# Patient Record
Sex: MALE | Race: WHITE | NOT HISPANIC OR LATINO | Employment: OTHER | URBAN - NONMETROPOLITAN AREA
[De-identification: names, ages, dates, MRNs, and addresses within clinical notes are randomized per-mention and may not be internally consistent; named-entity substitution may affect disease eponyms.]

---

## 2017-01-04 RX ORDER — CLOPIDOGREL BISULFATE 75 MG/1
TABLET ORAL
Qty: 90 TABLET | Refills: 3 | Status: ON HOLD | OUTPATIENT
Start: 2017-01-04 | End: 2017-02-07

## 2017-01-13 ENCOUNTER — OFFICE VISIT (OUTPATIENT)
Dept: ORTHOPEDIC SURGERY | Facility: CLINIC | Age: 77
End: 2017-01-13

## 2017-01-13 VITALS — BODY MASS INDEX: 27.64 KG/M2 | WEIGHT: 156 LBS | HEIGHT: 63 IN

## 2017-01-13 DIAGNOSIS — I25.10 CORONARY ARTERY DISEASE INVOLVING NATIVE CORONARY ARTERY OF NATIVE HEART WITHOUT ANGINA PECTORIS: ICD-10-CM

## 2017-01-13 DIAGNOSIS — M48.061 SPINAL STENOSIS OF LUMBAR REGION: ICD-10-CM

## 2017-01-13 DIAGNOSIS — M51.16 NEURITIS OR RADICULITIS DUE TO RUPTURE OF LUMBAR INTERVERTEBRAL DISC: ICD-10-CM

## 2017-01-13 DIAGNOSIS — M51.36 DEGENERATION OF LUMBAR INTERVERTEBRAL DISC: Primary | ICD-10-CM

## 2017-01-13 PROCEDURE — 99214 OFFICE O/P EST MOD 30 MIN: CPT | Performed by: ORTHOPAEDIC SURGERY

## 2017-01-13 NOTE — PROGRESS NOTES
Vishnu Waller is a 76 y.o. male returns for     Chief Complaint   Patient presents with   • Lumbar Spine - Follow-up       HISTORY OF PRESENT ILLNESS: Patient here to discuss surgical options.  Wishes to proceed for surgical treatment.       CONCURRENT MEDICAL HISTORY:    Past Medical History   Diagnosis Date   • Acute bacterial sinusitis    • Acute bronchitis    • Acute frontal sinusitis    • Acute maxillary sinusitis    • Acute pharyngitis    • Acute sinusitis      improved   • Allergic rhinitis    • Allergic rhinitis due to pollen    • Anxiety    • Artificial lens present      in position   • Backache    • Borderline glaucoma    • Chronic laryngitis    • Chronic rhinitis    • Coronary arteriosclerosis    • Coronary atherosclerosis    • Cough    • Degeneration of lumbar intervertebral disc    • Degenerative joint disease involving multiple joints    • Diabetes    • Diverticular disease of colon    • Erectile dysfunction    • Essential hypertension    • Generalized anxiety disorder    • GERD (gastroesophageal reflux disease)    • Glaucoma    • Heart disease    • Hemorrhoids      without bleeding   • Hyperlipidemia    • Insomnia    • Kidney stone    • Malignant tumor of prostate    • Need for prophylactic vaccination and inoculation against influenza    • Osteoarthritis    • Osteoarthritis of multiple joints    • Pain, lumbar region      Pain radiating to lumbar region of back     • Prostate cancer    • Pseudomembranous enterocolitis      improved   • Rotator cuff syndrome      right   • Shoulder pain    • Sleep apnea    • Strain of rotator cuff capsule    • Tenosynovitis    • Type 2 diabetes mellitus      no BDR   • Upper respiratory infection        No Known Allergies      Current Outpatient Prescriptions:   •  amLODIPine (NORVASC) 5 MG tablet, , Disp: , Rfl:   •  clopidogrel (PLAVIX) 75 MG tablet, TAKE 1 TABLET DAILY, Disp: 90 tablet, Rfl: 3  •  cyanocobalamin (VITAMIN B-12N) 50 MCG tablet, Take 50 mcg by  mouth Daily., Disp: , Rfl:   •  HYDROcodone-acetaminophen (NORCO)  MG per tablet, Take 1 tablet by mouth daily as needed for moderate pain (4-6)., Disp: , Rfl:   •  latanoprost (XALATAN) 0.005 % ophthalmic solution, Administer 1 drop to both eyes every night., Disp: , Rfl:   •  losartan (COZAAR) 50 MG tablet, , Disp: , Rfl:   •  melatonin 3 MG tablet, TAKE 1/2 TO 1 TABLET BY MOUTH NIGHTLY FOR SLEEP ISSUES, Disp: , Rfl: 5  •  meloxicam (MOBIC) 15 MG tablet, , Disp: , Rfl:   •  metFORMIN (GLUCOPHAGE) 1000 MG tablet, 1,000 mg 2 (Two) Times a Day With Meals., Disp: , Rfl:   •  mometasone (NASONEX) 50 MCG/ACT nasal spray, 2 sprays into each nostril daily., Disp: , Rfl:   •  montelukast (SINGULAIR) 10 MG tablet, , Disp: , Rfl:   •  Multiple Vitamins-Minerals (CENTRUM PO), Take 1 tablet by mouth daily. Centrum 0.4 mg-162 mg-18 mg tablet  (unconfirmed), Disp: , Rfl:   •  simvastatin (ZOCOR) 40 MG tablet, , Disp: , Rfl:     Past Surgical History   Procedure Laterality Date   • Cataract extraction Bilateral    • Nose surgery     • Prostatectomy     • Shoulder surgery Left      rotator cuff repair   • Colonoscopy     • Shoulder arthroscopy  07/24/2013     Arthroscopy of right shoulder with rotator repair, Carla procedure, Biceps tenotomy, subacromial decompression.   • Cardiac catheterization  09/25/2003     Cardiac cath (Normal left ventricular systolic function, EF 60% Multi-vessel coronary artery disease with critical disease noted in the LAD coronary artery, diagonal coronary artery, obtuse marginal coronary and right coronary artery.)   • Cardiac catheterization  05/03/1996     Cardiac cath (Coronary atherosclerotic heart disease. 70% diagonal stenosis. Mild to moderate left anterior descending artery stenosis. Preserved left ventricular function.)   • Colonoscopy       Colon endoscopy 48162 (Rectal bleeding. Radiation proctitis w/bleeding. An abnormal CT of transverse colon due to mucosal ischemic colitis, that  now is healed. Internal hemorrhoids w/possible bleeding.)   • Colonoscopy  05/10/2011     Colon endoscopy 14760 (Single sessile polyp found in transverse colon and sigmoid colon ,removed by cold biopsy polypectomy.divertic. found in sigmoid colon,descending colon. Friability/capillary friability in rectum sigmoid due to prior radiation.Inter. hem. Grade 1)   • Colonoscopy  05/02/2007     Colon endoscopy 85287 (Colon polyp. Diverticulosis without bleeding. Internal hemorrhoids without bleeding.)   • Cystoscopy  01/13/1995     Cystoscopy, stone removal (Right ureteroscopic lasertripsy.)   • Other surgical history       EXTENDED VISUAL FIELDS STUDY 37357 (Borderline glaucoma) (3): 03/19/2015, 04/09/2014, 03/27/2013   • Other surgical history  10/29/2003     Heart revascularize (TMR) (Directmyocardial revascularization times four; LIMA to LAD SVG to DARIUSZ SVG to OM1, SVG to RCA)   • Injection of medication  11/15/2012     Kenalog (4)      • Knee arthroscopy  01/17/2007     Knee arthroscopy, surgery (Arthroscopy with medial and lateral meniscectomies, right knee.)   • Knee surgery  11/04/2015     Knee Surgery (Right unicompartmental arthroplasty.)   • Septorhinoplasty  11/16/1989     Nasal surgery procedure (Septorhinoplasty with reconstruction of the nasal pyramid with a iliac crest graft, rhinoplasty was performed with external approach.)   • Other surgical history       OCT DISC NFL 77218 (Borderline glaucoma)  (3): 09/24/2015, 08/13/2014, 07/29/2013   • Cataract extraction  10/04/2011     Remove cataract, insert lens (Right)   • Cataract extraction  08/23/2011     Remove cataract, insert lens (left)   • Excision lesion  05/13/1999     REMOVE EAR LESION 05691 (1.3 cm basal cell carcinoma, right preauricular area. Excision)   • Excision lesion  03/13/2001     REMOVE LESION NECK/CHEST 74558 (Excision of irritated seborrheic keratosis, anterior neck, 1.1 cm and deep lipoma, posterior neck, 3.5 cm)   • Shoulder surgery   "11/01/2005     Shoulder surgery procedure (Decompression, subacromial, explore of the rotator cuff, no tear found nor repaired, acromioplasty of the left shoulder and AC shoulder joint resection.)   • Epidural  12/03/2014     Therapeutic/diag injection (Lumbar transforaminal epidural steroid injection, L5-S1, left side.)   • Epidural  10/22/2014     Therapeutic/diag injection (Lumbar transforaminal epidural steroid injection L5-S1 left side.)   • Epidural  08/07/2014     Therapeutic/diag injection (Lumbar transforaminal epidural steroid injection.)       ROS  No fevers or chills.  No chest pain or shortness of air.  No GI or  disturbances.    PHYSICAL EXAMINATION:       Visit Vitals   • Ht 63\" (160 cm)   • Wt 156 lb (70.8 kg)   • BMI 27.63 kg/m2       Physical Exam   Constitutional: He appears well-developed.   HENT:   Head: Normocephalic and atraumatic.   Eyes: Pupils are equal, round, and reactive to light. Right eye exhibits no discharge. Left eye exhibits no discharge.   Neck: Normal range of motion. No JVD present. No tracheal deviation present. No thyromegaly present.   Cardiovascular: Normal rate, regular rhythm, normal heart sounds and intact distal pulses.  Exam reveals no gallop and no friction rub.    No murmur heard.  Pulmonary/Chest: Effort normal and breath sounds normal. No respiratory distress. He has no wheezes. He has no rales. He exhibits no tenderness.   Abdominal: Soft. Bowel sounds are normal. He exhibits no distension. There is no tenderness. There is no guarding.   Musculoskeletal: He exhibits no edema or deformity.        Thoracic back: He exhibits normal range of motion, no tenderness, no bony tenderness, no swelling, no edema, no deformity, no laceration, no pain, no spasm and normal pulse.        Lumbar back: He exhibits decreased range of motion and tenderness. He exhibits no bony tenderness, no swelling, no edema, no deformity, no laceration, no pain, no spasm and normal pulse.        " Back:    Lymphadenopathy:     He has no cervical adenopathy.   Neurological: He is alert. He has normal reflexes. He displays normal reflexes. No cranial nerve deficit. Coordination normal.   Skin: Skin is warm. No rash noted. No erythema.   Psychiatric: He has a normal mood and affect. His behavior is normal. Thought content normal.   forward leaning posture.      GAIT:     [x]  Normal  []  Antalgic    Assistive device: []  None  []  Walker     []  Crutches  []  Cane     []  Wheelchair  []  Stretcher    Right Ankle Exam     Muscle Strength   Dorsiflexion:  5/5  Plantar flexion:  5/5  Anterior tibial:  5/5  Gastrocsoleus:  5/5  Peroneal muscle:  5/5      Left Ankle Exam     Muscle Strength   Dorsiflexion:  5/5   Plantar flexion:  5/5   Anterior tibial:  5/5   Gastrocsoleus:  5/5  Peroneal muscle:  5/5      Right Hip Exam     Muscle Strength   Flexion: 5/5       Left Hip Exam     Muscle Strength   Flexion: 5/5       Back Exam     Range of Motion   Extension: 20   Flexion: 60   Lateral Bend Right: 20   Lateral Bend Left: 20     Other   Gait: normal   Erythema: no back redness  Scars: absent              No results found.          ASSESSMENT:    Diagnoses and all orders for this visit:    Degeneration of lumbar intervertebral disc    Neuritis or radiculitis due to rupture of lumbar intervertebral disc          PLAN    Plan for decompression with interlaminar distraction of L3-4 and L4-5.   Reviewed risks of surgery, including risk of infection, hematoma, spinal fluid leak, neurologic injury including numbness and weakness.  I explained to him the medical risks of surgery including the risk of death, blindness, heart attack, stroke, gastric ulceration, perforation, peritonitis, deep vein thrombosis, pulmonary embolus, pneumonia, pulmonary failure necessitating prolonged ventilation, heart failure renal failure sepsis and multiorgan system failure.   He wishes to proceed for surgical treatment.       Lucio Faulkner  MD Gael

## 2017-01-13 NOTE — MR AVS SNAPSHOT
Vishnu Waller   1/13/2017 10:40 AM   Office Visit    Dept Phone:  [phone]   Encounter #:  [id number]    Provider:  uLcio Mayo MD   Department:  [facility] ORTHOPEDICS                Your Full Care Plan              Your Updated Medication List          This list is accurate as of: 1/13/17 12:33 PM.  Always use your most recent med list.                amLODIPine 5 MG tablet   Commonly known as:  NORVASC       CENTRUM PO       clopidogrel 75 MG tablet   Commonly known as:  PLAVIX   TAKE 1 TABLET DAILY       cyanocobalamin 50 MCG tablet   Commonly known as:  VITAMIN B-12N       HYDROcodone-acetaminophen  MG per tablet   Commonly known as:  NORCO       latanoprost 0.005 % ophthalmic solution   Commonly known as:  XALATAN       losartan 50 MG tablet   Commonly known as:  COZAAR       melatonin 3 MG tablet       meloxicam 15 MG tablet   Commonly known as:  MOBIC       metFORMIN 1000 MG tablet   Commonly known as:  GLUCOPHAGE       mometasone 50 MCG/ACT nasal spray   Commonly known as:  NASONEX       montelukast 10 MG tablet   Commonly known as:  SINGULAIR       simvastatin 40 MG tablet   Commonly known as:  ZOCOR               You Were Diagnosed With        Codes Comments    Degeneration of lumbar intervertebral disc    -  Primary ICD-10-CM: M51.36  ICD-9-CM: 722.52     Neuritis or radiculitis due to rupture of lumbar intervertebral disc     ICD-10-CM: M51.16  ICD-9-CM: 722.10       Instructions     None    Patient Instructions History      Upcoming Appointments     Visit Type Date Time Department    FOLLOW UP 1/13/2017 10:40 AM [facility] ORTHOPEDIC [facility]    FOLLOW UP 2/17/2017 10:00 AM [facility] ORTHOPEDIC [facility]    OFFICE VISIT 4/28/2017  1:15 PM [facility] HEART CARE [facility]    OFFICE VISIT 10/5/2017  1:15 PM [facility] OPHTHALMOLOGY [facility]      MyChart Signup     Our records indicate that you have declined [facility] signup. If you would like to sign up for  "[employer], please email [email or web address] or call [phone] to obtain an activation code.             Other Info from Your Visit           Your Appointments     Feb 17, 2017 10:00 AM CST   Follow Up with Nirmal Paez MD   [facility] ORTHOPEDICS (--)    [address]  [facility] [address] [phone]           Arrive 15 minutes prior to appointment.            Apr 28, 2017  1:15 PM CDT   Office Visit with Radha Wilkes MD   [facility] CARDIOLOGY (--)    [address]  [facility] [address] [phone]           Arrive 15 minutes prior to appointment.            Oct 05, 2017  1:15 PM CDT   Office Visit with Julio Cesar Shafer MD   [facility] OPHTHALMOLOGY (--)    [address] [place] [address] [address] [facility] [address] [phone]           Arrive 15 minutes prior to appointment.              Allergies     No Known Allergies      Reason for Visit     Lumbar Spine - Follow-up           Vital Signs     Height Weight Body Mass Index Smoking Status          63\" (160 cm) 156 lb (70.8 kg) 27.63 kg/m2 Current Every Day Smoker        Problems and Diagnoses Noted     Degeneration of lumbar or lumbosacral intervertebral disc    Neuritis or radiculitis due to rupture of lumbar intervertebral disc        "

## 2017-01-17 PROBLEM — M48.061 SPINAL STENOSIS OF LUMBAR REGION: Status: ACTIVE | Noted: 2017-01-17

## 2017-01-23 DIAGNOSIS — M51.16 NEURITIS OR RADICULITIS DUE TO RUPTURE OF LUMBAR INTERVERTEBRAL DISC: ICD-10-CM

## 2017-01-23 DIAGNOSIS — M51.36 DEGENERATION OF LUMBAR INTERVERTEBRAL DISC: Primary | ICD-10-CM

## 2017-01-23 DIAGNOSIS — M48.061 SPINAL STENOSIS, LUMBAR REGION, WITHOUT NEUROGENIC CLAUDICATION: ICD-10-CM

## 2017-01-24 ENCOUNTER — TELEPHONE (OUTPATIENT)
Dept: ORTHOPEDIC SURGERY | Facility: CLINIC | Age: 77
End: 2017-01-24

## 2017-02-01 ENCOUNTER — APPOINTMENT (OUTPATIENT)
Dept: PREADMISSION TESTING | Facility: HOSPITAL | Age: 77
End: 2017-02-01

## 2017-02-01 VITALS
BODY MASS INDEX: 26.75 KG/M2 | RESPIRATION RATE: 18 BRPM | WEIGHT: 151 LBS | HEIGHT: 63 IN | HEART RATE: 81 BPM | OXYGEN SATURATION: 97 % | SYSTOLIC BLOOD PRESSURE: 140 MMHG | DIASTOLIC BLOOD PRESSURE: 76 MMHG

## 2017-02-01 LAB
ANION GAP SERPL CALCULATED.3IONS-SCNC: 13 MMOL/L (ref 5–15)
BUN BLD-MCNC: 18 MG/DL (ref 7–21)
BUN/CREAT SERPL: 17.6 (ref 7–25)
CALCIUM SPEC-SCNC: 10.4 MG/DL (ref 8.4–10.2)
CHLORIDE SERPL-SCNC: 102 MMOL/L (ref 95–110)
CO2 SERPL-SCNC: 26 MMOL/L (ref 22–31)
CREAT BLD-MCNC: 1.02 MG/DL (ref 0.7–1.3)
DEPRECATED RDW RBC AUTO: 54.9 FL (ref 35.1–43.9)
ERYTHROCYTE [DISTWIDTH] IN BLOOD BY AUTOMATED COUNT: 15.4 % (ref 11.5–14.5)
GFR SERPL CREATININE-BSD FRML MDRD: 71 ML/MIN/1.73 (ref 42–98)
GLUCOSE BLD-MCNC: 170 MG/DL (ref 60–100)
HCT VFR BLD AUTO: 38.6 % (ref 39–49)
HGB BLD-MCNC: 13.1 G/DL (ref 13.7–17.3)
MCH RBC QN AUTO: 33 PG (ref 26.5–34)
MCHC RBC AUTO-ENTMCNC: 33.9 G/DL (ref 31.5–36.3)
MCV RBC AUTO: 97.2 FL (ref 80–98)
PLATELET # BLD AUTO: 231 10*3/MM3 (ref 150–450)
PMV BLD AUTO: 9.9 FL (ref 8–12)
POTASSIUM BLD-SCNC: 4.6 MMOL/L (ref 3.5–5.1)
RBC # BLD AUTO: 3.97 10*6/MM3 (ref 4.37–5.74)
SODIUM BLD-SCNC: 141 MMOL/L (ref 137–145)
WBC NRBC COR # BLD: 9.91 10*3/MM3 (ref 3.2–9.8)

## 2017-02-01 PROCEDURE — 85027 COMPLETE CBC AUTOMATED: CPT | Performed by: ORTHOPAEDIC SURGERY

## 2017-02-01 PROCEDURE — 36415 COLL VENOUS BLD VENIPUNCTURE: CPT

## 2017-02-01 PROCEDURE — 80048 BASIC METABOLIC PNL TOTAL CA: CPT | Performed by: ANESTHESIOLOGY

## 2017-02-01 RX ORDER — OXYBUTYNIN CHLORIDE 5 MG/1
5 TABLET ORAL NIGHTLY
Status: ON HOLD | COMMUNITY
End: 2019-01-23 | Stop reason: SINTOL

## 2017-02-01 RX ORDER — RANITIDINE HCL 75 MG
75 TABLET ORAL 2 TIMES DAILY
COMMUNITY
End: 2018-10-08

## 2017-02-01 NOTE — PAT
Spoke with Camryn at Dr. Mayo's office r/t patient still taking Plavix, was told for patient to hold as of today. Patient voiced understanding.

## 2017-02-06 ENCOUNTER — ANESTHESIA EVENT (OUTPATIENT)
Dept: PERIOP | Facility: HOSPITAL | Age: 77
End: 2017-02-06

## 2017-02-07 ENCOUNTER — APPOINTMENT (OUTPATIENT)
Dept: GENERAL RADIOLOGY | Facility: HOSPITAL | Age: 77
End: 2017-02-07

## 2017-02-07 ENCOUNTER — ANESTHESIA (OUTPATIENT)
Dept: PERIOP | Facility: HOSPITAL | Age: 77
End: 2017-02-07

## 2017-02-07 ENCOUNTER — HOSPITAL ENCOUNTER (OUTPATIENT)
Facility: HOSPITAL | Age: 77
Discharge: HOME | End: 2017-02-10
Attending: ORTHOPAEDIC SURGERY | Admitting: ORTHOPAEDIC SURGERY

## 2017-02-07 DIAGNOSIS — Z74.09 IMPAIRED PHYSICAL MOBILITY: Primary | ICD-10-CM

## 2017-02-07 PROBLEM — M51.36 DEGENERATIVE DISC DISEASE, LUMBAR: Status: ACTIVE | Noted: 2017-02-07

## 2017-02-07 LAB
GLUCOSE BLDC GLUCOMTR-MCNC: 151 MG/DL (ref 70–130)
GLUCOSE BLDC GLUCOMTR-MCNC: 176 MG/DL (ref 70–130)

## 2017-02-07 PROCEDURE — 72100 X-RAY EXAM L-S SPINE 2/3 VWS: CPT | Performed by: ORTHOPAEDIC SURGERY

## 2017-02-07 PROCEDURE — 25010000002 MIDAZOLAM PER 1 MG: Performed by: ANESTHESIOLOGY

## 2017-02-07 PROCEDURE — A9270 NON-COVERED ITEM OR SERVICE: HCPCS | Performed by: ORTHOPAEDIC SURGERY

## 2017-02-07 PROCEDURE — 25010000002 PROMETHAZINE PER 50 MG: Performed by: ORTHOPAEDIC SURGERY

## 2017-02-07 PROCEDURE — 82962 GLUCOSE BLOOD TEST: CPT

## 2017-02-07 PROCEDURE — 25010000002 ONDANSETRON PER 1 MG: Performed by: ANESTHESIOLOGY

## 2017-02-07 PROCEDURE — 63710000001 MONTELUKAST 10 MG TABLET: Performed by: ORTHOPAEDIC SURGERY

## 2017-02-07 PROCEDURE — 25010000002 HYDROMORPHONE PER 4 MG: Performed by: ANESTHESIOLOGY

## 2017-02-07 PROCEDURE — 25010000002 HYDROMORPHONE PER 4 MG: Performed by: NURSE ANESTHETIST, CERTIFIED REGISTERED

## 2017-02-07 PROCEDURE — 94799 UNLISTED PULMONARY SVC/PX: CPT

## 2017-02-07 PROCEDURE — 25010000002 MORPHINE PER 10 MG: Performed by: ORTHOPAEDIC SURGERY

## 2017-02-07 PROCEDURE — 25810000003 SODIUM CHLORIDE 0.9 % WITH KCL 20 MEQ 20-0.9 MEQ/L-% SOLUTION: Performed by: ORTHOPAEDIC SURGERY

## 2017-02-07 PROCEDURE — 22868 INSJ STABLJ DEV W/DCMPRN: CPT | Performed by: ORTHOPAEDIC SURGERY

## 2017-02-07 PROCEDURE — 63710000001 LATANOPROST 0.005 % SOLUTION 2.5 ML BOTTLE: Performed by: ORTHOPAEDIC SURGERY

## 2017-02-07 PROCEDURE — 22867 INSJ STABLJ DEV W/DCMPRN: CPT | Performed by: ORTHOPAEDIC SURGERY

## 2017-02-07 PROCEDURE — 25010000002 PROPOFOL 10 MG/ML EMULSION: Performed by: NURSE ANESTHETIST, CERTIFIED REGISTERED

## 2017-02-07 PROCEDURE — 25010000002 FENTANYL CITRATE (PF) 100 MCG/2ML SOLUTION: Performed by: NURSE ANESTHETIST, CERTIFIED REGISTERED

## 2017-02-07 PROCEDURE — 76000 FLUOROSCOPY <1 HR PHYS/QHP: CPT

## 2017-02-07 PROCEDURE — 25010000003 CEFAZOLIN PER 500 MG: Performed by: ORTHOPAEDIC SURGERY

## 2017-02-07 PROCEDURE — 25010000002 ONDANSETRON PER 1 MG: Performed by: ORTHOPAEDIC SURGERY

## 2017-02-07 PROCEDURE — 25010000002 PHENYLEPHRINE PER 1 ML: Performed by: NURSE ANESTHETIST, CERTIFIED REGISTERED

## 2017-02-07 PROCEDURE — 25010000002 MEPERIDINE 25 MG/0.5ML SOLUTION: Performed by: NURSE ANESTHETIST, CERTIFIED REGISTERED

## 2017-02-07 DEVICE — IMPLANTABLE DEVICE
Type: IMPLANTABLE DEVICE | Site: SPINE LUMBAR | Status: FUNCTIONAL
Brand: COFLEX INTERLAMINAR TECHNOLOGY, 12MM

## 2017-02-07 DEVICE — IMPLANTABLE DEVICE
Type: IMPLANTABLE DEVICE | Site: SPINE LUMBAR | Status: FUNCTIONAL
Brand: COFLEX INTERLAMINAR TECHNOLOGY, 10MM

## 2017-02-07 RX ORDER — LOSARTAN POTASSIUM 50 MG/1
50 TABLET ORAL DAILY
Status: DISCONTINUED | OUTPATIENT
Start: 2017-02-07 | End: 2017-02-10 | Stop reason: HOSPADM

## 2017-02-07 RX ORDER — LATANOPROST 50 UG/ML
1 SOLUTION/ DROPS OPHTHALMIC NIGHTLY
Status: DISCONTINUED | OUTPATIENT
Start: 2017-02-07 | End: 2017-02-10 | Stop reason: HOSPADM

## 2017-02-07 RX ORDER — FENTANYL CITRATE 50 UG/ML
INJECTION, SOLUTION INTRAMUSCULAR; INTRAVENOUS AS NEEDED
Status: DISCONTINUED | OUTPATIENT
Start: 2017-02-07 | End: 2017-02-07 | Stop reason: SURG

## 2017-02-07 RX ORDER — BACTERIOSTATIC SODIUM CHLORIDE 0.9 %
VIAL (ML) INJECTION AS NEEDED
Status: DISCONTINUED | OUTPATIENT
Start: 2017-02-07 | End: 2017-02-10 | Stop reason: HOSPADM

## 2017-02-07 RX ORDER — PROMETHAZINE HYDROCHLORIDE 25 MG/ML
12.5 INJECTION, SOLUTION INTRAMUSCULAR; INTRAVENOUS EVERY 6 HOURS PRN
Status: DISCONTINUED | OUTPATIENT
Start: 2017-02-07 | End: 2017-02-10 | Stop reason: HOSPADM

## 2017-02-07 RX ORDER — ONDANSETRON 2 MG/ML
4 INJECTION INTRAMUSCULAR; INTRAVENOUS ONCE AS NEEDED
Status: DISCONTINUED | OUTPATIENT
Start: 2017-02-07 | End: 2017-02-07

## 2017-02-07 RX ORDER — SODIUM CHLORIDE AND POTASSIUM CHLORIDE 150; 900 MG/100ML; MG/100ML
100 INJECTION, SOLUTION INTRAVENOUS CONTINUOUS
Status: DISCONTINUED | OUTPATIENT
Start: 2017-02-07 | End: 2017-02-10 | Stop reason: HOSPADM

## 2017-02-07 RX ORDER — FLUMAZENIL 0.1 MG/ML
0.2 INJECTION INTRAVENOUS AS NEEDED
Status: DISCONTINUED | OUTPATIENT
Start: 2017-02-07 | End: 2017-02-07

## 2017-02-07 RX ORDER — ONDANSETRON 2 MG/ML
4 INJECTION INTRAMUSCULAR; INTRAVENOUS EVERY 6 HOURS PRN
Status: DISCONTINUED | OUTPATIENT
Start: 2017-02-07 | End: 2017-02-10 | Stop reason: HOSPADM

## 2017-02-07 RX ORDER — MIDAZOLAM HYDROCHLORIDE 1 MG/ML
1 INJECTION INTRAMUSCULAR; INTRAVENOUS
Status: DISCONTINUED | OUTPATIENT
Start: 2017-02-07 | End: 2017-02-07 | Stop reason: HOSPADM

## 2017-02-07 RX ORDER — LIDOCAINE HYDROCHLORIDE 10 MG/ML
0.5 INJECTION, SOLUTION EPIDURAL; INFILTRATION; INTRACAUDAL; PERINEURAL ONCE AS NEEDED
Status: DISCONTINUED | OUTPATIENT
Start: 2017-02-07 | End: 2017-02-07 | Stop reason: HOSPADM

## 2017-02-07 RX ORDER — MONTELUKAST SODIUM 10 MG/1
10 TABLET ORAL NIGHTLY
Status: DISCONTINUED | OUTPATIENT
Start: 2017-02-07 | End: 2017-02-10 | Stop reason: HOSPADM

## 2017-02-07 RX ORDER — FAMOTIDINE 20 MG/1
20 TABLET, FILM COATED ORAL DAILY
Status: DISCONTINUED | OUTPATIENT
Start: 2017-02-07 | End: 2017-02-10 | Stop reason: HOSPADM

## 2017-02-07 RX ORDER — BUPIVACAINE HYDROCHLORIDE AND EPINEPHRINE 2.5; 5 MG/ML; UG/ML
INJECTION, SOLUTION EPIDURAL; INFILTRATION; INTRACAUDAL; PERINEURAL
Status: DISCONTINUED
Start: 2017-02-07 | End: 2017-02-07 | Stop reason: WASHOUT

## 2017-02-07 RX ORDER — AMLODIPINE BESYLATE 5 MG/1
5 TABLET ORAL DAILY
Status: DISCONTINUED | OUTPATIENT
Start: 2017-02-07 | End: 2017-02-10 | Stop reason: HOSPADM

## 2017-02-07 RX ORDER — UREA 10 %
3 LOTION (ML) TOPICAL NIGHTLY PRN
COMMUNITY
End: 2018-07-20

## 2017-02-07 RX ORDER — PROPOFOL 10 MG/ML
VIAL (ML) INTRAVENOUS AS NEEDED
Status: DISCONTINUED | OUTPATIENT
Start: 2017-02-07 | End: 2017-02-07 | Stop reason: SURG

## 2017-02-07 RX ORDER — SCOLOPAMINE TRANSDERMAL SYSTEM 1 MG/1
1 PATCH, EXTENDED RELEASE TRANSDERMAL ONCE
Status: DISCONTINUED | OUTPATIENT
Start: 2017-02-07 | End: 2017-02-07 | Stop reason: HOSPADM

## 2017-02-07 RX ORDER — ATORVASTATIN CALCIUM 40 MG/1
40 TABLET, FILM COATED ORAL DAILY
Status: DISCONTINUED | OUTPATIENT
Start: 2017-02-07 | End: 2017-02-10 | Stop reason: HOSPADM

## 2017-02-07 RX ORDER — CLOPIDOGREL BISULFATE 75 MG/1
75 TABLET ORAL DAILY
COMMUNITY
End: 2017-11-28 | Stop reason: SDUPTHER

## 2017-02-07 RX ORDER — SODIUM CHLORIDE 0.9 % (FLUSH) 0.9 %
1-10 SYRINGE (ML) INJECTION AS NEEDED
Status: DISCONTINUED | OUTPATIENT
Start: 2017-02-07 | End: 2017-02-07 | Stop reason: HOSPADM

## 2017-02-07 RX ORDER — MORPHINE SULFATE 4 MG/ML
4 INJECTION, SOLUTION INTRAMUSCULAR; INTRAVENOUS
Status: DISCONTINUED | OUTPATIENT
Start: 2017-02-07 | End: 2017-02-10 | Stop reason: HOSPADM

## 2017-02-07 RX ORDER — HYDRALAZINE HYDROCHLORIDE 20 MG/ML
5 INJECTION INTRAMUSCULAR; INTRAVENOUS
Status: DISCONTINUED | OUTPATIENT
Start: 2017-02-07 | End: 2017-02-07

## 2017-02-07 RX ORDER — 0.9 % SODIUM CHLORIDE 0.9 %
VIAL (ML) INJECTION
Status: DISPENSED
Start: 2017-02-07 | End: 2017-02-07

## 2017-02-07 RX ORDER — PROMETHAZINE HYDROCHLORIDE 25 MG/ML
2.5 INJECTION, SOLUTION INTRAMUSCULAR; INTRAVENOUS
Status: DISCONTINUED | OUTPATIENT
Start: 2017-02-07 | End: 2017-02-07 | Stop reason: HOSPADM

## 2017-02-07 RX ORDER — CLOPIDOGREL BISULFATE 75 MG/1
75 TABLET ORAL DAILY
Status: DISCONTINUED | OUTPATIENT
Start: 2017-02-07 | End: 2017-02-10 | Stop reason: HOSPADM

## 2017-02-07 RX ORDER — FAMOTIDINE 10 MG/ML
20 INJECTION, SOLUTION INTRAVENOUS ONCE
Status: DISCONTINUED | OUTPATIENT
Start: 2017-02-07 | End: 2017-02-07 | Stop reason: HOSPADM

## 2017-02-07 RX ORDER — NALOXONE HCL 0.4 MG/ML
0.2 VIAL (ML) INJECTION AS NEEDED
Status: DISCONTINUED | OUTPATIENT
Start: 2017-02-07 | End: 2017-02-07

## 2017-02-07 RX ORDER — CHOLECALCIFEROL (VITAMIN D3) 125 MCG
500 CAPSULE ORAL DAILY
Status: DISCONTINUED | OUTPATIENT
Start: 2017-02-07 | End: 2017-02-10 | Stop reason: HOSPADM

## 2017-02-07 RX ORDER — SODIUM CHLORIDE 9 MG/ML
1000 INJECTION, SOLUTION INTRAVENOUS CONTINUOUS PRN
Status: DISCONTINUED | OUTPATIENT
Start: 2017-02-07 | End: 2017-02-07 | Stop reason: HOSPADM

## 2017-02-07 RX ORDER — METOCLOPRAMIDE HYDROCHLORIDE 5 MG/ML
10 INJECTION INTRAMUSCULAR; INTRAVENOUS ONCE AS NEEDED
Status: DISCONTINUED | OUTPATIENT
Start: 2017-02-07 | End: 2017-02-07 | Stop reason: HOSPADM

## 2017-02-07 RX ORDER — LIDOCAINE HYDROCHLORIDE 20 MG/ML
INJECTION, SOLUTION INFILTRATION; PERINEURAL AS NEEDED
Status: DISCONTINUED | OUTPATIENT
Start: 2017-02-07 | End: 2017-02-07 | Stop reason: SURG

## 2017-02-07 RX ORDER — DEXAMETHASONE SODIUM PHOSPHATE 4 MG/ML
2 INJECTION, SOLUTION INTRA-ARTICULAR; INTRALESIONAL; INTRAMUSCULAR; INTRAVENOUS; SOFT TISSUE ONCE AS NEEDED
Status: DISCONTINUED | OUTPATIENT
Start: 2017-02-07 | End: 2017-02-07 | Stop reason: HOSPADM

## 2017-02-07 RX ORDER — ALBUTEROL SULFATE 2.5 MG/3ML
2.5 SOLUTION RESPIRATORY (INHALATION) ONCE
Status: COMPLETED | OUTPATIENT
Start: 2017-02-07 | End: 2017-02-07

## 2017-02-07 RX ORDER — MIDAZOLAM HYDROCHLORIDE 1 MG/ML
2 INJECTION INTRAMUSCULAR; INTRAVENOUS
Status: DISCONTINUED | OUTPATIENT
Start: 2017-02-07 | End: 2017-02-07 | Stop reason: HOSPADM

## 2017-02-07 RX ORDER — LABETALOL HYDROCHLORIDE 5 MG/ML
5 INJECTION, SOLUTION INTRAVENOUS
Status: DISCONTINUED | OUTPATIENT
Start: 2017-02-07 | End: 2017-02-07

## 2017-02-07 RX ORDER — MELOXICAM 15 MG/1
15 TABLET ORAL DAILY
Status: DISCONTINUED | OUTPATIENT
Start: 2017-02-07 | End: 2017-02-10 | Stop reason: HOSPADM

## 2017-02-07 RX ORDER — ONDANSETRON 2 MG/ML
4 INJECTION INTRAMUSCULAR; INTRAVENOUS ONCE AS NEEDED
Status: DISCONTINUED | OUTPATIENT
Start: 2017-02-07 | End: 2017-02-10 | Stop reason: HOSPADM

## 2017-02-07 RX ORDER — ROCURONIUM BROMIDE 10 MG/ML
INJECTION, SOLUTION INTRAVENOUS AS NEEDED
Status: DISCONTINUED | OUTPATIENT
Start: 2017-02-07 | End: 2017-02-07 | Stop reason: SURG

## 2017-02-07 RX ORDER — LANOLIN ALCOHOL/MO/W.PET/CERES
3 CREAM (GRAM) TOPICAL NIGHTLY PRN
Status: DISCONTINUED | OUTPATIENT
Start: 2017-02-07 | End: 2017-02-10 | Stop reason: HOSPADM

## 2017-02-07 RX ORDER — BACITRACIN 50000 [USP'U]/1
INJECTION, POWDER, LYOPHILIZED, FOR SOLUTION INTRAMUSCULAR AS NEEDED
Status: DISCONTINUED | OUTPATIENT
Start: 2017-02-07 | End: 2017-02-10 | Stop reason: HOSPADM

## 2017-02-07 RX ORDER — OXYBUTYNIN CHLORIDE 5 MG/1
5 TABLET ORAL DAILY
Status: DISCONTINUED | OUTPATIENT
Start: 2017-02-07 | End: 2017-02-10 | Stop reason: HOSPADM

## 2017-02-07 RX ORDER — BACITRACIN 50000 [USP'U]/1
INJECTION, POWDER, LYOPHILIZED, FOR SOLUTION INTRAMUSCULAR
Status: DISPENSED
Start: 2017-02-07 | End: 2017-02-07

## 2017-02-07 RX ORDER — OXYCODONE AND ACETAMINOPHEN 7.5; 325 MG/1; MG/1
1 TABLET ORAL EVERY 4 HOURS PRN
Status: DISCONTINUED | OUTPATIENT
Start: 2017-02-07 | End: 2017-02-10 | Stop reason: HOSPADM

## 2017-02-07 RX ORDER — ALBUTEROL SULFATE 2.5 MG/3ML
2.5 SOLUTION RESPIRATORY (INHALATION) ONCE
Status: DISCONTINUED | OUTPATIENT
Start: 2017-02-07 | End: 2017-02-10 | Stop reason: HOSPADM

## 2017-02-07 RX ORDER — METOCLOPRAMIDE HYDROCHLORIDE 5 MG/ML
10 INJECTION INTRAMUSCULAR; INTRAVENOUS ONCE AS NEEDED
Status: DISCONTINUED | OUTPATIENT
Start: 2017-02-07 | End: 2017-02-07

## 2017-02-07 RX ORDER — MORPHINE SULFATE 2 MG/ML
2 INJECTION, SOLUTION INTRAMUSCULAR; INTRAVENOUS
Status: DISCONTINUED | OUTPATIENT
Start: 2017-02-07 | End: 2017-02-07

## 2017-02-07 RX ORDER — DIPHENHYDRAMINE HYDROCHLORIDE 50 MG/ML
25 INJECTION INTRAMUSCULAR; INTRAVENOUS EVERY 6 HOURS PRN
Status: DISCONTINUED | OUTPATIENT
Start: 2017-02-07 | End: 2017-02-10 | Stop reason: HOSPADM

## 2017-02-07 RX ORDER — ONDANSETRON 2 MG/ML
4 INJECTION INTRAMUSCULAR; INTRAVENOUS ONCE AS NEEDED
Status: COMPLETED | OUTPATIENT
Start: 2017-02-07 | End: 2017-02-07

## 2017-02-07 RX ADMIN — FENTANYL CITRATE 50 MCG: 50 INJECTION, SOLUTION INTRAMUSCULAR; INTRAVENOUS at 09:45

## 2017-02-07 RX ADMIN — MORPHINE SULFATE 4 MG: 4 INJECTION, SOLUTION INTRAMUSCULAR; INTRAVENOUS at 19:55

## 2017-02-07 RX ADMIN — FENTANYL CITRATE 50 MCG: 50 INJECTION, SOLUTION INTRAMUSCULAR; INTRAVENOUS at 11:10

## 2017-02-07 RX ADMIN — MORPHINE SULFATE 4 MG: 4 INJECTION, SOLUTION INTRAMUSCULAR; INTRAVENOUS at 23:33

## 2017-02-07 RX ADMIN — ONDANSETRON 4 MG: 2 INJECTION INTRAMUSCULAR; INTRAVENOUS at 11:23

## 2017-02-07 RX ADMIN — EPHEDRINE SULFATE 10 MG: 50 INJECTION INTRAMUSCULAR; INTRAVENOUS; SUBCUTANEOUS at 10:00

## 2017-02-07 RX ADMIN — EPHEDRINE SULFATE 10 MG: 50 INJECTION INTRAMUSCULAR; INTRAVENOUS; SUBCUTANEOUS at 09:25

## 2017-02-07 RX ADMIN — PROPOFOL 30 MG: 10 INJECTION, EMULSION INTRAVENOUS at 09:45

## 2017-02-07 RX ADMIN — LIDOCAINE HYDROCHLORIDE 50 MG: 20 INJECTION, SOLUTION INFILTRATION; PERINEURAL at 08:05

## 2017-02-07 RX ADMIN — FENTANYL CITRATE 50 MCG: 50 INJECTION, SOLUTION INTRAMUSCULAR; INTRAVENOUS at 09:50

## 2017-02-07 RX ADMIN — HYDROMORPHONE HYDROCHLORIDE 0.3 MG: 1 INJECTION, SOLUTION INTRAMUSCULAR; INTRAVENOUS; SUBCUTANEOUS at 12:15

## 2017-02-07 RX ADMIN — PHENYLEPHRINE HYDROCHLORIDE 100 MCG: 10 INJECTION INTRAVENOUS at 08:27

## 2017-02-07 RX ADMIN — EPHEDRINE SULFATE 10 MG: 50 INJECTION INTRAMUSCULAR; INTRAVENOUS; SUBCUTANEOUS at 08:40

## 2017-02-07 RX ADMIN — HYDROMORPHONE HYDROCHLORIDE 0.2 MG: 1 INJECTION, SOLUTION INTRAMUSCULAR; INTRAVENOUS; SUBCUTANEOUS at 12:28

## 2017-02-07 RX ADMIN — ROCURONIUM BROMIDE 50 MG: 10 INJECTION INTRAVENOUS at 08:05

## 2017-02-07 RX ADMIN — MONTELUKAST SODIUM 10 MG: 10 TABLET, FILM COATED ORAL at 21:23

## 2017-02-07 RX ADMIN — MIDAZOLAM 1 MG: 1 INJECTION INTRAMUSCULAR; INTRAVENOUS at 07:58

## 2017-02-07 RX ADMIN — POTASSIUM CHLORIDE AND SODIUM CHLORIDE 100 ML/HR: 900; 150 INJECTION, SOLUTION INTRAVENOUS at 13:15

## 2017-02-07 RX ADMIN — MORPHINE SULFATE 4 MG: 4 INJECTION, SOLUTION INTRAMUSCULAR; INTRAVENOUS at 17:55

## 2017-02-07 RX ADMIN — HYDROMORPHONE HYDROCHLORIDE 0.5 MG: 1 INJECTION, SOLUTION INTRAMUSCULAR; INTRAVENOUS; SUBCUTANEOUS at 11:58

## 2017-02-07 RX ADMIN — FENTANYL CITRATE 50 MCG: 50 INJECTION, SOLUTION INTRAMUSCULAR; INTRAVENOUS at 08:05

## 2017-02-07 RX ADMIN — SODIUM CHLORIDE: 900 INJECTION, SOLUTION INTRAVENOUS at 09:10

## 2017-02-07 RX ADMIN — HYDROMORPHONE HYDROCHLORIDE 0.6 MG: 1 INJECTION, SOLUTION INTRAMUSCULAR; INTRAVENOUS; SUBCUTANEOUS at 13:03

## 2017-02-07 RX ADMIN — MEPERIDINE HYDROCHLORIDE 12.5 MG: 50 INJECTION, SOLUTION INTRAMUSCULAR; INTRAVENOUS; SUBCUTANEOUS at 13:31

## 2017-02-07 RX ADMIN — PHENYLEPHRINE HYDROCHLORIDE 100 MCG: 10 INJECTION INTRAVENOUS at 08:22

## 2017-02-07 RX ADMIN — HYDROMORPHONE HYDROCHLORIDE 0.4 MG: 1 INJECTION, SOLUTION INTRAMUSCULAR; INTRAVENOUS; SUBCUTANEOUS at 12:51

## 2017-02-07 RX ADMIN — SODIUM CHLORIDE 1000 ML: 900 INJECTION, SOLUTION INTRAVENOUS at 06:37

## 2017-02-07 RX ADMIN — CEFAZOLIN SODIUM 2 G: 1 INJECTION, POWDER, FOR SOLUTION INTRAMUSCULAR; INTRAVENOUS at 17:51

## 2017-02-07 RX ADMIN — PROPOFOL 120 MG: 10 INJECTION, EMULSION INTRAVENOUS at 08:05

## 2017-02-07 RX ADMIN — ONDANSETRON 4 MG: 2 INJECTION INTRAMUSCULAR; INTRAVENOUS at 17:49

## 2017-02-07 RX ADMIN — PROMETHAZINE HYDROCHLORIDE 12.5 MG: 25 INJECTION INTRAMUSCULAR; INTRAVENOUS at 19:55

## 2017-02-07 RX ADMIN — EPHEDRINE SULFATE 10 MG: 50 INJECTION INTRAMUSCULAR; INTRAVENOUS; SUBCUTANEOUS at 09:50

## 2017-02-07 RX ADMIN — ALBUTEROL SULFATE 2.5 MG: 2.5 SOLUTION RESPIRATORY (INHALATION) at 06:41

## 2017-02-07 RX ADMIN — EPHEDRINE SULFATE 10 MG: 50 INJECTION INTRAMUSCULAR; INTRAVENOUS; SUBCUTANEOUS at 08:20

## 2017-02-07 RX ADMIN — EPHEDRINE SULFATE 10 MG: 50 INJECTION INTRAMUSCULAR; INTRAVENOUS; SUBCUTANEOUS at 09:08

## 2017-02-07 RX ADMIN — LATANOPROST 1 DROP: 50 SOLUTION OPHTHALMIC at 22:33

## 2017-02-07 RX ADMIN — EPHEDRINE SULFATE 10 MG: 50 INJECTION INTRAMUSCULAR; INTRAVENOUS; SUBCUTANEOUS at 08:30

## 2017-02-07 RX ADMIN — EPHEDRINE SULFATE 10 MG: 50 INJECTION INTRAMUSCULAR; INTRAVENOUS; SUBCUTANEOUS at 10:30

## 2017-02-07 RX ADMIN — CEFAZOLIN SODIUM 2 G: 1 INJECTION, POWDER, FOR SOLUTION INTRAMUSCULAR; INTRAVENOUS at 08:14

## 2017-02-07 RX ADMIN — MORPHINE SULFATE 4 MG: 4 INJECTION, SOLUTION INTRAMUSCULAR; INTRAVENOUS at 15:00

## 2017-02-07 RX ADMIN — CEFAZOLIN SODIUM 2 G: 1 INJECTION, POWDER, FOR SOLUTION INTRAMUSCULAR; INTRAVENOUS at 22:34

## 2017-02-07 RX ADMIN — PHENYLEPHRINE HYDROCHLORIDE 100 MCG: 10 INJECTION INTRAVENOUS at 08:59

## 2017-02-07 RX ADMIN — MIDAZOLAM 1 MG: 1 INJECTION INTRAMUSCULAR; INTRAVENOUS at 08:02

## 2017-02-07 RX ADMIN — PHENYLEPHRINE HYDROCHLORIDE 100 MCG: 10 INJECTION INTRAVENOUS at 08:08

## 2017-02-07 RX ADMIN — ROCURONIUM BROMIDE 10 MG: 10 INJECTION INTRAVENOUS at 09:50

## 2017-02-07 NOTE — ANESTHESIA POSTPROCEDURE EVALUATION
Patient: Vishnu Waller    Procedure Summary     Date Anesthesia Start Anesthesia Stop Room / Location    02/07/17 0800 1155 [facility] OR 11 / [facility] OR       Procedure Diagnosis Surgeon Provider    LUMBAR LAMINECTOMY LUMBAR THREE-FOUR, LUMBAR FOUR-FIVE, INTERLAMINAR DISTRACTION  (N/A Spine Lumbar) Degenerative lumbar disc  (Degenerative lumbar disc) MD Elizabet Upton CRNA          Anesthesia Type: general  Last vitals  /64 (02/07/17 1316)    Temp      Pulse 76 (02/07/17 1316)   Resp 16 (02/07/17 1316)    SpO2 96 % (02/07/17 1316)      Post Anesthesia Care and Evaluation    Patient location during evaluation: bedside  Patient participation: complete - patient participated  Level of consciousness: awake  Pain management: adequate  Airway patency: patent  Anesthetic complications: No anesthetic complications    Cardiovascular status: acceptable  Respiratory status: acceptable  Hydration status: acceptable    Comments:

## 2017-02-07 NOTE — ADDENDUM NOTE
Addendum  created 02/07/17 1325 by Edgardo Cruz MD    Delete clinical note, Sign clinical note

## 2017-02-07 NOTE — ANESTHESIA PROCEDURE NOTES
Airway  Urgency: elective      General Information and Staff    Patient location during procedure: OR  CRNA: ELIZABETH DOYLE    Indications and Patient Condition  Indications for airway management: airway protection    Preoxygenated: yes  MILS maintained throughout  Mask difficulty assessment: 1 - vent by mask    Final Airway Details  Final airway type: endotracheal airway      Successful airway: ETT  Cuffed: yes   Successful intubation technique: direct laryngoscopy  Facilitating devices/methods: intubating stylet  Endotracheal tube insertion site: oral  Blade: Tl  Blade size: #4  ETT size: 7.5 mm  Cormack-Lehane Classification: grade I - full view of glottis  Placement verified by: chest auscultation   Measured from: lips  ETT to lips (cm): 21  Number of attempts at approach: 1

## 2017-02-07 NOTE — OP NOTE
Date of Procedure: 02/07/2017    PREOPERATIVE DIAGNOSES:   1. Multilevel lumbar degenerative disk disease.   2. Spinal stenosis.    POSTOPERATIVE DIAGNOSES:  1. Multilevel lumbar degenerative disk disease.  2. Spinal stenosis.    PROCEDURES PERFORMED:  1. Lumbar hemilaminotomy for decompression of L3-L4 and L4-L5.  2. Lumbar laminotomy for decompression on the left and the right sides at L3-L4.  3. Lumbar laminotomy for decompression at L4-L5 of left and right sides.  4. Placement of Coflex interspinous spacer implant at L3-L4.  5. Placement of interspinous spacer for distraction at L4-L5.  6. Radiographic evaluation.    SURGEON: Lucio Mayo MD     ANESTHESIA: General.    POSITIONING: Prone.    ESTIMATED BLOOD LOSS: 150.    FINDINGS: Severe lumbar stenosis.    DESCRIPTION OF PROCEDURE: The patient was brought to the operating room. Satisfactory airway was established. He was positioned prone. Care was taken to carefully pad the head and neck, trunk and extremities. The lumbar region was cleansed with alcohol-based scrub. A sterile spinal needle was placed and a lateral radiograph was obtained. The lateral radiograph demonstrated positioning of the spinal needle. I marked the skin and proceeded then for a sterile prep of the lumbar region. The lumbar region was cleansed with the ChloraPrep wash. Standard surgical barriers were positioned in the usual fashion utilizing sterile surgical technique throughout the course of the procedure. His consent form was reviewed and everyone agreed. Intravenous antibiotics were administered. MRI was reviewed demonstrating lumbar spinal stenosis of L3-L4 and   L4-L5. Incision was made with dissection down to the midline. I dissected the musculature from the left and right sides and a deep-seated retractor was placed. I identified the facets of L3-L4 and L4-L5, and then a hemilaminotomy for decompression was performed initially at L4-L5 on the right side, and then I placed  a curette and a lateral radiograph was obtained verifying positioning of the curette in the interlaminar space of L4-L5 to verify the operative level. Then utilizing a high-speed bur I removed portions of the lamina, and utilizing a Kerrison removed portions of the ligamentum flavum to decompress the lateral recess. On the right side similarly, this was done at L3-L4; however, in the effort of removing the ligamentum flavum at L3-L4 a durotomy was encountered. This was a small pin hole dural tear at approximately the 1-o'clock position. I proceeded to decompress the left side also with hemilaminotomy at L3-L4 and then again at L4-L5. I then removed the interspinous ligament and removed the ligamentum flavum all the way across the midline to both the left and right sides. Once this was fully decompressed, then I repaired the durotomy. This was done with a 4-0 Nurolon stitch, and once this was sealed then Valsalva showed there was no inadvertent cerebrospinal fluid leak. I irrigated the tissues. The lateral recess was fully decompressed. I trialed for the interspinous spacer at L3-L4 followed by L4-L5, and a size 10 was a correct fit and then a size 12 was a correct fit at L4-L5. I placed the Coflex interspinous spacer at L3-L4. This was positioned and then secured in place followed by placement of the size 12 at L4-L5. Both had a good fit. AP and lateral radiographs were reviewed and demonstrated positioning of the Coflex implant at L3-L4 and L4-L5. The wounds were then irrigated. Floseal was placed for hemostasis at the site of the laminotomies. I then placed a deep drain and suture closed the fascial layer with running Vicryl suture followed by subcutaneous Vicryl and then nylon on the skin. Sterile dressings were placed. The patient was repositioned, extubated and transported to recovery. There were no complications.         CC:            Lucio Mayo MD  Dictation Date/Time: 02/07/2017 14:51:00(Eastern  Time Zone)  Transcribed Date/Time: 02/07/2017 16:12:27 (Eastern Time Zone)  Dictator/ Initials:  [email or web address]  Document ID:                [id number]  Job ID: [id number]

## 2017-02-07 NOTE — ANESTHESIA PREPROCEDURE EVALUATION
" Anesthesia Evaluation     Patient summary reviewed and Nursing notes reviewed      Airway   Mallampati: II  TM distance: >3 FB  Neck ROM: full  possible difficult intubation  Dental    (+) poor dentation and lower dentures    Pulmonary    (+) a smoker (one pack/day for \"years\" uses inhaler PRN.) Current, recent URI resolved, sleep apnea, decreased breath sounds, wheezes (Expiratory wheezes.),     PE comment: Albuterol treatment pre-op.  Cardiovascular - normal exam    ECG reviewed  Rhythm: regular  Rate: normal    (+) hypertension well controlled, CAD, CABG > 6 Months,     ROS comment: EKG:NSR    Neuro/Psych  (+) numbness (Secondary to lumbar radicular symptoms.),    GI/Hepatic/Renal/Endo    (+)  GERD well controlled, chronic renal disease (Creatinine 1.02), diabetes mellitus type 2 well controlled,     Musculoskeletal     Abdominal    Substance History - negative use     OB/GYN negative ob/gyn ROS         Other   (+) arthritis                                 Anesthesia Plan    ASA 3     general     intravenous induction   Anesthetic plan and risks discussed with patient and spouse/significant other.      "

## 2017-02-07 NOTE — ANESTHESIA POSTPROCEDURE EVALUATION
Patient: Vishnu Waller    Procedure Summary     Date Anesthesia Start Anesthesia Stop Room / Location    02/07/17 0800 1155 [facility] OR 11 / [facility] OR       Procedure Diagnosis Surgeon Provider    LUMBAR LAMINECTOMY LUMBAR THREE-FOUR, LUMBAR FOUR-FIVE, INTERLAMINAR DISTRACTION  (N/A Spine Lumbar) Degenerative lumbar disc  (Degenerative lumbar disc) MD Elizabet Upton CRNA          Anesthesia Type: general  Last vitals  /64 (02/07/17 1316)    Temp      Pulse 76 (02/07/17 1316)   Resp 16 (02/07/17 1316)    SpO2 96 % (02/07/17 1316)      Post Anesthesia Care and Evaluation    Patient location during evaluation: bedside  Patient participation: complete - patient participated  Level of consciousness: awake  Pain management: adequate  Airway patency: patent  Anesthetic complications: No anesthetic complications  PONV Status: none  Cardiovascular status: acceptable  Respiratory status: acceptable  Hydration status: acceptable    Comments: Pt with hypoxic issues in recovery, likely atelectasis.  In Phase 2 recovery, I personally walked with him down the mccullough and then had him use his incentive spirometer.  His SpO2 on the toe was 97%, finger 92%.

## 2017-02-07 NOTE — BRIEF OP NOTE
LUMBAR LAMINECTOMY WITH/WITHOUT FUSION  Procedure Note    Vishnu Paizgle  2/7/2017    Pre-op Diagnosis:   Degenerative lumbar disc    Post-op Diagnosis:     Post-Op Diagnosis Codes:     * Degenerative lumbar disc [M51.36]    Procedure/CPT® Codes:  37622-19  08819-91  09326-90  63201-49    Procedure(s):  LUMBAR LAMINECTOMY LUMBAR THREE-FOUR, LUMBAR FOUR-FIVE, INTERLAMINAR DISTRACTION     Surgeon(s):  Lucio Mayo MD    Anesthesia: General    Staff:   Circulator: Edgardo Martinez RN  Scrub Person: Mary Jane Mckeon; Tonya Bautista  Assistant: Camryn Walton CSA    Estimated Blood Loss: * No values recorded between 2/7/2017  8:00 AM and 2/7/2017 11:44 AM *    Specimens:                * No specimens in log *      Drains:   Drain/Device Site 02/07/17 1055 midline lumbar spine collapsible closed device (Active)           Findings: spondylosis, spinal stenosis    Complications: dural tear      Lucio Mayo MD     Date: 2/7/2017  Time: 11:47 AM

## 2017-02-07 NOTE — PLAN OF CARE
Problem: Patient Care Overview (Adult)  Goal: Plan of Care Review  Outcome: Ongoing (interventions implemented as appropriate)    02/07/17 1253   Coping/Psychosocial Response Interventions   Plan Of Care Reviewed With patient   Patient Care Overview   Progress improving   Outcome Evaluation   Outcome Summary/Follow up Plan VSS on 3L nasal cannula, denies nausea, resting comfortably with nonverbal indicator of pain tolerable, verbal pain rating when awakened remains 8/10. Pedal pulses and neuro checks WDL. pt ready for transfer to .         Problem: Perioperative Period (Adult)  Goal: Signs and Symptoms of Listed Potential Problems Will be Absent or Manageable (Perioperative Period)  Outcome: Ongoing (interventions implemented as appropriate)

## 2017-02-08 LAB
GLUCOSE BLDC GLUCOMTR-MCNC: 105 MG/DL (ref 70–130)
GLUCOSE BLDC GLUCOMTR-MCNC: 121 MG/DL (ref 70–130)
GLUCOSE BLDC GLUCOMTR-MCNC: 126 MG/DL (ref 70–130)
GLUCOSE BLDC GLUCOMTR-MCNC: 161 MG/DL (ref 70–130)
HCT VFR BLD AUTO: 30.1 % (ref 39–49)
HGB BLD-MCNC: 10.2 G/DL (ref 13.7–17.3)

## 2017-02-08 PROCEDURE — 97530 THERAPEUTIC ACTIVITIES: CPT

## 2017-02-08 PROCEDURE — A9270 NON-COVERED ITEM OR SERVICE: HCPCS | Performed by: ORTHOPAEDIC SURGERY

## 2017-02-08 PROCEDURE — 97162 PT EVAL MOD COMPLEX 30 MIN: CPT

## 2017-02-08 PROCEDURE — 85014 HEMATOCRIT: CPT | Performed by: ORTHOPAEDIC SURGERY

## 2017-02-08 PROCEDURE — 94799 UNLISTED PULMONARY SVC/PX: CPT

## 2017-02-08 PROCEDURE — 97116 GAIT TRAINING THERAPY: CPT

## 2017-02-08 PROCEDURE — 97110 THERAPEUTIC EXERCISES: CPT

## 2017-02-08 PROCEDURE — 99024 POSTOP FOLLOW-UP VISIT: CPT | Performed by: FAMILY MEDICINE

## 2017-02-08 PROCEDURE — 94760 N-INVAS EAR/PLS OXIMETRY 1: CPT

## 2017-02-08 PROCEDURE — 25010000002 MORPHINE PER 10 MG: Performed by: ORTHOPAEDIC SURGERY

## 2017-02-08 PROCEDURE — G8978 MOBILITY CURRENT STATUS: HCPCS

## 2017-02-08 PROCEDURE — 63710000001 OXYBUTYNIN 5 MG TABLET: Performed by: ORTHOPAEDIC SURGERY

## 2017-02-08 PROCEDURE — 63710000001 FAMOTIDINE 20 MG TABLET: Performed by: ORTHOPAEDIC SURGERY

## 2017-02-08 PROCEDURE — 25010000002 DIPHENHYDRAMINE PER 50 MG: Performed by: ORTHOPAEDIC SURGERY

## 2017-02-08 PROCEDURE — G8979 MOBILITY GOAL STATUS: HCPCS

## 2017-02-08 PROCEDURE — 25810000003 SODIUM CHLORIDE 0.9 % WITH KCL 20 MEQ 20-0.9 MEQ/L-% SOLUTION: Performed by: ORTHOPAEDIC SURGERY

## 2017-02-08 PROCEDURE — 63710000001 METFORMIN 500 MG TABLET: Performed by: ORTHOPAEDIC SURGERY

## 2017-02-08 PROCEDURE — 63710000001 MULTIVITAMIN WITH MINERALS TABLET: Performed by: ORTHOPAEDIC SURGERY

## 2017-02-08 PROCEDURE — 63710000001 VITAMIN B-12 500 MCG TABLET: Performed by: ORTHOPAEDIC SURGERY

## 2017-02-08 PROCEDURE — 63710000001 ATORVASTATIN 40 MG TABLET: Performed by: ORTHOPAEDIC SURGERY

## 2017-02-08 PROCEDURE — 63710000001 MELOXICAM 15 MG TABLET: Performed by: ORTHOPAEDIC SURGERY

## 2017-02-08 PROCEDURE — 63710000001 AMLODIPINE 5 MG TABLET: Performed by: ORTHOPAEDIC SURGERY

## 2017-02-08 PROCEDURE — 63710000001 CLOPIDOGREL 75 MG TABLET: Performed by: ORTHOPAEDIC SURGERY

## 2017-02-08 PROCEDURE — 82962 GLUCOSE BLOOD TEST: CPT

## 2017-02-08 PROCEDURE — 63710000001 LOSARTAN 50 MG TABLET: Performed by: ORTHOPAEDIC SURGERY

## 2017-02-08 PROCEDURE — 63710000001 MONTELUKAST 10 MG TABLET: Performed by: ORTHOPAEDIC SURGERY

## 2017-02-08 PROCEDURE — 63710000001 OXYCODONE-ACETAMINOPHEN 7.5-325 MG TABLET: Performed by: ORTHOPAEDIC SURGERY

## 2017-02-08 PROCEDURE — 85018 HEMOGLOBIN: CPT | Performed by: ORTHOPAEDIC SURGERY

## 2017-02-08 RX ORDER — DEXTROSE MONOHYDRATE 25 G/50ML
25 INJECTION, SOLUTION INTRAVENOUS
Status: DISCONTINUED | OUTPATIENT
Start: 2017-02-08 | End: 2017-02-10 | Stop reason: HOSPADM

## 2017-02-08 RX ORDER — NICOTINE POLACRILEX 4 MG
15 LOZENGE BUCCAL
Status: DISCONTINUED | OUTPATIENT
Start: 2017-02-08 | End: 2017-02-10 | Stop reason: HOSPADM

## 2017-02-08 RX ADMIN — POTASSIUM CHLORIDE AND SODIUM CHLORIDE 100 ML/HR: 900; 150 INJECTION, SOLUTION INTRAVENOUS at 09:40

## 2017-02-08 RX ADMIN — OXYBUTYNIN CHLORIDE 5 MG: 5 TABLET ORAL at 09:44

## 2017-02-08 RX ADMIN — OXYCODONE HYDROCHLORIDE AND ACETAMINOPHEN 1 TABLET: 7.5; 325 TABLET ORAL at 21:25

## 2017-02-08 RX ADMIN — MORPHINE SULFATE 4 MG: 4 INJECTION, SOLUTION INTRAMUSCULAR; INTRAVENOUS at 09:36

## 2017-02-08 RX ADMIN — CYANOCOBALAMIN TAB 500 MCG 500 MCG: 500 TAB at 09:43

## 2017-02-08 RX ADMIN — POTASSIUM CHLORIDE AND SODIUM CHLORIDE 100 ML/HR: 900; 150 INJECTION, SOLUTION INTRAVENOUS at 20:58

## 2017-02-08 RX ADMIN — LATANOPROST 1 DROP: 50 SOLUTION OPHTHALMIC at 21:23

## 2017-02-08 RX ADMIN — METFORMIN HYDROCHLORIDE 1000 MG: 500 TABLET ORAL at 09:43

## 2017-02-08 RX ADMIN — METFORMIN HYDROCHLORIDE 1000 MG: 500 TABLET ORAL at 17:54

## 2017-02-08 RX ADMIN — DIPHENHYDRAMINE HYDROCHLORIDE 25 MG: 50 INJECTION INTRAMUSCULAR; INTRAVENOUS at 21:23

## 2017-02-08 RX ADMIN — LOSARTAN POTASSIUM 50 MG: 50 TABLET, FILM COATED ORAL at 09:44

## 2017-02-08 RX ADMIN — MONTELUKAST SODIUM 10 MG: 10 TABLET, FILM COATED ORAL at 21:23

## 2017-02-08 RX ADMIN — FAMOTIDINE 20 MG: 20 TABLET ORAL at 09:44

## 2017-02-08 RX ADMIN — OXYCODONE HYDROCHLORIDE AND ACETAMINOPHEN 1 TABLET: 7.5; 325 TABLET ORAL at 00:32

## 2017-02-08 RX ADMIN — MORPHINE SULFATE 4 MG: 4 INJECTION, SOLUTION INTRAMUSCULAR; INTRAVENOUS at 06:46

## 2017-02-08 RX ADMIN — AMLODIPINE BESYLATE 5 MG: 5 TABLET ORAL at 09:46

## 2017-02-08 RX ADMIN — CLOPIDOGREL BISULFATE 75 MG: 75 TABLET ORAL at 09:44

## 2017-02-08 RX ADMIN — POTASSIUM CHLORIDE AND SODIUM CHLORIDE 100 ML/HR: 900; 150 INJECTION, SOLUTION INTRAVENOUS at 00:30

## 2017-02-08 RX ADMIN — Medication 1 TABLET: at 09:44

## 2017-02-08 RX ADMIN — ATORVASTATIN CALCIUM 40 MG: 40 TABLET, FILM COATED ORAL at 09:44

## 2017-02-08 RX ADMIN — OXYCODONE HYDROCHLORIDE AND ACETAMINOPHEN 1 TABLET: 7.5; 325 TABLET ORAL at 11:29

## 2017-02-08 RX ADMIN — MELOXICAM 15 MG: 15 TABLET ORAL at 09:00

## 2017-02-08 RX ADMIN — DIPHENHYDRAMINE HYDROCHLORIDE 25 MG: 50 INJECTION INTRAMUSCULAR; INTRAVENOUS at 11:02

## 2017-02-08 NOTE — PROGRESS NOTES
Acute Care - Physical Therapy Initial Evaluation  [facility]     Patient Name: Vishnu Waller  : 1940  MRN: [id number]  Today's Date: 2017   Onset of Illness/Injury or Date of Surgery Date: 17  Date of Referral to PT: 17  Referring Physician: Dr. Mayo      Admit Date: 2017     Visit Dx:    ICD-10-CM ICD-9-CM   1. Impaired physical mobility Z74.09 781.99     Patient Active Problem List   Diagnosis   • Degeneration of lumbar intervertebral disc   • Low back pain without sciatica   • Neuritis or radiculitis due to rupture of lumbar intervertebral disc   • Borderline glaucoma, open angle with borderline findings   • Pseudophakia   • Coronary artery disease involving native coronary artery without angina pectoris   • S/P CABG (coronary artery bypass graft)   • Essential hypertension   • Mixed hyperlipidemia   • Thrombocytopenia   • Spinal stenosis of lumbar region   • Degenerative disc disease, lumbar     Past Medical History   Diagnosis Date   • Acute bacterial sinusitis    • Acute bronchitis    • Acute frontal sinusitis    • Acute maxillary sinusitis    • Acute pharyngitis    • Acute sinusitis      improved   • Allergic rhinitis    • Allergic rhinitis due to pollen    • Anxiety    • Artificial lens present      in position   • Backache    • Borderline glaucoma    • Chronic laryngitis    • Chronic rhinitis    • Coronary arteriosclerosis    • Coronary atherosclerosis    • Cough    • Degeneration of lumbar intervertebral disc    • Degenerative joint disease involving multiple joints    • Diabetes    • Diverticular disease of colon    • Erectile dysfunction    • Essential hypertension    • Generalized anxiety disorder    • GERD (gastroesophageal reflux disease)    • Glaucoma    • Heart disease    • Hemorrhoids      without bleeding   • Hyperlipidemia    • Insomnia    • Kidney stone    • Malignant tumor of prostate    • Need for prophylactic vaccination and inoculation against  influenza    • Osteoarthritis    • Osteoarthritis of multiple joints    • Pain, lumbar region      Pain radiating to lumbar region of back     • Prostate cancer    • Pseudomembranous enterocolitis      improved   • Rotator cuff syndrome      right   • Shoulder pain    • Sleep apnea    • Strain of rotator cuff capsule    • Tenosynovitis    • Type 2 diabetes mellitus      no BDR   • Upper respiratory infection      Past Surgical History   Procedure Laterality Date   • Cataract extraction Bilateral    • Nose surgery     • Prostatectomy     • Shoulder surgery Left      rotator cuff repair   • Colonoscopy     • Shoulder arthroscopy  07/24/2013     Arthroscopy of right shoulder with rotator repair, Carla procedure, Biceps tenotomy, subacromial decompression.   • Cardiac catheterization  09/25/2003     Cardiac cath (Normal left ventricular systolic function, EF 60% Multi-vessel coronary artery disease with critical disease noted in the LAD coronary artery, diagonal coronary artery, obtuse marginal coronary and right coronary artery.)   • Cardiac catheterization  05/03/1996     Cardiac cath (Coronary atherosclerotic heart disease. 70% diagonal stenosis. Mild to moderate left anterior descending artery stenosis. Preserved left ventricular function.)   • Colonoscopy       Colon endoscopy 30704 (Rectal bleeding. Radiation proctitis w/bleeding. An abnormal CT of transverse colon due to mucosal ischemic colitis, that now is healed. Internal hemorrhoids w/possible bleeding.)   • Colonoscopy  05/10/2011     Colon endoscopy 02756 (Single sessile polyp found in transverse colon and sigmoid colon ,removed by cold biopsy polypectomy.divertic. found in sigmoid colon,descending colon. Friability/capillary friability in rectum sigmoid due to prior radiation.Inter. hem. Grade 1)   • Colonoscopy  05/02/2007     Colon endoscopy 90315 (Colon polyp. Diverticulosis without bleeding. Internal hemorrhoids without bleeding.)   • Cystoscopy   01/13/1995     Cystoscopy, stone removal (Right ureteroscopic lasertripsy.)   • Other surgical history       EXTENDED VISUAL FIELDS STUDY 84130 (Borderline glaucoma) (3): 03/19/2015, 04/09/2014, 03/27/2013   • Other surgical history  10/29/2003     Heart revascularize (TMR) (Directmyocardial revascularization times four; LIMA to LAD SVG to DARIUSZ SVG to OM1, SVG to RCA)   • Injection of medication  11/15/2012     Kenalog (4)      • Knee arthroscopy  01/17/2007     Knee arthroscopy, surgery (Arthroscopy with medial and lateral meniscectomies, right knee.)   • Knee surgery  11/04/2015     Knee Surgery (Right unicompartmental arthroplasty.)   • Septorhinoplasty  11/16/1989     Nasal surgery procedure (Septorhinoplasty with reconstruction of the nasal pyramid with a iliac crest graft, rhinoplasty was performed with external approach.)   • Other surgical history       OCT DISC NFL 83050 (Borderline glaucoma)  (3): 09/24/2015, 08/13/2014, 07/29/2013   • Cataract extraction  10/04/2011     Remove cataract, insert lens (Right)   • Cataract extraction  08/23/2011     Remove cataract, insert lens (left)   • Excision lesion  05/13/1999     REMOVE EAR LESION 15168 (1.3 cm basal cell carcinoma, right preauricular area. Excision)   • Excision lesion  03/13/2001     REMOVE LESION NECK/CHEST 84313 (Excision of irritated seborrheic keratosis, anterior neck, 1.1 cm and deep lipoma, posterior neck, 3.5 cm)   • Shoulder surgery  11/01/2005     Shoulder surgery procedure (Decompression, subacromial, explore of the rotator cuff, no tear found nor repaired, acromioplasty of the left shoulder and AC shoulder joint resection.)   • Epidural  12/03/2014     Therapeutic/diag injection (Lumbar transforaminal epidural steroid injection, L5-S1, left side.)   • Epidural  10/22/2014     Therapeutic/diag injection (Lumbar transforaminal epidural steroid injection L5-S1 left side.)   • Epidural  08/07/2014     Therapeutic/diag injection (Lumbar  transforaminal epidural steroid injection.)   • Coronary artery bypass graft  2002   • Joint replacement  2015     partial          PT ASSESSMENT (last 72 hours)      PT Evaluation       02/08/17 1122 02/08/17 1000    General Information    Equipment Currently Used at Home walker, rolling;cane, straight;shower chair  -EW     Living Environment    Lives With spouse  -EW     Living Arrangements house  -EW     Transportation Available car  -EW     Sensory Assessment/Intervention    Light Touch  LUE;RUE  -MM    LUE Light Touch  WNL  -MM    RUE Light Touch  WNL  -MM    Sharp/Dull Discrimination  --  -MM      02/08/17 0930 02/07/17 1800    Rehab Evaluation    Document Type evaluation  -MMA     Subjective Information complains of;pain  -MMA     Patient Effort, Rehab Treatment good  -MMA     Symptoms Noted During/After Treatment none  -MMA     General Information    Patient Profile Review yes  -MMA     Onset of Illness/Injury or Date of Surgery Date 02/07/17  -MMA     Referring Physician Dr. Mayo  -MMA     Pertinent History Of Current Problem --   POD 1 lumbar laminectomy with out fusion at L3-L4 and L4-5  -MMA     Precautions/Limitations spinal precautions;oxygen therapy device and L/min;fall precautions;other (see comments)   Spoke with Dr. Mayo--per MD no brace needed/d.c bedrest   -MMA     Prior Level of Function independent:  -MMA     Equipment Currently Used at Home none  -MMA     Plans/Goals Discussed With patient  -MMA     Living Environment    Lives With spouse;friend(s)  -MMA     Living Arrangements house  -MMA     Home Accessibility no concerns  -MMA     Clinical Impression    Date of Referral to PT 02/08/17  -MMA     PT Diagnosis impaired functional mobility  -MMA     Prognosis good  -MMA     Functional Level At Time Of Evaluation CGA for transfers, ambulation of 125 ft bouts.  -MMA     Criteria for Skilled Therapeutic Interventions Met yes  -MMA     Pathology/Pathophysiology Noted (Describe Specifically  for Each System) musculoskeletal  -MMA     Impairments Found (describe specific impairments) aerobic capacity/endurance;gait, locomotion, and balance;posture  -MMA     Rehab Potential good, to achieve stated therapy goals  -MMA     Vital Signs    Pre Systolic BP Rehab 159  -MMA     Pre Treatment Diastolic BP 78  -MMA     Post Systolic BP Rehab 133  -MMA     Post Treatment Diastolic BP 67  -MMA     Pretreatment Heart Rate (beats/min) 114  -MMA     Posttreatment Heart Rate (beats/min) 105  -MMA     Pre SpO2 (%) 95  -MMA     O2 Delivery Pre Treatment supplemental O2  -MMA     Post SpO2 (%) 93  -MMA     O2 Delivery Post Treatment supplemental O2  -MMA     Rest Breaks  1  -MMA     Pain Assessment    Pain Assessment 0-10  -MMA     Pain Score 4  -MMA     Post Pain Score 4  -MMA     Pain Location Back  -MMA     Pain Intervention(s) Medication (See MAR)  -[facility]     Cognitive Assessment/Intervention    Current Cognitive/Communication Assessment functional  -[facility]     Orientation Status oriented x 4  -MMA     Follows Commands/Answers Questions 100% of the time  -[facility]     Personal Safety WNL/WFL  -[facility]     Personal Safety Interventions gait belt;nonskid shoes/slippers when out of bed  -MMA     ROM (Range of Motion)    General ROM no range of motion deficits identified  -MMA     MMT (Manual Muscle Testing)    General MMT Assessment no strength deficits identified  -MMA     Muscle Tone Assessment    Muscle Tone Assessment  Bilateral Upper Extremities;Bilateral Lower Extremities  -LW    Bilateral Upper Extremities Muscle Tone Assessment  mildly increased tone  -LW    Bilateral Lower Extremities Muscle Tone Assessment  mildly increased tone  -LW    Bed Mobility, Assessment/Treatment    Bed Mob, Supine to Sit, [place] conditional independence  -MMA     Bed Mob, Sit to Supine, [place] conditional independence  -MMA     Bed Mobility, Comment --   Educated pt on log rolling technique for safer mobility  -[facility]     Transfer  Assessment/Treatment    Transfers, Sit-Stand [place] contact guard assist  -MMA     Transfers, Stand-Sit [place] minimum assist (75% patient effort)  -MMA     Transfer, Comment Min A required for controlled descent to chair   -MMA     Gait Assessment/Treatment    Gait, [place] Level contact guard assist  -MMA     Gait, Distance (Feet) --   125' x 2  -MMA     Gait, Gait Deviations rafiq decreased;step length decreased  -MMA     Gait, Safety Issues supplemental O2  -MMA     Gait, Comment --   One episode of mild unsteadiness during bout.  -MMA     Sensory Assessment/Intervention    Light Touch --   BUE/BLE grossly intact to light touch  -MMA     Positioning and Restraints    Pre-Treatment Position in bed  -MMA     Post Treatment Position chair  -MMA     In Chair notified nsg;sitting;call light within reach;encouraged to call for assist  -MMA       02/07/17 1646 02/07/17 1644    General Information    Equipment Currently Used at Home  none  -DD    Living Environment    Lives With significant other  -DD     Living Arrangements house  -DD     Home Accessibility no concerns  -DD     Stair Railings at Home none  -DD     Type of Financial/Environmental Concern none  -DD     Transportation Available car  -DD       02/07/17 1146       Muscle Tone Assessment    Muscle Tone Assessment --  -KR       User Key  (r) = Recorded By, (t) = Taken By, (c) = Cosigned By    Initials Name Provider Type    DD Daniel Dickey, RN Registered Nurse    ELTON Howell, RN Registered Nurse    AUSTIN Arias, RN Registered Nurse    KVNG Cross, RN Registered Nurse    TANNA Parks, PT Physical Therapist    KAROLYN Ledbetter           Physical Therapy Education     Title: PT OT SLP Therapies (Done)     Topic: Physical Therapy (Done)     Point: Mobility training (Done)    Learning Progress Summary    Learner Readiness Method Response Comment Documented by Status   Patient Acceptance E VU log rolling  during bed mobility MM 02/08/17 1125 Done               Point: Home exercise program (Done)    Learning Progress Summary    Learner Readiness Method Response Comment Documented by Status   Patient Acceptance E VU log rolling during bed mobility MM 02/08/17 1125 Done               Point: Body mechanics (Done)    Learning Progress Summary    Learner Readiness Method Response Comment Documented by Status   Patient Acceptance E VU log rolling during bed mobility MM 02/08/17 1125 Done               Point: Precautions (Done)    Learning Progress Summary    Learner Readiness Method Response Comment Documented by Status   Patient Acceptance E VU log rolling during bed mobility MM 02/08/17 1125 Done                      User Key     Initials Effective Dates Name Provider Type Discipline     12/28/16 -  Elsa Parks PT Physical Therapist PT                PT Recommendation and Plan  Anticipated Discharge Disposition: home  Planned Therapy Interventions: gait training, balance training, home exercise program, strengthening, transfer training, patient/family education  PT Frequency: 2 times/day  Plan of Care Review  Outcome Summary/Follow up Plan: Pt required CGA for transfers and ambulation of 125 (x 2) ft. Pt will continue to benefit from skilled PT services to further challenge balance and increase distance ambulated. Anticipate pt can return home with family assistance upon d/c.           IP PT Goals       02/08/17 1117          Transfer Training PT STG    Transfer Training PT STG, Date Established 02/08/17  -MM      Transfer Training PT STG, Time to Achieve 2 - 3 days  -MM      Transfer Training PT STG, Activity Type all transfers  -MM      Transfer Training PT STG, [place] Level conditional independence  -MM      Gait Training PT STG    Gait Training Goal PT STG, Date Established 02/08/17  -MM      Gait Training Goal PT STG, Time to Achieve 2 - 3 days  -MM      Gait Training Goal PT STG, [place] Level  conditional independence  -MM      Gait Training Goal PT STG, Distance to Achieve 500'  -MM        User Key  (r) = Recorded By, (t) = Taken By, (c) = Cosigned By    Initials Name Provider Type    LETON Parks PT Physical Therapist                Outcome Measures       02/08/17 0930          How much help from another person do you currently need...    Turning from your back to your side while in flat bed without using bedrails? 4  -MM      Moving from lying on back to sitting on the side of a flat bed without bedrails? 4  -MM      Moving to and from a bed to a chair (including a wheelchair)? 3  -MM      Standing up from a chair using your arms (e.g., wheelchair, bedside chair)? 3  -MM      Climbing 3-5 steps with a railing? 3  -MM      To walk in hospital room? 3  -MM      AM-PAC 6 Clicks Score 20  -MM      Functional Assessment    Outcome Measure Options AM-PAC 6 Clicks Basic Mobility (PT)  -MM        User Key  (r) = Recorded By, (t) = Taken By, (c) = Cosigned By    Initials Name Provider Type    ELTON Parks PT Physical Therapist           Time Calculation:         PT Charges       02/08/17 1131          Time Calculation    Start Time 0930  -MM      Stop Time 1012  -MM      Time Calculation (min) 42 min  -MM      PT Received On 02/08/17  -MM      PT Goal Re-Cert Due Date 02/21/17  -MM      Time Calculation- PT    Total Timed Code Minutes- PT 25 minute(s)  -MM        User Key  (r) = Recorded By, (t) = Taken By, (c) = Cosigned By    Initials Name Provider Type    ELTON Parks PT Physical Therapist          Therapy Charges for Today     Code Description Service Date Service Provider Modifiers Qty    91190107324 HC PT MOBILITY CURRENT 2/8/2017 Elsa Parks PT GP, CJ 1    79962081862 HC PT MOBILITY PROJECTED 2/8/2017 Elsa Parks PT GP, CI 1    50908980751 HC PT EVAL MOD COMPLEXITY 1 2/8/2017 Elsa Parks PT GP 1    01003695172 HC GAIT TRAINING EA 15 MIN 2/8/2017 Elsa Parks PT GP 1     [id number]  PT THERAPEUTIC ACT EA 15 MIN 2/8/2017 Elsa Parks, PT GP 1          PT G-Codes  PT Professional Judgement Used?: Yes  Outcome Measure Options: AM-PAC 6 Clicks Basic Mobility (PT)  Score: 20  Functional Limitation: Mobility: Walking and moving around  Mobility: Walking and Moving Around Current Status (): At least 20 percent but less than 40 percent impaired, limited or restricted  Mobility: Walking and Moving Around Goal Status (): At least 1 percent but less than 20 percent impaired, limited or restricted      Elsa Parks, PT  2/8/2017

## 2017-02-08 NOTE — PLAN OF CARE
Problem: Patient Care Overview (Adult)  Goal: Plan of Care Review  Outcome: Ongoing (interventions implemented as appropriate)    02/08/17 1545   Coping/Psychosocial Response Interventions   Plan Of Care Reviewed With patient   Patient Care Overview   Progress improving   Outcome Evaluation   Outcome Summary/Follow up Plan Patient working with therapy, pain control continue no s/s infection at this time sitting up in chair multiiple times no new discharge at this time       Goal: Adult Individualization and Mutuality  Outcome: Ongoing (interventions implemented as appropriate)  Goal: Discharge Needs Assessment  Outcome: Ongoing (interventions implemented as appropriate)    Problem: Perioperative Period (Adult)  Goal: Signs and Symptoms of Listed Potential Problems Will be Absent or Manageable (Perioperative Period)  Outcome: Outcome(s) achieved Date Met:  02/08/17

## 2017-02-08 NOTE — PLAN OF CARE
Problem: Patient Care Overview (Adult)  Goal: Plan of Care Review  Outcome: Ongoing (interventions implemented as appropriate)    02/08/17 0352   Coping/Psychosocial Response Interventions   Plan Of Care Reviewed With patient   Patient Care Overview   Progress improving   Outcome Evaluation   Outcome Summary/Follow up Plan Phenergan effective, N/V resolved       Goal: Adult Individualization and Mutuality  Outcome: Ongoing (interventions implemented as appropriate)  Goal: Discharge Needs Assessment  Outcome: Ongoing (interventions implemented as appropriate)    Problem: Perioperative Period (Adult)  Goal: Signs and Symptoms of Listed Potential Problems Will be Absent or Manageable (Perioperative Period)  Outcome: Ongoing (interventions implemented as appropriate)

## 2017-02-08 NOTE — PLAN OF CARE
Problem: Laminectomy/Foraminotomy/Discectomy (Adult)  Goal: Signs and Symptoms of Listed Potential Problems Will be Absent or Manageable (Laminectomy/Foraminotomy/Discectomy)  Outcome: Ongoing (interventions implemented as appropriate)

## 2017-02-08 NOTE — PROGRESS NOTES
"Daily Progress Note      Active Problems:    Degenerative disc disease, lumbar     LOS: 0 days     Subjective    Vishnu Waller is a 77 year old POD 1 from a lumbar laminectomy with out fusion at L3-L4 and L4-L5 . Patient complained of nausea overnight and vomiting x3 as well as back pain. Denies any headaches, numbness and tingling in lower extremities. Patient is otherwise doing well.    Review of Systems - History obtained from the patient  General:negative for - chills, fatigue, fever,   Ophthalmic: negative for - blurry vision or loss of vision  Respiratory: no cough, shortness of breath, or wheezing  Cardiovascular: no chest pain, edema or dyspnea on exertion  Gastrointestinal:no abdominal pain or black or bloody stools. Positive for nausea and vomiting  Genito-Urinary: no dysuria, trouble voiding, or hematuria  Musculoskeletal: Positive for back pain  Neurological: negative for - dizziness, headaches, numbness/tingling  Dermatological: negative for rash and skin lesion changes    Objective     Vital signs in last 24 hours:  Temp:  [96.3 °F (35.7 °C)-97.6 °F (36.4 °C)] 97.4 °F (36.3 °C)  Heart Rate:  [69-97] 95  Resp:  [16-20] 18  BP: (106-126)/(54-64) 110/54    Intake/Output last 3 shifts:  I/O last 3 completed shifts:  In: 3424 [P.O.:480; I.V.:2944]  Out: 1265 [Urine:625; Emesis/NG output:450; Other:190]    Physical Exam:  Visit Vitals   • /54 (BP Location: Left arm, Patient Position: Lying)   • Pulse 95   • Temp 97.4 °F (36.3 °C) (Oral)   • Resp 18   • Ht 63\" (160 cm)   • Wt 149 lb 4 oz (67.7 kg)   • SpO2 97%   • BMI 26.44 kg/m2     Physical Exam   General Appearance:    Alert, cooperative, no distress, appears stated age   Head:    Normocephalic, without obvious abnormality, atraumatic   Eyes:    PERRL     Nose:   Nares normal, septum midline   Throat:   Lips, mucosa, and tongue normal; teeth and gums normal   Neck:   Supple, symmetrical, trachea midline, no adenopathy;        thyroid:  No " enlargement/tenderness/nodules; no carotid    bruit or JVD   Back:     Symmetric, no curvature, ROM normal, no CVA tenderness   Lungs:     Clear to auscultation bilaterally, respirations unlabored   Chest wall:    No tenderness or deformity   Heart:    Regular rate and rhythm, S1 and S2 normal, no murmur, rub   or gallop   Abdomen:     Soft, non-tender, bowel sounds active all four quadrants,     no masses, no organomegaly   Extremities:   Extremities normal, atraumatic, no cyanosis or edema. Able to move all 4 extremities equally. No signs or symptoms of DVT/PE   Pulses:   2+ and symmetric all extremities   Skin:   Skin color, texture, turgor normal, no rashes or lesions. Bandage and drain intact. No drainage noted.    Lymph nodes:   Cervical, supraclavicular, and axillary nodes normal   Neurologic:   CNII-XII intact. Normal strength 4/5 in lower ext, sensation and reflexes normal throughout       Assessment/Plan   77 Year old POD 1 from a lumbar laminectomy with out fusion at L3-L4 and L4-L5. Patient is doing well. Has phenergan for nausea. Will order PT to begin ambulation in [address]. Has scd/deejay for DVT ppx.     Karine Cabrera MD, PGY II

## 2017-02-08 NOTE — PROGRESS NOTES
Acute Care - Physical Therapy Treatment Note  [facility]     Patient Name: Vishnu Waller  : 1940  MRN: [id number]  Today's Date: 2017  Onset of Illness/Injury or Date of Surgery Date: 17  Date of Referral to PT: 17  Referring Physician: Dr. Mayo    Admit Date: 2017    Visit Dx:    ICD-10-CM ICD-9-CM   1. Impaired physical mobility Z74.09 781.99     Patient Active Problem List   Diagnosis   • Degeneration of lumbar intervertebral disc   • Low back pain without sciatica   • Neuritis or radiculitis due to rupture of lumbar intervertebral disc   • Borderline glaucoma, open angle with borderline findings   • Pseudophakia   • Coronary artery disease involving native coronary artery without angina pectoris   • S/P CABG (coronary artery bypass graft)   • Essential hypertension   • Mixed hyperlipidemia   • Thrombocytopenia   • Spinal stenosis of lumbar region   • Degenerative disc disease, lumbar               Adult Rehabilitation Note       17 1430          Rehab Assessment/Intervention    Discipline physical therapist  -MM      Document Type therapy note (daily note)  -MM      Subjective Information complains of;pain  -MM      Patient Effort, Rehab Treatment good  -MM      Precautions/Limitations spinal precautions;fall precautions  -MM      Recorded by [MM] Elsa Parks PT      Vital Signs    Pretreatment Heart Rate (beats/min) 97  -MM      Posttreatment Heart Rate (beats/min) 105  -MM      Pre SpO2 (%) 93  -MM      O2 Delivery Pre Treatment room air  -MM      Intra SpO2 (%) 94  -MM      O2 Delivery Intra Treatment room air  -MM      Post SpO2 (%) 92  -MM      O2 Delivery Post Treatment room air  -MM      Rest Breaks  1  -MM      Recorded by [MM] Elsa Parks PT      Pain Assessment    Pain Assessment 0-10  -MM      Pain Score 3  -MM      Post Pain Score 3  -MM      Pain Location Back  -MM      Pain Intervention(s) Repositioned  -MM      Recorded by [MM] Elsa Parks,  PT      Bed Mobility, Assessment/Treatment    Bed Mob, Supine to Sit, [place] conditional independence  -MM      Bed Mob, Sit to Supine, [place] conditional independence  -MM      Bed Mobility, Comment verbal cueing required for log rolling technique  -MM      Recorded by [MM] Elsa Parks, PT      Transfer Assessment/Treatment    Transfers, Sit-Stand Miller contact guard assist  -MM      Transfers, Stand-Sit [place] contact guard assist  -MM      Recorded by [MM] Elsa Parks, PT      Gait Assessment/Treatment    Gait, [place] Level contact guard assist  -MM      Gait, Distance (Feet) --   125', 300'  -MM      Gait, Comment --   Mild unsteadiness with B horizontal head turns  -MM      Recorded by [MM] Elsa Parks, PT      Positioning and Restraints    Pre-Treatment Position in bed  -MM      Post Treatment Position bed  -MM      In Bed side lying left;notified nsg;call light within reach;encouraged to call for assist  -MM      Recorded by [MM] Elsa Parks, PT        User Key  (r) = Recorded By, (t) = Taken By, (c) = Cosigned By    Initials Name Effective Dates    MM Elsa Parks, PT 12/28/16 -                 IP PT Goals       02/08/17 1510 02/08/17 1117       Transfer Training PT STG    Transfer Training PT STG, Date Established  02/08/17  -MM     Transfer Training PT STG, Time to Achieve  2 - 3 days  -MM     Transfer Training PT STG, Activity Type  all transfers  -MM     Transfer Training PT STG, [place] Level --  -MM conditional independence  -MM     Transfer Training PT STG, Date Goal Reviewed 02/08/17  -MM      Gait Training PT STG    Gait Training Goal PT STG, Date Established  02/08/17  -MM     Gait Training Goal PT STG, Time to Achieve  2 - 3 days  -MM     Gait Training Goal PT STG, [place] Level  conditional independence  -MM     Gait Training Goal PT STG, Distance to Achieve  500'  -MM     Gait Training Goal PT STG, Date Goal Reviewed 02/08/17  -MM        User Key   (r) = Recorded By, (t) = Taken By, (c) = Cosigned By    Initials Name Provider Type    MM Elsa Parks PT Physical Therapist          Physical Therapy Education     Title: PT OT SLP Therapies (Done)     Topic: Physical Therapy (Done)     Point: Mobility training (Done)    Learning Progress Summary    Learner Readiness Method Response Comment Documented by Status   Patient Acceptance E VU log rolling during bed mobility MM 02/08/17 1125 Done               Point: Home exercise program (Done)    Learning Progress Summary    Learner Readiness Method Response Comment Documented by Status   Patient Acceptance E VU log rolling during bed mobility MM 02/08/17 1125 Done               Point: Body mechanics (Done)    Learning Progress Summary    Learner Readiness Method Response Comment Documented by Status   Patient Acceptance E VU log rolling during bed mobility MM 02/08/17 1125 Done               Point: Precautions (Done)    Learning Progress Summary    Learner Readiness Method Response Comment Documented by Status   Patient Acceptance E VU log rolling during bed mobility MM 02/08/17 1125 Done                      User Key     Initials Effective Dates Name Provider Type Discipline     12/28/16 -  Elsa Parks PT Physical Therapist PT                    PT Recommendation and Plan  Anticipated Discharge Disposition: home  Planned Therapy Interventions: gait training, balance training, home exercise program, strengthening, transfer training, patient/family education  PT Frequency: 2 times/day  Plan of Care Review  Plan Of Care Reviewed With: patient  Outcome Summary/Follow up Plan: Pt required CGA for transfers and ambulation of 300' on room air--O2 sats remained greater than 92%. Pt will continue to benefit from skilled PT services to further address balance deficits (one LOB demonstrated with B head turns) --recommend home with assist and OP services.          Outcome Measures       02/08/17 0930          How much  help from another person do you currently need...    Turning from your back to your side while in flat bed without using bedrails? 4  -MM      Moving from lying on back to sitting on the side of a flat bed without bedrails? 4  -MM      Moving to and from a bed to a chair (including a wheelchair)? 3  -MM      Standing up from a chair using your arms (e.g., wheelchair, bedside chair)? 3  -MM      Climbing 3-5 steps with a railing? 3  -MM      To walk in hospital room? 3  -MM      AM-PAC 6 Clicks Score 20  -MM      Functional Assessment    Outcome Measure Options AM-PAC 6 Clicks Basic Mobility (PT)  -MM        User Key  (r) = Recorded By, (t) = Taken By, (c) = Cosigned By    Initials Name Provider Type    ELTON Parks PT Physical Therapist           Time Calculation:         PT Charges       02/08/17 1514 02/08/17 1131       Time Calculation    Start Time 1430  -MM 0930  -MM     Stop Time 1450  -MM 1012  -MM     Time Calculation (min) 20 min  -MM 42 min  -MM     PT Received On 02/08/17  -MM 02/08/17  -MM     PT Goal Re-Cert Due Date 02/21/17  -MM 02/21/17  -MM     Time Calculation- PT    Total Timed Code Minutes- PT 20 minute(s)  -MM 25 minute(s)  -MM       User Key  (r) = Recorded By, (t) = Taken By, (c) = Cosigned By    Initials Name Provider Type    ELTON Parks PT Physical Therapist          Therapy Charges for Today     Code Description Service Date Service Provider Modifiers Qty    45190238086 HC PT MOBILITY CURRENT 2/8/2017 Elsa Parks PT GP, CJ 1    77006228090 HC PT MOBILITY PROJECTED 2/8/2017 Elsa Parks PT GP, CI 1    83365846670 HC PT EVAL MOD COMPLEXITY 1 2/8/2017 Elsa Parks PT GP 1    45991010120 HC GAIT TRAINING EA 15 MIN 2/8/2017 Elsa Parks PT GP 1    81129394992 HC PT THERAPEUTIC ACT EA 15 MIN 2/8/2017 Elsa Parks PT GP 1    09465222450 HC PT THER PROC EA 15 MIN 2/8/2017 Elsa Parks PT GP 1          PT G-Codes  PT Professional Judgement Used?: Yes  Outcome Measure Options:  AM-PAC 6 Clicks Basic Mobility (PT)  Score: 20  Functional Limitation: Mobility: Walking and moving around  Mobility: Walking and Moving Around Current Status (): At least 20 percent but less than 40 percent impaired, limited or restricted  Mobility: Walking and Moving Around Goal Status (): At least 1 percent but less than 20 percent impaired, limited or restricted    Elsa Parks, PT  2/8/2017

## 2017-02-08 NOTE — PLAN OF CARE
Problem: Patient Care Overview (Adult)  Goal: Plan of Care Review  Outcome: Ongoing (interventions implemented as appropriate)    02/08/17 1117   Outcome Evaluation   Outcome Summary/Follow up Plan Pt required CGA for transfers and ambulation of 125 (x 2) ft. Pt will continue to benefit from skilled PT services to further challenge balance and increase distance ambulated. Anticipate pt can return home with family assistance upon d/c.          Problem: Inpatient Physical Therapy  Goal: Transfer Training Goal 1 STG- PT  Outcome: Ongoing (interventions implemented as appropriate)    02/08/17 1117   Transfer Training PT STG   Transfer Training PT STG, Date Established 02/08/17   Transfer Training PT STG, Time to Achieve 2 - 3 days   Transfer Training PT STG, Activity Type all transfers   Transfer Training PT STG, [place] Level conditional independence       Goal: Gait Training Goal STG- PT  Outcome: Ongoing (interventions implemented as appropriate)    02/08/17 1117   Gait Training PT STG   Gait Training Goal PT STG, Date Established 02/08/17   Gait Training Goal PT STG, Time to Achieve 2 - 3 days   Gait Training Goal PT STG, [place] Level conditional independence   Gait Training Goal PT STG, Distance to Achieve 500'

## 2017-02-08 NOTE — H&P (VIEW-ONLY)
Vishnu Waller is a 76 y.o. male returns for     Chief Complaint   Patient presents with   • Lumbar Spine - Follow-up       HISTORY OF PRESENT ILLNESS: Patient here to discuss surgical options.  Wishes to proceed for surgical treatment.       CONCURRENT MEDICAL HISTORY:    Past Medical History   Diagnosis Date   • Acute bacterial sinusitis    • Acute bronchitis    • Acute frontal sinusitis    • Acute maxillary sinusitis    • Acute pharyngitis    • Acute sinusitis      improved   • Allergic rhinitis    • Allergic rhinitis due to pollen    • Anxiety    • Artificial lens present      in position   • Backache    • Borderline glaucoma    • Chronic laryngitis    • Chronic rhinitis    • Coronary arteriosclerosis    • Coronary atherosclerosis    • Cough    • Degeneration of lumbar intervertebral disc    • Degenerative joint disease involving multiple joints    • Diabetes    • Diverticular disease of colon    • Erectile dysfunction    • Essential hypertension    • Generalized anxiety disorder    • GERD (gastroesophageal reflux disease)    • Glaucoma    • Heart disease    • Hemorrhoids      without bleeding   • Hyperlipidemia    • Insomnia    • Kidney stone    • Malignant tumor of prostate    • Need for prophylactic vaccination and inoculation against influenza    • Osteoarthritis    • Osteoarthritis of multiple joints    • Pain, lumbar region      Pain radiating to lumbar region of back     • Prostate cancer    • Pseudomembranous enterocolitis      improved   • Rotator cuff syndrome      right   • Shoulder pain    • Sleep apnea    • Strain of rotator cuff capsule    • Tenosynovitis    • Type 2 diabetes mellitus      no BDR   • Upper respiratory infection        No Known Allergies      Current Outpatient Prescriptions:   •  amLODIPine (NORVASC) 5 MG tablet, , Disp: , Rfl:   •  clopidogrel (PLAVIX) 75 MG tablet, TAKE 1 TABLET DAILY, Disp: 90 tablet, Rfl: 3  •  cyanocobalamin (VITAMIN B-12N) 50 MCG tablet, Take 50 mcg by  mouth Daily., Disp: , Rfl:   •  HYDROcodone-acetaminophen (NORCO)  MG per tablet, Take 1 tablet by mouth daily as needed for moderate pain (4-6)., Disp: , Rfl:   •  latanoprost (XALATAN) 0.005 % ophthalmic solution, Administer 1 drop to both eyes every night., Disp: , Rfl:   •  losartan (COZAAR) 50 MG tablet, , Disp: , Rfl:   •  melatonin 3 MG tablet, TAKE 1/2 TO 1 TABLET BY MOUTH NIGHTLY FOR SLEEP ISSUES, Disp: , Rfl: 5  •  meloxicam (MOBIC) 15 MG tablet, , Disp: , Rfl:   •  metFORMIN (GLUCOPHAGE) 1000 MG tablet, 1,000 mg 2 (Two) Times a Day With Meals., Disp: , Rfl:   •  mometasone (NASONEX) 50 MCG/ACT nasal spray, 2 sprays into each nostril daily., Disp: , Rfl:   •  montelukast (SINGULAIR) 10 MG tablet, , Disp: , Rfl:   •  Multiple Vitamins-Minerals (CENTRUM PO), Take 1 tablet by mouth daily. Centrum 0.4 mg-162 mg-18 mg tablet  (unconfirmed), Disp: , Rfl:   •  simvastatin (ZOCOR) 40 MG tablet, , Disp: , Rfl:     Past Surgical History   Procedure Laterality Date   • Cataract extraction Bilateral    • Nose surgery     • Prostatectomy     • Shoulder surgery Left      rotator cuff repair   • Colonoscopy     • Shoulder arthroscopy  07/24/2013     Arthroscopy of right shoulder with rotator repair, Carla procedure, Biceps tenotomy, subacromial decompression.   • Cardiac catheterization  09/25/2003     Cardiac cath (Normal left ventricular systolic function, EF 60% Multi-vessel coronary artery disease with critical disease noted in the LAD coronary artery, diagonal coronary artery, obtuse marginal coronary and right coronary artery.)   • Cardiac catheterization  05/03/1996     Cardiac cath (Coronary atherosclerotic heart disease. 70% diagonal stenosis. Mild to moderate left anterior descending artery stenosis. Preserved left ventricular function.)   • Colonoscopy       Colon endoscopy 54739 (Rectal bleeding. Radiation proctitis w/bleeding. An abnormal CT of transverse colon due to mucosal ischemic colitis, that  now is healed. Internal hemorrhoids w/possible bleeding.)   • Colonoscopy  05/10/2011     Colon endoscopy 79063 (Single sessile polyp found in transverse colon and sigmoid colon ,removed by cold biopsy polypectomy.divertic. found in sigmoid colon,descending colon. Friability/capillary friability in rectum sigmoid due to prior radiation.Inter. hem. Grade 1)   • Colonoscopy  05/02/2007     Colon endoscopy 32331 (Colon polyp. Diverticulosis without bleeding. Internal hemorrhoids without bleeding.)   • Cystoscopy  01/13/1995     Cystoscopy, stone removal (Right ureteroscopic lasertripsy.)   • Other surgical history       EXTENDED VISUAL FIELDS STUDY 00497 (Borderline glaucoma) (3): 03/19/2015, 04/09/2014, 03/27/2013   • Other surgical history  10/29/2003     Heart revascularize (TMR) (Directmyocardial revascularization times four; LIMA to LAD SVG to DARIUSZ SVG to OM1, SVG to RCA)   • Injection of medication  11/15/2012     Kenalog (4)      • Knee arthroscopy  01/17/2007     Knee arthroscopy, surgery (Arthroscopy with medial and lateral meniscectomies, right knee.)   • Knee surgery  11/04/2015     Knee Surgery (Right unicompartmental arthroplasty.)   • Septorhinoplasty  11/16/1989     Nasal surgery procedure (Septorhinoplasty with reconstruction of the nasal pyramid with a iliac crest graft, rhinoplasty was performed with external approach.)   • Other surgical history       OCT DISC NFL 79394 (Borderline glaucoma)  (3): 09/24/2015, 08/13/2014, 07/29/2013   • Cataract extraction  10/04/2011     Remove cataract, insert lens (Right)   • Cataract extraction  08/23/2011     Remove cataract, insert lens (left)   • Excision lesion  05/13/1999     REMOVE EAR LESION 38668 (1.3 cm basal cell carcinoma, right preauricular area. Excision)   • Excision lesion  03/13/2001     REMOVE LESION NECK/CHEST 13385 (Excision of irritated seborrheic keratosis, anterior neck, 1.1 cm and deep lipoma, posterior neck, 3.5 cm)   • Shoulder surgery   "11/01/2005     Shoulder surgery procedure (Decompression, subacromial, explore of the rotator cuff, no tear found nor repaired, acromioplasty of the left shoulder and AC shoulder joint resection.)   • Epidural  12/03/2014     Therapeutic/diag injection (Lumbar transforaminal epidural steroid injection, L5-S1, left side.)   • Epidural  10/22/2014     Therapeutic/diag injection (Lumbar transforaminal epidural steroid injection L5-S1 left side.)   • Epidural  08/07/2014     Therapeutic/diag injection (Lumbar transforaminal epidural steroid injection.)       ROS  No fevers or chills.  No chest pain or shortness of air.  No GI or  disturbances.    PHYSICAL EXAMINATION:       Visit Vitals   • Ht 63\" (160 cm)   • Wt 156 lb (70.8 kg)   • BMI 27.63 kg/m2       Physical Exam   Constitutional: He appears well-developed.   HENT:   Head: Normocephalic and atraumatic.   Eyes: Pupils are equal, round, and reactive to light. Right eye exhibits no discharge. Left eye exhibits no discharge.   Neck: Normal range of motion. No JVD present. No tracheal deviation present. No thyromegaly present.   Cardiovascular: Normal rate, regular rhythm, normal heart sounds and intact distal pulses.  Exam reveals no gallop and no friction rub.    No murmur heard.  Pulmonary/Chest: Effort normal and breath sounds normal. No respiratory distress. He has no wheezes. He has no rales. He exhibits no tenderness.   Abdominal: Soft. Bowel sounds are normal. He exhibits no distension. There is no tenderness. There is no guarding.   Musculoskeletal: He exhibits no edema or deformity.        Thoracic back: He exhibits normal range of motion, no tenderness, no bony tenderness, no swelling, no edema, no deformity, no laceration, no pain, no spasm and normal pulse.        Lumbar back: He exhibits decreased range of motion and tenderness. He exhibits no bony tenderness, no swelling, no edema, no deformity, no laceration, no pain, no spasm and normal pulse.        " Back:    Lymphadenopathy:     He has no cervical adenopathy.   Neurological: He is alert. He has normal reflexes. He displays normal reflexes. No cranial nerve deficit. Coordination normal.   Skin: Skin is warm. No rash noted. No erythema.   Psychiatric: He has a normal mood and affect. His behavior is normal. Thought content normal.   forward leaning posture.      GAIT:     [x]  Normal  []  Antalgic    Assistive device: []  None  []  Walker     []  Crutches  []  Cane     []  Wheelchair  []  Stretcher    Right Ankle Exam     Muscle Strength   Dorsiflexion:  5/5  Plantar flexion:  5/5  Anterior tibial:  5/5  Gastrocsoleus:  5/5  Peroneal muscle:  5/5      Left Ankle Exam     Muscle Strength   Dorsiflexion:  5/5   Plantar flexion:  5/5   Anterior tibial:  5/5   Gastrocsoleus:  5/5  Peroneal muscle:  5/5      Right Hip Exam     Muscle Strength   Flexion: 5/5       Left Hip Exam     Muscle Strength   Flexion: 5/5       Back Exam     Range of Motion   Extension: 20   Flexion: 60   Lateral Bend Right: 20   Lateral Bend Left: 20     Other   Gait: normal   Erythema: no back redness  Scars: absent              No results found.          ASSESSMENT:    Diagnoses and all orders for this visit:    Degeneration of lumbar intervertebral disc    Neuritis or radiculitis due to rupture of lumbar intervertebral disc          PLAN    Plan for decompression with interlaminar distraction of L3-4 and L4-5.   Reviewed risks of surgery, including risk of infection, hematoma, spinal fluid leak, neurologic injury including numbness and weakness.  I explained to him the medical risks of surgery including the risk of death, blindness, heart attack, stroke, gastric ulceration, perforation, peritonitis, deep vein thrombosis, pulmonary embolus, pneumonia, pulmonary failure necessitating prolonged ventilation, heart failure renal failure sepsis and multiorgan system failure.   He wishes to proceed for surgical treatment.       Lucio Faulkner  MD [facility]

## 2017-02-08 NOTE — PLAN OF CARE
Problem: Patient Care Overview (Adult)  Goal: Plan of Care Review  Outcome: Ongoing (interventions implemented as appropriate)    02/08/17 1510   Coping/Psychosocial Response Interventions   Plan Of Care Reviewed With patient   Outcome Evaluation   Outcome Summary/Follow up Plan Pt required CGA for transfers and ambulation of 300' on room air--O2 sats remained greater than 92%. Pt will continue to benefit from skilled PT services to further address balance deficits (one LOB demonstrated with B head turns) --recommend home with assist and OP services.         Problem: Inpatient Physical Therapy  Goal: Transfer Training Goal 1 STG- PT  Outcome: Ongoing (interventions implemented as appropriate)    02/08/17 1117 02/08/17 1510   Transfer Training PT STG   Transfer Training PT STG, Date Established 02/08/17 --    Transfer Training PT STG, Time to Achieve 2 - 3 days --    Transfer Training PT STG, Activity Type all transfers --    Transfer Training PT STG, [place] Level --  contact guard assist         02/08/17 1117 02/08/17 1510   Transfer Training PT STG   Transfer Training PT STG, Date Established 02/08/17 --    Transfer Training PT STG, Time to Achieve 2 - 3 days --    Transfer Training PT STG, Activity Type all transfers --    Transfer Training PT STG, [place] Level --  contact guard assist   Transfer Traini       02/08/17 1117 02/08/17 1510   Transfer Training PT STG   Transfer Training PT STG, Date Established 02/08/17 --    Transfer Training PT STG, Time to Achieve 2 - 3 days --    Transfer Training PT STG, Activity Type all transfers --    Transfer Training PT STG, [place] Level --  --    Transfer Training PT STG, Date Goal Reviewed --  02/08/17   ng PT STG, Date Goal Reviewed --  02/08/17       Goal: Gait Training Goal STG- PT  Outcome: Ongoing (interventions implemented as appropriate)    02/08/17 1117 02/08/17 1510   Gait Training PT STG   Gait Training Goal PT STG, Date Established 02/08/17 --     Gait Training Goal PT STG, Time to Achieve 2 - 3 days --    Gait Training Goal PT STG, [place] Level conditional independence --    Gait Training Goal PT STG, Distance to Achieve 500' --    Gait Training Goal PT STG, Date Goal Reviewed --  02/08/17

## 2017-02-09 LAB
GLUCOSE BLDC GLUCOMTR-MCNC: 105 MG/DL (ref 70–130)
GLUCOSE BLDC GLUCOMTR-MCNC: 108 MG/DL (ref 70–130)
GLUCOSE BLDC GLUCOMTR-MCNC: 134 MG/DL (ref 70–130)
GLUCOSE BLDC GLUCOMTR-MCNC: 150 MG/DL (ref 70–130)
HCT VFR BLD AUTO: 26.3 % (ref 39–49)
HGB BLD-MCNC: 8.6 G/DL (ref 13.7–17.3)

## 2017-02-09 PROCEDURE — G8980 MOBILITY D/C STATUS: HCPCS

## 2017-02-09 PROCEDURE — 63710000001 VITAMIN B-12 500 MCG TABLET: Performed by: ORTHOPAEDIC SURGERY

## 2017-02-09 PROCEDURE — A9270 NON-COVERED ITEM OR SERVICE: HCPCS | Performed by: ORTHOPAEDIC SURGERY

## 2017-02-09 PROCEDURE — 63710000001 ATORVASTATIN 40 MG TABLET: Performed by: ORTHOPAEDIC SURGERY

## 2017-02-09 PROCEDURE — 97116 GAIT TRAINING THERAPY: CPT

## 2017-02-09 PROCEDURE — G8978 MOBILITY CURRENT STATUS: HCPCS

## 2017-02-09 PROCEDURE — G8979 MOBILITY GOAL STATUS: HCPCS

## 2017-02-09 PROCEDURE — 63710000001 OXYBUTYNIN 5 MG TABLET: Performed by: ORTHOPAEDIC SURGERY

## 2017-02-09 PROCEDURE — 63710000001 MELOXICAM 15 MG TABLET: Performed by: ORTHOPAEDIC SURGERY

## 2017-02-09 PROCEDURE — 63710000001 METFORMIN 500 MG TABLET: Performed by: ORTHOPAEDIC SURGERY

## 2017-02-09 PROCEDURE — 97110 THERAPEUTIC EXERCISES: CPT

## 2017-02-09 PROCEDURE — 25010000002 DIPHENHYDRAMINE PER 50 MG: Performed by: ORTHOPAEDIC SURGERY

## 2017-02-09 PROCEDURE — 63710000001 MONTELUKAST 10 MG TABLET: Performed by: ORTHOPAEDIC SURGERY

## 2017-02-09 PROCEDURE — 94799 UNLISTED PULMONARY SVC/PX: CPT

## 2017-02-09 PROCEDURE — 63710000001 MULTIVITAMIN WITH MINERALS TABLET: Performed by: ORTHOPAEDIC SURGERY

## 2017-02-09 PROCEDURE — 63710000001 FAMOTIDINE 20 MG TABLET: Performed by: ORTHOPAEDIC SURGERY

## 2017-02-09 PROCEDURE — 82962 GLUCOSE BLOOD TEST: CPT

## 2017-02-09 PROCEDURE — 25810000003 SODIUM CHLORIDE 0.9 % WITH KCL 20 MEQ 20-0.9 MEQ/L-% SOLUTION: Performed by: ORTHOPAEDIC SURGERY

## 2017-02-09 PROCEDURE — 85014 HEMATOCRIT: CPT | Performed by: FAMILY MEDICINE

## 2017-02-09 PROCEDURE — 97530 THERAPEUTIC ACTIVITIES: CPT

## 2017-02-09 PROCEDURE — 63710000001 OXYCODONE-ACETAMINOPHEN 7.5-325 MG TABLET: Performed by: ORTHOPAEDIC SURGERY

## 2017-02-09 PROCEDURE — 63710000001 LOSARTAN 50 MG TABLET: Performed by: ORTHOPAEDIC SURGERY

## 2017-02-09 PROCEDURE — 63710000001 CLOPIDOGREL 75 MG TABLET: Performed by: ORTHOPAEDIC SURGERY

## 2017-02-09 PROCEDURE — 63710000001 AMLODIPINE 5 MG TABLET: Performed by: ORTHOPAEDIC SURGERY

## 2017-02-09 PROCEDURE — 85018 HEMOGLOBIN: CPT | Performed by: FAMILY MEDICINE

## 2017-02-09 RX ADMIN — DIPHENHYDRAMINE HYDROCHLORIDE 25 MG: 50 INJECTION INTRAMUSCULAR; INTRAVENOUS at 04:49

## 2017-02-09 RX ADMIN — CLOPIDOGREL BISULFATE 75 MG: 75 TABLET ORAL at 09:23

## 2017-02-09 RX ADMIN — DIPHENHYDRAMINE HYDROCHLORIDE 25 MG: 50 INJECTION INTRAMUSCULAR; INTRAVENOUS at 20:28

## 2017-02-09 RX ADMIN — CYANOCOBALAMIN TAB 500 MCG 500 MCG: 500 TAB at 09:22

## 2017-02-09 RX ADMIN — OXYCODONE HYDROCHLORIDE AND ACETAMINOPHEN 1 TABLET: 7.5; 325 TABLET ORAL at 16:14

## 2017-02-09 RX ADMIN — LOSARTAN POTASSIUM 50 MG: 50 TABLET, FILM COATED ORAL at 09:22

## 2017-02-09 RX ADMIN — Medication 1 TABLET: at 09:22

## 2017-02-09 RX ADMIN — OXYCODONE HYDROCHLORIDE AND ACETAMINOPHEN 1 TABLET: 7.5; 325 TABLET ORAL at 04:49

## 2017-02-09 RX ADMIN — AMLODIPINE BESYLATE 5 MG: 5 TABLET ORAL at 09:23

## 2017-02-09 RX ADMIN — POTASSIUM CHLORIDE AND SODIUM CHLORIDE 100 ML/HR: 900; 150 INJECTION, SOLUTION INTRAVENOUS at 17:53

## 2017-02-09 RX ADMIN — OXYCODONE HYDROCHLORIDE AND ACETAMINOPHEN 1 TABLET: 7.5; 325 TABLET ORAL at 20:27

## 2017-02-09 RX ADMIN — MELOXICAM 15 MG: 15 TABLET ORAL at 09:23

## 2017-02-09 RX ADMIN — METFORMIN HYDROCHLORIDE 1000 MG: 500 TABLET ORAL at 09:22

## 2017-02-09 RX ADMIN — MONTELUKAST SODIUM 10 MG: 10 TABLET, FILM COATED ORAL at 20:27

## 2017-02-09 RX ADMIN — METFORMIN HYDROCHLORIDE 1000 MG: 500 TABLET ORAL at 17:53

## 2017-02-09 RX ADMIN — LATANOPROST 1 DROP: 50 SOLUTION OPHTHALMIC at 20:28

## 2017-02-09 RX ADMIN — FAMOTIDINE 20 MG: 20 TABLET ORAL at 09:23

## 2017-02-09 RX ADMIN — OXYBUTYNIN CHLORIDE 5 MG: 5 TABLET ORAL at 09:23

## 2017-02-09 RX ADMIN — POTASSIUM CHLORIDE AND SODIUM CHLORIDE 100 ML/HR: 900; 150 INJECTION, SOLUTION INTRAVENOUS at 04:53

## 2017-02-09 RX ADMIN — ATORVASTATIN CALCIUM 40 MG: 40 TABLET, FILM COATED ORAL at 09:23

## 2017-02-09 NOTE — PLAN OF CARE
Problem: Patient Care Overview (Adult)  Goal: Plan of Care Review  Outcome: Ongoing (interventions implemented as appropriate)    02/09/17 0427   Coping/Psychosocial Response Interventions   Plan Of Care Reviewed With patient   Patient Care Overview   Progress improving   Outcome Evaluation   Outcome Summary/Follow up Plan Pt is stable, pain controlled.       Goal: Adult Individualization and Mutuality  Outcome: Ongoing (interventions implemented as appropriate)  Goal: Discharge Needs Assessment  Outcome: Ongoing (interventions implemented as appropriate)    Problem: Laminectomy/Foraminotomy/Discectomy (Adult)  Goal: Signs and Symptoms of Listed Potential Problems Will be Absent or Manageable (Laminectomy/Foraminotomy/Discectomy)  Outcome: Ongoing (interventions implemented as appropriate)

## 2017-02-09 NOTE — PROGRESS NOTES
"Daily Progress Note      Active Problems:    Degenerative disc disease, lumbar     LOS: 0 days     Subjective    Vishnu Waller is a 77 year old POD 1 from a lumbar laminectomy with out fusion at L3-L4 and L4-L5 . Patient complained lower back pain. Nausea has resolved.  Complained of slight headache this am. Denies numbness and tingling in lower extremities. Patient is otherwise doing well. Patient is working well with PT.     Review of Systems - History obtained from the patient  General:negative for - chills, fatigue, fever,   Ophthalmic: negative for - blurry vision or loss of vision  Respiratory: no cough, shortness of breath, or wheezing  Cardiovascular: no chest pain, edema or dyspnea on exertion  Gastrointestinal:no abdominal pain or black or bloody stools. Positive for nausea and vomiting  Genito-Urinary: no dysuria, trouble voiding, or hematuria  Musculoskeletal: Positive for back pain  Neurological: negative for - dizziness, headaches, numbness/tingling  Dermatological: negative for rash and skin lesion changes    Objective     Vital signs in last 24 hours:  Temp:  [99.2 °F (37.3 °C)-100.3 °F (37.9 °C)] 99.2 °F (37.3 °C)  Heart Rate:  [] 112  Resp:  [18-20] 18  BP: (102-159)/(57-70) 138/62    Intake/Output last 3 shifts:  I/O last 3 completed shifts:  In: 4609 [P.O.:600; I.V.:4009]  Out: 2635 [Urine:2425; Other:190]    Physical Exam:  Visit Vitals   • /62 (BP Location: Left arm, Patient Position: Lying)   • Pulse 112   • Temp 99.2 °F (37.3 °C) (Oral)   • Resp 18   • Ht 63\" (160 cm)   • Wt 149 lb 4 oz (67.7 kg)   • SpO2 97%   • BMI 26.44 kg/m2     Physical Exam   General Appearance:    Alert, cooperative, no distress, appears stated age   Head:    Normocephalic, without obvious abnormality, atraumatic   Eyes:    PERRL     Nose:   Nares normal, septum midline   Throat:   Lips, mucosa, and tongue normal; teeth and gums normal   Neck:   Supple, symmetrical, trachea midline, no adenopathy;        " thyroid:  No enlargement/tenderness/nodules; no carotid    bruit or JVD   Back:     Symmetric, no curvature, ROM normal, no CVA tenderness   Lungs:     Clear to auscultation bilaterally, respirations unlabored   Chest wall:    No tenderness or deformity   Heart:    Regular rate and rhythm, S1 and S2 normal, no murmur, rub   or gallop   Abdomen:     Soft, non-tender, bowel sounds active all four quadrants,     no masses, no organomegaly   Extremities:   Extremities normal, atraumatic, no cyanosis or edema. Able to move all 4 extremities equally. No signs or symptoms of DVT/PE   Pulses:   2+ and symmetric all extremities   Skin:   Skin color, texture, turgor normal, no rashes or lesions. Bandage and drain intact. Minimal drainage noted.    Lymph nodes:   Cervical, supraclavicular, and axillary nodes normal   Neurologic:   CNII-XII intact. Normal strength 4/5 in lower ext, sensation and reflexes normal throughout       Lab Results   Component Value Date    HGB 8.6 (L) 02/09/2017       Assessment/Plan   77 Year old POD 1 from a lumbar laminectomy with out fusion at L3-L4 and L4-L5. Patient is doing well. Has phenergan for nausea. Will D/C drain today and continue PT. Pain is well controlled. Will continue scd/deejay for DVT ppx.     Karine Cabrera MD, PGY II

## 2017-02-09 NOTE — PLAN OF CARE
Problem: Patient Care Overview (Adult)  Goal: Plan of Care Review  Outcome: Ongoing (interventions implemented as appropriate)    02/09/17 1535   Coping/Psychosocial Response Interventions   Plan Of Care Reviewed With patient   Outcome Evaluation   Outcome Summary/Follow up Plan Pt demonstrated modified independence with bed mobility, transfers, ambulation of 550 ft and ascended/descended 3 steps. Pt with no LOB during challenging gait tasks and varying gait speeds. No further skilled PT services needed at this time. Pt appears safe to ambulate with nursing staff while IP.          Problem: Inpatient Physical Therapy  Goal: Transfer Training Goal 1 STG- PT  Outcome: Outcome(s) achieved Date Met:  02/09/17 02/08/17 1117 02/09/17 1535   Transfer Training PT STG   Transfer Training PT STG, Date Established 02/08/17 --    Transfer Training PT STG, Time to Achieve 2 - 3 days --    Transfer Training PT STG, Activity Type all transfers --    Transfer Training PT STG, [place] Level conditional independence --    Transfer Training PT STG, Date Goal Reviewed --  02/09/17   Transfer Training PT STG, Outcome --  goal met       Goal: Gait Training Goal STG- PT  Outcome: Outcome(s) achieved Date Met:  02/09/17 02/08/17 1117 02/09/17 1535   Gait Training PT STG   Gait Training Goal PT STG, Date Established 02/08/17 --    Gait Training Goal PT STG, Time to Achieve 2 - 3 days --    Gait Training Goal PT STG, [place] Level conditional independence --    Gait Training Goal PT STG, Distance to Achieve 500' --    Gait Training Goal PT STG, Date Goal Reviewed --  02/09/17   Gait Training Goal PT STG, Outcome --  goal met

## 2017-02-09 NOTE — PROGRESS NOTES
Acute Care - Physical Therapy Treatment Note  [facility]     Patient Name: Vishnu Waller  : 1940  MRN: [id number]  Today's Date: 2017  Onset of Illness/Injury or Date of Surgery Date: 17  Date of Referral to PT: 17  Referring Physician: Dr. Mayo    Admit Date: 2017    Visit Dx:    ICD-10-CM ICD-9-CM   1. Impaired physical mobility Z74.09 781.99     Patient Active Problem List   Diagnosis   • Degeneration of lumbar intervertebral disc   • Low back pain without sciatica   • Neuritis or radiculitis due to rupture of lumbar intervertebral disc   • Borderline glaucoma, open angle with borderline findings   • Pseudophakia   • Coronary artery disease involving native coronary artery without angina pectoris   • S/P CABG (coronary artery bypass graft)   • Essential hypertension   • Mixed hyperlipidemia   • Thrombocytopenia   • Spinal stenosis of lumbar region   • Degenerative disc disease, lumbar               Adult Rehabilitation Note       17 0920 17 1430       Rehab Assessment/Intervention    Discipline physical therapy assistant  -AM physical therapist  -MM     Document Type therapy note (daily note)  -AM therapy note (daily note)  -MM     Subjective Information agree to therapy;complains of;pain  -AM complains of;pain  -MM     Patient Effort, Rehab Treatment good  -AM good  -MM     Precautions/Limitations spinal precautions;fall precautions  -AM spinal precautions;fall precautions  -MM     Equipment Issued to Patient gait belt  -AM      Recorded by [AM] Yair Haro PTA [MM] Elsa Parks PT     Vital Signs    Pre Systolic BP Rehab 138  -AM      Pre Treatment Diastolic BP 76  -AM      Post Systolic BP Rehab 168  -AM      Post Treatment Diastolic BP 74  -AM      Pretreatment Heart Rate (beats/min) 98  -AM 97  -MM     Posttreatment Heart Rate (beats/min) 97  -  -MM     Pre SpO2 (%) 96  -AM 93  -MM     O2 Delivery Pre Treatment supplemental O2  -AM room air   -MM     Intra SpO2 (%)  94  -MM     O2 Delivery Intra Treatment  room air  -MM     Post SpO2 (%) 95  -AM 92  -MM     O2 Delivery Post Treatment room air  -AM room air  -MM     Pre Patient Position Sitting  -AM      Intra Patient Position Standing  -AM      Post Patient Position Sitting  -AM      Rest Breaks   1  -MM     Recorded by [AM] Yair Haro PTA [MM] Elsa Parks, PT     Pain Assessment    Pain Assessment 0-10  -AM 0-10  -MM     Pain Score 5  -AM 3  -MM     Post Pain Score 4  -AM 3  -MM     Pain Type Surgical pain  -AM      Pain Location Back  -AM Back  -MM     Pain Orientation Mid  -AM      Pain Descriptors Aching  -AM      Pain Frequency Constant/continuous  -AM      Date Pain First Started 02/07/17  -AM      Clinical Progression Gradually improving  -AM      Patient's Stated Pain Goal No pain  -AM      Pain Intervention(s) Medication (See MAR);Ambulation/increased activity  -AM Repositioned  -MM     Result of Injury No  -AM      Work-Related Injury No  -AM      Multiple Pain Sites No  -AM      Recorded by [AM] Yair Haro PTA [MM] Elsa Parks, JANAE     Cognitive Assessment/Intervention    Current Cognitive/Communication Assessment functional  -AM      Orientation Status oriented x 4  -AM      Follows Commands/Answers Questions 100% of the time  -AM      Personal Safety Interventions gait belt;nonskid shoes/slippers when out of bed;supervised activity  -AM      Recorded by [AM] Yair Haro PTA      ROM (Range of Motion)    General ROM no range of motion deficits identified  -AM      Recorded by [AM] Yair Haro PTA      Bed Mobility, Assessment/Treatment    Bed Mobility, Roll Left, [place] not tested  -AM      Bed Mobility, Roll Right, [place] not tested  -AM      Bed Mobility, Scoot/Bridge, [place] not tested  -AM      Bed Mob, Supine to Sit, [place] not tested  -AM conditional independence  -MM     Bed Mob, Sit to Supine, [place] not tested  -AM conditional  independence  -MM     Bed Mob, Sidelying to Sit, [place] not tested  -AM      Bed Mob, Sit to Sidelying, [place] not tested  -AM      Bed Mobility, Comment  verbal cueing required for log rolling technique  -MM     Recorded by [AM] Yair Haro PTA [MM] Elsa Parks PT     Transfer Assessment/Treatment    Transfers, Bed-Chair [place] stand by assist;contact guard assist  -AM      Transfers, Chair-Bed [place] stand by assist;contact guard assist  -AM      Transfers, Sit-Stand [place] stand by assist;contact guard assist  -AM contact guard assist  -MM     Transfers, Stand-Sit [place] stand by assist;contact guard assist  -AM contact guard assist  -MM     Toilet Transfer, [place] not tested  -AM      Walk-In Shower Transfer, [place] not tested  -AM      Bathtub Transfer, [place] not tested  -AM      Transfer, Maintain Weight Bearing Status able to maintain weight bearing status  -AM      Transfer, Safety Issues impulsivity  -AM      Transfer, Impairments impaired balance  -AM      Recorded by [AM] Yair Haro PTA [MM] Elsa Parks, PT     Gait Assessment/Treatment    Gait, [place] Level stand by assist;contact guard assist  -AM contact guard assist  -MM     Gait, Distance (Feet) 150  -AM --   125', 300'  -MM     Gait, Gait Pattern Analysis swing-through gait  -AM      Gait, Maintain Weight Bearing Status able to maintain weight bearing status  -AM      Gait, Impairments impaired balance  -AM      Gait, Comment  --   Mild unsteadiness with B horizontal head turns  -MM     Recorded by [AM] Yair Haro PTA [MM] Elsa Parks, PT     Positioning and Restraints    Pre-Treatment Position in bed  -AM in bed  -MM     Post Treatment Position chair  -AM bed  -MM     In Bed  side lying left;notified nsg;call light within reach;encouraged to call for assist  -MM     In Chair reclined;call light within reach;encouraged to call for assist;legs elevated  -AM       Recorded by [AM] Yair Haro PTA [MM] Elsa Parks, PT       User Key  (r) = Recorded By, (t) = Taken By, (c) = Cosigned By    Initials Name Effective Dates    AM Yair Haro, PTA 10/17/16 -     MM Elsa Parks, PT 12/28/16 -                 IP PT Goals       02/09/17 0920 02/08/17 1510 02/08/17 1117    Transfer Training PT STG    Transfer Training PT STG, Date Established   02/08/17  -MM    Transfer Training PT STG, Time to Achieve   2 - 3 days  -MM    Transfer Training PT STG, Activity Type   all transfers  -MM    Transfer Training PT STG, [place] Level  --  -MM conditional independence  -MM    Transfer Training PT STG, Date Goal Reviewed 02/09/17  -AM 02/08/17  -MM     Transfer Training PT STG, Outcome goal not met  -AM      Gait Training PT STG    Gait Training Goal PT STG, Date Established   02/08/17  -MM    Gait Training Goal PT STG, Time to Achieve   2 - 3 days  -MM    Gait Training Goal PT STG, [place] Level   conditional independence  -MM    Gait Training Goal PT STG, Distance to Achieve   500'  -MM    Gait Training Goal PT STG, Date Goal Reviewed 02/09/17  -AM 02/08/17  -MM     Gait Training Goal PT STG, Outcome goal not met  -AM        User Key  (r) = Recorded By, (t) = Taken By, (c) = Cosigned By    Initials Name Provider Type    AM Yair Haro PTA Physical Therapy Assistant    MM Elsa Parks, PT Physical Therapist          Physical Therapy Education     Title: PT OT SLP Therapies (Done)     Topic: Physical Therapy (Done)     Point: Mobility training (Done)    Learning Progress Summary    Learner Readiness Method Response Comment Documented by Status   Patient Acceptance E VU log rolling during bed mobility MM 02/08/17 1125 Done               Point: Home exercise program (Done)    Learning Progress Summary    Learner Readiness Method Response Comment Documented by Status   Patient Acceptance E VU log rolling during bed mobility MM 02/08/17 1125 Done               Point: Body  mechanics (Done)    Learning Progress Summary    Learner Readiness Method Response Comment Documented by Status   Patient Acceptance E VU log rolling during bed mobility MM 02/08/17 1125 Done               Point: Precautions (Done)    Learning Progress Summary    Learner Readiness Method Response Comment Documented by Status   Patient Acceptance E VU log rolling during bed mobility MM 02/08/17 1125 Done                      User Key     Initials Effective Dates Name Provider Type Discipline     12/28/16 -  Elsa Parks, PT Physical Therapist PT                    PT Recommendation and Plan  Anticipated Discharge Disposition: home  Planned Therapy Interventions: gait training, balance training, home exercise program, strengthening, transfer training, patient/family education  PT Frequency: 2 times/day  Plan of Care Review  Plan Of Care Reviewed With: patient  Progress: progress toward functional goals as expected  Outcome Summary/Follow up Plan: No PT goals met this tx. Pt tolerated gt well (150 ft) w/no AD CGA. Pt would benefit from  PT to regain addtitional strength and endurance.          Outcome Measures       02/09/17 0920 02/08/17 0930       How much help from another person do you currently need...    Turning from your back to your side while in flat bed without using bedrails? 4  -AM 4  -MM     Moving from lying on back to sitting on the side of a flat bed without bedrails? 4  -AM 4  -MM     Moving to and from a bed to a chair (including a wheelchair)? 3  -AM 3  -MM     Standing up from a chair using your arms (e.g., wheelchair, bedside chair)? 3  -AM 3  -MM     Climbing 3-5 steps with a railing? 3  -AM 3  -MM     To walk in hospital room? 3  -AM 3  -MM     AM-PAC 6 Clicks Score 20  -AM 20  -MM     Functional Assessment    Outcome Measure Options AM-PAC 6 Clicks Basic Mobility (PT)  -AM AM-PAC 6 Clicks Basic Mobility (PT)  -MM       User Key  (r) = Recorded By, (t) = Taken By, (c) = Cosigned By     Initials Name Provider Type    AM Yair Haro PTA Physical Therapy Assistant    ELTON Parks, PT Physical Therapist           Time Calculation:         PT Charges       02/09/17 0920          Time Calculation    Start Time 0920  -AM      Stop Time 0945  -AM      Time Calculation (min) 25 min  -AM      PT Received On 02/09/17  -AM      PT - Next Appointment 02/09/17  -AM      Time Calculation- PT    Total Timed Code Minutes- PT 25 minute(s)  -AM        User Key  (r) = Recorded By, (t) = Taken By, (c) = Cosigned By    Initials Name Provider Type    AM Yair Haro PTA Physical Therapy Assistant          Therapy Charges for Today     Code Description Service Date Service Provider Modifiers Qty    56937288818 HC PT THERAPEUTIC ACT EA 15 MIN 2/9/2017 Yair Haro PTA GP 1    [id number] HC GAIT TRAINING EA 15 MIN 2/9/2017 Yair Haro PTA GP 1          PT G-Codes  PT Professional Judgement Used?: Yes  Outcome Measure Options: AM-PAC 6 Clicks Basic Mobility (PT)  Score: 20  Functional Limitation: Mobility: Walking and moving around  Mobility: Walking and Moving Around Current Status (): At least 20 percent but less than 40 percent impaired, limited or restricted  Mobility: Walking and Moving Around Goal Status (): At least 1 percent but less than 20 percent impaired, limited or restricted    Yair Haro PTA  2/9/2017

## 2017-02-09 NOTE — THERAPY DISCHARGE NOTE
Acute Care - Physical Therapy Treatment Note/Discharge  [facility]     Patient Name: Vishnu Waller  : 1940  MRN: [id number]  Today's Date: 2017  Onset of Illness/Injury or Date of Surgery Date: 17  Date of Referral to PT: 17  Referring Physician: Dr. Mayo    Admit Date: 2017    Visit Dx:    ICD-10-CM ICD-9-CM   1. Impaired physical mobility Z74.09 781.99     Patient Active Problem List   Diagnosis   • Degeneration of lumbar intervertebral disc   • Low back pain without sciatica   • Neuritis or radiculitis due to rupture of lumbar intervertebral disc   • Borderline glaucoma, open angle with borderline findings   • Pseudophakia   • Coronary artery disease involving native coronary artery without angina pectoris   • S/P CABG (coronary artery bypass graft)   • Essential hypertension   • Mixed hyperlipidemia   • Thrombocytopenia   • Spinal stenosis of lumbar region   • Degenerative disc disease, lumbar       Physical Therapy Education     Title: PT OT SLP Therapies (Done)     Topic: Physical Therapy (Done)     Point: Mobility training (Done)    Learning Progress Summary    Learner Readiness Method Response Comment Documented by Status   Patient Acceptance E VU log rolling during bed mobility MM 17 1125 Done               Point: Home exercise program (Done)    Learning Progress Summary    Learner Readiness Method Response Comment Documented by Status   Patient Acceptance E VU log rolling during bed mobility MM 17 1125 Done               Point: Body mechanics (Done)    Learning Progress Summary    Learner Readiness Method Response Comment Documented by Status   Patient Acceptance E VU log rolling during bed mobility MM 17 1125 Done               Point: Precautions (Done)    Learning Progress Summary    Learner Readiness Method Response Comment Documented by Status   Patient Acceptance E VU log rolling during bed mobility MM 17 1125 Done                       User Key     Initials Effective Dates Name Provider Type Discipline    MM 12/28/16 -  Elsa Parks, PT Physical Therapist PT                    IP PT Goals       02/09/17 1535 02/09/17 0920 02/08/17 1510    Transfer Training PT STG    Transfer Training PT STG, [place] Level   --  -MM    Transfer Training PT STG, Date Goal Reviewed 02/09/17  -MM 02/09/17  -AM 02/08/17  -MM    Transfer Training PT STG, Outcome goal met  -MM goal not met  -AM     Gait Training PT STG    Gait Training Goal PT STG, Date Goal Reviewed 02/09/17  -MM 02/09/17  -AM 02/08/17  -MM    Gait Training Goal PT STG, Outcome goal met  -MM goal not met  -AM       02/08/17 1117          Transfer Training PT STG    Transfer Training PT STG, Date Established 02/08/17  -MM      Transfer Training PT STG, Time to Achieve 2 - 3 days  -MM      Transfer Training PT STG, Activity Type all transfers  -MM      Transfer Training PT STG, [place] Level conditional independence  -MM      Gait Training PT STG    Gait Training Goal PT STG, Date Established 02/08/17  -MM      Gait Training Goal PT STG, Time to Achieve 2 - 3 days  -MM      Gait Training Goal PT STG, [place] Level conditional independence  -MM      Gait Training Goal PT STG, Distance to Achieve 500'  -MM        User Key  (r) = Recorded By, (t) = Taken By, (c) = Cosigned By    Initials Name Provider Type    AM Yair Haro, PTA Physical Therapy Assistant    MM Elsa Parks, PT Physical Therapist              Adult Rehabilitation Note       02/09/17 1433 02/09/17 0920 02/08/17 1430    Rehab Assessment/Intervention    Discipline physical therapist  -MM physical therapy assistant  -AM physical therapist  -MM    Document Type therapy note (daily note)  -MM therapy note (daily note)  -AM therapy note (daily note)  -MM    Subjective Information no complaints  -MM agree to therapy;complains of;pain  -AM complains of;pain  -MM    Patient Effort, Rehab Treatment excellent  -MM good  -AM good   -MM    Precautions/Limitations  spinal precautions;fall precautions  -AM spinal precautions;fall precautions  -MM    Equipment Issued to Patient  gait belt  -AM     Recorded by [MM] Elsa Parks, PT [AM] Yair Haro PTA [MM] Elsa Parks PT    Vital Signs    Pre Systolic BP Rehab  138  -AM     Pre Treatment Diastolic BP  76  -AM     Post Systolic BP Rehab  168  -AM     Post Treatment Diastolic BP  74  -AM     Pretreatment Heart Rate (beats/min) 108  -MM 98  -AM 97  -MM    Intratreatment Heart Rate (beats/min) 125  -MM      Posttreatment Heart Rate (beats/min) 116  -MM 97  -  -MM    Pre SpO2 (%) 95  -MM 96  -AM 93  -MM    O2 Delivery Pre Treatment room air  -MM supplemental O2  -AM room air  -MM    Intra SpO2 (%) 95  -MM  94  -MM    O2 Delivery Intra Treatment room air  -MM  room air  -MM    Post SpO2 (%) 96  -MM 95  -AM 92  -MM    O2 Delivery Post Treatment room air  -MM room air  -AM room air  -MM    Pre Patient Position  Sitting  -AM     Intra Patient Position  Standing  -AM     Post Patient Position  Sitting  -AM     Rest Breaks  0  -MM  1  -MM    Recorded by [MM] Elsa Parks, PT [AM] Yair Haro PTA [MM] Elsa Parks PT    Pain Assessment    Pain Assessment 0-10  -MM 0-10  -AM 0-10  -MM    Pain Score 3  -MM 5  -AM 3  -MM    Post Pain Score  4  -AM 3  -MM    Pain Type  Surgical pain  -AM     Pain Location Back  -MM Back  -AM Back  -MM    Pain Orientation  Mid  -AM     Pain Descriptors  Aching  -AM     Pain Frequency  Constant/continuous  -AM     Date Pain First Started  02/07/17  -AM     Clinical Progression  Gradually improving  -AM     Patient's Stated Pain Goal  No pain  -AM     Pain Intervention(s)  Medication (See MAR);Ambulation/increased activity  -AM Repositioned  -MM    Result of Injury  No  -AM     Work-Related Injury  No  -AM     Multiple Pain Sites  No  -AM     Recorded by [MM] Elsa Parks, PT [AM] Yair Haro PTA [MM] Elsa Parks PT    Cognitive  Assessment/Intervention    Current Cognitive/Communication Assessment  functional  -AM     Orientation Status  oriented x 4  -AM     Follows Commands/Answers Questions  100% of the time  -AM     Personal Safety Interventions  gait belt;nonskid shoes/slippers when out of bed;supervised activity  -AM     Recorded by  [AM] Yair Haro PTA     ROM (Range of Motion)    General ROM  no range of motion deficits identified  -AM     Recorded by  [AM] Yair Haro PTA     Bed Mobility, Assessment/Treatment    Bed Mobility, Roll Left, [place] conditional independence  -MM not tested  -AM     Bed Mobility, Roll Right, [place] conditional independence  -MM not tested  -AM     Bed Mobility, Scoot/Bridge, [place]  not tested  -AM     Bed Mob, Supine to Sit, [place] conditional independence  -MM not tested  -AM conditional independence  -MM    Bed Mob, Sit to Supine, [place] conditional independence  -MM not tested  -AM conditional independence  -MM    Bed Mob, Sidelying to Sit, [place]  not tested  -AM     Bed Mob, Sit to Sidelying, [place]  not tested  -AM     Bed Mobility, Comment Pt able to verbalize log rolling technique for safe bed mobility   -MM  verbal cueing required for log rolling technique  -MM    Recorded by [MM] Elsa Parks, PT [AM] Yair Haro PTA [MM] Elsa Parks, PT    Transfer Assessment/Treatment    Transfers, Bed-Chair [place]  stand by assist;contact guard assist  -AM     Transfers, Chair-Bed [place]  stand by assist;contact guard assist  -AM     Transfers, Sit-Stand [place] conditional independence  -MM stand by assist;contact guard assist  -AM contact guard assist  -MM    Transfers, Stand-Sit [place] conditional independence  -MM stand by assist;contact guard assist  -AM contact guard assist  -MM    Toilet Transfer, [place]  not tested  -AM     Walk-In Shower Transfer, [place]  not tested  -AM     Bathtub Transfer,  [place]  not tested  -AM     Transfer, Maintain Weight Bearing Status  able to maintain weight bearing status  -AM     Transfer, Safety Issues  impulsivity  -AM     Transfer, Impairments  impaired balance  -AM     Recorded by [MM] Elsa Parks, PT [AM] Yair Haro PTA [MM] Elsa Parks PT    Gait Assessment/Treatment    Gait, [place] Level conditional independence  -MM stand by assist;contact guard assist  -AM contact guard assist  -MM    Gait, Distance (Feet) 550  -  -AM --   125', 300'  -MM    Gait, Gait Pattern Analysis  swing-through gait  -AM     Gait, Gait Deviations rafiq decreased  -MM      Gait, Maintain Weight Bearing Status  able to maintain weight bearing status  -AM     Gait, Impairments  impaired balance  -AM     Gait, Comment Pt able to perform B horizontal/vertical head turns and changes in gait speed without LOB.   -MM  --   Mild unsteadiness with B horizontal head turns  -MM    Recorded by [MM] Elsa Parks, PT [AM] Yair Haro PTA [MM] Elsa Parks PT    Stairs Assessment/Treatment    Number of Stairs 3  -MM      Stairs, Handrail Location right side (ascending)  -MM      Stairs, [place] Level conditional independence  -MM      Stairs, Comment Step-to pattern utilized on stairs--no LOB  -MM      Recorded by [MM] Elsa Parks PT      Positioning and Restraints    Pre-Treatment Position in bed  -MM in bed  -AM in bed  -MM    Post Treatment Position bed  -MM chair  -AM bed  -MM    In Bed sitting EOB;call light within reach  -MM  side lying left;notified nsg;call light within reach;encouraged to call for assist  -MM    In Chair  reclined;call light within reach;encouraged to call for assist;legs elevated  -AM     Recorded by [MM] Elsa Parks, PT [AM] Yair Haro PTA [MM] Elsa Parks PT      User Key  (r) = Recorded By, (t) = Taken By, (c) = Cosigned By    Initials Name Effective Dates    AM Yair Haro PTA 10/17/16 -     MM Elsa Parks PT  12/28/16 -           PT Recommendation and Plan  Anticipated Discharge Disposition: home  Planned Therapy Interventions: gait training, balance training, home exercise program, strengthening, transfer training, patient/family education  PT Frequency: 2 times/day  Plan of Care Review  Plan Of Care Reviewed With: patient  Outcome Summary/Follow up Plan: Pt demonstrated modified independence with bed mobility, transfers, ambulation of 550 ft and ascended/descended 3 steps. Pt with no LOB during challenging gait tasks and varying gait speeds. No further skilled PT services needed at this time. Pt appears safe to ambulate with nursing staff while IP.           Outcome Measures       02/09/17 1500 02/09/17 0920 02/08/17 0930    How much help from another person do you currently need...    Turning from your back to your side while in flat bed without using bedrails? 4  -MM 4  -AM 4  -MM    Moving from lying on back to sitting on the side of a flat bed without bedrails? 4  -MM 4  -AM 4  -MM    Moving to and from a bed to a chair (including a wheelchair)? 4  -MM 3  -AM 3  -MM    Standing up from a chair using your arms (e.g., wheelchair, bedside chair)? 4  -MM 3  -AM 3  -MM    Climbing 3-5 steps with a railing? 3  -MM 3  -AM 3  -MM    To walk in hospital room? 4  -MM 3  -AM 3  -MM    AM-PAC 6 Clicks Score 23  -MM 20  -AM 20  -MM    Functional Assessment    Outcome Measure Options AM-PAC 6 Clicks Basic Mobility (PT)  -MM AM-PAC 6 Clicks Basic Mobility (PT)  -AM AM-PAC 6 Clicks Basic Mobility (PT)  -MM      User Key  (r) = Recorded By, (t) = Taken By, (c) = Cosigned By    Initials Name Provider Type    AM Yair Haro, PTA Physical Therapy Assistant    MM Elsa Parks, PT Physical Therapist           Time Calculation:         PT Charges       02/09/17 1544 02/09/17 0920       Time Calculation    Start Time 1433  -MM 0920  -AM     Stop Time 1450  -MM 0945  -AM     Time Calculation (min) 17 min  -MM 25 min  -AM     PT  Received On 02/09/17  -MM 02/09/17  -AM     PT - Next Appointment  02/09/17  -AM     Time Calculation- PT    Total Timed Code Minutes- PT 17 minute(s)  -MM 25 minute(s)  -AM       User Key  (r) = Recorded By, (t) = Taken By, (c) = Cosigned By    Initials Name Provider Type    AM Yair Haro, PTA Physical Therapy Assistant    MM Elsa Parks, PT Physical Therapist          Therapy Charges for Today     Code Description Service Date Service Provider Modifiers Qty    15086960607 HC PT MOBILITY CURRENT 2/8/2017 Elsa Parks, PT GP, CJ 1    14901761163 HC PT MOBILITY PROJECTED 2/8/2017 Elsa Parks, PT GP, CI 1    88963459676 HC PT EVAL MOD COMPLEXITY 1 2/8/2017 Elsa Parks, PT GP 1    46505824430 HC GAIT TRAINING EA 15 MIN 2/8/2017 Elsa Parks, PT GP 1    79512633435 HC PT THERAPEUTIC ACT EA 15 MIN 2/8/2017 Elsa Parks PT GP 1    65195086818 HC PT THER PROC EA 15 MIN 2/8/2017 Elsa Parks PT GP 1    83809713831 HC PT MOBILITY CURRENT 2/9/2017 Elsa Parks, PT GP, CI 1    22352103832 HC PT MOBILITY DISCHARGE 2/9/2017 Elsa Parks PT GP, CI 1    62687429572 HC PT MOBILITY PROJECTED 2/9/2017 Elsa Parks PT GP, CI 1    84773232284 HC PT THER PROC EA 15 MIN 2/9/2017 Elsa Parks PT GP 1          PT G-Codes  PT Professional Judgement Used?: Yes  Outcome Measure Options: AM-PAC 6 Clicks Basic Mobility (PT)  Score: 23  Functional Limitation: Mobility: Walking and moving around  Mobility: Walking and Moving Around Current Status (): At least 1 percent but less than 20 percent impaired, limited or restricted  Mobility: Walking and Moving Around Goal Status (): At least 1 percent but less than 20 percent impaired, limited or restricted  Mobility: Walking and Moving Around Discharge Status (): At least 1 percent but less than 20 percent impaired, limited or restricted    PT Discharge Summary  Anticipated Discharge Disposition: home  Reason for Discharge: All goals achieved, Maximum functional  potential achieved  Outcomes Achieved: Able to achieve all goals within established timeline  Discharge Destination: Home    Elsa Parks, PT  2/9/2017

## 2017-02-09 NOTE — PLAN OF CARE
Problem: Patient Care Overview (Adult)  Goal: Plan of Care Review  Outcome: Ongoing (interventions implemented as appropriate)    02/09/17 1545   Coping/Psychosocial Response Interventions   Plan Of Care Reviewed With patient   Patient Care Overview   Progress improving   Outcome Evaluation   Outcome Summary/Follow up Plan Pt vitals are stable. Pt ambulating in hallway.        Goal: Adult Individualization and Mutuality  Outcome: Ongoing (interventions implemented as appropriate)  Goal: Discharge Needs Assessment  Outcome: Ongoing (interventions implemented as appropriate)    Problem: Laminectomy/Foraminotomy/Discectomy (Adult)  Goal: Signs and Symptoms of Listed Potential Problems Will be Absent or Manageable (Laminectomy/Foraminotomy/Discectomy)  Outcome: Ongoing (interventions implemented as appropriate)

## 2017-02-10 VITALS
HEIGHT: 63 IN | RESPIRATION RATE: 18 BRPM | OXYGEN SATURATION: 94 % | HEART RATE: 92 BPM | TEMPERATURE: 98.9 F | WEIGHT: 149.25 LBS | SYSTOLIC BLOOD PRESSURE: 125 MMHG | BODY MASS INDEX: 26.45 KG/M2 | DIASTOLIC BLOOD PRESSURE: 64 MMHG

## 2017-02-10 LAB — GLUCOSE BLDC GLUCOMTR-MCNC: 120 MG/DL (ref 70–130)

## 2017-02-10 PROCEDURE — 63710000001 METFORMIN 500 MG TABLET: Performed by: ORTHOPAEDIC SURGERY

## 2017-02-10 PROCEDURE — 25810000003 SODIUM CHLORIDE 0.9 % WITH KCL 20 MEQ 20-0.9 MEQ/L-% SOLUTION: Performed by: ORTHOPAEDIC SURGERY

## 2017-02-10 PROCEDURE — A9270 NON-COVERED ITEM OR SERVICE: HCPCS | Performed by: ORTHOPAEDIC SURGERY

## 2017-02-10 PROCEDURE — 82962 GLUCOSE BLOOD TEST: CPT

## 2017-02-10 PROCEDURE — 99024 POSTOP FOLLOW-UP VISIT: CPT | Performed by: ORTHOPAEDIC SURGERY

## 2017-02-10 RX ORDER — SODIUM CHLORIDE 0.9 % (FLUSH) 0.9 %
SYRINGE (ML) INJECTION
Status: DISCONTINUED
Start: 2017-02-10 | End: 2017-02-10 | Stop reason: HOSPADM

## 2017-02-10 RX ORDER — OXYCODONE AND ACETAMINOPHEN 10; 325 MG/1; MG/1
1 TABLET ORAL EVERY 6 HOURS PRN
Qty: 70 TABLET | Refills: 0 | Status: SHIPPED | OUTPATIENT
Start: 2017-02-10 | End: 2017-08-11

## 2017-02-10 RX ORDER — DOCUSATE SODIUM 100 MG/1
100 CAPSULE, LIQUID FILLED ORAL 2 TIMES DAILY
Qty: 60 CAPSULE | Refills: 2 | Status: SHIPPED | OUTPATIENT
Start: 2017-02-10 | End: 2017-08-11

## 2017-02-10 RX ADMIN — POTASSIUM CHLORIDE AND SODIUM CHLORIDE 100 ML/HR: 900; 150 INJECTION, SOLUTION INTRAVENOUS at 04:47

## 2017-02-10 RX ADMIN — METFORMIN HYDROCHLORIDE 1000 MG: 500 TABLET ORAL at 07:58

## 2017-02-10 NOTE — PLAN OF CARE
Problem: Patient Care Overview (Adult)  Goal: Plan of Care Review  Outcome: Ongoing (interventions implemented as appropriate)    02/09/17 1545 02/09/17 2000   Coping/Psychosocial Response Interventions   Plan Of Care Reviewed With --  patient   Patient Care Overview   Progress improving --    Outcome Evaluation   Outcome Summary/Follow up Plan Pt vitals are stable. Pt ambulating in hallway.  --        Goal: Adult Individualization and Mutuality  Outcome: Ongoing (interventions implemented as appropriate)

## 2017-02-10 NOTE — DISCHARGE INSTR - ACTIVITY
May shower  No bending, stooping  No pulling or lifting  No twisting  No driving while on narcotics

## 2017-02-10 NOTE — DISCHARGE SUMMARY
DISCHARGE SUMMARY    NAME: Vishnu Waller   PHYSICIAN: Karine Cabrera MD  : 1940  MRN: [id number]    ADMITTED: 2017   DISCHARGED: 2/10/2017    ADMISSION DIAGNOSES: Degenerative lumbar disc [M51.36]  Degenerative disc disease, lumbar [M51.36]  DISCHARGE DIAGNOSES: Same as admission    SERVICE: Orthopedics. Attending Gael, Resident Karine Cabrera MD    CONSULTS:   Consult Orders (all)     Start     Ordered    17 164  Inpatient Consult to Case Management   Once     Provider:  (Not yet assigned)    17 1647          PROCEDURES:   lumbar laminectomy with out fusion at L3-L4 and L4-L5. Please see procedure note for full details.     HISTORY OF PRESENT ILLNESS:   Patient is a 77 y.o. male presented with Lower back pain. Discussed different options while in office. Patient decided on surgery.      DIAGNOSTIC DATA:   H&H remained stable. Last hemoglobin:  Lab Results   Component Value Date    HGB 8.6 (L) 2017        HOSPITAL COURSE:  Patient had a lumbar laminectomy with out fusion at L3-L4 and L4-L5 preformed by Dr. Mayo on 2017. Please see procedure note for full details. Encountered no complications. Patient worked well with PT. Was walking 550 feet and climbing 3 stairs with little to no difficulty. Drain and dressing was removed prior to discharge. No drainage from incision. No erythema or other signs of infection. Patient was tolerating pain well with Percocet 10/325mg every 6 hours PRN.     DISCHARGE CONDITION:   Good     DISPOSITION:  Home or Self Care    DISCHARGE MEDICATIONS   Vishnu Waller   Home Medication Instructions MARS:[id number]    Printed on:02/10/17 0736   Medication Information                      amLODIPine (NORVASC) 5 MG tablet  Take 5 mg by mouth Daily.             clopidogrel (PLAVIX) 75 MG tablet  Take 75 mg by mouth Daily.             cyanocobalamin (VITAMIN B-12N) 50 MCG tablet  Take 500 mcg by mouth Daily.              docusate sodium (COLACE) 100 MG capsule  Take 1 capsule by mouth 2 (Two) Times a Day.             guaiFENesin (ROBITUSSIN) 100 MG/5ML syrup  Take 200 mg by mouth 3 (Three) Times a Day As Needed for cough.             latanoprost (XALATAN) 0.005 % ophthalmic solution  Administer 1 drop to both eyes every night.             losartan (COZAAR) 50 MG tablet  Take 50 mg by mouth Daily.             melatonin 1 MG tablet  Take 3 mg by mouth At Night As Needed for sleep.             meloxicam (MOBIC) 15 MG tablet  Take 15 mg by mouth Daily.             metFORMIN (GLUCOPHAGE) 1000 MG tablet  1,000 mg 2 (Two) Times a Day With Meals.             mometasone (NASONEX) 50 MCG/ACT nasal spray  2 sprays into each nostril daily.             montelukast (SINGULAIR) 10 MG tablet  Take 10 mg by mouth Daily As Needed.             Multiple Vitamins-Minerals (CENTRUM PO)  Take 1 tablet by mouth daily. Centrum 0.4 mg-162 mg-18 mg tablet  (unconfirmed)             oxybutynin (DITROPAN) 5 MG tablet  Take 5 mg by mouth Daily.             oxyCODONE-acetaminophen (PERCOCET)  MG per tablet  Take 1 tablet by mouth Every 6 (Six) Hours As Needed for moderate pain (4-6).             raNITIdine (ZANTAC) 75 MG tablet  Take 75 mg by mouth 2 (Two) Times a Day.             simvastatin (ZOCOR) 40 MG tablet  Take 40 mg by mouth Every Night.                 INSTRUCTIONS:  Activity:  ambulate with walker. No heavy lifting.   Diet: Regular  Special instructions: Patient instructed to call MD or return to ED with worsening shortness of breath, chest pain, fever greater than 100.4 degrees F or any other medical concerns..    FOLLOW UP:   Dr. Mayo in 3 weeks    PENDING TEST RESULTS AT DISCHARGE  None    Time: Discharge 30 min    Dr. Mayo is the attending at time of discharge, he is aware of the patient's status and agrees with the above discharge summary.    Karine Cabrera MD PGY II          This document has been electronically signed by Karine  MD Rick on February 10, 2017 7:36 AM

## 2017-02-10 NOTE — PROGRESS NOTES
"Daily Progress Note      Active Problems:    Degenerative disc disease, lumbar     LOS: 0 days     Subjective    Vishnu Waller is a 77 year old POD 1 from a lumbar laminectomy with out fusion at L3-L4 and L4-L5 . Patient complained lower back pain. Denies numbness and tingling in lower extremities. Patient is otherwise doing well. Patient is working well with PT. Walked 550 feet and climbed 3 stairs.     Review of Systems - History obtained from the patient  General:negative for - chills, fatigue, fever,   Ophthalmic: negative for - blurry vision or loss of vision  Respiratory: no cough, shortness of breath, or wheezing  Cardiovascular: no chest pain, edema or dyspnea on exertion  Gastrointestinal:no abdominal pain or black or bloody stools, nausea and vomiting.   Genito-Urinary: no dysuria, trouble voiding, or hematuria  Musculoskeletal: Positive for back pain: improving  Neurological: negative for - dizziness, headaches, numbness/tingling  Dermatological: negative for rash and skin lesion changes    Objective     Vital signs in last 24 hours:  Temp:  [98.7 °F (37.1 °C)-100 °F (37.8 °C)] 98.9 °F (37.2 °C)  Heart Rate:  [70-92] 92  Resp:  [18] 18  BP: ()/(55-76) 125/64    Intake/Output last 3 shifts:  I/O last 3 completed shifts:  In: 4803 [P.O.:1040; I.V.:3763]  Out: 2820 [Urine:2750; Other:50]    Physical Exam:  Visit Vitals   • /64   • Pulse 92   • Temp 98.9 °F (37.2 °C) (Oral)   • Resp 18   • Ht 63\" (160 cm)   • Wt 149 lb 4 oz (67.7 kg)   • SpO2 94%   • BMI 26.44 kg/m2     Physical Exam   General Appearance:    Alert, cooperative, no distress, appears stated age   Head:    Normocephalic, without obvious abnormality, atraumatic   Eyes:    PERRL     Nose:   Nares normal, septum midline   Throat:   Lips, mucosa, and tongue normal; teeth and gums normal   Neck:   Supple, symmetrical, trachea midline, no adenopathy;        thyroid:  No enlargement/tenderness/nodules; no carotid    bruit or JVD   Back:     " Symmetric, no curvature, ROM normal, no CVA tenderness   Lungs:     Clear to auscultation bilaterally, respirations unlabored   Chest wall:    No tenderness or deformity   Heart:    Regular rate and rhythm, S1 and S2 normal, no murmur, rub   or gallop   Abdomen:     Soft, non-tender, bowel sounds active all four quadrants,     no masses, no organomegaly   Extremities:   Extremities normal, atraumatic, no cyanosis or edema. Able to move all 4 extremities equally. No signs or symptoms of DVT/PE   Pulses:   2+ and symmetric all extremities   Skin:   Skin color, texture, turgor normal, no rashes or lesions. Dressing removed. No erythema or drainage. Drain removed. Sutures intact.    Lymph nodes:   Cervical, supraclavicular, and axillary nodes normal   Neurologic:   CNII-XII intact. Normal strength 4/5 in lower ext, sensation and reflexes normal throughout       Lab Results   Component Value Date    HGB 8.6 (L) 02/09/2017       Assessment/Plan   77 Year old POD 2 from a lumbar laminectomy with out fusion at L3-L4 and L4-L5. Patient is doing well. Has phenergan for nausea. Pain is well controlled. Will continue scd/deejay for DVT ppx. Home today.     Karine Cabrera MD, PGY II

## 2017-02-11 LAB
GLUCOSE BLDC GLUCOMTR-MCNC: 128 MG/DL (ref 70–130)
GLUCOSE BLDC GLUCOMTR-MCNC: 156 MG/DL (ref 70–130)

## 2017-02-17 ENCOUNTER — OFFICE VISIT (OUTPATIENT)
Dept: ORTHOPEDIC SURGERY | Facility: CLINIC | Age: 77
End: 2017-02-17

## 2017-02-17 VITALS — WEIGHT: 149 LBS | BODY MASS INDEX: 26.4 KG/M2 | HEIGHT: 63 IN

## 2017-02-17 DIAGNOSIS — Z96.651 PRESENCE OF RIGHT ARTIFICIAL KNEE JOINT: ICD-10-CM

## 2017-02-17 DIAGNOSIS — M25.561 CHRONIC PAIN OF RIGHT KNEE: Primary | ICD-10-CM

## 2017-02-17 DIAGNOSIS — G89.29 CHRONIC PAIN OF RIGHT KNEE: Primary | ICD-10-CM

## 2017-02-17 PROCEDURE — 99213 OFFICE O/P EST LOW 20 MIN: CPT | Performed by: ORTHOPAEDIC SURGERY

## 2017-02-17 NOTE — PROGRESS NOTES
Vishnu Waller is a 77 y.o. male returns for     Chief Complaint   Patient presents with   • Right Knee - Follow-up       HISTORY OF PRESENT ILLNESS: surgery done 11/4/2015, xrays done today in office.  Patient is approximately 1 year status post unicompartmental arthroplasty.  He is doing very well and has no complaints from his knees.  No new problems.  Patient is currently status post a lumbar back surgery and is progressing from that as well.       CONCURRENT MEDICAL HISTORY:    Past Medical History   Diagnosis Date   • Acute bacterial sinusitis    • Acute bronchitis    • Acute frontal sinusitis    • Acute maxillary sinusitis    • Acute pharyngitis    • Acute sinusitis      improved   • Allergic rhinitis    • Allergic rhinitis due to pollen    • Anxiety    • Artificial lens present      in position   • Backache    • Borderline glaucoma    • Chronic laryngitis    • Chronic rhinitis    • Coronary arteriosclerosis    • Coronary atherosclerosis    • Cough    • Degeneration of lumbar intervertebral disc    • Degenerative joint disease involving multiple joints    • Diabetes    • Diverticular disease of colon    • Erectile dysfunction    • Essential hypertension    • Generalized anxiety disorder    • GERD (gastroesophageal reflux disease)    • Glaucoma    • Heart disease    • Hemorrhoids      without bleeding   • Hyperlipidemia    • Insomnia    • Kidney stone    • Malignant tumor of prostate    • Need for prophylactic vaccination and inoculation against influenza    • Osteoarthritis    • Osteoarthritis of multiple joints    • Pain, lumbar region      Pain radiating to lumbar region of back     • Prostate cancer    • Pseudomembranous enterocolitis      improved   • Rotator cuff syndrome      right   • Shoulder pain    • Sleep apnea    • Strain of rotator cuff capsule    • Tenosynovitis    • Type 2 diabetes mellitus      no BDR   • Upper respiratory infection        Allergies   Allergen Reactions   • Ace  Inhibitors      Cough with Lisinopril   • Molds & Smuts    • Hydrocodone-Acetaminophen Itching         Current Outpatient Prescriptions:   •  amLODIPine (NORVASC) 5 MG tablet, Take 5 mg by mouth Daily., Disp: , Rfl:   •  clopidogrel (PLAVIX) 75 MG tablet, Take 75 mg by mouth Daily., Disp: , Rfl:   •  cyanocobalamin (VITAMIN B-12N) 50 MCG tablet, Take 500 mcg by mouth Daily., Disp: , Rfl:   •  docusate sodium (COLACE) 100 MG capsule, Take 1 capsule by mouth 2 (Two) Times a Day., Disp: 60 capsule, Rfl: 2  •  guaiFENesin (ROBITUSSIN) 100 MG/5ML syrup, Take 200 mg by mouth 3 (Three) Times a Day As Needed for cough., Disp: , Rfl:   •  latanoprost (XALATAN) 0.005 % ophthalmic solution, Administer 1 drop to both eyes every night., Disp: , Rfl:   •  losartan (COZAAR) 50 MG tablet, Take 50 mg by mouth Daily., Disp: , Rfl:   •  melatonin 1 MG tablet, Take 3 mg by mouth At Night As Needed for sleep., Disp: , Rfl:   •  meloxicam (MOBIC) 15 MG tablet, Take 15 mg by mouth Daily., Disp: , Rfl:   •  metFORMIN (GLUCOPHAGE) 1000 MG tablet, 1,000 mg 2 (Two) Times a Day With Meals., Disp: , Rfl:   •  mometasone (NASONEX) 50 MCG/ACT nasal spray, 2 sprays into each nostril daily., Disp: , Rfl:   •  montelukast (SINGULAIR) 10 MG tablet, Take 10 mg by mouth Daily As Needed., Disp: , Rfl:   •  Multiple Vitamins-Minerals (CENTRUM PO), Take 1 tablet by mouth daily. Centrum 0.4 mg-162 mg-18 mg tablet  (unconfirmed), Disp: , Rfl:   •  oxybutynin (DITROPAN) 5 MG tablet, Take 5 mg by mouth Daily., Disp: , Rfl:   •  oxyCODONE-acetaminophen (PERCOCET)  MG per tablet, Take 1 tablet by mouth Every 6 (Six) Hours As Needed for moderate pain (4-6)., Disp: 70 tablet, Rfl: 0  •  raNITIdine (ZANTAC) 75 MG tablet, Take 75 mg by mouth 2 (Two) Times a Day., Disp: , Rfl:   •  simvastatin (ZOCOR) 40 MG tablet, Take 40 mg by mouth Every Night., Disp: , Rfl:     Past Surgical History   Procedure Laterality Date   • Cataract extraction Bilateral    • Nose  surgery     • Prostatectomy     • Shoulder surgery Left      rotator cuff repair   • Colonoscopy     • Shoulder arthroscopy  07/24/2013     Arthroscopy of right shoulder with rotator repair, Carla procedure, Biceps tenotomy, subacromial decompression.   • Cardiac catheterization  09/25/2003     Cardiac cath (Normal left ventricular systolic function, EF 60% Multi-vessel coronary artery disease with critical disease noted in the LAD coronary artery, diagonal coronary artery, obtuse marginal coronary and right coronary artery.)   • Cardiac catheterization  05/03/1996     Cardiac cath (Coronary atherosclerotic heart disease. 70% diagonal stenosis. Mild to moderate left anterior descending artery stenosis. Preserved left ventricular function.)   • Colonoscopy       Colon endoscopy 58093 (Rectal bleeding. Radiation proctitis w/bleeding. An abnormal CT of transverse colon due to mucosal ischemic colitis, that now is healed. Internal hemorrhoids w/possible bleeding.)   • Colonoscopy  05/10/2011     Colon endoscopy 03841 (Single sessile polyp found in transverse colon and sigmoid colon ,removed by cold biopsy polypectomy.divertic. found in sigmoid colon,descending colon. Friability/capillary friability in rectum sigmoid due to prior radiation.Inter. hem. Grade 1)   • Colonoscopy  05/02/2007     Colon endoscopy 22196 (Colon polyp. Diverticulosis without bleeding. Internal hemorrhoids without bleeding.)   • Cystoscopy  01/13/1995     Cystoscopy, stone removal (Right ureteroscopic lasertripsy.)   • Other surgical history       EXTENDED VISUAL FIELDS STUDY 64592 (Borderline glaucoma) (3): 03/19/2015, 04/09/2014, 03/27/2013   • Other surgical history  10/29/2003     Heart revascularize (TMR) (Directmyocardial revascularization times four; LIMA to LAD SVG to DARIUSZ SVG to OM1, SVG to RCA)   • Injection of medication  11/15/2012     Kenalog (4)      • Knee arthroscopy  01/17/2007     Knee arthroscopy, surgery (Arthroscopy with  "medial and lateral meniscectomies, right knee.)   • Knee surgery  11/04/2015     Knee Surgery (Right unicompartmental arthroplasty.)   • Septorhinoplasty  11/16/1989     Nasal surgery procedure (Septorhinoplasty with reconstruction of the nasal pyramid with a iliac crest graft, rhinoplasty was performed with external approach.)   • Other surgical history       OCT DISC NFL 52110 (Borderline glaucoma)  (3): 09/24/2015, 08/13/2014, 07/29/2013   • Cataract extraction  10/04/2011     Remove cataract, insert lens (Right)   • Cataract extraction  08/23/2011     Remove cataract, insert lens (left)   • Excision lesion  05/13/1999     REMOVE EAR LESION 77542 (1.3 cm basal cell carcinoma, right preauricular area. Excision)   • Excision lesion  03/13/2001     REMOVE LESION NECK/CHEST 25064 (Excision of irritated seborrheic keratosis, anterior neck, 1.1 cm and deep lipoma, posterior neck, 3.5 cm)   • Shoulder surgery  11/01/2005     Shoulder surgery procedure (Decompression, subacromial, explore of the rotator cuff, no tear found nor repaired, acromioplasty of the left shoulder and AC shoulder joint resection.)   • Epidural  12/03/2014     Therapeutic/diag injection (Lumbar transforaminal epidural steroid injection, L5-S1, left side.)   • Epidural  10/22/2014     Therapeutic/diag injection (Lumbar transforaminal epidural steroid injection L5-S1 left side.)   • Epidural  08/07/2014     Therapeutic/diag injection (Lumbar transforaminal epidural steroid injection.)   • Coronary artery bypass graft  2002   • Joint replacement  2015     partial   • Lumbar laminectomy N/A 2/7/2017     Procedure: LUMBAR LAMINECTOMY LUMBAR THREE-FOUR, LUMBAR FOUR-FIVE, INTERLAMINAR DISTRACTION ;  Surgeon: Lucio Mayo MD;  Location: [facility];  Service:        [facility]  No fevers or chills.  No chest pain or shortness of air.  No GI or  disturbances.    PHYSICAL EXAMINATION:       Visit Vitals   • Ht 63\" (160 cm)   • Wt 149 lb (67.6 kg)   • " BMI 26.39 kg/m2       Physical Exam   Constitutional: He is oriented to person, place, and time. He appears well-developed and well-nourished.   Neurological: He is alert and oriented to person, place, and time.   Psychiatric: He has a normal mood and affect. His behavior is normal. Judgment and thought content normal.         Right Knee Exam     Tenderness   The patient is experiencing no tenderness.         Range of Motion   The patient has normal right knee ROM.    Muscle Strength     The patient has normal right knee strength.    Tests   Varus: negative  Valgus: negative    Other   Scars: present (well-healed)  Sensation: normal  Pulse: present  Swelling: none              Xr Knee 1 Or 2 View Right    Result Date: 2/18/2017  Narrative: AP bilateral standing and lateral of the right knee show acceptable position and alignment of the knees status post unicompartmental arthroplasty on the right. He shows no sign of implant loosening or failure. He has minimal arthritic changes in the patellofemoral joint of the right knee. No other acute bony abnormality is noted. He has small metallic artifact in a linear fashion along the medial aspect of his left leg that may be representative of prior vascular work. He also shows some calcification of the popliteal artery on the lateral view of the right knee. Comparison views February 11, 2016. 02/18/17 at 10:31 AM by Nirmal Paez MD     Xr Knee Bilateral Ap Standing    Result Date: 2/18/2017  Narrative: AP bilateral standing and lateral of the right knee show acceptable position and alignment of the knees status post unicompartmental arthroplasty on the right.  He shows no sign of implant loosening or failure.  He has minimal arthritic changes in the patellofemoral joint of the right knee.  No other acute bony abnormality is noted.  He has small metallic artifact in a linear fashion along the medial aspect of his left leg that may be representative of prior vascular  work.  He also shows some calcification of the popliteal artery on the lateral view of the right knee.  Comparison views February 11, 2016. 02/18/17 at 10:29 AM by Nirmal Paez MD     Fl C Arm During Surgery    Result Date: 2/7/2017  Narrative: Please see performing physicians note for result.            ASSESSMENT:    Diagnoses and all orders for this visit:    Chronic pain of right knee    Presence of right artificial knee joint          PLAN    He will continue to be activity as tolerated.  He will continue to progress his strengthening and conditioning as tolerated.  From his knee, there are no restrictions.  He will follow up on an as-needed basis.  Otherwise, he will follow-up per Dr. Mayo's instruction for his back.    Return if symptoms worsen or fail to improve.    Nirmal Paez MD

## 2017-02-22 ENCOUNTER — OFFICE VISIT (OUTPATIENT)
Dept: ORTHOPEDIC SURGERY | Facility: CLINIC | Age: 77
End: 2017-02-22

## 2017-02-22 DIAGNOSIS — M48.061 SPINAL STENOSIS, LUMBAR REGION, WITHOUT NEUROGENIC CLAUDICATION: Primary | ICD-10-CM

## 2017-02-22 DIAGNOSIS — M51.36 DEGENERATION OF LUMBAR INTERVERTEBRAL DISC: ICD-10-CM

## 2017-02-22 DIAGNOSIS — M51.16 NEURITIS OR RADICULITIS DUE TO RUPTURE OF LUMBAR INTERVERTEBRAL DISC: ICD-10-CM

## 2017-02-22 PROCEDURE — 99024 POSTOP FOLLOW-UP VISIT: CPT | Performed by: NURSE PRACTITIONER

## 2017-02-22 NOTE — PROGRESS NOTES
Vishnu Waller is a 77 y.o. male returns for     Chief Complaint   Patient presents with   • Lumbar Spine - Post-op Spine Surgery   • Suture / Staple Removal   • Wound Check       HISTORY OF PRESENT ILLNESS: LUMBAR LAMINECTOMY LUMBAR THREE-FOUR, LUMBAR FOUR-FIVE, INTERLAMINAR DISTRACTION 2/9/17       CONCURRENT MEDICAL HISTORY:    Past Medical History   Diagnosis Date   • Acute bacterial sinusitis    • Acute bronchitis    • Acute frontal sinusitis    • Acute maxillary sinusitis    • Acute pharyngitis    • Acute sinusitis      improved   • Allergic rhinitis    • Allergic rhinitis due to pollen    • Anxiety    • Artificial lens present      in position   • Backache    • Borderline glaucoma    • Chronic laryngitis    • Chronic rhinitis    • Coronary arteriosclerosis    • Coronary atherosclerosis    • Cough    • Degeneration of lumbar intervertebral disc    • Degenerative joint disease involving multiple joints    • Diabetes    • Diverticular disease of colon    • Erectile dysfunction    • Essential hypertension    • Generalized anxiety disorder    • GERD (gastroesophageal reflux disease)    • Glaucoma    • Heart disease    • Hemorrhoids      without bleeding   • Hyperlipidemia    • Insomnia    • Kidney stone    • Malignant tumor of prostate    • Need for prophylactic vaccination and inoculation against influenza    • Osteoarthritis    • Osteoarthritis of multiple joints    • Pain, lumbar region      Pain radiating to lumbar region of back     • Prostate cancer    • Pseudomembranous enterocolitis      improved   • Rotator cuff syndrome      right   • Shoulder pain    • Sleep apnea    • Strain of rotator cuff capsule    • Tenosynovitis    • Type 2 diabetes mellitus      no BDR   • Upper respiratory infection        Allergies   Allergen Reactions   • Ace Inhibitors      Cough with Lisinopril   • Molds & Smuts    • Hydrocodone-Acetaminophen Itching         Current Outpatient Prescriptions:   •  amLODIPine (NORVASC) 5  MG tablet, Take 5 mg by mouth Daily., Disp: , Rfl:   •  clopidogrel (PLAVIX) 75 MG tablet, Take 75 mg by mouth Daily., Disp: , Rfl:   •  cyanocobalamin (VITAMIN B-12N) 50 MCG tablet, Take 500 mcg by mouth Daily., Disp: , Rfl:   •  docusate sodium (COLACE) 100 MG capsule, Take 1 capsule by mouth 2 (Two) Times a Day., Disp: 60 capsule, Rfl: 2  •  guaiFENesin (ROBITUSSIN) 100 MG/5ML syrup, Take 200 mg by mouth 3 (Three) Times a Day As Needed for cough., Disp: , Rfl:   •  latanoprost (XALATAN) 0.005 % ophthalmic solution, Administer 1 drop to both eyes every night., Disp: , Rfl:   •  losartan (COZAAR) 50 MG tablet, Take 50 mg by mouth Daily., Disp: , Rfl:   •  melatonin 1 MG tablet, Take 3 mg by mouth At Night As Needed for sleep., Disp: , Rfl:   •  meloxicam (MOBIC) 15 MG tablet, Take 15 mg by mouth Daily., Disp: , Rfl:   •  metFORMIN (GLUCOPHAGE) 1000 MG tablet, 1,000 mg 2 (Two) Times a Day With Meals., Disp: , Rfl:   •  mometasone (NASONEX) 50 MCG/ACT nasal spray, 2 sprays into each nostril daily., Disp: , Rfl:   •  montelukast (SINGULAIR) 10 MG tablet, Take 10 mg by mouth Daily As Needed., Disp: , Rfl:   •  Multiple Vitamins-Minerals (CENTRUM PO), Take 1 tablet by mouth daily. Centrum 0.4 mg-162 mg-18 mg tablet  (unconfirmed), Disp: , Rfl:   •  oxybutynin (DITROPAN) 5 MG tablet, Take 5 mg by mouth Daily., Disp: , Rfl:   •  oxyCODONE-acetaminophen (PERCOCET)  MG per tablet, Take 1 tablet by mouth Every 6 (Six) Hours As Needed for moderate pain (4-6)., Disp: 70 tablet, Rfl: 0  •  raNITIdine (ZANTAC) 75 MG tablet, Take 75 mg by mouth 2 (Two) Times a Day., Disp: , Rfl:   •  simvastatin (ZOCOR) 40 MG tablet, Take 40 mg by mouth Every Night., Disp: , Rfl:     Past Surgical History   Procedure Laterality Date   • Cataract extraction Bilateral    • Nose surgery     • Prostatectomy     • Shoulder surgery Left      rotator cuff repair   • Colonoscopy     • Shoulder arthroscopy  07/24/2013     Arthroscopy of right  shoulder with rotator repair, Carla procedure, Biceps tenotomy, subacromial decompression.   • Cardiac catheterization  09/25/2003     Cardiac cath (Normal left ventricular systolic function, EF 60% Multi-vessel coronary artery disease with critical disease noted in the LAD coronary artery, diagonal coronary artery, obtuse marginal coronary and right coronary artery.)   • Cardiac catheterization  05/03/1996     Cardiac cath (Coronary atherosclerotic heart disease. 70% diagonal stenosis. Mild to moderate left anterior descending artery stenosis. Preserved left ventricular function.)   • Colonoscopy       Colon endoscopy 90905 (Rectal bleeding. Radiation proctitis w/bleeding. An abnormal CT of transverse colon due to mucosal ischemic colitis, that now is healed. Internal hemorrhoids w/possible bleeding.)   • Colonoscopy  05/10/2011     Colon endoscopy 81096 (Single sessile polyp found in transverse colon and sigmoid colon ,removed by cold biopsy polypectomy.divertic. found in sigmoid colon,descending colon. Friability/capillary friability in rectum sigmoid due to prior radiation.Inter. hem. Grade 1)   • Colonoscopy  05/02/2007     Colon endoscopy 89076 (Colon polyp. Diverticulosis without bleeding. Internal hemorrhoids without bleeding.)   • Cystoscopy  01/13/1995     Cystoscopy, stone removal (Right ureteroscopic lasertripsy.)   • Other surgical history       EXTENDED VISUAL FIELDS STUDY 76707 (Borderline glaucoma) (3): 03/19/2015, 04/09/2014, 03/27/2013   • Other surgical history  10/29/2003     Heart revascularize (TMR) (Directmyocardial revascularization times four; LIMA to LAD SVG to DARIUSZ SVG to OM1, SVG to RCA)   • Injection of medication  11/15/2012     Kenalog (4)      • Knee arthroscopy  01/17/2007     Knee arthroscopy, surgery (Arthroscopy with medial and lateral meniscectomies, right knee.)   • Knee surgery  11/04/2015     Knee Surgery (Right unicompartmental arthroplasty.)   • Septorhinoplasty   11/16/1989     Nasal surgery procedure (Septorhinoplasty with reconstruction of the nasal pyramid with a iliac crest graft, rhinoplasty was performed with external approach.)   • Other surgical history       OCT DISC NFL 98108 (Borderline glaucoma)  (3): 09/24/2015, 08/13/2014, 07/29/2013   • Cataract extraction  10/04/2011     Remove cataract, insert lens (Right)   • Cataract extraction  08/23/2011     Remove cataract, insert lens (left)   • Excision lesion  05/13/1999     REMOVE EAR LESION 02029 (1.3 cm basal cell carcinoma, right preauricular area. Excision)   • Excision lesion  03/13/2001     REMOVE LESION NECK/CHEST 42707 (Excision of irritated seborrheic keratosis, anterior neck, 1.1 cm and deep lipoma, posterior neck, 3.5 cm)   • Shoulder surgery  11/01/2005     Shoulder surgery procedure (Decompression, subacromial, explore of the rotator cuff, no tear found nor repaired, acromioplasty of the left shoulder and AC shoulder joint resection.)   • Epidural  12/03/2014     Therapeutic/diag injection (Lumbar transforaminal epidural steroid injection, L5-S1, left side.)   • Epidural  10/22/2014     Therapeutic/diag injection (Lumbar transforaminal epidural steroid injection L5-S1 left side.)   • Epidural  08/07/2014     Therapeutic/diag injection (Lumbar transforaminal epidural steroid injection.)   • Coronary artery bypass graft  2002   • Joint replacement  2015     partial   • Lumbar laminectomy N/A 2/7/2017     Procedure: LUMBAR LAMINECTOMY LUMBAR THREE-FOUR, LUMBAR FOUR-FIVE, INTERLAMINAR DISTRACTION ;  Surgeon: Lucio Mayo MD;  Location: [facility];  Service:        ROS  No fevers or chills.  No chest pain or shortness of air.  No GI or  disturbances.    PHYSICAL EXAMINATION:       There were no vitals taken for this visit.    Physical Exam   Constitutional: He is oriented to person, place, and time. Vital signs are normal. He appears well-developed and well-nourished. He is cooperative.   HENT:    Head: Normocephalic and atraumatic.   Neck: Trachea normal and phonation normal.   Pulmonary/Chest: Effort normal. No respiratory distress.   Abdominal: Soft. Normal appearance. He exhibits no distension.   Neurological: He is alert and oriented to person, place, and time. GCS eye subscore is 4. GCS verbal subscore is 5. GCS motor subscore is 6.   Skin: Skin is warm, dry and intact.   Psychiatric: He has a normal mood and affect. His speech is normal and behavior is normal. Judgment and thought content normal. Cognition and memory are normal.   Vitals reviewed.      GAIT:     []  Normal  []  Antalgic    Assistive device: []  None  []  Walker     []  Crutches  []  Cane     []  Wheelchair  []  Stretcher    Back Exam     Comments:  Sutures removed this visit and Steri-Strips placed edges of the wound are approximated without any erythema or evidence of infection.              Xr Knee 1 Or 2 View Right    Result Date: 2/18/2017  Narrative: AP bilateral standing and lateral of the right knee show acceptable position and alignment of the knees status post unicompartmental arthroplasty on the right. He shows no sign of implant loosening or failure. He has minimal arthritic changes in the patellofemoral joint of the right knee. No other acute bony abnormality is noted. He has small metallic artifact in a linear fashion along the medial aspect of his left leg that may be representative of prior vascular work. He also shows some calcification of the popliteal artery on the lateral view of the right knee. Comparison views February 11, 2016. 02/18/17 at 10:31 AM by Nirmal Paez MD     Xr Knee Bilateral Ap Standing    Result Date: 2/18/2017  Narrative: AP bilateral standing and lateral of the right knee show acceptable position and alignment of the knees status post unicompartmental arthroplasty on the right.  He shows no sign of implant loosening or failure.  He has minimal arthritic changes in the patellofemoral  joint of the right knee.  No other acute bony abnormality is noted.  He has small metallic artifact in a linear fashion along the medial aspect of his left leg that may be representative of prior vascular work.  He also shows some calcification of the popliteal artery on the lateral view of the right knee.  Comparison views February 11, 2016. 02/18/17 at 10:29 AM by Nirmal Paez MD     Fl C Arm During Surgery    Result Date: 2/7/2017  Narrative: Please see performing physicians note for result.            ASSESSMENT:    Diagnoses and all orders for this visit:    Spinal stenosis, lumbar region, without neurogenic claudication    Degeneration of lumbar intervertebral disc    Neuritis or radiculitis due to rupture of lumbar intervertebral disc          PLAN  Pain has improved recommend follow-up with Dr. Mayo At regularly scheduled visit for postop recheck.  No Follow-up on file.    Hossein Alarcon, APRN

## 2017-03-06 ENCOUNTER — OFFICE VISIT (OUTPATIENT)
Dept: ORTHOPEDIC SURGERY | Facility: CLINIC | Age: 77
End: 2017-03-06

## 2017-03-06 VITALS — BODY MASS INDEX: 26.22 KG/M2 | WEIGHT: 148 LBS | HEIGHT: 63 IN

## 2017-03-06 DIAGNOSIS — M51.16 NEURITIS OR RADICULITIS DUE TO RUPTURE OF LUMBAR INTERVERTEBRAL DISC: ICD-10-CM

## 2017-03-06 DIAGNOSIS — M48.061 SPINAL STENOSIS OF LUMBAR REGION: Primary | ICD-10-CM

## 2017-03-06 DIAGNOSIS — M51.36 DEGENERATION OF LUMBAR INTERVERTEBRAL DISC: ICD-10-CM

## 2017-03-06 PROCEDURE — 99024 POSTOP FOLLOW-UP VISIT: CPT | Performed by: ORTHOPAEDIC SURGERY

## 2017-03-06 NOTE — PROGRESS NOTES
Vishnu Waller is a 77 y.o. male returns for     Chief Complaint   Patient presents with   • Lumbar Spine - Post-op Follow-up       HISTORY OF PRESENT ILLNESS:    States his stamina has diminished, however he is making slow progress.  States he is not taking any narcotic pain medications at this point,      CONCURRENT MEDICAL HISTORY:    Past Medical History   Diagnosis Date   • Acute bacterial sinusitis    • Acute bronchitis    • Acute frontal sinusitis    • Acute maxillary sinusitis    • Acute pharyngitis    • Acute sinusitis      improved   • Allergic rhinitis    • Allergic rhinitis due to pollen    • Anxiety    • Artificial lens present      in position   • Backache    • Borderline glaucoma    • Chronic laryngitis    • Chronic rhinitis    • Coronary arteriosclerosis    • Coronary atherosclerosis    • Cough    • Degeneration of lumbar intervertebral disc    • Degenerative joint disease involving multiple joints    • Diabetes    • Diverticular disease of colon    • Erectile dysfunction    • Essential hypertension    • Generalized anxiety disorder    • GERD (gastroesophageal reflux disease)    • Glaucoma    • Heart disease    • Hemorrhoids      without bleeding   • Hyperlipidemia    • Insomnia    • Kidney stone    • Malignant tumor of prostate    • Need for prophylactic vaccination and inoculation against influenza    • Osteoarthritis    • Osteoarthritis of multiple joints    • Pain, lumbar region      Pain radiating to lumbar region of back     • Prostate cancer    • Pseudomembranous enterocolitis      improved   • Rotator cuff syndrome      right   • Shoulder pain    • Sleep apnea    • Strain of rotator cuff capsule    • Tenosynovitis    • Type 2 diabetes mellitus      no BDR   • Upper respiratory infection        Allergies   Allergen Reactions   • Ace Inhibitors      Cough with Lisinopril   • Molds & Smuts    • Hydrocodone-Acetaminophen Itching         Current Outpatient Prescriptions:   •  amLODIPine  (NORVASC) 5 MG tablet, Take 5 mg by mouth Daily., Disp: , Rfl:   •  clopidogrel (PLAVIX) 75 MG tablet, Take 75 mg by mouth Daily., Disp: , Rfl:   •  cyanocobalamin (VITAMIN B-12N) 50 MCG tablet, Take 500 mcg by mouth Daily., Disp: , Rfl:   •  docusate sodium (COLACE) 100 MG capsule, Take 1 capsule by mouth 2 (Two) Times a Day., Disp: 60 capsule, Rfl: 2  •  guaiFENesin (ROBITUSSIN) 100 MG/5ML syrup, Take 200 mg by mouth 3 (Three) Times a Day As Needed for cough., Disp: , Rfl:   •  latanoprost (XALATAN) 0.005 % ophthalmic solution, Administer 1 drop to both eyes every night., Disp: , Rfl:   •  losartan (COZAAR) 50 MG tablet, Take 50 mg by mouth Daily., Disp: , Rfl:   •  melatonin 1 MG tablet, Take 3 mg by mouth At Night As Needed for sleep., Disp: , Rfl:   •  meloxicam (MOBIC) 15 MG tablet, Take 15 mg by mouth Daily., Disp: , Rfl:   •  metFORMIN (GLUCOPHAGE) 1000 MG tablet, 1,000 mg 2 (Two) Times a Day With Meals., Disp: , Rfl:   •  mometasone (NASONEX) 50 MCG/ACT nasal spray, 2 sprays into each nostril daily., Disp: , Rfl:   •  montelukast (SINGULAIR) 10 MG tablet, Take 10 mg by mouth Daily As Needed., Disp: , Rfl:   •  Multiple Vitamins-Minerals (CENTRUM PO), Take 1 tablet by mouth daily. Centrum 0.4 mg-162 mg-18 mg tablet  (unconfirmed), Disp: , Rfl:   •  oxybutynin (DITROPAN) 5 MG tablet, Take 5 mg by mouth Daily., Disp: , Rfl:   •  oxyCODONE-acetaminophen (PERCOCET)  MG per tablet, Take 1 tablet by mouth Every 6 (Six) Hours As Needed for moderate pain (4-6)., Disp: 70 tablet, Rfl: 0  •  raNITIdine (ZANTAC) 75 MG tablet, Take 75 mg by mouth 2 (Two) Times a Day., Disp: , Rfl:   •  simvastatin (ZOCOR) 40 MG tablet, Take 40 mg by mouth Every Night., Disp: , Rfl:     Past Surgical History   Procedure Laterality Date   • Cataract extraction Bilateral    • Nose surgery     • Prostatectomy     • Shoulder surgery Left      rotator cuff repair   • Colonoscopy     • Shoulder arthroscopy  07/24/2013     Arthroscopy of  right shoulder with rotator repair, Carla procedure, Biceps tenotomy, subacromial decompression.   • Cardiac catheterization  09/25/2003     Cardiac cath (Normal left ventricular systolic function, EF 60% Multi-vessel coronary artery disease with critical disease noted in the LAD coronary artery, diagonal coronary artery, obtuse marginal coronary and right coronary artery.)   • Cardiac catheterization  05/03/1996     Cardiac cath (Coronary atherosclerotic heart disease. 70% diagonal stenosis. Mild to moderate left anterior descending artery stenosis. Preserved left ventricular function.)   • Colonoscopy       Colon endoscopy 75653 (Rectal bleeding. Radiation proctitis w/bleeding. An abnormal CT of transverse colon due to mucosal ischemic colitis, that now is healed. Internal hemorrhoids w/possible bleeding.)   • Colonoscopy  05/10/2011     Colon endoscopy 57616 (Single sessile polyp found in transverse colon and sigmoid colon ,removed by cold biopsy polypectomy.divertic. found in sigmoid colon,descending colon. Friability/capillary friability in rectum sigmoid due to prior radiation.Inter. hem. Grade 1)   • Colonoscopy  05/02/2007     Colon endoscopy 83335 (Colon polyp. Diverticulosis without bleeding. Internal hemorrhoids without bleeding.)   • Cystoscopy  01/13/1995     Cystoscopy, stone removal (Right ureteroscopic lasertripsy.)   • Other surgical history       EXTENDED VISUAL FIELDS STUDY 71283 (Borderline glaucoma) (3): 03/19/2015, 04/09/2014, 03/27/2013   • Other surgical history  10/29/2003     Heart revascularize (TMR) (Directmyocardial revascularization times four; LIMA to LAD SVG to DARIUSZ SVG to OM1, SVG to RCA)   • Injection of medication  11/15/2012     Kenalog (4)      • Knee arthroscopy  01/17/2007     Knee arthroscopy, surgery (Arthroscopy with medial and lateral meniscectomies, right knee.)   • Knee surgery  11/04/2015     Knee Surgery (Right unicompartmental arthroplasty.)   • Septorhinoplasty   "11/16/1989     Nasal surgery procedure (Septorhinoplasty with reconstruction of the nasal pyramid with a iliac crest graft, rhinoplasty was performed with external approach.)   • Other surgical history       OCT DISC NFL 81846 (Borderline glaucoma)  (3): 09/24/2015, 08/13/2014, 07/29/2013   • Cataract extraction  10/04/2011     Remove cataract, insert lens (Right)   • Cataract extraction  08/23/2011     Remove cataract, insert lens (left)   • Excision lesion  05/13/1999     REMOVE EAR LESION 53772 (1.3 cm basal cell carcinoma, right preauricular area. Excision)   • Excision lesion  03/13/2001     REMOVE LESION NECK/CHEST 58115 (Excision of irritated seborrheic keratosis, anterior neck, 1.1 cm and deep lipoma, posterior neck, 3.5 cm)   • Shoulder surgery  11/01/2005     Shoulder surgery procedure (Decompression, subacromial, explore of the rotator cuff, no tear found nor repaired, acromioplasty of the left shoulder and AC shoulder joint resection.)   • Epidural  12/03/2014     Therapeutic/diag injection (Lumbar transforaminal epidural steroid injection, L5-S1, left side.)   • Epidural  10/22/2014     Therapeutic/diag injection (Lumbar transforaminal epidural steroid injection L5-S1 left side.)   • Epidural  08/07/2014     Therapeutic/diag injection (Lumbar transforaminal epidural steroid injection.)   • Coronary artery bypass graft  2002   • Joint replacement  2015     partial   • Lumbar laminectomy N/A 2/7/2017     Procedure: LUMBAR LAMINECTOMY LUMBAR THREE-FOUR, LUMBAR FOUR-FIVE, INTERLAMINAR DISTRACTION ;  Surgeon: Lucio Mayo MD;  Location: [facility];  Service:        ROS  No fevers or chills.  No chest pain or shortness of air.  No GI or  disturbances.    PHYSICAL EXAMINATION:       Visit Vitals   • Ht 63\" (160 cm)   • Wt 148 lb (67.1 kg)   • BMI 26.22 kg/m2       Physical Exam    GAIT:     []  Normal  []  Antalgic    Assistive device: []  None  []  Walker     []  Crutches  []  Cane     []  " Wheelchair  []  Stretcher    Back Exam     Tenderness   The patient is experiencing no tenderness.     Range of Motion   Extension: 10   Flexion: 60     Tests   Straight leg raise right: negative  Straight leg raise left: negative    Other   Erythema: no back redness  Scars: present              Xr Knee 1 Or 2 View Right    Result Date: 2/18/2017  Narrative: AP bilateral standing and lateral of the right knee show acceptable position and alignment of the knees status post unicompartmental arthroplasty on the right. He shows no sign of implant loosening or failure. He has minimal arthritic changes in the patellofemoral joint of the right knee. No other acute bony abnormality is noted. He has small metallic artifact in a linear fashion along the medial aspect of his left leg that may be representative of prior vascular work. He also shows some calcification of the popliteal artery on the lateral view of the right knee. Comparison views February 11, 2016. 02/18/17 at 10:31 AM by Nirmal Paez MD     Xr Knee Bilateral Ap Standing    Result Date: 2/18/2017  Narrative: AP bilateral standing and lateral of the right knee show acceptable position and alignment of the knees status post unicompartmental arthroplasty on the right.  He shows no sign of implant loosening or failure.  He has minimal arthritic changes in the patellofemoral joint of the right knee.  No other acute bony abnormality is noted.  He has small metallic artifact in a linear fashion along the medial aspect of his left leg that may be representative of prior vascular work.  He also shows some calcification of the popliteal artery on the lateral view of the right knee.  Comparison views February 11, 2016. 02/18/17 at 10:29 AM by Nirmal Paez MD     Fl C Arm During Surgery    Result Date: 2/7/2017  Narrative: Please see performing physicians note for result.            ASSESSMENT:    Diagnoses and all orders for this visit:    Spinal  stenosis of lumbar region    Degeneration of lumbar intervertebral disc    Neuritis or radiculitis due to rupture of lumbar intervertebral disc          PLAN    No heavy lifting bending or twisting.  I reviewed with him post operative activity restrictions. Continue post operative course.  Xray lumbar spine.        Lucio Mayo MD

## 2017-04-17 ENCOUNTER — OFFICE VISIT (OUTPATIENT)
Dept: ORTHOPEDIC SURGERY | Facility: CLINIC | Age: 77
End: 2017-04-17

## 2017-04-17 VITALS — WEIGHT: 148 LBS | HEIGHT: 63 IN | BODY MASS INDEX: 26.22 KG/M2

## 2017-04-17 DIAGNOSIS — M48.061 SPINAL STENOSIS OF LUMBAR REGION: ICD-10-CM

## 2017-04-17 DIAGNOSIS — M51.36 DEGENERATIVE DISC DISEASE, LUMBAR: Primary | ICD-10-CM

## 2017-04-17 PROCEDURE — 99024 POSTOP FOLLOW-UP VISIT: CPT | Performed by: ORTHOPAEDIC SURGERY

## 2017-04-17 NOTE — PROGRESS NOTES
Vishnu Waller is a 77 y.o. male returns for     Chief Complaint   Patient presents with   • Lumbar Spine - Follow-up, Pain       HISTORY OF PRESENT ILLNESS:  Continued pain lumbar spine   Pain scale today 6/10 [facility]  States he occasionally takes tylenol for pain medicine.       CONCURRENT MEDICAL HISTORY:    Past Medical History:   Diagnosis Date   • Acute bacterial sinusitis    • Acute bronchitis    • Acute frontal sinusitis    • Acute maxillary sinusitis    • Acute pharyngitis    • Acute sinusitis     improved   • Allergic rhinitis    • Allergic rhinitis due to pollen    • Anxiety    • Artificial lens present     in position   • Backache    • Borderline glaucoma    • Chronic laryngitis    • Chronic rhinitis    • Coronary arteriosclerosis    • Coronary atherosclerosis    • Cough    • Degeneration of lumbar intervertebral disc    • Degenerative joint disease involving multiple joints    • Diabetes    • Diverticular disease of colon    • Erectile dysfunction    • Essential hypertension    • Generalized anxiety disorder    • GERD (gastroesophageal reflux disease)    • Glaucoma    • Heart disease    • Hemorrhoids     without bleeding   • Hyperlipidemia    • Insomnia    • Kidney stone    • Malignant tumor of prostate    • Need for prophylactic vaccination and inoculation against influenza    • Osteoarthritis    • Osteoarthritis of multiple joints    • Pain, lumbar region     Pain radiating to lumbar region of back     • Prostate cancer    • Pseudomembranous enterocolitis     improved   • Rotator cuff syndrome     right   • Shoulder pain    • Sleep apnea    • Strain of rotator cuff capsule    • Tenosynovitis    • Type 2 diabetes mellitus     no BDR   • Upper respiratory infection        Allergies   Allergen Reactions   • Ace Inhibitors      Cough with Lisinopril   • Molds & Smuts    • Hydrocodone-Acetaminophen Itching         Current Outpatient Prescriptions:   •  amLODIPine (NORVASC) 5 MG tablet, Take 5 mg by mouth  Daily., Disp: , Rfl:   •  clopidogrel (PLAVIX) 75 MG tablet, Take 75 mg by mouth Daily., Disp: , Rfl:   •  cyanocobalamin (VITAMIN B-12N) 50 MCG tablet, Take 500 mcg by mouth Daily., Disp: , Rfl:   •  docusate sodium (COLACE) 100 MG capsule, Take 1 capsule by mouth 2 (Two) Times a Day., Disp: 60 capsule, Rfl: 2  •  guaiFENesin (ROBITUSSIN) 100 MG/5ML syrup, Take 200 mg by mouth 3 (Three) Times a Day As Needed for cough., Disp: , Rfl:   •  latanoprost (XALATAN) 0.005 % ophthalmic solution, Administer 1 drop to both eyes every night., Disp: , Rfl:   •  losartan (COZAAR) 50 MG tablet, Take 50 mg by mouth Daily., Disp: , Rfl:   •  melatonin 1 MG tablet, Take 3 mg by mouth At Night As Needed for sleep., Disp: , Rfl:   •  meloxicam (MOBIC) 15 MG tablet, Take 15 mg by mouth Daily., Disp: , Rfl:   •  metFORMIN (GLUCOPHAGE) 1000 MG tablet, 1,000 mg 2 (Two) Times a Day With Meals., Disp: , Rfl:   •  mometasone (NASONEX) 50 MCG/ACT nasal spray, 2 sprays into each nostril daily., Disp: , Rfl:   •  montelukast (SINGULAIR) 10 MG tablet, Take 10 mg by mouth Daily As Needed., Disp: , Rfl:   •  Multiple Vitamins-Minerals (CENTRUM PO), Take 1 tablet by mouth daily. Centrum 0.4 mg-162 mg-18 mg tablet  (unconfirmed), Disp: , Rfl:   •  oxybutynin (DITROPAN) 5 MG tablet, Take 5 mg by mouth Daily., Disp: , Rfl:   •  oxyCODONE-acetaminophen (PERCOCET)  MG per tablet, Take 1 tablet by mouth Every 6 (Six) Hours As Needed for moderate pain (4-6)., Disp: 70 tablet, Rfl: 0  •  raNITIdine (ZANTAC) 75 MG tablet, Take 75 mg by mouth 2 (Two) Times a Day., Disp: , Rfl:   •  simvastatin (ZOCOR) 40 MG tablet, Take 40 mg by mouth Every Night., Disp: , Rfl:     Past Surgical History:   Procedure Laterality Date   • CARDIAC CATHETERIZATION  09/25/2003    Cardiac cath (Normal left ventricular systolic function, EF 60% Multi-vessel coronary artery disease with critical disease noted in the LAD coronary artery, diagonal coronary artery, obtuse  marginal coronary and right coronary artery.)   • CARDIAC CATHETERIZATION  05/03/1996    Cardiac cath (Coronary atherosclerotic heart disease. 70% diagonal stenosis. Mild to moderate left anterior descending artery stenosis. Preserved left ventricular function.)   • CATARACT EXTRACTION Bilateral    • CATARACT EXTRACTION  10/04/2011    Remove cataract, insert lens (Right)   • CATARACT EXTRACTION  08/23/2011    Remove cataract, insert lens (left)   • COLONOSCOPY     • COLONOSCOPY      Colon endoscopy 24895 (Rectal bleeding. Radiation proctitis w/bleeding. An abnormal CT of transverse colon due to mucosal ischemic colitis, that now is healed. Internal hemorrhoids w/possible bleeding.)   • COLONOSCOPY  05/10/2011    Colon endoscopy 78532 (Single sessile polyp found in transverse colon and sigmoid colon ,removed by cold biopsy polypectomy.divertic. found in sigmoid colon,descending colon. Friability/capillary friability in rectum sigmoid due to prior radiation.Inter. hem. Grade 1)   • COLONOSCOPY  05/02/2007    Colon endoscopy 79961 (Colon polyp. Diverticulosis without bleeding. Internal hemorrhoids without bleeding.)   • CORONARY ARTERY BYPASS GRAFT  2002   • CYSTOSCOPY  01/13/1995    Cystoscopy, stone removal (Right ureteroscopic lasertripsy.)   • EPIDURAL  12/03/2014    Therapeutic/diag injection (Lumbar transforaminal epidural steroid injection, L5-S1, left side.)   • EPIDURAL  10/22/2014    Therapeutic/diag injection (Lumbar transforaminal epidural steroid injection L5-S1 left side.)   • EPIDURAL  08/07/2014    Therapeutic/diag injection (Lumbar transforaminal epidural steroid injection.)   • EXCISION LESION  05/13/1999    REMOVE EAR LESION 63931 (1.3 cm basal cell carcinoma, right preauricular area. Excision)   • EXCISION LESION  03/13/2001    REMOVE LESION NECK/CHEST 88676 (Excision of irritated seborrheic keratosis, anterior neck, 1.1 cm and deep lipoma, posterior neck, 3.5 cm)   • INJECTION OF MEDICATION   "11/15/2012    Kenalog (4)      • JOINT REPLACEMENT  2015    partial   • KNEE ARTHROSCOPY  01/17/2007    Knee arthroscopy, surgery (Arthroscopy with medial and lateral meniscectomies, right knee.)   • KNEE SURGERY  11/04/2015    Knee Surgery (Right unicompartmental arthroplasty.)   • LUMBAR LAMINECTOMY N/A 2/7/2017    Procedure: LUMBAR LAMINECTOMY LUMBAR THREE-FOUR, LUMBAR FOUR-FIVE, INTERLAMINAR DISTRACTION ;  Surgeon: Lucio Mayo MD;  Location: [facility];  Service:    • NOSE SURGERY     • OTHER SURGICAL HISTORY      EXTENDED VISUAL FIELDS STUDY 64929 (Borderline glaucoma) (3): 03/19/2015, 04/09/2014, 03/27/2013   • OTHER SURGICAL HISTORY  10/29/2003    Heart revascularize (TMR) (Directmyocardial revascularization times four; LIMA to LAD SVG to DARIUSZ SVG to OM1, SVG to RCA)   • OTHER SURGICAL HISTORY      OCT DISC NFL 77792 (Borderline glaucoma)  (3): 09/24/2015, 08/13/2014, 07/29/2013   • PROSTATECTOMY     • SEPTORHINOPLASTY  11/16/1989    Nasal surgery procedure (Septorhinoplasty with reconstruction of the nasal pyramid with a iliac crest graft, rhinoplasty was performed with external approach.)   • SHOULDER ARTHROSCOPY  07/24/2013    Arthroscopy of right shoulder with rotator repair, Carla procedure, Biceps tenotomy, subacromial decompression.   • SHOULDER SURGERY Left     rotator cuff repair   • SHOULDER SURGERY  11/01/2005    Shoulder surgery procedure (Decompression, subacromial, explore of the rotator cuff, no tear found nor repaired, acromioplasty of the left shoulder and AC shoulder joint resection.)       ROS  No fevers or chills.  No chest pain or shortness of air.  No GI or  disturbances.    PHYSICAL EXAMINATION:       Ht 63\" (160 cm)  Wt 148 lb (67.1 kg)  BMI 26.22 kg/m2    Physical Exam    GAIT:     []  Normal  []  Antalgic    Assistive device: []  None  []  Walker     []  Crutches  []  Cane     []  Wheelchair  []  Stretcher    Ortho Exam      Incision is fully healed. No erythema, " no tenderness  5/5 right and left hip flexors, quadriceps, tibialis anterior, extensor hallucis longus.       No results found.          ASSESSMENT:    Diagnoses and all orders for this visit:    Degenerative disc disease, lumbar    Spinal stenosis of lumbar region          PLAN    Begin physical therapy.  Post op progress has been steady.      Lucio Mayo MD

## 2017-04-17 NOTE — PROGRESS NOTES
Procedure   Procedures      Lumbar spine views interlaminar distraction device in place L4-5, the inferior laminar distraction implant is displaced.

## 2017-05-11 PROBLEM — Z96.60 HISTORY OF ARTIFICIAL JOINT: Status: ACTIVE | Noted: 2017-02-17

## 2017-05-11 PROBLEM — Z98.890 STATUS POST LUMBAR SPINE OPERATION: Status: ACTIVE | Noted: 2017-02-14

## 2017-05-11 PROBLEM — Z09 ENCOUNTER FOR FOLLOW-UP EXAMINATION AFTER COMPLETED TREATMENT FOR CONDITIONS OTHER THAN MALIGNANT NEOPLASM: Status: ACTIVE | Noted: 2017-02-10

## 2017-06-05 ENCOUNTER — OFFICE VISIT (OUTPATIENT)
Dept: ORTHOPEDIC SURGERY | Facility: CLINIC | Age: 77
End: 2017-06-05

## 2017-06-05 VITALS — WEIGHT: 147 LBS | BODY MASS INDEX: 26.05 KG/M2 | HEIGHT: 63 IN

## 2017-06-05 DIAGNOSIS — M51.36 DEGENERATIVE DISC DISEASE, LUMBAR: Primary | ICD-10-CM

## 2017-06-05 DIAGNOSIS — Z98.890 STATUS POST LUMBAR SPINE OPERATION: ICD-10-CM

## 2017-06-05 DIAGNOSIS — M51.16 NEURITIS OR RADICULITIS DUE TO RUPTURE OF LUMBAR INTERVERTEBRAL DISC: ICD-10-CM

## 2017-06-05 DIAGNOSIS — M48.061 SPINAL STENOSIS OF LUMBAR REGION: ICD-10-CM

## 2017-06-05 PROCEDURE — 99212 OFFICE O/P EST SF 10 MIN: CPT | Performed by: ORTHOPAEDIC SURGERY

## 2017-06-05 NOTE — PROGRESS NOTES
Vishnu Waller is a 77 y.o. male returns for     Chief Complaint   Patient presents with   • Lumbar Spine - Follow-up       HISTORY OF PRESENT ILLNESS:    States he is doing ok, has some trouble sleeping with aches and pains.  States he takes tylenol on occasion.    Overall activity is doing well.  He attends rehab twice per week.       CONCURRENT MEDICAL HISTORY:    Past Medical History:   Diagnosis Date   • Acute bacterial sinusitis    • Acute bronchitis    • Acute frontal sinusitis    • Acute maxillary sinusitis    • Acute pharyngitis    • Acute sinusitis     improved   • Allergic rhinitis    • Allergic rhinitis due to pollen    • Anxiety    • Artificial lens present     in position   • Atherosclerotic heart disease of native coronary artery without angina pectoris    • Backache    • Borderline glaucoma    • Chronic laryngitis    • Chronic rhinitis    • Coronary arteriosclerosis    • Coronary atherosclerosis    • Cough    • Degeneration of lumbar intervertebral disc    • Degenerative joint disease involving multiple joints    • Diabetes    • Diverticular disease of colon    • Dizziness and giddiness    • Erectile dysfunction    • Essential hypertension    • Generalized anxiety disorder    • GERD (gastroesophageal reflux disease)    • Glaucoma    • Heart disease    • Hemorrhoids     without bleeding   • Hyperlipidemia    • Insomnia    • Kidney stone    • Kidney stones    • Malignant tumor of prostate    • Need for prophylactic vaccination and inoculation against influenza    • Osteoarthritis    • Osteoarthritis of multiple joints    • Pain, lumbar region     Pain radiating to lumbar region of back     • Postviral fatigue syndrome    • Presence of aortocoronary bypass graft    • Prostate cancer    • Pseudomembranous enterocolitis     improved   • Rotator cuff syndrome     right   • Shoulder pain    • Sleep apnea    • SOB (shortness of breath)    • Strain of rotator cuff capsule    • Tenosynovitis    •  Thrombocytopenia    • Type 2 diabetes mellitus     no BDR   • Upper respiratory infection        Allergies   Allergen Reactions   • Ace Inhibitors      Cough with Lisinopril   • Molds & Smuts    • Hydrocodone-Acetaminophen Itching         Current Outpatient Prescriptions:   •  amLODIPine (NORVASC) 5 MG tablet, Take 5 mg by mouth Daily., Disp: , Rfl:   •  clopidogrel (PLAVIX) 75 MG tablet, Take 75 mg by mouth Daily., Disp: , Rfl:   •  cyanocobalamin (VITAMIN B-12N) 50 MCG tablet, Take 500 mcg by mouth Daily., Disp: , Rfl:   •  docusate sodium (COLACE) 100 MG capsule, Take 1 capsule by mouth 2 (Two) Times a Day., Disp: 60 capsule, Rfl: 2  •  guaiFENesin (ROBITUSSIN) 100 MG/5ML syrup, Take 200 mg by mouth 3 (Three) Times a Day As Needed for cough., Disp: , Rfl:   •  latanoprost (XALATAN) 0.005 % ophthalmic solution, Administer 1 drop to both eyes every night., Disp: , Rfl:   •  losartan (COZAAR) 50 MG tablet, Take 50 mg by mouth Daily., Disp: , Rfl:   •  melatonin 1 MG tablet, Take 3 mg by mouth At Night As Needed for sleep., Disp: , Rfl:   •  meloxicam (MOBIC) 15 MG tablet, Take 15 mg by mouth Daily., Disp: , Rfl:   •  metFORMIN (GLUCOPHAGE) 1000 MG tablet, 1,000 mg 2 (Two) Times a Day With Meals., Disp: , Rfl:   •  mometasone (NASONEX) 50 MCG/ACT nasal spray, 2 sprays into each nostril daily., Disp: , Rfl:   •  montelukast (SINGULAIR) 10 MG tablet, Take 10 mg by mouth Daily As Needed., Disp: , Rfl:   •  Multiple Vitamins-Minerals (CENTRUM PO), Take 1 tablet by mouth daily. Centrum 0.4 mg-162 mg-18 mg tablet  (unconfirmed), Disp: , Rfl:   •  oxybutynin (DITROPAN) 5 MG tablet, Take 5 mg by mouth Daily., Disp: , Rfl:   •  oxyCODONE-acetaminophen (PERCOCET)  MG per tablet, Take 1 tablet by mouth Every 6 (Six) Hours As Needed for moderate pain (4-6)., Disp: 70 tablet, Rfl: 0  •  raNITIdine (ZANTAC) 75 MG tablet, Take 75 mg by mouth 2 (Two) Times a Day., Disp: , Rfl:   •  simvastatin (ZOCOR) 40 MG tablet, Take 40 mg  by mouth Every Night., Disp: , Rfl:     Past Surgical History:   Procedure Laterality Date   • CARDIAC CATHETERIZATION  09/25/2003    Cardiac cath (Normal left ventricular systolic function, EF 60% Multi-vessel coronary artery disease with critical disease noted in the LAD coronary artery, diagonal coronary artery, obtuse marginal coronary and right coronary artery.)   • CARDIAC CATHETERIZATION  05/03/1996    Cardiac cath (Coronary atherosclerotic heart disease. 70% diagonal stenosis. Mild to moderate left anterior descending artery stenosis. Preserved left ventricular function.)   • CATARACT EXTRACTION Bilateral    • CATARACT EXTRACTION  10/04/2011    Remove cataract, insert lens (Right)   • CATARACT EXTRACTION  08/23/2011    Remove cataract, insert lens (left)   • COLONOSCOPY     • COLONOSCOPY      Colon endoscopy 71224 (Rectal bleeding. Radiation proctitis w/bleeding. An abnormal CT of transverse colon due to mucosal ischemic colitis, that now is healed. Internal hemorrhoids w/possible bleeding.)   • COLONOSCOPY  05/10/2011    Colon endoscopy 84968 (Single sessile polyp found in transverse colon and sigmoid colon ,removed by cold biopsy polypectomy.divertic. found in sigmoid colon,descending colon. Friability/capillary friability in rectum sigmoid due to prior radiation.Inter. hem. Grade 1)   • COLONOSCOPY  05/02/2007    Colon endoscopy 98270 (Colon polyp. Diverticulosis without bleeding. Internal hemorrhoids without bleeding.)   • CORONARY ARTERY BYPASS GRAFT  2002   • CYSTOSCOPY  01/13/1995    Cystoscopy, stone removal (Right ureteroscopic lasertripsy.)   • EPIDURAL  12/03/2014    Therapeutic/diag injection (Lumbar transforaminal epidural steroid injection, L5-S1, left side.)   • EPIDURAL  10/22/2014    Therapeutic/diag injection (Lumbar transforaminal epidural steroid injection L5-S1 left side.)   • EPIDURAL  08/07/2014    Therapeutic/diag injection (Lumbar transforaminal epidural steroid injection.)   •  EXCISION LESION  05/13/1999    REMOVE EAR LESION 10596 (1.3 cm basal cell carcinoma, right preauricular area. Excision)   • EXCISION LESION  03/13/2001    REMOVE LESION NECK/CHEST 31343 (Excision of irritated seborrheic keratosis, anterior neck, 1.1 cm and deep lipoma, posterior neck, 3.5 cm)   • INJECTION OF MEDICATION  11/15/2012    Kenalog (4)      • JOINT REPLACEMENT  2015    partial   • KNEE ARTHROSCOPY  01/17/2007    Knee arthroscopy, surgery (Arthroscopy with medial and lateral meniscectomies, right knee.)   • KNEE SURGERY  11/04/2015    Knee Surgery (Right unicompartmental arthroplasty.)   • LUMBAR LAMINECTOMY N/A 2/7/2017    Procedure: LUMBAR LAMINECTOMY LUMBAR THREE-FOUR, LUMBAR FOUR-FIVE, INTERLAMINAR DISTRACTION ;  Surgeon: Lucio Mayo MD;  Location: [facility];  Service:    • NOSE SURGERY     • OTHER SURGICAL HISTORY      EXTENDED VISUAL FIELDS STUDY 79508 (Borderline glaucoma) (3): 03/19/2015, 04/09/2014, 03/27/2013   • OTHER SURGICAL HISTORY  10/29/2003    Heart revascularize (TMR) (Directmyocardial revascularization times four; LIMA to LAD SVG to DARIUSZ SVG to OM1, SVG to RCA)   • OTHER SURGICAL HISTORY      OCT DISC NFL 99581 (Borderline glaucoma)  (3): 09/24/2015, 08/13/2014, 07/29/2013   • PROSTATECTOMY     • SEPTORHINOPLASTY  11/16/1989    Nasal surgery procedure (Septorhinoplasty with reconstruction of the nasal pyramid with a iliac crest graft, rhinoplasty was performed with external approach.)   • SHOULDER ARTHROSCOPY  07/24/2013    Arthroscopy of right shoulder with rotator repair, Carla procedure, Biceps tenotomy, subacromial decompression.   • SHOULDER SURGERY Left     rotator cuff repair   • SHOULDER SURGERY  11/01/2005    Shoulder surgery procedure (Decompression, subacromial, explore of the rotator cuff, no tear found nor repaired, acromioplasty of the left shoulder and AC shoulder joint resection.)       ROS  No fevers or chills.  No chest pain or shortness of air.  No GI  "or  disturbances.    PHYSICAL EXAMINATION:       Ht 63\" (160 cm)  Wt 147 lb (66.7 kg)  BMI 26.04 kg/m2    Physical Exam    GAIT:     []  Normal  []  Antalgic    Assistive device: []  None  []  Walker     []  Crutches  []  Cane     []  Wheelchair  []  Stretcher    Ortho Exam    Lumbar incision well healed.  No erythema  Ambulating well.  No motor weakness.        No results found.          ASSESSMENT:    Diagnoses and all orders for this visit:    Degenerative disc disease, lumbar    Neuritis or radiculitis due to rupture of lumbar intervertebral disc    Spinal stenosis of lumbar region    Status post lumbar spine operation          PLAN    Continue present activity level, no restrictions  Xray right and left hip       Lucio Mayo MD  "

## 2017-06-19 RX ORDER — MELOXICAM 15 MG/1
TABLET ORAL
Qty: 90 TABLET | Refills: 3 | Status: SHIPPED | OUTPATIENT
Start: 2017-06-19 | End: 2017-06-22 | Stop reason: SDUPTHER

## 2017-06-22 RX ORDER — MELOXICAM 15 MG/1
15 TABLET ORAL DAILY
Qty: 90 TABLET | Refills: 3 | Status: SHIPPED | OUTPATIENT
Start: 2017-06-22 | End: 2018-02-27 | Stop reason: HOSPADM

## 2017-08-01 RX ORDER — AMLODIPINE BESYLATE 5 MG/1
5 TABLET ORAL DAILY
Qty: 90 TABLET | Refills: 2 | Status: SHIPPED | OUTPATIENT
Start: 2017-08-01 | End: 2017-08-11 | Stop reason: SDUPTHER

## 2017-08-08 ENCOUNTER — TRANSCRIBE ORDERS (OUTPATIENT)
Dept: LAB | Facility: HOSPITAL | Age: 77
End: 2017-08-08

## 2017-08-08 DIAGNOSIS — R97.20 ELEVATED PROSTATE SPECIFIC ANTIGEN (PSA): Primary | ICD-10-CM

## 2017-08-11 ENCOUNTER — OFFICE VISIT (OUTPATIENT)
Dept: CARDIOLOGY | Facility: CLINIC | Age: 77
End: 2017-08-11

## 2017-08-11 VITALS
WEIGHT: 144 LBS | SYSTOLIC BLOOD PRESSURE: 152 MMHG | HEART RATE: 86 BPM | BODY MASS INDEX: 25.52 KG/M2 | DIASTOLIC BLOOD PRESSURE: 82 MMHG | HEIGHT: 63 IN

## 2017-08-11 DIAGNOSIS — I25.10 CHRONIC CORONARY ARTERY DISEASE: Primary | ICD-10-CM

## 2017-08-11 DIAGNOSIS — E78.2 MIXED HYPERLIPIDEMIA: ICD-10-CM

## 2017-08-11 DIAGNOSIS — I10 ESSENTIAL HYPERTENSION: ICD-10-CM

## 2017-08-11 PROCEDURE — 99213 OFFICE O/P EST LOW 20 MIN: CPT | Performed by: INTERNAL MEDICINE

## 2017-08-11 RX ORDER — AMLODIPINE BESYLATE 5 MG/1
5 TABLET ORAL DAILY
Qty: 90 TABLET | Refills: 3 | Status: SHIPPED | OUTPATIENT
Start: 2017-08-11 | End: 2018-02-15

## 2017-08-11 NOTE — PROGRESS NOTES
Vishnu Waller  77 y.o. male    08/11/2017  1. Chronic coronary artery disease    2. Essential hypertension    3. Mixed hyperlipidemia        History of Present Illness    Mr. Waller is here for follow-up of his above stated problems.  His done well with no chest pain, shortness of breath, palpitation, dizziness or syncope.  No signs of congestive heart failure was noted.  He has been compliant with his medications.        SUBJECTIVE    Allergies   Allergen Reactions   • Ace Inhibitors      Cough with Lisinopril   • Molds & Smuts    • Hydrocodone-Acetaminophen Itching         Past Medical History:   Diagnosis Date   • Acute bacterial sinusitis    • Acute bronchitis    • Acute frontal sinusitis    • Acute maxillary sinusitis    • Acute pharyngitis    • Acute sinusitis     improved   • Allergic rhinitis    • Allergic rhinitis due to pollen    • Anxiety    • Artificial lens present     in position   • Atherosclerotic heart disease of native coronary artery without angina pectoris    • Backache    • Borderline glaucoma    • Chronic laryngitis    • Chronic rhinitis    • Coronary arteriosclerosis    • Coronary atherosclerosis    • Cough    • Degeneration of lumbar intervertebral disc    • Degenerative joint disease involving multiple joints    • Diabetes    • Diverticular disease of colon    • Dizziness and giddiness    • Erectile dysfunction    • Essential hypertension    • Generalized anxiety disorder    • GERD (gastroesophageal reflux disease)    • Glaucoma    • Heart disease    • Hemorrhoids     without bleeding   • Hyperlipidemia    • Insomnia    • Kidney stone    • Kidney stones    • Malignant tumor of prostate    • Need for prophylactic vaccination and inoculation against influenza    • Osteoarthritis    • Osteoarthritis of multiple joints    • Pain, lumbar region     Pain radiating to lumbar region of back     • Postviral fatigue syndrome    • Presence of aortocoronary bypass graft    • Prostate cancer    •  Pseudomembranous enterocolitis     improved   • Rotator cuff syndrome     right   • Shoulder pain    • Sleep apnea    • SOB (shortness of breath)    • Strain of rotator cuff capsule    • Tenosynovitis    • Thrombocytopenia    • Type 2 diabetes mellitus     no BDR   • Upper respiratory infection          Past Surgical History:   Procedure Laterality Date   • CARDIAC CATHETERIZATION  09/25/2003    Cardiac cath (Normal left ventricular systolic function, EF 60% Multi-vessel coronary artery disease with critical disease noted in the LAD coronary artery, diagonal coronary artery, obtuse marginal coronary and right coronary artery.)   • CARDIAC CATHETERIZATION  05/03/1996    Cardiac cath (Coronary atherosclerotic heart disease. 70% diagonal stenosis. Mild to moderate left anterior descending artery stenosis. Preserved left ventricular function.)   • CATARACT EXTRACTION Bilateral    • CATARACT EXTRACTION  10/04/2011    Remove cataract, insert lens (Right)   • CATARACT EXTRACTION  08/23/2011    Remove cataract, insert lens (left)   • COLONOSCOPY     • COLONOSCOPY      Colon endoscopy 66178 (Rectal bleeding. Radiation proctitis w/bleeding. An abnormal CT of transverse colon due to mucosal ischemic colitis, that now is healed. Internal hemorrhoids w/possible bleeding.)   • COLONOSCOPY  05/10/2011    Colon endoscopy 74730 (Single sessile polyp found in transverse colon and sigmoid colon ,removed by cold biopsy polypectomy.divertic. found in sigmoid colon,descending colon. Friability/capillary friability in rectum sigmoid due to prior radiation.Inter. hem. Grade 1)   • COLONOSCOPY  05/02/2007    Colon endoscopy 34790 (Colon polyp. Diverticulosis without bleeding. Internal hemorrhoids without bleeding.)   • CORONARY ARTERY BYPASS GRAFT  2002   • CYSTOSCOPY  01/13/1995    Cystoscopy, stone removal (Right ureteroscopic lasertripsy.)   • EPIDURAL  12/03/2014    Therapeutic/diag injection (Lumbar transforaminal epidural steroid  injection, L5-S1, left side.)   • EPIDURAL  10/22/2014    Therapeutic/diag injection (Lumbar transforaminal epidural steroid injection L5-S1 left side.)   • EPIDURAL  08/07/2014    Therapeutic/diag injection (Lumbar transforaminal epidural steroid injection.)   • EXCISION LESION  05/13/1999    REMOVE EAR LESION 34514 (1.3 cm basal cell carcinoma, right preauricular area. Excision)   • EXCISION LESION  03/13/2001    REMOVE LESION NECK/CHEST 63523 (Excision of irritated seborrheic keratosis, anterior neck, 1.1 cm and deep lipoma, posterior neck, 3.5 cm)   • INJECTION OF MEDICATION  11/15/2012    Kenalog (4)      • JOINT REPLACEMENT  2015    partial   • KNEE ARTHROSCOPY  01/17/2007    Knee arthroscopy, surgery (Arthroscopy with medial and lateral meniscectomies, right knee.)   • KNEE SURGERY  11/04/2015    Knee Surgery (Right unicompartmental arthroplasty.)   • LUMBAR LAMINECTOMY N/A 2/7/2017    Procedure: LUMBAR LAMINECTOMY LUMBAR THREE-FOUR, LUMBAR FOUR-FIVE, INTERLAMINAR DISTRACTION ;  Surgeon: Lucio Mayo MD;  Location: [facility];  Service:    • NOSE SURGERY     • OTHER SURGICAL HISTORY      EXTENDED VISUAL FIELDS STUDY 34907 (Borderline glaucoma) (3): 03/19/2015, 04/09/2014, 03/27/2013   • OTHER SURGICAL HISTORY  10/29/2003    Heart revascularize (TMR) (Directmyocardial revascularization times four; LIMA to LAD SVG to DARIUSZ SVG to OM1, SVG to RCA)   • OTHER SURGICAL HISTORY      OCT DISC NFL 26115 (Borderline glaucoma)  (3): 09/24/2015, 08/13/2014, 07/29/2013   • PROSTATECTOMY     • SEPTORHINOPLASTY  11/16/1989    Nasal surgery procedure (Septorhinoplasty with reconstruction of the nasal pyramid with a iliac crest graft, rhinoplasty was performed with external approach.)   • SHOULDER ARTHROSCOPY  07/24/2013    Arthroscopy of right shoulder with rotator repair, Carla procedure, Biceps tenotomy, subacromial decompression.   • SHOULDER SURGERY Left     rotator cuff repair   • SHOULDER SURGERY   11/01/2005    Shoulder surgery procedure (Decompression, subacromial, explore of the rotator cuff, no tear found nor repaired, acromioplasty of the left shoulder and AC shoulder joint resection.)         Family History   Problem Relation Age of Onset   • Hypertension Mother    • Heart disease Mother    • Arthritis Mother    • Cancer Other    • Heart disease Other    • Hypertension Other          Social History     Social History   • Marital status: Single     Spouse name: N/A   • Number of children: N/A   • Years of education: N/A     Occupational History   • Not on file.     Social History Main Topics   • Smoking status: Current Some Day Smoker     Packs/day: 0.50     Years: 40.00     Types: Cigarettes   • Smokeless tobacco: Never Used   • Alcohol use Yes      Comment: occasionally   • Drug use: No   • Sexual activity: Defer     Other Topics Concern   • Not on file     Social History Narrative         Current Outpatient Prescriptions   Medication Sig Dispense Refill   • amLODIPine (NORVASC) 5 MG tablet Take 1 tablet by mouth Daily. 90 tablet 3   • clopidogrel (PLAVIX) 75 MG tablet Take 75 mg by mouth Daily.     • cyanocobalamin (VITAMIN B-12N) 50 MCG tablet Take 500 mcg by mouth Daily.     • latanoprost (XALATAN) 0.005 % ophthalmic solution Administer 1 drop to both eyes every night.     • losartan (COZAAR) 50 MG tablet Take 50 mg by mouth Daily.     • melatonin 1 MG tablet Take 3 mg by mouth At Night As Needed for sleep.     • meloxicam (MOBIC) 15 MG tablet Take 1 tablet by mouth Daily. 90 tablet 3   • metFORMIN (GLUCOPHAGE) 1000 MG tablet 1,000 mg 2 (Two) Times a Day With Meals.     • mometasone (NASONEX) 50 MCG/ACT nasal spray 2 sprays into each nostril daily.     • Multiple Vitamins-Minerals (CENTRUM PO) Take 1 tablet by mouth daily. Centrum 0.4 mg-162 mg-18 mg tablet  (unconfirmed)     • oxybutynin (DITROPAN) 5 MG tablet Take 5 mg by mouth Daily.     • raNITIdine (ZANTAC) 75 MG tablet Take 75 mg by mouth 2  "(Two) Times a Day.     • simvastatin (ZOCOR) 40 MG tablet Take 40 mg by mouth Every Night.       No current facility-administered medications for this visit.          OBJECTIVE    /82  Pulse 86  Ht 63\" (160 cm)  Wt 144 lb (65.3 kg)  BMI 25.51 kg/m2        Review of Systems     Constitutional:  Denies recent weight loss, weight gain, fever or chills, no change in exercise tolerance     HENT:  Denies any hearing loss, epistaxis, hoarseness, or difficulty speaking.     Eyes: Wears eyeglasses or contact lenses     Respiratory:  Denies dyspnea with exertion,no cough, wheezing, or hemoptysis.     Cardiovascular: Negative for palpations, chest pain, orthopnea, PND, peripheral edema, syncope, or claudication.     Gastrointestinal:  Denies change in bowel habits, dyspepsia, ulcer disease, hematochezia, or melena.     Endocrine: Negative for cold intolerance, heat intolerance, polydipsia, polyphagia and polyuria. Denies any history of weight change, heat/cold intolerance, polydipsia, polyuria     Genitourinary: Negative.      Musculoskeletal: DJD and back problems     Skin:  Denies any change in hair or nails, rashes, or skin lesions.     Allergic/Immunologic: Negative.  Negative for environmental allergies, food allergies and immunocompromised state.     Neurological:  Denies any history of recurrent headaches, strokes, TIA, or seizure disorder.     Hematological: Denies any food allergies, seasonal allergies, bleeding disorders, or lymphadenopathy.     Psychiatric/Behavioral: Denies any history of depression, substance abuse, or change in cognitive function.         Physical Exam     Constitutional: Cooperative, alert and oriented, well-developed, well-nourished, in no acute distress.     HENT:   Head: Normocephalic, normal hair patterns, no masses or tenderness.  Ears, Nose, and Throat: No gross abnormalities. No pallor or cyanosis.   Eyes: EOMS intact, PERRL, conjunctivae and lids unremarkable. Fundoscopic exam " and visual fields not performed.   Neck: No palpable masses or adenopathy, no thyromegaly, no JVD, carotid pulses are full and equal bilaterally and without  Bruits.     Cardiovascular: Regular rhythm, S1 and S2 normal, no S3 or S4. Apical impulse not displaced. No murmurs, gallops, or rubs detected.     Pulmonary/Chest: Chest: normal symmetry, no tenderness to palpation, normal respiratory excursion, no use of accessory muscles.            Pulmonary: Normal breath sounds. No rales or ronchi.    Abdominal: Abdomen soft, bowel sounds normoactive, no masses, no hepatosplenomegaly, non-tender, no bruits.     Musculoskeletal: No deformities, clubbing, cyanosis, erythema, or edema observed.     Neurological: No gross motor or sensory deficits noted, affect appropriate, oriented to time, person, place.     Skin: Warm and dry to the touch, no apparent skin lesions or masses noted.     Psychiatric: He has a normal mood and affect. His behavior is normal. Judgment and thought content normal.         Procedures      Lab Results   Component Value Date    WBC 9.91 (H) 02/01/2017    HGB 8.6 (L) 02/09/2017    HCT 26.3 (L) 02/09/2017    MCV 97.2 02/01/2017     02/01/2017     Lab Results   Component Value Date    GLUCOSE 170 (H) 02/01/2017    BUN 18 02/01/2017    CREATININE 1.02 02/01/2017    EGFRIFNONA 71 02/01/2017    BCR 17.6 02/01/2017    CO2 26.0 02/01/2017    CALCIUM 10.4 (H) 02/01/2017    ALBUMIN 3.2 (L) 01/18/2015    AST 32 01/18/2015    ALT 28 01/18/2015     No results found for: CHOL  No results found for: TRIG  No results found for: HDL  No results found for: LDLCALC  No results found for: LDL  No results found for: HDLLDLRATIO  No components found for: CHOLHDL  No results found for: HGBA1C  No results found for: TSH, B5ECKLT, X2IFDKU, THYROIDAB        ASSESSMENT AND PLAN  Mr. Waller has no evidence of progression of coronary artery disease.  He is 14 years post coronary artery bypass surgery.  His present  antiplatelet therapy with Plavix, antihypertensive therapy with Norvasc, losartan and statin therapy with simvastatin has been continued.  Prescriptions were refilled.    Diagnoses and all orders for this visit:    Chronic coronary artery disease    Essential hypertension    Mixed hyperlipidemia    Other orders  -     amLODIPine (NORVASC) 5 MG tablet; Take 1 tablet by mouth Daily.        Radha Wilkes MD  8/11/2017  3:50 PM

## 2017-08-23 ENCOUNTER — PREP FOR SURGERY (OUTPATIENT)
Dept: OTHER | Facility: HOSPITAL | Age: 77
End: 2017-08-23

## 2017-08-23 DIAGNOSIS — N13.2 URETERAL STONE WITH HYDRONEPHROSIS: Primary | ICD-10-CM

## 2017-08-24 ENCOUNTER — APPOINTMENT (OUTPATIENT)
Dept: PREADMISSION TESTING | Facility: HOSPITAL | Age: 77
End: 2017-08-24

## 2017-08-24 VITALS
OXYGEN SATURATION: 98 % | WEIGHT: 146 LBS | DIASTOLIC BLOOD PRESSURE: 58 MMHG | SYSTOLIC BLOOD PRESSURE: 110 MMHG | RESPIRATION RATE: 18 BRPM | HEART RATE: 91 BPM | HEIGHT: 63 IN | BODY MASS INDEX: 25.87 KG/M2

## 2017-08-24 LAB
ANION GAP SERPL CALCULATED.3IONS-SCNC: 9 MMOL/L (ref 5–15)
BUN BLD-MCNC: 18 MG/DL (ref 7–21)
BUN/CREAT SERPL: 18 (ref 7–25)
CALCIUM SPEC-SCNC: 9.7 MG/DL (ref 8.4–10.2)
CHLORIDE SERPL-SCNC: 106 MMOL/L (ref 95–110)
CO2 SERPL-SCNC: 25 MMOL/L (ref 22–31)
CREAT BLD-MCNC: 1 MG/DL (ref 0.7–1.3)
GFR SERPL CREATININE-BSD FRML MDRD: 73 ML/MIN/1.73 (ref 42–98)
GLUCOSE BLD-MCNC: 83 MG/DL (ref 60–100)
POTASSIUM BLD-SCNC: 5 MMOL/L (ref 3.5–5.1)
SODIUM BLD-SCNC: 140 MMOL/L (ref 137–145)

## 2017-08-24 PROCEDURE — 80048 BASIC METABOLIC PNL TOTAL CA: CPT | Performed by: ANESTHESIOLOGY

## 2017-08-24 PROCEDURE — 36415 COLL VENOUS BLD VENIPUNCTURE: CPT

## 2017-08-24 PROCEDURE — 93010 ELECTROCARDIOGRAM REPORT: CPT | Performed by: INTERNAL MEDICINE

## 2017-08-24 PROCEDURE — 93005 ELECTROCARDIOGRAM TRACING: CPT

## 2017-08-24 RX ORDER — MONTELUKAST SODIUM 10 MG/1
10 TABLET ORAL NIGHTLY
COMMUNITY
End: 2019-02-27

## 2017-08-24 RX ORDER — FLUTICASONE PROPIONATE 50 MCG
2 SPRAY, SUSPENSION (ML) NASAL DAILY PRN
COMMUNITY
End: 2018-02-14

## 2017-08-24 RX ORDER — SODIUM CHLORIDE 9 MG/ML
1000 INJECTION, SOLUTION INTRAVENOUS CONTINUOUS PRN
Status: CANCELLED | OUTPATIENT
Start: 2017-08-28

## 2017-08-24 NOTE — DISCHARGE INSTRUCTIONS
[facility]  Pre-op Information and Guidelines    You will be called after 2 p.m. the day before your surgery (Friday for Monday surgery) and notified of your time for arrival and approximate surgery time.  If you have not received a call by 4P.M., please contact Same Day Surgery at [phone] of if outside [facility] call [phone].    Please Follow these Important Safety Guidelines:    • The morning of your procedure, take only the medications listed below with   A sip of water:_____________________________________________       ______________________________________________    • DO NOT eat or drink anything after 12:00 midnight the night before surgery  Specific instructions concerning drinking clear liquids will be discussed during  the pre-surgery instruction call the day before your surgery.    • If you take a blood thinner (ex. Plavix, Coumadin, aspirin), ask your doctor when to stop it before surgery  STOP DATE: _________________    • Only 2 visitors are allowed in patient rooms at a time  Your visitors will be asked to wait in the lobby until the admission process is complete with the exception of a parent with a child and patients in need of special assistance.    • YOU CANNOT DRIVE YOURSELF HOME  You must be accompanied by someone who will be responsible for driving you home after surgery and for your care at home.    • DO NOT chew gum, use breath mints, hard candy, or smoke the day of surgery  • DO NOT drink alcohol for at least 24 hours before your surgery  • DO NOT wear any jewelry and remove all body piercing before coming to the hospital  • DO NOT wear make-up to the hospital  • If you are having surgery on an extremity (arm/leg/foot) remove nail polish/artificial nails on the surgical side  • Clothing, glasses, contacts, dentures, and hairpieces must be removed before surgery  • Bathe the night before or the morning of your surgery and do not use powders/lotions on  skin.

## 2017-08-28 ENCOUNTER — ANESTHESIA EVENT (OUTPATIENT)
Dept: PERIOP | Facility: HOSPITAL | Age: 77
End: 2017-08-28

## 2017-08-28 ENCOUNTER — APPOINTMENT (OUTPATIENT)
Dept: GENERAL RADIOLOGY | Facility: HOSPITAL | Age: 77
End: 2017-08-28

## 2017-08-28 ENCOUNTER — PREP FOR SURGERY (OUTPATIENT)
Dept: OTHER | Facility: HOSPITAL | Age: 77
End: 2017-08-28

## 2017-08-28 ENCOUNTER — ANESTHESIA (OUTPATIENT)
Dept: PERIOP | Facility: HOSPITAL | Age: 77
End: 2017-08-28

## 2017-08-28 ENCOUNTER — HOSPITAL ENCOUNTER (OUTPATIENT)
Facility: HOSPITAL | Age: 77
Setting detail: HOSPITAL OUTPATIENT SURGERY
Discharge: HOME | End: 2017-08-28
Attending: UROLOGY | Admitting: UROLOGY

## 2017-08-28 VITALS
OXYGEN SATURATION: 95 % | HEART RATE: 86 BPM | WEIGHT: 145.5 LBS | RESPIRATION RATE: 18 BRPM | TEMPERATURE: 97 F | SYSTOLIC BLOOD PRESSURE: 169 MMHG | BODY MASS INDEX: 25.78 KG/M2 | HEIGHT: 63 IN | DIASTOLIC BLOOD PRESSURE: 76 MMHG

## 2017-08-28 DIAGNOSIS — N13.2 URETERAL STONE WITH HYDRONEPHROSIS: ICD-10-CM

## 2017-08-28 LAB
GLUCOSE BLDC GLUCOMTR-MCNC: 117 MG/DL (ref 70–130)
GLUCOSE BLDC GLUCOMTR-MCNC: 157 MG/DL (ref 70–130)

## 2017-08-28 PROCEDURE — C1758 CATHETER, URETERAL: HCPCS | Performed by: UROLOGY

## 2017-08-28 PROCEDURE — 25010000002 MIDAZOLAM PER 1 MG: Performed by: NURSE ANESTHETIST, CERTIFIED REGISTERED

## 2017-08-28 PROCEDURE — 25010000002 PHENYLEPHRINE PER 1 ML: Performed by: NURSE ANESTHETIST, CERTIFIED REGISTERED

## 2017-08-28 PROCEDURE — 74000 HC ABDOMEN KUB: CPT

## 2017-08-28 PROCEDURE — 82962 GLUCOSE BLOOD TEST: CPT

## 2017-08-28 PROCEDURE — C2617 STENT, NON-COR, TEM W/O DEL: HCPCS | Performed by: UROLOGY

## 2017-08-28 PROCEDURE — 25010000002 PROPOFOL 10 MG/ML EMULSION: Performed by: NURSE ANESTHETIST, CERTIFIED REGISTERED

## 2017-08-28 PROCEDURE — C1769 GUIDE WIRE: HCPCS | Performed by: UROLOGY

## 2017-08-28 PROCEDURE — 74420 UROGRAPHY RTRGR +-KUB: CPT

## 2017-08-28 PROCEDURE — 0 IOPAMIDOL 61 % SOLUTION: Performed by: UROLOGY

## 2017-08-28 PROCEDURE — 25010000002 FUROSEMIDE PER 20 MG: Performed by: NURSE ANESTHETIST, CERTIFIED REGISTERED

## 2017-08-28 PROCEDURE — 25010000002 KETOROLAC TROMETHAMINE PER 15 MG: Performed by: NURSE ANESTHETIST, CERTIFIED REGISTERED

## 2017-08-28 PROCEDURE — 25010000002 FENTANYL CITRATE (PF) 100 MCG/2ML SOLUTION: Performed by: NURSE ANESTHETIST, CERTIFIED REGISTERED

## 2017-08-28 DEVICE — URETERAL STENT SET
Type: IMPLANTABLE DEVICE | Site: URETER | Status: FUNCTIONAL
Brand: POLARIS™ ULTRA

## 2017-08-28 RX ORDER — NALOXONE HCL 0.4 MG/ML
0.2 VIAL (ML) INJECTION AS NEEDED
Status: DISCONTINUED | OUTPATIENT
Start: 2017-08-28 | End: 2017-08-28 | Stop reason: HOSPADM

## 2017-08-28 RX ORDER — DIPHENHYDRAMINE HYDROCHLORIDE 50 MG/ML
12.5 INJECTION INTRAMUSCULAR; INTRAVENOUS
Status: DISCONTINUED | OUTPATIENT
Start: 2017-08-28 | End: 2017-08-28 | Stop reason: HOSPADM

## 2017-08-28 RX ORDER — LABETALOL HYDROCHLORIDE 5 MG/ML
5 INJECTION, SOLUTION INTRAVENOUS
Status: DISCONTINUED | OUTPATIENT
Start: 2017-08-28 | End: 2017-08-28 | Stop reason: HOSPADM

## 2017-08-28 RX ORDER — FLUMAZENIL 0.1 MG/ML
0.2 INJECTION INTRAVENOUS AS NEEDED
Status: DISCONTINUED | OUTPATIENT
Start: 2017-08-28 | End: 2017-08-28 | Stop reason: HOSPADM

## 2017-08-28 RX ORDER — FENTANYL CITRATE 50 UG/ML
INJECTION, SOLUTION INTRAMUSCULAR; INTRAVENOUS AS NEEDED
Status: DISCONTINUED | OUTPATIENT
Start: 2017-08-28 | End: 2017-08-28 | Stop reason: SURG

## 2017-08-28 RX ORDER — MIDAZOLAM HYDROCHLORIDE 1 MG/ML
INJECTION INTRAMUSCULAR; INTRAVENOUS AS NEEDED
Status: DISCONTINUED | OUTPATIENT
Start: 2017-08-28 | End: 2017-08-28 | Stop reason: SURG

## 2017-08-28 RX ORDER — CIPROFLOXACIN 250 MG/1
500 TABLET, FILM COATED ORAL 2 TIMES DAILY
Qty: 14 TABLET | Refills: 0 | Status: SHIPPED | OUTPATIENT
Start: 2017-08-28 | End: 2017-09-01 | Stop reason: HOSPADM

## 2017-08-28 RX ORDER — SCOLOPAMINE TRANSDERMAL SYSTEM 1 MG/1
1 PATCH, EXTENDED RELEASE TRANSDERMAL ONCE
Status: DISCONTINUED | OUTPATIENT
Start: 2017-08-28 | End: 2017-08-28 | Stop reason: HOSPADM

## 2017-08-28 RX ORDER — ONDANSETRON 2 MG/ML
4 INJECTION INTRAMUSCULAR; INTRAVENOUS ONCE AS NEEDED
Status: DISCONTINUED | OUTPATIENT
Start: 2017-08-28 | End: 2017-08-28 | Stop reason: HOSPADM

## 2017-08-28 RX ORDER — SODIUM CHLORIDE 9 MG/ML
1000 INJECTION, SOLUTION INTRAVENOUS CONTINUOUS PRN
Status: DISCONTINUED | OUTPATIENT
Start: 2017-08-28 | End: 2017-08-28 | Stop reason: HOSPADM

## 2017-08-28 RX ORDER — KETOROLAC TROMETHAMINE 30 MG/ML
INJECTION, SOLUTION INTRAMUSCULAR; INTRAVENOUS AS NEEDED
Status: DISCONTINUED | OUTPATIENT
Start: 2017-08-28 | End: 2017-08-28 | Stop reason: SURG

## 2017-08-28 RX ORDER — LIDOCAINE HYDROCHLORIDE 20 MG/ML
INJECTION, SOLUTION INFILTRATION; PERINEURAL AS NEEDED
Status: DISCONTINUED | OUTPATIENT
Start: 2017-08-28 | End: 2017-08-28 | Stop reason: SURG

## 2017-08-28 RX ORDER — PHENAZOPYRIDINE HYDROCHLORIDE 200 MG/1
200 TABLET, FILM COATED ORAL 3 TIMES DAILY PRN
Qty: 30 TABLET | Refills: 0 | Status: SHIPPED | OUTPATIENT
Start: 2017-08-28 | End: 2017-09-04

## 2017-08-28 RX ORDER — FUROSEMIDE 10 MG/ML
INJECTION INTRAMUSCULAR; INTRAVENOUS AS NEEDED
Status: DISCONTINUED | OUTPATIENT
Start: 2017-08-28 | End: 2017-08-28 | Stop reason: SURG

## 2017-08-28 RX ORDER — PROPOFOL 10 MG/ML
VIAL (ML) INTRAVENOUS AS NEEDED
Status: DISCONTINUED | OUTPATIENT
Start: 2017-08-28 | End: 2017-08-28 | Stop reason: SURG

## 2017-08-28 RX ORDER — EPHEDRINE SULFATE 50 MG/ML
5 INJECTION, SOLUTION INTRAVENOUS ONCE AS NEEDED
Status: DISCONTINUED | OUTPATIENT
Start: 2017-08-28 | End: 2017-08-28 | Stop reason: HOSPADM

## 2017-08-28 RX ADMIN — LIDOCAINE HYDROCHLORIDE 60 MG: 20 INJECTION, SOLUTION INFILTRATION; PERINEURAL at 12:18

## 2017-08-28 RX ADMIN — KETOROLAC TROMETHAMINE 60 MG: 30 INJECTION, SOLUTION INTRAMUSCULAR at 12:57

## 2017-08-28 RX ADMIN — CEFAZOLIN 1 G: 1 INJECTION, POWDER, FOR SOLUTION INTRAMUSCULAR; INTRAVENOUS; PARENTERAL at 12:21

## 2017-08-28 RX ADMIN — EPHEDRINE SULFATE 10 MG: 50 INJECTION INTRAMUSCULAR; INTRAVENOUS; SUBCUTANEOUS at 12:49

## 2017-08-28 RX ADMIN — PHENYLEPHRINE HYDROCHLORIDE 100 MCG: 10 INJECTION INTRAVENOUS at 12:27

## 2017-08-28 RX ADMIN — FUROSEMIDE 10 MG: 10 INJECTION, SOLUTION INTRAVENOUS at 12:57

## 2017-08-28 RX ADMIN — PHENYLEPHRINE HYDROCHLORIDE 100 MCG: 10 INJECTION INTRAVENOUS at 12:45

## 2017-08-28 RX ADMIN — MIDAZOLAM 2 MG: 1 INJECTION INTRAMUSCULAR; INTRAVENOUS at 12:12

## 2017-08-28 RX ADMIN — FENTANYL CITRATE 50 MCG: 50 INJECTION, SOLUTION INTRAMUSCULAR; INTRAVENOUS at 12:15

## 2017-08-28 RX ADMIN — PHENYLEPHRINE HYDROCHLORIDE 100 MCG: 10 INJECTION INTRAVENOUS at 12:33

## 2017-08-28 RX ADMIN — SCOPALAMINE 1 PATCH: 1 PATCH, EXTENDED RELEASE TRANSDERMAL at 09:14

## 2017-08-28 RX ADMIN — PROPOFOL 120 MG: 10 INJECTION, EMULSION INTRAVENOUS at 12:18

## 2017-08-28 RX ADMIN — PHENYLEPHRINE HYDROCHLORIDE 200 MCG: 10 INJECTION INTRAVENOUS at 12:38

## 2017-08-28 RX ADMIN — SODIUM CHLORIDE 1000 ML: 9 INJECTION, SOLUTION INTRAVENOUS at 08:53

## 2017-08-28 NOTE — ANESTHESIA PROCEDURE NOTES
Airway  Urgency: elective    Airway not difficult    General Information and Staff    Patient location during procedure: OR  CRNA: MILLI TURCIOS    Indications and Patient Condition    Preoxygenated: yes  Mask difficulty assessment: 0 - not attempted    Final Airway Details  Final airway type: supraglottic airway      Successful airway: classic  Size 4    Number of attempts at approach: 1    Additional Comments  Lips and teeth in preanesthetic condition

## 2017-08-28 NOTE — ANESTHESIA PREPROCEDURE EVALUATION
Anesthesia Evaluation     history of anesthetic complications: PONV  NPO Solid Status: > 8 hours  NPO Liquid Status: > 8 hours     Airway   Mallampati: III  TM distance: <3 FB  possible difficult intubation  Dental    (+) poor dentition    Pulmonary     breath sounds clear to auscultation  (+) shortness of breath, recent URI,   Cardiovascular     Rhythm: regular  Rate: normal    (+) hypertension poorly controlled, CAD, CABG 3-6 Months, hyperlipidemia      Neuro/Psych  (+) dizziness/light headedness, numbness, psychiatric history Anxiety,    GI/Hepatic/Renal/Endo    (+)  GERD poorly controlled, diabetes mellitus type 2 well controlled,     Musculoskeletal     Abdominal     Abdomen: soft.   Substance History      OB/GYN          Other   (+) arthritis   history of cancer active                                    Anesthesia Plan    ASA 3     general     intravenous induction   Anesthetic plan and risks discussed with patient.

## 2017-08-28 NOTE — ANESTHESIA POSTPROCEDURE EVALUATION
Patient: Vishnu Waller    Procedure Summary     Date Anesthesia Start Anesthesia Stop Room / Location    08/28/17 1212 1301  [facility] OR 02 / [facility]       Procedure Diagnosis Surgeon Provider    URETEROSCOPY LASER LITHOTRIPSY WITH STENT INSERTION AND RETROGRADE PYELOGRAM  (N/A ) Ureteral stone with hydronephrosis  (Ureteral stone with hydronephrosis [N13.2]) Anna M. D'Amico, MD George J Dwyer, MD          Anesthesia Type: general  Last vitals  BP        Temp        Pulse       Resp        SpO2          Post Anesthesia Care and Evaluation    Patient location during evaluation: PACU  Patient participation: complete - patient participated  Level of consciousness: awake and awake and alert  Pain management: adequate  Airway patency: patent  Anesthetic complications: No anesthetic complications  PONV Status: none  Cardiovascular status: acceptable  Respiratory status: acceptable  Hydration status: acceptable

## 2017-08-30 ENCOUNTER — HOSPITAL ENCOUNTER (OUTPATIENT)
Facility: HOSPITAL | Age: 77
Setting detail: OBSERVATION
Discharge: HOME | End: 2017-09-01
Attending: EMERGENCY MEDICINE | Admitting: FAMILY MEDICINE

## 2017-08-30 ENCOUNTER — APPOINTMENT (OUTPATIENT)
Dept: ULTRASOUND IMAGING | Facility: HOSPITAL | Age: 77
End: 2017-08-30

## 2017-08-30 ENCOUNTER — APPOINTMENT (OUTPATIENT)
Dept: GENERAL RADIOLOGY | Facility: HOSPITAL | Age: 77
End: 2017-08-30

## 2017-08-30 DIAGNOSIS — N12 PYELONEPHRITIS: Primary | ICD-10-CM

## 2017-08-30 LAB
ALBUMIN SERPL-MCNC: 4 G/DL (ref 3.4–4.8)
ALBUMIN/GLOB SERPL: 1.4 G/DL (ref 1.1–1.8)
ALP SERPL-CCNC: 48 U/L (ref 38–126)
ALT SERPL W P-5'-P-CCNC: 29 U/L (ref 21–72)
ANION GAP SERPL CALCULATED.3IONS-SCNC: 10 MMOL/L (ref 5–15)
AST SERPL-CCNC: 38 U/L (ref 17–59)
BACTERIA UR QL AUTO: ABNORMAL /HPF
BASOPHILS # BLD AUTO: 0.03 10*3/MM3 (ref 0–0.2)
BASOPHILS NFR BLD AUTO: 0.2 % (ref 0–2)
BILIRUB SERPL-MCNC: 0.5 MG/DL (ref 0.2–1.3)
BILIRUB UR QL STRIP: ABNORMAL
BUN BLD-MCNC: 19 MG/DL (ref 7–21)
BUN/CREAT SERPL: 17.1 (ref 7–25)
CALCIUM SPEC-SCNC: 9.3 MG/DL (ref 8.4–10.2)
CHLORIDE SERPL-SCNC: 100 MMOL/L (ref 95–110)
CLARITY UR: ABNORMAL
CO2 SERPL-SCNC: 24 MMOL/L (ref 22–31)
COLOR UR: ABNORMAL
CREAT BLD-MCNC: 1.11 MG/DL (ref 0.7–1.3)
DEPRECATED RDW RBC AUTO: 56.5 FL (ref 35.1–43.9)
EOSINOPHIL # BLD AUTO: 0.04 10*3/MM3 (ref 0–0.7)
EOSINOPHIL NFR BLD AUTO: 0.3 % (ref 0–7)
ERYTHROCYTE [DISTWIDTH] IN BLOOD BY AUTOMATED COUNT: 16.5 % (ref 11.5–14.5)
GFR SERPL CREATININE-BSD FRML MDRD: 65 ML/MIN/1.73 (ref 60–98)
GLOBULIN UR ELPH-MCNC: 2.8 GM/DL (ref 2.3–3.5)
GLUCOSE BLD-MCNC: 129 MG/DL (ref 60–100)
GLUCOSE UR STRIP-MCNC: ABNORMAL MG/DL
HCT VFR BLD AUTO: 31.4 % (ref 39–49)
HGB BLD-MCNC: 10.3 G/DL (ref 13.7–17.3)
HGB UR QL STRIP.AUTO: ABNORMAL
HOLD SPECIMEN: NORMAL
HOLD SPECIMEN: NORMAL
HYALINE CASTS UR QL AUTO: ABNORMAL /LPF
IMM GRANULOCYTES # BLD: 0.06 10*3/MM3 (ref 0–0.02)
IMM GRANULOCYTES NFR BLD: 0.4 % (ref 0–0.5)
KETONES UR QL STRIP: ABNORMAL
LEUKOCYTE ESTERASE UR QL STRIP.AUTO: ABNORMAL
LIPASE SERPL-CCNC: 28 U/L (ref 23–300)
LYMPHOCYTES # BLD AUTO: 0.74 10*3/MM3 (ref 0.6–4.2)
LYMPHOCYTES NFR BLD AUTO: 5.3 % (ref 10–50)
MCH RBC QN AUTO: 31.1 PG (ref 26.5–34)
MCHC RBC AUTO-ENTMCNC: 32.8 G/DL (ref 31.5–36.3)
MCV RBC AUTO: 94.9 FL (ref 80–98)
MONOCYTES # BLD AUTO: 1.15 10*3/MM3 (ref 0–0.9)
MONOCYTES NFR BLD AUTO: 8.2 % (ref 0–12)
NEUTROPHILS # BLD AUTO: 12.07 10*3/MM3 (ref 2–8.6)
NEUTROPHILS NFR BLD AUTO: 85.6 % (ref 37–80)
NITRITE UR QL STRIP: POSITIVE
PH UR STRIP.AUTO: <=5 [PH] (ref 5–9)
PLATELET # BLD AUTO: 215 10*3/MM3 (ref 150–450)
PMV BLD AUTO: 9.7 FL (ref 8–12)
POTASSIUM BLD-SCNC: 4.6 MMOL/L (ref 3.5–5.1)
PROT SERPL-MCNC: 6.8 G/DL (ref 6.3–8.6)
PROT UR QL STRIP: ABNORMAL
RBC # BLD AUTO: 3.31 10*6/MM3 (ref 4.37–5.74)
RBC # UR: ABNORMAL /HPF
REF LAB TEST METHOD: ABNORMAL
SODIUM BLD-SCNC: 134 MMOL/L (ref 137–145)
SP GR UR STRIP: 1.02 (ref 1–1.03)
SQUAMOUS #/AREA URNS HPF: ABNORMAL /HPF
UROBILINOGEN UR QL STRIP: ABNORMAL
WBC NRBC COR # BLD: 14.09 10*3/MM3 (ref 3.2–9.8)
WBC UR QL AUTO: ABNORMAL /HPF
WHOLE BLOOD HOLD SPECIMEN: NORMAL
WHOLE BLOOD HOLD SPECIMEN: NORMAL

## 2017-08-30 PROCEDURE — G0378 HOSPITAL OBSERVATION PER HR: HCPCS

## 2017-08-30 PROCEDURE — 80053 COMPREHEN METABOLIC PANEL: CPT | Performed by: EMERGENCY MEDICINE

## 2017-08-30 PROCEDURE — 25010000002 ONDANSETRON PER 1 MG: Performed by: EMERGENCY MEDICINE

## 2017-08-30 PROCEDURE — 25010000002 HYDROMORPHONE PER 4 MG: Performed by: EMERGENCY MEDICINE

## 2017-08-30 PROCEDURE — 76770 US EXAM ABDO BACK WALL COMP: CPT

## 2017-08-30 PROCEDURE — 74000 HC ABDOMEN KUB: CPT

## 2017-08-30 PROCEDURE — 96375 TX/PRO/DX INJ NEW DRUG ADDON: CPT

## 2017-08-30 PROCEDURE — 94760 N-INVAS EAR/PLS OXIMETRY 1: CPT

## 2017-08-30 PROCEDURE — 25010000002 ONDANSETRON PER 1 MG: Performed by: FAMILY MEDICINE

## 2017-08-30 PROCEDURE — 85025 COMPLETE CBC W/AUTO DIFF WBC: CPT | Performed by: EMERGENCY MEDICINE

## 2017-08-30 PROCEDURE — 96376 TX/PRO/DX INJ SAME DRUG ADON: CPT

## 2017-08-30 PROCEDURE — 87086 URINE CULTURE/COLONY COUNT: CPT | Performed by: EMERGENCY MEDICINE

## 2017-08-30 PROCEDURE — 94799 UNLISTED PULMONARY SVC/PX: CPT

## 2017-08-30 PROCEDURE — 83690 ASSAY OF LIPASE: CPT | Performed by: EMERGENCY MEDICINE

## 2017-08-30 PROCEDURE — 81001 URINALYSIS AUTO W/SCOPE: CPT | Performed by: EMERGENCY MEDICINE

## 2017-08-30 PROCEDURE — 96365 THER/PROPH/DIAG IV INF INIT: CPT

## 2017-08-30 PROCEDURE — 25010000002 LEVOFLOXACIN PER 250 MG: Performed by: EMERGENCY MEDICINE

## 2017-08-30 PROCEDURE — 99284 EMERGENCY DEPT VISIT MOD MDM: CPT

## 2017-08-30 PROCEDURE — 25010000002 HYDROMORPHONE PER 4 MG: Performed by: FAMILY MEDICINE

## 2017-08-30 PROCEDURE — 96361 HYDRATE IV INFUSION ADD-ON: CPT

## 2017-08-30 RX ORDER — LATANOPROST 50 UG/ML
1 SOLUTION/ DROPS OPHTHALMIC NIGHTLY
Status: DISCONTINUED | OUTPATIENT
Start: 2017-08-30 | End: 2017-09-01 | Stop reason: HOSPADM

## 2017-08-30 RX ORDER — SODIUM CHLORIDE 0.9 % (FLUSH) 0.9 %
1-10 SYRINGE (ML) INJECTION AS NEEDED
Status: DISCONTINUED | OUTPATIENT
Start: 2017-08-30 | End: 2017-09-01 | Stop reason: HOSPADM

## 2017-08-30 RX ORDER — OXYBUTYNIN CHLORIDE 5 MG/1
5 TABLET ORAL NIGHTLY
Status: DISCONTINUED | OUTPATIENT
Start: 2017-08-30 | End: 2017-09-01 | Stop reason: HOSPADM

## 2017-08-30 RX ORDER — ONDANSETRON 2 MG/ML
4 INJECTION INTRAMUSCULAR; INTRAVENOUS ONCE
Status: COMPLETED | OUTPATIENT
Start: 2017-08-30 | End: 2017-08-30

## 2017-08-30 RX ORDER — AMLODIPINE BESYLATE 5 MG/1
5 TABLET ORAL DAILY
Status: DISCONTINUED | OUTPATIENT
Start: 2017-08-31 | End: 2017-09-01 | Stop reason: HOSPADM

## 2017-08-30 RX ORDER — LOSARTAN POTASSIUM 50 MG/1
50 TABLET ORAL NIGHTLY
Status: DISCONTINUED | OUTPATIENT
Start: 2017-08-30 | End: 2017-09-01 | Stop reason: HOSPADM

## 2017-08-30 RX ORDER — ONDANSETRON 2 MG/ML
4 INJECTION INTRAMUSCULAR; INTRAVENOUS EVERY 6 HOURS PRN
Status: DISCONTINUED | OUTPATIENT
Start: 2017-08-30 | End: 2017-09-01 | Stop reason: HOSPADM

## 2017-08-30 RX ORDER — CLOPIDOGREL BISULFATE 75 MG/1
75 TABLET ORAL DAILY
Status: DISCONTINUED | OUTPATIENT
Start: 2017-08-31 | End: 2017-09-01 | Stop reason: HOSPADM

## 2017-08-30 RX ORDER — NALOXONE HCL 0.4 MG/ML
0.4 VIAL (ML) INJECTION
Status: DISCONTINUED | OUTPATIENT
Start: 2017-08-30 | End: 2017-09-01 | Stop reason: HOSPADM

## 2017-08-30 RX ORDER — SODIUM CHLORIDE 0.9 % (FLUSH) 0.9 %
10 SYRINGE (ML) INJECTION AS NEEDED
Status: DISCONTINUED | OUTPATIENT
Start: 2017-08-30 | End: 2017-09-01 | Stop reason: HOSPADM

## 2017-08-30 RX ORDER — LEVOFLOXACIN 5 MG/ML
750 INJECTION, SOLUTION INTRAVENOUS ONCE
Status: COMPLETED | OUTPATIENT
Start: 2017-08-30 | End: 2017-08-30

## 2017-08-30 RX ORDER — LEVOFLOXACIN 5 MG/ML
750 INJECTION, SOLUTION INTRAVENOUS ONCE
Status: DISCONTINUED | OUTPATIENT
Start: 2017-08-30 | End: 2017-08-30 | Stop reason: SDUPTHER

## 2017-08-30 RX ORDER — TAMSULOSIN HYDROCHLORIDE 0.4 MG/1
0.4 CAPSULE ORAL DAILY
Status: DISCONTINUED | OUTPATIENT
Start: 2017-08-31 | End: 2017-09-01 | Stop reason: HOSPADM

## 2017-08-30 RX ORDER — SODIUM CHLORIDE 9 MG/ML
125 INJECTION, SOLUTION INTRAVENOUS CONTINUOUS
Status: DISCONTINUED | OUTPATIENT
Start: 2017-08-30 | End: 2017-09-01 | Stop reason: HOSPADM

## 2017-08-30 RX ORDER — NICOTINE 21 MG/24HR
1 PATCH, TRANSDERMAL 24 HOURS TRANSDERMAL EVERY 24 HOURS
Status: DISCONTINUED | OUTPATIENT
Start: 2017-08-30 | End: 2017-09-01 | Stop reason: HOSPADM

## 2017-08-30 RX ORDER — FLUTICASONE PROPIONATE 50 MCG
2 SPRAY, SUSPENSION (ML) NASAL DAILY PRN
Status: DISCONTINUED | OUTPATIENT
Start: 2017-08-30 | End: 2017-09-01 | Stop reason: HOSPADM

## 2017-08-30 RX ORDER — MELOXICAM 15 MG/1
15 TABLET ORAL DAILY
Status: DISCONTINUED | OUTPATIENT
Start: 2017-08-31 | End: 2017-09-01 | Stop reason: HOSPADM

## 2017-08-30 RX ADMIN — Medication 10 ML: at 15:13

## 2017-08-30 RX ADMIN — SODIUM CHLORIDE 125 ML/HR: 900 INJECTION, SOLUTION INTRAVENOUS at 15:12

## 2017-08-30 RX ADMIN — LATANOPROST 1 DROP: 50 SOLUTION OPHTHALMIC at 22:41

## 2017-08-30 RX ADMIN — HYDROMORPHONE HYDROCHLORIDE 0.5 MG: 1 INJECTION, SOLUTION INTRAMUSCULAR; INTRAVENOUS; SUBCUTANEOUS at 20:31

## 2017-08-30 RX ADMIN — OXYBUTYNIN CHLORIDE 5 MG: 5 TABLET ORAL at 22:40

## 2017-08-30 RX ADMIN — ONDANSETRON 4 MG: 2 INJECTION INTRAMUSCULAR; INTRAVENOUS at 20:36

## 2017-08-30 RX ADMIN — HYDROMORPHONE HYDROCHLORIDE 1 MG: 1 INJECTION, SOLUTION INTRAMUSCULAR; INTRAVENOUS; SUBCUTANEOUS at 15:13

## 2017-08-30 RX ADMIN — METFORMIN HYDROCHLORIDE 1000 MG: 500 TABLET ORAL at 22:41

## 2017-08-30 RX ADMIN — ONDANSETRON 4 MG: 2 INJECTION INTRAMUSCULAR; INTRAVENOUS at 15:13

## 2017-08-30 RX ADMIN — SODIUM CHLORIDE 125 ML/HR: 900 INJECTION, SOLUTION INTRAVENOUS at 23:59

## 2017-08-30 RX ADMIN — LOSARTAN POTASSIUM 50 MG: 50 TABLET, FILM COATED ORAL at 22:40

## 2017-08-30 RX ADMIN — LEVOFLOXACIN 750 MG: 5 INJECTION, SOLUTION INTRAVENOUS at 17:33

## 2017-08-31 ENCOUNTER — APPOINTMENT (OUTPATIENT)
Dept: GENERAL RADIOLOGY | Facility: HOSPITAL | Age: 77
End: 2017-08-31

## 2017-08-31 LAB
ANION GAP SERPL CALCULATED.3IONS-SCNC: 9 MMOL/L (ref 5–15)
BUN BLD-MCNC: 15 MG/DL (ref 7–21)
BUN/CREAT SERPL: 14.2 (ref 7–25)
CALCIUM SPEC-SCNC: 8.6 MG/DL (ref 8.4–10.2)
CHLORIDE SERPL-SCNC: 105 MMOL/L (ref 95–110)
CO2 SERPL-SCNC: 24 MMOL/L (ref 22–31)
CREAT BLD-MCNC: 1.06 MG/DL (ref 0.7–1.3)
DEPRECATED RDW RBC AUTO: 57.7 FL (ref 35.1–43.9)
ERYTHROCYTE [DISTWIDTH] IN BLOOD BY AUTOMATED COUNT: 16.6 % (ref 11.5–14.5)
GFR SERPL CREATININE-BSD FRML MDRD: 68 ML/MIN/1.73 (ref 42–98)
GLUCOSE BLD-MCNC: 106 MG/DL (ref 60–100)
HCT VFR BLD AUTO: 30.9 % (ref 39–49)
HGB BLD-MCNC: 10 G/DL (ref 13.7–17.3)
MCH RBC QN AUTO: 30.8 PG (ref 26.5–34)
MCHC RBC AUTO-ENTMCNC: 32.4 G/DL (ref 31.5–36.3)
MCV RBC AUTO: 95.1 FL (ref 80–98)
PLATELET # BLD AUTO: 240 10*3/MM3 (ref 150–450)
PMV BLD AUTO: 9.8 FL (ref 8–12)
POTASSIUM BLD-SCNC: 4.3 MMOL/L (ref 3.5–5.1)
RBC # BLD AUTO: 3.25 10*6/MM3 (ref 4.37–5.74)
SODIUM BLD-SCNC: 138 MMOL/L (ref 137–145)
WBC NRBC COR # BLD: 10.44 10*3/MM3 (ref 3.2–9.8)

## 2017-08-31 PROCEDURE — 87086 URINE CULTURE/COLONY COUNT: CPT | Performed by: FAMILY MEDICINE

## 2017-08-31 PROCEDURE — 25010000002 ONDANSETRON PER 1 MG: Performed by: FAMILY MEDICINE

## 2017-08-31 PROCEDURE — 96376 TX/PRO/DX INJ SAME DRUG ADON: CPT

## 2017-08-31 PROCEDURE — G0378 HOSPITAL OBSERVATION PER HR: HCPCS

## 2017-08-31 PROCEDURE — 80048 BASIC METABOLIC PNL TOTAL CA: CPT | Performed by: FAMILY MEDICINE

## 2017-08-31 PROCEDURE — 74000 HC ABDOMEN KUB: CPT

## 2017-08-31 PROCEDURE — 96367 TX/PROPH/DG ADDL SEQ IV INF: CPT

## 2017-08-31 PROCEDURE — 25010000002 HYDROMORPHONE PER 4 MG: Performed by: FAMILY MEDICINE

## 2017-08-31 PROCEDURE — 85027 COMPLETE CBC AUTOMATED: CPT | Performed by: FAMILY MEDICINE

## 2017-08-31 PROCEDURE — 25010000002 CEFTRIAXONE: Performed by: NURSE PRACTITIONER

## 2017-08-31 PROCEDURE — 96361 HYDRATE IV INFUSION ADD-ON: CPT

## 2017-08-31 RX ORDER — LACTULOSE 10 G/15ML
20 SOLUTION ORAL 2 TIMES DAILY
Status: DISCONTINUED | OUTPATIENT
Start: 2017-08-31 | End: 2017-08-31

## 2017-08-31 RX ORDER — DOCUSATE SODIUM 100 MG/1
100 CAPSULE, LIQUID FILLED ORAL 2 TIMES DAILY
Status: DISCONTINUED | OUTPATIENT
Start: 2017-08-31 | End: 2017-09-01 | Stop reason: HOSPADM

## 2017-08-31 RX ORDER — LACTULOSE 10 G/15ML
20 SOLUTION ORAL 2 TIMES DAILY
Status: DISCONTINUED | OUTPATIENT
Start: 2017-08-31 | End: 2017-09-01 | Stop reason: HOSPADM

## 2017-08-31 RX ORDER — POLYETHYLENE GLYCOL 3350 17 G/17G
17 POWDER, FOR SOLUTION ORAL DAILY
Status: DISCONTINUED | OUTPATIENT
Start: 2017-08-31 | End: 2017-09-01 | Stop reason: HOSPADM

## 2017-08-31 RX ADMIN — ONDANSETRON 4 MG: 2 INJECTION INTRAMUSCULAR; INTRAVENOUS at 21:42

## 2017-08-31 RX ADMIN — CEFTRIAXONE 1 G: 1 INJECTION, POWDER, FOR SOLUTION INTRAMUSCULAR; INTRAVENOUS at 09:50

## 2017-08-31 RX ADMIN — METFORMIN HYDROCHLORIDE 1000 MG: 500 TABLET ORAL at 17:04

## 2017-08-31 RX ADMIN — AMLODIPINE BESYLATE 5 MG: 5 TABLET ORAL at 08:18

## 2017-08-31 RX ADMIN — ONDANSETRON 4 MG: 2 INJECTION INTRAMUSCULAR; INTRAVENOUS at 11:13

## 2017-08-31 RX ADMIN — LATANOPROST 1 DROP: 50 SOLUTION OPHTHALMIC at 20:28

## 2017-08-31 RX ADMIN — ONDANSETRON 4 MG: 2 INJECTION INTRAMUSCULAR; INTRAVENOUS at 05:18

## 2017-08-31 RX ADMIN — SODIUM CHLORIDE 125 ML/HR: 900 INJECTION, SOLUTION INTRAVENOUS at 23:30

## 2017-08-31 RX ADMIN — HYDROMORPHONE HYDROCHLORIDE 0.5 MG: 1 INJECTION, SOLUTION INTRAMUSCULAR; INTRAVENOUS; SUBCUTANEOUS at 21:42

## 2017-08-31 RX ADMIN — LOSARTAN POTASSIUM 50 MG: 50 TABLET, FILM COATED ORAL at 20:28

## 2017-08-31 RX ADMIN — TAMSULOSIN HYDROCHLORIDE 0.4 MG: 0.4 CAPSULE ORAL at 08:18

## 2017-08-31 RX ADMIN — DOCUSATE SODIUM 100 MG: 100 CAPSULE, LIQUID FILLED ORAL at 09:50

## 2017-08-31 RX ADMIN — SODIUM CHLORIDE 125 ML/HR: 900 INJECTION, SOLUTION INTRAVENOUS at 15:51

## 2017-08-31 RX ADMIN — HYDROMORPHONE HYDROCHLORIDE 0.5 MG: 1 INJECTION, SOLUTION INTRAMUSCULAR; INTRAVENOUS; SUBCUTANEOUS at 10:09

## 2017-08-31 RX ADMIN — LACTULOSE 20 G: 10 SOLUTION ORAL at 14:43

## 2017-08-31 RX ADMIN — CLOPIDOGREL BISULFATE 75 MG: 75 TABLET ORAL at 08:18

## 2017-08-31 RX ADMIN — POLYETHYLENE GLYCOL 3350 17 G: 17 POWDER, FOR SOLUTION ORAL at 09:50

## 2017-08-31 RX ADMIN — MELOXICAM 15 MG: 15 TABLET ORAL at 08:18

## 2017-08-31 RX ADMIN — HYDROMORPHONE HYDROCHLORIDE 0.5 MG: 1 INJECTION, SOLUTION INTRAMUSCULAR; INTRAVENOUS; SUBCUTANEOUS at 05:18

## 2017-08-31 RX ADMIN — METFORMIN HYDROCHLORIDE 1000 MG: 500 TABLET ORAL at 08:18

## 2017-08-31 RX ADMIN — OXYBUTYNIN CHLORIDE 5 MG: 5 TABLET ORAL at 20:28

## 2017-08-31 RX ADMIN — SODIUM CHLORIDE 125 ML/HR: 900 INJECTION, SOLUTION INTRAVENOUS at 07:20

## 2017-09-01 VITALS
HEART RATE: 88 BPM | RESPIRATION RATE: 20 BRPM | DIASTOLIC BLOOD PRESSURE: 81 MMHG | SYSTOLIC BLOOD PRESSURE: 184 MMHG | WEIGHT: 149.4 LBS | OXYGEN SATURATION: 94 % | TEMPERATURE: 98.9 F | BODY MASS INDEX: 26.47 KG/M2 | HEIGHT: 63 IN

## 2017-09-01 LAB
ANION GAP SERPL CALCULATED.3IONS-SCNC: 6 MMOL/L (ref 5–15)
BACTERIA SPEC AEROBE CULT: NORMAL
BACTERIA SPEC AEROBE CULT: NORMAL
BUN BLD-MCNC: 12 MG/DL (ref 7–21)
BUN/CREAT SERPL: 11.3 (ref 7–25)
CALCIUM SPEC-SCNC: 8.5 MG/DL (ref 8.4–10.2)
CHLORIDE SERPL-SCNC: 107 MMOL/L (ref 95–110)
CO2 SERPL-SCNC: 26 MMOL/L (ref 22–31)
CREAT BLD-MCNC: 1.06 MG/DL (ref 0.7–1.3)
GFR SERPL CREATININE-BSD FRML MDRD: 68 ML/MIN/1.73 (ref 60–98)
GLUCOSE BLD-MCNC: 95 MG/DL (ref 60–100)
POTASSIUM BLD-SCNC: 4.3 MMOL/L (ref 3.5–5.1)
SODIUM BLD-SCNC: 139 MMOL/L (ref 137–145)
WHOLE BLOOD HOLD SPECIMEN: NORMAL

## 2017-09-01 PROCEDURE — 25010000002 CEFTRIAXONE: Performed by: NURSE PRACTITIONER

## 2017-09-01 PROCEDURE — 80048 BASIC METABOLIC PNL TOTAL CA: CPT | Performed by: FAMILY MEDICINE

## 2017-09-01 PROCEDURE — G0378 HOSPITAL OBSERVATION PER HR: HCPCS

## 2017-09-01 PROCEDURE — 96366 THER/PROPH/DIAG IV INF ADDON: CPT

## 2017-09-01 RX ORDER — CEPHALEXIN 500 MG/1
500 CAPSULE ORAL 2 TIMES DAILY
Qty: 14 CAPSULE | Refills: 0 | Status: SHIPPED | OUTPATIENT
Start: 2017-09-01 | End: 2018-01-03

## 2017-09-01 RX ADMIN — METFORMIN HYDROCHLORIDE 1000 MG: 500 TABLET ORAL at 09:52

## 2017-09-01 RX ADMIN — TAMSULOSIN HYDROCHLORIDE 0.4 MG: 0.4 CAPSULE ORAL at 09:52

## 2017-09-01 RX ADMIN — AMLODIPINE BESYLATE 5 MG: 5 TABLET ORAL at 09:53

## 2017-09-01 RX ADMIN — MELOXICAM 15 MG: 15 TABLET ORAL at 09:52

## 2017-09-01 RX ADMIN — CLOPIDOGREL BISULFATE 75 MG: 75 TABLET ORAL at 09:52

## 2017-09-01 RX ADMIN — CEFTRIAXONE 1 G: 1 INJECTION, POWDER, FOR SOLUTION INTRAMUSCULAR; INTRAVENOUS at 09:52

## 2017-09-01 NOTE — PLAN OF CARE
Problem: Patient Care Overview (Adult)  Goal: Plan of Care Review  Outcome: Outcome(s) achieved Date Met:  09/01/17  Goal: Adult Individualization and Mutuality  Outcome: Outcome(s) achieved Date Met:  09/01/17  Goal: Discharge Needs Assessment  Outcome: Outcome(s) achieved Date Met:  09/01/17    Problem: Pain, Acute (Adult)  Goal: Acceptable Pain Control/Comfort Level  Outcome: Outcome(s) achieved Date Met:  09/01/17

## 2017-09-01 NOTE — DISCHARGE SUMMARY
[facility] Medicine Services  DISCHARGE SUMMARY       Date of Admission: 8/30/2017  Date of Discharge:  9/1/2017  Primary Care Physician: Johnathon Shaikh MD    Presenting Problem/History of Present Illness:  Pyelonephritis [N12]       Final Discharge Diagnoses:  Hospital Problem List     Pyelonephritis          Consults:   Consults     Date and Time Order Name Status Description    8/30/2017 1647 Hospitalist (on-call MD unless specified)             Pertinent Test Results:   Lab Results (last 72 hours)     Procedure Component Value Units Date/Time    CBC & Differential [[id number]] Collected:  08/30/17 1415    Specimen:  Blood Updated:  08/30/17 1441    Narrative:       The following orders were created for panel order CBC & Differential.  Procedure                               Abnormality         Status                     ---------                               -----------         ------                     CBC Auto Differential[[id number]]        Abnormal            Final result                 Please view results for these tests on the individual orders.    CBC Auto Differential [[id number]]  (Abnormal) Collected:  08/30/17 1415    Specimen:  Blood Updated:  08/30/17 1441     WBC 14.09 (H) 10*3/mm3      RBC 3.31 (L) 10*6/mm3      Hemoglobin 10.3 (L) g/dL      Hematocrit 31.4 (L) %      MCV 94.9 fL      MCH 31.1 pg      MCHC 32.8 g/dL      RDW 16.5 (H) %      RDW-SD 56.5 (H) fl      MPV 9.7 fL      Platelets 215 10*3/mm3      Neutrophil % 85.6 (H) %      Lymphocyte % 5.3 (L) %      Monocyte % 8.2 %      Eosinophil % 0.3 %      Basophil % 0.2 %      Immature Grans % 0.4 %      Neutrophils, Absolute 12.07 (H) 10*3/mm3      Lymphocytes, Absolute 0.74 10*3/mm3      Monocytes, Absolute 1.15 (H) 10*3/mm3      Eosinophils, Absolute 0.04 10*3/mm3      Basophils, Absolute 0.03 10*3/mm3      Immature Grans, Absolute 0.06 (H) 10*3/mm3     Lipase [[id number]]  (Normal) Collected:   08/30/17 1415    Specimen:  Blood Updated:  08/30/17 1447     Lipase 28 U/L     Comprehensive Metabolic Panel [[id number]]  (Abnormal) Collected:  08/30/17 1415    Specimen:  Blood Updated:  08/30/17 1450     Glucose 129 (H) mg/dL      BUN 19 mg/dL      Creatinine 1.11 mg/dL      Sodium 134 (L) mmol/L      Potassium 4.6 mmol/L      Chloride 100 mmol/L      CO2 24.0 mmol/L      Calcium 9.3 mg/dL      Total Protein 6.8 g/dL      Albumin 4.00 g/dL      ALT (SGPT) 29 U/L      AST (SGOT) 38 U/L      Alkaline Phosphatase 48 U/L      Total Bilirubin 0.5 mg/dL      eGFR Non African Amer 65 mL/min/1.73      Globulin 2.8 gm/dL      A/G Ratio 1.4 g/dL      BUN/Creatinine Ratio 17.1     Anion Gap 10.0 mmol/L     Narrative:       The MDRD GFR formula is only valid for adults with stable renal function between ages 18 and 70.    Urinalysis With / Culture If Indicated [[id number]]  (Abnormal) Collected:  08/30/17 1514    Specimen:  Urine from Urine, Clean Catch Updated:  08/30/17 1538     Color, UA Red (A)     Appearance, UA Turbid (A)     pH, UA <=5.0     Specific Gravity, UA 1.019     Glucose,  mg/dL (Trace) (A)     Ketones, UA 15 mg/dL (1+) (A)     Bilirubin, UA Moderate (2+) (A)     Blood, UA Large (3+) (A)     Protein,  mg/dL (2+) (A)     Leuk Esterase, UA Moderate (2+) (A)     Nitrite, UA Positive (A)     Urobilinogen, UA 2.0 E.U./dL (A)    Urinalysis, Microscopic Only [[id number]]  (Abnormal) Collected:  08/30/17 1514    Specimen:  Urine from Urine, Clean Catch Updated:  08/30/17 1548     RBC, UA Too Numerous to Count (A) /HPF      WBC, UA Too Numerous to Count (A) /HPF      Bacteria, UA 2+ (A) /HPF      Squamous Epithelial Cells, UA 3-5 (A) /HPF      Hyaline Casts, UA 0-2 /LPF      Methodology Manual Light Microscopy    [place] Draw [[id number]] Collected:  08/30/17 1415    Specimen:  Blood Updated:  08/30/17 1601    Narrative:       The following orders were created for panel order [place] Draw.  Procedure                                Abnormality         Status                     ---------                               -----------         ------                     Light Blue Top[[id number]]                                   Final result               Green Top (Gel)[[id number]]                                  Final result               Lavender Top[[id number]]                                     Final result               Gold Top - SST[[id number]]                                   Final result                 Please view results for these tests on the individual orders.    Light Blue Top [[id number]] Collected:  08/30/17 1415    Specimen:  Blood Updated:  08/30/17 1601     Extra Tube hold for add-on      Auto resulted       Green Top (Gel) [[id number]] Collected:  08/30/17 1415    Specimen:  Blood Updated:  08/30/17 1601     Extra Tube Hold for add-ons.      Auto resulted.       Lavender Top [[id number]] Collected:  08/30/17 1415    Specimen:  Blood Updated:  08/30/17 1601     Extra Tube hold for add-on      Auto resulted       Gold Top - SST [[id number]] Collected:  08/30/17 1415    Specimen:  Blood Updated:  08/30/17 1601     Extra Tube Hold for add-ons.      Auto resulted.       CBC (No Diff) [[id number]]  (Abnormal) Collected:  08/31/17 0532    Specimen:  Blood Updated:  08/31/17 0628     WBC 10.44 (H) 10*3/mm3      RBC 3.25 (L) 10*6/mm3      Hemoglobin 10.0 (L) g/dL      Hematocrit 30.9 (L) %      MCV 95.1 fL      MCH 30.8 pg      MCHC 32.4 g/dL      RDW 16.6 (H) %      RDW-SD 57.7 (H) fl      MPV 9.8 fL      Platelets 240 10*3/mm3     Basic Metabolic Panel [[id number]]  (Abnormal) Collected:  08/31/17 0532    Specimen:  Blood Updated:  08/31/17 0642     Glucose 106 (H) mg/dL      BUN 15 mg/dL      Creatinine 1.06 mg/dL      Sodium 138 mmol/L      Potassium 4.3 mmol/L      Chloride 105 mmol/L      CO2 24.0 mmol/L      Calcium 8.6 mg/dL      eGFR Non African Amer 68 mL/min/1.73      BUN/Creatinine Ratio 14.2     Anion Gap 9.0  mmol/L     Narrative:       The MDRD GFR formula is only valid for adults with stable renal function between ages 18 and 70.    Urine Culture [[id number]]  (Normal) Collected:  08/30/17 1514    Specimen:  Urine from Urine, Clean Catch Updated:  09/01/17 0549     Urine Culture No growth at 24 hours    Urine Culture [[id number]]  (Normal) Collected:  08/31/17 0016    Specimen:  Urine from Urine, Clean Catch Updated:  09/01/17 0551     Urine Culture Culture in progress    Basic Metabolic Panel [[id number]]  (Normal) Collected:  09/01/17 0535    Specimen:  Blood Updated:  09/01/17 0654     Glucose 95 mg/dL      BUN 12 mg/dL      Creatinine 1.06 mg/dL      Sodium 139 mmol/L      Potassium 4.3 mmol/L      Chloride 107 mmol/L      CO2 26.0 mmol/L      Calcium 8.5 mg/dL      eGFR Non African Amer 68 mL/min/1.73      BUN/Creatinine Ratio 11.3     Anion Gap 6.0 mmol/L     Narrative:       The MDRD GFR formula is only valid for adults with stable renal function between ages 18 and 70.    Extra Tubes [[id number]] Collected:  09/01/17 0535    Specimen:  Blood from Blood, Venous Line Updated:  09/01/17 0701    Narrative:       The following orders were created for panel order Extra Tubes.  Procedure                               Abnormality         Status                     ---------                               -----------         ------                     Lavender Top[[id number]]                                     Final result                 Please view results for these tests on the individual orders.    Lavender Top [[id number]] Collected:  09/01/17 0535    Specimen:  Blood Updated:  09/01/17 0701     Extra Tube hold for add-on      Auto resulted           Imaging Results (last 72 hours)     Procedure Component Value Units Date/Time    XR Abdomen KUB [[id number]] Collected:  08/30/17 1452     Updated:  08/30/17 1512    Narrative:       Procedure: XR ABDOMEN KUB    Indication:  Nephrolithiasis      Technique: Supine .     Prior  Relevant Exam: 8/28/2017     Large amount stool throughout the large bowel obscures small  calcifications. Stable appearance of mid to upper pole  calcification on the left and mid pole calcification on the  right.      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    Chronic postoperative changes involving the pelvis.         Impression:       CONCLUSION:    1. Large amount stool throughout the large bowel obscures small  calcifications.  2. Stable appearance of mid to upper pole calcification on the  left and mid pole calcification on the right.     Electronically signed by:  Lui Forbes MD  8/30/2017 3:11 PM CDT  Workstation: [id number]    US Renal Complete [[id number]] Collected:  08/30/17 1530     Updated:  08/30/17 1606    Narrative:       Patient Name:  MR. MARCIO JARAMILLO  Patient ID:  [id number]   Ordering:  SUSIE RIOS  Attending:  SUSIE RIOS  Referring:  SUSIE RIOS  ------------------------------------------------  Ultrasound renal complete    HISTORY: Right-sided flank pain. Nephrolithiasis..    Ultrasound examination of the kidneys and urinary bladder was  performed.    COMPARISON: None.     The right kidney measures 11.29 cm in length by 6.01 cm x 5.87 cm  transverse.  The left kidney measures 9.58 cm in length by 5.91 cm x 4.44 cm  transverse.  Increased echogenicity of the kidneys, suggesting medical renal  disease.  Bilateral minimal hydronephrosis.  Small nonobstructive renal calculi.  No solid or cystic renal mass.    Minimal circumferential thickening urinary bladder wall.      Impression:       CONCLUSION:  Increased echogenicity of the kidneys, suggesting medical renal  disease.  Bilateral minimal hydronephrosis.  Small nonobstructive renal calculi.  Minimal circumferential thickening urinary bladder wall.    [id number]    Electronically signed by:  Smith Koehler MD  8/30/2017 4:05 PM CDT  Workstation: [email or web address] Abdomen KUB [[id number]]  "Collected:  08/31/17 1906     Updated:  08/31/17 2152    Narrative:       Abdomen single view on 8/31/2017    CLINICAL INDICATION: Kidney stone, tubulointerstitial nephritis    COMPARISON: 8/30/2017    FINDINGS: There has been decrease in stool burden in the colon.  There are small calcifications overlying the lower pole of each  kidney suggesting bilateral renal stones. Vascular calcifications  are noted. No definite calcifications are noted overlying the  expected course of the ureters. Degenerative and postsurgical  changes are noted in the lumbar spine.      Impression:       Probable bilateral nephrolithiasis.    Electronically signed by:  Chidi Peng  8/31/2017 9:51 PM  CDT Workstation: [id number]        Chief Complaint on Day of Discharge: No complaints    Hospital Course:    9/1/2017:  The patient states his still has tenderness over the right flank.  KUB indicates no obstruction.       8/31/2017:  The patient states he continues to have pain, located in the right flank.  I spoke with Dr. D'Amico today about the case.  She states this pain is not related to kidney stones and can see him as an outpatient.  KUB shows a large amount of stool.       8/30/2017 H&P ( Dr. Delvalle):  A 76 yo male is here because he has right sided abdominal pain; he described his pain as sharp, constant, 8/10, associated with nausea, vomiting. Patient has extensive history of nephrolithiasis; being treated by Dr.Damico. Patient had cystocsopy, ureteroscopy, retrograe pyelogram, holmium laser and stent insertion procedure done secondary to hydronephrosis. . Patient had J stent placed in by Dr.Damico and it was taken out today. Patient has been having severe since morning.     Condition on Discharge:  Stable    Physical Exam on Discharge:  BP (!) 184/81 (BP Location: Left arm, Patient Position: Sitting)  Pulse 88  Temp 98.9 °F (37.2 °C) (Temporal Artery )   Resp 20  Ht 63\" (160 cm)  Wt 149 lb 6.4 oz (67.8 kg)  SpO2 94% "  BMI 26.47 kg/m2  Physical Exam   Constitutional: He is oriented to person, place, and time. He appears well-developed and well-nourished.   HENT:   Head: Normocephalic and atraumatic.   Eyes: EOM are normal. Pupils are equal, round, and reactive to light.   Neck: Normal range of motion. Neck supple.   Cardiovascular: Normal rate and regular rhythm.    Pulmonary/Chest: Effort normal and breath sounds normal.   Abdominal: Soft. Bowel sounds are normal.   Musculoskeletal: Normal range of motion.   Neurological: He is alert and oriented to person, place, and time.   Skin: Skin is warm and dry.   Psychiatric: He has a normal mood and affect.     Discharge Disposition:  Home or Self Care    Discharge Medications:   Vishnu Waller   Home Medication Instructions MARS:[id number]    Printed on:09/01/17 5869   Medication Information                      amLODIPine (NORVASC) 5 MG tablet  Take 1 tablet by mouth Daily.             cephalexin (KEFLEX) 500 MG capsule  Take 1 capsule by mouth 2 (Two) Times a Day.             clopidogrel (PLAVIX) 75 MG tablet  Take 75 mg by mouth Daily.             Cyanocobalamin (VITAMIN B 12 PO)  Take 500 mcg by mouth. Three times weekly, MoWeFr             fluticasone (FLONASE) 50 MCG/ACT nasal spray  2 sprays into each nostril Daily As Needed for Rhinitis.             latanoprost (XALATAN) 0.005 % ophthalmic solution  Administer 1 drop to both eyes every night.             losartan (COZAAR) 50 MG tablet  Take 50 mg by mouth Every Night.             melatonin 1 MG tablet  Take 3 mg by mouth At Night As Needed for sleep.             meloxicam (MOBIC) 15 MG tablet  Take 1 tablet by mouth Daily.             metFORMIN (GLUCOPHAGE) 1000 MG tablet  1,000 mg 2 (Two) Times a Day With Meals.             montelukast (SINGULAIR) 10 MG tablet  Take 10 mg by mouth Every Night.             Multiple Vitamins-Minerals (CENTRUM PO)  Take 1 tablet by mouth daily. Centrum 0.4 mg-162 mg-18 mg tablet   (unconfirmed)             oxybutynin (DITROPAN) 5 MG tablet  Take 5 mg by mouth Every Night.             phenazopyridine (PYRIDIUM) 200 MG tablet  Take 1 tablet by mouth 3 (Three) Times a Day As Needed for bladder spasms for up to 7 days.             raNITIdine (ZANTAC) 75 MG tablet  Take 75 mg by mouth 2 (Two) Times a Day.             simvastatin (ZOCOR) 40 MG tablet  Take 40 mg by mouth Every Night.                 Discharge Diet:   Diet Instructions     Diet: Regular       Discharge Diet:  Regular                 Activity at Discharge:   Activity Instructions     Activity as Tolerated                     Discharge Care Plan/Instructions: The patient is given a prescription for Keflex for his UTI.  He will follow up with his PCP in 2-3 for follow up of the UTI.  Follow up with Dr. D'Amico at his already scheduled appointment.      Follow-up Appointments:   Future Appointments  Date Time Provider Department Center   9/8/2017 1:20 PM Lucio Mayo MD [facility] None   10/5/2017 1:15 PM Julio Cesar Shafer MD [facility] None   2/15/2018 2:00 PM Radha Wilkes MD [facility] None         This document has been electronically signed by SERA Aviles on September 1, 2017 12:58 PM        Time: Greater than 30 minutes.

## 2017-09-06 DIAGNOSIS — N20.0 KIDNEY STONE: Primary | ICD-10-CM

## 2017-09-07 DIAGNOSIS — M25.551 BILATERAL HIP PAIN: Primary | ICD-10-CM

## 2017-09-07 DIAGNOSIS — M25.552 BILATERAL HIP PAIN: Primary | ICD-10-CM

## 2017-09-08 ENCOUNTER — OFFICE VISIT (OUTPATIENT)
Dept: ORTHOPEDIC SURGERY | Facility: CLINIC | Age: 77
End: 2017-09-08

## 2017-09-08 VITALS — HEIGHT: 63 IN | WEIGHT: 149 LBS | BODY MASS INDEX: 26.4 KG/M2

## 2017-09-08 DIAGNOSIS — M48.061 SPINAL STENOSIS OF LUMBAR REGION: ICD-10-CM

## 2017-09-08 DIAGNOSIS — M51.36 DEGENERATIVE DISC DISEASE, LUMBAR: Primary | ICD-10-CM

## 2017-09-08 DIAGNOSIS — M51.16 NEURITIS OR RADICULITIS DUE TO RUPTURE OF LUMBAR INTERVERTEBRAL DISC: ICD-10-CM

## 2017-09-08 DIAGNOSIS — M51.36 DEGENERATION OF LUMBAR INTERVERTEBRAL DISC: ICD-10-CM

## 2017-09-08 PROCEDURE — 99213 OFFICE O/P EST LOW 20 MIN: CPT | Performed by: ORTHOPAEDIC SURGERY

## 2017-09-08 RX ORDER — LIDOCAINE 50 MG/G
1 PATCH TOPICAL DAILY
Qty: 30 PATCH | Refills: 2 | Status: SHIPPED | OUTPATIENT
Start: 2017-09-08 | End: 2018-02-24

## 2017-09-08 NOTE — PROGRESS NOTES
Vishnu Waller is a 77 y.o. male returns for     Chief Complaint   Patient presents with   • Lumbar Spine - Follow-up       HISTORY OF PRESENT ILLNESS: xrays done today. Patient states that he is having constant pain, pain level 6. Patient is wanting to see about getting some patches for pain.      He is doing relatively well, he complains of some pain with numbness of the left foot, left leg.  With radiating pain occasionally into the left leg, left middle toes.    PROCEDURES PERFORMED:  1. Lumbar hemilaminotomy for decompression of L3-L4 and L4-L5.  2. Lumbar laminotomy for decompression on the left and the right sides at L3-L4.  3. Lumbar laminotomy for decompression at L4-L5 of left and right sides.  4. Placement of Coflex interspinous spacer implant at L3-L4.  5. Placement of interspinous spacer for distraction at L4-L5. Done on 2/7/2017     CONCURRENT MEDICAL HISTORY:    Past Medical History:   Diagnosis Date   • Acute bacterial sinusitis    • Acute bronchitis    • Acute frontal sinusitis    • Acute maxillary sinusitis    • Acute pharyngitis    • Allergic rhinitis    • Allergic rhinitis due to pollen    • Anxiety    • Artificial lens present     in position   • Atherosclerotic heart disease of native coronary artery without angina pectoris    • Backache    • Borderline glaucoma    • Chronic laryngitis    • Chronic rhinitis    • Coronary arteriosclerosis    • Coronary atherosclerosis    • Cough    • Degeneration of lumbar intervertebral disc    • Degenerative joint disease involving multiple joints    • Diabetes    • Diverticular disease of colon    • Dizziness and giddiness    • Erectile dysfunction    • Essential hypertension    • Generalized anxiety disorder    • GERD (gastroesophageal reflux disease)    • Glaucoma    • Heart disease    • Hemorrhoids     without bleeding   • Hyperlipidemia    • Insomnia    • Kidney stone    • Kidney stones    • Malignant tumor of prostate    • Need for prophylactic  vaccination and inoculation against influenza    • Osteoarthritis    • Osteoarthritis of multiple joints    • Pain, lumbar region     Pain radiating to lumbar region of back     • PONV (postoperative nausea and vomiting)    • Postviral fatigue syndrome    • Presence of aortocoronary bypass graft    • Prostate cancer    • Pseudomembranous enterocolitis     improved   • Rotator cuff syndrome     right   • Shoulder pain    • SOB (shortness of breath)    • Tenosynovitis    • Thrombocytopenia    • Type 2 diabetes mellitus     no BDR   • Upper respiratory infection        Allergies   Allergen Reactions   • Ace Inhibitors Cough   • Molds & Smuts Other (See Comments)     Sinus infection   • Hydrocodone-Acetaminophen Itching         Current Outpatient Prescriptions:   •  amLODIPine (NORVASC) 5 MG tablet, Take 1 tablet by mouth Daily., Disp: 90 tablet, Rfl: 3  •  cephalexin (KEFLEX) 500 MG capsule, Take 1 capsule by mouth 2 (Two) Times a Day., Disp: 14 capsule, Rfl: 0  •  clopidogrel (PLAVIX) 75 MG tablet, Take 75 mg by mouth Daily., Disp: , Rfl:   •  Cyanocobalamin (VITAMIN B 12 PO), Take 500 mcg by mouth. Three times weekly, MoWeFr, Disp: , Rfl:   •  fluticasone (FLONASE) 50 MCG/ACT nasal spray, 2 sprays into each nostril Daily As Needed for Rhinitis., Disp: , Rfl:   •  latanoprost (XALATAN) 0.005 % ophthalmic solution, Administer 1 drop to both eyes every night., Disp: , Rfl:   •  lidocaine (LIDODERM) 5 %, Place 1 patch on the skin Daily. Remove & Discard patch within 12 hours or as directed by MD, Disp: 30 patch, Rfl: 2  •  losartan (COZAAR) 50 MG tablet, Take 50 mg by mouth Every Night., Disp: , Rfl:   •  melatonin 1 MG tablet, Take 3 mg by mouth At Night As Needed for sleep., Disp: , Rfl:   •  meloxicam (MOBIC) 15 MG tablet, Take 1 tablet by mouth Daily., Disp: 90 tablet, Rfl: 3  •  metFORMIN (GLUCOPHAGE) 1000 MG tablet, 1,000 mg 2 (Two) Times a Day With Meals., Disp: , Rfl:   •  montelukast (SINGULAIR) 10 MG tablet,  Take 10 mg by mouth Every Night., Disp: , Rfl:   •  Multiple Vitamins-Minerals (CENTRUM PO), Take 1 tablet by mouth daily. Centrum 0.4 mg-162 mg-18 mg tablet  (unconfirmed), Disp: , Rfl:   •  oxybutynin (DITROPAN) 5 MG tablet, Take 5 mg by mouth Every Night., Disp: , Rfl:   •  raNITIdine (ZANTAC) 75 MG tablet, Take 75 mg by mouth 2 (Two) Times a Day., Disp: , Rfl:   •  simvastatin (ZOCOR) 40 MG tablet, Take 40 mg by mouth Every Night., Disp: , Rfl:     Past Surgical History:   Procedure Laterality Date   • CARDIAC CATHETERIZATION  09/25/2003    Cardiac cath (Normal left ventricular systolic function, EF 60% Multi-vessel coronary artery disease with critical disease noted in the LAD coronary artery, diagonal coronary artery, obtuse marginal coronary and right coronary artery.)   • CARDIAC CATHETERIZATION  05/03/1996    Cardiac cath (Coronary atherosclerotic heart disease. 70% diagonal stenosis. Mild to moderate left anterior descending artery stenosis. Preserved left ventricular function.)   • CATARACT EXTRACTION Bilateral    • CATARACT EXTRACTION  10/04/2011    Remove cataract, insert lens (Right)   • CATARACT EXTRACTION  08/23/2011    Remove cataract, insert lens (left)   • COLONOSCOPY     • COLONOSCOPY      Colon endoscopy 98120 (Rectal bleeding. Radiation proctitis w/bleeding. An abnormal CT of transverse colon due to mucosal ischemic colitis, that now is healed. Internal hemorrhoids w/possible bleeding.)   • COLONOSCOPY  05/10/2011    Colon endoscopy 40492 (Single sessile polyp found in transverse colon and sigmoid colon ,removed by cold biopsy polypectomy.divertic. found in sigmoid colon,descending colon. Friability/capillary friability in rectum sigmoid due to prior radiation.Inter. hem. Grade 1)   • COLONOSCOPY  05/02/2007    Colon endoscopy 01593 (Colon polyp. Diverticulosis without bleeding. Internal hemorrhoids without bleeding.)   • CORONARY ARTERY BYPASS GRAFT  2002   • CYSTOSCOPY  01/13/1995     Cystoscopy, stone removal (Right ureteroscopic lasertripsy.)   • CYSTOSCOPY, URETEROSCOPY, RETROGRADE PYELOGRAM, STENT INSERTION N/A 8/28/2017    Procedure: URETEROSCOPY LASER LITHOTRIPSY WITH STENT INSERTION AND RETROGRADE PYELOGRAM ;  Surgeon: Anna M. D'Amico, MD;  Location: [facility];  Service:    • EPIDURAL  12/03/2014    Therapeutic/diag injection (Lumbar transforaminal epidural steroid injection, L5-S1, left side.)   • EPIDURAL  10/22/2014    Therapeutic/diag injection (Lumbar transforaminal epidural steroid injection L5-S1 left side.)   • EPIDURAL  08/07/2014    Therapeutic/diag injection (Lumbar transforaminal epidural steroid injection.)   • EXCISION LESION  05/13/1999    REMOVE EAR LESION 37377 (1.3 cm basal cell carcinoma, right preauricular area. Excision)   • EXCISION LESION  03/13/2001    REMOVE LESION NECK/CHEST 04457 (Excision of irritated seborrheic keratosis, anterior neck, 1.1 cm and deep lipoma, posterior neck, 3.5 cm)   • INJECTION OF MEDICATION  11/15/2012    Kenalog (4)      • JOINT REPLACEMENT  2015    partial   • KNEE ARTHROSCOPY  01/17/2007    Knee arthroscopy, surgery (Arthroscopy with medial and lateral meniscectomies, right knee.)   • KNEE SURGERY  11/04/2015    Knee Surgery (Right unicompartmental arthroplasty.)   • LUMBAR LAMINECTOMY N/A 2/7/2017    Procedure: LUMBAR LAMINECTOMY LUMBAR THREE-FOUR, LUMBAR FOUR-FIVE, INTERLAMINAR DISTRACTION ;  Surgeon: Lucio Mayo MD;  Location: [facility];  Service:    • NOSE SURGERY     • OTHER SURGICAL HISTORY      EXTENDED VISUAL FIELDS STUDY 11323 (Borderline glaucoma) (3): 03/19/2015, 04/09/2014, 03/27/2013   • OTHER SURGICAL HISTORY  10/29/2003    Heart revascularize (TMR) (Directmyocardial revascularization times four; LIMA to LAD SVG to DARIUSZ SVG to OM1, SVG to RCA)   • OTHER SURGICAL HISTORY      OCT DISC NFL 72180 (Borderline glaucoma)  (3): 09/24/2015, 08/13/2014, 07/29/2013   • PROSTATECTOMY     • SEPTORHINOPLASTY  11/16/1989     "Nasal surgery procedure (Septorhinoplasty with reconstruction of the nasal pyramid with a iliac crest graft, rhinoplasty was performed with external approach.)   • SHOULDER ARTHROSCOPY  07/24/2013    Arthroscopy of right shoulder with rotator repair, Carla procedure, Biceps tenotomy, subacromial decompression.   • SHOULDER SURGERY  11/01/2005    Shoulder surgery procedure (Decompression, subacromial, explore of the rotator cuff, no tear found nor repaired, acromioplasty of the left shoulder and AC shoulder joint resection.)       ROS  No fevers or chills.  No chest pain or shortness of air.  No GI or  disturbances.    PHYSICAL EXAMINATION:       Ht 63\" (160 cm)  Wt 149 lb (67.6 kg)  BMI 26.39 kg/m2    Physical Exam    GAIT:     []  Normal  []  Antalgic    Assistive device: [x]  None  []  Walker     []  Crutches  []  Cane     []  Wheelchair  []  Stretcher    Ortho Exam  Tenderness: Lumbar   Swelling:  None       Range of Motion:      Flexion: 40     Extension: 5     Lateral Bend:   Right 10                              Left 10     Rotation:          Right 10                              Left 10       SLR:                  Right Negative                             Left Negative       Muscle Strength      Abductor: 5/5     Adductor: 5/5     Quadriceps: 5/5     Hamstrings: 5/5       Reflexes     Patellar: 1/4    Achilles: 1/4    Babinski: 0/4    Biceps: Not done3    Triceps: Not done       Sensation: Normal   Gait: Normal   Toe Walk: exam deferred   Heel Walk: exam deferred           I personally reviewed the xray of the pelvis right and left hips which show no significant arthritis of the hip joints.      I personally reviewed the MRI of the lumbar spine performed 8/26/16, multilevel degenerative changes of the lumbar spine with spinal stenosis L3-4 and L4-5.         ASSESSMENT:    Diagnoses and all orders for this visit:    Degenerative disc disease, lumbar    Neuritis or radiculitis due to rupture of lumbar " intervertebral disc    Spinal stenosis of lumbar region    Degeneration of lumbar intervertebral disc    Other orders  -     lidocaine (LIDODERM) 5 %; Place 1 patch on the skin Daily. Remove & Discard patch within 12 hours or as directed by MD          PLAN    Prescription given today for lidoderm patches, repeat MRI of lumbar spine.      Lucio Mayo MD

## 2017-09-21 ENCOUNTER — HOSPITAL ENCOUNTER (OUTPATIENT)
Dept: MRI IMAGING | Facility: HOSPITAL | Age: 77
Discharge: HOME | End: 2017-09-21
Admitting: ORTHOPAEDIC SURGERY

## 2017-09-21 DIAGNOSIS — M48.061 SPINAL STENOSIS OF LUMBAR REGION: ICD-10-CM

## 2017-09-21 DIAGNOSIS — M51.36 DEGENERATION OF LUMBAR INTERVERTEBRAL DISC: ICD-10-CM

## 2017-09-21 PROCEDURE — 72158 MRI LUMBAR SPINE W/O & W/DYE: CPT

## 2017-09-21 PROCEDURE — A9576 INJ PROHANCE MULTIPACK: HCPCS | Performed by: ORTHOPAEDIC SURGERY

## 2017-09-21 PROCEDURE — 25010000002 GADOTERIDOL PER 1 ML: Performed by: ORTHOPAEDIC SURGERY

## 2017-09-21 RX ADMIN — GADOTERIDOL 15 ML: 279.3 INJECTION, SOLUTION INTRAVENOUS at 11:51

## 2017-10-06 ENCOUNTER — OFFICE VISIT (OUTPATIENT)
Dept: ORTHOPEDIC SURGERY | Facility: CLINIC | Age: 77
End: 2017-10-06

## 2017-10-06 VITALS — WEIGHT: 149 LBS | HEIGHT: 63 IN | BODY MASS INDEX: 26.4 KG/M2

## 2017-10-06 DIAGNOSIS — M51.36 DEGENERATIVE DISC DISEASE, LUMBAR: Primary | ICD-10-CM

## 2017-10-06 DIAGNOSIS — Z98.890 STATUS POST LUMBAR SPINE OPERATION: ICD-10-CM

## 2017-10-06 DIAGNOSIS — M48.061 SPINAL STENOSIS OF LUMBAR REGION, UNSPECIFIED WHETHER NEUROGENIC CLAUDICATION PRESENT: ICD-10-CM

## 2017-10-06 DIAGNOSIS — M51.36 DEGENERATION OF LUMBAR INTERVERTEBRAL DISC: ICD-10-CM

## 2017-10-06 PROCEDURE — 99213 OFFICE O/P EST LOW 20 MIN: CPT | Performed by: ORTHOPAEDIC SURGERY

## 2017-10-06 NOTE — PROGRESS NOTES
Vishnu Waller is a 77 y.o. male returns for     Chief Complaint   Patient presents with   • Lumbar Spine - Follow-up   • Results     mri  lumbar spine done on 9/21/2017.        HISTORY OF PRESENT ILLNESS:    History of back pain, pain in the hips, left > right.  Pain is moderate in quality.         CONCURRENT MEDICAL HISTORY:    Past Medical History:   Diagnosis Date   • Acute bacterial sinusitis    • Acute bronchitis    • Acute frontal sinusitis    • Acute maxillary sinusitis    • Acute pharyngitis    • Allergic rhinitis    • Allergic rhinitis due to pollen    • Anxiety    • Artificial lens present     in position   • Atherosclerotic heart disease of native coronary artery without angina pectoris    • Backache    • Borderline glaucoma    • Chronic laryngitis    • Chronic rhinitis    • Coronary arteriosclerosis    • Coronary atherosclerosis    • Cough    • Degeneration of lumbar intervertebral disc    • Degenerative joint disease involving multiple joints    • Diabetes    • Diverticular disease of colon    • Dizziness and giddiness    • Erectile dysfunction    • Essential hypertension    • Generalized anxiety disorder    • GERD (gastroesophageal reflux disease)    • Glaucoma    • Heart disease    • Hemorrhoids     without bleeding   • Hyperlipidemia    • Insomnia    • Kidney stone    • Kidney stones    • Malignant tumor of prostate    • Need for prophylactic vaccination and inoculation against influenza    • Osteoarthritis    • Osteoarthritis of multiple joints    • Pain, lumbar region     Pain radiating to lumbar region of back     • PONV (postoperative nausea and vomiting)    • Postviral fatigue syndrome    • Presence of aortocoronary bypass graft    • Prostate cancer    • Pseudomembranous enterocolitis     improved   • Rotator cuff syndrome     right   • Shoulder pain    • SOB (shortness of breath)    • Tenosynovitis    • Thrombocytopenia    • Type 2 diabetes mellitus     no BDR   • Upper respiratory  infection        Allergies   Allergen Reactions   • Ace Inhibitors Cough   • Molds & Smuts Other (See Comments)     Sinus infection   • Hydrocodone-Acetaminophen Itching         Current Outpatient Prescriptions:   •  amLODIPine (NORVASC) 5 MG tablet, Take 1 tablet by mouth Daily., Disp: 90 tablet, Rfl: 3  •  cephalexin (KEFLEX) 500 MG capsule, Take 1 capsule by mouth 2 (Two) Times a Day., Disp: 14 capsule, Rfl: 0  •  clopidogrel (PLAVIX) 75 MG tablet, Take 75 mg by mouth Daily., Disp: , Rfl:   •  Cyanocobalamin (VITAMIN B 12 PO), Take 500 mcg by mouth. Three times weekly, MoWeFr, Disp: , Rfl:   •  fluticasone (FLONASE) 50 MCG/ACT nasal spray, 2 sprays into each nostril Daily As Needed for Rhinitis., Disp: , Rfl:   •  latanoprost (XALATAN) 0.005 % ophthalmic solution, Administer 1 drop to both eyes every night., Disp: , Rfl:   •  lidocaine (LIDODERM) 5 %, Place 1 patch on the skin Daily. Remove & Discard patch within 12 hours or as directed by MD, Disp: 30 patch, Rfl: 2  •  losartan (COZAAR) 50 MG tablet, Take 50 mg by mouth Every Night., Disp: , Rfl:   •  melatonin 1 MG tablet, Take 3 mg by mouth At Night As Needed for sleep., Disp: , Rfl:   •  meloxicam (MOBIC) 15 MG tablet, Take 1 tablet by mouth Daily., Disp: 90 tablet, Rfl: 3  •  metFORMIN (GLUCOPHAGE) 1000 MG tablet, 1,000 mg 2 (Two) Times a Day With Meals., Disp: , Rfl:   •  montelukast (SINGULAIR) 10 MG tablet, Take 10 mg by mouth Every Night., Disp: , Rfl:   •  Multiple Vitamins-Minerals (CENTRUM PO), Take 1 tablet by mouth daily. Centrum 0.4 mg-162 mg-18 mg tablet  (unconfirmed), Disp: , Rfl:   •  oxybutynin (DITROPAN) 5 MG tablet, Take 5 mg by mouth Every Night., Disp: , Rfl:   •  raNITIdine (ZANTAC) 75 MG tablet, Take 75 mg by mouth 2 (Two) Times a Day., Disp: , Rfl:   •  simvastatin (ZOCOR) 40 MG tablet, Take 40 mg by mouth Every Night., Disp: , Rfl:     Past Surgical History:   Procedure Laterality Date   • CARDIAC CATHETERIZATION  09/25/2003    Cardiac  cath (Normal left ventricular systolic function, EF 60% Multi-vessel coronary artery disease with critical disease noted in the LAD coronary artery, diagonal coronary artery, obtuse marginal coronary and right coronary artery.)   • CARDIAC CATHETERIZATION  05/03/1996    Cardiac cath (Coronary atherosclerotic heart disease. 70% diagonal stenosis. Mild to moderate left anterior descending artery stenosis. Preserved left ventricular function.)   • CATARACT EXTRACTION Bilateral    • CATARACT EXTRACTION  10/04/2011    Remove cataract, insert lens (Right)   • CATARACT EXTRACTION  08/23/2011    Remove cataract, insert lens (left)   • COLONOSCOPY     • COLONOSCOPY      Colon endoscopy 22315 (Rectal bleeding. Radiation proctitis w/bleeding. An abnormal CT of transverse colon due to mucosal ischemic colitis, that now is healed. Internal hemorrhoids w/possible bleeding.)   • COLONOSCOPY  05/10/2011    Colon endoscopy 68720 (Single sessile polyp found in transverse colon and sigmoid colon ,removed by cold biopsy polypectomy.divertic. found in sigmoid colon,descending colon. Friability/capillary friability in rectum sigmoid due to prior radiation.Inter. hem. Grade 1)   • COLONOSCOPY  05/02/2007    Colon endoscopy 45683 (Colon polyp. Diverticulosis without bleeding. Internal hemorrhoids without bleeding.)   • CORONARY ARTERY BYPASS GRAFT  2002   • CYSTOSCOPY  01/13/1995    Cystoscopy, stone removal (Right ureteroscopic lasertripsy.)   • CYSTOSCOPY, URETEROSCOPY, RETROGRADE PYELOGRAM, STENT INSERTION N/A 8/28/2017    Procedure: URETEROSCOPY LASER LITHOTRIPSY WITH STENT INSERTION AND RETROGRADE PYELOGRAM ;  Surgeon: Anna M. D'Amico, MD;  Location: [facility];  Service:    • EPIDURAL  12/03/2014    Therapeutic/diag injection (Lumbar transforaminal epidural steroid injection, L5-S1, left side.)   • EPIDURAL  10/22/2014    Therapeutic/diag injection (Lumbar transforaminal epidural steroid injection L5-S1 left side.)   • EPIDURAL   08/07/2014    Therapeutic/diag injection (Lumbar transforaminal epidural steroid injection.)   • EXCISION LESION  05/13/1999    REMOVE EAR LESION 32109 (1.3 cm basal cell carcinoma, right preauricular area. Excision)   • EXCISION LESION  03/13/2001    REMOVE LESION NECK/CHEST 01751 (Excision of irritated seborrheic keratosis, anterior neck, 1.1 cm and deep lipoma, posterior neck, 3.5 cm)   • INJECTION OF MEDICATION  11/15/2012    Kenalog (4)      • JOINT REPLACEMENT  2015    partial   • KNEE ARTHROSCOPY  01/17/2007    Knee arthroscopy, surgery (Arthroscopy with medial and lateral meniscectomies, right knee.)   • KNEE SURGERY  11/04/2015    Knee Surgery (Right unicompartmental arthroplasty.)   • LUMBAR LAMINECTOMY N/A 2/7/2017    Procedure: LUMBAR LAMINECTOMY LUMBAR THREE-FOUR, LUMBAR FOUR-FIVE, INTERLAMINAR DISTRACTION ;  Surgeon: Lucio Mayo MD;  Location: [facility];  Service:    • NOSE SURGERY     • OTHER SURGICAL HISTORY      EXTENDED VISUAL FIELDS STUDY 67950 (Borderline glaucoma) (3): 03/19/2015, 04/09/2014, 03/27/2013   • OTHER SURGICAL HISTORY  10/29/2003    Heart revascularize (TMR) (Directmyocardial revascularization times four; LIMA to LAD SVG to DARIUSZ SVG to OM1, SVG to RCA)   • OTHER SURGICAL HISTORY      OCT DISC NFL 83975 (Borderline glaucoma)  (3): 09/24/2015, 08/13/2014, 07/29/2013   • PROSTATECTOMY     • SEPTORHINOPLASTY  11/16/1989    Nasal surgery procedure (Septorhinoplasty with reconstruction of the nasal pyramid with a iliac crest graft, rhinoplasty was performed with external approach.)   • SHOULDER ARTHROSCOPY  07/24/2013    Arthroscopy of right shoulder with rotator repair, Carla procedure, Biceps tenotomy, subacromial decompression.   • SHOULDER SURGERY  11/01/2005    Shoulder surgery procedure (Decompression, subacromial, explore of the rotator cuff, no tear found nor repaired, acromioplasty of the left shoulder and AC shoulder joint resection.)       ROS  No fevers or  "chills.  No chest pain or shortness of air.  No GI or  disturbances.    PHYSICAL EXAMINATION:       Ht 63\" (160 cm)  Wt 149 lb (67.6 kg)  BMI 26.39 kg/m2    Physical Exam    GAIT:     [x]  Normal  []  Antalgic    Assistive device: [x]  None  []  Walker     []  Crutches  []  Cane     []  Wheelchair  []  Stretcher    Ortho Exam    Lumbar alignment is normal.  Incision is well healed, no erythema, no drainage  Lumbar flexion 50  Extension 10  5/5 right and left hip flexors, quadriceps, tibialis anterior extensor hallucis longus and gastrocnemius.    Sensation intact to touch.    Mri Lumbar Spine With & Without Contrast    Result Date: 9/21/2017  Narrative: MRI lumbar spine with and without contrast. Pain radiating down left leg. Prior lumbar surgery. Prior exam: Lumbar spine x-ray April 17, 2017. MRI lumbar spine August 26, 2016. TECHNIQUE: Multiplanar multisequence images lumbar spine obtained both prior to and following the intravenous infusion of contrast, 15 mL ProHance. FINDINGS: Multilevel degenerative changes. Susceptibility artifact from posterior X-Stop type devices between the spinous processes of L5-S1, and L4-L5. This correlates with findings seen on prior lumbar spine x-ray April 17, 2017. 0.5 cm anterior spondylolisthesis of L5 with respect to  S1. There is 0.3 cm retrolisthesis of L3 with respect to L4 both secondary to facet arthrosis. No significant central canal stenosis. Concentric disc bulging and posterior osteophytes at multiple levels but no focal disc protrusion. Mild neuroforaminal stenosis at L4-5 on the right and moderate neuroforaminal stenosis on the left secondary to facet arthrosis and disc bulging. Moderate right-sided neuroforaminal stenosis at L3-L4 also due to disc bulging and facet arthrosis. Facet arthrosis at multiple levels most severe at L5-S1. Joint effusions within the apophyseal joints at L5-S1 indicating active inflammatory, arthritic process. This is most apparent on " series 5 image 26. No pathologic enhancement following IV gadolinium.     Impression: CONCLUSION: Multilevel degenerative changes. Susceptibility artifact due to X stop type devices between the spinous processes of L4-5 and L5-S1. Mild anterior spondylolisthesis of L5 respect to S1. Mild retrolisthesis of L3 with respect to L4. Foraminal stenosis at multiple levels. Facet arthrosis at multiple levels most severe at L5-S1. Joint effusions within the apophyseal joints at L5-S1  indicating active inflammatory, arthritic process. No central canal stenosis or disc protrusion. No bone edema or bony destruction. No pathologic enhancement following IV gadolinium. Electronically signed by:  Melvin Figueroa MD  9/21/2017 12:26 PM CDT Workstation: [id number]    Xr Hips Bilateral With Or Without Pelvis 5 View    Result Date: 9/8/2017  Narrative: Xray AP pelvis, multiple pelvic clips.  SI joint normal. Interlaminar device in place lumbar spine Right hip xray AP and lateral, hip joint space is normal. Bone quality is normal.  Femoral head normal Left hip xray AP and lateral, hip joint space is normal, bone quality is normal. Femoral head normal.      I reviewed the MRI of the lumbar spine, there is multilevel lumbar degenerative disc disease, no significant spinal stenosis.        ASSESSMENT:    Diagnoses and all orders for this visit:    Degenerative disc disease, lumbar    Spinal stenosis of lumbar region, unspecified whether neurogenic claudication present    Degeneration of lumbar intervertebral disc    Status post lumbar spine operation          PLAN    Reviewed treatment options, surgery is an option for progressive symptoms.        Lucio Mayo MD

## 2017-11-02 RX ORDER — LOSARTAN POTASSIUM 50 MG/1
50 TABLET ORAL NIGHTLY
Qty: 90 TABLET | Refills: 3 | Status: SHIPPED | OUTPATIENT
Start: 2017-11-02 | End: 2018-11-28 | Stop reason: SDUPTHER

## 2017-11-28 RX ORDER — CLOPIDOGREL BISULFATE 75 MG/1
TABLET ORAL
Qty: 90 TABLET | Refills: 3 | Status: SHIPPED | OUTPATIENT
Start: 2017-11-28 | End: 2018-02-14 | Stop reason: SDUPTHER

## 2017-11-28 RX ORDER — CLOPIDOGREL BISULFATE 75 MG/1
75 TABLET ORAL DAILY
Qty: 90 TABLET | Refills: 2 | Status: SHIPPED | OUTPATIENT
Start: 2017-11-28 | End: 2020-03-20 | Stop reason: SDUPTHER

## 2018-01-11 ENCOUNTER — HOSPITAL ENCOUNTER (OUTPATIENT)
Facility: HOSPITAL | Age: 78
Setting detail: HOSPITAL OUTPATIENT SURGERY
End: 2018-01-11
Attending: INTERNAL MEDICINE | Admitting: INTERNAL MEDICINE

## 2018-02-14 ENCOUNTER — OFFICE VISIT (OUTPATIENT)
Dept: PULMONOLOGY | Facility: CLINIC | Age: 78
End: 2018-02-14

## 2018-02-14 VITALS
WEIGHT: 149 LBS | HEART RATE: 107 BPM | BODY MASS INDEX: 26.4 KG/M2 | DIASTOLIC BLOOD PRESSURE: 78 MMHG | OXYGEN SATURATION: 98 % | SYSTOLIC BLOOD PRESSURE: 152 MMHG | HEIGHT: 63 IN

## 2018-02-14 DIAGNOSIS — F17.200 TOBACCO USE DISORDER: ICD-10-CM

## 2018-02-14 DIAGNOSIS — J44.9 COPD WITH ASTHMA (HCC): ICD-10-CM

## 2018-02-14 DIAGNOSIS — R06.02 SOB (SHORTNESS OF BREATH): Primary | ICD-10-CM

## 2018-02-14 DIAGNOSIS — I10 ESSENTIAL HYPERTENSION: ICD-10-CM

## 2018-02-14 DIAGNOSIS — J30.89 CHRONIC NON-SEASONAL ALLERGIC RHINITIS, UNSPECIFIED TRIGGER: ICD-10-CM

## 2018-02-14 PROCEDURE — 94060 EVALUATION OF WHEEZING: CPT | Performed by: INTERNAL MEDICINE

## 2018-02-14 PROCEDURE — 94727 GAS DIL/WSHOT DETER LNG VOL: CPT | Performed by: INTERNAL MEDICINE

## 2018-02-14 PROCEDURE — 99214 OFFICE O/P EST MOD 30 MIN: CPT | Performed by: INTERNAL MEDICINE

## 2018-02-14 PROCEDURE — 94729 DIFFUSING CAPACITY: CPT | Performed by: INTERNAL MEDICINE

## 2018-02-14 PROCEDURE — 99406 BEHAV CHNG SMOKING 3-10 MIN: CPT | Performed by: INTERNAL MEDICINE

## 2018-02-14 RX ORDER — FLUCONAZOLE 100 MG/1
TABLET ORAL
COMMUNITY
Start: 2018-02-06 | End: 2018-02-14 | Stop reason: HOSPADM

## 2018-02-14 RX ORDER — TRAVOPROST 0.004 %
DROPS OPHTHALMIC (EYE)
COMMUNITY
Start: 2018-01-09 | End: 2019-01-08

## 2018-02-14 RX ORDER — ALBUTEROL SULFATE 90 UG/1
AEROSOL, METERED RESPIRATORY (INHALATION)
Refills: 1 | COMMUNITY
Start: 2017-11-08 | End: 2018-11-26 | Stop reason: SDUPTHER

## 2018-02-14 RX ORDER — FLUTICASONE PROPIONATE 50 MCG
2 SPRAY, SUSPENSION (ML) NASAL NIGHTLY
COMMUNITY
Start: 2018-02-14

## 2018-02-14 NOTE — PROGRESS NOTES
"Pulmonary Consultation    Johnathon Shaikh MD,    Thank you for asking me to see Vishnu Waller for   Chief Complaint   Patient presents with   • Bronchitis     Refd by Dr Shaikh   .    Subjective     History of Present Illness  Vishnu Waller is a 78 y.o. male with a PMH significant for tobacco use, allergies, CAD s/p CABG, HTN, DM, and borderline glaucoma who presents for evaluation of dyspnea. Pt states he was referred by his PCP due to persistent bronchitis. He usually gets this around this time of year but this time it has persisted longer than normal. Pt has had 2 nasal/ sinus surgeries which he thinks may \"trap viruses\". He has been tested for allergies and was told he reacted to mold spores. Pt has flonase but he only uses it when he has congestion. He admits to recent nasal congestion, postnasal gtt, and sinus pressure. Pt is also on albuterol which he uses for wheeze. He does feel like the albuterol helps. Pt admits to progressive OLMOS, but he attributes it to being out of shape. He has never been on any other inhalers. Pt does smoke 0.5ppd but he has cut back since his recent illness. He denies issues with asthma as a child but he did have chronic allergies. Pt reports his BP is normally ok at home on his antihypertensives. He has smoked up to 1-2ppd and has smoked for about 60yrs. 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 His brother and mother had CAD, but there is no lung disease in the family. His other brother did have lung cancer and was a smoker.     Review of Systems: History obtained from chart review and the patient.  Review of Systems   Constitutional: Negative for fever and unexpected weight change.   HENT: Positive for congestion, ear pain and postnasal drip. Negative for rhinorrhea.    Respiratory: Positive for cough, shortness of breath and wheezing.    Cardiovascular: Positive for chest pain.   Gastrointestinal:        Heartburn   Genitourinary: Positive for frequency. "   Musculoskeletal: Positive for arthralgias and back pain.     As described in the HPI. Otherwise, remainder of ROS (14 systems) were negative.    Patient Active Problem List   Diagnosis   • Degeneration of lumbar intervertebral disc   • Low back pain without sciatica   • Neuritis or radiculitis due to rupture of lumbar intervertebral disc   • Borderline glaucoma, open angle with borderline findings   • Pseudophakia   • Chronic coronary artery disease   • History of coronary artery bypass surgery   • Essential hypertension   • Mixed hyperlipidemia   • Thrombocytopenia   • Spinal stenosis of lumbar region   • Degenerative disc disease, lumbar   • Chronic pain of right knee   • History of artificial joint   • Cobalamin deficiency   • Degeneration of intervertebral disc of lumbosacral region   • Personal history of other infectious and parasitic diseases   • Status post lumbar spine operation   • History of renal calculi   • History of surgical procedure   • History of repair of rotator cuff   • Encounter for follow-up examination after completed treatment for conditions other than malignant neoplasm   • Encounter for general adult medical examination without abnormal findings   • Osteoarthritis of knee   • Osteoarthritis of multiple joints   • Primary insomnia   • Encounter for screening for malignant neoplasm of colon   • Encounter for screening for malignant neoplasm of prostate   • Type 2 diabetes mellitus   • Atherosclerotic heart disease of native coronary artery without angina pectoris   • SOB (shortness of breath)   • Presence of aortocoronary bypass graft   • Dizziness and giddiness   • Postviral fatigue syndrome   • Postviral fatigue syndrome   • Kidney stones   • Thrombocytopenia   • Pyelonephritis   • COPD with asthma   • Chronic non-seasonal allergic rhinitis   • Tobacco use disorder         Current Outpatient Prescriptions:   •  albuterol (PROAIR RESPICLICK) 108 (90 Base) MCG/ACT inhaler, Inhale 180 mcg.,  Disp: , Rfl:   •  amLODIPine (NORVASC) 5 MG tablet, Take 1 tablet by mouth Daily., Disp: 90 tablet, Rfl: 3  •  benzonatate (TESSALON) 200 MG capsule, Take 1 capsule by mouth 3 (Three) Times a Day As Needed for Cough., Disp: 30 capsule, Rfl: 0  •  clopidogrel (PLAVIX) 75 MG tablet, Take 1 tablet by mouth Daily., Disp: 90 tablet, Rfl: 2  •  Cyanocobalamin (VITAMIN B 12 PO), Take 500 mcg by mouth. Three times weekly, MoWeFr, Disp: , Rfl:   •  fluticasone (FLONASE) 50 MCG/ACT nasal spray, 2 sprays into each nostril Daily., Disp: , Rfl:   •  guaiFENesin (MUCINEX) 600 MG 12 hr tablet, Take 1 tablet by mouth Every 12 (Twelve) Hours., Disp: 20 tablet, Rfl: 0  •  latanoprost (XALATAN) 0.005 % ophthalmic solution, Administer 1 drop to both eyes every night., Disp: , Rfl:   •  lidocaine (LIDODERM) 5 %, Place 1 patch on the skin Daily. Remove & Discard patch within 12 hours or as directed by MD, Disp: 30 patch, Rfl: 2  •  losartan (COZAAR) 50 MG tablet, Take 1 tablet by mouth Every Night., Disp: 90 tablet, Rfl: 3  •  melatonin 1 MG tablet, Take 3 mg by mouth At Night As Needed for sleep., Disp: , Rfl:   •  meloxicam (MOBIC) 15 MG tablet, Take 1 tablet by mouth Daily., Disp: 90 tablet, Rfl: 3  •  metFORMIN (GLUCOPHAGE) 1000 MG tablet, 1,000 mg 2 (Two) Times a Day With Meals., Disp: , Rfl:   •  montelukast (SINGULAIR) 10 MG tablet, Take 10 mg by mouth Every Night., Disp: , Rfl:   •  Multiple Vitamins-Minerals (CENTRUM PO), Take 1 tablet by mouth daily. Centrum 0.4 mg-162 mg-18 mg tablet  (unconfirmed), Disp: , Rfl:   •  oxybutynin (DITROPAN) 5 MG tablet, Take 5 mg by mouth Every Night., Disp: , Rfl:   •  raNITIdine (ZANTAC) 75 MG tablet, Take 75 mg by mouth 2 (Two) Times a Day., Disp: , Rfl:   •  simvastatin (ZOCOR) 40 MG tablet, Take 40 mg by mouth Every Night., Disp: , Rfl:   •  TRAVATAN Z 0.004 % solution ophthalmic solution, , Disp: , Rfl:   •  VENTOLIN  (90 Base) MCG/ACT inhaler, INHALE 2 PUFFS BY MOUTH EVERY 4 HOURS  AS NEEDED FOR WHEEZING AND COUGH, Disp: , Rfl: 1  •  Fluticasone Furoate-Vilanterol 100-25 MCG/INH aerosol powder , Inhale 1 puff Daily., Disp: 1 each, Rfl: 11    Past Medical History:   Diagnosis Date   • Acute bacterial sinusitis    • Acute bronchitis    • Acute frontal sinusitis    • Acute maxillary sinusitis    • Acute pharyngitis    • Allergic rhinitis    • Allergic rhinitis due to pollen    • Anxiety    • Artificial lens present     in position   • Atherosclerotic heart disease of native coronary artery without angina pectoris    • Backache    • Borderline glaucoma    • Chronic laryngitis    • Chronic rhinitis    • Coronary arteriosclerosis    • Coronary atherosclerosis    • Cough    • Degeneration of lumbar intervertebral disc    • Degenerative joint disease involving multiple joints    • Diabetes    • Diverticular disease of colon    • Dizziness and giddiness    • Erectile dysfunction    • Essential hypertension    • Generalized anxiety disorder    • GERD (gastroesophageal reflux disease)    • Glaucoma    • Heart disease    • Hemorrhoids     without bleeding   • Hyperlipidemia    • Insomnia    • Kidney stone    • Kidney stones    • Malignant tumor of prostate    • Need for prophylactic vaccination and inoculation against influenza    • Osteoarthritis    • Osteoarthritis of multiple joints    • Pain, lumbar region     Pain radiating to lumbar region of back     • PONV (postoperative nausea and vomiting)    • Postviral fatigue syndrome    • Presence of aortocoronary bypass graft    • Prostate cancer    • Pseudomembranous enterocolitis     improved   • Rotator cuff syndrome     right   • Shoulder pain    • SOB (shortness of breath)    • Tenosynovitis    • Thrombocytopenia    • Type 2 diabetes mellitus     no BDR   • Upper respiratory infection      Past Surgical History:   Procedure Laterality Date   • CARDIAC CATHETERIZATION  09/25/2003    Cardiac cath (Normal left ventricular systolic function, EF 60%  Multi-vessel coronary artery disease with critical disease noted in the LAD coronary artery, diagonal coronary artery, obtuse marginal coronary and right coronary artery.)   • CARDIAC CATHETERIZATION  05/03/1996    Cardiac cath (Coronary atherosclerotic heart disease. 70% diagonal stenosis. Mild to moderate left anterior descending artery stenosis. Preserved left ventricular function.)   • CATARACT EXTRACTION Bilateral    • CATARACT EXTRACTION  10/04/2011    Remove cataract, insert lens (Right)   • CATARACT EXTRACTION  08/23/2011    Remove cataract, insert lens (left)   • COLONOSCOPY     • COLONOSCOPY      Colon endoscopy 27587 (Rectal bleeding. Radiation proctitis w/bleeding. An abnormal CT of transverse colon due to mucosal ischemic colitis, that now is healed. Internal hemorrhoids w/possible bleeding.)   • COLONOSCOPY  05/10/2011    Colon endoscopy 52335 (Single sessile polyp found in transverse colon and sigmoid colon ,removed by cold biopsy polypectomy.divertic. found in sigmoid colon,descending colon. Friability/capillary friability in rectum sigmoid due to prior radiation.Inter. hem. Grade 1)   • COLONOSCOPY  05/02/2007    Colon endoscopy 36409 (Colon polyp. Diverticulosis without bleeding. Internal hemorrhoids without bleeding.)   • CORONARY ARTERY BYPASS GRAFT  2002   • CYSTOSCOPY  01/13/1995    Cystoscopy, stone removal (Right ureteroscopic lasertripsy.)   • CYSTOSCOPY, URETEROSCOPY, RETROGRADE PYELOGRAM, STENT INSERTION N/A 8/28/2017    Procedure: URETEROSCOPY LASER LITHOTRIPSY WITH STENT INSERTION AND RETROGRADE PYELOGRAM ;  Surgeon: Anna M. D'Amico, MD;  Location: [facility];  Service:    • EPIDURAL  12/03/2014    Therapeutic/diag injection (Lumbar transforaminal epidural steroid injection, L5-S1, left side.)   • EPIDURAL  10/22/2014    Therapeutic/diag injection (Lumbar transforaminal epidural steroid injection L5-S1 left side.)   • EPIDURAL  08/07/2014    Therapeutic/diag injection (Lumbar  transforaminal epidural steroid injection.)   • EXCISION LESION  05/13/1999    REMOVE EAR LESION 11844 (1.3 cm basal cell carcinoma, right preauricular area. Excision)   • EXCISION LESION  03/13/2001    REMOVE LESION NECK/CHEST 84124 (Excision of irritated seborrheic keratosis, anterior neck, 1.1 cm and deep lipoma, posterior neck, 3.5 cm)   • INJECTION OF MEDICATION  11/15/2012    Kenalog (4)      • JOINT REPLACEMENT  2015    partial   • KNEE ARTHROSCOPY  01/17/2007    Knee arthroscopy, surgery (Arthroscopy with medial and lateral meniscectomies, right knee.)   • KNEE SURGERY  11/04/2015    Knee Surgery (Right unicompartmental arthroplasty.)   • LUMBAR LAMINECTOMY N/A 2/7/2017    Procedure: LUMBAR LAMINECTOMY LUMBAR THREE-FOUR, LUMBAR FOUR-FIVE, INTERLAMINAR DISTRACTION ;  Surgeon: Lucio Mayo MD;  Location: [facility];  Service:    • NOSE SURGERY     • OTHER SURGICAL HISTORY      EXTENDED VISUAL FIELDS STUDY 27020 (Borderline glaucoma) (3): 03/19/2015, 04/09/2014, 03/27/2013   • OTHER SURGICAL HISTORY  10/29/2003    Heart revascularize (TMR) (Directmyocardial revascularization times four; LIMA to LAD SVG to DARIUSZ SVG to OM1, SVG to RCA)   • OTHER SURGICAL HISTORY      OCT DISC NFL 24808 (Borderline glaucoma)  (3): 09/24/2015, 08/13/2014, 07/29/2013   • PROSTATECTOMY     • SEPTORHINOPLASTY  11/16/1989    Nasal surgery procedure (Septorhinoplasty with reconstruction of the nasal pyramid with a iliac crest graft, rhinoplasty was performed with external approach.)   • SHOULDER ARTHROSCOPY  07/24/2013    Arthroscopy of right shoulder with rotator repair, Carla procedure, Biceps tenotomy, subacromial decompression.   • SHOULDER SURGERY  11/01/2005    Shoulder surgery procedure (Decompression, subacromial, explore of the rotator cuff, no tear found nor repaired, acromioplasty of the left shoulder and AC shoulder joint resection.)     Social History     Social History   • Marital status: Single     Spouse  "name: N/A   • Number of children: N/A   • Years of education: N/A     Social History Main Topics   • Smoking status: Current Some Day Smoker     Packs/day: 0.50     Years: 40.00     Types: Cigarettes   • Smokeless tobacco: Never Used   • Alcohol use Yes      Comment: socially   • Drug use: No   • Sexual activity: Defer     Other Topics Concern   • None     Social History Narrative     Family History   Problem Relation Age of Onset   • Hypertension Mother    • Heart disease Mother    • Arthritis Mother    • Cancer Other    • Heart disease Other    • Hypertension Other           Objective     Blood pressure 152/78, pulse 107, height 160 cm (63\"), weight 67.6 kg (149 lb), SpO2 98 %.  Physical Exam   Constitutional: He is oriented to person, place, and time. Vital signs are normal. He appears well-developed and well-nourished.   HENT:   Head: Normocephalic and atraumatic.   Nose: Mucosal edema (L>R) present.   Mouth/Throat: Uvula is midline, oropharynx is clear and moist and mucous membranes are normal.   Mallampati   Eyes: Conjunctivae, EOM and lids are normal. Pupils are equal, round, and reactive to light.   Neck: Trachea normal and normal range of motion. No tracheal tenderness present. No thyroid mass present.   Cardiovascular: Regular rhythm and normal heart sounds.  Tachycardia present.  PMI is not displaced.  Exam reveals no gallop.    No murmur heard.  Pulmonary/Chest: Effort normal and breath sounds normal. No respiratory distress. He has no decreased breath sounds. He has no wheezes. He has no rhonchi. Chest wall is not dull to percussion. He exhibits no tenderness.   Abdominal: Soft. Normal appearance and bowel sounds are normal. There is no hepatosplenomegaly. There is no tenderness.   Musculoskeletal:   Normal gait, no extremity edema       Vascular Status -  His exam exhibits no right foot edema. His exam exhibits no left foot edema.  Lymphadenopathy:        Head (right side): No submandibular adenopathy " present.        Head (left side): No submandibular adenopathy present.     He has no cervical adenopathy.        Right: No supraclavicular adenopathy present.        Left: No supraclavicular adenopathy present.   Neurological: He is alert and oriented to person, place, and time. Gait normal.   Skin: Skin is warm and dry. No rash noted. No cyanosis. Nails show no clubbing.   Psychiatric: He has a normal mood and affect. His speech is normal and behavior is normal. Judgment normal.   Nursing note and vitals reviewed.      PFTs: 2/14/18 (independently reviewed and interpreted by me)  Ratio 66  FVC 2.43/ 79%  FEV1 1.61/ 74%  TLC 4.74/ 84%  DLCO 22.04/ 96%  Moderate obstruction with no significant bronchodilator response.  Normal lung volumes but elevated RV/TLC consistent with air trapping.  Normal diffusing capacity.  No comparative data available.    Radiology (independently reviewed and interpreted by me): CXR 2/1/18 showed prior sternotomy, hyperinflation, chronic interstitial markings         Assessment/Plan     Vishnu was seen today for bronchitis.    Diagnoses and all orders for this visit:    SOB (shortness of breath)  -     Full Pulmonary Function Test With Bronchodilator    COPD with asthma  -     Fluticasone Furoate-Vilanterol 100-25 MCG/INH aerosol powder ; Inhale 1 puff Daily.    Chronic non-seasonal allergic rhinitis, unspecified trigger    Tobacco use disorder    Essential hypertension         Discussion/ Recommendations:   PFTs are consistent with moderate COPD.  Chest x-ray was unremarkable.  Recent CT chest report was reviewed and showed some focal areas of bronchitis as well as a stable 3 mm noncalcified RLL nodule (since 3/2015).  I think his dyspnea and cough are primarily related to underlying COPD with possible asthma component causing chronic bronchitis.  Also, he does have significant allergic rhinitis symptoms which are undertreated.  Unfortunately, he does continue to smoke but has been able  to cut back with his recent illness.    -Start Breo 100 daily.  Sample coupon provided.  Instructed on use.  -Continue albuterol as needed only.  -Start using Flonase on a daily basis.  Instructed on proper technique.  Advised to use nasal saline at a minimum of twice daily and as needed for comfort.  -Continue Singulair daily.  -Consider ENT reevaluation  -Stop fluconazole as it is unnecessary.  -Counseled on the importance of complete tobacco cessation.  Discussed cessation aids such as the nicotine replacement products.  Recommended he  quit date.  Spent 3 minutes.  -Meets criteria for LD CT screen, but given recent CT chest he will not be due until February 2019.  We will discuss risks/benefits when the time is closer.         Return in about 4 weeks (around 3/14/2018) for Recheck.      Thank you for allowing me to participate in the care of Vishnu Waller. Please do not hesitate to contact me with any questions.         This document has been electronically signed by Camryn Koehler MD on February 14, 2018 1:58 PM      Dictated using Dragon

## 2018-02-15 ENCOUNTER — OFFICE VISIT (OUTPATIENT)
Dept: CARDIOLOGY | Facility: CLINIC | Age: 78
End: 2018-02-15

## 2018-02-15 VITALS
HEART RATE: 96 BPM | HEIGHT: 63 IN | DIASTOLIC BLOOD PRESSURE: 78 MMHG | SYSTOLIC BLOOD PRESSURE: 140 MMHG | WEIGHT: 143 LBS | BODY MASS INDEX: 25.34 KG/M2

## 2018-02-15 DIAGNOSIS — I10 ESSENTIAL HYPERTENSION: ICD-10-CM

## 2018-02-15 DIAGNOSIS — R06.02 SOB (SHORTNESS OF BREATH): ICD-10-CM

## 2018-02-15 DIAGNOSIS — I25.10 CHRONIC CORONARY ARTERY DISEASE: ICD-10-CM

## 2018-02-15 DIAGNOSIS — E78.2 MIXED HYPERLIPIDEMIA: ICD-10-CM

## 2018-02-15 DIAGNOSIS — F17.200 TOBACCO USE DISORDER: ICD-10-CM

## 2018-02-15 DIAGNOSIS — I25.10 ATHEROSCLEROSIS OF NATIVE CORONARY ARTERY OF NATIVE HEART WITHOUT ANGINA PECTORIS: Primary | ICD-10-CM

## 2018-02-15 PROCEDURE — 99214 OFFICE O/P EST MOD 30 MIN: CPT | Performed by: INTERNAL MEDICINE

## 2018-02-15 RX ORDER — DILTIAZEM HYDROCHLORIDE 180 MG/1
180 CAPSULE, COATED, EXTENDED RELEASE ORAL DAILY
Qty: 90 CAPSULE | Refills: 3 | Status: SHIPPED | OUTPATIENT
Start: 2018-02-15 | End: 2018-03-13 | Stop reason: SDUPTHER

## 2018-02-15 RX ORDER — DILTIAZEM HYDROCHLORIDE 180 MG/1
180 CAPSULE, COATED, EXTENDED RELEASE ORAL DAILY
Qty: 30 CAPSULE | Refills: 0 | Status: SHIPPED | OUTPATIENT
Start: 2018-02-15 | End: 2018-02-15 | Stop reason: SDUPTHER

## 2018-02-15 RX ORDER — DILTIAZEM HYDROCHLORIDE 180 MG/1
180 CAPSULE, COATED, EXTENDED RELEASE ORAL DAILY
Qty: 30 CAPSULE | Refills: 6 | Status: SHIPPED | OUTPATIENT
Start: 2018-02-15 | End: 2018-02-15 | Stop reason: SDUPTHER

## 2018-02-16 NOTE — PROGRESS NOTES
Vishnu Waller  78 y.o. male    02/15/2018  1. Atherosclerosis of native coronary artery of native heart without angina pectoris    2. Essential hypertension    3. Mixed hyperlipidemia    4. Chronic coronary artery disease    5. Tobacco use disorder    6. SOB (shortness of breath)        History of Present Illness    Mr. Waller is here for follow-up of his above stated problems.  He is done well from a cardiac standpoint with no chest pain but does have NYHA class II dyspnea on exertion.  He was seen by pulmonology recently and was started on bronchodilators.  His heart rate was noted to be elevated at 100 bpm today.  I did not appreciate any bronchospasm or signs of congestive heart failure.  His been compliant with his medications.  He denied any recent fevers or chills but has had occasional cough.  He unfortunately continues to smoke a few cigarettes a week.  We had a discussion about smoking cessation        SUBJECTIVE    Allergies   Allergen Reactions   • Ace Inhibitors Cough   • Molds & Smuts Other (See Comments)     Sinus infection   • Hydrocodone-Acetaminophen Itching         Past Medical History:   Diagnosis Date   • Acute bacterial sinusitis    • Acute bronchitis    • Acute frontal sinusitis    • Acute maxillary sinusitis    • Acute pharyngitis    • Allergic rhinitis    • Allergic rhinitis due to pollen    • Anxiety    • Artificial lens present     in position   • Atherosclerotic heart disease of native coronary artery without angina pectoris    • Backache    • Borderline glaucoma    • Chronic laryngitis    • Chronic rhinitis    • Coronary arteriosclerosis    • Coronary atherosclerosis    • Cough    • Degeneration of lumbar intervertebral disc    • Degenerative joint disease involving multiple joints    • Diabetes    • Diverticular disease of colon    • Dizziness and giddiness    • Erectile dysfunction    • Essential hypertension    • Generalized anxiety disorder    • GERD (gastroesophageal reflux  disease)    • Glaucoma    • Heart disease    • Hemorrhoids     without bleeding   • Hyperlipidemia    • Insomnia    • Kidney stone    • Kidney stones    • Malignant tumor of prostate    • Need for prophylactic vaccination and inoculation against influenza    • Osteoarthritis    • Osteoarthritis of multiple joints    • Pain, lumbar region     Pain radiating to lumbar region of back     • PONV (postoperative nausea and vomiting)    • Postviral fatigue syndrome    • Presence of aortocoronary bypass graft    • Prostate cancer    • Pseudomembranous enterocolitis     improved   • Rotator cuff syndrome     right   • Shoulder pain    • SOB (shortness of breath)    • Tenosynovitis    • Thrombocytopenia    • Type 2 diabetes mellitus     no BDR   • Upper respiratory infection          Past Surgical History:   Procedure Laterality Date   • CARDIAC CATHETERIZATION  09/25/2003    Cardiac cath (Normal left ventricular systolic function, EF 60% Multi-vessel coronary artery disease with critical disease noted in the LAD coronary artery, diagonal coronary artery, obtuse marginal coronary and right coronary artery.)   • CARDIAC CATHETERIZATION  05/03/1996    Cardiac cath (Coronary atherosclerotic heart disease. 70% diagonal stenosis. Mild to moderate left anterior descending artery stenosis. Preserved left ventricular function.)   • CATARACT EXTRACTION Bilateral    • CATARACT EXTRACTION  10/04/2011    Remove cataract, insert lens (Right)   • CATARACT EXTRACTION  08/23/2011    Remove cataract, insert lens (left)   • COLONOSCOPY     • COLONOSCOPY      Colon endoscopy 81381 (Rectal bleeding. Radiation proctitis w/bleeding. An abnormal CT of transverse colon due to mucosal ischemic colitis, that now is healed. Internal hemorrhoids w/possible bleeding.)   • COLONOSCOPY  05/10/2011    Colon endoscopy 71191 (Single sessile polyp found in transverse colon and sigmoid colon ,removed by cold biopsy polypectomy.divertic. found in sigmoid  colon,descending colon. Friability/capillary friability in rectum sigmoid due to prior radiation.Inter. hem. Grade 1)   • COLONOSCOPY  05/02/2007    Colon endoscopy 40654 (Colon polyp. Diverticulosis without bleeding. Internal hemorrhoids without bleeding.)   • CORONARY ARTERY BYPASS GRAFT  2002   • CYSTOSCOPY  01/13/1995    Cystoscopy, stone removal (Right ureteroscopic lasertripsy.)   • CYSTOSCOPY, URETEROSCOPY, RETROGRADE PYELOGRAM, STENT INSERTION N/A 8/28/2017    Procedure: URETEROSCOPY LASER LITHOTRIPSY WITH STENT INSERTION AND RETROGRADE PYELOGRAM ;  Surgeon: Anna M. D'Amico, MD;  Location: [facility];  Service:    • EPIDURAL  12/03/2014    Therapeutic/diag injection (Lumbar transforaminal epidural steroid injection, L5-S1, left side.)   • EPIDURAL  10/22/2014    Therapeutic/diag injection (Lumbar transforaminal epidural steroid injection L5-S1 left side.)   • EPIDURAL  08/07/2014    Therapeutic/diag injection (Lumbar transforaminal epidural steroid injection.)   • EXCISION LESION  05/13/1999    REMOVE EAR LESION 48592 (1.3 cm basal cell carcinoma, right preauricular area. Excision)   • EXCISION LESION  03/13/2001    REMOVE LESION NECK/CHEST 03264 (Excision of irritated seborrheic keratosis, anterior neck, 1.1 cm and deep lipoma, posterior neck, 3.5 cm)   • INJECTION OF MEDICATION  11/15/2012    Kenalog (4)      • JOINT REPLACEMENT  2015    partial   • KNEE ARTHROSCOPY  01/17/2007    Knee arthroscopy, surgery (Arthroscopy with medial and lateral meniscectomies, right knee.)   • KNEE SURGERY  11/04/2015    Knee Surgery (Right unicompartmental arthroplasty.)   • LUMBAR LAMINECTOMY N/A 2/7/2017    Procedure: LUMBAR LAMINECTOMY LUMBAR THREE-FOUR, LUMBAR FOUR-FIVE, INTERLAMINAR DISTRACTION ;  Surgeon: Lucio Mayo MD;  Location: [facility];  Service:    • NOSE SURGERY     • OTHER SURGICAL HISTORY      EXTENDED VISUAL FIELDS STUDY 97317 (Borderline glaucoma) (3): 03/19/2015, 04/09/2014, 03/27/2013   •  OTHER SURGICAL HISTORY  10/29/2003    Heart revascularize (TMR) (Directmyocardial revascularization times four; LIMA to LAD SVG to DARIUSZ SVG to OM1, SVG to RCA)   • OTHER SURGICAL HISTORY      OCT DISC NFL 73345 (Borderline glaucoma)  (3): 09/24/2015, 08/13/2014, 07/29/2013   • PROSTATECTOMY     • SEPTORHINOPLASTY  11/16/1989    Nasal surgery procedure (Septorhinoplasty with reconstruction of the nasal pyramid with a iliac crest graft, rhinoplasty was performed with external approach.)   • SHOULDER ARTHROSCOPY  07/24/2013    Arthroscopy of right shoulder with rotator repair, Carla procedure, Biceps tenotomy, subacromial decompression.   • SHOULDER SURGERY  11/01/2005    Shoulder surgery procedure (Decompression, subacromial, explore of the rotator cuff, no tear found nor repaired, acromioplasty of the left shoulder and AC shoulder joint resection.)         Family History   Problem Relation Age of Onset   • Hypertension Mother    • Heart disease Mother    • Arthritis Mother    • Cancer Other    • Heart disease Other    • Hypertension Other          Social History     Social History   • Marital status: Single     Spouse name: N/A   • Number of children: N/A   • Years of education: N/A     Occupational History   • Not on file.     Social History Main Topics   • Smoking status: Current Some Day Smoker     Packs/day: 0.50     Years: 40.00     Types: Cigarettes   • Smokeless tobacco: Never Used   • Alcohol use Yes      Comment: socially   • Drug use: No   • Sexual activity: Defer     Other Topics Concern   • Not on file     Social History Narrative         Current Outpatient Prescriptions   Medication Sig Dispense Refill   • albuterol (PROAIR RESPICLICK) 108 (90 Base) MCG/ACT inhaler Inhale 180 mcg.     • clopidogrel (PLAVIX) 75 MG tablet Take 1 tablet by mouth Daily. 90 tablet 2   • Cyanocobalamin (VITAMIN B 12 PO) Take 500 mcg by mouth. Three times weekly, MoWeFr     • fluticasone (FLONASE) 50 MCG/ACT nasal spray 2  "sprays into each nostril Daily.     • Fluticasone Furoate-Vilanterol 100-25 MCG/INH aerosol powder  Inhale 1 puff Daily. 1 each 11   • guaiFENesin (MUCINEX) 600 MG 12 hr tablet Take 1 tablet by mouth Every 12 (Twelve) Hours. 20 tablet 0   • latanoprost (XALATAN) 0.005 % ophthalmic solution Administer 1 drop to both eyes every night.     • lidocaine (LIDODERM) 5 % Place 1 patch on the skin Daily. Remove & Discard patch within 12 hours or as directed by MD 30 patch 2   • losartan (COZAAR) 50 MG tablet Take 1 tablet by mouth Every Night. 90 tablet 3   • melatonin 1 MG tablet Take 3 mg by mouth At Night As Needed for sleep.     • meloxicam (MOBIC) 15 MG tablet Take 1 tablet by mouth Daily. 90 tablet 3   • metFORMIN (GLUCOPHAGE) 1000 MG tablet 1,000 mg 2 (Two) Times a Day With Meals.     • montelukast (SINGULAIR) 10 MG tablet Take 10 mg by mouth Every Night.     • Multiple Vitamins-Minerals (CENTRUM PO) Take 1 tablet by mouth daily. Centrum 0.4 mg-162 mg-18 mg tablet  (unconfirmed)     • oxybutynin (DITROPAN) 5 MG tablet Take 5 mg by mouth Every Night.     • raNITIdine (ZANTAC) 75 MG tablet Take 75 mg by mouth 2 (Two) Times a Day.     • simvastatin (ZOCOR) 40 MG tablet Take 40 mg by mouth Every Night.     • TRAVATAN Z 0.004 % solution ophthalmic solution      • VENTOLIN  (90 Base) MCG/ACT inhaler INHALE 2 PUFFS BY MOUTH EVERY 4 HOURS AS NEEDED FOR WHEEZING AND COUGH  1   • diltiaZEM CD (CARDIZEM CD) 180 MG 24 hr capsule Take 1 capsule by mouth Daily. 90 capsule 3     No current facility-administered medications for this visit.          OBJECTIVE    /78  Pulse 96  Ht 160 cm (62.99\")  Wt 64.9 kg (143 lb)  BMI 25.34 kg/m2        Review of Systems     Constitutional:  Denies recent weight loss, weight gain, fever or chills, no change in exercise tolerance     HENT:  Denies any hearing loss, epistaxis, hoarseness, or difficulty speaking.     Eyes: Wears eyeglasses or contact lenses     Respiratory:  Dyspnea " with exertion,no cough, wheezing, or hemoptysis.     Cardiovascular: Negative for palpations, chest pain,  peripheral edema, syncope, or claudication.     Gastrointestinal:  Denies change in bowel habits, dyspepsia, ulcer disease, hematochezia, or melena.     Endocrine: Negative for cold intolerance, heat intolerance, polydipsia, polyphagia and polyuria. Denies any history of weight change, heat/cold intolerance, polydipsia, polyuria     Genitourinary: Negative.      Musculoskeletal: Denies any history of arthritic symptoms or back problems     Skin:  Denies any change in hair or nails, rashes, or skin lesions.     Allergic/Immunologic: Negative.  Negative for environmental allergies, food allergies and immunocompromised state.     Neurological:  Denies any history of recurrent headaches, strokes, TIA, or seizure disorder.     Hematological: Denies any food allergies, seasonal allergies, bleeding disorders, or lymphadenopathy.     Psychiatric/Behavioral: Denies any history of depression, substance abuse, or change in cognitive function.         Physical Exam     Constitutional: Cooperative, alert and oriented, well-developed, well-nourished, in no acute distress.     HENT:   Head: Normocephalic, normal hair patterns, no masses or tenderness.  Ears, Nose, and Throat: No gross abnormalities. No pallor or cyanosis.   Eyes: EOMS intact, PERRL, conjunctivae and lids unremarkable. Fundoscopic exam and visual fields not performed.   Neck: No palpable masses or adenopathy, no thyromegaly, no JVD, carotid pulses are full and equal bilaterally and without  Bruits.     Cardiovascular: Regular rhythm, S1 and S2 normal, no S3 or S4. Apical impulse not displaced. No murmurs, gallops, or rubs detected.     Pulmonary/Chest: Chest: Increased AP diameter of the chest, normal respiratory excursion, no intercostal retraction, no use of accessory muscles.            Pulmonary: Normal breath sounds. No rales or ronchi.    Abdominal:  Abdomen soft, bowel sounds normoactive, no masses, no hepatosplenomegaly, non-tender, no bruits.     Musculoskeletal: No deformities, clubbing, cyanosis, erythema, or edema observed. There are no spinal abnormalities noted. Normal muscle strength and tone.    Neurological: No gross motor or sensory deficits noted, affect appropriate, oriented to time, person, place.     Skin: Warm and dry to the touch, no apparent skin lesions or masses noted.     Psychiatric: He has a normal mood and affect. His behavior is normal. Judgment and thought content normal.         Procedures      Lab Results   Component Value Date    WBC 10.44 (H) 08/31/2017    HGB 10.0 (L) 08/31/2017    HCT 30.9 (L) 08/31/2017    MCV 95.1 08/31/2017     08/31/2017     Lab Results   Component Value Date    GLUCOSE 95 09/01/2017    BUN 12 09/01/2017    CREATININE 1.06 09/01/2017    EGFRIFNONA 68 09/01/2017    BCR 11.3 09/01/2017    CO2 26.0 09/01/2017    CALCIUM 8.5 09/01/2017    ALBUMIN 4.00 08/30/2017    LABIL2 1.4 08/30/2017    AST 38 08/30/2017    ALT 29 08/30/2017     No results found for: CHOL  No results found for: TRIG  No results found for: HDL  No components found for: LDLCALC  No results found for: LDL  No results found for: HDLLDLRATIO  No components found for: CHOLHDL  No results found for: HGBA1C  No results found for: TSH, [id number], T6KTTZV, THYROIDAB        ASSESSMENT AND PLAN  Mr. Waller has dyspnea which is most likely related to his pulmonary status.  Suspicion for congestive heart failure is low.  However to evaluate his left ventricular and valvular function and echocardiogram is being arranged.  I have continued his present antiplatelet therapy with Plavix, antihypertensive therapy with losartan, lipid-lowering therapy with simvastatin.  I have discontinued amlodipine and started him on Cardizem  mg daily for optimization of heart rate.    Vishnu was seen today for follow-up.    Diagnoses and all orders for this  visit:    Atherosclerosis of native coronary artery of native heart without angina pectoris  -     Adult Transthoracic Echo Complete W/ Cont if Necessary Per Protocol; Future    Essential hypertension  -     Adult Transthoracic Echo Complete W/ Cont if Necessary Per Protocol; Future    Mixed hyperlipidemia  -     Adult Transthoracic Echo Complete W/ Cont if Necessary Per Protocol; Future    Chronic coronary artery disease  -     Adult Transthoracic Echo Complete W/ Cont if Necessary Per Protocol; Future    Tobacco use disorder  -     Adult Transthoracic Echo Complete W/ Cont if Necessary Per Protocol; Future    SOB (shortness of breath)  -     Adult Transthoracic Echo Complete W/ Cont if Necessary Per Protocol; Future    Other orders  -     Discontinue: diltiaZEM CD (CARDIZEM CD) 180 MG 24 hr capsule; Take 1 capsule by mouth Daily.        Radha Wilkes MD  2/15/2018  7:35 PM

## 2018-02-24 ENCOUNTER — HOSPITAL ENCOUNTER (INPATIENT)
Facility: HOSPITAL | Age: 78
LOS: 1 days | Discharge: HOME | End: 2018-02-27
Attending: FAMILY MEDICINE | Admitting: INTERNAL MEDICINE

## 2018-02-24 ENCOUNTER — APPOINTMENT (OUTPATIENT)
Dept: GENERAL RADIOLOGY | Facility: HOSPITAL | Age: 78
End: 2018-02-24

## 2018-02-24 DIAGNOSIS — I20.0 UNSTABLE ANGINA (HCC): Primary | ICD-10-CM

## 2018-02-24 DIAGNOSIS — I21.4 NSTEMI (NON-ST ELEVATED MYOCARDIAL INFARCTION) (HCC): ICD-10-CM

## 2018-02-24 LAB
ALBUMIN SERPL-MCNC: 4 G/DL (ref 3.4–4.8)
ALBUMIN/GLOB SERPL: 1.4 G/DL (ref 1.1–1.8)
ALP SERPL-CCNC: 49 U/L (ref 38–126)
ALT SERPL W P-5'-P-CCNC: 32 U/L (ref 21–72)
ANION GAP SERPL CALCULATED.3IONS-SCNC: 16 MMOL/L (ref 5–15)
APTT PPP: 31.7 SECONDS (ref 20–40.3)
AST SERPL-CCNC: 26 U/L (ref 17–59)
BASOPHILS # BLD AUTO: 0.02 10*3/MM3 (ref 0–0.2)
BASOPHILS NFR BLD AUTO: 0.2 % (ref 0–2)
BILIRUB SERPL-MCNC: 0.4 MG/DL (ref 0.2–1.3)
BUN BLD-MCNC: 28 MG/DL (ref 7–21)
BUN/CREAT SERPL: 24.1 (ref 7–25)
CALCIUM SPEC-SCNC: 9.4 MG/DL (ref 8.4–10.2)
CHLORIDE SERPL-SCNC: 105 MMOL/L (ref 95–110)
CK MB SERPL-CCNC: 5.99 NG/ML (ref 0–5)
CK SERPL-CCNC: 50 U/L (ref 55–170)
CO2 SERPL-SCNC: 19 MMOL/L (ref 22–31)
CREAT BLD-MCNC: 1.16 MG/DL (ref 0.7–1.3)
DEPRECATED RDW RBC AUTO: 61.2 FL (ref 35.1–43.9)
EOSINOPHIL # BLD AUTO: 0.07 10*3/MM3 (ref 0–0.7)
EOSINOPHIL NFR BLD AUTO: 0.8 % (ref 0–7)
ERYTHROCYTE [DISTWIDTH] IN BLOOD BY AUTOMATED COUNT: 17.7 % (ref 11.5–14.5)
GFR SERPL CREATININE-BSD FRML MDRD: 61 ML/MIN/1.73 (ref 42–98)
GLOBULIN UR ELPH-MCNC: 2.8 GM/DL (ref 2.3–3.5)
GLUCOSE BLD-MCNC: 213 MG/DL (ref 60–100)
GLUCOSE BLDC GLUCOMTR-MCNC: 104 MG/DL (ref 70–130)
GLUCOSE BLDC GLUCOMTR-MCNC: 181 MG/DL (ref 70–130)
HCT VFR BLD AUTO: 33.6 % (ref 39–49)
HGB BLD-MCNC: 11.2 G/DL (ref 13.7–17.3)
HOLD SPECIMEN: NORMAL
HOLD SPECIMEN: NORMAL
IMM GRANULOCYTES # BLD: 0.04 10*3/MM3 (ref 0–0.02)
IMM GRANULOCYTES NFR BLD: 0.5 % (ref 0–0.5)
INR PPP: 1.07 (ref 0.8–1.2)
LYMPHOCYTES # BLD AUTO: 1.54 10*3/MM3 (ref 0.6–4.2)
LYMPHOCYTES NFR BLD AUTO: 17.6 % (ref 10–50)
MCH RBC QN AUTO: 32.7 PG (ref 26.5–34)
MCHC RBC AUTO-ENTMCNC: 33.3 G/DL (ref 31.5–36.3)
MCV RBC AUTO: 98 FL (ref 80–98)
MONOCYTES # BLD AUTO: 0.63 10*3/MM3 (ref 0–0.9)
MONOCYTES NFR BLD AUTO: 7.2 % (ref 0–12)
NEUTROPHILS # BLD AUTO: 6.45 10*3/MM3 (ref 2–8.6)
NEUTROPHILS NFR BLD AUTO: 73.7 % (ref 37–80)
NT-PROBNP SERPL-MCNC: 1880 PG/ML (ref 0–1800)
PLATELET # BLD AUTO: 217 10*3/MM3 (ref 150–450)
PMV BLD AUTO: 9.2 FL (ref 8–12)
POTASSIUM BLD-SCNC: 4.5 MMOL/L (ref 3.5–5.1)
PROT SERPL-MCNC: 6.8 G/DL (ref 6.3–8.6)
PROTHROMBIN TIME: 13.8 SECONDS (ref 11.1–15.3)
RBC # BLD AUTO: 3.43 10*6/MM3 (ref 4.37–5.74)
SODIUM BLD-SCNC: 140 MMOL/L (ref 137–145)
TROPONIN I SERPL-MCNC: 0.74 NG/ML
TROPONIN I SERPL-MCNC: 0.87 NG/ML
TSH SERPL DL<=0.05 MIU/L-ACNC: 1.07 MIU/ML (ref 0.46–4.68)
WBC NRBC COR # BLD: 8.75 10*3/MM3 (ref 3.2–9.8)
WHOLE BLOOD HOLD SPECIMEN: NORMAL

## 2018-02-24 PROCEDURE — 99284 EMERGENCY DEPT VISIT MOD MDM: CPT

## 2018-02-24 PROCEDURE — 85730 THROMBOPLASTIN TIME PARTIAL: CPT | Performed by: PHYSICIAN ASSISTANT

## 2018-02-24 PROCEDURE — 93005 ELECTROCARDIOGRAM TRACING: CPT

## 2018-02-24 PROCEDURE — G0378 HOSPITAL OBSERVATION PER HR: HCPCS

## 2018-02-24 PROCEDURE — 93010 ELECTROCARDIOGRAM REPORT: CPT | Performed by: INTERNAL MEDICINE

## 2018-02-24 PROCEDURE — 80053 COMPREHEN METABOLIC PANEL: CPT | Performed by: FAMILY MEDICINE

## 2018-02-24 PROCEDURE — 83036 HEMOGLOBIN GLYCOSYLATED A1C: CPT | Performed by: INTERNAL MEDICINE

## 2018-02-24 PROCEDURE — 93005 ELECTROCARDIOGRAM TRACING: CPT | Performed by: INTERNAL MEDICINE

## 2018-02-24 PROCEDURE — 85025 COMPLETE CBC W/AUTO DIFF WBC: CPT | Performed by: FAMILY MEDICINE

## 2018-02-24 PROCEDURE — 82962 GLUCOSE BLOOD TEST: CPT

## 2018-02-24 PROCEDURE — 84443 ASSAY THYROID STIM HORMONE: CPT | Performed by: PHYSICIAN ASSISTANT

## 2018-02-24 PROCEDURE — 71046 X-RAY EXAM CHEST 2 VIEWS: CPT

## 2018-02-24 PROCEDURE — 82550 ASSAY OF CK (CPK): CPT | Performed by: PHYSICIAN ASSISTANT

## 2018-02-24 PROCEDURE — 83880 ASSAY OF NATRIURETIC PEPTIDE: CPT | Performed by: FAMILY MEDICINE

## 2018-02-24 PROCEDURE — 94640 AIRWAY INHALATION TREATMENT: CPT

## 2018-02-24 PROCEDURE — 94760 N-INVAS EAR/PLS OXIMETRY 1: CPT

## 2018-02-24 PROCEDURE — 94799 UNLISTED PULMONARY SVC/PX: CPT

## 2018-02-24 PROCEDURE — 82553 CREATINE MB FRACTION: CPT | Performed by: PHYSICIAN ASSISTANT

## 2018-02-24 PROCEDURE — 84484 ASSAY OF TROPONIN QUANT: CPT | Performed by: FAMILY MEDICINE

## 2018-02-24 PROCEDURE — 85610 PROTHROMBIN TIME: CPT | Performed by: PHYSICIAN ASSISTANT

## 2018-02-24 RX ORDER — ASPIRIN 81 MG/1
324 TABLET, CHEWABLE ORAL ONCE
Status: DISCONTINUED | OUTPATIENT
Start: 2018-02-24 | End: 2018-02-26

## 2018-02-24 RX ORDER — SODIUM CHLORIDE 9 MG/ML
100 INJECTION, SOLUTION INTRAVENOUS CONTINUOUS
Status: DISCONTINUED | OUTPATIENT
Start: 2018-02-24 | End: 2018-02-25

## 2018-02-24 RX ORDER — LATANOPROST 50 UG/ML
1 SOLUTION/ DROPS OPHTHALMIC NIGHTLY
Status: DISCONTINUED | OUTPATIENT
Start: 2018-02-24 | End: 2018-02-27 | Stop reason: HOSPADM

## 2018-02-24 RX ORDER — ATORVASTATIN CALCIUM 20 MG/1
20 TABLET, FILM COATED ORAL DAILY
Status: DISCONTINUED | OUTPATIENT
Start: 2018-02-24 | End: 2018-02-27 | Stop reason: HOSPADM

## 2018-02-24 RX ORDER — NITROGLYCERIN 0.4 MG/1
0.4 TABLET SUBLINGUAL
Status: DISCONTINUED | OUTPATIENT
Start: 2018-02-24 | End: 2018-02-27 | Stop reason: HOSPADM

## 2018-02-24 RX ORDER — CARVEDILOL 3.12 MG/1
3.12 TABLET ORAL EVERY 12 HOURS SCHEDULED
Status: DISCONTINUED | OUTPATIENT
Start: 2018-02-24 | End: 2018-02-27 | Stop reason: HOSPADM

## 2018-02-24 RX ORDER — DEXTROSE MONOHYDRATE 25 G/50ML
25 INJECTION, SOLUTION INTRAVENOUS
Status: DISCONTINUED | OUTPATIENT
Start: 2018-02-24 | End: 2018-02-27 | Stop reason: HOSPADM

## 2018-02-24 RX ORDER — IPRATROPIUM BROMIDE AND ALBUTEROL SULFATE 2.5; .5 MG/3ML; MG/3ML
3 SOLUTION RESPIRATORY (INHALATION)
Status: DISCONTINUED | OUTPATIENT
Start: 2018-02-24 | End: 2018-02-27 | Stop reason: HOSPADM

## 2018-02-24 RX ORDER — ONDANSETRON 2 MG/ML
4 INJECTION INTRAMUSCULAR; INTRAVENOUS EVERY 6 HOURS PRN
Status: DISCONTINUED | OUTPATIENT
Start: 2018-02-24 | End: 2018-02-27 | Stop reason: HOSPADM

## 2018-02-24 RX ORDER — OXYBUTYNIN CHLORIDE 5 MG/1
5 TABLET ORAL NIGHTLY
Status: DISCONTINUED | OUTPATIENT
Start: 2018-02-24 | End: 2018-02-27 | Stop reason: HOSPADM

## 2018-02-24 RX ORDER — NICOTINE POLACRILEX 4 MG
15 LOZENGE BUCCAL
Status: DISCONTINUED | OUTPATIENT
Start: 2018-02-24 | End: 2018-02-27 | Stop reason: HOSPADM

## 2018-02-24 RX ORDER — FLUTICASONE PROPIONATE 50 MCG
2 SPRAY, SUSPENSION (ML) NASAL DAILY
Status: DISCONTINUED | OUTPATIENT
Start: 2018-02-24 | End: 2018-02-27 | Stop reason: HOSPADM

## 2018-02-24 RX ORDER — SODIUM CHLORIDE 0.9 % (FLUSH) 0.9 %
10 SYRINGE (ML) INJECTION AS NEEDED
Status: DISCONTINUED | OUTPATIENT
Start: 2018-02-24 | End: 2018-02-27 | Stop reason: HOSPADM

## 2018-02-24 RX ORDER — LANOLIN ALCOHOL/MO/W.PET/CERES
3 CREAM (GRAM) TOPICAL NIGHTLY PRN
Status: DISCONTINUED | OUTPATIENT
Start: 2018-02-24 | End: 2018-02-27 | Stop reason: HOSPADM

## 2018-02-24 RX ORDER — LOSARTAN POTASSIUM 50 MG/1
50 TABLET ORAL NIGHTLY
Status: DISCONTINUED | OUTPATIENT
Start: 2018-02-24 | End: 2018-02-27 | Stop reason: HOSPADM

## 2018-02-24 RX ORDER — BISACODYL 5 MG/1
5 TABLET, DELAYED RELEASE ORAL DAILY PRN
Status: DISCONTINUED | OUTPATIENT
Start: 2018-02-24 | End: 2018-02-27 | Stop reason: HOSPADM

## 2018-02-24 RX ORDER — ACETAMINOPHEN 325 MG/1
650 TABLET ORAL EVERY 6 HOURS PRN
Status: DISCONTINUED | OUTPATIENT
Start: 2018-02-24 | End: 2018-02-27 | Stop reason: HOSPADM

## 2018-02-24 RX ORDER — SODIUM CHLORIDE 0.9 % (FLUSH) 0.9 %
1-10 SYRINGE (ML) INJECTION AS NEEDED
Status: DISCONTINUED | OUTPATIENT
Start: 2018-02-24 | End: 2018-02-27 | Stop reason: HOSPADM

## 2018-02-24 RX ORDER — CLOPIDOGREL BISULFATE 75 MG/1
75 TABLET ORAL DAILY
Status: DISCONTINUED | OUTPATIENT
Start: 2018-02-25 | End: 2018-02-27 | Stop reason: HOSPADM

## 2018-02-24 RX ORDER — ASPIRIN 81 MG/1
81 TABLET ORAL DAILY
Status: DISCONTINUED | OUTPATIENT
Start: 2018-02-25 | End: 2018-02-27 | Stop reason: HOSPADM

## 2018-02-24 RX ORDER — FAMOTIDINE 20 MG/1
20 TABLET, FILM COATED ORAL DAILY
Status: DISCONTINUED | OUTPATIENT
Start: 2018-02-25 | End: 2018-02-27 | Stop reason: HOSPADM

## 2018-02-24 RX ORDER — NICOTINE 21 MG/24HR
1 PATCH, TRANSDERMAL 24 HOURS TRANSDERMAL EVERY 24 HOURS
Status: DISCONTINUED | OUTPATIENT
Start: 2018-02-24 | End: 2018-02-27 | Stop reason: HOSPADM

## 2018-02-24 RX ORDER — MONTELUKAST SODIUM 10 MG/1
10 TABLET ORAL NIGHTLY
Status: DISCONTINUED | OUTPATIENT
Start: 2018-02-24 | End: 2018-02-27 | Stop reason: HOSPADM

## 2018-02-24 RX ORDER — ASPIRIN 325 MG
325 TABLET ORAL ONCE
Status: COMPLETED | OUTPATIENT
Start: 2018-02-24 | End: 2018-02-24

## 2018-02-24 RX ORDER — DILTIAZEM HYDROCHLORIDE 180 MG/1
180 CAPSULE, COATED, EXTENDED RELEASE ORAL DAILY
Status: DISCONTINUED | OUTPATIENT
Start: 2018-02-24 | End: 2018-02-27 | Stop reason: HOSPADM

## 2018-02-24 RX ADMIN — IPRATROPIUM BROMIDE AND ALBUTEROL SULFATE 3 ML: 2.5; .5 SOLUTION RESPIRATORY (INHALATION) at 16:50

## 2018-02-24 RX ADMIN — ACETAMINOPHEN 650 MG: 325 TABLET ORAL at 20:37

## 2018-02-24 RX ADMIN — OXYBUTYNIN CHLORIDE 5 MG: 5 TABLET ORAL at 21:22

## 2018-02-24 RX ADMIN — MONTELUKAST SODIUM 10 MG: 10 TABLET, FILM COATED ORAL at 21:21

## 2018-02-24 RX ADMIN — LOSARTAN POTASSIUM 50 MG: 50 TABLET, FILM COATED ORAL at 21:22

## 2018-02-24 RX ADMIN — DILTIAZEM HYDROCHLORIDE 180 MG: 180 CAPSULE, COATED, EXTENDED RELEASE ORAL at 16:56

## 2018-02-24 RX ADMIN — ASPIRIN 325 MG: 325 TABLET, COATED ORAL at 13:50

## 2018-02-24 RX ADMIN — SODIUM CHLORIDE 100 ML/HR: 9 INJECTION, SOLUTION INTRAVENOUS at 16:57

## 2018-02-24 RX ADMIN — LATANOPROST 1 DROP: 50 SOLUTION OPHTHALMIC at 21:23

## 2018-02-24 RX ADMIN — CARVEDILOL 3.12 MG: 3.12 TABLET, FILM COATED ORAL at 21:20

## 2018-02-25 ENCOUNTER — APPOINTMENT (OUTPATIENT)
Dept: CARDIOLOGY | Facility: HOSPITAL | Age: 78
End: 2018-02-25
Attending: INTERNAL MEDICINE

## 2018-02-25 LAB
ALBUMIN SERPL-MCNC: 3.1 G/DL (ref 3.4–4.8)
ALBUMIN/GLOB SERPL: 1.3 G/DL (ref 1.1–1.8)
ALP SERPL-CCNC: 52 U/L (ref 38–126)
ALT SERPL W P-5'-P-CCNC: 35 U/L (ref 21–72)
ANION GAP SERPL CALCULATED.3IONS-SCNC: 8 MMOL/L (ref 5–15)
ANION GAP SERPL CALCULATED.3IONS-SCNC: 9 MMOL/L (ref 5–15)
AST SERPL-CCNC: 54 U/L (ref 17–59)
BH CV ECHO MEAS - AI DEC SLOPE: 215 CM/SEC^2
BH CV ECHO MEAS - AI MAX PG: 52.1 MMHG
BH CV ECHO MEAS - AI MAX VEL: 361 CM/SEC
BH CV ECHO MEAS - AI P1/2T: 491.8 MSEC
BH CV ECHO MEAS - AO ISTHMUS: 3.2 CM
BH CV ECHO MEAS - AO MAX PG (FULL): 4.7 MMHG
BH CV ECHO MEAS - AO MAX PG: 9.6 MMHG
BH CV ECHO MEAS - AO MEAN PG (FULL): 3 MMHG
BH CV ECHO MEAS - AO MEAN PG: 6 MMHG
BH CV ECHO MEAS - AO ROOT AREA (BSA CORRECTED): 2.2
BH CV ECHO MEAS - AO ROOT AREA: 10.8 CM^2
BH CV ECHO MEAS - AO ROOT DIAM: 3.7 CM
BH CV ECHO MEAS - AO V2 MAX: 155 CM/SEC
BH CV ECHO MEAS - AO V2 MEAN: 108 CM/SEC
BH CV ECHO MEAS - AO V2 VTI: 29.5 CM
BH CV ECHO MEAS - ASC AORTA: 4.6 CM
BH CV ECHO MEAS - AVA(I,A): 3.1 CM^2
BH CV ECHO MEAS - AVA(I,D): 3.1 CM^2
BH CV ECHO MEAS - AVA(V,A): 2.7 CM^2
BH CV ECHO MEAS - AVA(V,D): 2.7 CM^2
BH CV ECHO MEAS - BSA(HAYCOCK): 1.7 M^2
BH CV ECHO MEAS - BSA: 1.7 M^2
BH CV ECHO MEAS - BZI_BMI: 24.8 KILOGRAMS/M^2
BH CV ECHO MEAS - BZI_METRIC_HEIGHT: 160 CM
BH CV ECHO MEAS - BZI_METRIC_WEIGHT: 63.5 KG
BH CV ECHO MEAS - EDV(CUBED): 103.8 ML
BH CV ECHO MEAS - EDV(TEICH): 102.4 ML
BH CV ECHO MEAS - EF(CUBED): 65.4 %
BH CV ECHO MEAS - EF(TEICH): 56.9 %
BH CV ECHO MEAS - ESV(CUBED): 35.9 ML
BH CV ECHO MEAS - ESV(TEICH): 44.1 ML
BH CV ECHO MEAS - FS: 29.8 %
BH CV ECHO MEAS - IVS/LVPW: 1.1
BH CV ECHO MEAS - IVSD: 1.4 CM
BH CV ECHO MEAS - LA DIMENSION: 2.1 CM
BH CV ECHO MEAS - LA/AO: 0.57
BH CV ECHO MEAS - LV MASS(C)D: 236.6 GRAMS
BH CV ECHO MEAS - LV MASS(C)DI: 142.3 GRAMS/M^2
BH CV ECHO MEAS - LV MAX PG: 4.9 MMHG
BH CV ECHO MEAS - LV MEAN PG: 3 MMHG
BH CV ECHO MEAS - LV V1 MAX: 111 CM/SEC
BH CV ECHO MEAS - LV V1 MEAN: 79.2 CM/SEC
BH CV ECHO MEAS - LV V1 VTI: 24.2 CM
BH CV ECHO MEAS - LVIDD: 4.7 CM
BH CV ECHO MEAS - LVIDS: 3.3 CM
BH CV ECHO MEAS - LVOT AREA (M): 3.8 CM^2
BH CV ECHO MEAS - LVOT AREA: 3.8 CM^2
BH CV ECHO MEAS - LVOT DIAM: 2.2 CM
BH CV ECHO MEAS - LVPWD: 1.2 CM
BH CV ECHO MEAS - MR MAX PG: 75 MMHG
BH CV ECHO MEAS - MR MAX VEL: 433 CM/SEC
BH CV ECHO MEAS - MV A MAX VEL: 62.6 CM/SEC
BH CV ECHO MEAS - MV DEC SLOPE: 815 CM/SEC^2
BH CV ECHO MEAS - MV E MAX VEL: 94.6 CM/SEC
BH CV ECHO MEAS - MV E/A: 1.5
BH CV ECHO MEAS - MV MAX PG: 4.5 MMHG
BH CV ECHO MEAS - MV MEAN PG: 2 MMHG
BH CV ECHO MEAS - MV P1/2T MAX VEL: 107 CM/SEC
BH CV ECHO MEAS - MV P1/2T: 38.5 MSEC
BH CV ECHO MEAS - MV V2 MAX: 106 CM/SEC
BH CV ECHO MEAS - MV V2 MEAN: 59 CM/SEC
BH CV ECHO MEAS - MV V2 VTI: 32.6 CM
BH CV ECHO MEAS - MVA P1/2T LCG: 2.1 CM^2
BH CV ECHO MEAS - MVA(P1/2T): 5.7 CM^2
BH CV ECHO MEAS - MVA(VTI): 2.8 CM^2
BH CV ECHO MEAS - PA MAX PG: 4.1 MMHG
BH CV ECHO MEAS - PA V2 MAX: 101 CM/SEC
BH CV ECHO MEAS - RAP SYSTOLE: 5 MMHG
BH CV ECHO MEAS - RVSP: 34.8 MMHG
BH CV ECHO MEAS - SI(AO): 190.9 ML/M^2
BH CV ECHO MEAS - SI(CUBED): 40.9 ML/M^2
BH CV ECHO MEAS - SI(LVOT): 55.4 ML/M^2
BH CV ECHO MEAS - SI(TEICH): 35 ML/M^2
BH CV ECHO MEAS - SV(AO): 317.2 ML
BH CV ECHO MEAS - SV(CUBED): 67.9 ML
BH CV ECHO MEAS - SV(LVOT): 92 ML
BH CV ECHO MEAS - SV(TEICH): 58.2 ML
BH CV ECHO MEAS - TR MAX VEL: 273 CM/SEC
BILIRUB SERPL-MCNC: 0.2 MG/DL (ref 0.2–1.3)
BUN BLD-MCNC: 24 MG/DL (ref 7–21)
BUN BLD-MCNC: 25 MG/DL (ref 7–21)
BUN/CREAT SERPL: 24.7 (ref 7–25)
BUN/CREAT SERPL: 25 (ref 7–25)
CALCIUM SPEC-SCNC: 8.3 MG/DL (ref 8.4–10.2)
CALCIUM SPEC-SCNC: 8.4 MG/DL (ref 8.4–10.2)
CHLORIDE SERPL-SCNC: 107 MMOL/L (ref 95–110)
CHLORIDE SERPL-SCNC: 108 MMOL/L (ref 95–110)
CO2 SERPL-SCNC: 21 MMOL/L (ref 22–31)
CO2 SERPL-SCNC: 22 MMOL/L (ref 22–31)
CREAT BLD-MCNC: 0.97 MG/DL (ref 0.7–1.3)
CREAT BLD-MCNC: 1 MG/DL (ref 0.7–1.3)
DEPRECATED RDW RBC AUTO: 60.8 FL (ref 35.1–43.9)
ERYTHROCYTE [DISTWIDTH] IN BLOOD BY AUTOMATED COUNT: 17.5 % (ref 11.5–14.5)
GFR SERPL CREATININE-BSD FRML MDRD: 73 ML/MIN/1.73 (ref 60–98)
GFR SERPL CREATININE-BSD FRML MDRD: 75 ML/MIN/1.73 (ref 42–98)
GLOBULIN UR ELPH-MCNC: 2.3 GM/DL (ref 2.3–3.5)
GLUCOSE BLD-MCNC: 114 MG/DL (ref 60–100)
GLUCOSE BLD-MCNC: 117 MG/DL (ref 60–100)
GLUCOSE BLDC GLUCOMTR-MCNC: 106 MG/DL (ref 70–130)
GLUCOSE BLDC GLUCOMTR-MCNC: 124 MG/DL (ref 70–130)
GLUCOSE BLDC GLUCOMTR-MCNC: 235 MG/DL (ref 70–130)
HBA1C MFR BLD: 6.8 % (ref 4–5.6)
HCT VFR BLD AUTO: 29.4 % (ref 39–49)
HGB BLD-MCNC: 9.8 G/DL (ref 13.7–17.3)
LV EF 2D ECHO EST: 50 %
MCH RBC QN AUTO: 32.5 PG (ref 26.5–34)
MCHC RBC AUTO-ENTMCNC: 33.3 G/DL (ref 31.5–36.3)
MCV RBC AUTO: 97.4 FL (ref 80–98)
PLATELET # BLD AUTO: 197 10*3/MM3 (ref 150–450)
PMV BLD AUTO: 9.4 FL (ref 8–12)
POTASSIUM BLD-SCNC: 4.5 MMOL/L (ref 3.5–5.1)
POTASSIUM BLD-SCNC: 4.6 MMOL/L (ref 3.5–5.1)
PROT SERPL-MCNC: 5.4 G/DL (ref 6.3–8.6)
RBC # BLD AUTO: 3.02 10*6/MM3 (ref 4.37–5.74)
SODIUM BLD-SCNC: 137 MMOL/L (ref 137–145)
SODIUM BLD-SCNC: 138 MMOL/L (ref 137–145)
TROPONIN I SERPL-MCNC: 1.44 NG/ML
TROPONIN I SERPL-MCNC: 1.47 NG/ML
WBC NRBC COR # BLD: 8.23 10*3/MM3 (ref 3.2–9.8)

## 2018-02-25 PROCEDURE — 99226 PR SBSQ OBSERVATION CARE/DAY 35 MINUTES: CPT | Performed by: INTERNAL MEDICINE

## 2018-02-25 PROCEDURE — 85027 COMPLETE CBC AUTOMATED: CPT | Performed by: INTERNAL MEDICINE

## 2018-02-25 PROCEDURE — 84484 ASSAY OF TROPONIN QUANT: CPT | Performed by: INTERNAL MEDICINE

## 2018-02-25 PROCEDURE — 80053 COMPREHEN METABOLIC PANEL: CPT | Performed by: INTERNAL MEDICINE

## 2018-02-25 PROCEDURE — 25010000002 ENOXAPARIN PER 10 MG: Performed by: INTERNAL MEDICINE

## 2018-02-25 PROCEDURE — G0378 HOSPITAL OBSERVATION PER HR: HCPCS

## 2018-02-25 PROCEDURE — 93306 TTE W/DOPPLER COMPLETE: CPT | Performed by: INTERNAL MEDICINE

## 2018-02-25 PROCEDURE — 84484 ASSAY OF TROPONIN QUANT: CPT | Performed by: FAMILY MEDICINE

## 2018-02-25 PROCEDURE — 93010 ELECTROCARDIOGRAM REPORT: CPT | Performed by: INTERNAL MEDICINE

## 2018-02-25 PROCEDURE — 93306 TTE W/DOPPLER COMPLETE: CPT

## 2018-02-25 PROCEDURE — 93005 ELECTROCARDIOGRAM TRACING: CPT | Performed by: INTERNAL MEDICINE

## 2018-02-25 PROCEDURE — 82962 GLUCOSE BLOOD TEST: CPT

## 2018-02-25 RX ADMIN — ENOXAPARIN SODIUM 60 MG: 60 INJECTION SUBCUTANEOUS at 21:19

## 2018-02-25 RX ADMIN — CARVEDILOL 3.12 MG: 3.12 TABLET, FILM COATED ORAL at 08:54

## 2018-02-25 RX ADMIN — OXYBUTYNIN CHLORIDE 5 MG: 5 TABLET ORAL at 21:15

## 2018-02-25 RX ADMIN — DILTIAZEM HYDROCHLORIDE 180 MG: 180 CAPSULE, COATED, EXTENDED RELEASE ORAL at 08:54

## 2018-02-25 RX ADMIN — MONTELUKAST SODIUM 10 MG: 10 TABLET, FILM COATED ORAL at 21:15

## 2018-02-25 RX ADMIN — ENOXAPARIN SODIUM 60 MG: 60 INJECTION SUBCUTANEOUS at 09:29

## 2018-02-25 RX ADMIN — CARVEDILOL 3.12 MG: 3.12 TABLET, FILM COATED ORAL at 21:15

## 2018-02-25 RX ADMIN — ATORVASTATIN CALCIUM 20 MG: 20 TABLET, FILM COATED ORAL at 21:14

## 2018-02-25 RX ADMIN — ASPIRIN 81 MG: 81 TABLET, COATED ORAL at 08:54

## 2018-02-25 RX ADMIN — LATANOPROST 1 DROP: 50 SOLUTION OPHTHALMIC at 21:16

## 2018-02-25 RX ADMIN — SODIUM CHLORIDE 100 ML/HR: 9 INJECTION, SOLUTION INTRAVENOUS at 03:47

## 2018-02-25 RX ADMIN — LOSARTAN POTASSIUM 50 MG: 50 TABLET, FILM COATED ORAL at 21:14

## 2018-02-25 RX ADMIN — FAMOTIDINE 20 MG: 20 TABLET, FILM COATED ORAL at 08:54

## 2018-02-25 RX ADMIN — FLUTICASONE PROPIONATE 2 SPRAY: 50 SPRAY, METERED NASAL at 08:55

## 2018-02-25 RX ADMIN — CLOPIDOGREL BISULFATE 75 MG: 75 TABLET ORAL at 08:54

## 2018-02-26 ENCOUNTER — PREP FOR SURGERY (OUTPATIENT)
Dept: OTHER | Facility: HOSPITAL | Age: 78
End: 2018-02-26

## 2018-02-26 DIAGNOSIS — I21.4 NSTEMI (NON-ST ELEVATED MYOCARDIAL INFARCTION) (HCC): Primary | ICD-10-CM

## 2018-02-26 LAB
ANION GAP SERPL CALCULATED.3IONS-SCNC: 8 MMOL/L (ref 5–15)
BUN BLD-MCNC: 19 MG/DL (ref 7–21)
BUN/CREAT SERPL: 20.2 (ref 7–25)
CALCIUM SPEC-SCNC: 9.2 MG/DL (ref 8.4–10.2)
CHLORIDE SERPL-SCNC: 106 MMOL/L (ref 95–110)
CO2 SERPL-SCNC: 24 MMOL/L (ref 22–31)
CREAT BLD-MCNC: 0.94 MG/DL (ref 0.7–1.3)
GFR SERPL CREATININE-BSD FRML MDRD: 78 ML/MIN/1.73 (ref 60–98)
GLUCOSE BLD-MCNC: 111 MG/DL (ref 60–100)
GLUCOSE BLDC GLUCOMTR-MCNC: 130 MG/DL (ref 70–130)
GLUCOSE BLDC GLUCOMTR-MCNC: 132 MG/DL (ref 70–130)
GLUCOSE BLDC GLUCOMTR-MCNC: 134 MG/DL (ref 70–130)
GLUCOSE BLDC GLUCOMTR-MCNC: 178 MG/DL (ref 70–130)
POTASSIUM BLD-SCNC: 4.6 MMOL/L (ref 3.5–5.1)
SODIUM BLD-SCNC: 138 MMOL/L (ref 137–145)

## 2018-02-26 PROCEDURE — C9604 PERC D-E COR REVASC T CABG S: HCPCS | Performed by: INTERNAL MEDICINE

## 2018-02-26 PROCEDURE — 82962 GLUCOSE BLOOD TEST: CPT

## 2018-02-26 PROCEDURE — 25010000002 MIDAZOLAM PER 1 MG: Performed by: INTERNAL MEDICINE

## 2018-02-26 PROCEDURE — 93455 CORONARY ART/GRFT ANGIO S&I: CPT | Performed by: INTERNAL MEDICINE

## 2018-02-26 PROCEDURE — B2021ZZ PLAIN RADIOGRAPHY OF SINGLE CORONARY ARTERY BYPASS GRAFT USING LOW OSMOLAR CONTRAST: ICD-10-PCS | Performed by: INTERNAL MEDICINE

## 2018-02-26 PROCEDURE — C1769 GUIDE WIRE: HCPCS | Performed by: INTERNAL MEDICINE

## 2018-02-26 PROCEDURE — C1725 CATH, TRANSLUMIN NON-LASER: HCPCS | Performed by: INTERNAL MEDICINE

## 2018-02-26 PROCEDURE — 92937 PRQ TRLUML REVSC CAB GRF 1: CPT | Performed by: INTERNAL MEDICINE

## 2018-02-26 PROCEDURE — 80048 BASIC METABOLIC PNL TOTAL CA: CPT | Performed by: INTERNAL MEDICINE

## 2018-02-26 PROCEDURE — B2011ZZ PLAIN RADIOGRAPHY OF MULTIPLE CORONARY ARTERIES USING LOW OSMOLAR CONTRAST: ICD-10-PCS | Performed by: INTERNAL MEDICINE

## 2018-02-26 PROCEDURE — 63710000001 INSULIN ASPART PER 5 UNITS: Performed by: INTERNAL MEDICINE

## 2018-02-26 PROCEDURE — 4A023N7 MEASUREMENT OF CARDIAC SAMPLING AND PRESSURE, LEFT HEART, PERCUTANEOUS APPROACH: ICD-10-PCS | Performed by: INTERNAL MEDICINE

## 2018-02-26 PROCEDURE — B2081ZZ PLAIN RADIOGRAPHY OF LEFT INTERNAL MAMMARY BYPASS GRAFT USING LOW OSMOLAR CONTRAST: ICD-10-PCS | Performed by: INTERNAL MEDICINE

## 2018-02-26 PROCEDURE — 25010000002 FENTANYL CITRATE (PF) 100 MCG/2ML SOLUTION: Performed by: INTERNAL MEDICINE

## 2018-02-26 PROCEDURE — 027034Z DILATION OF CORONARY ARTERY, ONE ARTERY WITH DRUG-ELUTING INTRALUMINAL DEVICE, PERCUTANEOUS APPROACH: ICD-10-PCS | Performed by: INTERNAL MEDICINE

## 2018-02-26 PROCEDURE — 0 IOPAMIDOL PER 1 ML: Performed by: INTERNAL MEDICINE

## 2018-02-26 PROCEDURE — C1874 STENT, COATED/COV W/DEL SYS: HCPCS | Performed by: INTERNAL MEDICINE

## 2018-02-26 PROCEDURE — 25010000002 BIVALIRUDIN PER 1 MG: Performed by: INTERNAL MEDICINE

## 2018-02-26 PROCEDURE — C1887 CATHETER, GUIDING: HCPCS | Performed by: INTERNAL MEDICINE

## 2018-02-26 PROCEDURE — C1894 INTRO/SHEATH, NON-LASER: HCPCS | Performed by: INTERNAL MEDICINE

## 2018-02-26 DEVICE — XIENCE ALPINE EVEROLIMUS ELUTING CORONARY STENT SYSTEM 4.00 MM X 18 MM / RAPID-EXCHANGE
Type: IMPLANTABLE DEVICE | Status: FUNCTIONAL
Brand: XIENCE ALPINE

## 2018-02-26 RX ORDER — NITROGLYCERIN 5 MG/ML
INJECTION, SOLUTION INTRAVENOUS AS NEEDED
Status: DISCONTINUED | OUTPATIENT
Start: 2018-02-26 | End: 2018-02-26 | Stop reason: HOSPADM

## 2018-02-26 RX ORDER — LIDOCAINE HYDROCHLORIDE 20 MG/ML
INJECTION, SOLUTION INFILTRATION; PERINEURAL AS NEEDED
Status: DISCONTINUED | OUTPATIENT
Start: 2018-02-26 | End: 2018-02-26 | Stop reason: HOSPADM

## 2018-02-26 RX ORDER — SODIUM CHLORIDE 9 MG/ML
125 INJECTION, SOLUTION INTRAVENOUS CONTINUOUS
Status: DISCONTINUED | OUTPATIENT
Start: 2018-02-26 | End: 2018-02-26

## 2018-02-26 RX ORDER — SODIUM CHLORIDE 0.9 % (FLUSH) 0.9 %
1-10 SYRINGE (ML) INJECTION AS NEEDED
Status: DISCONTINUED | OUTPATIENT
Start: 2018-02-26 | End: 2018-02-26 | Stop reason: HOSPADM

## 2018-02-26 RX ORDER — FENTANYL CITRATE 50 UG/ML
25 INJECTION, SOLUTION INTRAMUSCULAR; INTRAVENOUS EVERY 6 HOURS PRN
Status: DISCONTINUED | OUTPATIENT
Start: 2018-02-26 | End: 2018-02-27 | Stop reason: HOSPADM

## 2018-02-26 RX ORDER — ISOSORBIDE MONONITRATE 30 MG/1
30 TABLET, EXTENDED RELEASE ORAL
Status: DISCONTINUED | OUTPATIENT
Start: 2018-02-26 | End: 2018-02-27 | Stop reason: HOSPADM

## 2018-02-26 RX ORDER — CLOPIDOGREL BISULFATE 75 MG/1
TABLET ORAL AS NEEDED
Status: DISCONTINUED | OUTPATIENT
Start: 2018-02-26 | End: 2018-02-26 | Stop reason: HOSPADM

## 2018-02-26 RX ORDER — NITROGLYCERIN 20 MG/100ML
INJECTION INTRAVENOUS CONTINUOUS PRN
Status: DISCONTINUED | OUTPATIENT
Start: 2018-02-26 | End: 2018-02-26 | Stop reason: HOSPADM

## 2018-02-26 RX ORDER — SODIUM CHLORIDE 9 MG/ML
100 INJECTION, SOLUTION INTRAVENOUS CONTINUOUS
Status: DISCONTINUED | OUTPATIENT
Start: 2018-02-26 | End: 2018-02-27 | Stop reason: HOSPADM

## 2018-02-26 RX ORDER — SODIUM CHLORIDE 0.9 % (FLUSH) 0.9 %
1-10 SYRINGE (ML) INJECTION AS NEEDED
Status: CANCELLED | OUTPATIENT
Start: 2018-02-26

## 2018-02-26 RX ORDER — SODIUM CHLORIDE 9 MG/ML
125 INJECTION, SOLUTION INTRAVENOUS CONTINUOUS
Status: CANCELLED | OUTPATIENT
Start: 2018-02-26

## 2018-02-26 RX ORDER — MIDAZOLAM HYDROCHLORIDE 1 MG/ML
INJECTION INTRAMUSCULAR; INTRAVENOUS AS NEEDED
Status: DISCONTINUED | OUTPATIENT
Start: 2018-02-26 | End: 2018-02-26 | Stop reason: HOSPADM

## 2018-02-26 RX ADMIN — OXYBUTYNIN CHLORIDE 5 MG: 5 TABLET ORAL at 21:09

## 2018-02-26 RX ADMIN — MONTELUKAST SODIUM 10 MG: 10 TABLET, FILM COATED ORAL at 21:08

## 2018-02-26 RX ADMIN — DILTIAZEM HYDROCHLORIDE 180 MG: 180 CAPSULE, COATED, EXTENDED RELEASE ORAL at 13:24

## 2018-02-26 RX ADMIN — LOSARTAN POTASSIUM 50 MG: 50 TABLET, FILM COATED ORAL at 21:08

## 2018-02-26 RX ADMIN — INSULIN ASPART 2 UNITS: 100 INJECTION, SOLUTION INTRAVENOUS; SUBCUTANEOUS at 21:10

## 2018-02-26 RX ADMIN — ACETAMINOPHEN 650 MG: 325 TABLET ORAL at 16:43

## 2018-02-26 RX ADMIN — ISOSORBIDE MONONITRATE 30 MG: 30 TABLET, EXTENDED RELEASE ORAL at 13:24

## 2018-02-26 RX ADMIN — ATORVASTATIN CALCIUM 20 MG: 20 TABLET, FILM COATED ORAL at 21:07

## 2018-02-26 RX ADMIN — FAMOTIDINE 20 MG: 20 TABLET, FILM COATED ORAL at 13:24

## 2018-02-26 RX ADMIN — CARVEDILOL 3.12 MG: 3.12 TABLET, FILM COATED ORAL at 21:07

## 2018-02-26 RX ADMIN — CARVEDILOL 3.12 MG: 3.12 TABLET, FILM COATED ORAL at 13:24

## 2018-02-26 RX ADMIN — FENTANYL CITRATE 25 MCG: 50 INJECTION INTRAMUSCULAR; INTRAVENOUS at 13:55

## 2018-02-26 RX ADMIN — LATANOPROST 1 DROP: 50 SOLUTION OPHTHALMIC at 21:08

## 2018-02-27 VITALS
SYSTOLIC BLOOD PRESSURE: 111 MMHG | HEIGHT: 63 IN | WEIGHT: 138 LBS | TEMPERATURE: 97.6 F | BODY MASS INDEX: 24.45 KG/M2 | HEART RATE: 97 BPM | RESPIRATION RATE: 16 BRPM | OXYGEN SATURATION: 96 % | DIASTOLIC BLOOD PRESSURE: 66 MMHG

## 2018-02-27 LAB
ANION GAP SERPL CALCULATED.3IONS-SCNC: 10 MMOL/L (ref 5–15)
BASOPHILS # BLD AUTO: 0.02 10*3/MM3 (ref 0–0.2)
BASOPHILS NFR BLD AUTO: 0.2 % (ref 0–2)
BUN BLD-MCNC: 19 MG/DL (ref 7–21)
BUN/CREAT SERPL: 20 (ref 7–25)
CALCIUM SPEC-SCNC: 8.7 MG/DL (ref 8.4–10.2)
CHLORIDE SERPL-SCNC: 108 MMOL/L (ref 95–110)
CO2 SERPL-SCNC: 21 MMOL/L (ref 22–31)
CREAT BLD-MCNC: 0.95 MG/DL (ref 0.7–1.3)
DEPRECATED RDW RBC AUTO: 60.6 FL (ref 35.1–43.9)
EOSINOPHIL # BLD AUTO: 0.1 10*3/MM3 (ref 0–0.7)
EOSINOPHIL NFR BLD AUTO: 1.1 % (ref 0–7)
ERYTHROCYTE [DISTWIDTH] IN BLOOD BY AUTOMATED COUNT: 17.6 % (ref 11.5–14.5)
GFR SERPL CREATININE-BSD FRML MDRD: 77 ML/MIN/1.73 (ref 42–98)
GLUCOSE BLD-MCNC: 92 MG/DL (ref 60–100)
GLUCOSE BLDC GLUCOMTR-MCNC: 102 MG/DL (ref 70–130)
HCT VFR BLD AUTO: 27.4 % (ref 39–49)
HGB BLD-MCNC: 9.4 G/DL (ref 13.7–17.3)
IMM GRANULOCYTES # BLD: 0.05 10*3/MM3 (ref 0–0.02)
IMM GRANULOCYTES NFR BLD: 0.5 % (ref 0–0.5)
LYMPHOCYTES # BLD AUTO: 1.77 10*3/MM3 (ref 0.6–4.2)
LYMPHOCYTES NFR BLD AUTO: 19.1 % (ref 10–50)
MCH RBC QN AUTO: 33.2 PG (ref 26.5–34)
MCHC RBC AUTO-ENTMCNC: 34.3 G/DL (ref 31.5–36.3)
MCV RBC AUTO: 96.8 FL (ref 80–98)
MONOCYTES # BLD AUTO: 1.01 10*3/MM3 (ref 0–0.9)
MONOCYTES NFR BLD AUTO: 10.9 % (ref 0–12)
NEUTROPHILS # BLD AUTO: 6.31 10*3/MM3 (ref 2–8.6)
NEUTROPHILS NFR BLD AUTO: 68.2 % (ref 37–80)
PLATELET # BLD AUTO: 186 10*3/MM3 (ref 150–450)
PMV BLD AUTO: 9.4 FL (ref 8–12)
POTASSIUM BLD-SCNC: 4.3 MMOL/L (ref 3.5–5.1)
RBC # BLD AUTO: 2.83 10*6/MM3 (ref 4.37–5.74)
SODIUM BLD-SCNC: 139 MMOL/L (ref 137–145)
WBC NRBC COR # BLD: 9.26 10*3/MM3 (ref 3.2–9.8)

## 2018-02-27 PROCEDURE — 82962 GLUCOSE BLOOD TEST: CPT

## 2018-02-27 PROCEDURE — 85025 COMPLETE CBC W/AUTO DIFF WBC: CPT | Performed by: NURSE PRACTITIONER

## 2018-02-27 PROCEDURE — 99232 SBSQ HOSP IP/OBS MODERATE 35: CPT | Performed by: INTERNAL MEDICINE

## 2018-02-27 PROCEDURE — 80048 BASIC METABOLIC PNL TOTAL CA: CPT | Performed by: INTERNAL MEDICINE

## 2018-02-27 RX ORDER — NITROGLYCERIN 0.4 MG/1
0.4 TABLET SUBLINGUAL
Qty: 30 TABLET | Refills: 0 | Status: SHIPPED | OUTPATIENT
Start: 2018-02-27

## 2018-02-27 RX ORDER — ASPIRIN 81 MG/1
81 TABLET ORAL DAILY
Qty: 30 TABLET | Refills: 0 | Status: SHIPPED | OUTPATIENT
Start: 2018-02-28 | End: 2019-01-08

## 2018-02-27 RX ORDER — CARVEDILOL 3.12 MG/1
3.12 TABLET ORAL EVERY 12 HOURS SCHEDULED
Qty: 60 TABLET | Refills: 0 | Status: SHIPPED | OUTPATIENT
Start: 2018-02-27 | End: 2019-03-14 | Stop reason: SDUPTHER

## 2018-02-27 RX ORDER — ISOSORBIDE MONONITRATE 30 MG/1
30 TABLET, EXTENDED RELEASE ORAL
Qty: 30 TABLET | Refills: 0 | Status: SHIPPED | OUTPATIENT
Start: 2018-02-28

## 2018-02-27 RX ORDER — ATORVASTATIN CALCIUM 20 MG/1
20 TABLET, FILM COATED ORAL DAILY
Qty: 30 TABLET | Refills: 0 | Status: SHIPPED | OUTPATIENT
Start: 2018-02-27 | End: 2020-04-16

## 2018-02-27 RX ADMIN — ASPIRIN 81 MG: 81 TABLET, COATED ORAL at 08:21

## 2018-02-27 RX ADMIN — DILTIAZEM HYDROCHLORIDE 180 MG: 180 CAPSULE, COATED, EXTENDED RELEASE ORAL at 08:21

## 2018-02-27 RX ADMIN — FAMOTIDINE 20 MG: 20 TABLET, FILM COATED ORAL at 08:21

## 2018-02-27 RX ADMIN — CARVEDILOL 3.12 MG: 3.12 TABLET, FILM COATED ORAL at 08:21

## 2018-02-27 RX ADMIN — ISOSORBIDE MONONITRATE 30 MG: 30 TABLET, EXTENDED RELEASE ORAL at 08:21

## 2018-02-27 RX ADMIN — CLOPIDOGREL BISULFATE 75 MG: 75 TABLET ORAL at 08:21

## 2018-03-13 RX ORDER — DILTIAZEM HYDROCHLORIDE 180 MG/1
180 CAPSULE, COATED, EXTENDED RELEASE ORAL DAILY
Qty: 90 CAPSULE | Refills: 3 | Status: SHIPPED | OUTPATIENT
Start: 2018-03-13 | End: 2018-04-20 | Stop reason: SDUPTHER

## 2018-03-15 ENCOUNTER — OFFICE VISIT (OUTPATIENT)
Dept: PULMONOLOGY | Facility: CLINIC | Age: 78
End: 2018-03-15

## 2018-03-15 VITALS
DIASTOLIC BLOOD PRESSURE: 82 MMHG | OXYGEN SATURATION: 96 % | HEART RATE: 92 BPM | WEIGHT: 144.9 LBS | BODY MASS INDEX: 25.68 KG/M2 | HEIGHT: 63 IN | SYSTOLIC BLOOD PRESSURE: 142 MMHG

## 2018-03-15 DIAGNOSIS — Z72.0 TOBACCO ABUSE: ICD-10-CM

## 2018-03-15 DIAGNOSIS — J44.9 COPD WITH ASTHMA (HCC): Primary | ICD-10-CM

## 2018-03-15 DIAGNOSIS — J30.89 CHRONIC NON-SEASONAL ALLERGIC RHINITIS, UNSPECIFIED TRIGGER: ICD-10-CM

## 2018-03-15 PROBLEM — Z98.61 HISTORY OF PTCA 1: Status: ACTIVE | Noted: 2018-03-05

## 2018-03-15 PROBLEM — I71.21 ASCENDING AORTIC ANEURYSM (HCC): Status: ACTIVE | Noted: 2018-03-05

## 2018-03-15 PROCEDURE — G9902 PT SCRN TBCO AND ID AS USER: HCPCS | Performed by: INTERNAL MEDICINE

## 2018-03-15 PROCEDURE — 99214 OFFICE O/P EST MOD 30 MIN: CPT | Performed by: INTERNAL MEDICINE

## 2018-03-15 PROCEDURE — G8420 CALC BMI NORM PARAMETERS: HCPCS | Performed by: INTERNAL MEDICINE

## 2018-03-15 PROCEDURE — G9903 PT SCRN TBCO ID AS NON USER: HCPCS | Performed by: INTERNAL MEDICINE

## 2018-03-15 PROCEDURE — 99406 BEHAV CHNG SMOKING 3-10 MIN: CPT | Performed by: INTERNAL MEDICINE

## 2018-03-15 RX ORDER — OMEPRAZOLE 20 MG/1
20 CAPSULE, DELAYED RELEASE ORAL DAILY
COMMUNITY
End: 2022-01-01

## 2018-03-15 RX ORDER — BUDESONIDE AND FORMOTEROL FUMARATE DIHYDRATE 80; 4.5 UG/1; UG/1
2 AEROSOL RESPIRATORY (INHALATION)
Qty: 3 INHALER | Refills: 4 | Status: SHIPPED | OUTPATIENT
Start: 2018-03-15 | End: 2018-11-26 | Stop reason: SDUPTHER

## 2018-03-15 RX ORDER — BUDESONIDE AND FORMOTEROL FUMARATE DIHYDRATE 80; 4.5 UG/1; UG/1
2 AEROSOL RESPIRATORY (INHALATION)
Qty: 1 INHALER | Refills: 11 | Status: SHIPPED | OUTPATIENT
Start: 2018-03-15 | End: 2018-03-15 | Stop reason: SDUPTHER

## 2018-03-15 RX ORDER — TAMSULOSIN HYDROCHLORIDE 0.4 MG/1
1 CAPSULE ORAL NIGHTLY
COMMUNITY
End: 2019-02-27

## 2018-03-15 NOTE — PROGRESS NOTES
Pulmonary Office Follow-up    Subjective     Vishnu Waller is seen today at the office for   Chief Complaint   Patient presents with   • Follow-up     4 week         HPI  Vishnu Waller is a 78 y.o. male with a PMH significant for COPD with asthma, tobacco use, allergies, CAD s/p CABG, HTN, DM, and borderline glaucoma who presents for follow-up of COPD/ asthma. He was last seen on 2/14/18, at which time I recommended starting Breo 100 as well as Flonase daily.  I also counseled on the importance of complete tobacco cessation. Pt states he noted chest burning and jaw ache after starting the Breo, but when he stopped and restarted the pain progressed. He ended up having a LHC requiring PCI. Pt states he stopped the Breo as he was not sure if he should continue. He does still have OLMOS as well as nasal congestion. Pt tried the flonase but he notes some issues with nose bleeds. If he stops the flonase, he has recurrence of the drainage. Pt denies chest pain, fevers, or edema. He does have some wheeze and he uses albuterol 1-2x/d. He does complain of increased cough at night when his drainage irritates his throat but he has been using an expectorant that helps.  He was able to stop smoking shortly before his recent hospitalization for PCI and has been able to maintain cessation.        Patient Active Problem List   Diagnosis   • Degeneration of lumbar intervertebral disc   • Low back pain without sciatica   • Neuritis or radiculitis due to rupture of lumbar intervertebral disc   • Borderline glaucoma, open angle with borderline findings   • Pseudophakia   • Chronic coronary artery disease   • History of coronary artery bypass surgery   • Essential hypertension   • Mixed hyperlipidemia   • Thrombocytopenia   • Spinal stenosis of lumbar region   • Degenerative disc disease, lumbar   • Chronic pain of right knee   • History of artificial joint   • B12 deficiency   • Degeneration of intervertebral disc of  lumbosacral region   • Personal history of other infectious and parasitic diseases   • History of lumbar laminectomy   • History of pyelonephritis   • History of surgical procedure   • History of repair of rotator cuff   • Encounter for follow-up examination after completed treatment for conditions other than malignant neoplasm   • Encounter for general adult medical examination without abnormal findings   • Osteoarthritis of knee   • Osteoarthritis of multiple joints   • Primary insomnia   • Encounter for screening for malignant neoplasm of colon   • Encounter for screening for malignant neoplasm of prostate   • Type 2 diabetes mellitus without complication, without long-term current use of insulin   • Coronary artery disease involving native coronary artery of native heart without angina pectoris   • SOB (shortness of breath)   • Presence of aortocoronary bypass graft   • Dizziness and giddiness   • Postviral fatigue syndrome   • Postviral fatigue syndrome   • Recurrent kidney stones   • Thrombocytopenia   • Pyelonephritis   • COPD with asthma   • Chronic non-seasonal allergic rhinitis   • Tobacco abuse   • Unstable angina   • NSTEMI (non-ST elevated myocardial infarction)   • Ascending aortic aneurysm   • History of PTCA 1   • Medicare annual wellness visit, subsequent       Review of Systems  Review of Systems   Constitutional: Negative for fever and unexpected weight change.   HENT: Positive for congestion and postnasal drip. Negative for rhinorrhea.    Respiratory: Positive for cough, shortness of breath and wheezing.    Cardiovascular: Negative for chest pain.   Genitourinary: Positive for flank pain.   Musculoskeletal: Positive for arthralgias, back pain and neck pain.     As described in the HPI. Otherwise, remainder of ROS (14 systems) were negative.    Medications, Allergies, Social, and Family Histories reviewed as per EMR.    Objective     Vitals:    03/15/18 1412   BP: 142/82   Pulse: 92   SpO2: 96%      Physical Exam   Constitutional: He is oriented to person, place, and time. Vital signs are normal. He appears well-developed and well-nourished.   HENT:   Head: Normocephalic and atraumatic.   Nose: Mucosal edema (L>R) present.   Mouth/Throat: Uvula is midline, oropharynx is clear and moist and mucous membranes are normal.   Mallampati   Eyes: Conjunctivae, EOM and lids are normal. Pupils are equal, round, and reactive to light.   Neck: Trachea normal and normal range of motion. No tracheal tenderness present. No thyroid mass present.   Cardiovascular: Regular rhythm and normal heart sounds.  Tachycardia present.  PMI is not displaced.  Exam reveals no gallop.    No murmur heard.  Pulmonary/Chest: Effort normal and breath sounds normal. No respiratory distress. He has no decreased breath sounds. He has no wheezes. He has no rhonchi. He exhibits no tenderness.   Abdominal: Soft. Normal appearance and bowel sounds are normal. There is no hepatosplenomegaly. There is no tenderness.   Musculoskeletal:   Normal gait, no extremity edema     Vascular Status -  His right foot exhibits normal right foot edema. His left foot exhibits normal left foot edema.  Lymphadenopathy:        Head (right side): No submandibular adenopathy present.        Head (left side): No submandibular adenopathy present.     He has no cervical adenopathy.        Right: No supraclavicular adenopathy present.        Left: No supraclavicular adenopathy present.   Neurological: He is alert and oriented to person, place, and time. Gait normal.   Skin: Skin is warm and dry. No rash noted. No cyanosis. Nails show no clubbing.   Psychiatric: He has a normal mood and affect. His speech is normal and behavior is normal. Judgment normal.   Nursing note and vitals reviewed.          Assessment/Plan     Vishnu was seen today for follow-up.    Diagnoses and all orders for this visit:    COPD with asthma    Chronic non-seasonal allergic rhinitis, unspecified  trigger    Tobacco abuse    Other orders  -     Discontinue: budesonide-formoterol (SYMBICORT) 80-4.5 MCG/ACT inhaler; Inhale 2 puffs 2 (Two) Times a Day.  -     budesonide-formoterol (SYMBICORT) 80-4.5 MCG/ACT inhaler; Inhale 2 puffs 2 (Two) Times a Day.         Discussion/ Recommendations:   It is possible the symptoms he experienced after using the Breo was related to angina, but it is also likely it could've been from the dry powder inhalation.  I think he would benefit from an ICS/LABA, but we will try an MDI instead.  Unfortunately, we cannot use anticholinergics due to his history of glaucoma.  I think his cough is primarily related to his chronic rhinitis.    -Start Symbicort 80.  Samples provided and instructed on technique.  -Continue albuterol as needed only.  -Encouraged him to use Flonase on a daily basis but to use frequent nasal saline as well to prevent epistaxis and excessive dryness.  -Continue daily Singulair.  -Allergen avoidance.  -I advised the patient of the risks in continuing to use tobacco, and I provided this patient with smoking cessation educational materials.  Encouraged him to maintain cessation, and to use nicotine or place him products as needed for cravings.  During this visit, I spent > 3-10 minutes counseling the patient regarding smoking cessation.  -LDCT screening in February 2019.      Discussed the patient's BMI with him. BMI is within normal parameters. No follow-up required.        Return in about 4 weeks (around 4/12/2018) for Recheck.          This document has been electronically signed by Camryn Koehler MD on March 15, 2018 2:53 PM      Dictated using Dragon

## 2018-03-16 ENCOUNTER — OFFICE VISIT (OUTPATIENT)
Dept: ORTHOPEDIC SURGERY | Facility: CLINIC | Age: 78
End: 2018-03-16

## 2018-03-16 VITALS — BODY MASS INDEX: 25.69 KG/M2 | WEIGHT: 145 LBS | HEIGHT: 63 IN

## 2018-03-16 DIAGNOSIS — M47.892 OTHER OSTEOARTHRITIS OF SPINE, CERVICAL REGION: ICD-10-CM

## 2018-03-16 DIAGNOSIS — M51.36 DEGENERATION OF LUMBAR INTERVERTEBRAL DISC: ICD-10-CM

## 2018-03-16 DIAGNOSIS — M51.16 NEURITIS OR RADICULITIS DUE TO RUPTURE OF LUMBAR INTERVERTEBRAL DISC: ICD-10-CM

## 2018-03-16 DIAGNOSIS — M51.36 DEGENERATIVE DISC DISEASE, LUMBAR: Primary | ICD-10-CM

## 2018-03-16 DIAGNOSIS — M48.061 SPINAL STENOSIS OF LUMBAR REGION, UNSPECIFIED WHETHER NEUROGENIC CLAUDICATION PRESENT: ICD-10-CM

## 2018-03-16 DIAGNOSIS — Z98.890 STATUS POST LUMBAR SPINE OPERATION: ICD-10-CM

## 2018-03-16 PROCEDURE — 99214 OFFICE O/P EST MOD 30 MIN: CPT | Performed by: ORTHOPAEDIC SURGERY

## 2018-03-16 NOTE — PROGRESS NOTES
Vishnu Waller is a 78 y.o. male returns for     Chief Complaint   Patient presents with   • Lumbar Spine - Follow-up       HISTORY OF PRESENT ILLNESS:    Complains of back and neck pain, right leg weakness and numbness.  States the right leg/foot has been weak and numb for one month now.       CONCURRENT MEDICAL HISTORY:    Past Medical History:   Diagnosis Date   • Acute bacterial sinusitis    • Acute bronchitis    • Acute frontal sinusitis    • Acute maxillary sinusitis    • Acute pharyngitis    • Allergic rhinitis    • Allergic rhinitis due to pollen    • Anxiety    • Artificial lens present     in position   • Atherosclerotic heart disease of native coronary artery without angina pectoris    • Backache    • Borderline glaucoma    • Chronic laryngitis    • Chronic rhinitis    • Coronary arteriosclerosis    • Coronary atherosclerosis    • Cough    • Degeneration of lumbar intervertebral disc    • Degenerative joint disease involving multiple joints    • Diabetes    • Diverticular disease of colon    • Dizziness and giddiness    • Erectile dysfunction    • Essential hypertension    • Generalized anxiety disorder    • GERD (gastroesophageal reflux disease)    • Glaucoma    • Heart disease    • Hemorrhoids     without bleeding   • Hyperlipidemia    • Insomnia    • Kidney stone    • Kidney stones    • Malignant tumor of prostate    • Need for prophylactic vaccination and inoculation against influenza    • Osteoarthritis    • Osteoarthritis of multiple joints    • Pain, lumbar region     Pain radiating to lumbar region of back     • PONV (postoperative nausea and vomiting)    • Postviral fatigue syndrome    • Presence of aortocoronary bypass graft    • Prostate cancer    • Pseudomembranous enterocolitis     improved   • Rotator cuff syndrome     right   • Shoulder pain    • SOB (shortness of breath)    • Tenosynovitis    • Thrombocytopenia    • Type 2 diabetes mellitus     no BDR   • Upper respiratory infection         Allergies   Allergen Reactions   • Molds & Smuts Other (See Comments)     Sinus infection   • Hydrocodone-Acetaminophen Itching         Current Outpatient Prescriptions:   •  albuterol (PROAIR RESPICLICK) 108 (90 Base) MCG/ACT inhaler, Inhale 180 mcg., Disp: , Rfl:   •  aspirin 81 MG EC tablet, Take 1 tablet by mouth Daily., Disp: 30 tablet, Rfl: 0  •  atorvastatin (LIPITOR) 20 MG tablet, Take 1 tablet by mouth Daily., Disp: 30 tablet, Rfl: 0  •  budesonide-formoterol (SYMBICORT) 80-4.5 MCG/ACT inhaler, Inhale 2 puffs 2 (Two) Times a Day., Disp: 3 inhaler, Rfl: 4  •  carvedilol (COREG) 3.125 MG tablet, Take 1 tablet by mouth Every 12 (Twelve) Hours., Disp: 60 tablet, Rfl: 0  •  clopidogrel (PLAVIX) 75 MG tablet, Take 1 tablet by mouth Daily., Disp: 90 tablet, Rfl: 2  •  Cyanocobalamin (VITAMIN B 12 PO), Take 500 mcg by mouth. Three times weekly, MoWeFr, Disp: , Rfl:   •  diltiaZEM CD (CARDIZEM CD) 180 MG 24 hr capsule, Take 1 capsule by mouth Daily., Disp: 90 capsule, Rfl: 3  •  fluticasone (FLONASE) 50 MCG/ACT nasal spray, 2 sprays into each nostril Every Night., Disp: , Rfl:   •  isosorbide mononitrate (IMDUR) 30 MG 24 hr tablet, Take 1 tablet by mouth Daily., Disp: 30 tablet, Rfl: 0  •  latanoprost (XALATAN) 0.005 % ophthalmic solution, Administer 1 drop to both eyes every night., Disp: , Rfl:   •  losartan (COZAAR) 50 MG tablet, Take 1 tablet by mouth Every Night., Disp: 90 tablet, Rfl: 3  •  melatonin 1 MG tablet, Take 3 mg by mouth At Night As Needed for sleep., Disp: , Rfl:   •  metFORMIN (GLUCOPHAGE) 1000 MG tablet, Take 1 tablet by mouth 2 (Two) Times a Day With Meals., Disp: , Rfl:   •  montelukast (SINGULAIR) 10 MG tablet, Take 10 mg by mouth Every Night., Disp: , Rfl:   •  Multiple Vitamins-Minerals (CENTRUM PO), Take 1 tablet by mouth daily. Centrum 0.4 mg-162 mg-18 mg tablet  (unconfirmed), Disp: , Rfl:   •  nitroglycerin (NITROSTAT) 0.4 MG SL tablet, Place 1 tablet under the tongue Every 5  (Five) Minutes As Needed for Chest Pain. Take no more than 3 doses in 15 minutes., Disp: 30 tablet, Rfl: 0  •  omeprazole (priLOSEC) 20 MG capsule, Take 20 mg by mouth Daily., Disp: , Rfl:   •  oxybutynin (DITROPAN) 5 MG tablet, Take 5 mg by mouth Every Night., Disp: , Rfl:   •  raNITIdine (ZANTAC) 75 MG tablet, Take 75 mg by mouth 2 (Two) Times a Day., Disp: , Rfl:   •  tamsulosin (FLOMAX) 0.4 MG capsule 24 hr capsule, Take 1 capsule by mouth Every Night., Disp: , Rfl:   •  TRAVATAN Z 0.004 % solution ophthalmic solution, , Disp: , Rfl:   •  VENTOLIN  (90 Base) MCG/ACT inhaler, INHALE 2 PUFFS BY MOUTH EVERY 4 HOURS AS NEEDED FOR WHEEZING AND COUGH, Disp: , Rfl: 1    Past Surgical History:   Procedure Laterality Date   • CARDIAC CATHETERIZATION  09/25/2003    Cardiac cath (Normal left ventricular systolic function, EF 60% Multi-vessel coronary artery disease with critical disease noted in the LAD coronary artery, diagonal coronary artery, obtuse marginal coronary and right coronary artery.)   • CARDIAC CATHETERIZATION  05/03/1996    Cardiac cath (Coronary atherosclerotic heart disease. 70% diagonal stenosis. Mild to moderate left anterior descending artery stenosis. Preserved left ventricular function.)   • CARDIAC CATHETERIZATION N/A 2/26/2018    Procedure: Left Heart Cath/ pci if indicated ;  Surgeon: Radha Wilkes MD;  Location: [facility] INVASIVE LOCATION;  Service:    • CATARACT EXTRACTION Bilateral    • CATARACT EXTRACTION  10/04/2011    Remove cataract, insert lens (Right)   • CATARACT EXTRACTION  08/23/2011    Remove cataract, insert lens (left)   • COLONOSCOPY     • COLONOSCOPY      Colon endoscopy 93513 (Rectal bleeding. Radiation proctitis w/bleeding. An abnormal CT of transverse colon due to mucosal ischemic colitis, that now is healed. Internal hemorrhoids w/possible bleeding.)   • COLONOSCOPY  05/10/2011    Colon endoscopy 76966 (Single sessile polyp found in transverse colon and  sigmoid colon ,removed by cold biopsy polypectomy.divertic. found in sigmoid colon,descending colon. Friability/capillary friability in rectum sigmoid due to prior radiation.Inter. hem. Grade 1)   • COLONOSCOPY  05/02/2007    Colon endoscopy 77887 (Colon polyp. Diverticulosis without bleeding. Internal hemorrhoids without bleeding.)   • CORONARY ARTERY BYPASS GRAFT  2002   • CYSTOSCOPY  01/13/1995    Cystoscopy, stone removal (Right ureteroscopic lasertripsy.)   • CYSTOSCOPY, URETEROSCOPY, RETROGRADE PYELOGRAM, STENT INSERTION N/A 8/28/2017    Procedure: URETEROSCOPY LASER LITHOTRIPSY WITH STENT INSERTION AND RETROGRADE PYELOGRAM ;  Surgeon: Anna M. D'Amico, MD;  Location: [facility];  Service:    • EPIDURAL  12/03/2014    Therapeutic/diag injection (Lumbar transforaminal epidural steroid injection, L5-S1, left side.)   • EPIDURAL  10/22/2014    Therapeutic/diag injection (Lumbar transforaminal epidural steroid injection L5-S1 left side.)   • EPIDURAL  08/07/2014    Therapeutic/diag injection (Lumbar transforaminal epidural steroid injection.)   • EXCISION LESION  05/13/1999    REMOVE EAR LESION 12760 (1.3 cm basal cell carcinoma, right preauricular area. Excision)   • EXCISION LESION  03/13/2001    REMOVE LESION NECK/CHEST 58621 (Excision of irritated seborrheic keratosis, anterior neck, 1.1 cm and deep lipoma, posterior neck, 3.5 cm)   • INJECTION OF MEDICATION  11/15/2012    Kenalog (4)      • JOINT REPLACEMENT  2015    partial   • KNEE ARTHROSCOPY  01/17/2007    Knee arthroscopy, surgery (Arthroscopy with medial and lateral meniscectomies, right knee.)   • KNEE SURGERY  11/04/2015    Knee Surgery (Right unicompartmental arthroplasty.)   • LUMBAR LAMINECTOMY N/A 2/7/2017    Procedure: LUMBAR LAMINECTOMY LUMBAR THREE-FOUR, LUMBAR FOUR-FIVE, INTERLAMINAR DISTRACTION ;  Surgeon: Lucio Mayo MD;  Location: [facility];  Service:    • NOSE SURGERY     • OTHER SURGICAL HISTORY      EXTENDED VISUAL FIELDS  "STUDY 27174 (Borderline glaucoma) (3): 03/19/2015, 04/09/2014, 03/27/2013   • OTHER SURGICAL HISTORY  10/29/2003    Heart revascularize (TMR) (Directmyocardial revascularization times four; LIMA to LAD SVG to DARIUSZ SVG to OM1, SVG to RCA)   • OTHER SURGICAL HISTORY      OCT DISC NFL 74630 (Borderline glaucoma)  (3): 09/24/2015, 08/13/2014, 07/29/2013   • PROSTATECTOMY     • SEPTORHINOPLASTY  11/16/1989    Nasal surgery procedure (Septorhinoplasty with reconstruction of the nasal pyramid with a iliac crest graft, rhinoplasty was performed with external approach.)   • SHOULDER ARTHROSCOPY  07/24/2013    Arthroscopy of right shoulder with rotator repair, Carla procedure, Biceps tenotomy, subacromial decompression.   • SHOULDER SURGERY  11/01/2005    Shoulder surgery procedure (Decompression, subacromial, explore of the rotator cuff, no tear found nor repaired, acromioplasty of the left shoulder and AC shoulder joint resection.)       ROS  No fevers or chills.  No chest pain or shortness of air.  No GI or  disturbances.    PHYSICAL EXAMINATION:       Ht 160 cm (63\")   Wt 65.8 kg (145 lb)   BMI 25.69 kg/m²     Physical Exam    GAIT:     []  Normal  []  Antalgic    Assistive device: []  None  []  Walker     []  Crutches  []  Cane     []  Wheelchair  []  Stretcher    Right Ankle Exam     Muscle Strength   Dorsiflexion:  3/5  Plantar flexion:  5/5  Anterior tibial:  3/5      Left Ankle Exam     Muscle Strength   Dorsiflexion:  5/5   Plantar flexion:  5/5   Anterior tibial:  5/5       Back Exam     Tenderness   The patient is experiencing tenderness in the lumbar.    Muscle Strength   Right Quadriceps:  5/5   Left Quadriceps:  5/5   Right Hamstrings:  5/5   Left Hamstrings:  5/5     Other   Erythema: no back redness  Scars: present                   Xr Chest 2 View    Result Date: 2/24/2018  Narrative: EXAM:          Radiograph(s), Chest VIEWS:    PA / Lat ; 2     DATE/TIME:  2/24/2018 1:56 PM CST            INDICATION: "   Chest Pain triage protocol  COMPARISON:  CXR: 2/1/18         FINDINGS:         - lines/tubes:    none   - cardiac:         size within normal limits       - mediastinum: contour within normal limits       - lungs:         no focal air space process, pulmonary interstitial edema, nodule(s)/mass           - pleura:         no evidence of  fluid                - osseous:         Status post median sternotomy                - misc.:        Impression: CONCLUSION:    1. No evidence of an active cardiopulmonary process.                                  Electronically signed by:  HUANG Strauss MD  2/24/2018 1:57 PM CST Workstation: [id number]            ASSESSMENT:    Diagnoses and all orders for this visit:    Degenerative disc disease, lumbar    Spinal stenosis of lumbar region, unspecified whether neurogenic claudication present    Degeneration of lumbar intervertebral disc    Status post lumbar spine operation    Neuritis or radiculitis due to rupture of lumbar intervertebral disc          PLAN    Physical therapy.  MRI of lumbar spine to assess  Xray lumbar spine with L5-S1 spot view.       Lucio Mayo MD

## 2018-03-21 ENCOUNTER — OFFICE VISIT (OUTPATIENT)
Dept: CARDIOLOGY | Facility: CLINIC | Age: 78
End: 2018-03-21

## 2018-03-21 VITALS
OXYGEN SATURATION: 98 % | WEIGHT: 145 LBS | BODY MASS INDEX: 25.69 KG/M2 | SYSTOLIC BLOOD PRESSURE: 140 MMHG | DIASTOLIC BLOOD PRESSURE: 80 MMHG | HEART RATE: 66 BPM | HEIGHT: 63 IN

## 2018-03-21 DIAGNOSIS — E78.2 MIXED HYPERLIPIDEMIA: ICD-10-CM

## 2018-03-21 DIAGNOSIS — I25.10 CORONARY ARTERY DISEASE INVOLVING NATIVE CORONARY ARTERY OF NATIVE HEART WITHOUT ANGINA PECTORIS: Primary | ICD-10-CM

## 2018-03-21 DIAGNOSIS — I71.21 ASCENDING AORTIC ANEURYSM (HCC): ICD-10-CM

## 2018-03-21 DIAGNOSIS — I21.4 NSTEMI (NON-ST ELEVATED MYOCARDIAL INFARCTION) (HCC): ICD-10-CM

## 2018-03-21 DIAGNOSIS — I10 ESSENTIAL HYPERTENSION: ICD-10-CM

## 2018-03-21 PROCEDURE — 99214 OFFICE O/P EST MOD 30 MIN: CPT | Performed by: INTERNAL MEDICINE

## 2018-03-21 NOTE — PROGRESS NOTES
Vishnu Waller  78 y.o. male    03/21/2018  1. Coronary artery disease involving native coronary artery of native heart without angina pectoris    2. Essential hypertension    3. Mixed hyperlipidemia    4. NSTEMI (non-ST elevated myocardial infarction)    5. Ascending aortic aneurysm        History of Present Illness    Mr. Waller is a 78-year-old  male with the history of diabetes mellitus, hypertension, hyperlipidemia, COPD, tobacco abuse, CAD status post CABG in 2003 when he received LIMA to LAD, SVG to diagonal, SVG to OM 2 and SVG to distal right coronary artery.  He presented with chest pain and shortness of breath and ruled in for a non-ST segment elevation myocardial infarction in February 2018.  Cardiac catheterization was hence recommended. Cardiac catheterization showed:  99% eccentric lesion noted in the proximal segment of the SVG to distal right coronary artery graft.  Successful PCI with placement of a 4.0 x 18 mm Xience Alpine stent.  Patent LIMA to Left Anterior Descending Coronary Artery.  Native LAD beyond the anastomosis was widely patent  Severe native vessel coronary artery disease with 100% occlusion of the mid LAD, mid right coronary artery.  A small to medium-sized tortuous circumflex had a proximal lesion up to 99%.  Occluded SVG to diagonal and occluded SVG to obtuse marginal 2  Ascending aortogram showing ectatic ascending aorta.  Echocardiogram performed showed:  · Left Ventricle: Left ventricular systolic function is low normal. Estimated EF = 50%  · Left ventricular wall thickness is consistent with mild-to-moderate concentric hypertrophy. Left ventricular diastolic function is normal  · Right Ventricle: Normal right ventricular cavity size, wall thickness, systolic function and septal motion noted.  · Aortic valve is thickened and calcified with moderate aortic regurgitation.  · Mild-to-moderate mitral valve regurgitation is present  · No evidence of pulmonary  hypertension is present.  · Greater Vessels: Ascending aorta is aneurysmal to 4.6 cm  · There is no evidence of pericardial effusion.    The patient has done reasonably well with no reoccurrence of chest pain.  His dyspnea has improved.  He has quit smoking.  No signs of bronchospasm or congestive heart failure was noted.    SUBJECTIVE    Allergies   Allergen Reactions   • Molds & Smuts Other (See Comments)     Sinus infection   • Hydrocodone-Acetaminophen Itching         Past Medical History:   Diagnosis Date   • Acute bacterial sinusitis    • Acute bronchitis    • Acute frontal sinusitis    • Acute maxillary sinusitis    • Acute pharyngitis    • Allergic rhinitis    • Allergic rhinitis due to pollen    • Anxiety    • Artificial lens present     in position   • Atherosclerotic heart disease of native coronary artery without angina pectoris    • Backache    • Borderline glaucoma    • Chronic laryngitis    • Chronic rhinitis    • Coronary arteriosclerosis    • Coronary atherosclerosis    • Cough    • Degeneration of lumbar intervertebral disc    • Degenerative joint disease involving multiple joints    • Diabetes    • Diverticular disease of colon    • Dizziness and giddiness    • Erectile dysfunction    • Essential hypertension    • Generalized anxiety disorder    • GERD (gastroesophageal reflux disease)    • Glaucoma    • Heart disease    • Hemorrhoids     without bleeding   • Hyperlipidemia    • Insomnia    • Kidney stone    • Kidney stones    • Malignant tumor of prostate    • Need for prophylactic vaccination and inoculation against influenza    • Osteoarthritis    • Osteoarthritis of multiple joints    • Pain, lumbar region     Pain radiating to lumbar region of back     • PONV (postoperative nausea and vomiting)    • Postviral fatigue syndrome    • Presence of aortocoronary bypass graft    • Prostate cancer    • Pseudomembranous enterocolitis     improved   • Rotator cuff syndrome     right   • Shoulder pain     • SOB (shortness of breath)    • Tenosynovitis    • Thrombocytopenia    • Type 2 diabetes mellitus     no BDR   • Upper respiratory infection          Past Surgical History:   Procedure Laterality Date   • CARDIAC CATHETERIZATION  09/25/2003    Cardiac cath (Normal left ventricular systolic function, EF 60% Multi-vessel coronary artery disease with critical disease noted in the LAD coronary artery, diagonal coronary artery, obtuse marginal coronary and right coronary artery.)   • CARDIAC CATHETERIZATION  05/03/1996    Cardiac cath (Coronary atherosclerotic heart disease. 70% diagonal stenosis. Mild to moderate left anterior descending artery stenosis. Preserved left ventricular function.)   • CARDIAC CATHETERIZATION N/A 2/26/2018    Procedure: Left Heart Cath/ pci if indicated ;  Surgeon: Radha Wilkes MD;  Location: [facility] CATH INVASIVE LOCATION;  Service:    • CATARACT EXTRACTION Bilateral    • CATARACT EXTRACTION  10/04/2011    Remove cataract, insert lens (Right)   • CATARACT EXTRACTION  08/23/2011    Remove cataract, insert lens (left)   • COLONOSCOPY     • COLONOSCOPY      Colon endoscopy 21180 (Rectal bleeding. Radiation proctitis w/bleeding. An abnormal CT of transverse colon due to mucosal ischemic colitis, that now is healed. Internal hemorrhoids w/possible bleeding.)   • COLONOSCOPY  05/10/2011    Colon endoscopy 10273 (Single sessile polyp found in transverse colon and sigmoid colon ,removed by cold biopsy polypectomy.divertic. found in sigmoid colon,descending colon. Friability/capillary friability in rectum sigmoid due to prior radiation.Inter. hem. Grade 1)   • COLONOSCOPY  05/02/2007    Colon endoscopy 16237 (Colon polyp. Diverticulosis without bleeding. Internal hemorrhoids without bleeding.)   • CORONARY ARTERY BYPASS GRAFT  2002   • CYSTOSCOPY  01/13/1995    Cystoscopy, stone removal (Right ureteroscopic lasertripsy.)   • CYSTOSCOPY, URETEROSCOPY, RETROGRADE PYELOGRAM, STENT  INSERTION N/A 8/28/2017    Procedure: URETEROSCOPY LASER LITHOTRIPSY WITH STENT INSERTION AND RETROGRADE PYELOGRAM ;  Surgeon: Anna M. D'Amico, MD;  Location: [facility];  Service:    • EPIDURAL  12/03/2014    Therapeutic/diag injection (Lumbar transforaminal epidural steroid injection, L5-S1, left side.)   • EPIDURAL  10/22/2014    Therapeutic/diag injection (Lumbar transforaminal epidural steroid injection L5-S1 left side.)   • EPIDURAL  08/07/2014    Therapeutic/diag injection (Lumbar transforaminal epidural steroid injection.)   • EXCISION LESION  05/13/1999    REMOVE EAR LESION 59452 (1.3 cm basal cell carcinoma, right preauricular area. Excision)   • EXCISION LESION  03/13/2001    REMOVE LESION NECK/CHEST 13741 (Excision of irritated seborrheic keratosis, anterior neck, 1.1 cm and deep lipoma, posterior neck, 3.5 cm)   • INJECTION OF MEDICATION  11/15/2012    Kenalog (4)      • JOINT REPLACEMENT  2015    partial   • KNEE ARTHROSCOPY  01/17/2007    Knee arthroscopy, surgery (Arthroscopy with medial and lateral meniscectomies, right knee.)   • KNEE SURGERY  11/04/2015    Knee Surgery (Right unicompartmental arthroplasty.)   • LUMBAR LAMINECTOMY N/A 2/7/2017    Procedure: LUMBAR LAMINECTOMY LUMBAR THREE-FOUR, LUMBAR FOUR-FIVE, INTERLAMINAR DISTRACTION ;  Surgeon: Lucio Mayo MD;  Location: [facility];  Service:    • NOSE SURGERY     • OTHER SURGICAL HISTORY      EXTENDED VISUAL FIELDS STUDY 47458 (Borderline glaucoma) (3): 03/19/2015, 04/09/2014, 03/27/2013   • OTHER SURGICAL HISTORY  10/29/2003    Heart revascularize (TMR) (Directmyocardial revascularization times four; LIMA to LAD SVG to DARIUSZ SVG to OM1, SVG to RCA)   • OTHER SURGICAL HISTORY      OCT DISC NFL 28868 (Borderline glaucoma)  (3): 09/24/2015, 08/13/2014, 07/29/2013   • PROSTATECTOMY     • SEPTORHINOPLASTY  11/16/1989    Nasal surgery procedure (Septorhinoplasty with reconstruction of the nasal pyramid with a iliac crest graft,  rhinoplasty was performed with external approach.)   • SHOULDER ARTHROSCOPY  07/24/2013    Arthroscopy of right shoulder with rotator repair, Caral procedure, Biceps tenotomy, subacromial decompression.   • SHOULDER SURGERY  11/01/2005    Shoulder surgery procedure (Decompression, subacromial, explore of the rotator cuff, no tear found nor repaired, acromioplasty of the left shoulder and AC shoulder joint resection.)         Family History   Problem Relation Age of Onset   • Hypertension Mother    • Heart disease Mother    • Arthritis Mother    • Cancer Other    • Heart disease Other    • Hypertension Other          Social History     Social History   • Marital status:      Spouse name: N/A   • Number of children: N/A   • Years of education: N/A     Occupational History   • Not on file.     Social History Main Topics   • Smoking status: Current Some Day Smoker     Packs/day: 0.50     Years: 40.00     Types: Cigarettes   • Smokeless tobacco: Never Used   • Alcohol use Yes      Comment: socially   • Drug use: No   • Sexual activity: Defer     Other Topics Concern   • Not on file     Social History Narrative   • No narrative on file         Current Outpatient Prescriptions   Medication Sig Dispense Refill   • albuterol (PROAIR RESPICLICK) 108 (90 Base) MCG/ACT inhaler Inhale 180 mcg.     • aspirin 81 MG EC tablet Take 1 tablet by mouth Daily. 30 tablet 0   • atorvastatin (LIPITOR) 20 MG tablet Take 1 tablet by mouth Daily. 30 tablet 0   • budesonide-formoterol (SYMBICORT) 80-4.5 MCG/ACT inhaler Inhale 2 puffs 2 (Two) Times a Day. 3 inhaler 4   • carvedilol (COREG) 3.125 MG tablet Take 1 tablet by mouth Every 12 (Twelve) Hours. 60 tablet 0   • clopidogrel (PLAVIX) 75 MG tablet Take 1 tablet by mouth Daily. 90 tablet 2   • Cyanocobalamin (VITAMIN B 12 PO) Take 500 mcg by mouth. Three times weekly, MoWeFr     • diltiaZEM CD (CARDIZEM CD) 180 MG 24 hr capsule Take 1 capsule by mouth Daily. 90 capsule 3   •  "fluticasone (FLONASE) 50 MCG/ACT nasal spray 2 sprays into each nostril Every Night.     • isosorbide mononitrate (IMDUR) 30 MG 24 hr tablet Take 1 tablet by mouth Daily. 30 tablet 0   • latanoprost (XALATAN) 0.005 % ophthalmic solution Administer 1 drop to both eyes every night.     • losartan (COZAAR) 50 MG tablet Take 1 tablet by mouth Every Night. 90 tablet 3   • melatonin 1 MG tablet Take 3 mg by mouth At Night As Needed for sleep.     • metFORMIN (GLUCOPHAGE) 1000 MG tablet Take 1 tablet by mouth 2 (Two) Times a Day With Meals.     • montelukast (SINGULAIR) 10 MG tablet Take 10 mg by mouth Every Night.     • Multiple Vitamins-Minerals (CENTRUM PO) Take 1 tablet by mouth daily. Centrum 0.4 mg-162 mg-18 mg tablet  (unconfirmed)     • nitroglycerin (NITROSTAT) 0.4 MG SL tablet Place 1 tablet under the tongue Every 5 (Five) Minutes As Needed for Chest Pain. Take no more than 3 doses in 15 minutes. 30 tablet 0   • oxybutynin (DITROPAN) 5 MG tablet Take 5 mg by mouth Every Night.     • tamsulosin (FLOMAX) 0.4 MG capsule 24 hr capsule Take 1 capsule by mouth Every Night.     • TRAVATAN Z 0.004 % solution ophthalmic solution      • VENTOLIN  (90 Base) MCG/ACT inhaler INHALE 2 PUFFS BY MOUTH EVERY 4 HOURS AS NEEDED FOR WHEEZING AND COUGH  1   • omeprazole (priLOSEC) 20 MG capsule Take 20 mg by mouth Daily.     • raNITIdine (ZANTAC) 75 MG tablet Take 75 mg by mouth 2 (Two) Times a Day.       No current facility-administered medications for this visit.          OBJECTIVE    /80   Pulse 66   Ht 160 cm (62.99\")   Wt 65.8 kg (145 lb)   SpO2 98%   BMI 25.69 kg/m²         Review of Systems     Constitutional:  Denies recent weight loss, weight gain, fever or chills, no change in exercise tolerance     HENT:  Denies any hearing loss, epistaxis, hoarseness, or difficulty speaking.     Eyes: Wears eyeglasses or contact lenses     Respiratory: Occasional dyspnea with exertion,no cough, wheezing, or hemoptysis. "     Cardiovascular: Negative for palpations, chest pain, orthopnea, PND, peripheral edema, syncope, or claudication.     Gastrointestinal:  Denies change in bowel habits, dyspepsia, ulcer disease, hematochezia, or melena.     Endocrine: Negative for cold intolerance, heat intolerance, polydipsia, polyphagia and polyuria.     Genitourinary: Negative.      Musculoskeletal: DJD    Skin:  Denies any change in hair or nails, rashes, or skin lesions.     Allergic/Immunologic: Negative.  Negative for environmental allergies, food allergies and immunocompromised state.     Neurological:  Denies any history of recurrent headaches, strokes, TIA, or seizure disorder.     Hematological: Denies any food allergies, seasonal allergies, bleeding disorders, or lymphadenopathy.     Psychiatric/Behavioral: Denies any history of depression, substance abuse, or change in cognitive function.         Physical Exam     Constitutional: Cooperative, alert and oriented, well-developed, well-nourished, in no acute distress.     HENT:   Head: Normocephalic, normal hair patterns, no masses or tenderness.  Ears, Nose, and Throat: No gross abnormalities. No pallor or cyanosis.   Eyes: EOMS intact, PERRL, conjunctivae and lids unremarkable. Fundoscopic exam and visual fields not performed.   Neck: No palpable masses or adenopathy, no thyromegaly, no JVD, carotid pulses are full and equal bilaterally and without  Bruits.     Cardiovascular: Regular rhythm, S1 and S2 normal, no S3 or S4.  No murmurs, gallops, or rubs detected.     Pulmonary/Chest: Chest: Increased AP diameter chest, normal respiratory excursion, no intercostal retraction, no use of accessory muscles.            Pulmonary: Normal breath sounds. No rales or ronchi.    Abdominal: Abdomen soft, bowel sounds normoactive, no masses, no hepatosplenomegaly, non-tender, no bruits.     Musculoskeletal: No deformities, clubbing, cyanosis, erythema, or edema observed. There are no spinal  abnormalities noted.     Neurological: No gross motor or sensory deficits noted, affect appropriate, oriented to time, person, place.     Skin: Warm and dry to the touch, no apparent skin lesions or masses noted.     Psychiatric: He has a normal mood and affect. His behavior is normal. Judgment and thought content normal.         Procedures      Lab Results   Component Value Date    WBC 9.26 02/27/2018    HGB 9.4 (L) 02/27/2018    HCT 27.4 (L) 02/27/2018    MCV 96.8 02/27/2018     02/27/2018     Lab Results   Component Value Date    GLUCOSE 92 02/27/2018    BUN 19 02/27/2018    CREATININE 0.95 02/27/2018    EGFRIFNONA 77 02/27/2018    BCR 20.0 02/27/2018    CO2 21.0 (L) 02/27/2018    CALCIUM 8.7 02/27/2018    ALBUMIN 3.10 (L) 02/25/2018    LABIL2 1.3 02/25/2018    AST 54 02/25/2018    ALT 35 02/25/2018     No results found for: CHOL  No results found for: TRIG  No results found for: HDL  No components found for: LDLCALC  No results found for: LDL  No results found for: HDLLDLRATIO  No components found for: CHOLHDL  Lab Results   Component Value Date    HGBA1C 6.8 (H) 02/24/2018     Lab Results   Component Value Date    TSH 1.070 02/24/2018           ASSESSMENT AND PLAN  Mr. Waller is progressing well following his recent PCI with no reoccurrence of chest pain.  His dyspnea is stable.  No bronchospasm or congestive heart failure was noted.  Antiplatelet therapy with aspirin and Plavix, management of heart rate and blood pressure with diltiazem CD and Coreg, antihypertensive therapy with losartan, antianginal therapy with isosorbide mononitrate and lipid-lowering therapy with atorvastatin has been continued.  He is on bronchodilators as prescribed by Dr. Koehler.    Vishnu was seen today for follow-up.    Diagnoses and all orders for this visit:    Coronary artery disease involving native coronary artery of native heart without angina pectoris    Essential hypertension    Mixed hyperlipidemia    NSTEMI (non-ST  elevated myocardial infarction)    Ascending aortic aneurysm        Radha Wilkes MD  3/22/2018  9:44 PM

## 2018-03-22 ENCOUNTER — HOSPITAL ENCOUNTER (OUTPATIENT)
Dept: MRI IMAGING | Facility: HOSPITAL | Age: 78
Discharge: HOME | End: 2018-03-22
Admitting: ORTHOPAEDIC SURGERY

## 2018-03-22 DIAGNOSIS — M48.061 SPINAL STENOSIS OF LUMBAR REGION, UNSPECIFIED WHETHER NEUROGENIC CLAUDICATION PRESENT: ICD-10-CM

## 2018-03-22 DIAGNOSIS — M51.36 DEGENERATIVE DISC DISEASE, LUMBAR: ICD-10-CM

## 2018-03-22 PROCEDURE — 72148 MRI LUMBAR SPINE W/O DYE: CPT

## 2018-03-26 ENCOUNTER — APPOINTMENT (OUTPATIENT)
Dept: PHYSICAL THERAPY | Facility: HOSPITAL | Age: 78
End: 2018-03-26
Attending: ORTHOPAEDIC SURGERY

## 2018-03-28 RX ORDER — DILTIAZEM HYDROCHLORIDE 180 MG/1
180 CAPSULE, COATED, EXTENDED RELEASE ORAL DAILY
Qty: 30 CAPSULE | Refills: 6 | Status: SHIPPED | OUTPATIENT
Start: 2018-03-28 | End: 2018-12-27 | Stop reason: SDUPTHER

## 2018-04-04 DIAGNOSIS — M51.36 DEGENERATIVE DISC DISEASE, LUMBAR: Primary | ICD-10-CM

## 2018-04-06 ENCOUNTER — OFFICE VISIT (OUTPATIENT)
Dept: ORTHOPEDIC SURGERY | Facility: CLINIC | Age: 78
End: 2018-04-06

## 2018-04-06 VITALS — BODY MASS INDEX: 27.23 KG/M2 | WEIGHT: 148 LBS | HEIGHT: 62 IN

## 2018-04-06 DIAGNOSIS — Z98.890 STATUS POST LUMBAR SPINE OPERATION: ICD-10-CM

## 2018-04-06 DIAGNOSIS — M51.16 NEURITIS OR RADICULITIS DUE TO RUPTURE OF LUMBAR INTERVERTEBRAL DISC: ICD-10-CM

## 2018-04-06 DIAGNOSIS — M48.061 SPINAL STENOSIS OF LUMBAR REGION, UNSPECIFIED WHETHER NEUROGENIC CLAUDICATION PRESENT: ICD-10-CM

## 2018-04-06 DIAGNOSIS — M51.36 DEGENERATIVE DISC DISEASE, LUMBAR: Primary | ICD-10-CM

## 2018-04-06 PROCEDURE — 99213 OFFICE O/P EST LOW 20 MIN: CPT | Performed by: ORTHOPAEDIC SURGERY

## 2018-04-06 NOTE — PROGRESS NOTES
Vishnu Waller is a 78 y.o. male returns for     Chief Complaint   Patient presents with   • Lumbar Spine - Follow-up, Pain   • Results     mri lumbar spine, xrays done today.        HISTORY OF PRESENT ILLNESS:  77 y/o returns for follow up evaluations he had an MRI done of the lumbar spine 3/22/18.  He continues to have back pain, the pain is debilitating at times.  The pain is sometimes unrelenting.   He describes paresthesias of the legs.  The pain is intermittent.        CONCURRENT MEDICAL HISTORY:    Past Medical History:   Diagnosis Date   • Acute bacterial sinusitis    • Acute bronchitis    • Acute frontal sinusitis    • Acute maxillary sinusitis    • Acute pharyngitis    • Allergic rhinitis    • Allergic rhinitis due to pollen    • Anxiety    • Artificial lens present     in position   • Atherosclerotic heart disease of native coronary artery without angina pectoris    • Backache    • Borderline glaucoma    • Chronic laryngitis    • Chronic rhinitis    • Coronary arteriosclerosis    • Coronary atherosclerosis    • Cough    • Degeneration of lumbar intervertebral disc    • Degenerative joint disease involving multiple joints    • Diabetes    • Diverticular disease of colon    • Dizziness and giddiness    • Erectile dysfunction    • Essential hypertension    • Generalized anxiety disorder    • GERD (gastroesophageal reflux disease)    • Glaucoma    • Heart disease    • Hemorrhoids     without bleeding   • Hyperlipidemia    • Insomnia    • Kidney stone    • Kidney stones    • Malignant tumor of prostate    • Need for prophylactic vaccination and inoculation against influenza    • Osteoarthritis    • Osteoarthritis of multiple joints    • Pain, lumbar region     Pain radiating to lumbar region of back     • PONV (postoperative nausea and vomiting)    • Postviral fatigue syndrome    • Presence of aortocoronary bypass graft    • Prostate cancer    • Pseudomembranous enterocolitis     improved   • Rotator  cuff syndrome     right   • Shoulder pain    • SOB (shortness of breath)    • Tenosynovitis    • Thrombocytopenia    • Type 2 diabetes mellitus     no BDR   • Upper respiratory infection        Allergies   Allergen Reactions   • Molds & Smuts Other (See Comments)     Sinus infection   • Hydrocodone-Acetaminophen Itching         Current Outpatient Prescriptions:   •  albuterol (PROAIR RESPICLICK) 108 (90 Base) MCG/ACT inhaler, Inhale 180 mcg., Disp: , Rfl:   •  aspirin 81 MG EC tablet, Take 1 tablet by mouth Daily., Disp: 30 tablet, Rfl: 0  •  atorvastatin (LIPITOR) 20 MG tablet, Take 1 tablet by mouth Daily., Disp: 30 tablet, Rfl: 0  •  budesonide-formoterol (SYMBICORT) 80-4.5 MCG/ACT inhaler, Inhale 2 puffs 2 (Two) Times a Day., Disp: 3 inhaler, Rfl: 4  •  carvedilol (COREG) 3.125 MG tablet, Take 1 tablet by mouth Every 12 (Twelve) Hours., Disp: 60 tablet, Rfl: 0  •  clopidogrel (PLAVIX) 75 MG tablet, Take 1 tablet by mouth Daily., Disp: 90 tablet, Rfl: 2  •  Cyanocobalamin (VITAMIN B 12 PO), Take 500 mcg by mouth. Three times weekly, MoWeFr, Disp: , Rfl:   •  diltiaZEM CD (CARDIZEM CD) 180 MG 24 hr capsule, Take 1 capsule by mouth Daily., Disp: 90 capsule, Rfl: 3  •  diltiaZEM CD (CARDIZEM CD) 180 MG 24 hr capsule, TAKE 1 CAPSULE BY MOUTH DAILY., Disp: 30 capsule, Rfl: 6  •  fluticasone (FLONASE) 50 MCG/ACT nasal spray, 2 sprays into each nostril Every Night., Disp: , Rfl:   •  isosorbide mononitrate (IMDUR) 30 MG 24 hr tablet, Take 1 tablet by mouth Daily., Disp: 30 tablet, Rfl: 0  •  latanoprost (XALATAN) 0.005 % ophthalmic solution, Administer 1 drop to both eyes every night., Disp: , Rfl:   •  losartan (COZAAR) 50 MG tablet, Take 1 tablet by mouth Every Night., Disp: 90 tablet, Rfl: 3  •  melatonin 1 MG tablet, Take 3 mg by mouth At Night As Needed for sleep., Disp: , Rfl:   •  metFORMIN (GLUCOPHAGE) 1000 MG tablet, Take 1 tablet by mouth 2 (Two) Times a Day With Meals., Disp: , Rfl:   •  montelukast  (SINGULAIR) 10 MG tablet, Take 10 mg by mouth Every Night., Disp: , Rfl:   •  Multiple Vitamins-Minerals (CENTRUM PO), Take 1 tablet by mouth daily. Centrum 0.4 mg-162 mg-18 mg tablet  (unconfirmed), Disp: , Rfl:   •  nitroglycerin (NITROSTAT) 0.4 MG SL tablet, Place 1 tablet under the tongue Every 5 (Five) Minutes As Needed for Chest Pain. Take no more than 3 doses in 15 minutes., Disp: 30 tablet, Rfl: 0  •  omeprazole (priLOSEC) 20 MG capsule, Take 20 mg by mouth Daily., Disp: , Rfl:   •  oxybutynin (DITROPAN) 5 MG tablet, Take 5 mg by mouth Every Night., Disp: , Rfl:   •  raNITIdine (ZANTAC) 75 MG tablet, Take 75 mg by mouth 2 (Two) Times a Day., Disp: , Rfl:   •  tamsulosin (FLOMAX) 0.4 MG capsule 24 hr capsule, Take 1 capsule by mouth Every Night., Disp: , Rfl:   •  TRAVATAN Z 0.004 % solution ophthalmic solution, , Disp: , Rfl:   •  VENTOLIN  (90 Base) MCG/ACT inhaler, INHALE 2 PUFFS BY MOUTH EVERY 4 HOURS AS NEEDED FOR WHEEZING AND COUGH, Disp: , Rfl: 1    Past Surgical History:   Procedure Laterality Date   • CARDIAC CATHETERIZATION  09/25/2003    Cardiac cath (Normal left ventricular systolic function, EF 60% Multi-vessel coronary artery disease with critical disease noted in the LAD coronary artery, diagonal coronary artery, obtuse marginal coronary and right coronary artery.)   • CARDIAC CATHETERIZATION  05/03/1996    Cardiac cath (Coronary atherosclerotic heart disease. 70% diagonal stenosis. Mild to moderate left anterior descending artery stenosis. Preserved left ventricular function.)   • CARDIAC CATHETERIZATION N/A 2/26/2018    Procedure: Left Heart Cath/ pci if indicated ;  Surgeon: Radha Wilkes MD;  Location: [facility] INVASIVE LOCATION;  Service:    • CATARACT EXTRACTION Bilateral    • CATARACT EXTRACTION  10/04/2011    Remove cataract, insert lens (Right)   • CATARACT EXTRACTION  08/23/2011    Remove cataract, insert lens (left)   • COLONOSCOPY     • COLONOSCOPY      Colon  endoscopy 79244 (Rectal bleeding. Radiation proctitis w/bleeding. An abnormal CT of transverse colon due to mucosal ischemic colitis, that now is healed. Internal hemorrhoids w/possible bleeding.)   • COLONOSCOPY  05/10/2011    Colon endoscopy 65948 (Single sessile polyp found in transverse colon and sigmoid colon ,removed by cold biopsy polypectomy.divertic. found in sigmoid colon,descending colon. Friability/capillary friability in rectum sigmoid due to prior radiation.Inter. hem. Grade 1)   • COLONOSCOPY  05/02/2007    Colon endoscopy 11318 (Colon polyp. Diverticulosis without bleeding. Internal hemorrhoids without bleeding.)   • CORONARY ARTERY BYPASS GRAFT  2002   • CYSTOSCOPY  01/13/1995    Cystoscopy, stone removal (Right ureteroscopic lasertripsy.)   • CYSTOSCOPY, URETEROSCOPY, RETROGRADE PYELOGRAM, STENT INSERTION N/A 8/28/2017    Procedure: URETEROSCOPY LASER LITHOTRIPSY WITH STENT INSERTION AND RETROGRADE PYELOGRAM ;  Surgeon: Anna M. D'Amico, MD;  Location: [facility];  Service:    • EPIDURAL  12/03/2014    Therapeutic/diag injection (Lumbar transforaminal epidural steroid injection, L5-S1, left side.)   • EPIDURAL  10/22/2014    Therapeutic/diag injection (Lumbar transforaminal epidural steroid injection L5-S1 left side.)   • EPIDURAL  08/07/2014    Therapeutic/diag injection (Lumbar transforaminal epidural steroid injection.)   • EXCISION LESION  05/13/1999    REMOVE EAR LESION 94272 (1.3 cm basal cell carcinoma, right preauricular area. Excision)   • EXCISION LESION  03/13/2001    REMOVE LESION NECK/CHEST 68709 (Excision of irritated seborrheic keratosis, anterior neck, 1.1 cm and deep lipoma, posterior neck, 3.5 cm)   • INJECTION OF MEDICATION  11/15/2012    Kenalog (4)      • JOINT REPLACEMENT  2015    partial   • KNEE ARTHROSCOPY  01/17/2007    Knee arthroscopy, surgery (Arthroscopy with medial and lateral meniscectomies, right knee.)   • KNEE SURGERY  11/04/2015    Knee Surgery (Right  "unicompartmental arthroplasty.)   • LUMBAR LAMINECTOMY N/A 2/7/2017    Procedure: LUMBAR LAMINECTOMY LUMBAR THREE-FOUR, LUMBAR FOUR-FIVE, INTERLAMINAR DISTRACTION ;  Surgeon: Lucio Mayo MD;  Location: [facility];  Service:    • NOSE SURGERY     • OTHER SURGICAL HISTORY      EXTENDED VISUAL FIELDS STUDY 56500 (Borderline glaucoma) (3): 03/19/2015, 04/09/2014, 03/27/2013   • OTHER SURGICAL HISTORY  10/29/2003    Heart revascularize (TMR) (Directmyocardial revascularization times four; LIMA to LAD SVG to DARIUSZ SVG to OM1, SVG to RCA)   • OTHER SURGICAL HISTORY      OCT DISC NFL 07171 (Borderline glaucoma)  (3): 09/24/2015, 08/13/2014, 07/29/2013   • PROSTATECTOMY     • SEPTORHINOPLASTY  11/16/1989    Nasal surgery procedure (Septorhinoplasty with reconstruction of the nasal pyramid with a iliac crest graft, rhinoplasty was performed with external approach.)   • SHOULDER ARTHROSCOPY  07/24/2013    Arthroscopy of right shoulder with rotator repair, Carla procedure, Biceps tenotomy, subacromial decompression.   • SHOULDER SURGERY  11/01/2005    Shoulder surgery procedure (Decompression, subacromial, explore of the rotator cuff, no tear found nor repaired, acromioplasty of the left shoulder and AC shoulder joint resection.)       ROS  No fevers or chills.  No chest pain or shortness of air.  No GI or  disturbances.    PHYSICAL EXAMINATION:       Ht 157.5 cm (62\")   Wt 67.1 kg (148 lb)   BMI 27.07 kg/m²     Physical Exam    GAIT:     []  Normal  []  Antalgic    Assistive device: [x]  None  []  Walker     []  Crutches  []  Cane     []  Wheelchair  []  Stretcher    Right Ankle Exam     Muscle Strength   Dorsiflexion:  3/5  Plantar flexion:  5/5  Anterior tibial:  3/5      Left Ankle Exam     Muscle Strength   Dorsiflexion:  5/5   Plantar flexion:  5/5   Anterior tibial:  5/5       Back Exam     Tenderness   The patient is experiencing tenderness in the lumbar.    Muscle Strength   Right Quadriceps:  5/5 "   Left Quadriceps:  5/5   Right Hamstrings:  5/5   Left Hamstrings:  5/5     Other   Erythema: no back redness  Scars: present                    Xr Spine Lumbar 2 Or 3 Vw    Result Date: 4/6/2018  Narrative: Xray of lumbar spine with sarina posterior and lateral projection view the bone quality is poor.  The lumbar alignment is maintained. Interlaminar distraction device is present L3-4, the L4-5 interlaminar distraction device has dislocated.      Mri Lumbar Spine Without Contrast    Result Date: 3/22/2018  Narrative: Procedure: MRI lumbar spine without contrast Reason for exam: Degenerative disc disease lumbar spine. Prior lumbar spine surgery. Chronic lumbar pain with difficulty moving right foot. FINDINGS: Multisequence multiplanar MR imaging of the lumbar spine was performed without contrast. Comparison MR lumbar spine exam dated September 21, 2017 as well as plain films dated April 17, 2017. Stable appearance of susceptibility artifact from posterior X-type devices between the spinous processes of L5-S1 and L4-L5. Stable mild grade 1 spondylolisthesis of the L5 in relation S1 vertebral body. Stable L3 on L4 retrolisthesis by 0.3 cm. Stable L5 chronic bilateral spondylolysis versus postsurgical changes. No evidence of acute fracture or dislocation. The conus of the spinal cord appears normal with tip at the T12-L1 intervertebral disc space level. T10-T11: Disc space normal. No disc protrusion or extrusion. Spinal cord and nerve roots exit without compromise. T11-T12: Disc space normal. No disc protrusion or extrusion. Spinal cord and nerve roots appear to exit without compromise. T12-L1: Disc space normal. No disc protrusion or extrusion. Nerve roots exit without compromise. L1-L2: Diffuse loss of disc space height consistent with degenerative disc disease. Anterior and posterior osteophytosis which is mildly indenting the anterior thecal sac. Nerve roots exit without compromise. L2-L3: Diffuse mild disc  protrusion mildly indenting the anterior thecal sac. Nerve roots exit without compromise. L3-L4: Diffuse loss of disc space height suspicious for degenerative disc disease. Diffuse mild disc protrusion mildly indenting the anterior thecal sac. Nerve roots exit without compromise. L4-L5: Diffuse loss of disc space height suspicious for degenerative disc disease. Diffuse disc protrusion which extends somewhat eccentrically into the region of left lateral recess causing moderate left lateral recess stenosis. Cannot exclude impingement upon the left L5 nerve root within the left lateral recess. Otherwise nerve roots exit without compromise. Recommend clinical correlation. L5-S1: Disc space normal. No disc protrusion or extrusion. Nerve roots exit without compromise.     Impression: 1.  Stable appearance of susceptibility artifact from posterior X-type devices between the spinous processes of L5-S1 and L4-L5. Stable mild grade 1 spondylolisthesis of the L5 in relation S1 vertebral body. Stable L3 on L4 retrolisthesis by 0.3 cm. Stable L5 chronic bilateral spondylolysis versus postsurgical changes. 2.  L4-L5: Diffuse loss of disc space height suspicious for degenerative disc disease. Diffuse disc protrusion which extends somewhat eccentrically into the region of left lateral recess causing moderate left lateral recess stenosis. Cannot exclude impingement upon the left L5 nerve root within the left lateral recess. Otherwise nerve roots exit without compromise. Recommend clinical correlation. 3.  L3-L4: Diffuse loss of disc space height suspicious for degenerative disc disease. Diffuse mild disc protrusion mildly indenting the anterior thecal sac. Nerve roots exit without compromise. 4.  L2-L3: Diffuse mild disc protrusion mildly indenting the anterior thecal sac. Nerve roots exit without compromise. 5.  L1-L2: Diffuse loss of disc space height consistent with degenerative disc disease. Anterior and posterior osteophytosis  which is mildly indenting the anterior thecal sac. Nerve roots exit without compromise. Electronically signed by:  Alexey Alba MD  3/22/2018 12:41 PM CDT Workstation: [id number]    I reviewed MRI of the lumbar spine which is performed 3/22/18. There is multilevel lumbar degenerative disc disease of L3-4 L4-5 and L5-S1, facet spondylosis, lumbar disc bulging.            ASSESSMENT:    Diagnoses and all orders for this visit:    Degenerative disc disease, lumbar    Spinal stenosis of lumbar region, unspecified whether neurogenic claudication present    Status post lumbar spine operation    Neuritis or radiculitis due to rupture of lumbar intervertebral disc          PLAN    I reviewed with him the imaging and discussed treatment options.  I explained to him that non surgical and surgical treatment options are consideration for him.    Spinal cord stimulator and pain interventional techniques are a consideration.        Lucio Mayo MD

## 2018-04-20 ENCOUNTER — OFFICE VISIT (OUTPATIENT)
Dept: PULMONOLOGY | Facility: CLINIC | Age: 78
End: 2018-04-20

## 2018-04-20 VITALS
BODY MASS INDEX: 25.82 KG/M2 | HEIGHT: 63 IN | HEART RATE: 83 BPM | SYSTOLIC BLOOD PRESSURE: 167 MMHG | OXYGEN SATURATION: 98 % | WEIGHT: 145.7 LBS | DIASTOLIC BLOOD PRESSURE: 70 MMHG

## 2018-04-20 DIAGNOSIS — F12.90 MARIJUANA USE: ICD-10-CM

## 2018-04-20 DIAGNOSIS — J44.9 COPD WITH ASTHMA (HCC): Primary | ICD-10-CM

## 2018-04-20 DIAGNOSIS — J30.89 CHRONIC NON-SEASONAL ALLERGIC RHINITIS, UNSPECIFIED TRIGGER: ICD-10-CM

## 2018-04-20 DIAGNOSIS — Z87.891 PERSONAL HISTORY OF TOBACCO USE, PRESENTING HAZARDS TO HEALTH: ICD-10-CM

## 2018-04-20 PROBLEM — Z72.0 TOBACCO ABUSE: Status: RESOLVED | Noted: 2018-02-14 | Resolved: 2018-04-20

## 2018-04-20 PROCEDURE — 99214 OFFICE O/P EST MOD 30 MIN: CPT | Performed by: INTERNAL MEDICINE

## 2018-04-20 NOTE — PROGRESS NOTES
Pulmonary Office Follow-up    Subjective     Vishnu Waller is seen today at the office for   Chief Complaint   Patient presents with   • Follow-up     4 week fab   • COPD         HPI  Vishnu Waller is a 78 y.o. male with a PMH significant for COPD with asthma, tobacco use, allergies, CAD s/p CABG, HTN, DM, and borderline glaucoma who presents for follow-up of COPD.  He was last seen on 3/15/18, at which time I recommended changing to Symbicort and encouraged him to use Flonase regularly.  He states since his last visit he's been doing well on the Symbicort and has not needed his albuterol.  Maintain smoking cessation, but admits he recently started smoking marijuana on average twice a week due to his debilitating back pain.  He denies fever, cough, chest pain, or sputum.  Patient also denies any recent exacerbations or steroid use.      Patient Active Problem List   Diagnosis   • Degeneration of lumbar intervertebral disc   • Low back pain without sciatica   • Neuritis or radiculitis due to rupture of lumbar intervertebral disc   • Borderline glaucoma, open angle with borderline findings   • Pseudophakia   • Chronic coronary artery disease   • History of coronary artery bypass surgery   • Essential hypertension   • Mixed hyperlipidemia   • Thrombocytopenia   • Spinal stenosis of lumbar region   • Degenerative disc disease, lumbar   • Chronic pain of right knee   • History of artificial joint   • B12 deficiency   • Degeneration of intervertebral disc of lumbosacral region   • Personal history of other infectious and parasitic diseases   • Status post lumbar spine operation   • History of pyelonephritis   • History of surgical procedure   • History of repair of rotator cuff   • Encounter for follow-up examination after completed treatment for conditions other than malignant neoplasm   • Encounter for general adult medical examination without abnormal findings   • Osteoarthritis of knee   •  Osteoarthritis of multiple joints   • Primary insomnia   • Encounter for screening for malignant neoplasm of colon   • Encounter for screening for malignant neoplasm of prostate   • Type 2 diabetes mellitus without complication, without long-term current use of insulin   • Coronary artery disease involving native coronary artery of native heart without angina pectoris   • SOB (shortness of breath)   • Presence of aortocoronary bypass graft   • Dizziness and giddiness   • Postviral fatigue syndrome   • Postviral fatigue syndrome   • Recurrent kidney stones   • Thrombocytopenia   • Pyelonephritis   • COPD with asthma   • Chronic non-seasonal allergic rhinitis   • Unstable angina   • NSTEMI (non-ST elevated myocardial infarction)   • Ascending aortic aneurysm   • History of PTCA 1   • Medicare annual wellness visit, subsequent       Review of Systems  Review of Systems   Constitutional: Negative for fever and unexpected weight change.   HENT: Negative for congestion and rhinorrhea.    Respiratory: Negative for cough, shortness of breath and wheezing.    Cardiovascular: Negative for chest pain.   Genitourinary: Positive for flank pain.   Musculoskeletal: Positive for arthralgias and back pain.     As described in the HPI. Otherwise, remainder of ROS (14 systems) were negative.    Medications, Allergies, Social, and Family Histories reviewed as per EMR.    Objective     Vitals:    04/20/18 1415   BP: 167/70   Pulse: 83   SpO2: 98%     Physical Exam   Constitutional: He is oriented to person, place, and time. Vital signs are normal. He appears well-developed and well-nourished.   HENT:   Head: Normocephalic and atraumatic.   Nose: Mucosal edema (L>R) present.   Mouth/Throat: Uvula is midline, oropharynx is clear and moist and mucous membranes are normal.   Mallampati   Eyes: Conjunctivae, EOM and lids are normal. Pupils are equal, round, and reactive to light.   Neck: Trachea normal and normal range of motion. No tracheal  tenderness present. No thyroid mass present.   Cardiovascular: Regular rhythm and normal heart sounds.  Tachycardia present.  PMI is not displaced.  Exam reveals no gallop.    No murmur heard.  Pulmonary/Chest: Effort normal and breath sounds normal. No respiratory distress. He has no decreased breath sounds. He has no wheezes. He has no rhonchi. He exhibits no tenderness.   Abdominal: Soft. Normal appearance and bowel sounds are normal. There is no hepatomegaly. There is no tenderness.   Musculoskeletal:   Normal gait, no extremity edema     Vascular Status -  His right foot exhibits no edema. His left foot exhibits no edema.  Lymphadenopathy:        Head (right side): No submandibular adenopathy present.        Head (left side): No submandibular adenopathy present.     He has no cervical adenopathy.        Right: No supraclavicular adenopathy present.        Left: No supraclavicular adenopathy present.   Neurological: He is alert and oriented to person, place, and time. Gait normal.   Skin: Skin is warm and dry. No rash noted. No cyanosis. Nails show no clubbing.   Psychiatric: He has a normal mood and affect. His speech is normal and behavior is normal. Judgment normal.   Nursing note and vitals reviewed.          Assessment/Plan     Vishnu was seen today for follow-up and copd.    Diagnoses and all orders for this visit:    COPD with asthma    Personal history of tobacco use, presenting hazards to health    Chronic non-seasonal allergic rhinitis, unspecified trigger    Marijuana use         Discussion/ Recommendations:   He is doing well since changing to Symbicort and has not required his albuterol.  Unfortunately, he has had debilitating back pain which has led him to use marijuana intermittently to help with the pain.  He has not yet restarted smoking tobacco ago.  His allergies are well controlled at this point.    -Continue Symbicort twice daily and albuterol as needed only.  -Continue Flonase and Singulair  daily.  -Cautioned the patient on ongoing inhalational exposure with marijuana use as it can aggravate his underlying COPD.  -Annual LD CT screening February 2019.    Patient's Body mass index is 25.81 kg/m². BMI is within normal parameters. No follow-up required.        Return in about 3 months (around 7/20/2018) for Recheck.          This document has been electronically signed by Camryn Koehler MD on April 20, 2018 2:51 PM      Dictated using Dragon

## 2018-04-23 ENCOUNTER — OFFICE VISIT (OUTPATIENT)
Dept: CARDIAC SURGERY | Facility: CLINIC | Age: 78
End: 2018-04-23

## 2018-04-23 VITALS
SYSTOLIC BLOOD PRESSURE: 160 MMHG | TEMPERATURE: 96.6 F | HEIGHT: 63 IN | BODY MASS INDEX: 26.05 KG/M2 | HEART RATE: 81 BPM | OXYGEN SATURATION: 99 % | WEIGHT: 147 LBS | DIASTOLIC BLOOD PRESSURE: 70 MMHG

## 2018-04-23 DIAGNOSIS — E11.9 TYPE 2 DIABETES MELLITUS WITHOUT COMPLICATION, WITHOUT LONG-TERM CURRENT USE OF INSULIN (HCC): ICD-10-CM

## 2018-04-23 DIAGNOSIS — I25.10 CORONARY ARTERY DISEASE INVOLVING NATIVE CORONARY ARTERY OF NATIVE HEART WITHOUT ANGINA PECTORIS: ICD-10-CM

## 2018-04-23 DIAGNOSIS — I71.21 ASCENDING AORTIC ANEURYSM (HCC): ICD-10-CM

## 2018-04-23 DIAGNOSIS — I10 ESSENTIAL HYPERTENSION: ICD-10-CM

## 2018-04-23 DIAGNOSIS — I71.20 THORACIC AORTIC ANEURYSM WITHOUT RUPTURE (HCC): Primary | ICD-10-CM

## 2018-04-23 DIAGNOSIS — E78.2 MIXED HYPERLIPIDEMIA: ICD-10-CM

## 2018-04-23 PROCEDURE — 99204 OFFICE O/P NEW MOD 45 MIN: CPT | Performed by: THORACIC SURGERY (CARDIOTHORACIC VASCULAR SURGERY)

## 2018-05-02 ENCOUNTER — HOSPITAL ENCOUNTER (OUTPATIENT)
Dept: CT IMAGING | Facility: HOSPITAL | Age: 78
Discharge: HOME | End: 2018-05-02
Admitting: THORACIC SURGERY (CARDIOTHORACIC VASCULAR SURGERY)

## 2018-05-02 DIAGNOSIS — I71.20 THORACIC AORTIC ANEURYSM WITHOUT RUPTURE (HCC): ICD-10-CM

## 2018-05-02 PROCEDURE — 71250 CT THORAX DX C-: CPT

## 2018-05-06 PROBLEM — I71.20 THORACIC AORTIC ANEURYSM WITHOUT RUPTURE (HCC): Status: ACTIVE | Noted: 2018-05-06

## 2018-05-06 NOTE — PROGRESS NOTES
4/23/2018    Vishnu Waller  1940    Chief Complaint:    Chief Complaint   Patient presents with   • Aortic Aneurysm       HPI:      PCP:  Johnathon Shaikh MD  Cardiology:  Dr Wilkes      78 y.o. male with HTN(uncontrolled, increased risk stroke, rupture), Hyperlipidemia(stable, increased risk cardiovascular events) and Diabetes Mellitus(stable, increased risk cardiovascular events) , CAD(stable, PCI 2018, CABG 2003), thoracic aortic aneurysm(new, increased risk rupture).  former smoker.  Moderate numbness both legs x years.  No new chest back pain..  No TIA stroke amaurosis.  No MI claudication. No other associated signs, symptoms or modifying factors.    2/2018 Echocardiogram:  EF 50%, LVH, LA 21mm, LV 47mm, RVSP 35mmHg.  Mild to moderate MR, moderate AI.  Ascending aorta 46mm.    The following portions of the patient's history were reviewed and updated as appropriate: allergies, current medications, past family history, past medical history, past social history, past surgical history and problem list.  Recent images independently reviewed.  Available laboratory values reviewed.    PMH:  Past Medical History:   Diagnosis Date   • Acute bacterial sinusitis    • Acute bronchitis    • Acute frontal sinusitis    • Acute maxillary sinusitis    • Acute pharyngitis    • Allergic rhinitis    • Allergic rhinitis due to pollen    • Anxiety    • Artificial lens present     in position   • Backache    • Borderline glaucoma    • Chronic laryngitis    • Chronic rhinitis    • Cough    • Degeneration of lumbar intervertebral disc    • Degenerative joint disease involving multiple joints    • Diverticular disease of colon    • Dizziness and giddiness    • Erectile dysfunction    • Essential hypertension    • Generalized anxiety disorder    • GERD (gastroesophageal reflux disease)    • Glaucoma    • Hemorrhoids     without bleeding   • Insomnia    • Kidney stone    • Kidney stones    • Malignant tumor of prostate     • Need for prophylactic vaccination and inoculation against influenza    • Osteoarthritis    • Osteoarthritis of multiple joints    • Pain, lumbar region     Pain radiating to lumbar region of back     • PONV (postoperative nausea and vomiting)    • Postviral fatigue syndrome    • Presence of aortocoronary bypass graft    • Prostate cancer    • Pseudomembranous enterocolitis     improved   • Rotator cuff syndrome     right   • Shoulder pain    • SOB (shortness of breath)    • Tenosynovitis    • Thrombocytopenia    • Upper respiratory infection        ALLERGIES:  Allergies   Allergen Reactions   • Molds & Smuts Other (See Comments)     Sinus infection   • Hydrocodone-Acetaminophen Itching         MEDICATIONS:    Current Outpatient Prescriptions:   •  albuterol (PROAIR RESPICLICK) 108 (90 Base) MCG/ACT inhaler, Inhale 180 mcg., Disp: , Rfl:   •  aspirin 81 MG EC tablet, Take 1 tablet by mouth Daily., Disp: 30 tablet, Rfl: 0  •  atorvastatin (LIPITOR) 20 MG tablet, Take 1 tablet by mouth Daily., Disp: 30 tablet, Rfl: 0  •  budesonide-formoterol (SYMBICORT) 80-4.5 MCG/ACT inhaler, Inhale 2 puffs 2 (Two) Times a Day., Disp: 3 inhaler, Rfl: 4  •  carvedilol (COREG) 3.125 MG tablet, Take 1 tablet by mouth Every 12 (Twelve) Hours., Disp: 60 tablet, Rfl: 0  •  clopidogrel (PLAVIX) 75 MG tablet, Take 1 tablet by mouth Daily., Disp: 90 tablet, Rfl: 2  •  Cyanocobalamin (VITAMIN B 12 PO), Take 500 mcg by mouth. Three times weekly, MoWeFr, Disp: , Rfl:   •  diltiaZEM CD (CARDIZEM CD) 180 MG 24 hr capsule, TAKE 1 CAPSULE BY MOUTH DAILY., Disp: 30 capsule, Rfl: 6  •  fluticasone (FLONASE) 50 MCG/ACT nasal spray, 2 sprays into each nostril Every Night., Disp: , Rfl:   •  isosorbide mononitrate (IMDUR) 30 MG 24 hr tablet, Take 1 tablet by mouth Daily., Disp: 30 tablet, Rfl: 0  •  latanoprost (XALATAN) 0.005 % ophthalmic solution, Administer 1 drop to both eyes every night., Disp: , Rfl:   •  losartan (COZAAR) 50 MG tablet, Take 1  tablet by mouth Every Night., Disp: 90 tablet, Rfl: 3  •  melatonin 1 MG tablet, Take 3 mg by mouth At Night As Needed for sleep., Disp: , Rfl:   •  metFORMIN (GLUCOPHAGE) 1000 MG tablet, Take 1 tablet by mouth 2 (Two) Times a Day With Meals., Disp: , Rfl:   •  montelukast (SINGULAIR) 10 MG tablet, Take 10 mg by mouth Every Night., Disp: , Rfl:   •  Multiple Vitamins-Minerals (CENTRUM PO), Take 1 tablet by mouth daily. Centrum 0.4 mg-162 mg-18 mg tablet  (unconfirmed), Disp: , Rfl:   •  nitroglycerin (NITROSTAT) 0.4 MG SL tablet, Place 1 tablet under the tongue Every 5 (Five) Minutes As Needed for Chest Pain. Take no more than 3 doses in 15 minutes., Disp: 30 tablet, Rfl: 0  •  omeprazole (priLOSEC) 20 MG capsule, Take 20 mg by mouth Daily., Disp: , Rfl:   •  oxybutynin (DITROPAN) 5 MG tablet, Take 5 mg by mouth Every Night., Disp: , Rfl:   •  raNITIdine (ZANTAC) 75 MG tablet, Take 75 mg by mouth 2 (Two) Times a Day., Disp: , Rfl:   •  tamsulosin (FLOMAX) 0.4 MG capsule 24 hr capsule, Take 1 capsule by mouth Every Night., Disp: , Rfl:   •  TRAVATAN Z 0.004 % solution ophthalmic solution, , Disp: , Rfl:   •  VENTOLIN  (90 Base) MCG/ACT inhaler, INHALE 2 PUFFS BY MOUTH EVERY 4 HOURS AS NEEDED FOR WHEEZING AND COUGH, Disp: , Rfl: 1    Review of Systems   Review of Systems   Constitution: Negative for malaise/fatigue, night sweats and weight loss.   HENT: Negative for hearing loss, hoarse voice and stridor.    Eyes: Negative for vision loss in left eye, vision loss in right eye and visual disturbance.   Cardiovascular: Positive for dyspnea on exertion. Negative for chest pain, leg swelling and palpitations.   Respiratory: Positive for shortness of breath. Negative for cough and hemoptysis.    Hematologic/Lymphatic: Negative for adenopathy and bleeding problem. Does not bruise/bleed easily.   Skin: Negative for color change, poor wound healing and rash.   Musculoskeletal: Positive for arthritis, back pain and neck  pain. Negative for muscle weakness.   Gastrointestinal: Negative for abdominal pain, dysphagia and heartburn.   Neurological: Negative for dizziness, numbness and seizures.   Psychiatric/Behavioral: Negative for altered mental status, depression and memory loss. The patient is not nervous/anxious.        Physical Exam   Physical Exam   Constitutional: He is oriented to person, place, and time. He is active and cooperative. He does not appear ill. No distress.   HENT:   Head: Atraumatic.   Right Ear: Hearing normal.   Left Ear: Hearing normal.   Nose: No nasal deformity. No epistaxis.   Mouth/Throat: He does not have dentures. Normal dentition.   Eyes: Conjunctivae and lids are normal. Right pupil is round and reactive. Left pupil is round and reactive.   Neck: No JVD present. Carotid bruit is not present. No tracheal deviation present. No thyroid mass and no thyromegaly present.   Cardiovascular: Normal rate and regular rhythm.    No murmur heard.  Pulses:       Carotid pulses are 2+ on the right side, and 2+ on the left side.       Radial pulses are 2+ on the right side, and 2+ on the left side.        Dorsalis pedis pulses are 2+ on the right side, and 2+ on the left side.        Posterior tibial pulses are 2+ on the right side, and 2+ on the left side.   Pulmonary/Chest: Effort normal and breath sounds normal.   Abdominal: Soft. He exhibits no distension and no mass. There is no splenomegaly or hepatomegaly. There is no tenderness.   Musculoskeletal: He exhibits no deformity.   Gait normal.    Lymphadenopathy:     He has no cervical adenopathy.        Right: No supraclavicular adenopathy present.        Left: No supraclavicular adenopathy present.   Neurological: He is alert and oriented to person, place, and time. He has normal strength.   Skin: Skin is warm and dry. No cyanosis or erythema. No pallor.   No venous staining   Psychiatric: He has a normal mood and affect. His speech is normal. Judgment and thought  content normal.   Results for MARCIO WALLER (MRN [id number]) as of 5/6/2018 15:43   Ref. Range 2/27/2018 05:07   Creatinine Latest Ref Range: 0.70 - 1.30 mg/dL 0.95   BUN Latest Ref Range: 7 - 21 mg/dL 19       ASSESSMENT:  Marcio was seen today for aortic aneurysm.    Diagnoses and all orders for this visit:    Thoracic aortic aneurysm without rupture  -     CT Chest Without Contrast; Future    Mixed hyperlipidemia    Essential hypertension    Coronary artery disease involving native coronary artery of native heart without angina pectoris    Ascending aortic aneurysm    Type 2 diabetes mellitus without complication, without long-term current use of insulin    PLAN:  Detailed discussion with Marcio Waller regarding situation and options.  Aneurysm Aorta.  Surgical intervention not indicated at this time.  Will plan to follow with interval imaging.  BP control in 120 range.  Discussed symptoms of rupture, details of surgical repair including aortic root replacement, reimplantation of coronary arteries, possible valve replacement  Endovascular and open repair.  Risks, benefits discussed.  Understands and wishes to proceed with plan    CT Chest without contrast 5/2/2018    Return after above studies complete  Recommended regular physical activity, progressive walking program.  Continue current medications as directed.  Will Obtain relevant old records.    Thank you for the opportunity to participate in this patient's care.    Copy to primary care provider.    EMR Dragon/Transcription disclaimer:   Much of this encounter note is an electronic transcription/translation of spoken language to printed text. The electronic translation of spoken language may permit erroneous, or at times, nonsensical words or phrases to be inadvertently transcribed; Although I have reviewed the note for such errors, some may still exist.

## 2018-05-21 ENCOUNTER — OFFICE VISIT (OUTPATIENT)
Dept: CARDIAC SURGERY | Facility: CLINIC | Age: 78
End: 2018-05-21

## 2018-05-21 VITALS
HEART RATE: 100 BPM | BODY MASS INDEX: 26.05 KG/M2 | TEMPERATURE: 97.7 F | HEIGHT: 63 IN | WEIGHT: 147 LBS | SYSTOLIC BLOOD PRESSURE: 138 MMHG | OXYGEN SATURATION: 99 % | DIASTOLIC BLOOD PRESSURE: 78 MMHG

## 2018-05-21 DIAGNOSIS — N18.2 CHRONIC KIDNEY DISEASE, STAGE 2 (MILD): ICD-10-CM

## 2018-05-21 DIAGNOSIS — E11.9 TYPE 2 DIABETES MELLITUS WITHOUT COMPLICATION, WITHOUT LONG-TERM CURRENT USE OF INSULIN (HCC): ICD-10-CM

## 2018-05-21 DIAGNOSIS — M15.9 PRIMARY OSTEOARTHRITIS INVOLVING MULTIPLE JOINTS: ICD-10-CM

## 2018-05-21 DIAGNOSIS — I71.20 THORACIC AORTIC ANEURYSM WITHOUT RUPTURE (HCC): Primary | ICD-10-CM

## 2018-05-21 DIAGNOSIS — I25.10 CORONARY ARTERY DISEASE INVOLVING NATIVE CORONARY ARTERY OF NATIVE HEART WITHOUT ANGINA PECTORIS: ICD-10-CM

## 2018-05-21 DIAGNOSIS — I10 ESSENTIAL HYPERTENSION: ICD-10-CM

## 2018-05-21 DIAGNOSIS — E78.2 MIXED HYPERLIPIDEMIA: ICD-10-CM

## 2018-05-21 PROCEDURE — 99214 OFFICE O/P EST MOD 30 MIN: CPT | Performed by: THORACIC SURGERY (CARDIOTHORACIC VASCULAR SURGERY)

## 2018-06-09 PROBLEM — N18.2 CHRONIC KIDNEY DISEASE, STAGE 2 (MILD): Status: ACTIVE | Noted: 2018-06-09

## 2018-06-09 NOTE — PROGRESS NOTES
5/21/2018    Vishnu Waller  1940    Chief Complaint:  Ascending aortic aneurysm    HPI:      PCP:  Johnathon Shaikh MD  Cardiology:  Dr Wilkes     78 y.o. male with HTN(uncontrolled, increased risk stroke, rupture), Hyperlipidemia(stable, increased risk cardiovascular events) and Diabetes Mellitus(stable, increased risk cardiovascular events) , CAD(stable, PCI 2018, CABG 2003), thoracic aortic aneurysm(new, increased risk rupture).  former smoker.  Moderate numbness both legs x years.  No new chest back pain..  No TIA stroke amaurosis.  No MI claudication. No other associated signs, symptoms or modifying factors.     2/2018 Echocardiogram:  EF 50%, LVH, LA 21mm, LV 47mm, RVSP 35mmHg.  Mild to moderate MR, moderate AI.  Ascending aorta 46mm.  5/2/2018 CT Chest:  Ascending aorta 46mm, arch 31mm, descending 27mm, abdominal 20mm    The following portions of the patient's history were reviewed and updated as appropriate: allergies, current medications, past family history, past medical history, past social history, past surgical history and problem list.  Recent images independently reviewed.  Available laboratory values reviewed.    PMH:  Past Medical History:   Diagnosis Date   • Acute bacterial sinusitis    • Acute bronchitis    • Acute frontal sinusitis    • Acute maxillary sinusitis    • Acute pharyngitis    • Allergic rhinitis    • Allergic rhinitis due to pollen    • Anxiety    • Artificial lens present     in position   • Backache    • Borderline glaucoma    • Chronic laryngitis    • Chronic rhinitis    • Cough    • Degeneration of lumbar intervertebral disc    • Degenerative joint disease involving multiple joints    • Diverticular disease of colon    • Dizziness and giddiness    • Erectile dysfunction    • Essential hypertension    • Generalized anxiety disorder    • GERD (gastroesophageal reflux disease)    • Glaucoma    • Hemorrhoids     without bleeding   • Insomnia    • Kidney stone    •  Kidney stones    • Malignant tumor of prostate    • Need for prophylactic vaccination and inoculation against influenza    • Osteoarthritis    • Osteoarthritis of multiple joints    • Pain, lumbar region     Pain radiating to lumbar region of back     • PONV (postoperative nausea and vomiting)    • Postviral fatigue syndrome    • Presence of aortocoronary bypass graft    • Prostate cancer    • Pseudomembranous enterocolitis     improved   • Rotator cuff syndrome     right   • Shoulder pain    • SOB (shortness of breath)    • Tenosynovitis    • Thrombocytopenia    • Upper respiratory infection        ALLERGIES:  Allergies   Allergen Reactions   • Molds & Smuts Other (See Comments)     Sinus infection   • Hydrocodone-Acetaminophen Itching         MEDICATIONS:    Current Outpatient Prescriptions:   •  albuterol (PROAIR RESPICLICK) 108 (90 Base) MCG/ACT inhaler, Inhale 180 mcg., Disp: , Rfl:   •  aspirin 81 MG EC tablet, Take 1 tablet by mouth Daily., Disp: 30 tablet, Rfl: 0  •  atorvastatin (LIPITOR) 20 MG tablet, Take 1 tablet by mouth Daily., Disp: 30 tablet, Rfl: 0  •  budesonide-formoterol (SYMBICORT) 80-4.5 MCG/ACT inhaler, Inhale 2 puffs 2 (Two) Times a Day., Disp: 3 inhaler, Rfl: 4  •  carvedilol (COREG) 3.125 MG tablet, Take 1 tablet by mouth Every 12 (Twelve) Hours., Disp: 60 tablet, Rfl: 0  •  clopidogrel (PLAVIX) 75 MG tablet, Take 1 tablet by mouth Daily., Disp: 90 tablet, Rfl: 2  •  Cyanocobalamin (VITAMIN B 12 PO), Take 500 mcg by mouth. Three times weekly, MoWeFr, Disp: , Rfl:   •  diltiaZEM CD (CARDIZEM CD) 180 MG 24 hr capsule, TAKE 1 CAPSULE BY MOUTH DAILY., Disp: 30 capsule, Rfl: 6  •  fluticasone (FLONASE) 50 MCG/ACT nasal spray, 2 sprays into each nostril Every Night., Disp: , Rfl:   •  isosorbide mononitrate (IMDUR) 30 MG 24 hr tablet, Take 1 tablet by mouth Daily., Disp: 30 tablet, Rfl: 0  •  latanoprost (XALATAN) 0.005 % ophthalmic solution, Administer 1 drop to both eyes every night., Disp: ,  Rfl:   •  losartan (COZAAR) 50 MG tablet, Take 1 tablet by mouth Every Night., Disp: 90 tablet, Rfl: 3  •  melatonin 1 MG tablet, Take 3 mg by mouth At Night As Needed for sleep., Disp: , Rfl:   •  metFORMIN (GLUCOPHAGE) 1000 MG tablet, Take 1 tablet by mouth 2 (Two) Times a Day With Meals., Disp: , Rfl:   •  montelukast (SINGULAIR) 10 MG tablet, Take 10 mg by mouth Every Night., Disp: , Rfl:   •  Multiple Vitamins-Minerals (CENTRUM PO), Take 1 tablet by mouth daily. Centrum 0.4 mg-162 mg-18 mg tablet  (unconfirmed), Disp: , Rfl:   •  nitroglycerin (NITROSTAT) 0.4 MG SL tablet, Place 1 tablet under the tongue Every 5 (Five) Minutes As Needed for Chest Pain. Take no more than 3 doses in 15 minutes., Disp: 30 tablet, Rfl: 0  •  omeprazole (priLOSEC) 20 MG capsule, Take 20 mg by mouth Daily., Disp: , Rfl:   •  oxybutynin (DITROPAN) 5 MG tablet, Take 5 mg by mouth Every Night., Disp: , Rfl:   •  raNITIdine (ZANTAC) 75 MG tablet, Take 75 mg by mouth 2 (Two) Times a Day., Disp: , Rfl:   •  tamsulosin (FLOMAX) 0.4 MG capsule 24 hr capsule, Take 1 capsule by mouth Every Night., Disp: , Rfl:   •  TRAVATAN Z 0.004 % solution ophthalmic solution, , Disp: , Rfl:   •  VENTOLIN  (90 Base) MCG/ACT inhaler, INHALE 2 PUFFS BY MOUTH EVERY 4 HOURS AS NEEDED FOR WHEEZING AND COUGH, Disp: , Rfl: 1    Review of Systems   Review of Systems   Constitution: Negative for weakness, malaise/fatigue and weight loss.   Cardiovascular: Positive for dyspnea on exertion. Negative for chest pain and claudication.   Respiratory: Positive for shortness of breath. Negative for cough.    Skin: Negative for color change and poor wound healing.   Musculoskeletal: Positive for arthritis, back pain and neck pain.   Neurological: Negative for dizziness and numbness.       Physical Exam   Physical Exam   Constitutional: He is oriented to person, place, and time. He is active and cooperative. He does not appear ill. No distress.   HENT:   Right Ear:  Hearing normal.   Left Ear: Hearing normal.   Nose: No nasal deformity. No epistaxis.   Mouth/Throat: He does not have dentures. Normal dentition.   Cardiovascular: Normal rate and regular rhythm.    No murmur heard.  Pulses:       Carotid pulses are 2+ on the right side, and 2+ on the left side.       Radial pulses are 2+ on the right side, and 2+ on the left side.        Dorsalis pedis pulses are 2+ on the right side, and 2+ on the left side.        Posterior tibial pulses are 2+ on the right side, and 2+ on the left side.   Pulmonary/Chest: Effort normal and breath sounds normal.   Abdominal: Soft. He exhibits no distension and no mass. There is no tenderness.   Musculoskeletal: He exhibits no deformity.   Gait normal.    Neurological: He is alert and oriented to person, place, and time. He has normal strength.   Skin: Skin is warm and dry. No cyanosis or erythema. No pallor.   No venous staining   Psychiatric: He has a normal mood and affect. His speech is normal. Judgment and thought content normal.     Results for SRIDHAR VISHNU VILLATORO (MRN [id number]) as of 6/9/2018 15:53   Ref. Range 2/27/2018 05:07   Creatinine Latest Ref Range: 0.70 - 1.30 mg/dL 0.95   BUN Latest Ref Range: 7 - 21 mg/dL 19     ASSESSMENT:  Vishnu was seen today for thoracic aneurysm.    Diagnoses and all orders for this visit:    Thoracic aortic aneurysm without rupture  -     CT Chest Without Contrast; Future    Mixed hyperlipidemia    Essential hypertension    Coronary artery disease involving native coronary artery of native heart without angina pectoris    Type 2 diabetes mellitus without complication, without long-term current use of insulin    Primary osteoarthritis involving multiple joints    Chronic kidney disease, stage 2 (mild)    PlAN:  Detailed discussion with Vishnu Villatoro Sridhar regarding situation and options.  Aneurysm ascending Aorta.  Surgical intervention not indicated at this time.  Will plan to follow with interval  imaging.  BP control in 120 range.  Discussed symptoms of rupture, details of surgical repair including aortic root replacement, reimplantation of coronary arteries, possible valve replacement  Endovascular and open repair.  Risks, benefits discussed.  Understands and wishes to proceed with plan    Return in 1 years with CT Chest without contrast    Return after above studies complete  Recommended regular physical activity, progressive walking program.  Continue current medications as directed.    Thank you for the opportunity to participate in this patient's care.    Copy to primary care provider.    EMR Dragon/Transcription disclaimer:   Much of this encounter note is an electronic transcription/translation of spoken language to printed text. The electronic translation of spoken language may permit erroneous, or at times, nonsensical words or phrases to be inadvertently transcribed; Although I have reviewed the note for such errors, some may still exist.

## 2018-07-20 ENCOUNTER — OFFICE VISIT (OUTPATIENT)
Dept: PULMONOLOGY | Facility: CLINIC | Age: 78
End: 2018-07-20

## 2018-07-20 VITALS
HEART RATE: 92 BPM | BODY MASS INDEX: 25.04 KG/M2 | OXYGEN SATURATION: 97 % | HEIGHT: 63 IN | WEIGHT: 141.3 LBS | SYSTOLIC BLOOD PRESSURE: 145 MMHG | DIASTOLIC BLOOD PRESSURE: 60 MMHG

## 2018-07-20 DIAGNOSIS — Z87.891 PERSONAL HISTORY OF TOBACCO USE, PRESENTING HAZARDS TO HEALTH: ICD-10-CM

## 2018-07-20 DIAGNOSIS — G89.29 OTHER CHRONIC PAIN: ICD-10-CM

## 2018-07-20 DIAGNOSIS — J44.9 COPD WITH ASTHMA (HCC): Primary | ICD-10-CM

## 2018-07-20 DIAGNOSIS — J30.89 CHRONIC NONSEASONAL ALLERGIC RHINITIS DUE TO POLLEN: ICD-10-CM

## 2018-07-20 PROCEDURE — 99214 OFFICE O/P EST MOD 30 MIN: CPT | Performed by: INTERNAL MEDICINE

## 2018-07-20 RX ORDER — ACETAMINOPHEN 500 MG
1000 TABLET ORAL 3 TIMES DAILY
COMMUNITY
End: 2021-10-22

## 2018-07-20 NOTE — PROGRESS NOTES
Pulmonary Office Follow-up    Subjective     Vishnu Waller is seen today at the office for   Chief Complaint   Patient presents with   • Follow-up     3 month COPD         HPI  Vishnu Waller is a 78 y.o. male with a PMH significant for COPD with asthma, tobacco use, allergies, CAD s/p CABG, HTN, DM, and borderline glaucoma who presents for follow-up of COPD.  He was last seen on 4/20/18, at which time he was doing better since changing back to Symbicort. Pt states he is concerned that he may be over medicated as he stays tired all the time. He admits that he does not get good rest at night due to chronic pain. Pt does report he will get good sleep when he uses marijuana. He continues to use Symbicort BID and is rarely using his albuterol. Pt is only taking tylenol prn for his pain; he takes 6 to 8- 500mg tabs a day. He does have some rhinorrhea but he is taking singulair, zyrtec and flonase.       Patient Active Problem List   Diagnosis   • Degeneration of lumbar intervertebral disc   • Low back pain without sciatica   • Neuritis or radiculitis due to rupture of lumbar intervertebral disc   • Borderline glaucoma, open angle with borderline findings   • Pseudophakia   • History of coronary artery bypass surgery   • Essential hypertension   • Mixed hyperlipidemia   • Thrombocytopenia (CMS/HCC)   • Spinal stenosis of lumbar region   • Degenerative disc disease, lumbar   • Chronic pain of right knee   • History of artificial joint   • B12 deficiency   • Degeneration of intervertebral disc of lumbosacral region   • Personal history of other infectious and parasitic diseases   • Status post lumbar spine operation   • History of pyelonephritis   • History of surgical procedure   • History of repair of rotator cuff   • Encounter for follow-up examination after completed treatment for conditions other than malignant neoplasm   • Encounter for general adult medical examination without abnormal findings   •  Osteoarthritis of knee   • Osteoarthritis of multiple joints   • Primary insomnia   • Encounter for screening for malignant neoplasm of colon   • Encounter for screening for malignant neoplasm of prostate   • Type 2 diabetes mellitus without complication, without long-term current use of insulin (CMS/[facility])   • Coronary artery disease involving native coronary artery of native heart without angina pectoris   • SOB (shortness of breath)   • Presence of aortocoronary bypass graft   • Dizziness and giddiness   • Postviral fatigue syndrome   • Postviral fatigue syndrome   • Recurrent kidney stones   • Thrombocytopenia (CMS/HCC)   • Pyelonephritis   • COPD with asthma (CMS/HCC)   • Chronic non-seasonal allergic rhinitis   • Unstable angina (CMS/HCC)   • NSTEMI (non-ST elevated myocardial infarction) (CMS/[facility])   • History of PTCA 1   • Medicare annual wellness visit, subsequent   • Thoracic aortic aneurysm without rupture (CMS/[facility])   • Chronic kidney disease, stage 2 (mild)   • Personal history of tobacco use, presenting hazards to health       Review of Systems  Review of Systems   Constitutional: Positive for fatigue. Negative for fever and unexpected weight change.   HENT: Negative for congestion and rhinorrhea.    Respiratory: Positive for shortness of breath. Negative for cough and wheezing.    Cardiovascular: Negative for chest pain.   Musculoskeletal: Positive for arthralgias and back pain.     As described in the HPI. Otherwise, remainder of ROS (14 systems) were negative.    Medications, Allergies, Social, and Family Histories reviewed as per EMR.    Objective     Vitals:    07/20/18 1451   BP: 145/60   Pulse: 92   SpO2: 97%     Physical Exam   Constitutional: He is oriented to person, place, and time. Vital signs are normal. He appears well-developed and well-nourished.   HENT:   Head: Normocephalic and atraumatic.   Nose: No mucosal edema (L>R).   Mouth/Throat: Uvula is midline, oropharynx is clear and moist and  mucous membranes are normal.   Mallampati   Eyes: Pupils are equal, round, and reactive to light. Conjunctivae, EOM and lids are normal.   Neck: Trachea normal and normal range of motion. No tracheal tenderness present. No thyroid mass present.   Cardiovascular: Normal rate, regular rhythm and normal heart sounds.  PMI is not displaced.  Exam reveals no gallop.    No murmur heard.  Pulmonary/Chest: Effort normal and breath sounds normal. No respiratory distress. He has no decreased breath sounds. He has no wheezes. He has no rhonchi. He exhibits no tenderness.   Abdominal: Soft. Normal appearance and bowel sounds are normal. There is no hepatomegaly. There is no tenderness.   Musculoskeletal:   Normal gait, no extremity edema     Vascular Status -  His right foot exhibits no edema. His left foot exhibits no edema.  Lymphadenopathy:        Head (right side): No submandibular adenopathy present.        Head (left side): No submandibular adenopathy present.     He has no cervical adenopathy.        Right: No supraclavicular adenopathy present.        Left: No supraclavicular adenopathy present.   Neurological: He is alert and oriented to person, place, and time. Gait normal.   Skin: Skin is warm and dry. No rash noted. No cyanosis. Nails show no clubbing.   Psychiatric: He has a normal mood and affect. His speech is normal and behavior is normal. Judgment normal.   Nursing note and vitals reviewed.          Assessment/Plan     Vishnu was seen today for follow-up.    Diagnoses and all orders for this visit:    COPD with asthma (CMS/[facility])    Chronic nonseasonal allergic rhinitis due to pollen    Personal history of tobacco use, presenting hazards to health    Other chronic pain         Discussion/ Recommendations:   He continues to do well on Symbicort and has infrequent albuterol need, which is likely due to the fact that he does not exert himself free much secondary to his chronic pain.  His allergies are fairly well  controlled, but he still has some issues with rhinorrhea.  I think his fatigue is likely due to sleep disruption from his chronic pain, but it is possible could be related to side effects from several of his medications.  I am concerned that he is using significant amounts of acetaminophen daily for his pain and have cautioned him on limits to prevent liver damage.    -Continue Symbicort twice daily and albuterol as needed only.  -Continue Flonase and Singulair daily.  -Try Benadryl at bedtime, but if more persistent allergies it is okay to use generic Zyrtec.  -Advised him not to take anymore then 4 g of acetaminophen daily and to ensure that he is not taking any other medication containing acetaminophen.  Ideally, he should take less than 3 g daily.  I recommended that he ensure his PCP is aware of his usage.  -Annual LD CT screening February 2019.    Patient's Body mass index is 25.03 kg/m². BMI is within normal parameters. No follow-up required.        Return for Recheck.          This document has been electronically signed by Camryn Koehler MD on July 20, 2018 3:45 PM      Dictated using Dragon

## 2018-09-26 ENCOUNTER — OFFICE VISIT (OUTPATIENT)
Dept: CARDIOLOGY | Facility: CLINIC | Age: 78
End: 2018-09-26

## 2018-09-26 VITALS
HEIGHT: 63 IN | WEIGHT: 141 LBS | DIASTOLIC BLOOD PRESSURE: 60 MMHG | SYSTOLIC BLOOD PRESSURE: 120 MMHG | HEART RATE: 61 BPM | BODY MASS INDEX: 24.98 KG/M2 | OXYGEN SATURATION: 97 %

## 2018-09-26 DIAGNOSIS — I25.10 CORONARY ARTERY DISEASE INVOLVING NATIVE CORONARY ARTERY OF NATIVE HEART WITHOUT ANGINA PECTORIS: Primary | ICD-10-CM

## 2018-09-26 DIAGNOSIS — I71.20 THORACIC AORTIC ANEURYSM WITHOUT RUPTURE (HCC): ICD-10-CM

## 2018-09-26 DIAGNOSIS — I10 ESSENTIAL HYPERTENSION: ICD-10-CM

## 2018-09-26 DIAGNOSIS — E78.2 MIXED HYPERLIPIDEMIA: ICD-10-CM

## 2018-09-26 PROCEDURE — 99214 OFFICE O/P EST MOD 30 MIN: CPT | Performed by: INTERNAL MEDICINE

## 2018-09-26 NOTE — PROGRESS NOTES
Vishnu Waller  78 y.o. male    09/26/2018  1. Coronary artery disease involving native coronary artery of native heart without angina pectoris    2. Essential hypertension    3. Mixed hyperlipidemia    4. Thoracic aortic aneurysm without rupture (CMS/[facility])        History of Present Illness    Mr. Waller is a 78-year-old  male with the history of diabetes mellitus, hypertension, hyperlipidemia, COPD, tobacco abuse, CAD status post CABG in 2003 when he received LIMA to LAD, SVG to diagonal, SVG to OM 2 and SVG to distal right coronary artery.  In February 2018 he presented with prolonged chest pain and ruled in for a non-ST segment elevation myocardial infarctions and cardiac catheterization showed the following findings:  Impression:  99% eccentric lesion noted in the proximal segment of the SVG to distal right coronary artery graft.  Successful PCI with placement of a 4.0 x 18 mm Xience Alpine stent.  Patent LIMA to Left Anterior Descending Coronary Artery.  Native LAD beyond the anastomosis was widely patent  Severe native vessel coronary artery disease with 100% occlusion of the mid LAD, mid right coronary artery.  A small to medium-sized tortuous circumflex had a proximal lesion up to 99%.  Occluded SVG to diagonal and occluded SVG to obtuse marginal 2  Ascending aortogram showing ectatic ascending aorta.    CT angiogram of the chest showed aorta measuring 4.4 cm.  Patient has done quite well and denied any chest pain or shortness of breath.  His predominant symptoms were related to DJD and pain in the neck and mid thoracic spine.    SUBJECTIVE    Allergies   Allergen Reactions   • Molds & Smuts Other (See Comments)     Sinus infection   • Hydrocodone-Acetaminophen Itching         Past Medical History:   Diagnosis Date   • Acute bacterial sinusitis    • Acute bronchitis    • Acute frontal sinusitis    • Acute maxillary sinusitis    • Acute pharyngitis    • Allergic rhinitis    • Allergic rhinitis  due to pollen    • Anxiety    • Artificial lens present     in position   • Backache    • Borderline glaucoma    • Chronic laryngitis    • Chronic rhinitis    • Cough    • Degeneration of lumbar intervertebral disc    • Degenerative joint disease involving multiple joints    • Diverticular disease of colon    • Dizziness and giddiness    • Erectile dysfunction    • Essential hypertension    • Generalized anxiety disorder    • GERD (gastroesophageal reflux disease)    • Glaucoma    • Hemorrhoids     without bleeding   • Insomnia    • Kidney stone    • Kidney stones    • Malignant tumor of prostate (CMS/HCC)    • Need for prophylactic vaccination and inoculation against influenza    • Osteoarthritis    • Osteoarthritis of multiple joints    • Pain, lumbar region     Pain radiating to lumbar region of back     • PONV (postoperative nausea and vomiting)    • Postviral fatigue syndrome    • Presence of aortocoronary bypass graft    • Prostate cancer (CMS/HCC)    • Pseudomembranous enterocolitis     improved   • Rotator cuff syndrome     right   • Shoulder pain    • SOB (shortness of breath)    • Tenosynovitis    • Thrombocytopenia (CMS/HCC)    • Upper respiratory infection          Past Surgical History:   Procedure Laterality Date   • CARDIAC CATHETERIZATION  09/25/2003    Cardiac cath (Normal left ventricular systolic function, EF 60% Multi-vessel coronary artery disease with critical disease noted in the LAD coronary artery, diagonal coronary artery, obtuse marginal coronary and right coronary artery.)   • CARDIAC CATHETERIZATION  05/03/1996    Cardiac cath (Coronary atherosclerotic heart disease. 70% diagonal stenosis. Mild to moderate left anterior descending artery stenosis. Preserved left ventricular function.)   • CARDIAC CATHETERIZATION N/A 2/26/2018    Procedure: Left Heart Cath/ pci if indicated ;  Surgeon: Radha Wilkes MD;  Location: [facility] INVASIVE LOCATION;  Service:    • CATARACT  EXTRACTION Bilateral    • CATARACT EXTRACTION  10/04/2011    Remove cataract, insert lens (Right)   • CATARACT EXTRACTION  08/23/2011    Remove cataract, insert lens (left)   • COLONOSCOPY     • COLONOSCOPY      Colon endoscopy 92319 (Rectal bleeding. Radiation proctitis w/bleeding. An abnormal CT of transverse colon due to mucosal ischemic colitis, that now is healed. Internal hemorrhoids w/possible bleeding.)   • COLONOSCOPY  05/10/2011    Colon endoscopy 55623 (Single sessile polyp found in transverse colon and sigmoid colon ,removed by cold biopsy polypectomy.divertic. found in sigmoid colon,descending colon. Friability/capillary friability in rectum sigmoid due to prior radiation.Inter. hem. Grade 1)   • COLONOSCOPY  05/02/2007    Colon endoscopy 58817 (Colon polyp. Diverticulosis without bleeding. Internal hemorrhoids without bleeding.)   • CORONARY ARTERY BYPASS GRAFT  2002   • CYSTOSCOPY  01/13/1995    Cystoscopy, stone removal (Right ureteroscopic lasertripsy.)   • CYSTOSCOPY, URETEROSCOPY, RETROGRADE PYELOGRAM, STENT INSERTION N/A 8/28/2017    Procedure: URETEROSCOPY LASER LITHOTRIPSY WITH STENT INSERTION AND RETROGRADE PYELOGRAM ;  Surgeon: Anna M. D'Amico, MD;  Location: [facility];  Service:    • EPIDURAL  12/03/2014    Therapeutic/diag injection (Lumbar transforaminal epidural steroid injection, L5-S1, left side.)   • EPIDURAL  10/22/2014    Therapeutic/diag injection (Lumbar transforaminal epidural steroid injection L5-S1 left side.)   • EPIDURAL  08/07/2014    Therapeutic/diag injection (Lumbar transforaminal epidural steroid injection.)   • EXCISION LESION  05/13/1999    REMOVE EAR LESION 24686 (1.3 cm basal cell carcinoma, right preauricular area. Excision)   • EXCISION LESION  03/13/2001    REMOVE LESION NECK/CHEST 50901 (Excision of irritated seborrheic keratosis, anterior neck, 1.1 cm and deep lipoma, posterior neck, 3.5 cm)   • INJECTION OF MEDICATION  11/15/2012    Kenalog (4)      • JOINT  REPLACEMENT  2015    partial   • KNEE ARTHROSCOPY  01/17/2007    Knee arthroscopy, surgery (Arthroscopy with medial and lateral meniscectomies, right knee.)   • KNEE SURGERY  11/04/2015    Knee Surgery (Right unicompartmental arthroplasty.)   • LUMBAR LAMINECTOMY N/A 2/7/2017    Procedure: LUMBAR LAMINECTOMY LUMBAR THREE-FOUR, LUMBAR FOUR-FIVE, INTERLAMINAR DISTRACTION ;  Surgeon: Lucio Mayo MD;  Location: [facility];  Service:    • NOSE SURGERY     • OTHER SURGICAL HISTORY      EXTENDED VISUAL FIELDS STUDY 95277 (Borderline glaucoma) (3): 03/19/2015, 04/09/2014, 03/27/2013   • OTHER SURGICAL HISTORY  10/29/2003    Heart revascularize (TMR) (Directmyocardial revascularization times four; LIMA to LAD SVG to DARIUSZ SVG to OM1, SVG to RCA)   • OTHER SURGICAL HISTORY      OCT DISC NFL 85065 (Borderline glaucoma)  (3): 09/24/2015, 08/13/2014, 07/29/2013   • PROSTATECTOMY     • SEPTORHINOPLASTY  11/16/1989    Nasal surgery procedure (Septorhinoplasty with reconstruction of the nasal pyramid with a iliac crest graft, rhinoplasty was performed with external approach.)   • SHOULDER ARTHROSCOPY  07/24/2013    Arthroscopy of right shoulder with rotator repair, Carla procedure, Biceps tenotomy, subacromial decompression.   • SHOULDER SURGERY  11/01/2005    Shoulder surgery procedure (Decompression, subacromial, explore of the rotator cuff, no tear found nor repaired, acromioplasty of the left shoulder and AC shoulder joint resection.)         Family History   Problem Relation Age of Onset   • Hypertension Mother    • Heart disease Mother    • Arthritis Mother    • Cancer Other    • Heart disease Other    • Hypertension Other          Social History     Social History   • Marital status: Significant Other     Spouse name: N/A   • Number of children: N/A   • Years of education: N/A     Occupational History   • Not on file.     Social History Main Topics   • Smoking status: Former Smoker     Packs/day: 0.50      Years: 40.00     Types: Cigarettes     Quit date: 2/28/2018   • Smokeless tobacco: Never Used   • Alcohol use Yes      Comment: socially   • Drug use: No   • Sexual activity: Defer     Other Topics Concern   • Not on file     Social History Narrative   • No narrative on file         Current Outpatient Prescriptions   Medication Sig Dispense Refill   • acetaminophen (TYLENOL) 500 MG tablet Take 1,000 mg by mouth Every 6 (Six) Hours As Needed.     • albuterol (PROAIR RESPICLICK) 108 (90 Base) MCG/ACT inhaler Inhale 180 mcg.     • aspirin 81 MG EC tablet Take 1 tablet by mouth Daily. 30 tablet 0   • atorvastatin (LIPITOR) 20 MG tablet Take 1 tablet by mouth Daily. 30 tablet 0   • budesonide-formoterol (SYMBICORT) 80-4.5 MCG/ACT inhaler Inhale 2 puffs 2 (Two) Times a Day. 3 inhaler 4   • carvedilol (COREG) 3.125 MG tablet Take 1 tablet by mouth Every 12 (Twelve) Hours. 60 tablet 0   • clopidogrel (PLAVIX) 75 MG tablet Take 1 tablet by mouth Daily. 90 tablet 2   • Cyanocobalamin (VITAMIN B 12 PO) Take 500 mcg by mouth. Three times weekly, MoWeFr     • diltiaZEM CD (CARDIZEM CD) 180 MG 24 hr capsule TAKE 1 CAPSULE BY MOUTH DAILY. 30 capsule 6   • fluticasone (FLONASE) 50 MCG/ACT nasal spray 2 sprays into each nostril Every Night.     • isosorbide mononitrate (IMDUR) 30 MG 24 hr tablet Take 1 tablet by mouth Daily. 30 tablet 0   • latanoprost (XALATAN) 0.005 % ophthalmic solution Administer 1 drop to both eyes every night.     • losartan (COZAAR) 50 MG tablet Take 1 tablet by mouth Every Night. 90 tablet 3   • metFORMIN (GLUCOPHAGE) 1000 MG tablet Take 1 tablet by mouth 2 (Two) Times a Day With Meals.     • montelukast (SINGULAIR) 10 MG tablet Take 10 mg by mouth Every Night.     • Multiple Vitamins-Minerals (CENTRUM PO) Take 1 tablet by mouth daily. Centrum 0.4 mg-162 mg-18 mg tablet  (unconfirmed)     • nitroglycerin (NITROSTAT) 0.4 MG SL tablet Place 1 tablet under the tongue Every 5 (Five) Minutes As Needed for Chest  "Pain. Take no more than 3 doses in 15 minutes. 30 tablet 0   • omeprazole (priLOSEC) 20 MG capsule Take 20 mg by mouth Daily.     • oxybutynin (DITROPAN) 5 MG tablet Take 5 mg by mouth Every Night.     • raNITIdine (ZANTAC) 75 MG tablet Take 75 mg by mouth 2 (Two) Times a Day.     • tamsulosin (FLOMAX) 0.4 MG capsule 24 hr capsule Take 1 capsule by mouth Every Night.     • TRAVATAN Z 0.004 % solution ophthalmic solution      • VENTOLIN  (90 Base) MCG/ACT inhaler INHALE 2 PUFFS BY MOUTH EVERY 4 HOURS AS NEEDED FOR WHEEZING AND COUGH  1     No current facility-administered medications for this visit.          OBJECTIVE    /60   Pulse 61   Ht 160 cm (62.99\")   Wt 64 kg (141 lb)   SpO2 97%   BMI 24.98 kg/m²         Review of Systems     Constitutional:  Denies recent weight loss, weight gain, fever or chills     HENT:  Denies any hearing loss, epistaxis, hoarseness, or difficulty speaking.     Eyes: Wears eyeglasses or contact lenses     Respiratory:  Denies dyspnea with exertion,no cough, wheezing, or hemoptysis.     Cardiovascular: Negative for palpations, chest pain, orthopnea, PND    Gastrointestinal:  Denies change in bowel habits, dyspepsia, ulcer disease, hematochezia, or melena.     Endocrine: Negative for cold intolerance, heat intolerance, polydipsia, polyphagia and polyuria.    Genitourinary: Negative.      Musculoskeletal: DJD, Neck/ Thoracic/ Lumbar pain      Physical Exam     Constitutional: Cooperative, alert and oriented,  in no acute distress.     HENT:   Head: Normocephalic, normal hair patterns, no masses or tenderness.  Ears, Nose, and Throat: No gross abnormalities. No pallor or cyanosis.   Eyes: EOMS intact, PERRL, conjunctivae and lids unremarkable. Fundoscopic exam and visual fields not performed.   Neck: No palpable masses or adenopathy, no thyromegaly, no JVD, carotid pulses are full and equal bilaterally and without  Bruits.     Cardiovascular: Regular rhythm, S1 and S2 " normal, no S3 or S4.  No murmurs, gallops, or rubs detected.     Pulmonary/Chest: Chest: normal symmetry, normal respiratory excursion, no intercostal retraction, no use of accessory muscles.            Pulmonary: Normal breath sounds. No rales or ronchi.    Abdominal: Abdomen soft, bowel sounds normoactive, no masses, no hepatosplenomegaly, non-tender, no bruits.     Musculoskeletal: No deformities, clubbing, cyanosis, erythema, or edema observed.    Neurological: No gross motor or sensory deficits noted, affect appropriate, oriented to time, person, place.     Skin: Warm and dry to the touch, no apparent skin lesions or masses noted.     Psychiatric: He has a normal mood and affect. His behavior is normal. Judgment and thought content normal.         Procedures      Lab Results   Component Value Date    WBC 9.26 02/27/2018    HGB 9.4 (L) 02/27/2018    HCT 27.4 (L) 02/27/2018    MCV 96.8 02/27/2018     02/27/2018     Lab Results   Component Value Date    GLUCOSE 92 02/27/2018    BUN 19 02/27/2018    CREATININE 0.95 02/27/2018    EGFRIFNONA 77 02/27/2018    BCR 20.0 02/27/2018    CO2 21.0 (L) 02/27/2018    CALCIUM 8.7 02/27/2018    ALBUMIN 3.10 (L) 02/25/2018    AST 54 02/25/2018    ALT 35 02/25/2018     No results found for: CHOL  No results found for: TRIG  No results found for: HDL  No components found for: LDLCALC  No results found for: LDL  No results found for: HDLLDLRATIO  No components found for: CHOLHDL  Lab Results   Component Value Date    HGBA1C 6.8 (H) 02/24/2018     Lab Results   Component Value Date    TSH 1.070 02/24/2018           ASSESSMENT AND PLAN  Mr. Waller is stable with no clinical evidence of angina, arrhythmia or congestive heart failure.  I've continued antiplatelet therapy with aspirin and Plavix, antihypertensive therapy with Coreg, diltiazem CD, losartan, antianginal therapy with isosorbide mononitrate and lipid-lowering therapy with atorvastatin.    Vishnu was seen today for  follow-up.    Diagnoses and all orders for this visit:    Coronary artery disease involving native coronary artery of native heart without angina pectoris    Essential hypertension    Mixed hyperlipidemia    Thoracic aortic aneurysm without rupture (CMS/HCC)        Radha Wilkes MD  9/26/2018  4:15 PM

## 2018-10-08 ENCOUNTER — OFFICE VISIT (OUTPATIENT)
Dept: ORTHOPEDIC SURGERY | Facility: CLINIC | Age: 78
End: 2018-10-08

## 2018-10-08 VITALS — BODY MASS INDEX: 27.05 KG/M2 | WEIGHT: 147 LBS | HEIGHT: 62 IN

## 2018-10-08 DIAGNOSIS — M48.061 SPINAL STENOSIS OF LUMBAR REGION, UNSPECIFIED WHETHER NEUROGENIC CLAUDICATION PRESENT: ICD-10-CM

## 2018-10-08 DIAGNOSIS — M54.6 THORACIC SPINE PAIN: ICD-10-CM

## 2018-10-08 DIAGNOSIS — M54.2 CERVICAL PAIN (NECK): ICD-10-CM

## 2018-10-08 DIAGNOSIS — M51.36 DEGENERATIVE DISC DISEASE, LUMBAR: Primary | ICD-10-CM

## 2018-10-08 DIAGNOSIS — M51.16 NEURITIS OR RADICULITIS DUE TO RUPTURE OF LUMBAR INTERVERTEBRAL DISC: ICD-10-CM

## 2018-10-08 PROCEDURE — 99213 OFFICE O/P EST LOW 20 MIN: CPT | Performed by: ORTHOPAEDIC SURGERY

## 2018-10-08 RX ORDER — CYCLOBENZAPRINE HCL 10 MG
10 TABLET ORAL 3 TIMES DAILY PRN
Qty: 60 TABLET | Refills: 1 | Status: SHIPPED | OUTPATIENT
Start: 2018-10-08 | End: 2018-12-22 | Stop reason: SDUPTHER

## 2018-10-08 NOTE — PROGRESS NOTES
"Vishnu Waller is a 78 y.o. male returns for     Chief Complaint   Patient presents with   • Lumbar Spine - Follow-up       HISTORY OF PRESENT ILLNESS: f/u on lumbar recheck patient states that he is in pain today  78 y neck and back pain, present for 6 months, the pain is present at the top of the back, and neck region,  The pain does not radiate.  No improvement with physical therapy.  The pain is present throughout the morning.           CONCURRENT MEDICAL HISTORY:    The following portions of the patient's history were reviewed and updated as appropriate: allergies, current medications, past family history, past medical history, past social history, past surgical history and problem list.     ROS  No fevers or chills.  No chest pain or shortness of air.  No GI or  disturbances.    PHYSICAL EXAMINATION:       Ht 157.5 cm (62\")   Wt 66.7 kg (147 lb)   BMI 26.89 kg/m²     Physical Exam    GAIT:     []  Normal  []  Antalgic    Assistive device: []  None  []  Walker     []  Crutches  []  Cane     []  Wheelchair  []  Stretcher    Ortho Exam    Cervical alignment is normal  No mass, no erythema  5/5 right and left deltoid bicep tricep wrist extension and flexion and hand .  Ambulating normally          Xr Spine Cervical 2 Or 3 View    Result Date: 10/8/2018  Narrative: Ordering Provider:  Lucio Mayo MD Ordering Diagnosis/Indication:  Cervical pain (neck) Procedure:  XR SPINE CERVICAL 2 OR 3 VW Exam Date:  10/8/18 RELEVANT PRIOR IMAGES:  XR Spine Thoracic 2 View 10/08/2018 [id number] Final COMPARISON:   none     Impression:  cervical alignment is neutral, significant loss of disc height of multiple discs within the cervical spine with significant endplate osteophytes, poor bone quality Bone quality: decreased Alignment: neutral, minimally kyphotic Fracture: none Soft tissue: sternal wires present     Xr Spine Thoracic 2 View    Result Date: 10/8/2018  Narrative: Ordering Provider:  Gael" Lucio Faulkner MD Ordering Diagnosis/Indication:  Thoracic spine pain Procedure:  XR SPINE THORACIC 2 VW Exam Date:  10/8/18 RELEVANT PRIOR IMAGES:  XR spine lumbar 2 or 3 vw 04/06/2018 [id number] Final COMPARISON:  Not applicable, no relevant images available.     Impression:  poor bone quality, loss of disc space of multiple levels with endplate osteophytes, kyphotic alignment, mild wedging of the upper thoracic vertebra at the apex Bone quality:  poor Alignment:  Kyphotic alignment thoracic spine Fracture: none Soft tissue: sternal wire fixation present. Calcification soft tissues.             ASSESSMENT:    Diagnoses and all orders for this visit:    Degenerative disc disease, lumbar    Spinal stenosis of lumbar region, unspecified whether neurogenic claudication present    Neuritis or radiculitis due to rupture of lumbar intervertebral disc  -     TENS (Transcutaneous Electrical Nerve Stimulator)    Thoracic spine pain  -     XR Spine Thoracic 2 View  -     TENS (Transcutaneous Electrical Nerve Stimulator)    Cervical pain (neck)  -     XR Spine Cervical 2 or 3 View    Other orders  -     cyclobenzaprine (FLEXERIL) 10 MG tablet; Take 1 tablet by mouth 3 (Three) Times a Day As Needed for Muscle Spasms.          PLAN    \  Discussed non operative treatment including TENS unit flexeril is prescribed for relief of symptoms  Surgery may be an option in 2019, he will need to be on anticoagulation until next year.          Lucio Mayo MD

## 2018-11-26 ENCOUNTER — OFFICE VISIT (OUTPATIENT)
Dept: PULMONOLOGY | Facility: CLINIC | Age: 78
End: 2018-11-26

## 2018-11-26 VITALS
SYSTOLIC BLOOD PRESSURE: 161 MMHG | HEART RATE: 82 BPM | DIASTOLIC BLOOD PRESSURE: 82 MMHG | WEIGHT: 139.5 LBS | HEIGHT: 62 IN | BODY MASS INDEX: 25.67 KG/M2 | OXYGEN SATURATION: 96 %

## 2018-11-26 DIAGNOSIS — F12.90 MARIJUANA SMOKER: ICD-10-CM

## 2018-11-26 DIAGNOSIS — J44.9 COPD WITH ASTHMA (HCC): Primary | ICD-10-CM

## 2018-11-26 DIAGNOSIS — Z87.891 PERSONAL HISTORY OF TOBACCO USE, PRESENTING HAZARDS TO HEALTH: ICD-10-CM

## 2018-11-26 DIAGNOSIS — J30.89 CHRONIC NON-SEASONAL ALLERGIC RHINITIS: ICD-10-CM

## 2018-11-26 PROCEDURE — 99214 OFFICE O/P EST MOD 30 MIN: CPT | Performed by: INTERNAL MEDICINE

## 2018-11-26 RX ORDER — ALBUTEROL SULFATE 90 UG/1
2 AEROSOL, METERED RESPIRATORY (INHALATION) EVERY 4 HOURS PRN
Qty: 1 INHALER | Refills: 11 | Status: SHIPPED | OUTPATIENT
Start: 2018-11-26 | End: 2020-07-06 | Stop reason: SDUPTHER

## 2018-11-26 RX ORDER — BUDESONIDE AND FORMOTEROL FUMARATE DIHYDRATE 80; 4.5 UG/1; UG/1
2 AEROSOL RESPIRATORY (INHALATION)
Qty: 3 INHALER | Refills: 11 | Status: SHIPPED | OUTPATIENT
Start: 2018-11-26 | End: 2019-08-13 | Stop reason: SDUPTHER

## 2018-11-28 RX ORDER — LOSARTAN POTASSIUM 50 MG/1
TABLET ORAL
Qty: 90 TABLET | Refills: 3 | Status: SHIPPED | OUTPATIENT
Start: 2018-11-28 | End: 2019-01-08

## 2018-12-24 RX ORDER — CYCLOBENZAPRINE HCL 10 MG
TABLET ORAL
Qty: 60 TABLET | Refills: 1 | Status: SHIPPED | OUTPATIENT
Start: 2018-12-24 | End: 2019-01-08

## 2018-12-27 RX ORDER — DILTIAZEM HYDROCHLORIDE 180 MG/1
180 CAPSULE, COATED, EXTENDED RELEASE ORAL DAILY
Qty: 90 CAPSULE | Refills: 2 | Status: SHIPPED | OUTPATIENT
Start: 2018-12-27 | End: 2018-12-27 | Stop reason: SDUPTHER

## 2018-12-27 RX ORDER — DILTIAZEM HYDROCHLORIDE 180 MG/1
180 CAPSULE, COATED, EXTENDED RELEASE ORAL DAILY
Qty: 30 CAPSULE | Refills: 0 | Status: SHIPPED | OUTPATIENT
Start: 2018-12-27 | End: 2019-03-14 | Stop reason: SDUPTHER

## 2019-01-07 ENCOUNTER — HOSPITAL ENCOUNTER (EMERGENCY)
Facility: HOSPITAL | Age: 79
Discharge: HOME | End: 2019-01-07
Attending: FAMILY MEDICINE | Admitting: FAMILY MEDICINE

## 2019-01-07 VITALS
BODY MASS INDEX: 26.82 KG/M2 | WEIGHT: 151.4 LBS | SYSTOLIC BLOOD PRESSURE: 167 MMHG | HEIGHT: 63 IN | DIASTOLIC BLOOD PRESSURE: 71 MMHG | RESPIRATION RATE: 18 BRPM | TEMPERATURE: 97.2 F | HEART RATE: 73 BPM | OXYGEN SATURATION: 96 %

## 2019-01-07 DIAGNOSIS — R10.9 RIGHT FLANK PAIN: ICD-10-CM

## 2019-01-07 DIAGNOSIS — N20.0 KIDNEY STONE ON RIGHT SIDE: Primary | ICD-10-CM

## 2019-01-07 LAB
ALBUMIN SERPL-MCNC: 4.2 G/DL (ref 3.4–4.8)
ALBUMIN/GLOB SERPL: 1.8 G/DL (ref 1.1–1.8)
ALP SERPL-CCNC: 53 U/L (ref 38–126)
ALT SERPL W P-5'-P-CCNC: 18 U/L (ref 21–72)
ANION GAP SERPL CALCULATED.3IONS-SCNC: 11 MMOL/L (ref 5–15)
AST SERPL-CCNC: 23 U/L (ref 17–59)
BACTERIA UR QL AUTO: ABNORMAL /HPF
BASOPHILS # BLD AUTO: 0.04 10*3/MM3 (ref 0–0.2)
BASOPHILS NFR BLD AUTO: 0.4 % (ref 0–2)
BILIRUB SERPL-MCNC: 0.6 MG/DL (ref 0.2–1.3)
BILIRUB UR QL STRIP: NEGATIVE
BUN BLD-MCNC: 15 MG/DL (ref 7–21)
BUN/CREAT SERPL: 15.6 (ref 7–25)
CALCIUM SPEC-SCNC: 9.2 MG/DL (ref 8.4–10.2)
CHLORIDE SERPL-SCNC: 100 MMOL/L (ref 95–110)
CLARITY UR: ABNORMAL
CO2 SERPL-SCNC: 26 MMOL/L (ref 22–31)
COLOR UR: ABNORMAL
CREAT BLD-MCNC: 0.96 MG/DL (ref 0.7–1.3)
DEPRECATED RDW RBC AUTO: 58.1 FL (ref 35.1–43.9)
EOSINOPHIL # BLD AUTO: 0.4 10*3/MM3 (ref 0–0.7)
EOSINOPHIL NFR BLD AUTO: 3.7 % (ref 0–7)
ERYTHROCYTE [DISTWIDTH] IN BLOOD BY AUTOMATED COUNT: 16.2 % (ref 11.5–14.5)
GFR SERPL CREATININE-BSD FRML MDRD: 76 ML/MIN/1.73 (ref 42–98)
GLOBULIN UR ELPH-MCNC: 2.4 GM/DL (ref 2.3–3.5)
GLUCOSE BLD-MCNC: 132 MG/DL (ref 60–100)
GLUCOSE UR STRIP-MCNC: NEGATIVE MG/DL
HCT VFR BLD AUTO: 34.1 % (ref 39–49)
HGB BLD-MCNC: 11.6 G/DL (ref 13.7–17.3)
HGB UR QL STRIP.AUTO: ABNORMAL
HOLD SPECIMEN: NORMAL
HOLD SPECIMEN: NORMAL
HYALINE CASTS UR QL AUTO: ABNORMAL /LPF
IMM GRANULOCYTES # BLD AUTO: 0.07 10*3/MM3 (ref 0–0.02)
IMM GRANULOCYTES NFR BLD AUTO: 0.6 % (ref 0–0.5)
KETONES UR QL STRIP: NEGATIVE
LEUKOCYTE ESTERASE UR QL STRIP.AUTO: NEGATIVE
LIPASE SERPL-CCNC: 54 U/L (ref 23–300)
LYMPHOCYTES # BLD AUTO: 2.19 10*3/MM3 (ref 0.6–4.2)
LYMPHOCYTES NFR BLD AUTO: 20.1 % (ref 10–50)
MCH RBC QN AUTO: 35 PG (ref 26.5–34)
MCHC RBC AUTO-ENTMCNC: 34 G/DL (ref 31.5–36.3)
MCV RBC AUTO: 103 FL (ref 80–98)
MONOCYTES # BLD AUTO: 1.4 10*3/MM3 (ref 0–0.9)
MONOCYTES NFR BLD AUTO: 12.8 % (ref 0–12)
NEUTROPHILS # BLD AUTO: 6.8 10*3/MM3 (ref 2–8.6)
NEUTROPHILS NFR BLD AUTO: 62.4 % (ref 37–80)
NITRITE UR QL STRIP: NEGATIVE
PH UR STRIP.AUTO: 5.5 [PH] (ref 5–9)
PLATELET # BLD AUTO: 229 10*3/MM3 (ref 150–450)
PMV BLD AUTO: 9 FL (ref 8–12)
POTASSIUM BLD-SCNC: 4.1 MMOL/L (ref 3.5–5.1)
PROT SERPL-MCNC: 6.6 G/DL (ref 6.3–8.6)
PROT UR QL STRIP: ABNORMAL
RBC # BLD AUTO: 3.31 10*6/MM3 (ref 4.37–5.74)
RBC # UR: ABNORMAL /HPF
REF LAB TEST METHOD: ABNORMAL
SODIUM BLD-SCNC: 137 MMOL/L (ref 137–145)
SP GR UR STRIP: 1.02 (ref 1–1.03)
SQUAMOUS #/AREA URNS HPF: ABNORMAL /HPF
UROBILINOGEN UR QL STRIP: ABNORMAL
WBC NRBC COR # BLD: 10.9 10*3/MM3 (ref 3.2–9.8)
WBC UR QL AUTO: ABNORMAL /HPF
WHOLE BLOOD HOLD SPECIMEN: NORMAL
WHOLE BLOOD HOLD SPECIMEN: NORMAL

## 2019-01-07 PROCEDURE — 96374 THER/PROPH/DIAG INJ IV PUSH: CPT

## 2019-01-07 PROCEDURE — 81001 URINALYSIS AUTO W/SCOPE: CPT | Performed by: FAMILY MEDICINE

## 2019-01-07 PROCEDURE — 80053 COMPREHEN METABOLIC PANEL: CPT | Performed by: FAMILY MEDICINE

## 2019-01-07 PROCEDURE — 85025 COMPLETE CBC W/AUTO DIFF WBC: CPT | Performed by: FAMILY MEDICINE

## 2019-01-07 PROCEDURE — 83690 ASSAY OF LIPASE: CPT | Performed by: FAMILY MEDICINE

## 2019-01-07 PROCEDURE — 25010000002 ONDANSETRON PER 1 MG: Performed by: FAMILY MEDICINE

## 2019-01-07 PROCEDURE — 96375 TX/PRO/DX INJ NEW DRUG ADDON: CPT

## 2019-01-07 PROCEDURE — 25010000002 HYDROMORPHONE PER 4 MG: Performed by: FAMILY MEDICINE

## 2019-01-07 PROCEDURE — 99284 EMERGENCY DEPT VISIT MOD MDM: CPT

## 2019-01-07 RX ORDER — HYDROCODONE BITARTRATE AND ACETAMINOPHEN 7.5; 325 MG/1; MG/1
1 TABLET ORAL EVERY 6 HOURS PRN
Qty: 12 TABLET | Refills: 0 | Status: SHIPPED | OUTPATIENT
Start: 2019-01-07 | End: 2019-01-07

## 2019-01-07 RX ORDER — TAMSULOSIN HYDROCHLORIDE 0.4 MG/1
0.4 CAPSULE ORAL ONCE
Status: COMPLETED | OUTPATIENT
Start: 2019-01-07 | End: 2019-01-07

## 2019-01-07 RX ORDER — HYDROMORPHONE HCL 110MG/55ML
1 PATIENT CONTROLLED ANALGESIA SYRINGE INTRAVENOUS ONCE
Status: COMPLETED | OUTPATIENT
Start: 2019-01-07 | End: 2019-01-07

## 2019-01-07 RX ORDER — OXYCODONE AND ACETAMINOPHEN 7.5; 325 MG/1; MG/1
1 TABLET ORAL EVERY 6 HOURS PRN
Qty: 12 TABLET | Refills: 0 | Status: SHIPPED | OUTPATIENT
Start: 2019-01-07 | End: 2019-01-22

## 2019-01-07 RX ORDER — SODIUM CHLORIDE 0.9 % (FLUSH) 0.9 %
10 SYRINGE (ML) INJECTION AS NEEDED
Status: DISCONTINUED | OUTPATIENT
Start: 2019-01-07 | End: 2019-01-07 | Stop reason: HOSPADM

## 2019-01-07 RX ORDER — ONDANSETRON 4 MG/1
4 TABLET, ORALLY DISINTEGRATING ORAL EVERY 6 HOURS PRN
Qty: 10 TABLET | Refills: 0 | Status: SHIPPED | OUTPATIENT
Start: 2019-01-07 | End: 2019-02-27

## 2019-01-07 RX ORDER — ONDANSETRON 2 MG/ML
4 INJECTION INTRAMUSCULAR; INTRAVENOUS ONCE
Status: COMPLETED | OUTPATIENT
Start: 2019-01-07 | End: 2019-01-07

## 2019-01-07 RX ORDER — TAMSULOSIN HYDROCHLORIDE 0.4 MG/1
1 CAPSULE ORAL DAILY
Qty: 7 CAPSULE | Refills: 0 | Status: SHIPPED | OUTPATIENT
Start: 2019-01-07 | End: 2019-01-08

## 2019-01-07 RX ADMIN — SODIUM CHLORIDE, PRESERVATIVE FREE 10 ML: 5 INJECTION INTRAVENOUS at 08:47

## 2019-01-07 RX ADMIN — HYDROMORPHONE HYDROCHLORIDE 1 MG: 2 INJECTION INTRAMUSCULAR; INTRAVENOUS; SUBCUTANEOUS at 08:37

## 2019-01-07 RX ADMIN — TAMSULOSIN HYDROCHLORIDE 0.4 MG: 0.4 CAPSULE ORAL at 08:39

## 2019-01-07 RX ADMIN — ONDANSETRON 4 MG: 2 INJECTION INTRAMUSCULAR; INTRAVENOUS at 08:46

## 2019-01-07 NOTE — ED PROVIDER NOTES
Subjective     Flank Pain   Pain location:  R flank  Pain quality: aching    Pain radiates to:  RLQ  Pain severity:  Moderate  Onset quality:  Sudden  Duration:  1 day  Timing:  Constant  Progression:  Worsening  Chronicity:  Recurrent  Context: not trauma    Relieved by:  Nothing  Worsened by:  Nothing  Ineffective treatments:  None tried  Associated symptoms: dysuria and hematuria    Associated symptoms: no chest pain, no chills, no cough, no diarrhea, no fatigue, no fever, no nausea, no shortness of breath, no sore throat and no vomiting    Risk factors: not obese        Review of Systems   Constitutional: Negative for appetite change, chills, diaphoresis, fatigue and fever.   HENT: Negative for congestion, ear discharge, ear pain, nosebleeds, rhinorrhea, sinus pressure, sore throat and trouble swallowing.    Eyes: Negative for discharge and redness.   Respiratory: Negative for apnea, cough, chest tightness, shortness of breath and wheezing.    Cardiovascular: Negative for chest pain.   Gastrointestinal: Negative for abdominal pain, diarrhea, nausea and vomiting.   Endocrine: Negative for polyuria.   Genitourinary: Positive for dysuria, flank pain and hematuria. Negative for frequency and urgency.   Musculoskeletal: Negative for myalgias and neck pain.   Skin: Negative for color change and rash.   Allergic/Immunologic: Negative for immunocompromised state.   Neurological: Negative for dizziness, seizures, syncope, weakness, light-headedness and headaches.   Hematological: Negative for adenopathy. Does not bruise/bleed easily.   Psychiatric/Behavioral: Negative for behavioral problems and confusion.   All other systems reviewed and are negative.      Past Medical History:   Diagnosis Date   • Acute bacterial sinusitis    • Acute bronchitis    • Acute frontal sinusitis    • Acute maxillary sinusitis    • Acute pharyngitis    • Allergic rhinitis    • Allergic rhinitis due to pollen    • Anxiety    • Artificial lens  present     in position   • Backache    • Borderline glaucoma    • Chronic laryngitis    • Chronic rhinitis    • Cough    • Degeneration of lumbar intervertebral disc    • Degenerative joint disease involving multiple joints    • Diverticular disease of colon    • Dizziness and giddiness    • Erectile dysfunction    • Essential hypertension    • Generalized anxiety disorder    • GERD (gastroesophageal reflux disease)    • Glaucoma    • Hemorrhoids     without bleeding   • Insomnia    • Kidney stone    • Kidney stones    • Malignant tumor of prostate (CMS/HCC)    • Need for prophylactic vaccination and inoculation against influenza    • Osteoarthritis    • Osteoarthritis of multiple joints    • Pain, lumbar region     Pain radiating to lumbar region of back     • PONV (postoperative nausea and vomiting)    • Postviral fatigue syndrome    • Presence of aortocoronary bypass graft    • Prostate cancer (CMS/HCC)    • Pseudomembranous enterocolitis     improved   • Rotator cuff syndrome     right   • Shoulder pain    • SOB (shortness of breath)    • Tenosynovitis    • Thrombocytopenia (CMS/HCC)    • Upper respiratory infection        Allergies   Allergen Reactions   • Molds & Smuts Other (See Comments)     Sinus infection   • Hydrocodone-Acetaminophen Itching       Past Surgical History:   Procedure Laterality Date   • CARDIAC CATHETERIZATION  09/25/2003    Cardiac cath (Normal left ventricular systolic function, EF 60% Multi-vessel coronary artery disease with critical disease noted in the LAD coronary artery, diagonal coronary artery, obtuse marginal coronary and right coronary artery.)   • CARDIAC CATHETERIZATION  05/03/1996    Cardiac cath (Coronary atherosclerotic heart disease. 70% diagonal stenosis. Mild to moderate left anterior descending artery stenosis. Preserved left ventricular function.)   • CARDIAC CATHETERIZATION N/A 2/26/2018    Procedure: Left Heart Cath/ pci if indicated ;  Surgeon: Radha David  MD Chung;  Location: [facility] INVASIVE LOCATION;  Service:    • CATARACT EXTRACTION Bilateral    • CATARACT EXTRACTION  10/04/2011    Remove cataract, insert lens (Right)   • CATARACT EXTRACTION  08/23/2011    Remove cataract, insert lens (left)   • COLONOSCOPY     • COLONOSCOPY      Colon endoscopy 38631 (Rectal bleeding. Radiation proctitis w/bleeding. An abnormal CT of transverse colon due to mucosal ischemic colitis, that now is healed. Internal hemorrhoids w/possible bleeding.)   • COLONOSCOPY  05/10/2011    Colon endoscopy 11453 (Single sessile polyp found in transverse colon and sigmoid colon ,removed by cold biopsy polypectomy.divertic. found in sigmoid colon,descending colon. Friability/capillary friability in rectum sigmoid due to prior radiation.Inter. hem. Grade 1)   • COLONOSCOPY  05/02/2007    Colon endoscopy 32662 (Colon polyp. Diverticulosis without bleeding. Internal hemorrhoids without bleeding.)   • CORONARY ARTERY BYPASS GRAFT  2002   • CYSTOSCOPY  01/13/1995    Cystoscopy, stone removal (Right ureteroscopic lasertripsy.)   • CYSTOSCOPY, URETEROSCOPY, RETROGRADE PYELOGRAM, STENT INSERTION N/A 8/28/2017    Procedure: URETEROSCOPY LASER LITHOTRIPSY WITH STENT INSERTION AND RETROGRADE PYELOGRAM ;  Surgeon: Anna M. D'Amico, MD;  Location: [facility] OR;  Service:    • EPIDURAL  12/03/2014    Therapeutic/diag injection (Lumbar transforaminal epidural steroid injection, L5-S1, left side.)   • EPIDURAL  10/22/2014    Therapeutic/diag injection (Lumbar transforaminal epidural steroid injection L5-S1 left side.)   • EPIDURAL  08/07/2014    Therapeutic/diag injection (Lumbar transforaminal epidural steroid injection.)   • EXCISION LESION  05/13/1999    REMOVE EAR LESION 25060 (1.3 cm basal cell carcinoma, right preauricular area. Excision)   • EXCISION LESION  03/13/2001    REMOVE LESION NECK/CHEST 68362 (Excision of irritated seborrheic keratosis, anterior neck, 1.1 cm and deep lipoma, posterior neck,  3.5 cm)   • INJECTION OF MEDICATION  11/15/2012    Kenalog (4)      • JOINT REPLACEMENT  2015    partial   • KNEE ARTHROSCOPY  01/17/2007    Knee arthroscopy, surgery (Arthroscopy with medial and lateral meniscectomies, right knee.)   • KNEE SURGERY  11/04/2015    Knee Surgery (Right unicompartmental arthroplasty.)   • LUMBAR LAMINECTOMY N/A 2/7/2017    Procedure: LUMBAR LAMINECTOMY LUMBAR THREE-FOUR, LUMBAR FOUR-FIVE, INTERLAMINAR DISTRACTION ;  Surgeon: Lucio Mayo MD;  Location: [facility];  Service:    • NOSE SURGERY     • OTHER SURGICAL HISTORY      EXTENDED VISUAL FIELDS STUDY 72197 (Borderline glaucoma) (3): 03/19/2015, 04/09/2014, 03/27/2013   • OTHER SURGICAL HISTORY  10/29/2003    Heart revascularize (TMR) (Directmyocardial revascularization times four; LIMA to LAD SVG to DARIUSZ SVG to OM1, SVG to RCA)   • OTHER SURGICAL HISTORY      OCT DISC NFL 82630 (Borderline glaucoma)  (3): 09/24/2015, 08/13/2014, 07/29/2013   • PROSTATECTOMY     • SEPTORHINOPLASTY  11/16/1989    Nasal surgery procedure (Septorhinoplasty with reconstruction of the nasal pyramid with a iliac crest graft, rhinoplasty was performed with external approach.)   • SHOULDER ARTHROSCOPY  07/24/2013    Arthroscopy of right shoulder with rotator repair, Carla procedure, Biceps tenotomy, subacromial decompression.   • SHOULDER SURGERY  11/01/2005    Shoulder surgery procedure (Decompression, subacromial, explore of the rotator cuff, no tear found nor repaired, acromioplasty of the left shoulder and AC shoulder joint resection.)       Family History   Problem Relation Age of Onset   • Hypertension Mother    • Heart disease Mother    • Arthritis Mother    • Cancer Other    • Heart disease Other    • Hypertension Other        Social History     Socioeconomic History   • Marital status: Significant Other     Spouse name: Not on file   • Number of children: Not on file   • Years of education: Not on file   • Highest education level: Not  on file   Tobacco Use   • Smoking status: Former Smoker     Packs/day: 0.50     Years: 40.00     Pack years: 20.00     Types: Cigarettes     Last attempt to quit: 2018     Years since quittin.8   • Smokeless tobacco: Never Used   Substance and Sexual Activity   • Alcohol use: Yes     Comment: socially   • Drug use: No   • Sexual activity: Defer           Objective   Physical Exam   Constitutional: He is oriented to person, place, and time. He appears well-developed and well-nourished.   HENT:   Head: Normocephalic and atraumatic.   Nose: Nose normal.   Mouth/Throat: Oropharynx is clear and moist.   Eyes: Conjunctivae and EOM are normal. Pupils are equal, round, and reactive to light. Right eye exhibits no discharge. Left eye exhibits no discharge. No scleral icterus.   Neck: Normal range of motion. Neck supple. No tracheal deviation present.   Cardiovascular: Normal rate, regular rhythm and normal heart sounds.   No murmur heard.  Pulmonary/Chest: Effort normal and breath sounds normal. No stridor. No respiratory distress. He has no wheezes. He has no rales.   Abdominal: Soft. Bowel sounds are normal. He exhibits no distension and no mass. There is tenderness in the right lower quadrant. There is CVA tenderness (right). There is no rebound and no guarding.   Musculoskeletal: He exhibits no edema.   Neurological: He is alert and oriented to person, place, and time. Coordination normal.   Skin: Skin is warm and dry. No rash noted. No erythema.   Psychiatric: He has a normal mood and affect. His behavior is normal. Thought content normal.   Nursing note and vitals reviewed.      Procedures           ED Course  ED Course as of 936   Mon 2019   0932 Patient has a strong history of kidney stones as well as an aortic aneurysm for which she has had multiple CT imaging in the past.  On the last CT chest, the kidneys were seen and it demonstrates some kidney stones on the right.  In an effort to avoid  excessive radiation, CT imaging was felt and patient was treated for his kidney stone.  He understands that should symptoms worsen he'll return at that point we may need a CT scan, otherwise he is to follow-up with Dr. Gaines.  [CB]      ED Course User Index  [CB] Mandeep Rivas MD          Labs Reviewed   COMPREHENSIVE METABOLIC PANEL - Abnormal; Notable for the following components:       Result Value    Glucose 132 (*)     ALT (SGPT) 18 (*)     All other components within normal limits    Narrative:     The MDRD GFR formula is only valid for adults with stable renal function between ages 18 and 70.   URINALYSIS W/ MICROSCOPIC IF INDICATED (NO CULTURE) - Abnormal; Notable for the following components:    Appearance, UA Cloudy (*)     Blood, UA Large (3+) (*)     Protein, UA Trace (*)     All other components within normal limits   CBC WITH AUTO DIFFERENTIAL - Abnormal; Notable for the following components:    WBC 10.90 (*)     RBC 3.31 (*)     Hemoglobin 11.6 (*)     Hematocrit 34.1 (*)     .0 (*)     MCH 35.0 (*)     RDW 16.2 (*)     RDW-SD 58.1 (*)     Monocyte % 12.8 (*)     Immature Grans % 0.6 (*)     Monocytes, Absolute 1.40 (*)     Immature Grans, Absolute 0.07 (*)     All other components within normal limits   URINALYSIS, MICROSCOPIC ONLY - Abnormal; Notable for the following components:    RBC, UA Too Numerous to Count (*)     WBC, UA 6-12 (*)     All other components within normal limits   LIPASE - Normal   RAINBOW DRAW    Narrative:     The following orders were created for panel order [place] Draw.  Procedure                               Abnormality         Status                     ---------                               -----------         ------                     Light Blue Top[[id number]]                                   Final result               Green Top (Gel)[[id number]]                                  Final result               Lavender Top[[id number]]                                      Final result               Gold Top - SST[[id number]]                                   Final result                 Please view results for these tests on the individual orders.   CBC AND DIFFERENTIAL    Narrative:     The following orders were created for panel order CBC & Differential.  Procedure                               Abnormality         Status                     ---------                               -----------         ------                     CBC Auto Differential[[id number]]        Abnormal            Final result                 Please view results for these tests on the individual orders.   LIGHT BLUE TOP   GREEN TOP   LAVENDER TOP   GOLD TOP - SST       No orders to display                 MDM      Final diagnoses:   Kidney stone on right side   Right flank pain            Mandeep Rivsa MD  01/07/19 0937

## 2019-01-07 NOTE — ED NOTES
Pt presents to the ED with complaints of right side/ flank pain that started yesterday. Pt reports a history of kidney stones. Pt does report hematuria.      Caprice Garcia RN  01/07/19 0754

## 2019-01-08 ENCOUNTER — APPOINTMENT (OUTPATIENT)
Dept: CT IMAGING | Facility: HOSPITAL | Age: 79
End: 2019-01-08

## 2019-01-08 ENCOUNTER — HOSPITAL ENCOUNTER (EMERGENCY)
Facility: HOSPITAL | Age: 79
Discharge: HOME | End: 2019-01-08
Attending: EMERGENCY MEDICINE | Admitting: EMERGENCY MEDICINE

## 2019-01-08 ENCOUNTER — PREP FOR SURGERY (OUTPATIENT)
Dept: OTHER | Facility: HOSPITAL | Age: 79
End: 2019-01-08

## 2019-01-08 VITALS
BODY MASS INDEX: 26.75 KG/M2 | TEMPERATURE: 97.5 F | RESPIRATION RATE: 18 BRPM | HEART RATE: 76 BPM | OXYGEN SATURATION: 98 % | SYSTOLIC BLOOD PRESSURE: 152 MMHG | HEIGHT: 63 IN | DIASTOLIC BLOOD PRESSURE: 70 MMHG | WEIGHT: 151 LBS

## 2019-01-08 DIAGNOSIS — N20.0 KIDNEY STONE: Primary | ICD-10-CM

## 2019-01-08 DIAGNOSIS — N20.1 CALCULUS OF URETER: Primary | ICD-10-CM

## 2019-01-08 LAB
ANION GAP SERPL CALCULATED.3IONS-SCNC: 13 MMOL/L (ref 5–15)
BACTERIA UR QL AUTO: ABNORMAL /HPF
BASOPHILS # BLD AUTO: 0.01 10*3/MM3 (ref 0–0.2)
BASOPHILS NFR BLD AUTO: 0.1 % (ref 0–2)
BILIRUB UR QL STRIP: NEGATIVE
BUN BLD-MCNC: 17 MG/DL (ref 7–21)
BUN/CREAT SERPL: 15.3 (ref 7–25)
CALCIUM SPEC-SCNC: 8.7 MG/DL (ref 8.4–10.2)
CHLORIDE SERPL-SCNC: 97 MMOL/L (ref 95–110)
CLARITY UR: CLEAR
CO2 SERPL-SCNC: 26 MMOL/L (ref 22–31)
COLOR UR: YELLOW
CREAT BLD-MCNC: 1.11 MG/DL (ref 0.7–1.3)
DEPRECATED RDW RBC AUTO: 56.9 FL (ref 35.1–43.9)
EOSINOPHIL # BLD AUTO: 0.09 10*3/MM3 (ref 0–0.7)
EOSINOPHIL NFR BLD AUTO: 0.6 % (ref 0–7)
ERYTHROCYTE [DISTWIDTH] IN BLOOD BY AUTOMATED COUNT: 16 % (ref 11.5–14.5)
GFR SERPL CREATININE-BSD FRML MDRD: 64 ML/MIN/1.73 (ref 42–98)
GLUCOSE BLD-MCNC: 169 MG/DL (ref 60–100)
GLUCOSE UR STRIP-MCNC: NEGATIVE MG/DL
HCT VFR BLD AUTO: 32.4 % (ref 39–49)
HGB BLD-MCNC: 11.2 G/DL (ref 13.7–17.3)
HGB UR QL STRIP.AUTO: ABNORMAL
HYALINE CASTS UR QL AUTO: ABNORMAL /LPF
IMM GRANULOCYTES # BLD AUTO: 0.03 10*3/MM3 (ref 0–0.02)
IMM GRANULOCYTES NFR BLD AUTO: 0.2 % (ref 0–0.5)
KETONES UR QL STRIP: ABNORMAL
LEUKOCYTE ESTERASE UR QL STRIP.AUTO: NEGATIVE
LYMPHOCYTES # BLD AUTO: 1.19 10*3/MM3 (ref 0.6–4.2)
LYMPHOCYTES NFR BLD AUTO: 7.8 % (ref 10–50)
MCH RBC QN AUTO: 35.6 PG (ref 26.5–34)
MCHC RBC AUTO-ENTMCNC: 34.6 G/DL (ref 31.5–36.3)
MCV RBC AUTO: 102.9 FL (ref 80–98)
MONOCYTES # BLD AUTO: 1.98 10*3/MM3 (ref 0–0.9)
MONOCYTES NFR BLD AUTO: 13 % (ref 0–12)
NEUTROPHILS # BLD AUTO: 11.98 10*3/MM3 (ref 2–8.6)
NEUTROPHILS NFR BLD AUTO: 78.3 % (ref 37–80)
NITRITE UR QL STRIP: NEGATIVE
NRBC BLD AUTO-RTO: 0 /100 WBC (ref 0–0)
PH UR STRIP.AUTO: 5.5 [PH] (ref 5–9)
PLATELET # BLD AUTO: 237 10*3/MM3 (ref 150–450)
PMV BLD AUTO: 9.5 FL (ref 8–12)
POTASSIUM BLD-SCNC: 3.9 MMOL/L (ref 3.5–5.1)
PROT UR QL STRIP: NEGATIVE
RBC # BLD AUTO: 3.15 10*6/MM3 (ref 4.37–5.74)
RBC # UR: ABNORMAL /HPF
REF LAB TEST METHOD: ABNORMAL
SODIUM BLD-SCNC: 136 MMOL/L (ref 137–145)
SP GR UR STRIP: 1.02 (ref 1–1.03)
SQUAMOUS #/AREA URNS HPF: ABNORMAL /HPF
UROBILINOGEN UR QL STRIP: ABNORMAL
WBC NRBC COR # BLD: 15.28 10*3/MM3 (ref 3.2–9.8)
WBC UR QL AUTO: ABNORMAL /HPF

## 2019-01-08 PROCEDURE — 25010000002 KETOROLAC TROMETHAMINE PER 15 MG: Performed by: STUDENT IN AN ORGANIZED HEALTH CARE EDUCATION/TRAINING PROGRAM

## 2019-01-08 PROCEDURE — 25010000002 CEFTRIAXONE: Performed by: STUDENT IN AN ORGANIZED HEALTH CARE EDUCATION/TRAINING PROGRAM

## 2019-01-08 PROCEDURE — 80048 BASIC METABOLIC PNL TOTAL CA: CPT | Performed by: STUDENT IN AN ORGANIZED HEALTH CARE EDUCATION/TRAINING PROGRAM

## 2019-01-08 PROCEDURE — 85025 COMPLETE CBC W/AUTO DIFF WBC: CPT | Performed by: STUDENT IN AN ORGANIZED HEALTH CARE EDUCATION/TRAINING PROGRAM

## 2019-01-08 PROCEDURE — 74176 CT ABD & PELVIS W/O CONTRAST: CPT

## 2019-01-08 PROCEDURE — 96365 THER/PROPH/DIAG IV INF INIT: CPT

## 2019-01-08 PROCEDURE — 96375 TX/PRO/DX INJ NEW DRUG ADDON: CPT

## 2019-01-08 PROCEDURE — 81001 URINALYSIS AUTO W/SCOPE: CPT | Performed by: STUDENT IN AN ORGANIZED HEALTH CARE EDUCATION/TRAINING PROGRAM

## 2019-01-08 PROCEDURE — 99283 EMERGENCY DEPT VISIT LOW MDM: CPT

## 2019-01-08 RX ORDER — CYCLOBENZAPRINE HCL 10 MG
10 TABLET ORAL 3 TIMES DAILY PRN
COMMUNITY
End: 2021-01-06 | Stop reason: SDUPTHER

## 2019-01-08 RX ORDER — KETOROLAC TROMETHAMINE 15 MG/ML
15 INJECTION, SOLUTION INTRAMUSCULAR; INTRAVENOUS ONCE
Status: COMPLETED | OUTPATIENT
Start: 2019-01-08 | End: 2019-01-08

## 2019-01-08 RX ORDER — LEVOFLOXACIN 5 MG/ML
500 INJECTION, SOLUTION INTRAVENOUS ONCE
Status: CANCELLED | OUTPATIENT
Start: 2019-01-11

## 2019-01-08 RX ORDER — LOSARTAN POTASSIUM 50 MG/1
50 TABLET ORAL NIGHTLY
COMMUNITY
End: 2020-07-06 | Stop reason: SDUPTHER

## 2019-01-08 RX ORDER — NITROFURANTOIN 25; 75 MG/1; MG/1
100 CAPSULE ORAL 2 TIMES DAILY
Qty: 14 CAPSULE | Refills: 0 | Status: ON HOLD | OUTPATIENT
Start: 2019-01-08 | End: 2019-01-11

## 2019-01-08 RX ADMIN — KETOROLAC TROMETHAMINE 15 MG: 15 INJECTION, SOLUTION INTRAMUSCULAR; INTRAVENOUS at 04:16

## 2019-01-08 RX ADMIN — CEFTRIAXONE 1 G: 1 INJECTION, POWDER, FOR SOLUTION INTRAMUSCULAR; INTRAVENOUS at 05:00

## 2019-01-08 NOTE — ED PROVIDER NOTES
Subjective   Patient discharged yesterday from the ER with a prescription of Percocet.  She took the medication twice, but did nothing to help his pain.  He has an appointment to see urology in 6 hours.  He returned to the ER because he continued to be in pain.        History provided by:  Patient and spouse   used: No    Abdominal Pain   Pain location:  R flank  Pain quality: sharp and stabbing    Pain radiates to:  Groin  Pain severity:  Severe  Onset quality:  Sudden  Duration:  2 days  Timing:  Constant  Progression:  Unchanged  Chronicity:  New  Context: awakening from sleep    Context: not alcohol use, not diet changes, not eating, not previous surgeries, not recent illness, not sick contacts and not suspicious food intake    Relieved by:  NSAIDs  Worsened by:  Movement, palpation and position changes  Ineffective treatments:  Not moving  Associated symptoms: no anorexia, no chest pain, no chills, no constipation, no cough, no diarrhea, no dysuria, no fatigue, no fever, no hematuria, no melena, no nausea, no shortness of breath, no sore throat and no vomiting    Risk factors comment:  Kidney stone      Review of Systems   Constitutional: Negative for activity change, appetite change, chills, diaphoresis, fatigue and fever.   HENT: Negative for congestion, ear pain, rhinorrhea, sneezing and sore throat.    Eyes: Negative for pain, redness, itching and visual disturbance.   Respiratory: Negative for cough, choking, chest tightness, shortness of breath, wheezing and stridor.    Cardiovascular: Negative for chest pain, palpitations and leg swelling.   Gastrointestinal: Negative for abdominal distention, abdominal pain, anorexia, blood in stool, constipation, diarrhea, melena, nausea, rectal pain and vomiting.        Right flank   Genitourinary: Negative for difficulty urinating, dysuria, flank pain, frequency and hematuria.   Skin: Negative for color change and rash.   Neurological: Negative  for dizziness, seizures, syncope, weakness, light-headedness, numbness and headaches.   Psychiatric/Behavioral: Negative for agitation, confusion, decreased concentration and sleep disturbance. The patient is not nervous/anxious.    All other systems reviewed and are negative.      Past Medical History:   Diagnosis Date   • Acute bacterial sinusitis    • Acute bronchitis    • Acute frontal sinusitis    • Acute maxillary sinusitis    • Acute pharyngitis    • Allergic rhinitis    • Allergic rhinitis due to pollen    • Anxiety    • Artificial lens present     in position   • Backache    • Borderline glaucoma    • Chronic laryngitis    • Chronic rhinitis    • Cough    • Degeneration of lumbar intervertebral disc    • Degenerative joint disease involving multiple joints    • Diverticular disease of colon    • Dizziness and giddiness    • Erectile dysfunction    • Essential hypertension    • Generalized anxiety disorder    • GERD (gastroesophageal reflux disease)    • Glaucoma    • Hemorrhoids     without bleeding   • Insomnia    • Kidney stone    • Kidney stones    • Malignant tumor of prostate (CMS/HCC)    • Need for prophylactic vaccination and inoculation against influenza    • Osteoarthritis    • Osteoarthritis of multiple joints    • Pain, lumbar region     Pain radiating to lumbar region of back     • PONV (postoperative nausea and vomiting)    • Postviral fatigue syndrome    • Presence of aortocoronary bypass graft    • Prostate cancer (CMS/HCC)    • Pseudomembranous enterocolitis     improved   • Rotator cuff syndrome     right   • Shoulder pain    • SOB (shortness of breath)    • Tenosynovitis    • Thrombocytopenia (CMS/HCC)    • Upper respiratory infection        Allergies   Allergen Reactions   • Molds & Smuts Other (See Comments)     Sinus infection   • Hydrocodone-Acetaminophen Itching       Past Surgical History:   Procedure Laterality Date   • CARDIAC CATHETERIZATION  09/25/2003    Cardiac cath (Normal  left ventricular systolic function, EF 60% Multi-vessel coronary artery disease with critical disease noted in the LAD coronary artery, diagonal coronary artery, obtuse marginal coronary and right coronary artery.)   • CARDIAC CATHETERIZATION  05/03/1996    Cardiac cath (Coronary atherosclerotic heart disease. 70% diagonal stenosis. Mild to moderate left anterior descending artery stenosis. Preserved left ventricular function.)   • CARDIAC CATHETERIZATION N/A 2/26/2018    Procedure: Left Heart Cath/ pci if indicated ;  Surgeon: Radha Wilkes MD;  Location: [facility] CATH INVASIVE LOCATION;  Service:    • CATARACT EXTRACTION Bilateral    • CATARACT EXTRACTION  10/04/2011    Remove cataract, insert lens (Right)   • CATARACT EXTRACTION  08/23/2011    Remove cataract, insert lens (left)   • COLONOSCOPY     • COLONOSCOPY      Colon endoscopy 02844 (Rectal bleeding. Radiation proctitis w/bleeding. An abnormal CT of transverse colon due to mucosal ischemic colitis, that now is healed. Internal hemorrhoids w/possible bleeding.)   • COLONOSCOPY  05/10/2011    Colon endoscopy 32496 (Single sessile polyp found in transverse colon and sigmoid colon ,removed by cold biopsy polypectomy.divertic. found in sigmoid colon,descending colon. Friability/capillary friability in rectum sigmoid due to prior radiation.Inter. hem. Grade 1)   • COLONOSCOPY  05/02/2007    Colon endoscopy 18111 (Colon polyp. Diverticulosis without bleeding. Internal hemorrhoids without bleeding.)   • CORONARY ARTERY BYPASS GRAFT  2002   • CYSTOSCOPY  01/13/1995    Cystoscopy, stone removal (Right ureteroscopic lasertripsy.)   • CYSTOSCOPY, URETEROSCOPY, RETROGRADE PYELOGRAM, STENT INSERTION N/A 8/28/2017    Procedure: URETEROSCOPY LASER LITHOTRIPSY WITH STENT INSERTION AND RETROGRADE PYELOGRAM ;  Surgeon: Anna M. D'Amico, MD;  Location: [facility] OR;  Service:    • EPIDURAL  12/03/2014    Therapeutic/diag injection (Lumbar transforaminal epidural steroid  injection, L5-S1, left side.)   • EPIDURAL  10/22/2014    Therapeutic/diag injection (Lumbar transforaminal epidural steroid injection L5-S1 left side.)   • EPIDURAL  08/07/2014    Therapeutic/diag injection (Lumbar transforaminal epidural steroid injection.)   • EXCISION LESION  05/13/1999    REMOVE EAR LESION 55260 (1.3 cm basal cell carcinoma, right preauricular area. Excision)   • EXCISION LESION  03/13/2001    REMOVE LESION NECK/CHEST 58602 (Excision of irritated seborrheic keratosis, anterior neck, 1.1 cm and deep lipoma, posterior neck, 3.5 cm)   • INJECTION OF MEDICATION  11/15/2012    Kenalog (4)      • JOINT REPLACEMENT  2015    partial   • KNEE ARTHROSCOPY  01/17/2007    Knee arthroscopy, surgery (Arthroscopy with medial and lateral meniscectomies, right knee.)   • KNEE SURGERY  11/04/2015    Knee Surgery (Right unicompartmental arthroplasty.)   • LUMBAR LAMINECTOMY N/A 2/7/2017    Procedure: LUMBAR LAMINECTOMY LUMBAR THREE-FOUR, LUMBAR FOUR-FIVE, INTERLAMINAR DISTRACTION ;  Surgeon: Lucio Mayo MD;  Location: [facility];  Service:    • NOSE SURGERY     • OTHER SURGICAL HISTORY      EXTENDED VISUAL FIELDS STUDY 19955 (Borderline glaucoma) (3): 03/19/2015, 04/09/2014, 03/27/2013   • OTHER SURGICAL HISTORY  10/29/2003    Heart revascularize (TMR) (Directmyocardial revascularization times four; LIMA to LAD SVG to DARIUSZ SVG to OM1, SVG to RCA)   • OTHER SURGICAL HISTORY      OCT DISC NFL 57124 (Borderline glaucoma)  (3): 09/24/2015, 08/13/2014, 07/29/2013   • PROSTATECTOMY     • SEPTORHINOPLASTY  11/16/1989    Nasal surgery procedure (Septorhinoplasty with reconstruction of the nasal pyramid with a iliac crest graft, rhinoplasty was performed with external approach.)   • SHOULDER ARTHROSCOPY  07/24/2013    Arthroscopy of right shoulder with rotator repair, Carla procedure, Biceps tenotomy, subacromial decompression.   • SHOULDER SURGERY  11/01/2005    Shoulder surgery procedure (Decompression,  "subacromial, explore of the rotator cuff, no tear found nor repaired, acromioplasty of the left shoulder and AC shoulder joint resection.)       Family History   Problem Relation Age of Onset   • Hypertension Mother    • Heart disease Mother    • Arthritis Mother    • Cancer Other    • Heart disease Other    • Hypertension Other        Social History     Socioeconomic History   • Marital status: Significant Other     Spouse name: Not on file   • Number of children: Not on file   • Years of education: Not on file   • Highest education level: Not on file   Tobacco Use   • Smoking status: Former Smoker     Packs/day: 0.50     Years: 40.00     Pack years: 20.00     Types: Cigarettes     Last attempt to quit: 2018     Years since quittin.8   • Smokeless tobacco: Never Used   Substance and Sexual Activity   • Alcohol use: Yes     Comment: socially   • Drug use: No   • Sexual activity: Defer           Objective    Vitals:    19 0300 19 0457   BP: 164/77 132/58   BP Location:  Left arm   Patient Position:  Lying   Pulse: 90 84   Resp: 18 16   Temp: 97.5 °F (36.4 °C)    TempSrc: Oral    SpO2: 94% 92%   Weight: 68.5 kg (151 lb)    Height: 160 cm (63\")      Physical Exam   Constitutional: He is oriented to person, place, and time. He appears well-developed and well-nourished. He is active.  Non-toxic appearance. He does not have a sickly appearance. He does not appear ill. No distress.   HENT:   Head: Normocephalic.   Right Ear: External ear normal.   Left Ear: External ear normal.   Nose: Nose normal. No mucosal edema or rhinorrhea.   Mouth/Throat: Uvula is midline, oropharynx is clear and moist and mucous membranes are normal.   Eyes: Conjunctivae are normal. No scleral icterus.   Neck: Normal range of motion.   Cardiovascular: Intact distal pulses.   Pulmonary/Chest: Effort normal and breath sounds normal. No accessory muscle usage. No respiratory distress. He has no decreased breath sounds. He has no " wheezes.   Abdominal: Soft. Bowel sounds are normal. There is tenderness in the right lower quadrant. There is no rigidity, no rebound, no guarding and no CVA tenderness.   Right flank pain   Neurological: He is alert and oriented to person, place, and time. He has normal strength. He is not disoriented. No cranial nerve deficit (grossly intact) or sensory deficit. GCS eye subscore is 4. GCS verbal subscore is 5. GCS motor subscore is 6.   Skin: Skin is warm. Capillary refill takes less than 2 seconds. No rash noted. He is not diaphoretic.   Nursing note and vitals reviewed.      Procedures           ED Course      Results for orders placed or performed during the hospital encounter of 01/08/19   Urinalysis With Culture If Indicated - Urine, Clean Catch   Result Value Ref Range    Color, UA Yellow Yellow, Straw, Dark Yellow, Geraldine    Appearance, UA Clear Clear    pH, UA 5.5 5.0 - 9.0    Specific Gravity, UA 1.019 1.003 - 1.030    Glucose, UA Negative Negative    Ketones, UA 15 mg/dL (1+) (A) Negative    Bilirubin, UA Negative Negative    Blood, UA Moderate (2+) (A) Negative    Protein, UA Negative Negative    Leuk Esterase, UA Negative Negative    Nitrite, UA Negative Negative    Urobilinogen, UA 0.2 E.U./dL 0.2 - 1.0 E.U./dL   Basic Metabolic Panel   Result Value Ref Range    Glucose 169 (H) 60 - 100 mg/dL    BUN 17 7 - 21 mg/dL    Creatinine 1.11 0.70 - 1.30 mg/dL    Sodium 136 (L) 137 - 145 mmol/L    Potassium 3.9 3.5 - 5.1 mmol/L    Chloride 97 95 - 110 mmol/L    CO2 26.0 22.0 - 31.0 mmol/L    Calcium 8.7 8.4 - 10.2 mg/dL    eGFR Non African Amer 64 42 - 98 mL/min/1.73    BUN/Creatinine Ratio 15.3 7.0 - 25.0    Anion Gap 13.0 5.0 - 15.0 mmol/L   CBC Auto Differential   Result Value Ref Range    WBC 15.28 (H) 3.20 - 9.80 10*3/mm3    RBC 3.15 (L) 4.37 - 5.74 10*6/mm3    Hemoglobin 11.2 (L) 13.7 - 17.3 g/dL    Hematocrit 32.4 (L) 39.0 - 49.0 %    .9 (H) 80.0 - 98.0 fL    MCH 35.6 (H) 26.5 - 34.0 pg    MCHC  34.6 31.5 - 36.3 g/dL    RDW 16.0 (H) 11.5 - 14.5 %    RDW-SD 56.9 (H) 35.1 - 43.9 fl    MPV 9.5 8.0 - 12.0 fL    Platelets 237 150 - 450 10*3/mm3    Neutrophil % 78.3 37.0 - 80.0 %    Lymphocyte % 7.8 (L) 10.0 - 50.0 %    Monocyte % 13.0 (H) 0.0 - 12.0 %    Eosinophil % 0.6 0.0 - 7.0 %    Basophil % 0.1 0.0 - 2.0 %    Immature Grans % 0.2 0.0 - 0.5 %    Neutrophils, Absolute 11.98 (H) 2.00 - 8.60 10*3/mm3    Lymphocytes, Absolute 1.19 0.60 - 4.20 10*3/mm3    Monocytes, Absolute 1.98 (H) 0.00 - 0.90 10*3/mm3    Eosinophils, Absolute 0.09 0.00 - 0.70 10*3/mm3    Basophils, Absolute 0.01 0.00 - 0.20 10*3/mm3    Immature Grans, Absolute 0.03 (H) 0.00 - 0.02 10*3/mm3    nRBC 0.0 0.0 - 0.0 /100 WBC   Urinalysis, Microscopic Only - Urine, Clean Catch   Result Value Ref Range    RBC, UA 6-12 (A) None Seen /HPF    WBC, UA 6-12 (A) None Seen, 0-2, 3-5 /HPF    Bacteria, UA None Seen None Seen /HPF    Squamous Epithelial Cells, UA None Seen None Seen, 0-2 /HPF    Hyaline Casts, UA 3-6 None Seen /LPF    Methodology Automated Microscopy      CT Abdomen Pelvis Without Contrast   Final Result   1. Mild right hydronephrosis and hydroureter due to 6.2 mm distal   ureteral calculus    2. Bilateral nephrolithiasis   3. Cholelithiasis.   4. Diverticulosis      Electronically signed by:  Lexx Tatum MD  1/8/2019 4:44 AM CST   Workstation: [id number]              MDM  Number of Diagnoses or Management Options  Kidney stone: new and requires workup  Diagnosis management comments: Patient evaluated in the ER for right flank pain secondary to kidney stone.  Laboratory work significant for urine with red blood cells and white blood cells.  Dose of Rocephin administered in the ER.  Discharged with a prescription of Macrobid.  Patient has appointment to see urology in 5 hours.  CT shows mild right hydronephrosis and a distal 6.2 mm stone.  Patient received Toradol in the ER for pain.  Has a prescription at home for Percocets.  He is  comfortable going home and following up with urology as an outpatient.        Amount and/or Complexity of Data Reviewed  Clinical lab tests: reviewed and ordered  Tests in the radiology section of CPT®: reviewed and ordered  Tests in the medicine section of CPT®: ordered and reviewed    Risk of Complications, Morbidity, and/or Mortality  Presenting problems: moderate  Diagnostic procedures: moderate  Management options: moderate    Patient Progress  Patient progress: improved        Final diagnoses:   Kidney stone           This document has been electronically signed by Lucio Becerra MD on January 8, 2019 5:02 AM     Lucio Becerra MD  Resident  01/08/19 1288

## 2019-01-08 NOTE — PAT
Left message with Carmelita Browning, with Dr. Wilkes, asking if OK to hold Plavix prior to procedure on Friday. Awaiting answer, will call patient back. Leann, with Dr. Gaines, notified of patient taking Plavix today.

## 2019-01-09 NOTE — H&P
[facility] History and Physical  MARCIO JARAMILLO  78-year-old; :February 3, 1940;;male   [address];[place] [address]  Ins: 1) MEDICARE 2) [employer]  MRN:[id number]  TORIE FERNANDEZ MD  [facility] - [place]  2019 10:51 AM  Allergies  No Known Allergies Unknown  Current Medications  Flomax 0.4 mg oral capsule  budesonide 0.25 mg/2 mL inhalation suspension  oxybutynin 5 mg oral tablet  Centrum Mens oral tablet  simvastatin 40 mg oral tablet  atorvastatin 20 mg oral tablet  nitroglycerin 0.4 mg sublingual tablet  isosorbide dinitrate 30 mg oral tablet  Cardizem  mg/24 hours oral capsule,  extended release  oxyCODONE 5 mg oral tablet  carvedilol 3.125 mg oral tablet  raNITIdine 75 mg oral tablet  Cozaar 50 mg oral tablet  Zofran 4 mg oral tablet  albuterol 90 mcg/inh inhalation aerosol  montelukast 10 mg oral tablet  Flovent Diskus 50 mcg/inh inhalation powder  Adult Aspirin 81 mg oral tablet, chewable  metFORMIN 1000 mg oral tablet  clopidogrel 75 mg oral tablet  travoprost 0.004% ophthalmic solution  * Medications Reconciled  Medical History  Neoplasm of prostate  Essential hypertension  Cataract  Glaucoma  Heart disease  Kidney disease  Osteoarthritis  Diabetes mellitus  Patient reports having had a colonoscopy within  the past 9 years.  Surgical History  HEART STENTS  Psychiatric History  Patient is generally satisfied with life and has no  anxiety or thoughts of suicide. Has depression.  Family History  Sister Family history of cancer  Mother Heart disease  Mother Diabetes mellitus  Mother Essential hypertension  Mother Kidney disease  Mother   HEART ATTACK  Father   OLD AGE  Brother   CANCER  Brother   ALCOHOLIC  Sister   CANCER  Social History  Pt currently smokes one pack of cigerattes a  week and drinks an occasionally beer. He is  retired and .  Imm/Inj/Office Meds History  Comments  Patient has had influenza vaccination for this  season.  Patient has had pneumococcal vaccination.  Chief Complaint  Ureteral stone  History of Present Illness  MARCIO JARAMILLO is a 78-year-old male here for evaluation. He has a history of  ureteral stone. CT today shows 6.2 mm distal right ureteral stone with hydronephrosis.  Location: Right ureter.  Severity: Mild.  Onset / Duration: >1 week.  Modifying factors: Flomax.  Associated signs and symptoms: No fever.  Review of Systems  Eyes: ; Denies: blurry vision, pain in the eyes and double vision  ENMT: Reports: sinus problems; Denies: ear pain and sore throat  Cardiovascular: ; Denies: chest pain, varicose veins, angina and syncope  Respiratory: Reports: shortness of breath; Denies: wheezing and frequent cough  Gastrointestinal: Reports: heartburn; Denies: abdominal pain, nausea, vomiting and  indigestion  Genitourinary: Reports: other symptoms (see HPI)  Musculoskeletal: Reports: joint pain, neck pain and back pain  Integumentary: ; Denies: skin rash, boils and persistent itch  Neurological: ; Denies: tremors, dizzy spells and numbness / tingling  Psychiatric: Reports: generally satisfied with life and depression; Denies: suicidal  ideation and anxiety  Endocrine: Reports: tired/sluggish; Denies: Excessive thirst, cold intolerance and  heat intolerance  Hematologic/Lymphatic: ; Denies: swollen glands and blood clotting problem  Allergic/Immunologic: ; Denies: hayfever  Constitutional: Reports: weight loss; Denies: fever and chills  Vital Signs  1/8/2019 10:51:00 AM  BP: 149/88 (mmHg) Heart Rate: 87 (/min)  Weight: 138 (lb) Resp Rate: 18 (/min)  Height: 63 (in) BMI: 24.45  Former smoker  Exam  General appearance: The patient appears well developed and well nourished, in no  apparent acute distress.  Assessment of hearing: Hearing appears normal.  Examination of neck: Neck appears supple.  Assessment of respiratory effort: Respiratory effort appears normal.  No apparent  distress.  Inspection of breasts: Deferred.  Obtain stool sample: Stool specimen for occult blood is not indicated.  Examination of gait and station: Normal gait and stature.  Head and neck (Assessment of range of motion): Adequate ROM noted in all  extremities.  Head and neck (Assessment of stability): Ambulates without assistance.  Inspection of skin and subcutaneous tissue: Exam of the skin is within normal limits.  The color and skin turgor are normal. No lesions, bruises, rashes, or jaundice.  Orientation to time, place and person: Oriented to person, place, and time.  Mood and affect: Mood and affect are normal.  Orientation: He appears alert and oriented.  Mood and affect: Mood and affect appear normal.  Data Review  Medications and chart reviewed. History and physical form/ROS reviewed and new  form completed today. Radiology images reviewed.  Assessment - Ureteric stone  DX:  Ureteric stone  SNO: [id number], ICD-9: 592.1, ICD-10: N20.1  Plan  Plan Notes:  Cystoscopy right retrograde, ureteroscopy, laser lithotripsy, stent placement.  Education:  Kidney Stones - English  Discussion:  Discussed my findings and plan of action and reasoning behind decision making. All  questions were answered. FINDINGS WERE DISCUSSED WITH PATIENT. RISKS  AND BENEFITS EXPLAINED. PATIENT WISHES TO PROCEED.  Instructions:  Patient is instructed to call with any problems. Patient is instructed to call if the  condition worsens. Patient is instructed to call with any changes in condition. Patient  is instructed to call if he has any intractable pain, fever, chills, nausea, or vomiting.  INCREASE FLUIDS. IF PAIN WORSENS/ UNCONTROLLED OR TEMPERATURE  >101.0 GO IMMEDIATELY TO ER.

## 2019-01-09 NOTE — PAT
Spoke with Alma Rosa, with Dr. Wilkes, states Dr. Wilkes ok to come off Plavix for procedure. Patient notified, voiced understanding.

## 2019-01-11 ENCOUNTER — APPOINTMENT (OUTPATIENT)
Dept: GENERAL RADIOLOGY | Facility: HOSPITAL | Age: 79
End: 2019-01-11

## 2019-01-11 ENCOUNTER — ANESTHESIA (OUTPATIENT)
Dept: PERIOP | Facility: HOSPITAL | Age: 79
End: 2019-01-11

## 2019-01-11 ENCOUNTER — ANESTHESIA EVENT (OUTPATIENT)
Dept: PERIOP | Facility: HOSPITAL | Age: 79
End: 2019-01-11

## 2019-01-11 ENCOUNTER — HOSPITAL ENCOUNTER (OUTPATIENT)
Facility: HOSPITAL | Age: 79
Setting detail: HOSPITAL OUTPATIENT SURGERY
Discharge: HOME | End: 2019-01-11
Attending: UROLOGY | Admitting: UROLOGY

## 2019-01-11 VITALS
SYSTOLIC BLOOD PRESSURE: 147 MMHG | TEMPERATURE: 96.6 F | OXYGEN SATURATION: 97 % | HEART RATE: 73 BPM | HEIGHT: 63 IN | BODY MASS INDEX: 24.41 KG/M2 | RESPIRATION RATE: 16 BRPM | WEIGHT: 137.79 LBS | DIASTOLIC BLOOD PRESSURE: 67 MMHG

## 2019-01-11 DIAGNOSIS — N20.1 CALCULUS OF URETER: ICD-10-CM

## 2019-01-11 LAB
GLUCOSE BLDC GLUCOMTR-MCNC: 117 MG/DL (ref 70–130)
GLUCOSE BLDC GLUCOMTR-MCNC: 125 MG/DL (ref 70–130)

## 2019-01-11 PROCEDURE — C1769 GUIDE WIRE: HCPCS | Performed by: UROLOGY

## 2019-01-11 PROCEDURE — C2617 STENT, NON-COR, TEM W/O DEL: HCPCS | Performed by: UROLOGY

## 2019-01-11 PROCEDURE — 82962 GLUCOSE BLOOD TEST: CPT

## 2019-01-11 PROCEDURE — 25010000002 PROPOFOL 10 MG/ML EMULSION: Performed by: NURSE ANESTHETIST, CERTIFIED REGISTERED

## 2019-01-11 PROCEDURE — C1758 CATHETER, URETERAL: HCPCS | Performed by: UROLOGY

## 2019-01-11 PROCEDURE — C1894 INTRO/SHEATH, NON-LASER: HCPCS | Performed by: UROLOGY

## 2019-01-11 PROCEDURE — 25010000002 SUCCINYLCHOLINE PER 20 MG: Performed by: NURSE ANESTHETIST, CERTIFIED REGISTERED

## 2019-01-11 PROCEDURE — 25010000002 LEVOFLOXACIN PER 250 MG: Performed by: UROLOGY

## 2019-01-11 PROCEDURE — 93010 ELECTROCARDIOGRAM REPORT: CPT | Performed by: INTERNAL MEDICINE

## 2019-01-11 PROCEDURE — 74420 UROGRAPHY RTRGR +-KUB: CPT

## 2019-01-11 PROCEDURE — 25010000002 ONDANSETRON PER 1 MG: Performed by: NURSE ANESTHETIST, CERTIFIED REGISTERED

## 2019-01-11 PROCEDURE — 25010000002 FENTANYL CITRATE (PF) 100 MCG/2ML SOLUTION: Performed by: NURSE ANESTHETIST, CERTIFIED REGISTERED

## 2019-01-11 PROCEDURE — 93005 ELECTROCARDIOGRAM TRACING: CPT | Performed by: ANESTHESIOLOGY

## 2019-01-11 DEVICE — URETERAL STENT
Type: IMPLANTABLE DEVICE | Status: FUNCTIONAL
Brand: PERCUFLEX™ PLUS

## 2019-01-11 RX ORDER — SUCCINYLCHOLINE CHLORIDE 20 MG/ML
INJECTION INTRAMUSCULAR; INTRAVENOUS AS NEEDED
Status: DISCONTINUED | OUTPATIENT
Start: 2019-01-11 | End: 2019-01-11 | Stop reason: SURG

## 2019-01-11 RX ORDER — EPHEDRINE SULFATE 50 MG/ML
INJECTION, SOLUTION INTRAVENOUS AS NEEDED
Status: DISCONTINUED | OUTPATIENT
Start: 2019-01-11 | End: 2019-01-11 | Stop reason: SURG

## 2019-01-11 RX ORDER — NALOXONE HCL 0.4 MG/ML
0.2 VIAL (ML) INJECTION AS NEEDED
Status: DISCONTINUED | OUTPATIENT
Start: 2019-01-11 | End: 2019-01-11 | Stop reason: HOSPADM

## 2019-01-11 RX ORDER — LIDOCAINE HYDROCHLORIDE 20 MG/ML
INJECTION, SOLUTION INFILTRATION; PERINEURAL AS NEEDED
Status: DISCONTINUED | OUTPATIENT
Start: 2019-01-11 | End: 2019-01-11 | Stop reason: SURG

## 2019-01-11 RX ORDER — DIPHENHYDRAMINE HYDROCHLORIDE 50 MG/ML
12.5 INJECTION INTRAMUSCULAR; INTRAVENOUS
Status: DISCONTINUED | OUTPATIENT
Start: 2019-01-11 | End: 2019-01-11 | Stop reason: HOSPADM

## 2019-01-11 RX ORDER — FLUMAZENIL 0.1 MG/ML
0.2 INJECTION INTRAVENOUS AS NEEDED
Status: DISCONTINUED | OUTPATIENT
Start: 2019-01-11 | End: 2019-01-11 | Stop reason: HOSPADM

## 2019-01-11 RX ORDER — LABETALOL HYDROCHLORIDE 5 MG/ML
5 INJECTION, SOLUTION INTRAVENOUS
Status: DISCONTINUED | OUTPATIENT
Start: 2019-01-11 | End: 2019-01-11 | Stop reason: HOSPADM

## 2019-01-11 RX ORDER — FENTANYL CITRATE 50 UG/ML
INJECTION, SOLUTION INTRAMUSCULAR; INTRAVENOUS AS NEEDED
Status: DISCONTINUED | OUTPATIENT
Start: 2019-01-11 | End: 2019-01-11 | Stop reason: SURG

## 2019-01-11 RX ORDER — SODIUM CHLORIDE 9 MG/ML
1000 INJECTION, SOLUTION INTRAVENOUS CONTINUOUS
Status: DISCONTINUED | OUTPATIENT
Start: 2019-01-11 | End: 2019-01-11 | Stop reason: HOSPADM

## 2019-01-11 RX ORDER — NITROFURANTOIN 25; 75 MG/1; MG/1
100 CAPSULE ORAL 2 TIMES DAILY
COMMUNITY
End: 2019-01-22

## 2019-01-11 RX ORDER — ONDANSETRON 2 MG/ML
4 INJECTION INTRAMUSCULAR; INTRAVENOUS ONCE AS NEEDED
Status: DISCONTINUED | OUTPATIENT
Start: 2019-01-11 | End: 2019-01-11 | Stop reason: HOSPADM

## 2019-01-11 RX ORDER — EPHEDRINE SULFATE 50 MG/ML
5 INJECTION, SOLUTION INTRAVENOUS ONCE AS NEEDED
Status: DISCONTINUED | OUTPATIENT
Start: 2019-01-11 | End: 2019-01-11 | Stop reason: HOSPADM

## 2019-01-11 RX ORDER — ONDANSETRON 2 MG/ML
INJECTION INTRAMUSCULAR; INTRAVENOUS AS NEEDED
Status: DISCONTINUED | OUTPATIENT
Start: 2019-01-11 | End: 2019-01-11 | Stop reason: SURG

## 2019-01-11 RX ORDER — PROPOFOL 10 MG/ML
VIAL (ML) INTRAVENOUS AS NEEDED
Status: DISCONTINUED | OUTPATIENT
Start: 2019-01-11 | End: 2019-01-11 | Stop reason: SURG

## 2019-01-11 RX ORDER — LEVOFLOXACIN 5 MG/ML
500 INJECTION, SOLUTION INTRAVENOUS ONCE
Status: COMPLETED | OUTPATIENT
Start: 2019-01-11 | End: 2019-01-11

## 2019-01-11 RX ADMIN — SUCCINYLCHOLINE CHLORIDE 100 MG: 20 INJECTION, SOLUTION INTRAMUSCULAR; INTRAVENOUS at 18:50

## 2019-01-11 RX ADMIN — LEVOFLOXACIN 500 MG: 5 INJECTION, SOLUTION INTRAVENOUS at 18:54

## 2019-01-11 RX ADMIN — EPHEDRINE SULFATE 10 MG: 50 INJECTION INTRAVENOUS at 19:00

## 2019-01-11 RX ADMIN — SODIUM CHLORIDE 1000 ML: 900 INJECTION, SOLUTION INTRAVENOUS at 14:52

## 2019-01-11 RX ADMIN — PROPOFOL 150 MG: 10 INJECTION, EMULSION INTRAVENOUS at 18:50

## 2019-01-11 RX ADMIN — FENTANYL CITRATE 25 MCG: 50 INJECTION, SOLUTION INTRAMUSCULAR; INTRAVENOUS at 18:53

## 2019-01-11 RX ADMIN — ONDANSETRON 4 MG: 2 INJECTION INTRAMUSCULAR; INTRAVENOUS at 19:11

## 2019-01-11 RX ADMIN — FENTANYL CITRATE 25 MCG: 50 INJECTION, SOLUTION INTRAMUSCULAR; INTRAVENOUS at 18:50

## 2019-01-11 RX ADMIN — LIDOCAINE HYDROCHLORIDE 60 MG: 20 INJECTION, SOLUTION INFILTRATION; PERINEURAL at 18:50

## 2019-01-11 RX ADMIN — SODIUM CHLORIDE: 900 INJECTION, SOLUTION INTRAVENOUS at 18:36

## 2019-01-11 NOTE — ANESTHESIA PREPROCEDURE EVALUATION
Anesthesia Evaluation     Patient summary reviewed   history of anesthetic complications: PONV  NPO Solid Status: > 8 hours  NPO Liquid Status: > 8 hours           Airway   Mallampati: II  TM distance: <3 FB  possible difficult intubation and No difficulty expected  Dental    (+) edentulous    Pulmonary     breath sounds clear to auscultation  (+) a smoker Former, asthma, shortness of breath, recent URI,   Cardiovascular   Exercise tolerance: good (4-7 METS)    NYHA Classification: II  ECG reviewed  Rhythm: regular  Rate: normal    (+) hypertension poorly controlled, past MI , CAD, CABG 3-6 Months, angina, PVD, hyperlipidemia,       Neuro/Psych  (+) dizziness/light headedness, numbness, psychiatric history Anxiety,     GI/Hepatic/Renal/Endo    (+)  GERD poorly controlled,  diabetes mellitus type 2 well controlled,     Musculoskeletal     (+) neck pain,   Abdominal     Abdomen: soft.   Substance History      OB/GYN          Other   (+) arthritis   history of cancer active                      Anesthesia Plan    ASA 3     general     intravenous induction   Anesthetic plan, all risks, benefits, and alternatives have been provided, discussed and informed consent has been obtained with: patient.

## 2019-01-12 NOTE — ANESTHESIA POSTPROCEDURE EVALUATION
Patient: Vishnu Waller    Procedure Summary     Date:  01/11/19 Room / Location:  [facility] OR 08 / [facility] OR    Anesthesia Start:  1839 Anesthesia Stop:  1923    Procedure:  CYSTOSCOPY URETEROSCOPY RETROGRADE PYELOGRAM HOLMIUM LASER STENT INSERTION (Right ) Diagnosis:       Calculus of ureter      (Calculus of ureter [N20.1])    Surgeon:  Nirmal Gaines MD Provider:  Low Benavidez MD    Anesthesia Type:  general ASA Status:  3          Anesthesia Type: general  Last vitals  BP   170/74 (01/11/19 1443)   Temp   97.4 °F (36.3 °C) (01/11/19 1443)   Pulse   63 (01/11/19 1443)   Resp   18 (01/11/19 1443)     SpO2   98 % (01/11/19 1443)     Post Anesthesia Care and Evaluation    Patient location during evaluation: PACU  Patient participation: complete - patient participated  Level of consciousness: awake and alert  Pain score: 0  Pain management: adequate  Airway patency: patent  Anesthetic complications: No anesthetic complications  PONV Status: none  Cardiovascular status: acceptable  Respiratory status: acceptable  Hydration status: acceptable

## 2019-01-12 NOTE — OP NOTE
CYSTOSCOPY URETEROSCOPY RETROGRADE PYELOGRAM HOLMIUM LASER STENT INSERTION  Procedure Note    Vishnu Waller  1/11/2019    Pre-op Diagnosis:   Calculus of ureter [N20.1]    Post-op Diagnosis:     Post-Op Diagnosis Codes:     * Calculus of ureter [N20.1]      Procedure(s):  CYSTOSCOPY URETEROSCOPY RETROGRADE PYELOGRAM HOLMIUM LASER STENT INSERTION    Surgeon(s):  Nirmal Gaines MD    Anesthesia: General    Staff:   Circulator: Carolee Landon RN  Scrub Person: Una Villagran  Assistant: Leann Ross CSA    Estimated Blood Loss: minimal    Specimens:                None      Drains:      Findings: Ureteral stricture right side    Complications: None    Indications: Flank pain stone seen on CT scan    Description of Procedure: Patient brought to the operating room suite placed in dorsolithotomy position.  He has sterile prep and drape of genitalia routine fashion.  Passed a 22 Japanese cystoscope open the bladder right ureter catheterized 6 cc of contrast placed up the ureter strictured area distally was noted.  I put a 0.38 guidewire up the ureter into the renal pelvis and dilated with some difficulty with the navigator system that took the ureteroscope went from the bladder all the way to the right renal pelvis.  The ureter was opened no signs of any obstruction but a strictured area which had been dilated distally we removed the scope and an over-the-top guidewire through cystoscope we placed a 8 x 26 double-J stent push assistant pusher bladder strain scope was removed she'll be noted that we brought the stent strangury outside and shortened it in the urethra itself in routine fashion patient taken recovery and tolerated well    Nirmal Gaines MD     Date: 1/11/2019  Time: 7:14 PM

## 2019-01-12 NOTE — ANESTHESIA PROCEDURE NOTES
ANESTHESIA INTUBATION  Urgency: elective    Airway not difficult    General Information and Staff    Patient location during procedure: OR    Indications and Patient Condition  Indications for airway management: airway protection    Preoxygenated: yes  Mask difficulty assessment: 1 - vent by mask    Final Airway Details  Final airway type: endotracheal airway      Successful airway: ETT  Cuffed: yes   Successful intubation technique: direct laryngoscopy  Facilitating devices/methods: intubating stylet  Endotracheal tube insertion site: oral  Blade: Tl  Blade size: 3  ETT size (mm): 7.0  Cormack-Lehane Classification: grade I - full view of glottis  Placement verified by: chest auscultation and capnometry   Measured from: gums  ETT to gums (cm): 23  Number of attempts at approach: 2

## 2019-01-12 NOTE — DISCHARGE INSTR - APPOINTMENTS
++++++Please call Dr. Gaines office on Monday to get a follow-up appointment scheduled #[phone]+++++

## 2019-01-18 ENCOUNTER — PREP FOR SURGERY (OUTPATIENT)
Dept: OTHER | Facility: HOSPITAL | Age: 79
End: 2019-01-18

## 2019-01-18 DIAGNOSIS — T19.1XXS FOREIGN BODY IN BLADDER AND URETHRA, SEQUELA: Primary | ICD-10-CM

## 2019-01-18 DIAGNOSIS — N20.1 CALCULUS OF URETER: ICD-10-CM

## 2019-01-18 DIAGNOSIS — T19.0XXS FOREIGN BODY IN BLADDER AND URETHRA, SEQUELA: Primary | ICD-10-CM

## 2019-01-18 PROBLEM — T19.0XXA FOREIGN BODY IN BLADDER AND URETHRA: Status: ACTIVE | Noted: 2019-01-18

## 2019-01-18 PROBLEM — T19.1XXA FOREIGN BODY IN BLADDER AND URETHRA: Status: ACTIVE | Noted: 2019-01-18

## 2019-01-18 RX ORDER — LEVOFLOXACIN 5 MG/ML
500 INJECTION, SOLUTION INTRAVENOUS ONCE
Status: CANCELLED | OUTPATIENT
Start: 2019-01-23

## 2019-01-21 ENCOUNTER — PREP FOR SURGERY (OUTPATIENT)
Dept: OTHER | Facility: HOSPITAL | Age: 79
End: 2019-01-21

## 2019-01-23 ENCOUNTER — ANESTHESIA EVENT (OUTPATIENT)
Dept: PERIOP | Facility: HOSPITAL | Age: 79
End: 2019-01-23

## 2019-01-23 ENCOUNTER — HOSPITAL ENCOUNTER (OUTPATIENT)
Facility: HOSPITAL | Age: 79
Setting detail: HOSPITAL OUTPATIENT SURGERY
Discharge: HOME | End: 2019-01-23
Attending: UROLOGY | Admitting: UROLOGY

## 2019-01-23 ENCOUNTER — ANESTHESIA (OUTPATIENT)
Dept: PERIOP | Facility: HOSPITAL | Age: 79
End: 2019-01-23

## 2019-01-23 VITALS
OXYGEN SATURATION: 96 % | DIASTOLIC BLOOD PRESSURE: 65 MMHG | WEIGHT: 133.38 LBS | HEART RATE: 76 BPM | BODY MASS INDEX: 23.63 KG/M2 | SYSTOLIC BLOOD PRESSURE: 146 MMHG | TEMPERATURE: 98 F | RESPIRATION RATE: 18 BRPM | HEIGHT: 63 IN

## 2019-01-23 DIAGNOSIS — T19.0XXS FOREIGN BODY IN BLADDER AND URETHRA, SEQUELA: ICD-10-CM

## 2019-01-23 DIAGNOSIS — T19.1XXS FOREIGN BODY IN BLADDER AND URETHRA, SEQUELA: ICD-10-CM

## 2019-01-23 LAB — GLUCOSE BLDC GLUCOMTR-MCNC: 120 MG/DL (ref 70–130)

## 2019-01-23 PROCEDURE — 82962 GLUCOSE BLOOD TEST: CPT

## 2019-01-23 PROCEDURE — 88300 SURGICAL PATH GROSS: CPT | Performed by: UROLOGY

## 2019-01-23 PROCEDURE — 25010000002 PROPOFOL 10 MG/ML EMULSION: Performed by: NURSE ANESTHETIST, CERTIFIED REGISTERED

## 2019-01-23 PROCEDURE — 88300 SURGICAL PATH GROSS: CPT | Performed by: PATHOLOGY

## 2019-01-23 PROCEDURE — 25010000002 LEVOFLOXACIN PER 250 MG: Performed by: UROLOGY

## 2019-01-23 RX ORDER — SODIUM CHLORIDE 9 MG/ML
1000 INJECTION, SOLUTION INTRAVENOUS CONTINUOUS
Status: DISCONTINUED | OUTPATIENT
Start: 2019-01-23 | End: 2019-01-23 | Stop reason: HOSPADM

## 2019-01-23 RX ORDER — LEVOFLOXACIN 5 MG/ML
500 INJECTION, SOLUTION INTRAVENOUS ONCE
Status: COMPLETED | OUTPATIENT
Start: 2019-01-23 | End: 2019-01-23

## 2019-01-23 RX ORDER — PROPOFOL 10 MG/ML
VIAL (ML) INTRAVENOUS AS NEEDED
Status: DISCONTINUED | OUTPATIENT
Start: 2019-01-23 | End: 2019-01-23 | Stop reason: SURG

## 2019-01-23 RX ORDER — LIDOCAINE HYDROCHLORIDE 10 MG/ML
INJECTION, SOLUTION INFILTRATION; PERINEURAL AS NEEDED
Status: DISCONTINUED | OUTPATIENT
Start: 2019-01-23 | End: 2019-01-23 | Stop reason: SURG

## 2019-01-23 RX ADMIN — PROPOFOL 50 MG: 10 INJECTION, EMULSION INTRAVENOUS at 15:12

## 2019-01-23 RX ADMIN — SODIUM CHLORIDE 1000 ML: 9 INJECTION, SOLUTION INTRAVENOUS at 13:08

## 2019-01-23 RX ADMIN — PROPOFOL 30 MG: 10 INJECTION, EMULSION INTRAVENOUS at 15:17

## 2019-01-23 RX ADMIN — LEVOFLOXACIN 500 MG: 5 INJECTION, SOLUTION INTRAVENOUS at 15:15

## 2019-01-23 RX ADMIN — LIDOCAINE HYDROCHLORIDE 100 MG: 10 INJECTION, SOLUTION INFILTRATION; PERINEURAL at 15:12

## 2019-01-23 NOTE — ANESTHESIA PREPROCEDURE EVALUATION
Anesthesia Evaluation     Patient summary reviewed   history of anesthetic complications: PONV  NPO Solid Status: > 8 hours  NPO Liquid Status: > 4 hours           Airway   Mallampati: II  TM distance: <3 FB  Neck ROM: full  No difficulty expected  Dental    (+) poor dentition and partials        Pulmonary     breath sounds clear to auscultation  (+) a smoker Current Smoked day of surgery, asthma, shortness of breath, recent URI,   Cardiovascular   Exercise tolerance: poor (<4 METS)    NYHA Classification: II  ECG reviewed  PT is on anticoagulation therapy  Patient on routine beta blocker and Beta blocker given within 24 hours of surgery  Rhythm: regular  Rate: normal    (+) hypertension well controlled, past MI  >12 months, CAD, CABG >6 Months, cardiac stents within the past 12 months PVD (AORTIC ANEURYSM), hyperlipidemia,   (-) angina    ROS comment: Sinus rhythm with premature atrial complexes  Otherwise normal ECG  When compared with ECG of 25-FEB-2018 08:12,  premature atrial complexes are now present  ST no longer depressed in Inferior leads  Nonspecific T wave abnormality no longer evident in Inferior leads  Confirmed by VETTIANKAL    · Left atrial cavity size is mildly dilated.  · Left ventricular systolic function is normal. Estimated EF = 60%.  · Mild aortic valve regurgitation is present.  · Left ventricular diastolic dysfunction (grade I a) consistent with impaired relaxation.  · The study is technically difficult for diagnosis    Neuro/Psych  (+) dizziness/light headedness, numbness, psychiatric history Anxiety,     (-) seizures, TIA, CVA  GI/Hepatic/Renal/Endo    (+)  GERD poorly controlled,  renal disease stones, diabetes mellitus (glu 120) type 2 well controlled,   (-) hepatitis, liver disease    Musculoskeletal     (+) back pain, neck pain,   Abdominal     Abdomen: soft.   Substance History - negative use     OB/GYN          Other   (+) arthritis   history of cancer (PROSTATE) active                       Anesthesia Plan    ASA 3     MAC   total IV anesthesia  intravenous induction   Anesthetic plan, all risks, benefits, and alternatives have been provided, discussed and informed consent has been obtained with: patient and spouse/significant other.

## 2019-01-23 NOTE — H&P
[facility] History and Physical  MARCIO JARAMILLO  78-year-old; :February 3, 1940;;male   [address] [address]  Ins: 1) MEDICARE 2) [employer]  MRN:[id number]  TORIE FERNANDEZ MD  [facility] [place]  2019 12:08 PM  Allergies  No Known Allergies Unknown  Current Medications  Flomax 0.4 mg oral capsule  budesonide 0.25 mg/2 mL inhalation suspension  oxybutynin 5 mg oral tablet  Centrum Mens oral tablet  simvastatin 40 mg oral tablet  atorvastatin 20 mg oral tablet  nitroglycerin 0.4 mg sublingual tablet  isosorbide dinitrate 30 mg oral tablet  Cardizem  mg/24 hours oral capsule,  extended release  oxyCODONE 5 mg oral tablet  carvedilol 3.125 mg oral tablet  raNITIdine 75 mg oral tablet  Cozaar 50 mg oral tablet  Zofran 4 mg oral tablet  albuterol 90 mcg/inh inhalation aerosol  montelukast 10 mg oral tablet  Flovent Diskus 50 mcg/inh inhalation powder  Adult Aspirin 81 mg oral tablet, chewable  metFORMIN 1000 mg oral tablet  clopidogrel 75 mg oral tablet  travoprost 0.004% ophthalmic solution  * Medications Reconciled  Problem List  Ureter stricture/obstruction 2019  Medical History  Neoplasm of prostate  Essential hypertension  Cataract  Glaucoma  Heart disease  Kidney disease  Osteoarthritis  Diabetes mellitus  Has not had colonoscopy.  Chief Complaint  Ureteral stent  History of Present Illness  MARCIO JARAMILLO is a 78-year-old male here for ureteral stent removal. He is status  post cystoscopy with retrogrades done on 19.  Location: Right ureter.  Severity: Mild.  Onset / Duration: >1 week.  Associated signs and symptoms: No fever.  Review of Systems  Eyes: ; Denies: blurry vision, pain in the eyes and double vision  ENMT: Reports: sinus problems; Denies: ear pain and sore throat  Cardiovascular: ; Denies: chest pain, varicose veins, angina and syncope  Respiratory: Reports: shortness of breath; Denies:  wheezing and frequent cough  Gastrointestinal: Reports: heartburn; Denies: abdominal pain, nausea, vomiting and  indigestion  Genitourinary: Reports: other symptoms (see HPI)  Musculoskeletal: Reports: joint pain, neck pain and back pain  Integumentary: ; Denies: skin rash, boils and persistent itch  Neurological: ; Denies: tremors, dizzy spells and numbness / tingling  Psychiatric: Reports: generally satisfied with life and depression; Denies: suicidal  ideation and anxiety  Endocrine: Reports: tired/sluggish; Denies: Excessive thirst, cold intolerance and  heat intolerance  Hematologic/Lymphatic: ; Denies: swollen glands and blood clotting problem  Allergic/Immunologic: ; Denies: hayfever  Constitutional: Reports: weight loss; Denies: fever and chills  Vital Signs  1/18/2019 12:08:00 PM  BP: 150/91 (mmHg) Heart Rate: 84 (/min)  Weight: 138 (lb) Resp Rate: 18 (/min)  Height: 63 (in) BMI: 24.45  Light tobacco smoker  Exam  General appearance: The patient appears well developed and well nourished, in no  apparent acute distress.  Examination of pupils and irises: No redness or drainage noted.  Assessment of hearing: Responds to verbal command.  Examination of neck: Neck appears supple. No masses noted.  Assessment of respiratory effort: Respiratory effort appears normal. No apparent  distress.  Genitourinary: After prep and local lidocaine gel inserted into urethra, cystoscopy was  performed and attempted to remove ureteral stent. Unable to remove stent in office.  He will have to have stent removed at hospital.  Examination of gait and station: Normal gait and stature.  Head and neck (Assessment of range of motion): Adequate ROM noted in all  extremities.  Head and neck (Assessment of muscle strength and tone): Muscle strength and tone  normal for age.  Inspection of skin and subcutaneous tissue: Exam of the skin is within normal limits.  The color and skin turgor are normal. No lesions, bruises, rashes, or  jaundice.  Orientation to time, place and person: Oriented to person, place, and time.  Mood and affect: Normal mood and affect.  Data Review  Medications and chart reviewed. History and physical form/ROS reviewed and no  changes noted. Previous encounter reviewed. Last form done 01/08/19.  Assessment - Ureter stricture/obstruction  DX:  Ureter stricture/obstruction  SNO: [id number], ICD-9: 593.3, ICD-10: N13.5  Foreign body in ureter  SNO: [id number], ICD-9: 939.9, ICD-10: T19.8XXS  Assessment Notes:  Unable to remove stent in office.  Plan  Plan Notes:  Cystoscopy, right stent removal.  Procedures:  [id number] CYSTOSCOPY AND TREATMENT  Education:  Ureteral Stent - English  Discussion:  Discussed my findings and plan of action and reasoning behind decision making. All  questions were answered. FINDINGS WERE DISCUSSED WITH PATIENT. RISKS  AND BENEFITS EXPLAINED. PATIENT WISHES TO PROCEED.  Instructions:  Patient is instructed to call with any problems. Patient is instructed to call if the  condition worsens. Patient is instructed to call with any changes in condition.

## 2019-01-23 NOTE — ANESTHESIA POSTPROCEDURE EVALUATION
Patient: Vishnu Waller    Procedure Summary     Date:  01/23/19 Room / Location:  [facility] OR 02 / [facility] OR    Anesthesia Start:  1508 Anesthesia Stop:  1527    Procedure:  CYSTOSCOPY, RIGHT STENT REMOVAL (Right Urethra) Diagnosis:       Foreign body in bladder and urethra, sequela      (Foreign body in bladder and urethra, sequela [T19.1XXS, T19.0XXS])    Surgeon:  Nirmal Gaines MD Provider:  Dwayne Mills MD    Anesthesia Type:  MAC ASA Status:  3          Anesthesia Type: MAC  Last vitals  BP   139/68 (01/23/19 1311)   Temp   97.3 °F (36.3 °C) (01/23/19 1311)   Pulse   64 (01/23/19 1311)   Resp   18 (01/23/19 1311)     SpO2   97 % (01/23/19 1311)     Post Anesthesia Care and Evaluation    Patient location during evaluation: bedside  Patient participation: complete - patient participated  Level of consciousness: awake  Pain score: 0  Pain management: adequate  Airway patency: patent  Anesthetic complications: No anesthetic complications  PONV Status: none  Cardiovascular status: acceptable  Respiratory status: acceptable  Hydration status: acceptable

## 2019-01-23 NOTE — OP NOTE
CYSTOSCOPY  Procedure Note    Vishnu Waller  1/23/2019    Pre-op Diagnosis:   Foreign body in bladder and urethra, sequela [T19.1XXS, T19.0XXS]    Post-op Diagnosis:     Post-Op Diagnosis Codes:     * Foreign body in bladder and urethra, sequela [T19.1XXS, T19.0XXS]      Procedure(s):  CYSTOSCOPY, RIGHT STENT REMOVAL    Surgeon(s):  Nirmal Gaines MD    Anesthesia: Monitor Anesthesia Care    Staff:   Circulator: Marily Bagley RN  Scrub Person: Pari De La Paz; Tonya Bautista  Assistant: Leann Ross CSA    Estimated Blood Loss: none    Specimens:                ID Type Source Tests Collected by Time   A : old stent Hardware / Foreign Body Ureter, Right TISSUE PATHOLOGY EXAM Nirmal Gaines MD 1/23/2019 1515         Drains:      Findings: Retained right J stent    Complications: None    Indications: Same    Description of Procedure: Patient brought to cystoscopy place in the dorsolithotomy position.  He had a sterile prep and drape of the genitalia.  2% Xylocaine gel placed per urethra.  I placed a rigid cystoscope into the anterior urethra engaged the stent string pull the stent out drain the bladder removed the scope the patient taken recovery and her procedure well    Nirmal Gaines MD     Date: 1/23/2019  Time: 3:26 PM

## 2019-01-25 LAB
LAB AP CASE REPORT: NORMAL
PATH REPORT.FINAL DX SPEC: NORMAL
PATH REPORT.GROSS SPEC: NORMAL

## 2019-02-26 NOTE — PROGRESS NOTES
Pulmonary Office Follow-up    Subjective     Vishnu Waller is seen today at the office for   Chief Complaint   Patient presents with   • COPD         HPI  Vishnu Waller is a 79 y.o. male with a PMH significant for COPD with asthma, past tobacco use, allergies, CAD s/p CABG, HTN, DM, and borderline glaucoma who presents for follow-up of COPD.  He was last seen on 11/26/18, at which time he was stable on Symbicort and was not having any issues with his chronic allergic rhinitis. He states he was seen in the ED with kidney stones, but not issues with his breathing. Pt admits to OLMOS so he is using his albuterol at least once a day, but he is not using his Symbicort daily. He continues to get out of breath with exertion but states that he thinks this is his baseline.  Patient does have some intermittent cough, but denies any significant sputum production or chest tightness.  His wife is concerned that he has decreased appetite and ongoing weight loss.  Patient has had both a CT of the chest and abdomen and pelvis within the last year that did not show any concerning findings.  He admits that a lot of his exertion is limited due to chronic pain and his wife wonders if drinking some alcohol at bedtime would help him sleep better.  He does continue to smoke marijuana periodically, but states that it causes increased cough and congestion so he has not been doing it very much.  He does complain of ongoing issues with rhinitis and postnasal drip despite using Zyrtec and Flonase regularly.    Patient Active Problem List   Diagnosis   • Degeneration of lumbar intervertebral disc   • Low back pain without sciatica   • Neuritis or radiculitis due to rupture of lumbar intervertebral disc   • Borderline glaucoma, open angle with borderline findings   • Pseudophakia   • History of coronary artery bypass surgery   • Essential hypertension   • Mixed hyperlipidemia   • Thrombocytopenia (CMS/HCC)   • Spinal stenosis of  lumbar region   • Degenerative disc disease, lumbar   • Chronic pain of right knee   • History of artificial joint   • B12 deficiency   • Degeneration of intervertebral disc of lumbosacral region   • Personal history of other infectious and parasitic diseases   • Status post lumbar spine operation   • History of pyelonephritis   • History of surgical procedure   • History of repair of rotator cuff   • Encounter for follow-up examination after completed treatment for conditions other than malignant neoplasm   • Encounter for general adult medical examination without abnormal findings   • Osteoarthritis of knee   • Osteoarthritis of multiple joints   • Primary insomnia   • Encounter for screening for malignant neoplasm of colon   • Encounter for screening for malignant neoplasm of prostate   • Type 2 diabetes mellitus without complication, without long-term current use of insulin (CMS/HCC)   • Coronary artery disease involving native coronary artery of native heart without angina pectoris   • SOB (shortness of breath)   • Presence of aortocoronary bypass graft   • Dizziness and giddiness   • Postviral fatigue syndrome   • Postviral fatigue syndrome   • Recurrent kidney stones   • Thrombocytopenia (CMS/HCC)   • Pyelonephritis   • COPD with asthma (CMS/HCC)   • Chronic non-seasonal allergic rhinitis   • Unstable angina (CMS/HCC)   • NSTEMI (non-ST elevated myocardial infarction) (CMS/HCC)   • History of PTCA 1   • Medicare annual wellness visit, subsequent   • Thoracic aortic aneurysm without rupture (CMS/HCC)   • Chronic kidney disease, stage 2 (mild)   • Personal history of tobacco use, presenting hazards to health   • Cervical pain (neck)   • Thoracic spine pain   • Marijuana smoker   • Calculus of ureter   • Foreign body in bladder and urethra   • Physical deconditioning       Review of Systems  Review of Systems   Constitutional: Positive for appetite change, fatigue and unexpected weight change. Negative for  fever.   HENT: Positive for congestion, postnasal drip and rhinorrhea.    Respiratory: Positive for cough and shortness of breath. Negative for wheezing.    Cardiovascular: Negative for chest pain.   Musculoskeletal: Positive for arthralgias and back pain.     As described in the HPI. Otherwise, remainder of ROS (14 systems) were negative.    Medications, Allergies, Social, and Family Histories reviewed as per EMR.    Objective     Vitals:    02/27/19 1357   BP: 130/74   Pulse: 96   SpO2: 97%     Physical Exam   Constitutional: He is oriented to person, place, and time. Vital signs are normal. He appears well-developed and well-nourished.   HENT:   Head: Normocephalic and atraumatic.   Nose: No mucosal edema.   Mouth/Throat: Uvula is midline, oropharynx is clear and moist and mucous membranes are normal.   Mallampati 3   Eyes: Conjunctivae, EOM and lids are normal. Pupils are equal, round, and reactive to light.   Neck: Trachea normal and normal range of motion. No tracheal tenderness present. No thyroid mass present.   Cardiovascular: Normal rate, regular rhythm and normal heart sounds. PMI is not displaced. Exam reveals no gallop.   No murmur heard.  Pulmonary/Chest: Effort normal and breath sounds normal. No respiratory distress. He has no decreased breath sounds. He has no wheezes. He has no rhonchi. He exhibits no tenderness.   Abdominal: Soft. Normal appearance and bowel sounds are normal. There is no hepatomegaly. There is no tenderness.   Musculoskeletal:   Normal gait, no extremity edema     Vascular Status -  His right foot exhibits no edema. His left foot exhibits no edema.  Lymphadenopathy:        Head (right side): No submandibular adenopathy present.        Head (left side): No submandibular adenopathy present.     He has no cervical adenopathy.        Right: No supraclavicular adenopathy present.        Left: No supraclavicular adenopathy present.   Neurological: He is alert and oriented to person,  place, and time. Gait normal.   Skin: Skin is warm and dry. No rash noted. No cyanosis. Nails show no clubbing.   Psychiatric: He has a normal mood and affect. His speech is normal and behavior is normal. Judgment normal.   Nursing note and vitals reviewed.          Assessment/Plan     Vishnu was seen today for copd.    Diagnoses and all orders for this visit:    COPD with asthma (CMS/[facility])    Chronic non-seasonal allergic rhinitis    Personal history of tobacco use, presenting hazards to health    Physical deconditioning    Weight loss         Discussion/ Recommendations:   While he is having worsening dyspnea, I think it is likely due to him essentially being off his Symbicort at this time.  I did  him that we may still need to increase the dose of his Symbicort to 160 if he has persistent dyspnea and albuterol use despite using his current Symbicort on a regular basis.  Otherwise, I think his chronic rhinitis is related to his prior nasal manipulation, but if it persists we can consider trying a different nasal spray such as Astelin or ipratropium.  I am uncertain as to the cause of the patient's ongoing weight loss, but more than likely it is due to decreases in his activity level causing decreased appetite.  He did not have any concerning findings for malignancy on his recent CTs, but he could have a GI process leading to the weight loss.  I suggested that they discuss it further with his primary care.  During our visit my assistant did identify that the patient is currently taking to statin medications, and unfortunately according to the last note from Dr. Melendrez both were on his medication list.  I did  the patient that he should only be taking 1 of these on a daily basis and have recommended that he contact his primary care to determine which one is recommended.    -Counseled patient to use current Symbicort 2 puffs twice daily for optimal benefit.  -If he continues to have dyspnea and frequent  albuterol use, we will escalate to the Symbicort 160.  -Use albuterol as needed for dyspnea or wheeze.  -Continue Flonase and Singulair daily.  -Continue frequent nasal saline.  -Qualifies for LDCT, but also having CT chest annually to follow thoracic aortic aneurysm due may 2019. Will use this scan as his screening (1-2ppd x 60yrs, quit 2017).  -Counseled to contact PCP to inquire as to whether he should be on simvastatin or atorvastatin.  Counseled patient not to use both simultaneously.  -If decreased appetite and weight loss continue, recommend following up with PCP.  -Up-to-date with flu vaccine.    Patient's Body mass index is 23.91 kg/m². BMI is within normal parameters. No follow-up required.        Return in about 3 months (around 5/27/2019) for Recheck COPD.          This document has been electronically signed by Camryn Koehler MD on February 27, 2019 3:41 PM      Dictated using Dragon

## 2019-02-27 ENCOUNTER — OFFICE VISIT (OUTPATIENT)
Dept: PULMONOLOGY | Facility: CLINIC | Age: 79
End: 2019-02-27

## 2019-02-27 VITALS
OXYGEN SATURATION: 97 % | HEIGHT: 63 IN | SYSTOLIC BLOOD PRESSURE: 130 MMHG | BODY MASS INDEX: 23.92 KG/M2 | HEART RATE: 96 BPM | WEIGHT: 135 LBS | DIASTOLIC BLOOD PRESSURE: 74 MMHG

## 2019-02-27 DIAGNOSIS — J44.9 COPD WITH ASTHMA (HCC): Primary | ICD-10-CM

## 2019-02-27 DIAGNOSIS — Z87.891 PERSONAL HISTORY OF TOBACCO USE, PRESENTING HAZARDS TO HEALTH: ICD-10-CM

## 2019-02-27 DIAGNOSIS — R63.4 WEIGHT LOSS: ICD-10-CM

## 2019-02-27 DIAGNOSIS — J30.89 CHRONIC NON-SEASONAL ALLERGIC RHINITIS: ICD-10-CM

## 2019-02-27 DIAGNOSIS — R53.81 PHYSICAL DECONDITIONING: ICD-10-CM

## 2019-02-27 PROCEDURE — 99214 OFFICE O/P EST MOD 30 MIN: CPT | Performed by: INTERNAL MEDICINE

## 2019-02-27 RX ORDER — OXYBUTYNIN CHLORIDE 5 MG/1
5 TABLET, EXTENDED RELEASE ORAL DAILY
COMMUNITY

## 2019-02-27 RX ORDER — SIMVASTATIN 40 MG
40 TABLET ORAL NIGHTLY
COMMUNITY
End: 2019-02-27

## 2019-02-27 RX ORDER — CETIRIZINE HYDROCHLORIDE 10 MG/1
10 TABLET ORAL DAILY
COMMUNITY
End: 2020-07-30

## 2019-03-04 DIAGNOSIS — M25.552 LEFT HIP PAIN: Primary | ICD-10-CM

## 2019-03-05 ENCOUNTER — OFFICE VISIT (OUTPATIENT)
Dept: ORTHOPEDIC SURGERY | Facility: CLINIC | Age: 79
End: 2019-03-05

## 2019-03-05 VITALS — BODY MASS INDEX: 23.92 KG/M2 | WEIGHT: 135 LBS | HEIGHT: 63 IN

## 2019-03-05 DIAGNOSIS — M16.12 PRIMARY OSTEOARTHRITIS OF LEFT HIP: ICD-10-CM

## 2019-03-05 DIAGNOSIS — M25.552 LEFT HIP PAIN: Primary | ICD-10-CM

## 2019-03-05 DIAGNOSIS — I25.10 CORONARY ARTERY DISEASE INVOLVING NATIVE CORONARY ARTERY OF NATIVE HEART WITHOUT ANGINA PECTORIS: ICD-10-CM

## 2019-03-05 DIAGNOSIS — I10 ESSENTIAL HYPERTENSION: ICD-10-CM

## 2019-03-05 DIAGNOSIS — J44.9 COPD WITH ASTHMA (HCC): ICD-10-CM

## 2019-03-05 PROCEDURE — 99214 OFFICE O/P EST MOD 30 MIN: CPT | Performed by: ORTHOPAEDIC SURGERY

## 2019-03-05 NOTE — PROGRESS NOTES
Vishnu Waller is a 79 y.o. male   Primary provider:  Johnathon Shaikh MD       Chief Complaint   Patient presents with   • Left Hip - Pain       HISTORY OF PRESENT ILLNESS: Patient is here today for new complaint of left hip pain. Patient was sent to xray upon arrival. Patient states that his pain is 5/10 today in his left hip.    He reports that he has had 2 significant episodes of pain in his left hip.  One approximately 2 months ago and then one again 1 month ago.  He states the pain is not constant.  He denies numbness or tingling associated with this.  When he has the pain that radiates to his groin.  He states that the pain is severe for as long as the episode lasts which is usually 2-3 perhaps even 4 hours.  No specific injury.    History of Present Illness     CONCURRENT MEDICAL HISTORY:    Past Medical History:   Diagnosis Date   • AAA (abdominal aortic aneurysm) (CMS/HCC)    • Acute bacterial sinusitis    • Acute bronchitis    • Acute frontal sinusitis    • Acute maxillary sinusitis    • Acute pharyngitis    • Allergic rhinitis    • Allergic rhinitis due to pollen    • Anxiety    • Artificial lens present     in position   • Backache    • Borderline glaucoma    • C. difficile diarrhea 2014   • Chronic laryngitis    • Chronic rhinitis    • Coronary artery disease    • Cough    • Degeneration of lumbar intervertebral disc    • Degenerative joint disease involving multiple joints    • Diabetes mellitus (CMS/HCC)    • Diverticular disease of colon    • Dizziness and giddiness    • Erectile dysfunction    • Essential hypertension    • Generalized anxiety disorder    • GERD (gastroesophageal reflux disease)    • Glaucoma    • Hemorrhoids     without bleeding   • Insomnia    • Kidney stone    • Kidney stones    • Malignant tumor of prostate (CMS/HCC)    • Need for prophylactic vaccination and inoculation against influenza    • Osteoarthritis    • Osteoarthritis of multiple joints    • Pain, lumbar region      Pain radiating to lumbar region of back     • PONV (postoperative nausea and vomiting)    • Postviral fatigue syndrome    • Presence of aortocoronary bypass graft    • Prostate cancer (CMS/HCC)    • Pseudomembranous enterocolitis     improved   • Rotator cuff syndrome     right   • Shoulder pain    • SOB (shortness of breath)    • Tenosynovitis    • Thrombocytopenia (CMS/HCC)    • Upper respiratory infection        Allergies   Allergen Reactions   • Molds & Smuts Other (See Comments)     Sinus infection   • Hydrocodone-Acetaminophen Itching         Current Outpatient Medications:   •  acetaminophen (TYLENOL) 500 MG tablet, Take 1,000 mg by mouth Every 6 (Six) Hours As Needed., Disp: , Rfl:   •  atorvastatin (LIPITOR) 20 MG tablet, Take 1 tablet by mouth Daily., Disp: 30 tablet, Rfl: 0  •  budesonide-formoterol (SYMBICORT) 80-4.5 MCG/ACT inhaler, Inhale 2 puffs 2 (Two) Times a Day., Disp: 3 inhaler, Rfl: 11  •  carvedilol (COREG) 3.125 MG tablet, Take 1 tablet by mouth Every 12 (Twelve) Hours., Disp: 60 tablet, Rfl: 0  •  cetirizine (zyrTEC) 10 MG tablet, Take 10 mg by mouth Daily., Disp: , Rfl:   •  clopidogrel (PLAVIX) 75 MG tablet, Take 1 tablet by mouth Daily., Disp: 90 tablet, Rfl: 2  •  Cyanocobalamin (VITAMIN B 12 PO), Take 500 mcg by mouth Daily. Three times weekly, MoWeFr, Disp: , Rfl:   •  cyclobenzaprine (FLEXERIL) 10 MG tablet, Take 10 mg by mouth 3 (Three) Times a Day As Needed for Muscle Spasms., Disp: , Rfl:   •  diltiaZEM CD (CARDIZEM CD) 180 MG 24 hr capsule, Take 1 capsule by mouth Daily., Disp: 30 capsule, Rfl: 0  •  fluticasone (FLONASE) 50 MCG/ACT nasal spray, 2 sprays into each nostril Every Night., Disp: , Rfl:   •  isosorbide mononitrate (IMDUR) 30 MG 24 hr tablet, Take 1 tablet by mouth Daily., Disp: 30 tablet, Rfl: 0  •  latanoprost (XALATAN) 0.005 % ophthalmic solution, Administer 1 drop to both eyes every night., Disp: , Rfl:   •  losartan (COZAAR) 50 MG tablet, Take 50 mg by mouth  Every Night., Disp: , Rfl:   •  metFORMIN (GLUCOPHAGE) 1000 MG tablet, Take 1 tablet by mouth 2 (Two) Times a Day With Meals., Disp: , Rfl:   •  Multiple Vitamins-Minerals (CENTRUM PO), Take 1 tablet by mouth daily. Centrum 0.4 mg-162 mg-18 mg tablet  (unconfirmed), Disp: , Rfl:   •  nitroglycerin (NITROSTAT) 0.4 MG SL tablet, Place 1 tablet under the tongue Every 5 (Five) Minutes As Needed for Chest Pain. Take no more than 3 doses in 15 minutes., Disp: 30 tablet, Rfl: 0  •  omeprazole (priLOSEC) 20 MG capsule, Take 20 mg by mouth Daily., Disp: , Rfl:   •  oxybutynin XL (DITROPAN-XL) 5 MG 24 hr tablet, Take 5 mg by mouth Daily., Disp: , Rfl:   •  VENTOLIN  (90 Base) MCG/ACT inhaler, Inhale 2 puffs Every 4 (Four) Hours As Needed for Wheezing., Disp: 1 inhaler, Rfl: 11    Past Surgical History:   Procedure Laterality Date   • CARDIAC CATHETERIZATION  09/25/2003    Cardiac cath (Normal left ventricular systolic function, EF 60% Multi-vessel coronary artery disease with critical disease noted in the LAD coronary artery, diagonal coronary artery, obtuse marginal coronary and right coronary artery.)   • CARDIAC CATHETERIZATION  05/03/1996    Cardiac cath (Coronary atherosclerotic heart disease. 70% diagonal stenosis. Mild to moderate left anterior descending artery stenosis. Preserved left ventricular function.)   • CARDIAC CATHETERIZATION N/A 2/26/2018    Procedure: Left Heart Cath/ pci if indicated ;  Surgeon: Radha Wilkes MD;  Location: [facility] INVASIVE LOCATION;  Service:    • CATARACT EXTRACTION Bilateral    • CATARACT EXTRACTION  10/04/2011    Remove cataract, insert lens (Right)   • CATARACT EXTRACTION  08/23/2011    Remove cataract, insert lens (left)   • COLONOSCOPY     • COLONOSCOPY      Colon endoscopy 44867 (Rectal bleeding. Radiation proctitis w/bleeding. An abnormal CT of transverse colon due to mucosal ischemic colitis, that now is healed. Internal hemorrhoids w/possible bleeding.)    • COLONOSCOPY  05/10/2011    Colon endoscopy 76500 (Single sessile polyp found in transverse colon and sigmoid colon ,removed by cold biopsy polypectomy.divertic. found in sigmoid colon,descending colon. Friability/capillary friability in rectum sigmoid due to prior radiation.Inter. hem. Grade 1)   • COLONOSCOPY  05/02/2007    Colon endoscopy 32185 (Colon polyp. Diverticulosis without bleeding. Internal hemorrhoids without bleeding.)   • CORONARY ARTERY BYPASS GRAFT  2002   • CYSTOSCOPY  01/13/1995    Cystoscopy, stone removal (Right ureteroscopic lasertripsy.)   • CYSTOSCOPY Right 1/23/2019    Procedure: CYSTOSCOPY, RIGHT STENT REMOVAL;  Surgeon: Nirmal Gaines MD;  Location: [facility];  Service: Urology   • CYSTOSCOPY, URETEROSCOPY, RETROGRADE PYELOGRAM, STENT INSERTION N/A 8/28/2017    Procedure: URETEROSCOPY LASER LITHOTRIPSY WITH STENT INSERTION AND RETROGRADE PYELOGRAM ;  Surgeon: Anna M. D'Amico, MD;  Location: [facility];  Service:    • CYSTOSCOPY, URETEROSCOPY, RETROGRADE PYELOGRAM, STENT INSERTION Right 1/11/2019    Procedure: CYSTOSCOPY URETEROSCOPY RETROGRADE PYELOGRAM HOLMIUM LASER STENT INSERTION;  Surgeon: Nirmal Gaines MD;  Location: [facility];  Service: Urology   • EPIDURAL  12/03/2014    Therapeutic/diag injection (Lumbar transforaminal epidural steroid injection, L5-S1, left side.)   • EPIDURAL  10/22/2014    Therapeutic/diag injection (Lumbar transforaminal epidural steroid injection L5-S1 left side.)   • EPIDURAL  08/07/2014    Therapeutic/diag injection (Lumbar transforaminal epidural steroid injection.)   • EXCISION LESION  05/13/1999    REMOVE EAR LESION 39123 (1.3 cm basal cell carcinoma, right preauricular area. Excision)   • EXCISION LESION  03/13/2001    REMOVE LESION NECK/CHEST 88475 (Excision of irritated seborrheic keratosis, anterior neck, 1.1 cm and deep lipoma, posterior neck, 3.5 cm)   • INJECTION OF MEDICATION  11/15/2012    Kenalog (4)      • JOINT REPLACEMENT  2015     partial   • KNEE ARTHROSCOPY  01/17/2007    Knee arthroscopy, surgery (Arthroscopy with medial and lateral meniscectomies, right knee.)   • KNEE SURGERY  11/04/2015    Knee Surgery (Right unicompartmental arthroplasty.)   • LUMBAR LAMINECTOMY N/A 2/7/2017    Procedure: LUMBAR LAMINECTOMY LUMBAR THREE-FOUR, LUMBAR FOUR-FIVE, INTERLAMINAR DISTRACTION ;  Surgeon: Lucio Mayo MD;  Location: [facility];  Service:    • NOSE SURGERY     • OTHER SURGICAL HISTORY      EXTENDED VISUAL FIELDS STUDY 68919 (Borderline glaucoma) (3): 03/19/2015, 04/09/2014, 03/27/2013   • OTHER SURGICAL HISTORY  10/29/2003    Heart revascularize (TMR) (Directmyocardial revascularization times four; LIMA to LAD SVG to DARIUSZ SVG to OM1, SVG to RCA)   • OTHER SURGICAL HISTORY      OCT DISC NFL 60163 (Borderline glaucoma)  (3): 09/24/2015, 08/13/2014, 07/29/2013   • PROSTATECTOMY     • SEPTORHINOPLASTY  11/16/1989    Nasal surgery procedure (Septorhinoplasty with reconstruction of the nasal pyramid with a iliac crest graft, rhinoplasty was performed with external approach.)   • SHOULDER ARTHROSCOPY  07/24/2013    Arthroscopy of right shoulder with rotator repair, Carla procedure, Biceps tenotomy, subacromial decompression.   • SHOULDER SURGERY  11/01/2005    Shoulder surgery procedure (Decompression, subacromial, explore of the rotator cuff, no tear found nor repaired, acromioplasty of the left shoulder and AC shoulder joint resection.)       Family History   Problem Relation Age of Onset   • Hypertension Mother    • Heart disease Mother    • Arthritis Mother    • Cancer Other    • Heart disease Other    • Hypertension Other        Social History     Socioeconomic History   • Marital status: Significant Other     Spouse name: Not on file   • Number of children: Not on file   • Years of education: Not on file   • Highest education level: Not on file   Social Needs   • Financial resource strain: Not on file   • Food insecurity - worry:  "Not on file   • Food insecurity - inability: Not on file   • Transportation needs - medical: Not on file   • Transportation needs - non-medical: Not on file   Occupational History   • Not on file   Tobacco Use   • Smoking status: Current Every Day Smoker     Packs/day: 0.50     Years: 40.00     Pack years: 20.00     Types: Cigarettes   • Smokeless tobacco: Never Used   Substance and Sexual Activity   • Alcohol use: Yes     Comment: socially   • Drug use: No   • Sexual activity: Defer   Other Topics Concern   • Not on file   Social History Narrative   • Not on file        Review of Systems   Constitutional: Negative.    HENT: Negative.    Eyes: Negative.    Respiratory: Negative.    Cardiovascular: Negative.    Gastrointestinal: Negative.    Endocrine: Negative.    Musculoskeletal: Positive for joint swelling.        Left hip pain     Skin: Negative.    Allergic/Immunologic: Negative.    Neurological: Negative.    Hematological: Negative.    Psychiatric/Behavioral: Negative.        PHYSICAL EXAMINATION:       Ht 160 cm (63\")   Wt 61.2 kg (135 lb)   BMI 23.91 kg/m²     Physical Exam   Constitutional: He is oriented to person, place, and time. He appears well-developed and well-nourished.   Neurological: He is alert and oriented to person, place, and time.   Psychiatric: He has a normal mood and affect. His behavior is normal. Judgment and thought content normal.       GAIT:     []  Normal  []  Antalgic    Assistive device: []  None  []  Walker     []  Crutches  []  Cane     []  Wheelchair  []  Stretcher    Right Hip Exam     Tenderness   The patient is experiencing no tenderness.     Range of Motion   The patient has normal right hip ROM.    Muscle Strength   The patient has normal right hip strength.    Tests   COOPER: negative  Fadir:  Negative FADIR test    Other   Erythema: absent  Sensation: normal  Pulse: present      Left Hip Exam     Tenderness   The patient is experiencing no tenderness.     Range of Motion "   External rotation: 50   Internal rotation: 30     Muscle Strength   Flexion: 4/5     Tests   COOPER: negative  Fadir:  Negative FADIR test    Other   Erythema: absent  Sensation: normal  Pulse: present    Comments:  Positive Stinchfield test                  Xr Hip With Or Without Pelvis 2 - 3 View Left    Result Date: 3/5/2019  Narrative: Ordering Provider:  Nirmal Paez MD Ordering Diagnosis/Indication:  Left hip pain Procedure:  XR HIP W OR WO PELVIS 2-3 VIEW LEFT Exam Date:  3/5/19 COMPARISON:  Not applicable, no relevant images available.     Impression:  AP standing of the pelvis with AP and lateral of the left hip show acceptable position and alignment of both hips with no evidence of acute bony abnormality.  There are multiple surgical clips noted in the lower pelvic region consistent with prior surgery.  No significant osteoarthritic changes are noted in the left hip.  There are some mild degenerative cystic changes in the femoral head but the head maintains spherical shape without any significant osteophytes.  Right hip does not show any significant arthritic change.  No other acute findings. Nirmal Paez MD 3/5/19           ASSESSMENT:    Diagnoses and all orders for this visit:    Left hip pain  -     meloxicam (MOBIC) 15 MG tablet; Take 1 tablet by mouth Daily. 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  We also discussed strengthening conditioning exercises.  We talked about the possibility of an intra-articular steroid injection if his symptoms become worse or more frequent.  Otherwise, we discussed activity as tolerated.    Return in about 6 weeks (around 4/16/2019) for recheck.    Nirmal Paez MD

## 2019-03-06 RX ORDER — MELOXICAM 15 MG/1
15 TABLET ORAL DAILY
Qty: 30 TABLET | Refills: 0 | Status: SHIPPED | OUTPATIENT
Start: 2019-03-06 | End: 2019-03-14 | Stop reason: SDUPTHER

## 2019-03-14 DIAGNOSIS — M25.552 LEFT HIP PAIN: ICD-10-CM

## 2019-03-14 RX ORDER — CARVEDILOL 3.12 MG/1
3.12 TABLET ORAL EVERY 12 HOURS SCHEDULED
Qty: 60 TABLET | Refills: 6 | Status: SHIPPED | OUTPATIENT
Start: 2019-03-14 | End: 2019-03-29 | Stop reason: SDUPTHER

## 2019-03-14 RX ORDER — DILTIAZEM HYDROCHLORIDE 180 MG/1
180 CAPSULE, COATED, EXTENDED RELEASE ORAL DAILY
Qty: 30 CAPSULE | Refills: 6 | Status: SHIPPED | OUTPATIENT
Start: 2019-03-14 | End: 2020-03-20 | Stop reason: SDUPTHER

## 2019-03-14 RX ORDER — MELOXICAM 15 MG/1
15 TABLET ORAL DAILY
Qty: 90 TABLET | Refills: 0 | Status: SHIPPED | OUTPATIENT
Start: 2019-03-14 | End: 2021-07-12

## 2019-03-29 ENCOUNTER — OFFICE VISIT (OUTPATIENT)
Dept: CARDIOLOGY | Facility: CLINIC | Age: 79
End: 2019-03-29

## 2019-03-29 VITALS
SYSTOLIC BLOOD PRESSURE: 128 MMHG | DIASTOLIC BLOOD PRESSURE: 80 MMHG | BODY MASS INDEX: 23.92 KG/M2 | HEART RATE: 82 BPM | OXYGEN SATURATION: 98 % | WEIGHT: 135 LBS | HEIGHT: 63 IN

## 2019-03-29 DIAGNOSIS — I25.10 CORONARY ARTERY DISEASE INVOLVING NATIVE CORONARY ARTERY OF NATIVE HEART WITHOUT ANGINA PECTORIS: Primary | ICD-10-CM

## 2019-03-29 DIAGNOSIS — Z95.1 PRESENCE OF AORTOCORONARY BYPASS GRAFT: ICD-10-CM

## 2019-03-29 DIAGNOSIS — I10 ESSENTIAL HYPERTENSION: ICD-10-CM

## 2019-03-29 DIAGNOSIS — E78.2 MIXED HYPERLIPIDEMIA: ICD-10-CM

## 2019-03-29 DIAGNOSIS — I71.20 THORACIC AORTIC ANEURYSM WITHOUT RUPTURE (HCC): ICD-10-CM

## 2019-03-29 PROCEDURE — 99214 OFFICE O/P EST MOD 30 MIN: CPT | Performed by: INTERNAL MEDICINE

## 2019-03-29 RX ORDER — CARVEDILOL 3.12 MG/1
3.12 TABLET ORAL EVERY 12 HOURS SCHEDULED
Qty: 180 TABLET | Refills: 3 | Status: SHIPPED | OUTPATIENT
Start: 2019-03-29 | End: 2020-07-02 | Stop reason: SDUPTHER

## 2019-03-29 RX ORDER — CARVEDILOL 3.12 MG/1
3.12 TABLET ORAL EVERY 12 HOURS SCHEDULED
Qty: 90 TABLET | Refills: 3 | Status: SHIPPED | OUTPATIENT
Start: 2019-03-29 | End: 2019-03-29 | Stop reason: SDUPTHER

## 2019-03-29 NOTE — PROGRESS NOTES
Vishnu Waller  79 y.o. male    03/29/2019  1. Coronary artery disease involving native coronary artery of native heart without angina pectoris    2. Mixed hyperlipidemia    3. Essential hypertension    4. Thoracic aortic aneurysm without rupture (CMS/HCC)    5. Presence of aortocoronary bypass graft        History of Present Illness    Mr. Waller is a 78-year-old  male with the history of diabetes mellitus, hypertension, hyperlipidemia, COPD, tobacco abuse, CAD status post CABG in 2003 when he received LIMA to LAD, SVG to diagonal, SVG to OM 2 and SVG to distal right coronary artery.  In February 2018 he presented with prolonged chest pain and ruled in for a non-ST segment elevation myocardial infarctions and cardiac catheterization showed the following findings:  Impression:  99% eccentric lesion noted in the proximal segment of the SVG to distal right coronary artery graft.  Successful PCI with placement of a 4.0 x 18 mm Xience Alpine stent.  Patent LIMA to Left Anterior Descending Coronary Artery.  Native LAD beyond the anastomosis was widely patent  Severe native vessel coronary artery disease with 100% occlusion of the mid LAD, mid right coronary artery.  A small to medium-sized tortuous circumflex had a proximal lesion up to 99%.  Occluded SVG to diagonal and occluded SVG to obtuse marginal 2  Ascending aortogram showing ectatic ascending aorta.     CT angiogram of the chest showed aorta measuring 4.4 cm.  Patient has done quite well and denied any chest pain or shortness of breath.  His predominant symptoms were related to DJD and pain in the neck and mid thoracic spine.     Clinical exam today was unremarkable with no signs of congestive heart failure.  His blood pressure was in the normal range.  Unfortunately continues to smoke and we had a discussion about smoking cessation.      SUBJECTIVE    Allergies   Allergen Reactions   • Molds & Smuts Other (See Comments)     Sinus infection   •  Hydrocodone-Acetaminophen Itching         Past Medical History:   Diagnosis Date   • AAA (abdominal aortic aneurysm) (CMS/HCC)    • Acute bacterial sinusitis    • Acute bronchitis    • Acute frontal sinusitis    • Acute maxillary sinusitis    • Acute pharyngitis    • Allergic rhinitis    • Allergic rhinitis due to pollen    • Anxiety    • Artificial lens present     in position   • Backache    • Borderline glaucoma    • C. difficile diarrhea 2014   • Chronic laryngitis    • Chronic rhinitis    • Coronary artery disease    • Cough    • Degeneration of lumbar intervertebral disc    • Degenerative joint disease involving multiple joints    • Diabetes mellitus (CMS/HCC)    • Diverticular disease of colon    • Dizziness and giddiness    • Erectile dysfunction    • Essential hypertension    • Generalized anxiety disorder    • GERD (gastroesophageal reflux disease)    • Glaucoma    • Hemorrhoids     without bleeding   • Insomnia    • Kidney stone    • Kidney stones    • Malignant tumor of prostate (CMS/HCC)    • Need for prophylactic vaccination and inoculation against influenza    • Osteoarthritis    • Osteoarthritis of multiple joints    • Pain, lumbar region     Pain radiating to lumbar region of back     • PONV (postoperative nausea and vomiting)    • Postviral fatigue syndrome    • Presence of aortocoronary bypass graft    • Prostate cancer (CMS/HCC)    • Pseudomembranous enterocolitis     improved   • Rotator cuff syndrome     right   • Shoulder pain    • SOB (shortness of breath)    • Tenosynovitis    • Thrombocytopenia (CMS/HCC)    • Upper respiratory infection          Past Surgical History:   Procedure Laterality Date   • CARDIAC CATHETERIZATION  09/25/2003    Cardiac cath (Normal left ventricular systolic function, EF 60% Multi-vessel coronary artery disease with critical disease noted in the LAD coronary artery, diagonal coronary artery, obtuse marginal coronary and right coronary artery.)   • CARDIAC  CATHETERIZATION  05/03/1996    Cardiac cath (Coronary atherosclerotic heart disease. 70% diagonal stenosis. Mild to moderate left anterior descending artery stenosis. Preserved left ventricular function.)   • CARDIAC CATHETERIZATION N/A 2/26/2018    Procedure: Left Heart Cath/ pci if indicated ;  Surgeon: Radha Wilkes MD;  Location: [facility] CATH INVASIVE LOCATION;  Service:    • CATARACT EXTRACTION Bilateral    • CATARACT EXTRACTION  10/04/2011    Remove cataract, insert lens (Right)   • CATARACT EXTRACTION  08/23/2011    Remove cataract, insert lens (left)   • COLONOSCOPY     • COLONOSCOPY      Colon endoscopy 83410 (Rectal bleeding. Radiation proctitis w/bleeding. An abnormal CT of transverse colon due to mucosal ischemic colitis, that now is healed. Internal hemorrhoids w/possible bleeding.)   • COLONOSCOPY  05/10/2011    Colon endoscopy 47252 (Single sessile polyp found in transverse colon and sigmoid colon ,removed by cold biopsy polypectomy.divertic. found in sigmoid colon,descending colon. Friability/capillary friability in rectum sigmoid due to prior radiation.Inter. hem. Grade 1)   • COLONOSCOPY  05/02/2007    Colon endoscopy 31851 (Colon polyp. Diverticulosis without bleeding. Internal hemorrhoids without bleeding.)   • CORONARY ARTERY BYPASS GRAFT  2002   • CYSTOSCOPY  01/13/1995    Cystoscopy, stone removal (Right ureteroscopic lasertripsy.)   • CYSTOSCOPY Right 1/23/2019    Procedure: CYSTOSCOPY, RIGHT STENT REMOVAL;  Surgeon: Nirmal Gaines MD;  Location: [facility];  Service: Urology   • CYSTOSCOPY, URETEROSCOPY, RETROGRADE PYELOGRAM, STENT INSERTION N/A 8/28/2017    Procedure: URETEROSCOPY LASER LITHOTRIPSY WITH STENT INSERTION AND RETROGRADE PYELOGRAM ;  Surgeon: Anna M. D'Amico, MD;  Location: [facility] OR;  Service:    • CYSTOSCOPY, URETEROSCOPY, RETROGRADE PYELOGRAM, STENT INSERTION Right 1/11/2019    Procedure: CYSTOSCOPY URETEROSCOPY RETROGRADE PYELOGRAM HOLMIUM LASER STENT  INSERTION;  Surgeon: Nirmal Gaines MD;  Location: [facility];  Service: Urology   • EPIDURAL  12/03/2014    Therapeutic/diag injection (Lumbar transforaminal epidural steroid injection, L5-S1, left side.)   • EPIDURAL  10/22/2014    Therapeutic/diag injection (Lumbar transforaminal epidural steroid injection L5-S1 left side.)   • EPIDURAL  08/07/2014    Therapeutic/diag injection (Lumbar transforaminal epidural steroid injection.)   • EXCISION LESION  05/13/1999    REMOVE EAR LESION 54738 (1.3 cm basal cell carcinoma, right preauricular area. Excision)   • EXCISION LESION  03/13/2001    REMOVE LESION NECK/CHEST 26901 (Excision of irritated seborrheic keratosis, anterior neck, 1.1 cm and deep lipoma, posterior neck, 3.5 cm)   • INJECTION OF MEDICATION  11/15/2012    Kenalog (4)      • JOINT REPLACEMENT  2015    partial   • KNEE ARTHROSCOPY  01/17/2007    Knee arthroscopy, surgery (Arthroscopy with medial and lateral meniscectomies, right knee.)   • KNEE SURGERY  11/04/2015    Knee Surgery (Right unicompartmental arthroplasty.)   • LUMBAR LAMINECTOMY N/A 2/7/2017    Procedure: LUMBAR LAMINECTOMY LUMBAR THREE-FOUR, LUMBAR FOUR-FIVE, INTERLAMINAR DISTRACTION ;  Surgeon: Lucio Mayo MD;  Location: [facility];  Service:    • NOSE SURGERY     • OTHER SURGICAL HISTORY      EXTENDED VISUAL FIELDS STUDY 68132 (Borderline glaucoma) (3): 03/19/2015, 04/09/2014, 03/27/2013   • OTHER SURGICAL HISTORY  10/29/2003    Heart revascularize (TMR) (Directmyocardial revascularization times four; LIMA to LAD SVG to DARIUSZ SVG to OM1, SVG to RCA)   • OTHER SURGICAL HISTORY      OCT DISC NFL 99902 (Borderline glaucoma)  (3): 09/24/2015, 08/13/2014, 07/29/2013   • PROSTATECTOMY     • SEPTORHINOPLASTY  11/16/1989    Nasal surgery procedure (Septorhinoplasty with reconstruction of the nasal pyramid with a iliac crest graft, rhinoplasty was performed with external approach.)   • SHOULDER ARTHROSCOPY  07/24/2013    Arthroscopy of  right shoulder with rotator repair, Carla procedure, Biceps tenotomy, subacromial decompression.   • SHOULDER SURGERY  11/01/2005    Shoulder surgery procedure (Decompression, subacromial, explore of the rotator cuff, no tear found nor repaired, acromioplasty of the left shoulder and AC shoulder joint resection.)         Family History   Problem Relation Age of Onset   • Hypertension Mother    • Heart disease Mother    • Arthritis Mother    • Cancer Other    • Heart disease Other    • Hypertension Other          Social History     Socioeconomic History   • Marital status: Significant Other     Spouse name: Not on file   • Number of children: Not on file   • Years of education: Not on file   • Highest education level: Not on file   Tobacco Use   • Smoking status: Current Every Day Smoker     Packs/day: 0.50     Years: 40.00     Pack years: 20.00     Types: Cigarettes   • Smokeless tobacco: Never Used   Substance and Sexual Activity   • Alcohol use: Yes     Comment: socially   • Drug use: No   • Sexual activity: Defer         Current Outpatient Medications   Medication Sig Dispense Refill   • acetaminophen (TYLENOL) 500 MG tablet Take 1,000 mg by mouth Every 6 (Six) Hours As Needed.     • atorvastatin (LIPITOR) 20 MG tablet Take 1 tablet by mouth Daily. 30 tablet 0   • budesonide-formoterol (SYMBICORT) 80-4.5 MCG/ACT inhaler Inhale 2 puffs 2 (Two) Times a Day. 3 inhaler 11   • cetirizine (zyrTEC) 10 MG tablet Take 10 mg by mouth Daily.     • clopidogrel (PLAVIX) 75 MG tablet Take 1 tablet by mouth Daily. 90 tablet 2   • Cyanocobalamin (VITAMIN B 12 PO) Take 500 mcg by mouth Daily. Three times weekly, MoWeFr     • cyclobenzaprine (FLEXERIL) 10 MG tablet Take 10 mg by mouth 3 (Three) Times a Day As Needed for Muscle Spasms.     • diltiaZEM CD (CARDIZEM CD) 180 MG 24 hr capsule Take 1 capsule by mouth Daily. 30 capsule 6   • fluticasone (FLONASE) 50 MCG/ACT nasal spray 2 sprays into each nostril Every Night.     •  "isosorbide mononitrate (IMDUR) 30 MG 24 hr tablet Take 1 tablet by mouth Daily. 30 tablet 0   • latanoprost (XALATAN) 0.005 % ophthalmic solution Administer 1 drop to both eyes every night.     • losartan (COZAAR) 50 MG tablet Take 50 mg by mouth Every Night.     • meloxicam (MOBIC) 15 MG tablet Take 1 tablet by mouth Daily. Take with meal daily 90 tablet 0   • metFORMIN (GLUCOPHAGE) 1000 MG tablet Take 1 tablet by mouth 2 (Two) Times a Day With Meals.     • Multiple Vitamins-Minerals (CENTRUM PO) Take 1 tablet by mouth daily. Centrum 0.4 mg-162 mg-18 mg tablet  (unconfirmed)     • nitroglycerin (NITROSTAT) 0.4 MG SL tablet Place 1 tablet under the tongue Every 5 (Five) Minutes As Needed for Chest Pain. Take no more than 3 doses in 15 minutes. 30 tablet 0   • omeprazole (priLOSEC) 20 MG capsule Take 20 mg by mouth Daily.     • oxybutynin XL (DITROPAN-XL) 5 MG 24 hr tablet Take 5 mg by mouth Daily.     • VENTOLIN  (90 Base) MCG/ACT inhaler Inhale 2 puffs Every 4 (Four) Hours As Needed for Wheezing. 1 inhaler 11   • carvedilol (COREG) 3.125 MG tablet Take 1 tablet by mouth Every 12 (Twelve) Hours. 180 tablet 3     No current facility-administered medications for this visit.          OBJECTIVE    /80   Pulse 82   Ht 160 cm (62.99\")   Wt 61.2 kg (135 lb)   SpO2 98%   BMI 23.92 kg/m²         Review of Systems     Constitutional:  Denies recent weight loss, weight gain, fever or chills, no change in exercise tolerance     HENT:  Denies any hearing loss, epistaxis, hoarseness, or difficulty speaking.     Eyes: Wears eyeglasses or contact lenses     Respiratory:  Denies dyspnea with exertion,no cough, wheezing, or hemoptysis.     Cardiovascular: Negative for palpations, chest pain, orthopnea, PND    Gastrointestinal:  Denies change in bowel habits, dyspepsia, ulcer disease, hematochezia, or melena.     Endocrine: Negative for cold intolerance, heat intolerance, polydipsia, polyphagia and polyuria. "     Genitourinary: Negative.      Musculoskeletal: DJD    Skin:  Denies any change in hair or nails, rashes, or skin lesions.     Allergic/Immunologic: Negative.  Negative for environmental allergies, food allergies and immunocompromised state.     Neurological:  Denies any history of recurrent headaches, strokes, TIA, or seizure disorder.     Hematological: Denies any food allergies, seasonal allergies, bleeding disorders, or lymphadenopathy. h/o thrombocytopenia    Psychiatric/Behavioral: Denies any history of depression, substance abuse, or change in cognitive function.         Physical Exam     Constitutional: Cooperative, alert and oriented,  in no acute distress.     HENT:   Head: Normocephalic, normal hair patterns, no masses or tenderness.  Ears, Nose, and Throat: No gross abnormalities. No pallor or cyanosis.  Eyes: EOMS intact, PERRL, conjunctivae and lids unremarkable. Fundoscopic exam and visual fields not performed.   Neck: No palpable masses or adenopathy, no thyromegaly, no JVD, carotid pulses are full and equal bilaterally and without  Bruits.     Cardiovascular: Regular rhythm, S1 and S2 normal, no S3 or S4.  No murmurs, gallops, or rubs detected.     Pulmonary/Chest: Chest: normal symmetry,  normal respiratory excursion, no intercostal retraction, no use of accessory muscles.            Pulmonary: Normal breath sounds. No rales or ronchi.    Abdominal: Abdomen soft, bowel sounds normoactive, no masses, no hepatosplenomegaly, non-tender, no bruits.     Musculoskeletal: No deformities, clubbing, cyanosis, erythema, or edema observed.     Neurological: No gross motor or sensory deficits noted, affect appropriate, oriented to time, person, place.     Skin: Warm and dry to the touch, no apparent skin lesions or masses noted.     Psychiatric: He has a normal mood and affect. His behavior is normal. Judgment and thought content normal.         Procedures      Lab Results   Component Value Date    WBC  15.28 (H) 01/08/2019    HGB 11.2 (L) 01/08/2019    HCT 32.4 (L) 01/08/2019    .9 (H) 01/08/2019     01/08/2019     Lab Results   Component Value Date    GLUCOSE 169 (H) 01/08/2019    BUN 17 01/08/2019    CREATININE 1.11 01/08/2019    EGFRIFNONA 64 01/08/2019    BCR 15.3 01/08/2019    CO2 26.0 01/08/2019    CALCIUM 8.7 01/08/2019    ALBUMIN 4.20 01/07/2019    AST 23 01/07/2019    ALT 18 (L) 01/07/2019     Lab Results   Component Value Date    CHOL 113 12/28/2018     Lab Results   Component Value Date    TRIG 141 12/28/2018     Lab Results   Component Value Date    HDL 30 (L) 12/28/2018     No components found for: LDLCALC  Lab Results   Component Value Date    LDL 68 12/28/2018     No results found for: HDLLDLRATIO  No components found for: CHOLHDL  Lab Results   Component Value Date    HGBA1C 7.1 (H) 12/28/2018     Lab Results   Component Value Date    TSH 1.43 12/28/2018           ASSESSMENT AND PLAN  Mr. Waller is stable with no clinical evidence of progression of coronary artery disease.  He is concerned about his a sending aortic aneurysm which was noted to be 4.4 cm in April of last year.  Arrangements have been made for him to follow-up with vascular surgery and a CT scan of the aorta will be arranged prior to that.    I have continued antiplatelet therapy with aspirin and Plavix, lipid-lowering therapy with atorvastatin, antihypertensive therapy with coreg, Cardizem CD, losartan, antianginal therapy with isosorbide mononitrate.  Smoking cessation was stressed.    Vishnu was seen today for follow-up.    Diagnoses and all orders for this visit:    Coronary artery disease involving native coronary artery of native heart without angina pectoris    Mixed hyperlipidemia    Essential hypertension    Thoracic aortic aneurysm without rupture (CMS/HCC)    Presence of aortocoronary bypass graft        Patient's Body mass index is 23.92 kg/m². BMI is within normal parameters. No follow-up required..      I  advised Vishnu of the risks of continuing to use tobacco, and I provided him with tobacco cessation educational materials in the After Visit Summary.     During this visit, I spent 3 minutes counseling the patient regarding tobacco cessation.      Radha Wilkes MD  3/29/2019  2:53 PM

## 2019-04-08 ENCOUNTER — OFFICE VISIT (OUTPATIENT)
Dept: ORTHOPEDIC SURGERY | Facility: CLINIC | Age: 79
End: 2019-04-08

## 2019-04-08 DIAGNOSIS — M51.36 DEGENERATION OF LUMBAR INTERVERTEBRAL DISC: Primary | ICD-10-CM

## 2019-04-08 DIAGNOSIS — M51.37 DEGENERATION OF INTERVERTEBRAL DISC OF LUMBOSACRAL REGION: ICD-10-CM

## 2019-04-08 DIAGNOSIS — M51.16 NEURITIS OR RADICULITIS DUE TO RUPTURE OF LUMBAR INTERVERTEBRAL DISC: ICD-10-CM

## 2019-04-08 DIAGNOSIS — M51.36 DEGENERATIVE DISC DISEASE, LUMBAR: ICD-10-CM

## 2019-04-08 PROCEDURE — 99212 OFFICE O/P EST SF 10 MIN: CPT | Performed by: ORTHOPAEDIC SURGERY

## 2019-04-08 NOTE — PROGRESS NOTES
Vishnu Waller is a 79 y.o. male returns for     Chief Complaint   Patient presents with   • Lumbar Spine - Follow-up, Pain       HISTORY OF PRESENT ILLNESS:follow up lumbar spine     79 y pain present in the back between the shoulder blades.  Apparently he is taking an antiinflammatory which is prescribed by Dr. Paez.  The pain is present with certain movements.  The pain is located in the midthoracic region, between the shoulder blades, TENS unit helps, lidoderm patch does not help.         CONCURRENT MEDICAL HISTORY:    The following portions of the patient's history were reviewed and updated as appropriate: allergies, current medications, past family history, past medical history, past social history, past surgical history and problem list.     ROS  No fevers or chills.  No chest pain or shortness of air.  No GI or  disturbances.    PHYSICAL EXAMINATION:       There were no vitals taken for this visit.    Physical Exam    GAIT:     [x]  Normal  []  Antalgic    Assistive device: [x]  None  []  Walker     []  Crutches  []  Cane     []  Wheelchair  []  Stretcher    Ortho Exam    Pain over the mid thoracic region  No mass no erythema, no swelling  No stepoff deformity        No results found.          ASSESSMENT:    Diagnoses and all orders for this visit:    Degeneration of lumbar intervertebral disc    Neuritis or radiculitis due to rupture of lumbar intervertebral disc    Degenerative disc disease, lumbar    Degeneration of intervertebral disc of lumbosacral region          PLAN     discussed continue TENS unit, or possibly repeat MRI of thoracic spine.      Lucio Mayo MD

## 2019-05-06 ENCOUNTER — HOSPITAL ENCOUNTER (OUTPATIENT)
Dept: CT IMAGING | Facility: HOSPITAL | Age: 79
Discharge: HOME | End: 2019-05-06
Admitting: THORACIC SURGERY (CARDIOTHORACIC VASCULAR SURGERY)

## 2019-05-06 DIAGNOSIS — I71.20 THORACIC AORTIC ANEURYSM WITHOUT RUPTURE (HCC): ICD-10-CM

## 2019-05-06 PROCEDURE — 71250 CT THORAX DX C-: CPT

## 2019-05-08 ENCOUNTER — TELEPHONE (OUTPATIENT)
Dept: ORTHOPEDIC SURGERY | Facility: CLINIC | Age: 79
End: 2019-05-08

## 2019-05-29 NOTE — PROGRESS NOTES
Pulmonary Office Follow-up    Subjective     Vishnu Waller is seen today at the office for   Chief Complaint   Patient presents with   • COPD         HPI  Vishnu Waller is a 79 y.o. male with a PMH significant for COPD with asthma, past tobacco use, allergies, CAD s/p CABG, HTN, DM, and borderline glaucoma who presents for follow-up of COPD.  He was last seen on 2/27/19, at which time I stressed the importance of using Symbicort twice daily for maximal benefit and also recommend he continue on his allergy regimen.  He states that he has been relying more on his Ventolin using it twice daily as it seems like it is helping more.  Patient admits he has not been using his Symbicort very often as he did not think it was doing very much.  He continues to have dyspnea on exertion but also admits he is not exerting himself much due to his chronic pain.  Patient has occasional cough, denies any sputum, fevers, or chest pain.  He does report that his wife is concerned that he has had some ankle swelling recently but he admits that it has gone down.  Patient states that he was told by his urologist he need to be drinking more fluid but he also has been eating increased sodium in his diet.  He denies any recent steroids or antibiotics. He does he does smoke marijuana periodically when his pain becomes too severe to handle.  Otherwise, he is using Tylenol frequent basis.    Patient Active Problem List   Diagnosis   • Degeneration of lumbar intervertebral disc   • Low back pain without sciatica   • Neuritis or radiculitis due to rupture of lumbar intervertebral disc   • Borderline glaucoma, open angle with borderline findings   • Pseudophakia   • History of coronary artery bypass surgery   • Essential hypertension   • Mixed hyperlipidemia   • Thrombocytopenia (CMS/HCC)   • Spinal stenosis of lumbar region   • Degenerative disc disease, lumbar   • Chronic pain of right knee   • History of artificial joint   • B12  deficiency   • Degeneration of intervertebral disc of lumbosacral region   • Personal history of other infectious and parasitic diseases   • Status post lumbar spine operation   • History of pyelonephritis   • History of surgical procedure   • History of repair of rotator cuff   • Encounter for follow-up examination after completed treatment for conditions other than malignant neoplasm   • Encounter for general adult medical examination without abnormal findings   • Osteoarthritis of knee   • Osteoarthritis of multiple joints   • Primary insomnia   • Encounter for screening for malignant neoplasm of colon   • Encounter for screening for malignant neoplasm of prostate   • Type 2 diabetes mellitus without complication, without long-term current use of insulin (CMS/[facility])   • Coronary artery disease involving native coronary artery of native heart without angina pectoris   • SOB (shortness of breath)   • Presence of aortocoronary bypass graft   • Dizziness and giddiness   • Postviral fatigue syndrome   • Postviral fatigue syndrome   • Recurrent kidney stones   • Thrombocytopenia (CMS/[facility])   • Pyelonephritis   • COPD with asthma (CMS/HCC)   • Chronic non-seasonal allergic rhinitis   • Unstable angina (CMS/HCC)   • NSTEMI (non-ST elevated myocardial infarction) (CMS/HCC)   • History of PTCA 1   • Medicare annual wellness visit, subsequent   • Thoracic aortic aneurysm without rupture (CMS/HCC)   • Chronic kidney disease, stage 2 (mild)   • Personal history of tobacco use, presenting hazards to health   • Cervical pain (neck)   • Thoracic spine pain   • Marijuana smoker   • Calculus of ureter   • Foreign body in bladder and urethra   • Physical deconditioning   • Left hip pain       Review of Systems  Review of Systems   Constitutional: Positive for fatigue. Negative for fever and unexpected weight change.   HENT: Negative for congestion and postnasal drip.    Respiratory: Positive for cough and shortness of breath. Negative  for chest tightness and wheezing.    Cardiovascular: Positive for leg swelling. Negative for chest pain.   Musculoskeletal: Positive for arthralgias and back pain.     As described in the HPI. Otherwise, remainder of ROS (14 systems) were negative.    Medications, Allergies, Social, and Family Histories reviewed as per EMR.    Objective     Vitals:    05/30/19 1357   BP: 177/73   Pulse: 88   SpO2: 97%     Physical Exam   Constitutional: He is oriented to person, place, and time. Vital signs are normal. He appears well-developed and well-nourished.   HENT:   Head: Normocephalic and atraumatic.   Nose: No mucosal edema.   Mouth/Throat: Uvula is midline, oropharynx is clear and moist and mucous membranes are normal.   Mallampati 3   Eyes: Conjunctivae, EOM and lids are normal. Pupils are equal, round, and reactive to light.   Neck: Trachea normal and normal range of motion. No tracheal tenderness present. No thyroid mass present.   Cardiovascular: Normal rate, regular rhythm and normal heart sounds. PMI is not displaced. Exam reveals no gallop.   No murmur heard.  Pulmonary/Chest: Effort normal and breath sounds normal. No respiratory distress. He has no decreased breath sounds. He has no wheezes. He has no rhonchi. He exhibits no tenderness.   Abdominal: Soft. Normal appearance and bowel sounds are normal. There is no hepatomegaly. There is no tenderness.   Musculoskeletal:   Normal gait, mild ankle edema     Vascular Status -  His right foot exhibits abnormal foot edema. His left foot exhibits abnormal foot edema.  Lymphadenopathy:        Head (right side): No submandibular adenopathy present.        Head (left side): No submandibular adenopathy present.     He has no cervical adenopathy.        Right: No supraclavicular adenopathy present.        Left: No supraclavicular adenopathy present.   Neurological: He is alert and oriented to person, place, and time. Gait normal.   Skin: Skin is warm and dry. No rash noted.  No cyanosis. Nails show no clubbing.   Psychiatric: He has a normal mood and affect. His speech is normal and behavior is normal. Judgment normal.   Nursing note and vitals reviewed.          Assessment/Plan     Vishnu was seen today for copd.    Diagnoses and all orders for this visit:    COPD with asthma (CMS/[facility])    Chronic non-seasonal allergic rhinitis    Personal history of tobacco use, presenting hazards to health    Physical deconditioning    Ankle edema, bilateral         Discussion/ Recommendations:   Unfortunately, he has not been using his Symbicort for maximal benefit so I counseled him on his proper bronchodilator usage.  Otherwise, he is not having significant issues with his allergies but continues to have issues with his chronic pain.  I think his ankle edema is likely due to recent increase water intake as well as sodium intake.    -Stressed the importance of using Symbicort 2 puffs twice daily for maximal benefit.  -If he continues to have dyspnea and frequent albuterol use, we will escalate to the Symbicort 160.  -Use albuterol only as needed for dyspnea or wheeze.  -Continue Flonase and Singulair daily.  -Continue frequent nasal saline.  -Qualifies for LDCT, but also having CT chest annually to follow thoracic aortic aneurysm due may 2019. Will use this scan as his screening (1-2ppd x 60yrs, quit 2017).  -Again cautioned on smoking marijuana.  -Up-to-date with flu vaccine.  -Recommended he decrease his sodium intake and elevate his legs.  If swelling does not resolve, he should follow-up with his PCP.    Patient's Body mass index is 24.09 kg/m². BMI is within normal parameters. No follow-up required.        Return in about 3 months (around 8/30/2019) for Recheck COPD.          This document has been electronically signed by Camryn Koehler MD on May 30, 2019 2:22 PM      Dictated using Dragon

## 2019-05-30 ENCOUNTER — OFFICE VISIT (OUTPATIENT)
Dept: PULMONOLOGY | Facility: CLINIC | Age: 79
End: 2019-05-30

## 2019-05-30 VITALS
OXYGEN SATURATION: 97 % | HEART RATE: 88 BPM | HEIGHT: 63 IN | DIASTOLIC BLOOD PRESSURE: 73 MMHG | SYSTOLIC BLOOD PRESSURE: 177 MMHG | WEIGHT: 136 LBS | BODY MASS INDEX: 24.1 KG/M2

## 2019-05-30 DIAGNOSIS — Z87.891 PERSONAL HISTORY OF TOBACCO USE, PRESENTING HAZARDS TO HEALTH: ICD-10-CM

## 2019-05-30 DIAGNOSIS — M25.472 ANKLE EDEMA, BILATERAL: ICD-10-CM

## 2019-05-30 DIAGNOSIS — R53.81 PHYSICAL DECONDITIONING: ICD-10-CM

## 2019-05-30 DIAGNOSIS — J30.89 CHRONIC NON-SEASONAL ALLERGIC RHINITIS: ICD-10-CM

## 2019-05-30 DIAGNOSIS — M25.471 ANKLE EDEMA, BILATERAL: ICD-10-CM

## 2019-05-30 DIAGNOSIS — J44.9 COPD WITH ASTHMA (HCC): Primary | ICD-10-CM

## 2019-05-30 PROCEDURE — 99214 OFFICE O/P EST MOD 30 MIN: CPT | Performed by: INTERNAL MEDICINE

## 2019-06-03 ENCOUNTER — OFFICE VISIT (OUTPATIENT)
Dept: CARDIAC SURGERY | Facility: CLINIC | Age: 79
End: 2019-06-03

## 2019-06-03 VITALS
OXYGEN SATURATION: 98 % | TEMPERATURE: 97.9 F | SYSTOLIC BLOOD PRESSURE: 160 MMHG | WEIGHT: 140 LBS | HEIGHT: 63 IN | DIASTOLIC BLOOD PRESSURE: 72 MMHG | HEART RATE: 69 BPM | BODY MASS INDEX: 24.8 KG/M2

## 2019-06-03 DIAGNOSIS — E78.2 MIXED HYPERLIPIDEMIA: ICD-10-CM

## 2019-06-03 DIAGNOSIS — I10 ESSENTIAL HYPERTENSION: ICD-10-CM

## 2019-06-03 DIAGNOSIS — I71.20 THORACIC AORTIC ANEURYSM WITHOUT RUPTURE (HCC): Primary | ICD-10-CM

## 2019-06-03 DIAGNOSIS — J44.9 COPD WITH ASTHMA (HCC): ICD-10-CM

## 2019-06-03 DIAGNOSIS — F17.218 NICOTINE DEPENDENCE, CIGARETTES, WITH OTHER NICOTINE-INDUCED DISORDERS: ICD-10-CM

## 2019-06-03 DIAGNOSIS — E11.9 TYPE 2 DIABETES MELLITUS WITHOUT COMPLICATION, WITHOUT LONG-TERM CURRENT USE OF INSULIN (HCC): ICD-10-CM

## 2019-06-03 DIAGNOSIS — I25.10 CORONARY ARTERY DISEASE INVOLVING NATIVE CORONARY ARTERY OF NATIVE HEART WITHOUT ANGINA PECTORIS: ICD-10-CM

## 2019-06-03 DIAGNOSIS — M15.9 PRIMARY OSTEOARTHRITIS INVOLVING MULTIPLE JOINTS: ICD-10-CM

## 2019-06-03 PROCEDURE — 99406 BEHAV CHNG SMOKING 3-10 MIN: CPT | Performed by: THORACIC SURGERY (CARDIOTHORACIC VASCULAR SURGERY)

## 2019-06-03 PROCEDURE — 99214 OFFICE O/P EST MOD 30 MIN: CPT | Performed by: THORACIC SURGERY (CARDIOTHORACIC VASCULAR SURGERY)

## 2019-06-05 PROBLEM — F17.218 NICOTINE DEPENDENCE, CIGARETTES, WITH OTHER NICOTINE-INDUCED DISORDERS: Status: ACTIVE | Noted: 2019-06-05

## 2019-06-05 NOTE — PROGRESS NOTES
6/3/2019    Vishnu Waller  1940    Chief Complaint:  Thoracic aortic aneurysm    HPI:      PCP:  Johnathon Shaikh MD  Cardiology:  Dr Wilkes     79 y.o. male with HTN(uncontrolled, increased risk stroke, rupture), Hyperlipidemia(stable, increased risk cardiovascular events) and Diabetes Mellitus(stable, increased risk cardiovascular events) , CAD(stable, PCI 2018, CABG 2003), thoracic aortic aneurysm(new, increased risk rupture).  former smoker.  Moderate numbness both legs x years.  No new chest back pain..  No TIA stroke amaurosis.  No MI claudication. No other associated signs, symptoms or modifying factors.     2/2018 Echocardiogram:  EF 50%, LVH, LA 21mm, LV 47mm, RVSP 35mmHg.  Mild to moderate MR, moderate AI.  Ascending aorta 46mm.  5/2018 CT Chest:  Ascending aorta 46mm, arch 31mm, descending 27mm, abdominal 20mm  5/2019 CT Chest:  Ascending aorta 46mm, arch 31mm, descending 29mm, abdominal 18mm    2002 CABG  2/2018 ECG:  NSR 72, QTc 407  2/2018 Echocardiogram:  EF 50%, LA 21mm, LV 47mm, RVSP 35mmHg  2/2018 Cardiac Catheterization:  %, LIMA-LAD patent.  CX 95%, occluded SVG M2, D1.  %, SVG-RCA 99%, PCI RCA SVG 0%    The following portions of the patient's history were reviewed and updated as appropriate: allergies, current medications, past family history, past medical history, past social history, past surgical history and problem list.  Recent images independently reviewed.  Available laboratory values reviewed.    PMH:  Past Medical History:   Diagnosis Date   • AAA (abdominal aortic aneurysm) (CMS/[facility])    • Acute bacterial sinusitis    • Acute bronchitis    • Acute frontal sinusitis    • Acute maxillary sinusitis    • Acute pharyngitis    • Allergic rhinitis    • Allergic rhinitis due to pollen    • Anxiety    • Artificial lens present     in position   • Backache    • Borderline glaucoma    • C. difficile diarrhea 2014   • Chronic laryngitis    • Chronic rhinitis    •  Coronary artery disease    • Cough    • Degeneration of lumbar intervertebral disc    • Degenerative joint disease involving multiple joints    • Diabetes mellitus (CMS/HCC)    • Diverticular disease of colon    • Dizziness and giddiness    • Erectile dysfunction    • Essential hypertension    • Generalized anxiety disorder    • GERD (gastroesophageal reflux disease)    • Glaucoma    • Hemorrhoids     without bleeding   • Insomnia    • Kidney stone    • Kidney stones    • Malignant tumor of prostate (CMS/HCC)    • Need for prophylactic vaccination and inoculation against influenza    • Osteoarthritis    • Osteoarthritis of multiple joints    • Pain, lumbar region     Pain radiating to lumbar region of back     • PONV (postoperative nausea and vomiting)    • Postviral fatigue syndrome    • Presence of aortocoronary bypass graft    • Prostate cancer (CMS/HCC)    • Pseudomembranous enterocolitis     improved   • Rotator cuff syndrome     right   • Shoulder pain    • SOB (shortness of breath)    • Tenosynovitis    • Thrombocytopenia (CMS/HCC)    • Upper respiratory infection      Family History   Problem Relation Age of Onset   • Hypertension Mother    • Heart disease Mother    • Arthritis Mother    • Cancer Other    • Heart disease Other    • Hypertension Other      Social History     Tobacco Use   • Smoking status: Current Every Day Smoker     Packs/day: 0.50     Years: 40.00     Pack years: 20.00     Types: Cigarettes   • Smokeless tobacco: Never Used   Substance Use Topics   • Alcohol use: Yes     Comment: socially   • Drug use: No       ALLERGIES:  Allergies   Allergen Reactions   • Molds & Smuts Other (See Comments)     Sinus infection   • Hydrocodone-Acetaminophen Itching         MEDICATIONS:    Current Outpatient Medications:   •  acetaminophen (TYLENOL) 500 MG tablet, Take 1,000 mg by mouth Every 6 (Six) Hours As Needed., Disp: , Rfl:   •  atorvastatin (LIPITOR) 20 MG tablet, Take 1 tablet by mouth Daily.,  Disp: 30 tablet, Rfl: 0  •  budesonide-formoterol (SYMBICORT) 80-4.5 MCG/ACT inhaler, Inhale 2 puffs 2 (Two) Times a Day., Disp: 3 inhaler, Rfl: 11  •  carvedilol (COREG) 3.125 MG tablet, Take 1 tablet by mouth Every 12 (Twelve) Hours., Disp: 180 tablet, Rfl: 3  •  cetirizine (zyrTEC) 10 MG tablet, Take 10 mg by mouth Daily., Disp: , Rfl:   •  clopidogrel (PLAVIX) 75 MG tablet, Take 1 tablet by mouth Daily., Disp: 90 tablet, Rfl: 2  •  Cyanocobalamin (VITAMIN B 12 PO), Take 500 mcg by mouth Daily. Three times weekly, MoWeFr, Disp: , Rfl:   •  cyclobenzaprine (FLEXERIL) 10 MG tablet, Take 10 mg by mouth 3 (Three) Times a Day As Needed for Muscle Spasms., Disp: , Rfl:   •  diltiaZEM CD (CARDIZEM CD) 180 MG 24 hr capsule, Take 1 capsule by mouth Daily., Disp: 30 capsule, Rfl: 6  •  fluticasone (FLONASE) 50 MCG/ACT nasal spray, 2 sprays into each nostril Every Night., Disp: , Rfl:   •  isosorbide mononitrate (IMDUR) 30 MG 24 hr tablet, Take 1 tablet by mouth Daily., Disp: 30 tablet, Rfl: 0  •  latanoprost (XALATAN) 0.005 % ophthalmic solution, Administer 1 drop to both eyes every night., Disp: , Rfl:   •  losartan (COZAAR) 50 MG tablet, Take 50 mg by mouth Every Night., Disp: , Rfl:   •  meloxicam (MOBIC) 15 MG tablet, Take 1 tablet by mouth Daily. Take with meal daily, Disp: 90 tablet, Rfl: 0  •  metFORMIN (GLUCOPHAGE) 1000 MG tablet, Take 1 tablet by mouth 2 (Two) Times a Day With Meals., Disp: , Rfl:   •  Multiple Vitamins-Minerals (CENTRUM PO), Take 1 tablet by mouth daily. Centrum 0.4 mg-162 mg-18 mg tablet  (unconfirmed), Disp: , Rfl:   •  nitroglycerin (NITROSTAT) 0.4 MG SL tablet, Place 1 tablet under the tongue Every 5 (Five) Minutes As Needed for Chest Pain. Take no more than 3 doses in 15 minutes., Disp: 30 tablet, Rfl: 0  •  omeprazole (priLOSEC) 20 MG capsule, Take 20 mg by mouth Daily., Disp: , Rfl:   •  oxybutynin XL (DITROPAN-XL) 5 MG 24 hr tablet, Take 5 mg by mouth Daily., Disp: , Rfl:   •  VENTOLIN   (90 Base) MCG/ACT inhaler, Inhale 2 puffs Every 4 (Four) Hours As Needed for Wheezing., Disp: 1 inhaler, Rfl: 11    Review of Systems   Review of Systems   Constitution: Negative for weakness, malaise/fatigue and weight loss.   Cardiovascular: Positive for dyspnea on exertion. Negative for chest pain and claudication.   Respiratory: Positive for shortness of breath. Negative for cough.    Skin: Negative for color change and poor wound healing.   Musculoskeletal: Positive for arthritis, back pain and neck pain.   Neurological: Negative for dizziness and numbness.       Physical Exam   Physical Exam   Constitutional: He is oriented to person, place, and time. He is active and cooperative. He does not appear ill. No distress.   HENT:   Right Ear: Hearing normal.   Left Ear: Hearing normal.   Nose: No nasal deformity. No epistaxis.   Mouth/Throat: He does not have dentures. Normal dentition.   Cardiovascular: Normal rate and regular rhythm.   No murmur heard.  Pulses:       Carotid pulses are 2+ on the right side, and 2+ on the left side.       Radial pulses are 2+ on the right side, and 2+ on the left side.        Dorsalis pedis pulses are 2+ on the right side, and 2+ on the left side.   Pulmonary/Chest: Effort normal and breath sounds normal.   Abdominal: Soft. He exhibits no distension and no mass. There is no tenderness.   Musculoskeletal: He exhibits no deformity.   Gait normal.    Neurological: He is alert and oriented to person, place, and time. He has normal strength.   Skin: Skin is warm and dry. No cyanosis or erythema. No pallor.   No venous staining   Psychiatric: He has a normal mood and affect. His speech is normal. Judgment and thought content normal.     Results for CLINTVISHNU MCKENZIE (MRN [id number]) as of 6/5/2019 11:25  GFR 64 Ref. Range 1/8/2019 04:16   Creatinine Latest Ref Range: 0.70 - 1.30 mg/dL 1.11   BUN Latest Ref Range: 7 - 21 mg/dL 17     ASSESSMENT:  Vishnu was seen today for  thoracic aneurysm.    Diagnoses and all orders for this visit:    Thoracic aortic aneurysm without rupture (CMS/HCC)  -     CT Chest Without Contrast; Future    Mixed hyperlipidemia    Essential hypertension    Coronary artery disease involving native coronary artery of native heart without angina pectoris    COPD with asthma (CMS/HCC)    Type 2 diabetes mellitus without complication, without long-term current use of insulin (CMS/HCC)    Primary osteoarthritis involving multiple joints    Nicotine dependence, cigarettes, with other nicotine-induced disorders    PLAN:  Detailed discussion with Vishnu Waller regarding situation and options.  Aneurysm ascending thoracic aorta.  Surgical intervention not indicated at this time.  Will plan to follow with interval imaging.  Smoking cessation, lipid management, BP control in 120 range advised.  Discussed symptoms of rupture, details of surgical repair including aortic root replacement, reimplantation of coronary arteries, possible valve replacement, endovascular and open repair.  Risks, benefits discussed.  Understands and wishes to proceed with plan    Return in 2 years with CT Chest without contrast    Return after above studies complete  Smoking cessation advised and assistance options offered including behavioral counseling ([facility] [employer]essation [facility]), Nicotine replacement therapy (patches or gum), pharmacologic therapy (Chantix, Wellbutrin). patient understands that continued use of tobacco products increases risk of heart disease, stroke, cancer; counseling for 3-5min.  Recommended regular physical activity, progressive walking program.  Continue current medications as directed.    Thank you for the opportunity to participate in this patient's care.    Copy to primary care provider.    EMR Dragon/Transcription disclaimer:   Much of this encounter note is an electronic transcription/translation of spoken language to printed text. The electronic  translation of spoken language may permit erroneous, or at times, nonsensical words or phrases to be inadvertently transcribed; Although I have reviewed the note for such errors, some may still exist.

## 2019-08-13 DIAGNOSIS — J44.9 COPD WITH ASTHMA (HCC): ICD-10-CM

## 2019-08-13 RX ORDER — BUDESONIDE AND FORMOTEROL FUMARATE DIHYDRATE 80; 4.5 UG/1; UG/1
2 AEROSOL RESPIRATORY (INHALATION)
Qty: 3 INHALER | Refills: 11 | Status: SHIPPED | OUTPATIENT
Start: 2019-08-13 | End: 2019-08-21 | Stop reason: SDUPTHER

## 2019-08-21 DIAGNOSIS — J44.9 COPD WITH ASTHMA (HCC): ICD-10-CM

## 2019-08-21 RX ORDER — BUDESONIDE AND FORMOTEROL FUMARATE DIHYDRATE 80; 4.5 UG/1; UG/1
2 AEROSOL RESPIRATORY (INHALATION)
Qty: 3 INHALER | Refills: 3 | Status: SHIPPED | OUTPATIENT
Start: 2019-08-21 | End: 2020-03-05 | Stop reason: ALTCHOICE

## 2019-08-29 NOTE — PROGRESS NOTES
Pulmonary Office Follow-up    Subjective     Vishnu Waller is seen today at the office for   Chief Complaint   Patient presents with   • COPD         HPI  Vishnu Waller is a 79 y.o. male with a PMH significant for COPD with asthma, past tobacco use, allergies, CAD s/p CABG, HTN, DM, and borderline glaucoma who presents for follow-up of COPD.      He was last seen on 5/30/19, at which time he had not been using his Symbicort as prescribed so I counseled on the importance of compliance, and again cautioned him on continued smoking of marijuana.  He states that his breathing has been doing fairly well and he continues on his Symbicort twice daily.  Patient reports that he rarely needs his albuterol.  He still has a cough productive of light green sputum, but it has not changed in amount or color.  He denies any fever, wheeze, chest pain, or weight changes.  Patient does complain that with the high pressure his arthritis has been bothering him more.  He does continue to smoke marijuana on a daily basis which is his only relief for his pain.  He is having a little bit of drainage but states it is chronic and he is doing fairly well on his Flonase.      Tobacco use history:  Type: cigarettes  Amount: 1-2 ppd  Duration: 60 years  Cessation: 2017   Willing to quit: N/A      Patient Active Problem List   Diagnosis   • Degeneration of lumbar intervertebral disc   • Low back pain without sciatica   • Neuritis or radiculitis due to rupture of lumbar intervertebral disc   • Borderline glaucoma, open angle with borderline findings   • Pseudophakia   • History of coronary artery bypass surgery   • Essential hypertension   • Mixed hyperlipidemia   • Thrombocytopenia (CMS/HCC)   • Spinal stenosis of lumbar region   • Degenerative disc disease, lumbar   • Chronic pain of right knee   • History of artificial joint   • B12 deficiency   • Degeneration of intervertebral disc of lumbosacral region   • Personal history of  other infectious and parasitic diseases   • Status post lumbar spine operation   • History of pyelonephritis   • History of surgical procedure   • History of repair of rotator cuff   • Encounter for follow-up examination after completed treatment for conditions other than malignant neoplasm   • Encounter for general adult medical examination without abnormal findings   • Osteoarthritis of knee   • Osteoarthritis of multiple joints   • Primary insomnia   • Encounter for screening for malignant neoplasm of colon   • Encounter for screening for malignant neoplasm of prostate   • Type 2 diabetes mellitus without complication, without long-term current use of insulin (CMS/HCC)   • Coronary artery disease involving native coronary artery of native heart without angina pectoris   • SOB (shortness of breath)   • Presence of aortocoronary bypass graft   • Dizziness and giddiness   • Postviral fatigue syndrome   • Postviral fatigue syndrome   • Recurrent kidney stones   • Thrombocytopenia (CMS/HCC)   • Pyelonephritis   • COPD with asthma (CMS/HCC)   • Chronic non-seasonal allergic rhinitis   • Unstable angina (CMS/HCC)   • NSTEMI (non-ST elevated myocardial infarction) (CMS/HCC)   • History of PTCA 1   • Medicare annual wellness visit, subsequent   • Thoracic aortic aneurysm without rupture (CMS/HCC)   • Chronic kidney disease, stage 2 (mild)   • Personal history of tobacco use, presenting hazards to health   • Cervical pain (neck)   • Thoracic spine pain   • Marijuana smoker   • Calculus of ureter   • Foreign body in bladder and urethra   • Physical deconditioning   • Left hip pain   • Nicotine dependence, cigarettes, with other nicotine-induced disorders       Review of Systems  Review of Systems   Constitutional: Positive for fatigue. Negative for fever and unexpected weight change.   HENT: Positive for postnasal drip. Negative for congestion.    Respiratory: Positive for cough and shortness of breath. Negative for chest  tightness and wheezing.    Cardiovascular: Negative for chest pain and leg swelling.   Musculoskeletal: Positive for arthralgias and back pain.     As described in the HPI. Otherwise, remainder of ROS (14 systems) were negative.    Medications, Allergies, Social, and Family Histories reviewed as per EMR.    Objective     Vitals:    08/30/19 1409   BP: 149/76   Pulse: 93   SpO2: 97%     Physical Exam   Constitutional: He is oriented to person, place, and time. Vital signs are normal. He appears well-developed and well-nourished.   HENT:   Head: Normocephalic and atraumatic.   Nose: No mucosal edema.   Mouth/Throat: Uvula is midline, oropharynx is clear and moist and mucous membranes are normal.   Mallampati 3   Eyes: Conjunctivae, EOM and lids are normal. Pupils are equal, round, and reactive to light.   Neck: Trachea normal and normal range of motion. No tracheal tenderness present. No thyroid mass present.   Cardiovascular: Normal rate, regular rhythm and normal heart sounds. PMI is not displaced. Exam reveals no gallop.   No murmur heard.  Pulmonary/Chest: Effort normal and breath sounds normal. No respiratory distress. He has no decreased breath sounds. He has no wheezes. He has no rhonchi. He exhibits no tenderness.   Abdominal: Soft. Normal appearance and bowel sounds are normal. There is no hepatomegaly. There is no tenderness.   Musculoskeletal:   Normal gait, no extremity edema     Vascular Status -  His right foot exhibits no edema. His left foot exhibits no edema.  Lymphadenopathy:        Head (right side): No submandibular adenopathy present.        Head (left side): No submandibular adenopathy present.     He has no cervical adenopathy.        Right: No supraclavicular adenopathy present.        Left: No supraclavicular adenopathy present.   Neurological: He is alert and oriented to person, place, and time. Gait normal.   Skin: Skin is warm and dry. No rash noted. No cyanosis. Nails show no clubbing.    Psychiatric: He has a normal mood and affect. His speech is normal and behavior is normal. Judgment normal.   Nursing note and vitals reviewed.          Assessment/Plan     Vishnu was seen today for copd.    Diagnoses and all orders for this visit:    COPD with asthma (CMS/[facility])    Chronic non-seasonal allergic rhinitis    Personal history of tobacco use, presenting hazards to health    Physical deconditioning         Discussion/ Recommendations:   He is stable from a COPD standpoint and his allergies are relatively well controlled.  Unfortunately, he does continue to smoke marijuana and I again counseled him on the adverse effects of smoking anything.    -Continue Symbicort 2 puffs twice daily.  -Use albuterol only as needed for dyspnea or wheeze.  -Continue Flonase and Singulair daily.  -Continue frequent nasal saline.  -Qualifies for LDCT, but also having CT chest annually to follow thoracic aortic aneurysm due May 2019. Will use this scan as his screening (1-2ppd x 60yrs, quit 2017).  -Again, cautioned on smoking marijuana.  -Up-to-date with flu and pneumococcal vaccines.  Encouraged him to get annual flu vaccine when available.    Patient's Body mass index is 23.38 kg/m². BMI is within normal parameters. No follow-up required.        Return in about 6 months (around 2/29/2020).          This document has been electronically signed by Camryn Koehler MD on August 30, 2019 2:25 PM      Dictated using Dragon

## 2019-08-30 ENCOUNTER — OFFICE VISIT (OUTPATIENT)
Dept: PULMONOLOGY | Facility: CLINIC | Age: 79
End: 2019-08-30

## 2019-08-30 VITALS
DIASTOLIC BLOOD PRESSURE: 76 MMHG | HEART RATE: 93 BPM | SYSTOLIC BLOOD PRESSURE: 149 MMHG | BODY MASS INDEX: 23.39 KG/M2 | OXYGEN SATURATION: 97 % | WEIGHT: 132 LBS | HEIGHT: 63 IN

## 2019-08-30 DIAGNOSIS — Z87.891 PERSONAL HISTORY OF TOBACCO USE, PRESENTING HAZARDS TO HEALTH: ICD-10-CM

## 2019-08-30 DIAGNOSIS — J30.89 CHRONIC NON-SEASONAL ALLERGIC RHINITIS: ICD-10-CM

## 2019-08-30 DIAGNOSIS — R53.81 PHYSICAL DECONDITIONING: ICD-10-CM

## 2019-08-30 DIAGNOSIS — J44.9 COPD WITH ASTHMA (HCC): Primary | ICD-10-CM

## 2019-08-30 PROCEDURE — 99214 OFFICE O/P EST MOD 30 MIN: CPT | Performed by: INTERNAL MEDICINE

## 2019-10-03 ENCOUNTER — OFFICE VISIT (OUTPATIENT)
Dept: CARDIOLOGY | Facility: CLINIC | Age: 79
End: 2019-10-03

## 2019-10-03 VITALS
SYSTOLIC BLOOD PRESSURE: 120 MMHG | WEIGHT: 133 LBS | OXYGEN SATURATION: 98 % | HEIGHT: 63 IN | HEART RATE: 80 BPM | BODY MASS INDEX: 23.57 KG/M2 | DIASTOLIC BLOOD PRESSURE: 74 MMHG

## 2019-10-03 DIAGNOSIS — I71.20 THORACIC AORTIC ANEURYSM WITHOUT RUPTURE (HCC): ICD-10-CM

## 2019-10-03 DIAGNOSIS — E78.2 MIXED HYPERLIPIDEMIA: ICD-10-CM

## 2019-10-03 DIAGNOSIS — I10 ESSENTIAL HYPERTENSION: ICD-10-CM

## 2019-10-03 DIAGNOSIS — I25.10 CORONARY ARTERY DISEASE INVOLVING NATIVE CORONARY ARTERY OF NATIVE HEART WITHOUT ANGINA PECTORIS: Primary | ICD-10-CM

## 2019-10-03 DIAGNOSIS — Z95.1 PRESENCE OF AORTOCORONARY BYPASS GRAFT: ICD-10-CM

## 2019-10-03 PROCEDURE — 99214 OFFICE O/P EST MOD 30 MIN: CPT | Performed by: INTERNAL MEDICINE

## 2019-10-03 RX ORDER — TRIAMCINOLONE ACETONIDE 1 MG/G
CREAM TOPICAL
COMMUNITY
Start: 2019-04-02 | End: 2019-11-17

## 2019-10-03 RX ORDER — SIMVASTATIN 40 MG
TABLET ORAL
COMMUNITY
Start: 2019-08-09 | End: 2019-10-03

## 2019-10-03 RX ORDER — POTASSIUM CITRATE 10 MEQ/1
TABLET, EXTENDED RELEASE ORAL EVERY 8 HOURS SCHEDULED
COMMUNITY
End: 2019-11-17 | Stop reason: SDDI

## 2019-10-03 RX ORDER — DULOXETIN HYDROCHLORIDE 30 MG/1
30 CAPSULE, DELAYED RELEASE ORAL DAILY
Refills: 0 | COMMUNITY
Start: 2019-09-20 | End: 2022-01-01

## 2019-10-03 NOTE — PROGRESS NOTES
Vishnu Waller  79 y.o. male    10/03/2019  1. Coronary artery disease involving native coronary artery of native heart without angina pectoris    2. Essential hypertension    3. Mixed hyperlipidemia    4. Presence of aortocoronary bypass graft    5. Thoracic aortic aneurysm without rupture (CMS/[facility])        History of Present Illness  Mr. Waller is a 78-year-old  male with the history of diabetes mellitus, hypertension, hyperlipidemia, COPD, tobacco abuse, CAD status post CABG in 2003 when he received LIMA to LAD, SVG to diagonal, SVG to OM 2 and SVG to distal right coronary artery.  In February 2018 he presented with prolonged chest pain and ruled in for a non-ST segment elevation myocardial infarctions and cardiac catheterization showed the following findings:  Impression:  99% eccentric lesion noted in the proximal segment of the SVG to distal right coronary artery graft.  Successful PCI with placement of a 4.0 x 18 mm Xience Alpine stent.  Patent LIMA to Left Anterior Descending Coronary Artery.  Native LAD beyond the anastomosis was widely patent  Severe native vessel coronary artery disease with 100% occlusion of the mid LAD, mid right coronary artery.  A small to medium-sized tortuous circumflex had a proximal lesion up to 99%.  Occluded SVG to diagonal and occluded SVG to obtuse marginal 2  Ascending aortogram showing ectatic ascending aorta.     CT angiogram of the chest showed aorta measuring 4.4 cm.  Patient has done quite well and denied any chest pain or shortness of breath.  His predominant complaint is arthritis related symptoms.  His a sending aortic aneurysm is being monitored by his vascular surgery.     Clinical exam today was unremarkable with no signs of congestive heart failure.  His blood pressure was in the normal range.  Unfortunately continues to smoke and we had a discussion about smoking cessation.     SUBJECTIVE    Allergies   Allergen Reactions   • Molds & Smuts Other  (See Comments)     Sinus infection   • Hydrocodone-Acetaminophen Itching         Past Medical History:   Diagnosis Date   • AAA (abdominal aortic aneurysm) (CMS/HCC)    • Acute bacterial sinusitis    • Acute bronchitis    • Acute frontal sinusitis    • Acute maxillary sinusitis    • Acute pharyngitis    • Allergic rhinitis    • Allergic rhinitis due to pollen    • Anxiety    • Artificial lens present     in position   • Backache    • Borderline glaucoma    • C. difficile diarrhea 2014   • Chronic laryngitis    • Chronic rhinitis    • Coronary artery disease    • Cough    • Degeneration of lumbar intervertebral disc    • Degenerative joint disease involving multiple joints    • Diabetes mellitus (CMS/HCC)    • Diverticular disease of colon    • Dizziness and giddiness    • Erectile dysfunction    • Essential hypertension    • Generalized anxiety disorder    • GERD (gastroesophageal reflux disease)    • Glaucoma    • Hemorrhoids     without bleeding   • Insomnia    • Kidney stone    • Kidney stones    • Malignant tumor of prostate (CMS/HCC)    • Need for prophylactic vaccination and inoculation against influenza    • Osteoarthritis    • Osteoarthritis of multiple joints    • Pain, lumbar region     Pain radiating to lumbar region of back     • PONV (postoperative nausea and vomiting)    • Postviral fatigue syndrome    • Presence of aortocoronary bypass graft    • Prostate cancer (CMS/HCC)    • Pseudomembranous enterocolitis     improved   • Rotator cuff syndrome     right   • Shoulder pain    • SOB (shortness of breath)    • Tenosynovitis    • Thrombocytopenia (CMS/HCC)    • Upper respiratory infection          Past Surgical History:   Procedure Laterality Date   • CARDIAC CATHETERIZATION  09/25/2003    Cardiac cath (Normal left ventricular systolic function, EF 60% Multi-vessel coronary artery disease with critical disease noted in the LAD coronary artery, diagonal coronary artery, obtuse marginal coronary and right  coronary artery.)   • CARDIAC CATHETERIZATION  05/03/1996    Cardiac cath (Coronary atherosclerotic heart disease. 70% diagonal stenosis. Mild to moderate left anterior descending artery stenosis. Preserved left ventricular function.)   • CARDIAC CATHETERIZATION N/A 2/26/2018    Procedure: Left Heart Cath/ pci if indicated ;  Surgeon: Radha Wilkes MD;  Location: [facility] CATH INVASIVE LOCATION;  Service:    • CATARACT EXTRACTION Bilateral    • CATARACT EXTRACTION  10/04/2011    Remove cataract, insert lens (Right)   • CATARACT EXTRACTION  08/23/2011    Remove cataract, insert lens (left)   • COLONOSCOPY     • COLONOSCOPY      Colon endoscopy 39612 (Rectal bleeding. Radiation proctitis w/bleeding. An abnormal CT of transverse colon due to mucosal ischemic colitis, that now is healed. Internal hemorrhoids w/possible bleeding.)   • COLONOSCOPY  05/10/2011    Colon endoscopy 19589 (Single sessile polyp found in transverse colon and sigmoid colon ,removed by cold biopsy polypectomy.divertic. found in sigmoid colon,descending colon. Friability/capillary friability in rectum sigmoid due to prior radiation.Inter. hem. Grade 1)   • COLONOSCOPY  05/02/2007    Colon endoscopy 49653 (Colon polyp. Diverticulosis without bleeding. Internal hemorrhoids without bleeding.)   • CORONARY ARTERY BYPASS GRAFT  2002   • CYSTOSCOPY  01/13/1995    Cystoscopy, stone removal (Right ureteroscopic lasertripsy.)   • CYSTOSCOPY Right 1/23/2019    Procedure: CYSTOSCOPY, RIGHT STENT REMOVAL;  Surgeon: Nirmal Gaines MD;  Location: [facility];  Service: Urology   • CYSTOSCOPY, URETEROSCOPY, RETROGRADE PYELOGRAM, STENT INSERTION N/A 8/28/2017    Procedure: URETEROSCOPY LASER LITHOTRIPSY WITH STENT INSERTION AND RETROGRADE PYELOGRAM ;  Surgeon: Anna M. D'Amico, MD;  Location: [facility] OR;  Service:    • CYSTOSCOPY, URETEROSCOPY, RETROGRADE PYELOGRAM, STENT INSERTION Right 1/11/2019    Procedure: CYSTOSCOPY URETEROSCOPY RETROGRADE PYELOGRAM  HOLMIUM LASER STENT INSERTION;  Surgeon: Nirmal Gaines MD;  Location: [facility];  Service: Urology   • EPIDURAL  12/03/2014    Therapeutic/diag injection (Lumbar transforaminal epidural steroid injection, L5-S1, left side.)   • EPIDURAL  10/22/2014    Therapeutic/diag injection (Lumbar transforaminal epidural steroid injection L5-S1 left side.)   • EPIDURAL  08/07/2014    Therapeutic/diag injection (Lumbar transforaminal epidural steroid injection.)   • EXCISION LESION  05/13/1999    REMOVE EAR LESION 89628 (1.3 cm basal cell carcinoma, right preauricular area. Excision)   • EXCISION LESION  03/13/2001    REMOVE LESION NECK/CHEST 47706 (Excision of irritated seborrheic keratosis, anterior neck, 1.1 cm and deep lipoma, posterior neck, 3.5 cm)   • INJECTION OF MEDICATION  11/15/2012    Kenalog (4)      • JOINT REPLACEMENT  2015    partial   • KNEE ARTHROSCOPY  01/17/2007    Knee arthroscopy, surgery (Arthroscopy with medial and lateral meniscectomies, right knee.)   • KNEE SURGERY  11/04/2015    Knee Surgery (Right unicompartmental arthroplasty.)   • LUMBAR LAMINECTOMY N/A 2/7/2017    Procedure: LUMBAR LAMINECTOMY LUMBAR THREE-FOUR, LUMBAR FOUR-FIVE, INTERLAMINAR DISTRACTION ;  Surgeon: Lucio Mayo MD;  Location: [facility];  Service:    • NOSE SURGERY     • OTHER SURGICAL HISTORY      EXTENDED VISUAL FIELDS STUDY 55812 (Borderline glaucoma) (3): 03/19/2015, 04/09/2014, 03/27/2013   • OTHER SURGICAL HISTORY  10/29/2003    Heart revascularize (TMR) (Directmyocardial revascularization times four; LIMA to LAD SVG to DARIUSZ SVG to OM1, SVG to RCA)   • OTHER SURGICAL HISTORY      OCT DISC NFL 78010 (Borderline glaucoma)  (3): 09/24/2015, 08/13/2014, 07/29/2013   • PROSTATECTOMY     • SEPTORHINOPLASTY  11/16/1989    Nasal surgery procedure (Septorhinoplasty with reconstruction of the nasal pyramid with a iliac crest graft, rhinoplasty was performed with external approach.)   • SHOULDER ARTHROSCOPY  07/24/2013     Arthroscopy of right shoulder with rotator repair, Carla procedure, Biceps tenotomy, subacromial decompression.   • SHOULDER SURGERY  11/01/2005    Shoulder surgery procedure (Decompression, subacromial, explore of the rotator cuff, no tear found nor repaired, acromioplasty of the left shoulder and AC shoulder joint resection.)         Family History   Problem Relation Age of Onset   • Hypertension Mother    • Heart disease Mother    • Arthritis Mother    • Cancer Other    • Heart disease Other    • Hypertension Other          Social History     Socioeconomic History   • Marital status: Significant Other     Spouse name: Not on file   • Number of children: Not on file   • Years of education: Not on file   • Highest education level: Not on file   Tobacco Use   • Smoking status: Current Every Day Smoker     Packs/day: 0.50     Years: 40.00     Pack years: 20.00     Types: Cigarettes   • Smokeless tobacco: Never Used   Substance and Sexual Activity   • Alcohol use: Yes     Comment: socially   • Drug use: No   • Sexual activity: Defer         Current Outpatient Medications   Medication Sig Dispense Refill   • acetaminophen (TYLENOL) 500 MG tablet Take 1,000 mg by mouth Every 6 (Six) Hours As Needed.     • atorvastatin (LIPITOR) 20 MG tablet Take 1 tablet by mouth Daily. 30 tablet 0   • budesonide-formoterol (SYMBICORT) 80-4.5 MCG/ACT inhaler Inhale 2 puffs 2 (Two) Times a Day. 3 inhaler 3   • carvedilol (COREG) 3.125 MG tablet Take 1 tablet by mouth Every 12 (Twelve) Hours. 180 tablet 3   • cetirizine (zyrTEC) 10 MG tablet Take 10 mg by mouth Daily.     • clopidogrel (PLAVIX) 75 MG tablet Take 1 tablet by mouth Daily. 90 tablet 2   • Cyanocobalamin (VITAMIN B 12 PO) Take 500 mcg by mouth Daily. Three times weekly, MoWeFr     • cyclobenzaprine (FLEXERIL) 10 MG tablet Take 10 mg by mouth 3 (Three) Times a Day As Needed for Muscle Spasms.     • diltiaZEM CD (CARDIZEM CD) 180 MG 24 hr capsule Take 1 capsule by mouth  "Daily. 30 capsule 6   • DULoxetine (CYMBALTA) 30 MG capsule Take 30 mg by mouth Daily.  0   • fluticasone (FLONASE) 50 MCG/ACT nasal spray 2 sprays into each nostril Every Night.     • isosorbide mononitrate (IMDUR) 30 MG 24 hr tablet Take 1 tablet by mouth Daily. 30 tablet 0   • latanoprost (XALATAN) 0.005 % ophthalmic solution Administer 1 drop to both eyes every night.     • losartan (COZAAR) 50 MG tablet Take 50 mg by mouth Every Night.     • meloxicam (MOBIC) 15 MG tablet Take 1 tablet by mouth Daily. Take with meal daily 90 tablet 0   • metFORMIN (GLUCOPHAGE) 1000 MG tablet Take 1 tablet by mouth 2 (Two) Times a Day With Meals.     • Multiple Vitamins-Minerals (CENTRUM PO) Take 1 tablet by mouth daily. Centrum 0.4 mg-162 mg-18 mg tablet  (unconfirmed)     • nitroglycerin (NITROSTAT) 0.4 MG SL tablet Place 1 tablet under the tongue Every 5 (Five) Minutes As Needed for Chest Pain. Take no more than 3 doses in 15 minutes. 30 tablet 0   • omeprazole (priLOSEC) 20 MG capsule Take 20 mg by mouth Daily.     • oxybutynin XL (DITROPAN-XL) 5 MG 24 hr tablet Take 5 mg by mouth Daily.     • potassium citrate (UROCIT-K) 10 MEQ (1080 MG) CR tablet Every 8 (Eight) Hours.     • triamcinolone (KENALOG) 0.1 % cream Apply thin amount to affected areas twice daily x 7 days only.     • VENTOLIN  (90 Base) MCG/ACT inhaler Inhale 2 puffs Every 4 (Four) Hours As Needed for Wheezing. 1 inhaler 11   • simvastatin (ZOCOR) 40 MG tablet TAKE 1 TABLET EVERY EVENING       No current facility-administered medications for this visit.          OBJECTIVE    /74   Pulse 80   Ht 160 cm (62.99\")   Wt 60.3 kg (133 lb)   SpO2 98%   BMI 23.57 kg/m²         Review of Systems     Constitutional:  Denies recent weight loss, weight gain, fever or chills     HENT:  Denies any hearing loss, epistaxis, hoarseness, or difficulty speaking.     Eyes: Wears eyeglasses or contact lenses     Respiratory:  Denies dyspnea with exertion,no cough, " wheezing, or hemoptysis.     Cardiovascular: Negative for palpations, chest pain, orthopnea, PND    Gastrointestinal:  Denies change in bowel habits, dyspepsia, ulcer disease, hematochezia, or melena.     Endocrine: Negative for cold intolerance, heat intolerance, polydipsia, polyphagia and polyuria.     Genitourinary: Negative.      Musculoskeletal: DJD    Skin:  Denies any change in hair or nails, rashes, or skin lesions.     Allergic/Immunologic: Negative.  Negative for environmental allergies, food allergies and immunocompromised state.     Neurological:  Denies any history of recurrent headaches, strokes, TIA, or seizure disorder.     Hematological: Denies any food allergies, seasonal allergies, bleeding disorders, or lymphadenopathy.     Psychiatric/Behavioral: Denies any history of depression, substance abuse, or change in cognitive function.         Physical Exam     Constitutional: Cooperative, alert and oriented,  in no acute distress.     HENT:   Head: Normocephalic, normal hair patterns, no masses or tenderness.  Ears, Nose, and Throat: No gross abnormalities. No pallor or cyanosis.   Eyes: EOMS intact, PERRL, conjunctivae and lids unremarkable. Fundoscopic exam and visual fields not performed.   Neck: No palpable masses or adenopathy, no thyromegaly, no JVD, carotid pulses are full and equal bilaterally and without  Bruits.     Cardiovascular: Regular rhythm, S1 and S2 normal, no S3 or S4.  No murmurs, gallops, or rubs detected.     Pulmonary/Chest: Chest: normal symmetry,  normal respiratory excursion, no intercostal retraction, no use of accessory muscles.            Pulmonary: Normal breath sounds. No rales or ronchi.    Abdominal: Abdomen soft, bowel sounds normoactive, no masses, no hepatosplenomegaly, non-tender, no bruits.     Musculoskeletal: No deformities, clubbing, cyanosis, erythema, or edema observed.     Neurological: No gross motor or sensory deficits noted, affect appropriate, oriented  to time, person, place.     Skin: Warm and dry to the touch, no apparent skin lesions or masses noted.     Psychiatric: He has a normal mood and affect. His behavior is normal. Judgment and thought content normal.         Procedures      Lab Results   Component Value Date    WBC 15.28 (H) 01/08/2019    HGB 11.2 (L) 01/08/2019    HCT 32.4 (L) 01/08/2019    .9 (H) 01/08/2019     01/08/2019     Lab Results   Component Value Date    GLUCOSE 122 (H) 07/19/2019    BUN 17 01/08/2019    CREATININE 1.11 01/08/2019    EGFRIFNONA 64 01/08/2019    BCR 15.3 01/08/2019    CO2 26.0 01/08/2019    CALCIUM 8.7 01/08/2019    ALBUMIN 4.20 01/07/2019    AST 23 01/07/2019    ALT 18 (L) 01/07/2019     Lab Results   Component Value Date    CHOL 113 12/28/2018     Lab Results   Component Value Date    TRIG 141 12/28/2018     Lab Results   Component Value Date    HDL 30 (L) 12/28/2018     No components found for: LDLCALC  Lab Results   Component Value Date    LDL 68 12/28/2018     No results found for: HDLLDLRATIO  No components found for: CHOLHDL  Lab Results   Component Value Date    HGBA1C 6.3 (H) 07/19/2019     Lab Results   Component Value Date    TSH 1.43 12/28/2018           ASSESSMENT AND PLAN  Mr. Waller is stable at this time with no clinical evidence of angina, arrhythmia or congestive heart failure.  He has been compliant with his medications.  Smoking cessation was once again stressed.  I have continued antihypertensive therapy with carvedilol, Cardizem CD, losartan, antianginal therapy with isosorbide mononitrate, antiplatelet therapy with Plavix, lipid-lowering therapy with atorvastatin.    Vishnu was seen today for follow-up.    Diagnoses and all orders for this visit:    Coronary artery disease involving native coronary artery of native heart without angina pectoris    Essential hypertension    Mixed hyperlipidemia    Presence of aortocoronary bypass graft    Thoracic aortic aneurysm without rupture  (CMS/[facility])        Patient's Body mass index is 23.57 kg/m². BMI is within normal parameters. No follow-up required..      Vishnu Waller is a current cigarettes user.  He currently smokes <1 pack of cigarettes per day for a duration of 55 years. I have educated him on the risk of diseases from using tobacco products such as cancer, COPD and heart diease.     I spent 3  minutes counseling the patient.          Radha Wilkes MD  10/3/2019  2:52 PM

## 2019-11-14 ENCOUNTER — OFFICE VISIT (OUTPATIENT)
Dept: OTOLARYNGOLOGY | Facility: CLINIC | Age: 79
End: 2019-11-14

## 2019-11-14 VITALS — BODY MASS INDEX: 23.78 KG/M2 | HEIGHT: 63 IN | WEIGHT: 134.2 LBS | RESPIRATION RATE: 18 BRPM

## 2019-11-14 DIAGNOSIS — C44.319 BASAL CELL CARCINOMA (BCC) OF RIGHT CHEEK: Primary | ICD-10-CM

## 2019-11-14 PROCEDURE — 99203 OFFICE O/P NEW LOW 30 MIN: CPT | Performed by: OTOLARYNGOLOGY

## 2019-11-18 NOTE — PROGRESS NOTES
Subjective   Vishnu Waller is a 79 y.o. male.     History of Present Illness     Patient had a lesion of his right cheek that is been present for several months.  It was crusty and the crust would come off but would always recur and it would never heal.  Nothing in particular brought this on.  Subsequently was seen at Dr. Joy's office and underwent biopsy on 10/7/2019.  Notes from Dr. Joy's office are available and are personally reviewed.  Punch biopsy was consistent with basal cell carcinoma, ulcerated.  She reports that since the biopsy he has not had much trouble with crusting.  No bleeding or pain.  Is chronically on Plavix        The following portions of the patient's history were reviewed and updated as appropriate: allergies, current medications, past family history, past medical history, past social history, past surgical history and problem list.      Vishnu Waller reports that he has been smoking cigarettes.  He has a 20.00 pack-year smoking history. He has never used smokeless tobacco. He reports that he drinks alcohol. He reports that he does not use drugs.  Patient is a tobacco user and has been counseled for use of tobacco products    Family History   Problem Relation Age of Onset   • Hypertension Mother    • Heart disease Mother    • Arthritis Mother    • Cancer Other    • Heart disease Other    • Hypertension Other        Allergies   Allergen Reactions   • Molds & Smuts Other (See Comments)     Sinus infection   • Hydrocodone-Acetaminophen Itching         Current Outpatient Medications:   •  acetaminophen (TYLENOL) 500 MG tablet, Take 1,000 mg by mouth Every 6 (Six) Hours As Needed., Disp: , Rfl:   •  atorvastatin (LIPITOR) 20 MG tablet, Take 1 tablet by mouth Daily., Disp: 30 tablet, Rfl: 0  •  budesonide-formoterol (SYMBICORT) 80-4.5 MCG/ACT inhaler, Inhale 2 puffs 2 (Two) Times a Day., Disp: 3 inhaler, Rfl: 3  •  carvedilol (COREG) 3.125 MG tablet, Take 1 tablet by mouth Every  12 (Twelve) Hours., Disp: 180 tablet, Rfl: 3  •  cetirizine (zyrTEC) 10 MG tablet, Take 10 mg by mouth Daily., Disp: , Rfl:   •  clopidogrel (PLAVIX) 75 MG tablet, Take 1 tablet by mouth Daily., Disp: 90 tablet, Rfl: 2  •  Cyanocobalamin (VITAMIN B 12 PO), Take 500 mcg by mouth Daily. Three times weekly, MoWeFr, Disp: , Rfl:   •  cyclobenzaprine (FLEXERIL) 10 MG tablet, Take 10 mg by mouth 3 (Three) Times a Day As Needed for Muscle Spasms., Disp: , Rfl:   •  diltiaZEM CD (CARDIZEM CD) 180 MG 24 hr capsule, Take 1 capsule by mouth Daily., Disp: 30 capsule, Rfl: 6  •  fluticasone (FLONASE) 50 MCG/ACT nasal spray, 2 sprays into each nostril Every Night., Disp: , Rfl:   •  isosorbide mononitrate (IMDUR) 30 MG 24 hr tablet, Take 1 tablet by mouth Daily., Disp: 30 tablet, Rfl: 0  •  latanoprost (XALATAN) 0.005 % ophthalmic solution, Administer 1 drop to both eyes every night., Disp: , Rfl:   •  losartan (COZAAR) 50 MG tablet, Take 50 mg by mouth Every Night., Disp: , Rfl:   •  meloxicam (MOBIC) 15 MG tablet, Take 1 tablet by mouth Daily. Take with meal daily, Disp: 90 tablet, Rfl: 0  •  metFORMIN (GLUCOPHAGE) 1000 MG tablet, Take 1 tablet by mouth 2 (Two) Times a Day With Meals., Disp: , Rfl:   •  Multiple Vitamins-Minerals (CENTRUM PO), Take 1 tablet by mouth daily. Centrum 0.4 mg-162 mg-18 mg tablet  (unconfirmed), Disp: , Rfl:   •  nitroglycerin (NITROSTAT) 0.4 MG SL tablet, Place 1 tablet under the tongue Every 5 (Five) Minutes As Needed for Chest Pain. Take no more than 3 doses in 15 minutes., Disp: 30 tablet, Rfl: 0  •  omeprazole (priLOSEC) 20 MG capsule, Take 20 mg by mouth Daily., Disp: , Rfl:   •  oxybutynin XL (DITROPAN-XL) 5 MG 24 hr tablet, Take 5 mg by mouth Daily., Disp: , Rfl:   •  VENTOLIN  (90 Base) MCG/ACT inhaler, Inhale 2 puffs Every 4 (Four) Hours As Needed for Wheezing., Disp: 1 inhaler, Rfl: 11  •  DULoxetine (CYMBALTA) 30 MG capsule, Take 30 mg by mouth Daily., Disp: , Rfl: 0    Past  Medical History:   Diagnosis Date   • AAA (abdominal aortic aneurysm) (CMS/HCC)    • Acute bacterial sinusitis    • Acute bronchitis    • Acute frontal sinusitis    • Acute maxillary sinusitis    • Acute pharyngitis    • Allergic rhinitis    • Allergic rhinitis due to pollen    • Anxiety    • Artificial lens present     in position   • Backache    • Borderline glaucoma    • C. difficile diarrhea 2014   • Chronic laryngitis    • Chronic rhinitis    • Coronary artery disease    • Cough    • Degeneration of lumbar intervertebral disc    • Degenerative joint disease involving multiple joints    • Diabetes mellitus (CMS/HCC)    • Diverticular disease of colon    • Dizziness and giddiness    • Erectile dysfunction    • Essential hypertension    • Generalized anxiety disorder    • GERD (gastroesophageal reflux disease)    • Glaucoma    • Hemorrhoids     without bleeding   • Insomnia    • Kidney stone    • Kidney stones    • Malignant tumor of prostate (CMS/HCC)    • Need for prophylactic vaccination and inoculation against influenza    • Osteoarthritis    • Osteoarthritis of multiple joints    • Pain, lumbar region     Pain radiating to lumbar region of back     • PONV (postoperative nausea and vomiting)    • Postviral fatigue syndrome    • Presence of aortocoronary bypass graft    • Prostate cancer (CMS/HCC)    • Pseudomembranous enterocolitis     improved   • Rotator cuff syndrome     right   • Shoulder pain    • SOB (shortness of breath)    • Tenosynovitis    • Thrombocytopenia (CMS/HCC)    • Upper respiratory infection        Past Surgical History:   Procedure Laterality Date   • CARDIAC CATHETERIZATION  09/25/2003    Cardiac cath (Normal left ventricular systolic function, EF 60% Multi-vessel coronary artery disease with critical disease noted in the LAD coronary artery, diagonal coronary artery, obtuse marginal coronary and right coronary artery.)   • CARDIAC CATHETERIZATION  05/03/1996    Cardiac cath (Coronary  atherosclerotic heart disease. 70% diagonal stenosis. Mild to moderate left anterior descending artery stenosis. Preserved left ventricular function.)   • CARDIAC CATHETERIZATION N/A 2/26/2018    Procedure: Left Heart Cath/ pci if indicated ;  Surgeon: Radha Wilkes MD;  Location: [facility] CATH INVASIVE LOCATION;  Service:    • CATARACT EXTRACTION Bilateral    • CATARACT EXTRACTION  10/04/2011    Remove cataract, insert lens (Right)   • CATARACT EXTRACTION  08/23/2011    Remove cataract, insert lens (left)   • COLONOSCOPY     • COLONOSCOPY      Colon endoscopy 38767 (Rectal bleeding. Radiation proctitis w/bleeding. An abnormal CT of transverse colon due to mucosal ischemic colitis, that now is healed. Internal hemorrhoids w/possible bleeding.)   • COLONOSCOPY  05/10/2011    Colon endoscopy 00291 (Single sessile polyp found in transverse colon and sigmoid colon ,removed by cold biopsy polypectomy.divertic. found in sigmoid colon,descending colon. Friability/capillary friability in rectum sigmoid due to prior radiation.Inter. hem. Grade 1)   • COLONOSCOPY  05/02/2007    Colon endoscopy 01299 (Colon polyp. Diverticulosis without bleeding. Internal hemorrhoids without bleeding.)   • CORONARY ARTERY BYPASS GRAFT  2002   • CYSTOSCOPY  01/13/1995    Cystoscopy, stone removal (Right ureteroscopic lasertripsy.)   • CYSTOSCOPY Right 1/23/2019    Procedure: CYSTOSCOPY, RIGHT STENT REMOVAL;  Surgeon: Nirmal Gaines MD;  Location: [facility] OR;  Service: Urology   • CYSTOSCOPY, URETEROSCOPY, RETROGRADE PYELOGRAM, STENT INSERTION N/A 8/28/2017    Procedure: URETEROSCOPY LASER LITHOTRIPSY WITH STENT INSERTION AND RETROGRADE PYELOGRAM ;  Surgeon: Anna M. D'Amico, MD;  Location: [facility] OR;  Service:    • CYSTOSCOPY, URETEROSCOPY, RETROGRADE PYELOGRAM, STENT INSERTION Right 1/11/2019    Procedure: CYSTOSCOPY URETEROSCOPY RETROGRADE PYELOGRAM HOLMIUM LASER STENT INSERTION;  Surgeon: Nirmal Gaines MD;  Location: [facility]  OR;  Service: Urology   • EPIDURAL  12/03/2014    Therapeutic/diag injection (Lumbar transforaminal epidural steroid injection, L5-S1, left side.)   • EPIDURAL  10/22/2014    Therapeutic/diag injection (Lumbar transforaminal epidural steroid injection L5-S1 left side.)   • EPIDURAL  08/07/2014    Therapeutic/diag injection (Lumbar transforaminal epidural steroid injection.)   • EXCISION LESION  05/13/1999    REMOVE EAR LESION 86393 (1.3 cm basal cell carcinoma, right preauricular area. Excision)   • EXCISION LESION  03/13/2001    REMOVE LESION NECK/CHEST 83119 (Excision of irritated seborrheic keratosis, anterior neck, 1.1 cm and deep lipoma, posterior neck, 3.5 cm)   • INJECTION OF MEDICATION  11/15/2012    Kenalog (4)      • JOINT REPLACEMENT  2015    partial   • KNEE ARTHROSCOPY  01/17/2007    Knee arthroscopy, surgery (Arthroscopy with medial and lateral meniscectomies, right knee.)   • KNEE SURGERY  11/04/2015    Knee Surgery (Right unicompartmental arthroplasty.)   • LUMBAR LAMINECTOMY N/A 2/7/2017    Procedure: LUMBAR LAMINECTOMY LUMBAR THREE-FOUR, LUMBAR FOUR-FIVE, INTERLAMINAR DISTRACTION ;  Surgeon: Lucio Mayo MD;  Location: [facility] OR;  Service:    • NOSE SURGERY     • OTHER SURGICAL HISTORY      EXTENDED VISUAL FIELDS STUDY 39556 (Borderline glaucoma) (3): 03/19/2015, 04/09/2014, 03/27/2013   • OTHER SURGICAL HISTORY  10/29/2003    Heart revascularize (TMR) (Directmyocardial revascularization times four; LIMA to LAD SVG to DARIUSZ SVG to OM1, SVG to RCA)   • OTHER SURGICAL HISTORY      OCT DISC NFL 36270 (Borderline glaucoma)  (3): 09/24/2015, 08/13/2014, 07/29/2013   • PROSTATECTOMY     • SEPTORHINOPLASTY  11/16/1989    Nasal surgery procedure (Septorhinoplasty with reconstruction of the nasal pyramid with a iliac crest graft, rhinoplasty was performed with external approach.)   • SHOULDER ARTHROSCOPY  07/24/2013    Arthroscopy of right shoulder with rotator repair, Carla procedure, Biceps  tenotomy, subacromial decompression.   • SHOULDER SURGERY  11/01/2005    Shoulder surgery procedure (Decompression, subacromial, explore of the rotator cuff, no tear found nor repaired, acromioplasty of the left shoulder and AC shoulder joint resection.)         Review of Systems   HENT: Positive for postnasal drip and sneezing.    Respiratory: Positive for shortness of breath.    Musculoskeletal: Positive for arthralgias, back pain and neck pain.   Hematological: Bruises/bleeds easily.   All other systems reviewed and are negative.          Objective   Physical Exam  General: Well-developed well-nourished male in no acute distress.  Alert and oriented x3. Head: Normocephalic. Face: Symmetrical strength and appearance. PERRL. EOMI. Voice:Strong. Speech:Fluent  Ears: External ears no deformity, canals no discharge, tympanic membranes intact clear and mobile bilaterally.  Nose: Nares show no discharge mass polyp or purulence.  Boggy mucosa is present.  No gross external deformity.  Septum: Midline  Oral cavity: Lips and gums without lesions.  Tongue and floor of mouth without lesions.  Parotid and submandibular ducts unobstructed.  No mucosal lesions on the buccal mucosa or vestibule of the mouth.  Pharynx: No erythema exudate mass or ulcer  Neck: No lymphadenopathy.  No thyromegaly.  Trachea and larynx midline.  No masses in the parotid or submandibular glands.  Skin: 7 mm x 5 mm lesion of the right mid cheek consistent with the biopsy site indicated in Dr. Joy's notes.  Appears to have some residual nodularity suggesting that there is additional disease present.        Assessment/Plan   Vishnu was seen today for skin lesion.    Diagnoses and all orders for this visit:    Basal cell carcinoma (BCC) of right cheek      Plan: I have offered to perform excision of the skin lesion with frozen section margin control if indicated, and possible flap or skin graft if needed for closure.  I have explained the nature of this  procedure to the patient in layman's terms including risks of bleeding, infection, poor healing, poor appearance, numbness, recurrence, and possible need for further treatment depending on final pathology.  Proposed benefit would be definitive treatment of the identified lesion.  The alternative would be observation which could result in continued or recurrent growth of the lesions.  Patient voices understanding of all of the above and wishes to proceed.  This will be scheduled.  Patient is instructed to stop his Plavix 5 days prior to the scheduled procedure.

## 2019-12-02 RX ORDER — LOSARTAN POTASSIUM 50 MG/1
TABLET ORAL
Qty: 90 TABLET | Refills: 3 | Status: SHIPPED | OUTPATIENT
Start: 2019-12-02 | End: 2020-12-07

## 2019-12-12 ENCOUNTER — OFFICE VISIT (OUTPATIENT)
Dept: ORTHOPEDIC SURGERY | Facility: CLINIC | Age: 79
End: 2019-12-12

## 2019-12-12 VITALS — HEIGHT: 63 IN | BODY MASS INDEX: 23.39 KG/M2 | WEIGHT: 132 LBS

## 2019-12-12 DIAGNOSIS — I10 ESSENTIAL HYPERTENSION: ICD-10-CM

## 2019-12-12 DIAGNOSIS — R93.6 ABNORMAL X-RAY OF HUMERUS: Primary | ICD-10-CM

## 2019-12-12 DIAGNOSIS — J44.9 COPD WITH ASTHMA (HCC): ICD-10-CM

## 2019-12-12 DIAGNOSIS — M89.8X2 PAIN OF LEFT HUMERUS: ICD-10-CM

## 2019-12-12 DIAGNOSIS — E11.9 TYPE 2 DIABETES MELLITUS WITHOUT COMPLICATION, WITHOUT LONG-TERM CURRENT USE OF INSULIN (HCC): ICD-10-CM

## 2019-12-12 PROCEDURE — 99214 OFFICE O/P EST MOD 30 MIN: CPT | Performed by: ORTHOPAEDIC SURGERY

## 2019-12-12 NOTE — PROGRESS NOTES
Vishnu Waller is a 79 y.o. male   Primary provider:  Johnathon Shaikh MD       Chief Complaint   Patient presents with   • Left Upper Arm - Pain       HISTORY OF PRESENT ILLNESS:  New patient left humerus pain, patient had XRAYS done on 10/24/19 at [facility], patient also had CT done on 11/5/19 at [facility], patient states pain score is at a 9 today,   Patient is complaining of some pain in his shoulder.  No pain in the humeral shaft.  No report of injury.  He was having pain all over and had multiple x-rays done.  On 1 of the x-rays there was noted to be a lytic lesion in his left humerus.  That necessitated CT scan and follow-up with me today.  No numbness or tingling.  No night sweats  No unexplained weight loss    Pain   This is a new problem. The current episode started more than 1 month ago. The problem occurs constantly. Associated symptoms include joint swelling. Associated symptoms comments: Aching burning . The symptoms are aggravated by standing and walking (sitting ). He has tried nothing for the symptoms. The treatment provided no relief.        CONCURRENT MEDICAL HISTORY:    Past Medical History:   Diagnosis Date   • AAA (abdominal aortic aneurysm) (CMS/[facility])    • Acute bacterial sinusitis    • Acute bronchitis    • Acute frontal sinusitis    • Acute maxillary sinusitis    • Acute pharyngitis    • Allergic rhinitis    • Allergic rhinitis due to pollen    • Anxiety    • Artificial lens present     in position   • Backache    • Borderline glaucoma    • C. difficile diarrhea 2014   • Chronic laryngitis    • Chronic rhinitis    • Coronary artery disease    • Cough    • Degeneration of lumbar intervertebral disc    • Degenerative joint disease involving multiple joints    • Diabetes mellitus (CMS/[facility])    • Diverticular disease of colon    • Dizziness and giddiness    • Erectile dysfunction    • Essential hypertension    • Generalized anxiety disorder    • GERD (gastroesophageal reflux disease)    •  Glaucoma    • Hemorrhoids     without bleeding   • Insomnia    • Kidney stone    • Kidney stones    • Malignant tumor of prostate (CMS/HCC)    • Need for prophylactic vaccination and inoculation against influenza    • Osteoarthritis    • Osteoarthritis of multiple joints    • Pain, lumbar region     Pain radiating to lumbar region of back     • PONV (postoperative nausea and vomiting)    • Postviral fatigue syndrome    • Presence of aortocoronary bypass graft    • Prostate cancer (CMS/HCC)    • Pseudomembranous enterocolitis     improved   • Rotator cuff syndrome     right   • Shoulder pain    • SOB (shortness of breath)    • Tenosynovitis    • Thrombocytopenia (CMS/HCC)    • Upper respiratory infection        Allergies   Allergen Reactions   • Molds & Smuts Other (See Comments)     Sinus infection   • Hydrocodone-Acetaminophen Itching         Current Outpatient Medications:   •  acetaminophen (TYLENOL) 500 MG tablet, Take 1,000 mg by mouth Every 6 (Six) Hours As Needed., Disp: , Rfl:   •  atorvastatin (LIPITOR) 20 MG tablet, Take 1 tablet by mouth Daily., Disp: 30 tablet, Rfl: 0  •  budesonide-formoterol (SYMBICORT) 80-4.5 MCG/ACT inhaler, Inhale 2 puffs 2 (Two) Times a Day., Disp: 3 inhaler, Rfl: 3  •  carvedilol (COREG) 3.125 MG tablet, Take 1 tablet by mouth Every 12 (Twelve) Hours., Disp: 180 tablet, Rfl: 3  •  cetirizine (zyrTEC) 10 MG tablet, Take 10 mg by mouth Daily., Disp: , Rfl:   •  clopidogrel (PLAVIX) 75 MG tablet, Take 1 tablet by mouth Daily., Disp: 90 tablet, Rfl: 2  •  Cyanocobalamin (VITAMIN B 12 PO), Take 500 mcg by mouth Daily. Three times weekly, MoWeFr, Disp: , Rfl:   •  cyclobenzaprine (FLEXERIL) 10 MG tablet, Take 10 mg by mouth 3 (Three) Times a Day As Needed for Muscle Spasms., Disp: , Rfl:   •  diltiaZEM CD (CARDIZEM CD) 180 MG 24 hr capsule, Take 1 capsule by mouth Daily., Disp: 30 capsule, Rfl: 6  •  DULoxetine (CYMBALTA) 30 MG capsule, Take 30 mg by mouth Daily., Disp: , Rfl: 0  •   fluticasone (FLONASE) 50 MCG/ACT nasal spray, 2 sprays into each nostril Every Night., Disp: , Rfl:   •  isosorbide mononitrate (IMDUR) 30 MG 24 hr tablet, Take 1 tablet by mouth Daily., Disp: 30 tablet, Rfl: 0  •  latanoprost (XALATAN) 0.005 % ophthalmic solution, Administer 1 drop to both eyes every night., Disp: , Rfl:   •  losartan (COZAAR) 50 MG tablet, Take 50 mg by mouth Every Night., Disp: , Rfl:   •  losartan (COZAAR) 50 MG tablet, TAKE 1 TABLET EVERY NIGHT, Disp: 90 tablet, Rfl: 3  •  meloxicam (MOBIC) 15 MG tablet, Take 1 tablet by mouth Daily. Take with meal daily, Disp: 90 tablet, Rfl: 0  •  metFORMIN (GLUCOPHAGE) 1000 MG tablet, Take 1 tablet by mouth 2 (Two) Times a Day With Meals., Disp: , Rfl:   •  Multiple Vitamins-Minerals (CENTRUM PO), Take 1 tablet by mouth daily. Centrum 0.4 mg-162 mg-18 mg tablet  (unconfirmed), Disp: , Rfl:   •  nitroglycerin (NITROSTAT) 0.4 MG SL tablet, Place 1 tablet under the tongue Every 5 (Five) Minutes As Needed for Chest Pain. Take no more than 3 doses in 15 minutes., Disp: 30 tablet, Rfl: 0  •  omeprazole (priLOSEC) 20 MG capsule, Take 20 mg by mouth Daily., Disp: , Rfl:   •  oxybutynin XL (DITROPAN-XL) 5 MG 24 hr tablet, Take 5 mg by mouth Daily., Disp: , Rfl:   •  VENTOLIN  (90 Base) MCG/ACT inhaler, Inhale 2 puffs Every 4 (Four) Hours As Needed for Wheezing., Disp: 1 inhaler, Rfl: 11    Past Surgical History:   Procedure Laterality Date   • CARDIAC CATHETERIZATION  09/25/2003    Cardiac cath (Normal left ventricular systolic function, EF 60% Multi-vessel coronary artery disease with critical disease noted in the LAD coronary artery, diagonal coronary artery, obtuse marginal coronary and right coronary artery.)   • CARDIAC CATHETERIZATION  05/03/1996    Cardiac cath (Coronary atherosclerotic heart disease. 70% diagonal stenosis. Mild to moderate left anterior descending artery stenosis. Preserved left ventricular function.)   • CARDIAC CATHETERIZATION N/A  2/26/2018    Procedure: Left Heart Cath/ pci if indicated ;  Surgeon: Radha Wilkes MD;  Location: [facility] CATH INVASIVE LOCATION;  Service:    • CATARACT EXTRACTION Bilateral    • CATARACT EXTRACTION  10/04/2011    Remove cataract, insert lens (Right)   • CATARACT EXTRACTION  08/23/2011    Remove cataract, insert lens (left)   • COLONOSCOPY     • COLONOSCOPY      Colon endoscopy 07129 (Rectal bleeding. Radiation proctitis w/bleeding. An abnormal CT of transverse colon due to mucosal ischemic colitis, that now is healed. Internal hemorrhoids w/possible bleeding.)   • COLONOSCOPY  05/10/2011    Colon endoscopy 58825 (Single sessile polyp found in transverse colon and sigmoid colon ,removed by cold biopsy polypectomy.divertic. found in sigmoid colon,descending colon. Friability/capillary friability in rectum sigmoid due to prior radiation.Inter. hem. Grade 1)   • COLONOSCOPY  05/02/2007    Colon endoscopy 90536 (Colon polyp. Diverticulosis without bleeding. Internal hemorrhoids without bleeding.)   • CORONARY ARTERY BYPASS GRAFT  2002   • CYSTOSCOPY  01/13/1995    Cystoscopy, stone removal (Right ureteroscopic lasertripsy.)   • CYSTOSCOPY Right 1/23/2019    Procedure: CYSTOSCOPY, RIGHT STENT REMOVAL;  Surgeon: Nirmal Gaines MD;  Location: [facility] OR;  Service: Urology   • CYSTOSCOPY, URETEROSCOPY, RETROGRADE PYELOGRAM, STENT INSERTION N/A 8/28/2017    Procedure: URETEROSCOPY LASER LITHOTRIPSY WITH STENT INSERTION AND RETROGRADE PYELOGRAM ;  Surgeon: Anna M. D'Amico, MD;  Location: [facility];  Service:    • CYSTOSCOPY, URETEROSCOPY, RETROGRADE PYELOGRAM, STENT INSERTION Right 1/11/2019    Procedure: CYSTOSCOPY URETEROSCOPY RETROGRADE PYELOGRAM HOLMIUM LASER STENT INSERTION;  Surgeon: Nirmal Gaines MD;  Location: [facility] OR;  Service: Urology   • EPIDURAL  12/03/2014    Therapeutic/diag injection (Lumbar transforaminal epidural steroid injection, L5-S1, left side.)   • EPIDURAL  10/22/2014     Therapeutic/diag injection (Lumbar transforaminal epidural steroid injection L5-S1 left side.)   • EPIDURAL  08/07/2014    Therapeutic/diag injection (Lumbar transforaminal epidural steroid injection.)   • EXCISION LESION  05/13/1999    REMOVE EAR LESION 36471 (1.3 cm basal cell carcinoma, right preauricular area. Excision)   • EXCISION LESION  03/13/2001    REMOVE LESION NECK/CHEST 32483 (Excision of irritated seborrheic keratosis, anterior neck, 1.1 cm and deep lipoma, posterior neck, 3.5 cm)   • INJECTION OF MEDICATION  11/15/2012    Kenalog (4)      • JOINT REPLACEMENT  2015    partial   • KNEE ARTHROSCOPY  01/17/2007    Knee arthroscopy, surgery (Arthroscopy with medial and lateral meniscectomies, right knee.)   • KNEE SURGERY  11/04/2015    Knee Surgery (Right unicompartmental arthroplasty.)   • LUMBAR LAMINECTOMY N/A 2/7/2017    Procedure: LUMBAR LAMINECTOMY LUMBAR THREE-FOUR, LUMBAR FOUR-FIVE, INTERLAMINAR DISTRACTION ;  Surgeon: Lucio Mayo MD;  Location: [facility];  Service:    • NOSE SURGERY     • OTHER SURGICAL HISTORY      EXTENDED VISUAL FIELDS STUDY 53165 (Borderline glaucoma) (3): 03/19/2015, 04/09/2014, 03/27/2013   • OTHER SURGICAL HISTORY  10/29/2003    Heart revascularize (TMR) (Directmyocardial revascularization times four; LIMA to LAD SVG to DARIUSZ SVG to OM1, SVG to RCA)   • OTHER SURGICAL HISTORY      OCT DISC NFL 89366 (Borderline glaucoma)  (3): 09/24/2015, 08/13/2014, 07/29/2013   • PROSTATECTOMY     • SEPTORHINOPLASTY  11/16/1989    Nasal surgery procedure (Septorhinoplasty with reconstruction of the nasal pyramid with a iliac crest graft, rhinoplasty was performed with external approach.)   • SHOULDER ARTHROSCOPY  07/24/2013    Arthroscopy of right shoulder with rotator repair, Crala procedure, Biceps tenotomy, subacromial decompression.   • SHOULDER SURGERY  11/01/2005    Shoulder surgery procedure (Decompression, subacromial, explore of the rotator cuff, no tear found nor  "repaired, acromioplasty of the left shoulder and AC shoulder joint resection.)       Family History   Problem Relation Age of Onset   • Hypertension Mother    • Heart disease Mother    • Arthritis Mother    • Cancer Other    • Heart disease Other    • Hypertension Other         Social History     Socioeconomic History   • Marital status: Significant Other     Spouse name: Not on file   • Number of children: Not on file   • Years of education: Not on file   • Highest education level: Not on file   Tobacco Use   • Smoking status: Light Tobacco Smoker     Packs/day: 0.50     Years: 40.00     Pack years: 20.00     Types: Cigarettes   • Smokeless tobacco: Never Used   Substance and Sexual Activity   • Alcohol use: Yes     Comment: socially   • Drug use: No   • Sexual activity: Defer        Review of Systems   HENT: Positive for postnasal drip.    Respiratory: Positive for wheezing.    Endocrine: Positive for cold intolerance and heat intolerance.   Musculoskeletal: Positive for joint swelling.        Muscle pain   Hematological: Bruises/bleeds easily.   Psychiatric/Behavioral: Positive for sleep disturbance.   All other systems reviewed and are negative.      PHYSICAL EXAMINATION:       Ht 160 cm (63\")   Wt 59.9 kg (132 lb)   BMI 23.38 kg/m²     Physical Exam   Constitutional: He is oriented to person, place, and time. He appears well-developed and well-nourished.   Neurological: He is alert and oriented to person, place, and time.   Psychiatric: He has a normal mood and affect. His behavior is normal. Judgment and thought content normal.       GAIT:     [x]  Normal  []  Antalgic    Assistive device: [x]  None  []  Walker     []  Crutches  []  Cane     []  Wheelchair  []  Stretcher    Right Shoulder Exam     Tenderness   The patient is experiencing no tenderness.    Range of Motion   The patient has normal right shoulder ROM.    Muscle Strength   The patient has normal right shoulder strength.    Tests   Carlin test: " negative    Other   Erythema: absent  Sensation: normal  Pulse: present      Left Shoulder Exam     Tenderness   The patient is experiencing tenderness in the acromioclavicular joint.    Range of Motion   Active abduction: 170   Forward flexion: 180     Muscle Strength   Abduction: 4/5   Supraspinatus: 4/5     Tests   Carlin test: positive    Other   Erythema: absent  Sensation: normal  Pulse: present                   XRAY left humerus:    Left Humerus, Two Views:  The lucent area in the midshaft of the left humerus is still seen.  No additional lesions are seen.    IMPRESSION:       The lucent area in the left midshaft of the humerus may be real and a lytic lesion needs exclusion.  CT of the left humerus is recommended.    CT left humerus:  CT left humerus: In the area of radiographic interest there is mild scalloping of the posterior cortex but the marrow in the area of scalloping measures under 0 Hounsfield units and no permeative destruction is seen . The remainder of the humerus is   unremarkable .    IMPRESSION:     The relative lucency and cortical thinning noted on the recent plain film does not appear to be associated with a destructive lesion but is rather reflects a normal variation .    ASSESSMENT:    Diagnoses and all orders for this visit:    Abnormal x-ray of humerus    Pain of left humerus    Essential hypertension    COPD with asthma (CMS/[facility])    Type 2 diabetes mellitus without complication, without long-term current use of insulin (CMS/[facility])        Patient's Body mass index is 23.38 kg/m². BMI is within normal parameters. No follow-up required..    PLAN    Patient is already scheduled to see pain management on Monday.  He will continue to do so.  According to radiology evaluation, there does not appear to be a destructive lesion in the humeral shaft but rather a normal anatomic variant.  However, my recommendation is for repeat plain film x-ray in 6 weeks to see if there is any change in the  appearance of this lesion.  Slowly progress motion and activity as tolerated.    Return in about 6 weeks (around 1/23/2020) for Recheck with repeat xrays left humerus.    Nirmal Paez MD

## 2020-01-22 DIAGNOSIS — R93.6 ABNORMAL X-RAY OF HUMERUS: ICD-10-CM

## 2020-01-22 DIAGNOSIS — M89.8X2 PAIN OF LEFT HUMERUS: Primary | ICD-10-CM

## 2020-01-23 ENCOUNTER — OFFICE VISIT (OUTPATIENT)
Dept: ORTHOPEDIC SURGERY | Facility: CLINIC | Age: 80
End: 2020-01-23

## 2020-01-23 VITALS — WEIGHT: 137.5 LBS | HEIGHT: 63 IN | BODY MASS INDEX: 24.36 KG/M2

## 2020-01-23 DIAGNOSIS — R93.6 ABNORMAL X-RAY OF HUMERUS: ICD-10-CM

## 2020-01-23 DIAGNOSIS — M89.8X2 PAIN OF LEFT HUMERUS: Primary | ICD-10-CM

## 2020-01-23 PROCEDURE — 99213 OFFICE O/P EST LOW 20 MIN: CPT | Performed by: ORTHOPAEDIC SURGERY

## 2020-01-23 RX ORDER — GABAPENTIN 600 MG/1
600 TABLET ORAL 4 TIMES DAILY
COMMUNITY
End: 2021-02-09

## 2020-01-23 NOTE — PROGRESS NOTES
"Vishnu Waller is a 79 y.o. male returns for     Chief Complaint   Patient presents with   • Left Upper Arm - Follow-up     Xray today       HISTORY OF PRESENT ILLNESS:  Follow up Lytic lesion left humerus.  Xray today.  Pain level 0 in arm.  He is having more pain in his back today.  No problems with left arm.  No fevers or chills.  No numbness or tingling.     CONCURRENT MEDICAL HISTORY:    The following portions of the patient's history were reviewed and updated as appropriate: allergies, current medications, past family history, past medical history, past social history, past surgical history and problem list.     ROS  No fevers or chills.  No chest pain or shortness of air.  No GI or  disturbances.    PHYSICAL EXAMINATION:       Ht 160 cm (63\")   Wt 62.4 kg (137 lb 8 oz)   BMI 24.36 kg/m²     Physical Exam   Constitutional: He is oriented to person, place, and time. He appears well-developed and well-nourished.   Neurological: He is alert and oriented to person, place, and time.   Psychiatric: He has a normal mood and affect. His behavior is normal. Judgment and thought content normal.       GAIT:     [x]  Normal  []  Antalgic    Assistive device: [x]  None  []  Walker     []  Crutches  []  Cane     []  Wheelchair  []  Stretcher    Left Shoulder Exam     Tenderness   The patient is experiencing no tenderness.     Range of Motion   Active abduction: 170   Forward flexion: 170     Muscle Strength   Abduction: 5/5   Supraspinatus: 5/5     Tests   Carlin test: negative    Other   Erythema: absent  Sensation: normal  Pulse: present               Xr Humerus Left    Result Date: 1/23/2020  Narrative: Ordering Provider:  Nirmal Paez MD Ordering Diagnosis/Indication:  Pain of left humerus, Abnormal x-ray of humerus Procedure:  XR HUMERUS LEFT Exam Date:  1/23/20 COMPARISON:  Not applicable, no relevant images available.     Impression:  AP and lateral of the humerus show acceptable position and " alignment with no evidence of acute bony abnormality.  There is a mild arthritic spur on the undersurface of the humeral head.  There is a lytic lesion in the midshaft of the humerus just distal to midshaft.  This lesion measures 15 x 16 mm on the AP view and 6.4 x 23 mm on the lateral view.  It involves the anterior cortex and there is very mild scalloping on the anterior cortex.  Recent CT scan report was viewed which indicated that there were no aggressive findings in this lesion.  Repeat x-rays and 8 weeks would be warranted to assess for any change and the cortical appearance.  MRI and/or repeat CT scan may be warranted depending on changes in the next x-ray. Nirmal Paez MD 1/23/20             ASSESSMENT:    Diagnoses and all orders for this visit:    Pain of left humerus    Abnormal x-ray of humerus    Other orders  -     gabapentin (NEURONTIN) 300 MG capsule; Take 300 mg by mouth 3 (Three) Times a Day.          PLAN    Slowly progress motion and activity as tolerated.  Continue strength and conditioning.  No restrictions.  Recheck in 8 weeks with repeat x-rays of the left humerus.    Patient's Body mass index is 24.36 kg/m². BMI is within normal parameters. No follow-up required..      Return in about 8 weeks (around 3/19/2020) for Recheck with repeat xrays.    Nirmal Paez MD

## 2020-01-27 ENCOUNTER — PROCEDURE VISIT (OUTPATIENT)
Dept: OTOLARYNGOLOGY | Facility: CLINIC | Age: 80
End: 2020-01-27

## 2020-01-27 VITALS — OXYGEN SATURATION: 96 % | BODY MASS INDEX: 24.45 KG/M2 | WEIGHT: 138 LBS | HEIGHT: 63 IN

## 2020-01-27 DIAGNOSIS — C44.319 BASAL CELL CARCINOMA (BCC) OF RIGHT CHEEK: Primary | ICD-10-CM

## 2020-01-27 PROCEDURE — 88331 PATH CONSLTJ SURG 1 BLK 1SPC: CPT | Performed by: OTOLARYNGOLOGY

## 2020-01-27 PROCEDURE — 12052 INTMD RPR FACE/MM 2.6-5.0 CM: CPT | Performed by: OTOLARYNGOLOGY

## 2020-01-27 PROCEDURE — 88331 PATH CONSLTJ SURG 1 BLK 1SPC: CPT | Performed by: PATHOLOGY

## 2020-01-27 PROCEDURE — 88305 TISSUE EXAM BY PATHOLOGIST: CPT | Performed by: OTOLARYNGOLOGY

## 2020-01-27 PROCEDURE — 88332 PATH CONSLTJ SURG EA ADD BLK: CPT | Performed by: OTOLARYNGOLOGY

## 2020-01-27 PROCEDURE — 88332 PATH CONSLTJ SURG EA ADD BLK: CPT | Performed by: PATHOLOGY

## 2020-01-27 PROCEDURE — 11642 EXC F/E/E/N/L MAL+MRG 1.1-2: CPT | Performed by: OTOLARYNGOLOGY

## 2020-01-27 PROCEDURE — 88305 TISSUE EXAM BY PATHOLOGIST: CPT | Performed by: PATHOLOGY

## 2020-01-27 NOTE — PROGRESS NOTES
Procedure note    Preop diagnosis basal cell carcinoma right cheek    Postop diagnosis: Same    Procedure: Excision of basal cell carcinoma right cheek 1.3 cm with 4.0 cm layered closure    Surgeon: Dr. Matias    Anesthesia: 1% Xylocaine with epinephrine    Description of procedure: After identifying the biopsy site and noting that it seemed to have increased in size to a diameter of approximately 1.0 cm, I once again informed the patient of the nature of the proposed procedure and risks including bleeding, infection, poor healing, poor appearance, numbness, recurrence, damage to the nerves to the face for movement, and possible need for further treatment depending on final pathology.  Proposed benefit includes definitive treatment of known malignancy.  The alternative would be observation however since the lesion is obviously growing I would recommend treatment.  Patient voiced understanding and agreement.  Skin around the lesion was cleansed with alcohol and an elliptical incision was designed to follow the relaxed skin tension lines in the area.  This was then infiltrated with 1% Xylocaine with epinephrine and prepped with Betadine and sterile towels.  Lesion was excised sharply, full-thickness, marked with a suture for orientation, and sent to pathology for frozen section.  While awaiting frozen section 1 small bleeder was controlled with high-frequency cautery.  The wound was copiously irrigated with saline and the subcutaneous tissues were reapproximated using interrupted 5-0 Vicryl.  Wound was covered with a saline wet-to-dry dressing.  Frozen section returned basal cell carcinoma with tangentially positive margin and one section of the superior medial margin.  The wound was uncovered.  And the superior margin was resected and marked with a suture for orientation and sent for permanent section.  Wound was irrigated again with saline and the subcutaneous tissue was again reapproximated with interrupted 5-0  Vicryl.  Skin was closed with running 5-0 nylon.  Bacitracin ointment was applied and procedure was terminated.  Patient tolerated procedure well.  He was instructed in local wound care and advised to return in 1 week for suture removal.

## 2020-01-29 LAB
LAB AP CASE REPORT: NORMAL
Lab: NORMAL
PATH REPORT.FINAL DX SPEC: NORMAL
PATH REPORT.GROSS SPEC: NORMAL

## 2020-02-03 ENCOUNTER — OFFICE VISIT (OUTPATIENT)
Dept: OTOLARYNGOLOGY | Facility: CLINIC | Age: 80
End: 2020-02-03

## 2020-02-03 VITALS — HEART RATE: 76 BPM | WEIGHT: 140.2 LBS | HEIGHT: 63 IN | OXYGEN SATURATION: 94 % | BODY MASS INDEX: 24.84 KG/M2

## 2020-02-03 DIAGNOSIS — Z48.817 AFTERCARE FOLLOWING SURGERY OF THE SKIN OR SUBCUTANEOUS TISSUE: Primary | ICD-10-CM

## 2020-02-03 PROCEDURE — 99024 POSTOP FOLLOW-UP VISIT: CPT | Performed by: OTOLARYNGOLOGY

## 2020-02-03 NOTE — PROGRESS NOTES
Subjective   Vishnu Waller is a 80 y.o. male.       History of Present Illness     Patient is one-week status post excision of basal cell carcinoma of the right face.  Final pathology showed clear margins.  Patient has no specific complaints.    The following portions of the patient's history were reviewed and updated as appropriate: allergies, current medications, past family history, past medical history, past social history, past surgical history and problem list.     reports that he has been smoking cigarettes. He has a 20.00 pack-year smoking history. He has never used smokeless tobacco. He reports that he drinks alcohol. He reports that he does not use drugs.   Patient is not a tobacco user and has not been counseled for use of tobacco products      Review of Systems        Objective   Physical Exam  Incision intact, no evidence of infection.  All external sutures are removed.      Assessment/Plan   Vishnu was seen today for follow-up.    Diagnoses and all orders for this visit:    Aftercare following surgery of the skin or subcutaneous tissue      Plan: Sutures removed as described above.  May follow-up with me as needed.

## 2020-03-04 NOTE — PROGRESS NOTES
Pulmonary Office Follow-up    Subjective     Vishnu Waller is seen today at the office for   Chief Complaint   Patient presents with   • COPD         HPI  Vishnu Waller is a 80 y.o. male with a PMH significant for COPD with asthma, past tobacco use, allergies, CAD s/p CABG, HTN, DM, and borderline glaucoma who presents for follow-up of COPD.      8/30/19: He remained stable on Symbicort with infrequent albuterol use. I again cautioned him on smoking marijuana.    3/5/20: He states that he is fairly stable but he does continue to get out of breath.  He is using Symbicort twice daily but sometimes needs something in between.  Patient uses albuterol rarely as he relies on his Symbicort as a rescue.  He has some cough productive clear sputum mostly during the daytime but does not have it at night.  He does report continued drainage when he lies down at night.  He is using his Flonase daily but only uses saline when he develops epistaxis.  Patient denies any recent illnesses or steroid use.  He has undergone radiofrequency ablation for some of his back pain and is going back for repeat procedure soon.  He has stopped smoking marijuana, but has gone back smoking around half a pack of cigarettes a day.      Tobacco use history:  Type: cigarettes  Amount: 1-2 ppd  Duration: 60 years  Cessation: 2017   Willing to quit: N/A      Patient Active Problem List   Diagnosis   • Degeneration of lumbar intervertebral disc   • Low back pain without sciatica   • Neuritis or radiculitis due to rupture of lumbar intervertebral disc   • Borderline glaucoma, open angle with borderline findings   • Pseudophakia   • History of coronary artery bypass surgery   • Essential hypertension   • Mixed hyperlipidemia   • Thrombocytopenia (CMS/HCC)   • Spinal stenosis of lumbar region   • Degenerative disc disease, lumbar   • Chronic pain of right knee   • History of artificial joint   • B12 deficiency   • Degeneration of  intervertebral disc of lumbosacral region   • Personal history of other infectious and parasitic diseases   • Status post lumbar spine operation   • History of pyelonephritis   • History of surgical procedure   • History of repair of rotator cuff   • Encounter for follow-up examination after completed treatment for conditions other than malignant neoplasm   • Encounter for general adult medical examination without abnormal findings   • Osteoarthritis of knee   • Osteoarthritis of multiple joints   • Primary insomnia   • Encounter for screening for malignant neoplasm of colon   • Encounter for screening for malignant neoplasm of prostate   • Type 2 diabetes mellitus without complication, without long-term current use of insulin (CMS/HCC)   • Coronary artery disease involving native coronary artery of native heart without angina pectoris   • SOB (shortness of breath)   • Presence of aortocoronary bypass graft   • Dizziness and giddiness   • Postviral fatigue syndrome   • Postviral fatigue syndrome   • Recurrent kidney stones   • Thrombocytopenia (CMS/HCC)   • Pyelonephritis   • COPD with asthma (CMS/HCC)   • Chronic non-seasonal allergic rhinitis   • Unstable angina (CMS/HCC)   • NSTEMI (non-ST elevated myocardial infarction) (CMS/HCC)   • History of PTCA 1   • Medicare annual wellness visit, subsequent   • Thoracic aortic aneurysm without rupture (CMS/HCC)   • Chronic kidney disease, stage 2 (mild)   • Personal history of tobacco use, presenting hazards to health   • Cervical pain (neck)   • Thoracic spine pain   • Calculus of ureter   • Foreign body in bladder and urethra   • Physical deconditioning   • Left hip pain   • Nicotine dependence, cigarettes, with other nicotine-induced disorders   • Pain of left humerus   • Cigarette nicotine dependence, uncomplicated       Review of Systems  Review of Systems   Constitutional: Positive for fatigue. Negative for fever and unexpected weight change.   HENT: Positive for  postnasal drip. Negative for congestion.    Respiratory: Positive for cough and shortness of breath. Negative for chest tightness and wheezing.    Cardiovascular: Negative for chest pain and leg swelling.   Musculoskeletal: Positive for arthralgias and back pain.     As described in the HPI. Otherwise, remainder of ROS (14 systems) were negative.    Medications, Allergies, Social, and Family Histories reviewed as per EMR.    Objective     Vitals:    03/05/20 1458   BP: 130/72   Pulse: 79   SpO2: 95%     Physical Exam   Constitutional: He is oriented to person, place, and time. Vital signs are normal. He appears well-developed and well-nourished.   HENT:   Head: Normocephalic and atraumatic.   Nose: No mucosal edema.   Mouth/Throat: Uvula is midline, oropharynx is clear and moist and mucous membranes are normal.   Mallampati 3   Eyes: Pupils are equal, round, and reactive to light. Conjunctivae, EOM and lids are normal.   Neck: Trachea normal and normal range of motion. No tracheal tenderness present. No thyroid mass present.   Cardiovascular: Normal rate, regular rhythm and normal heart sounds. PMI is not displaced. Exam reveals no gallop.   No murmur heard.  Pulmonary/Chest: Effort normal and breath sounds normal. No respiratory distress. He has no decreased breath sounds. He has no wheezes. He has no rhonchi. He exhibits no tenderness.   Abdominal: Soft. Normal appearance and bowel sounds are normal. There is no hepatomegaly. There is no tenderness.   Musculoskeletal:   Normal gait, no extremity edema     Vascular Status -  His right foot exhibits no edema. His left foot exhibits no edema.  Lymphadenopathy:        Head (right side): No submandibular adenopathy present.        Head (left side): No submandibular adenopathy present.     He has no cervical adenopathy.        Right: No supraclavicular adenopathy present.        Left: No supraclavicular adenopathy present.   Neurological: He is alert and oriented to  person, place, and time. Gait normal.   Skin: Skin is warm and dry. No rash noted. No cyanosis. Nails show no clubbing.   Psychiatric: He has a normal mood and affect. His speech is normal and behavior is normal. Judgment normal.   Nursing note and vitals reviewed.          Assessment/Plan     Vishnu was seen today for copd.    Diagnoses and all orders for this visit:    COPD with asthma (CMS/[facility])  -     budesonide-formoterol (SYMBICORT) 160-4.5 MCG/ACT inhaler; Inhale 2 puffs 2 (Two) Times a Day.    Chronic non-seasonal allergic rhinitis    Physical deconditioning    Cigarette nicotine dependence, uncomplicated         Discussion/ Recommendations:   Given his persistent dyspnea, I do think it is worth escalating his Symbicort to the 160 dose.  While he has stopped smoking marijuana, unfortunately he did start smoking cigarettes again.  I have counseled him on the importance of complete tobacco cessation but he is not prepared to quit yet.    -Increase Symbicort to 160/4.5, 2 puffs twice daily.  Sample provided instructed on use.  -Use albuterol only as needed for dyspnea or wheeze.  -Continue Flonase and Singulair daily.  -Recommended he start using nasal saline more frequently.  -Qualifies for LDCT, but also having CT chest annually to follow thoracic aortic aneurysm due May 2019. Will use this scan as his screening (1-2ppd x 60yrs, quit 2017).  -Vishnu Waller  reports that he has been smoking cigarettes. He has a 20.00 pack-year smoking history. He has never used smokeless tobacco.. I have educated him on the risk of diseases from using tobacco products such as cancer, COPD and heart diease. I advised him to quit and he is not willing to quit. I spent 2 minutes counseling the patient.  -Up-to-date with flu and pneumococcal vaccines.     Patient's Body mass index is 24.98 kg/m². BMI is within normal parameters. No follow-up required.        Return in about 6 weeks (around 4/16/2020) for Recheck  COPD.          This document has been electronically signed by Camryn Koehler MD on March 5, 2020 3:20 PM      Dictated using Dragon

## 2020-03-05 ENCOUNTER — OFFICE VISIT (OUTPATIENT)
Dept: PULMONOLOGY | Facility: CLINIC | Age: 80
End: 2020-03-05

## 2020-03-05 VITALS
HEIGHT: 63 IN | HEART RATE: 79 BPM | BODY MASS INDEX: 24.98 KG/M2 | DIASTOLIC BLOOD PRESSURE: 72 MMHG | OXYGEN SATURATION: 95 % | WEIGHT: 141 LBS | SYSTOLIC BLOOD PRESSURE: 130 MMHG

## 2020-03-05 DIAGNOSIS — R53.81 PHYSICAL DECONDITIONING: ICD-10-CM

## 2020-03-05 DIAGNOSIS — J30.89 CHRONIC NON-SEASONAL ALLERGIC RHINITIS: ICD-10-CM

## 2020-03-05 DIAGNOSIS — F17.210 CIGARETTE NICOTINE DEPENDENCE, UNCOMPLICATED: ICD-10-CM

## 2020-03-05 DIAGNOSIS — J44.9 COPD WITH ASTHMA (HCC): Primary | ICD-10-CM

## 2020-03-05 PROBLEM — F12.90 MARIJUANA SMOKER: Status: RESOLVED | Noted: 2018-11-26 | Resolved: 2020-03-05

## 2020-03-05 PROCEDURE — 99214 OFFICE O/P EST MOD 30 MIN: CPT | Performed by: INTERNAL MEDICINE

## 2020-03-05 RX ORDER — BUDESONIDE AND FORMOTEROL FUMARATE DIHYDRATE 160; 4.5 UG/1; UG/1
2 AEROSOL RESPIRATORY (INHALATION) 2 TIMES DAILY
Qty: 3 INHALER | Refills: 4 | Status: SHIPPED | OUTPATIENT
Start: 2020-03-05 | End: 2020-04-20

## 2020-03-16 DIAGNOSIS — M89.8X2 PAIN OF LEFT HUMERUS: Primary | ICD-10-CM

## 2020-03-20 RX ORDER — CLOPIDOGREL BISULFATE 75 MG/1
75 TABLET ORAL DAILY
Qty: 90 TABLET | Refills: 3 | Status: SHIPPED | OUTPATIENT
Start: 2020-03-20 | End: 2020-07-30

## 2020-03-20 RX ORDER — DILTIAZEM HYDROCHLORIDE 180 MG/1
180 CAPSULE, COATED, EXTENDED RELEASE ORAL DAILY
Qty: 90 CAPSULE | Refills: 3 | Status: SHIPPED | OUTPATIENT
Start: 2020-03-20 | End: 2021-10-06 | Stop reason: SDUPTHER

## 2020-04-13 NOTE — TELEPHONE ENCOUNTER
Patient called wanting the Symbicort 80 instead of the 160.  This is due to cost and he can not tell any difference with the different strengths.  I explained that Dr. Koehler is out of the office until 4/20/2020.  He was ok waiting until them.

## 2020-04-16 ENCOUNTER — OFFICE VISIT (OUTPATIENT)
Dept: CARDIOLOGY | Facility: CLINIC | Age: 80
End: 2020-04-16

## 2020-04-16 VITALS — BODY MASS INDEX: 24.98 KG/M2 | HEIGHT: 63 IN | WEIGHT: 141 LBS

## 2020-04-16 DIAGNOSIS — Z95.1 PRESENCE OF AORTOCORONARY BYPASS GRAFT: ICD-10-CM

## 2020-04-16 DIAGNOSIS — E78.2 MIXED HYPERLIPIDEMIA: ICD-10-CM

## 2020-04-16 DIAGNOSIS — I25.10 CORONARY ARTERY DISEASE INVOLVING NATIVE CORONARY ARTERY OF NATIVE HEART WITHOUT ANGINA PECTORIS: Primary | ICD-10-CM

## 2020-04-16 DIAGNOSIS — I71.20 THORACIC AORTIC ANEURYSM WITHOUT RUPTURE (HCC): ICD-10-CM

## 2020-04-16 DIAGNOSIS — I10 ESSENTIAL HYPERTENSION: ICD-10-CM

## 2020-04-16 PROCEDURE — 99214 OFFICE O/P EST MOD 30 MIN: CPT | Performed by: INTERNAL MEDICINE

## 2020-04-16 RX ORDER — ATORVASTATIN CALCIUM 40 MG/1
40 TABLET, FILM COATED ORAL DAILY
Qty: 90 TABLET | Refills: 3 | Status: SHIPPED | OUTPATIENT
Start: 2020-04-16 | End: 2021-10-06 | Stop reason: SDUPTHER

## 2020-04-16 NOTE — PROGRESS NOTES
Vishnu Waller  80 y.o. male      1. Coronary artery disease involving native coronary artery of native heart without angina pectoris    2. Essential hypertension    3. Mixed hyperlipidemia    4. Presence of aortocoronary bypass graft    5. Thoracic aortic aneurysm without rupture (CMS/HCC)        History of Present Illness:  This was a telephone visit:  Mr. Waller is a 78-year-old  male with the history of diabetes mellitus, hypertension, hyperlipidemia, COPD, tobacco abuse, CAD status post CABG in 2003 when he received LIMA to LAD, SVG to diagonal, SVG to OM 2 and SVG to distal right coronary artery.  In February 2018 he presented with prolonged chest pain and ruled in for a non-ST segment elevation myocardial infarctions and cardiac catheterization showed the following findings:  Impression:  99% eccentric lesion noted in the proximal segment of the SVG to distal right coronary artery graft.  Successful PCI with placement of a 4.0 x 18 mm Xience Alpine stent.  Patent LIMA to Left Anterior Descending Coronary Artery.  Native LAD beyond the anastomosis was widely patent  Severe native vessel coronary artery disease with 100% occlusion of the mid LAD, mid right coronary artery.  A small to medium-sized tortuous circumflex had a proximal lesion up to 99%.  Occluded SVG to diagonal and occluded SVG to obtuse marginal 2  Ascending aortogram showing ectatic ascending aorta.     CT angiogram of the chest showed aorta measuring 4.4 cm.  Patient has done quite well and denied any chest pain or shortness of breath.  His predominant complaint is arthritis related symptoms.  His ascending aortic aneurysm is being monitored by his vascular surgery.     The patient denied any cardiac symptoms such as chest pain, shortness of breath, palpitation and has been compliant with his medications.  Unfortunately continues to smoke and does not wish to quit.  His LDL was checked in January of this year and was 123.  He has  been compliant with his medications and diet.  The patient did not have his blood pressure equipment with him and hence heart rate and BP could not be checked.  He however indicates that this is been in the normal range.    SUBJECTIVE    Allergies   Allergen Reactions   • Molds & Smuts Other (See Comments)     Sinus infection   • Hydrocodone-Acetaminophen Itching         Past Medical History:   Diagnosis Date   • AAA (abdominal aortic aneurysm) (CMS/HCC)    • Acute bacterial sinusitis    • Acute bronchitis    • Acute frontal sinusitis    • Acute maxillary sinusitis    • Acute pharyngitis    • Allergic rhinitis    • Allergic rhinitis due to pollen    • Anxiety    • Artificial lens present     in position   • Backache    • Borderline glaucoma    • C. difficile diarrhea 2014   • Chronic laryngitis    • Chronic rhinitis    • Coronary artery disease    • Cough    • Degeneration of lumbar intervertebral disc    • Degenerative joint disease involving multiple joints    • Diabetes mellitus (CMS/[facility])    • Diverticular disease of colon    • Dizziness and giddiness    • Erectile dysfunction    • Essential hypertension    • Generalized anxiety disorder    • GERD (gastroesophageal reflux disease)    • Glaucoma    • Hemorrhoids     without bleeding   • Insomnia    • Kidney stone    • Kidney stones    • Malignant tumor of prostate (CMS/[facility])    • Need for prophylactic vaccination and inoculation against influenza    • Osteoarthritis    • Osteoarthritis of multiple joints    • Pain, lumbar region     Pain radiating to lumbar region of back     • PONV (postoperative nausea and vomiting)    • Postviral fatigue syndrome    • Presence of aortocoronary bypass graft    • Prostate cancer (CMS/[facility])    • Pseudomembranous enterocolitis     improved   • Rotator cuff syndrome     right   • Shoulder pain    • SOB (shortness of breath)    • Tenosynovitis    • Thrombocytopenia (CMS/HCC)    • Upper respiratory infection          Past Surgical  History:   Procedure Laterality Date   • CARDIAC CATHETERIZATION  09/25/2003    Cardiac cath (Normal left ventricular systolic function, EF 60% Multi-vessel coronary artery disease with critical disease noted in the LAD coronary artery, diagonal coronary artery, obtuse marginal coronary and right coronary artery.)   • CARDIAC CATHETERIZATION  05/03/1996    Cardiac cath (Coronary atherosclerotic heart disease. 70% diagonal stenosis. Mild to moderate left anterior descending artery stenosis. Preserved left ventricular function.)   • CARDIAC CATHETERIZATION N/A 2/26/2018    Procedure: Left Heart Cath/ pci if indicated ;  Surgeon: Radha Wilkes MD;  Location: [facility] CATH INVASIVE LOCATION;  Service:    • CATARACT EXTRACTION Bilateral    • CATARACT EXTRACTION  10/04/2011    Remove cataract, insert lens (Right)   • CATARACT EXTRACTION  08/23/2011    Remove cataract, insert lens (left)   • COLONOSCOPY     • COLONOSCOPY      Colon endoscopy 01356 (Rectal bleeding. Radiation proctitis w/bleeding. An abnormal CT of transverse colon due to mucosal ischemic colitis, that now is healed. Internal hemorrhoids w/possible bleeding.)   • COLONOSCOPY  05/10/2011    Colon endoscopy 48029 (Single sessile polyp found in transverse colon and sigmoid colon ,removed by cold biopsy polypectomy.divertic. found in sigmoid colon,descending colon. Friability/capillary friability in rectum sigmoid due to prior radiation.Inter. hem. Grade 1)   • COLONOSCOPY  05/02/2007    Colon endoscopy 83564 (Colon polyp. Diverticulosis without bleeding. Internal hemorrhoids without bleeding.)   • CORONARY ARTERY BYPASS GRAFT  2002   • CYSTOSCOPY  01/13/1995    Cystoscopy, stone removal (Right ureteroscopic lasertripsy.)   • CYSTOSCOPY Right 1/23/2019    Procedure: CYSTOSCOPY, RIGHT STENT REMOVAL;  Surgeon: Nirmal Gaines MD;  Location: [facility] OR;  Service: Urology   • CYSTOSCOPY, URETEROSCOPY, RETROGRADE PYELOGRAM, STENT INSERTION N/A 8/28/2017     Procedure: URETEROSCOPY LASER LITHOTRIPSY WITH STENT INSERTION AND RETROGRADE PYELOGRAM ;  Surgeon: Anna M. D'Amico, MD;  Location: [facility];  Service:    • CYSTOSCOPY, URETEROSCOPY, RETROGRADE PYELOGRAM, STENT INSERTION Right 1/11/2019    Procedure: CYSTOSCOPY URETEROSCOPY RETROGRADE PYELOGRAM HOLMIUM LASER STENT INSERTION;  Surgeon: Nirmal Gaines MD;  Location: [facility];  Service: Urology   • EPIDURAL  12/03/2014    Therapeutic/diag injection (Lumbar transforaminal epidural steroid injection, L5-S1, left side.)   • EPIDURAL  10/22/2014    Therapeutic/diag injection (Lumbar transforaminal epidural steroid injection L5-S1 left side.)   • EPIDURAL  08/07/2014    Therapeutic/diag injection (Lumbar transforaminal epidural steroid injection.)   • EXCISION LESION  05/13/1999    REMOVE EAR LESION 84459 (1.3 cm basal cell carcinoma, right preauricular area. Excision)   • EXCISION LESION  03/13/2001    REMOVE LESION NECK/CHEST 76193 (Excision of irritated seborrheic keratosis, anterior neck, 1.1 cm and deep lipoma, posterior neck, 3.5 cm)   • INJECTION OF MEDICATION  11/15/2012    Kenalog (4)      • JOINT REPLACEMENT  2015    partial   • KNEE ARTHROSCOPY  01/17/2007    Knee arthroscopy, surgery (Arthroscopy with medial and lateral meniscectomies, right knee.)   • KNEE SURGERY  11/04/2015    Knee Surgery (Right unicompartmental arthroplasty.)   • LUMBAR LAMINECTOMY N/A 2/7/2017    Procedure: LUMBAR LAMINECTOMY LUMBAR THREE-FOUR, LUMBAR FOUR-FIVE, INTERLAMINAR DISTRACTION ;  Surgeon: Lucio Mayo MD;  Location: [facility];  Service:    • NOSE SURGERY     • OTHER SURGICAL HISTORY      EXTENDED VISUAL FIELDS STUDY 11895 (Borderline glaucoma) (3): 03/19/2015, 04/09/2014, 03/27/2013   • OTHER SURGICAL HISTORY  10/29/2003    Heart revascularize (TMR) (Directmyocardial revascularization times four; LIMA to LAD SVG to DARIUSZ SVG to OM1, SVG to RCA)   • OTHER SURGICAL HISTORY      OCT DISC NFL 15955 (Borderline  glaucoma)  (3): 09/24/2015, 08/13/2014, 07/29/2013   • PROSTATECTOMY     • SEPTORHINOPLASTY  11/16/1989    Nasal surgery procedure (Septorhinoplasty with reconstruction of the nasal pyramid with a iliac crest graft, rhinoplasty was performed with external approach.)   • SHOULDER ARTHROSCOPY  07/24/2013    Arthroscopy of right shoulder with rotator repair, Carla procedure, Biceps tenotomy, subacromial decompression.   • SHOULDER SURGERY  11/01/2005    Shoulder surgery procedure (Decompression, subacromial, explore of the rotator cuff, no tear found nor repaired, acromioplasty of the left shoulder and AC shoulder joint resection.)         Family History   Problem Relation Age of Onset   • Hypertension Mother    • Heart disease Mother    • Arthritis Mother    • Cancer Other    • Heart disease Other    • Hypertension Other          Social History     Socioeconomic History   • Marital status: Significant Other     Spouse name: Not on file   • Number of children: Not on file   • Years of education: Not on file   • Highest education level: Not on file   Tobacco Use   • Smoking status: Light Tobacco Smoker     Packs/day: 0.50     Years: 40.00     Pack years: 20.00     Types: Cigarettes   • Smokeless tobacco: Never Used   Substance and Sexual Activity   • Alcohol use: Yes     Comment: socially   • Drug use: No   • Sexual activity: Defer         Current Outpatient Medications   Medication Sig Dispense Refill   • acetaminophen (TYLENOL) 500 MG tablet Take 1,000 mg by mouth Every 6 (Six) Hours As Needed.     • atorvastatin (LIPITOR) 20 MG tablet Take 1 tablet by mouth Daily. 30 tablet 0   • budesonide-formoterol (SYMBICORT) 160-4.5 MCG/ACT inhaler Inhale 2 puffs 2 (Two) Times a Day. 3 inhaler 4   • carvedilol (COREG) 3.125 MG tablet Take 1 tablet by mouth Every 12 (Twelve) Hours. 180 tablet 3   • cetirizine (zyrTEC) 10 MG tablet Take 10 mg by mouth Daily.     • clopidogrel (PLAVIX) 75 MG tablet Take 1 tablet by mouth Daily.  "90 tablet 3   • Cyanocobalamin (VITAMIN B 12 PO) Take 500 mcg by mouth Daily. Three times weekly, MoWeFr     • cyclobenzaprine (FLEXERIL) 10 MG tablet Take 10 mg by mouth 3 (Three) Times a Day As Needed for Muscle Spasms.     • dilTIAZem CD (CARDIZEM CD) 180 MG 24 hr capsule Take 1 capsule by mouth Daily. 90 capsule 3   • DULoxetine (CYMBALTA) 30 MG capsule Take 30 mg by mouth Daily.  0   • fluticasone (FLONASE) 50 MCG/ACT nasal spray 2 sprays into each nostril Every Night.     • gabapentin (NEURONTIN) 300 MG capsule Take 300 mg by mouth 4 (Four) Times a Day.     • isosorbide mononitrate (IMDUR) 30 MG 24 hr tablet Take 1 tablet by mouth Daily. 30 tablet 0   • latanoprost (XALATAN) 0.005 % ophthalmic solution Administer 1 drop to both eyes every night.     • losartan (COZAAR) 50 MG tablet Take 50 mg by mouth Every Night.     • losartan (COZAAR) 50 MG tablet TAKE 1 TABLET EVERY NIGHT 90 tablet 3   • meloxicam (MOBIC) 15 MG tablet Take 1 tablet by mouth Daily. Take with meal daily 90 tablet 0   • metFORMIN (GLUCOPHAGE) 1000 MG tablet Take 1 tablet by mouth 2 (Two) Times a Day With Meals.     • Multiple Vitamins-Minerals (CENTRUM PO) Take 1 tablet by mouth daily. Centrum 0.4 mg-162 mg-18 mg tablet  (unconfirmed)     • nitroglycerin (NITROSTAT) 0.4 MG SL tablet Place 1 tablet under the tongue Every 5 (Five) Minutes As Needed for Chest Pain. Take no more than 3 doses in 15 minutes. 30 tablet 0   • omeprazole (priLOSEC) 20 MG capsule Take 20 mg by mouth Daily.     • oxybutynin XL (DITROPAN-XL) 5 MG 24 hr tablet Take 5 mg by mouth Daily.     • VENTOLIN  (90 Base) MCG/ACT inhaler Inhale 2 puffs Every 4 (Four) Hours As Needed for Wheezing. 1 inhaler 11     No current facility-administered medications for this visit.          OBJECTIVE    Ht 160 cm (63\")   Wt 64 kg (141 lb)   BMI 24.98 kg/m²         Review of Systems     Constitutional:  Denies recent weight loss, weight gain, fever or chills     HENT:  Denies any " hearing loss, epistaxis, hoarseness, or difficulty speaking.     Eyes: Wears eyeglasses or contact lenses     Respiratory:  Denies dyspnea with exertion,no cough, wheezing, or hemoptysis.     Cardiovascular: Negative for palpations, chest pain, orthopnea, PND    Gastrointestinal:  Denies change in bowel habits, dyspepsia, ulcer disease, hematochezia, or melena.     Endocrine: Negative for cold intolerance, heat intolerance, polydipsia, polyphagia and polyuria.     Genitourinary: Negative.      Musculoskeletal: DJD    Skin:  Denies any change in hair or nails, rashes, or skin lesions.     Allergic/Immunologic: Negative.  Negative for environmental allergies, food allergies and immunocompromised state.     Neurological:  Denies any history of recurrent headaches, strokes, TIA, or seizure disorder.     Hematological: Denies any food allergies, seasonal allergies, bleeding disorders, or lymphadenopathy.     Psychiatric/Behavioral: Denies any history of depression, substance abuse, or change in cognitive function.             Lab Results   Component Value Date    WBC 15.28 (H) 01/08/2019    HGB 11.2 (L) 01/08/2019    HCT 32.4 (L) 01/08/2019    .9 (H) 01/08/2019     01/08/2019     Lab Results   Component Value Date    GLUCOSE 122 (H) 07/19/2019    BUN 17 01/08/2019    CREATININE 1.11 01/08/2019    EGFRIFNONA 64 01/08/2019    BCR 15.3 01/08/2019    CO2 26.0 01/08/2019    CALCIUM 8.7 01/08/2019    ALBUMIN 4.20 01/07/2019    AST 23 01/07/2019    ALT 18 (L) 01/07/2019     Lab Results   Component Value Date    CHOL 113 12/28/2018     Lab Results   Component Value Date    TRIG 141 12/28/2018     Lab Results   Component Value Date    HDL 30 (L) 12/28/2018     No components found for: LDLCALC  Lab Results   Component Value Date    LDL 68 12/28/2018     No results found for: HDLLDLRATIO  No components found for: CHOLHDL  Lab Results   Component Value Date    HGBA1C 6.8 (H) 01/16/2020     Lab Results   Component Value  Date    TSH 1.43 12/28/2018           ASSESSMENT AND PLAN  Mr. Waller is stable at this time with no clinical evidence of angina, arrhythmia or congestive heart failure.  He has been compliant with his medications.  Smoking cessation was once again stressed.  I have continued antihypertensive therapy with carvedilol, Cardizem CD, losartan, antianginal therapy with isosorbide mononitrate, antiplatelet therapy with Plavix, lipid-lowering therapy with atorvastatin.  For optimization of LDL have increased the dose of Lipitor to 40 mg daily.    Vishnu was seen today for follow-up.    Diagnoses and all orders for this visit:    Coronary artery disease involving native coronary artery of native heart without angina pectoris    Essential hypertension    Mixed hyperlipidemia    Presence of aortocoronary bypass graft    Thoracic aortic aneurysm without rupture (CMS/HCC)        Patient's Body mass index is 24.98 kg/m². BMI is within normal parameters. No follow-up required..      Vishnu Waller is a current cigarettes user.  He currently smokes <1 pack of cigarettes per day for a duration of 55 years. I have educated him on the risk of diseases from using tobacco products such as cancer, COPD and heart diease.       Radha Wilkes MD  4/16/2020  11:29

## 2020-04-20 RX ORDER — BUDESONIDE AND FORMOTEROL FUMARATE DIHYDRATE 80; 4.5 UG/1; UG/1
2 AEROSOL RESPIRATORY (INHALATION) 2 TIMES DAILY
Qty: 3 INHALER | Refills: 1 | Status: SHIPPED | OUTPATIENT
Start: 2020-04-20 | End: 2020-07-06 | Stop reason: ALTCHOICE

## 2020-05-10 ENCOUNTER — HOSPITAL ENCOUNTER (EMERGENCY)
Facility: HOSPITAL | Age: 80
Discharge: HOME | End: 2020-05-11
Attending: EMERGENCY MEDICINE | Admitting: EMERGENCY MEDICINE

## 2020-05-10 ENCOUNTER — APPOINTMENT (OUTPATIENT)
Dept: ULTRASOUND IMAGING | Facility: HOSPITAL | Age: 80
End: 2020-05-10

## 2020-05-10 DIAGNOSIS — S80.12XA CONTUSION OF LEFT LOWER EXTREMITY, INITIAL ENCOUNTER: Primary | ICD-10-CM

## 2020-05-10 LAB
ANION GAP SERPL CALCULATED.3IONS-SCNC: 16 MMOL/L (ref 5–15)
ANISOCYTOSIS BLD QL: NORMAL
BASOPHILS # BLD AUTO: 0.09 10*3/MM3 (ref 0–0.2)
BASOPHILS NFR BLD AUTO: 0.5 % (ref 0–1.5)
BUN BLD-MCNC: 23 MG/DL (ref 8–23)
BUN/CREAT SERPL: 19.7 (ref 7–25)
CALCIUM SPEC-SCNC: 9.5 MG/DL (ref 8.6–10.5)
CHLORIDE SERPL-SCNC: 101 MMOL/L (ref 98–107)
CO2 SERPL-SCNC: 25 MMOL/L (ref 22–29)
CREAT BLD-MCNC: 1.17 MG/DL (ref 0.76–1.27)
DEPRECATED RDW RBC AUTO: 76.5 FL (ref 37–54)
EOSINOPHIL # BLD AUTO: 0.03 10*3/MM3 (ref 0–0.4)
EOSINOPHIL NFR BLD AUTO: 0.2 % (ref 0.3–6.2)
ERYTHROCYTE [DISTWIDTH] IN BLOOD BY AUTOMATED COUNT: 20.5 % (ref 12.3–15.4)
GFR SERPL CREATININE-BSD FRML MDRD: 60 ML/MIN/1.73
GLUCOSE BLD-MCNC: 218 MG/DL (ref 65–99)
HCT VFR BLD AUTO: 32 % (ref 37.5–51)
HGB BLD-MCNC: 10.3 G/DL (ref 13–17.7)
HOLD SPECIMEN: NORMAL
HOLD SPECIMEN: NORMAL
IMM GRANULOCYTES # BLD AUTO: 0.95 10*3/MM3 (ref 0–0.05)
IMM GRANULOCYTES NFR BLD AUTO: 5.7 % (ref 0–0.5)
INR PPP: 1.15 (ref 0.8–1.2)
LYMPHOCYTES # BLD AUTO: 1.52 10*3/MM3 (ref 0.7–3.1)
LYMPHOCYTES NFR BLD AUTO: 9.2 % (ref 19.6–45.3)
MCH RBC QN AUTO: 32.5 PG (ref 26.6–33)
MCHC RBC AUTO-ENTMCNC: 32.2 G/DL (ref 31.5–35.7)
MCV RBC AUTO: 100.9 FL (ref 79–97)
MONOCYTES # BLD AUTO: 1.97 10*3/MM3 (ref 0.1–0.9)
MONOCYTES NFR BLD AUTO: 11.9 % (ref 5–12)
NEUTROPHILS # BLD AUTO: 12.03 10*3/MM3 (ref 1.7–7)
NEUTROPHILS NFR BLD AUTO: 72.5 % (ref 42.7–76)
NRBC BLD AUTO-RTO: 0.6 /100 WBC (ref 0–0.2)
PLATELET # BLD AUTO: 387 10*3/MM3 (ref 140–450)
PMV BLD AUTO: 8.7 FL (ref 6–12)
POIKILOCYTOSIS BLD QL SMEAR: NORMAL
POTASSIUM BLD-SCNC: 4.3 MMOL/L (ref 3.5–5.2)
PROTHROMBIN TIME: 14.5 SECONDS (ref 11.1–15.3)
RBC # BLD AUTO: 3.17 10*6/MM3 (ref 4.14–5.8)
SMALL PLATELETS BLD QL SMEAR: ADEQUATE
SODIUM BLD-SCNC: 142 MMOL/L (ref 136–145)
WBC MORPH BLD: NORMAL
WBC NRBC COR # BLD: 16.59 10*3/MM3 (ref 3.4–10.8)
WHOLE BLOOD HOLD SPECIMEN: NORMAL
WHOLE BLOOD HOLD SPECIMEN: NORMAL

## 2020-05-10 PROCEDURE — 99283 EMERGENCY DEPT VISIT LOW MDM: CPT

## 2020-05-10 PROCEDURE — 85007 BL SMEAR W/DIFF WBC COUNT: CPT | Performed by: EMERGENCY MEDICINE

## 2020-05-10 PROCEDURE — 93971 EXTREMITY STUDY: CPT

## 2020-05-10 PROCEDURE — 80048 BASIC METABOLIC PNL TOTAL CA: CPT | Performed by: EMERGENCY MEDICINE

## 2020-05-10 PROCEDURE — 85610 PROTHROMBIN TIME: CPT | Performed by: EMERGENCY MEDICINE

## 2020-05-10 PROCEDURE — 85025 COMPLETE CBC W/AUTO DIFF WBC: CPT | Performed by: EMERGENCY MEDICINE

## 2020-05-10 RX ORDER — SODIUM CHLORIDE 0.9 % (FLUSH) 0.9 %
10 SYRINGE (ML) INJECTION AS NEEDED
Status: DISCONTINUED | OUTPATIENT
Start: 2020-05-10 | End: 2020-05-11 | Stop reason: HOSPADM

## 2020-05-11 VITALS
DIASTOLIC BLOOD PRESSURE: 72 MMHG | WEIGHT: 140 LBS | TEMPERATURE: 97.7 F | HEART RATE: 80 BPM | SYSTOLIC BLOOD PRESSURE: 155 MMHG | RESPIRATION RATE: 18 BRPM | HEIGHT: 63 IN | BODY MASS INDEX: 24.8 KG/M2 | OXYGEN SATURATION: 92 %

## 2020-05-11 NOTE — ED PROVIDER NOTES
Subjective   80-year-old male history of diabetes, hypertension, CAD, Vj aortic aneurysm presents with left leg pain for past 3 to 4 days.  Patient reports sharp pain in his left calf worse when he walks.  Reports some bruising in his left calf that he noticed 2 days ago as well.  Denies any injury.  Reports mild swelling of the left leg.  Denies any history of DVT or PE.  Reports he takes daily Plavix.  Denies any chest or abdominal pain.          Review of Systems   Constitutional: Negative for chills and fever.   HENT: Negative for rhinorrhea, sore throat and voice change.    Eyes: Negative for pain and redness.   Respiratory: Negative for cough, shortness of breath and wheezing.    Cardiovascular: Negative for chest pain and palpitations.   Gastrointestinal: Negative for abdominal pain, constipation, diarrhea, nausea and vomiting.   Genitourinary: Negative for dysuria and hematuria.   Musculoskeletal: Negative for joint swelling and myalgias.   Skin: Negative for pallor and rash.   Neurological: Negative for dizziness, syncope, weakness and headaches.       Past Medical History:   Diagnosis Date   • AAA (abdominal aortic aneurysm) (CMS/[facility])    • Acute bacterial sinusitis    • Acute bronchitis    • Acute frontal sinusitis    • Acute maxillary sinusitis    • Acute pharyngitis    • Allergic rhinitis    • Allergic rhinitis due to pollen    • Anxiety    • Artificial lens present     in position   • Backache    • Borderline glaucoma    • C. difficile diarrhea 2014   • Chronic laryngitis    • Chronic rhinitis    • Coronary artery disease    • Cough    • Degeneration of lumbar intervertebral disc    • Degenerative joint disease involving multiple joints    • Diabetes mellitus (CMS/[facility])    • Diverticular disease of colon    • Dizziness and giddiness    • Erectile dysfunction    • Essential hypertension    • Generalized anxiety disorder    • GERD (gastroesophageal reflux disease)    • Glaucoma    • Hemorrhoids      without bleeding   • Insomnia    • Kidney stone    • Kidney stones    • Malignant tumor of prostate (CMS/HCC)    • Need for prophylactic vaccination and inoculation against influenza    • Osteoarthritis    • Osteoarthritis of multiple joints    • Pain, lumbar region     Pain radiating to lumbar region of back     • PONV (postoperative nausea and vomiting)    • Postviral fatigue syndrome    • Presence of aortocoronary bypass graft    • Prostate cancer (CMS/HCC)    • Pseudomembranous enterocolitis     improved   • Rotator cuff syndrome     right   • Shoulder pain    • SOB (shortness of breath)    • Tenosynovitis    • Thrombocytopenia (CMS/HCC)    • Upper respiratory infection        Allergies   Allergen Reactions   • Molds & Smuts Other (See Comments)     Sinus infection   • Hydrocodone-Acetaminophen Itching       Past Surgical History:   Procedure Laterality Date   • CARDIAC CATHETERIZATION  09/25/2003    Cardiac cath (Normal left ventricular systolic function, EF 60% Multi-vessel coronary artery disease with critical disease noted in the LAD coronary artery, diagonal coronary artery, obtuse marginal coronary and right coronary artery.)   • CARDIAC CATHETERIZATION  05/03/1996    Cardiac cath (Coronary atherosclerotic heart disease. 70% diagonal stenosis. Mild to moderate left anterior descending artery stenosis. Preserved left ventricular function.)   • CARDIAC CATHETERIZATION N/A 2/26/2018    Procedure: Left Heart Cath/ pci if indicated ;  Surgeon: Radha Wilkes MD;  Location: [facility] INVASIVE LOCATION;  Service:    • CATARACT EXTRACTION Bilateral    • CATARACT EXTRACTION  10/04/2011    Remove cataract, insert lens (Right)   • CATARACT EXTRACTION  08/23/2011    Remove cataract, insert lens (left)   • COLONOSCOPY     • COLONOSCOPY      Colon endoscopy 77958 (Rectal bleeding. Radiation proctitis w/bleeding. An abnormal CT of transverse colon due to mucosal ischemic colitis, that now is healed. Internal  hemorrhoids w/possible bleeding.)   • COLONOSCOPY  05/10/2011    Colon endoscopy 06528 (Single sessile polyp found in transverse colon and sigmoid colon ,removed by cold biopsy polypectomy.divertic. found in sigmoid colon,descending colon. Friability/capillary friability in rectum sigmoid due to prior radiation.Inter. hem. Grade 1)   • COLONOSCOPY  05/02/2007    Colon endoscopy 03158 (Colon polyp. Diverticulosis without bleeding. Internal hemorrhoids without bleeding.)   • CORONARY ARTERY BYPASS GRAFT  2002   • CYSTOSCOPY  01/13/1995    Cystoscopy, stone removal (Right ureteroscopic lasertripsy.)   • CYSTOSCOPY Right 1/23/2019    Procedure: CYSTOSCOPY, RIGHT STENT REMOVAL;  Surgeon: Nirmal Gaines MD;  Location: [facility];  Service: Urology   • CYSTOSCOPY, URETEROSCOPY, RETROGRADE PYELOGRAM, STENT INSERTION N/A 8/28/2017    Procedure: URETEROSCOPY LASER LITHOTRIPSY WITH STENT INSERTION AND RETROGRADE PYELOGRAM ;  Surgeon: Anna M. D'Amico, MD;  Location: [facility] OR;  Service:    • CYSTOSCOPY, URETEROSCOPY, RETROGRADE PYELOGRAM, STENT INSERTION Right 1/11/2019    Procedure: CYSTOSCOPY URETEROSCOPY RETROGRADE PYELOGRAM HOLMIUM LASER STENT INSERTION;  Surgeon: Nirmal Gaines MD;  Location: [facility] OR;  Service: Urology   • EPIDURAL  12/03/2014    Therapeutic/diag injection (Lumbar transforaminal epidural steroid injection, L5-S1, left side.)   • EPIDURAL  10/22/2014    Therapeutic/diag injection (Lumbar transforaminal epidural steroid injection L5-S1 left side.)   • EPIDURAL  08/07/2014    Therapeutic/diag injection (Lumbar transforaminal epidural steroid injection.)   • EXCISION LESION  05/13/1999    REMOVE EAR LESION 15479 (1.3 cm basal cell carcinoma, right preauricular area. Excision)   • EXCISION LESION  03/13/2001    REMOVE LESION NECK/CHEST 51548 (Excision of irritated seborrheic keratosis, anterior neck, 1.1 cm and deep lipoma, posterior neck, 3.5 cm)   • INJECTION OF MEDICATION  11/15/2012    Kenalog (4)       • JOINT REPLACEMENT  2015    partial   • KNEE ARTHROSCOPY  01/17/2007    Knee arthroscopy, surgery (Arthroscopy with medial and lateral meniscectomies, right knee.)   • KNEE SURGERY  11/04/2015    Knee Surgery (Right unicompartmental arthroplasty.)   • LUMBAR LAMINECTOMY N/A 2/7/2017    Procedure: LUMBAR LAMINECTOMY LUMBAR THREE-FOUR, LUMBAR FOUR-FIVE, INTERLAMINAR DISTRACTION ;  Surgeon: Lucio Mayo MD;  Location: [facility];  Service:    • NOSE SURGERY     • OTHER SURGICAL HISTORY      EXTENDED VISUAL FIELDS STUDY 79449 (Borderline glaucoma) (3): 03/19/2015, 04/09/2014, 03/27/2013   • OTHER SURGICAL HISTORY  10/29/2003    Heart revascularize (TMR) (Directmyocardial revascularization times four; LIMA to LAD SVG to DARIUSZ SVG to OM1, SVG to RCA)   • OTHER SURGICAL HISTORY      OCT DISC NFL 65664 (Borderline glaucoma)  (3): 09/24/2015, 08/13/2014, 07/29/2013   • PROSTATECTOMY     • SEPTORHINOPLASTY  11/16/1989    Nasal surgery procedure (Septorhinoplasty with reconstruction of the nasal pyramid with a iliac crest graft, rhinoplasty was performed with external approach.)   • SHOULDER ARTHROSCOPY  07/24/2013    Arthroscopy of right shoulder with rotator repair, Carla procedure, Biceps tenotomy, subacromial decompression.   • SHOULDER SURGERY  11/01/2005    Shoulder surgery procedure (Decompression, subacromial, explore of the rotator cuff, no tear found nor repaired, acromioplasty of the left shoulder and AC shoulder joint resection.)       Family History   Problem Relation Age of Onset   • Hypertension Mother    • Heart disease Mother    • Arthritis Mother    • Cancer Other    • Heart disease Other    • Hypertension Other        Social History     Socioeconomic History   • Marital status: Significant Other     Spouse name: Not on file   • Number of children: Not on file   • Years of education: Not on file   • Highest education level: Not on file   Tobacco Use   • Smoking status: Light Tobacco Smoker      Packs/day: 0.50     Years: 40.00     Pack years: 20.00     Types: Cigarettes   • Smokeless tobacco: Never Used   Substance and Sexual Activity   • Alcohol use: Yes     Comment: socially   • Drug use: No   • Sexual activity: Defer           Objective   Physical Exam   Constitutional: He is oriented to person, place, and time. He appears well-developed and well-nourished. No distress.   HENT:   Head: Normocephalic and atraumatic.   Mouth/Throat: Oropharynx is clear and moist. No oropharyngeal exudate.   Eyes: Pupils are equal, round, and reactive to light. Conjunctivae and EOM are normal.   Neck: Normal range of motion. Neck supple. No JVD present.   Cardiovascular: Normal rate, regular rhythm and normal heart sounds. Exam reveals no gallop and no friction rub.   No murmur heard.  Weak bilateral DP pulses- monophasic doppler signal detected b/l   Pulmonary/Chest: Effort normal and breath sounds normal. No stridor. He has no wheezes. He has no rales.   Abdominal: Soft. Bowel sounds are normal. He exhibits no distension. There is no tenderness.   Musculoskeletal: Normal range of motion. He exhibits no edema or deformity.   L leg mild ankle swelling and posterior calf ecchymosis   Neurological: He is alert and oriented to person, place, and time. He exhibits normal muscle tone.   Skin: Skin is warm and dry. Capillary refill takes less than 2 seconds. No rash noted. He is not diaphoretic. No erythema.       Procedures           ED Course      Results for orders placed or performed during the hospital encounter of 05/10/20   Basic Metabolic Panel   Result Value Ref Range    Glucose 218 (H) 65 - 99 mg/dL    BUN 23 8 - 23 mg/dL    Creatinine 1.17 0.76 - 1.27 mg/dL    Sodium 142 136 - 145 mmol/L    Potassium 4.3 3.5 - 5.2 mmol/L    Chloride 101 98 - 107 mmol/L    CO2 25.0 22.0 - 29.0 mmol/L    Calcium 9.5 8.6 - 10.5 mg/dL    eGFR Non African Amer 60 (L) >60 mL/min/1.73    BUN/Creatinine Ratio 19.7 7.0 - 25.0    Anion Gap 16.0  (H) 5.0 - 15.0 mmol/L   Protime-INR   Result Value Ref Range    Protime 14.5 11.1 - 15.3 Seconds    INR 1.15 0.80 - 1.20   CBC Auto Differential   Result Value Ref Range    WBC 16.59 (H) 3.40 - 10.80 10*3/mm3    RBC 3.17 (L) 4.14 - 5.80 10*6/mm3    Hemoglobin 10.3 (L) 13.0 - 17.7 g/dL    Hematocrit 32.0 (L) 37.5 - 51.0 %    .9 (H) 79.0 - 97.0 fL    MCH 32.5 26.6 - 33.0 pg    MCHC 32.2 31.5 - 35.7 g/dL    RDW 20.5 (H) 12.3 - 15.4 %    RDW-SD 76.5 (H) 37.0 - 54.0 fl    MPV 8.7 6.0 - 12.0 fL    Platelets 387 140 - 450 10*3/mm3    Neutrophil % 72.5 42.7 - 76.0 %    Lymphocyte % 9.2 (L) 19.6 - 45.3 %    Monocyte % 11.9 5.0 - 12.0 %    Eosinophil % 0.2 (L) 0.3 - 6.2 %    Basophil % 0.5 0.0 - 1.5 %    Immature Grans % 5.7 (H) 0.0 - 0.5 %    Neutrophils, Absolute 12.03 (H) 1.70 - 7.00 10*3/mm3    Lymphocytes, Absolute 1.52 0.70 - 3.10 10*3/mm3    Monocytes, Absolute 1.97 (H) 0.10 - 0.90 10*3/mm3    Eosinophils, Absolute 0.03 0.00 - 0.40 10*3/mm3    Basophils, Absolute 0.09 0.00 - 0.20 10*3/mm3    Immature Grans, Absolute 0.95 (H) 0.00 - 0.05 10*3/mm3    nRBC 0.6 (H) 0.0 - 0.2 /100 WBC   Scan Slide   Result Value Ref Range    Anisocytosis Slight/1+ None Seen    Poikilocytes Slight/1+ None Seen    WBC Morphology Normal Normal    Platelet Estimate Adequate Normal   Light Blue Top   Result Value Ref Range    Extra Tube hold for add-on    Green Top (Gel)   Result Value Ref Range    Extra Tube Hold for add-ons.    Lavender Top   Result Value Ref Range    Extra Tube hold for add-on    Gold Top - SST   Result Value Ref Range    Extra Tube Hold for add-ons.      Us Venous Doppler Lower Extremity Left (duplex)    Result Date: 5/11/2020  Ultrasound left lower extremity venous duplex exam on 5/10/2020 CLINICAL INDICATION: Left calf bruising and pain COMPARISON: None FINDINGS: Multiple sonographic images are obtained from the left groin through the left calf vessels. Color flow, compression and spectral analysis are all  performed. There is complete compressibility of the veins of the lower extremity. Good color flow is identified within the veins of the lower extremity.     No evidence of deep venous thrombus. Electronically signed by:  Chidi Peng  5/11/2020 12:01 AM CDT Workstation: [id number]                                         [facility]  Number of Diagnoses or Management Options  Diagnosis management comments: Patient with some ecchymosis and pain of the left lower calf.  Mild swelling in this area also.  Likely mild muscle strain/sprain injury leading to bruising.  He has weak bilateral DP pulses however his capillary refill is good, extremities are warm, he has no loss of sensation or severe pain at rest. He was counseled on the possibility of peripheral vascular disease and referred to vascular specialist for follow up.  Counseled to return if he has any worsening pain, loss of sensation in his lower extremity, discoloration of his lower extremity.      Final diagnoses:   Contusion of left lower extremity, initial encounter            Arpan Barroso MD  05/11/20 0010

## 2020-05-11 NOTE — DISCHARGE INSTRUCTIONS
You are found to have weak pulses in your feet. This could be a sign of peripheral vascular disease. You should follow up with a vascular specialist as soon as possible to be further evaluated for this. If you develop any severe pain, loss of sensation, discoloration in your toes or feet please return to the ED.

## 2020-05-11 NOTE — ED NOTES
Patient is wearing mask, and has been educated by RN on importance of wearing mask during hospital visit.      Cuca Garza RN  05/10/20 [id number]

## 2020-05-26 ENCOUNTER — OFFICE VISIT (OUTPATIENT)
Dept: CARDIAC SURGERY | Facility: CLINIC | Age: 80
End: 2020-05-26

## 2020-05-26 VITALS
OXYGEN SATURATION: 96 % | HEIGHT: 63 IN | WEIGHT: 140 LBS | DIASTOLIC BLOOD PRESSURE: 70 MMHG | BODY MASS INDEX: 24.8 KG/M2 | HEART RATE: 92 BPM | TEMPERATURE: 98.2 F | SYSTOLIC BLOOD PRESSURE: 160 MMHG

## 2020-05-26 DIAGNOSIS — I73.9 PVD (PERIPHERAL VASCULAR DISEASE) (HCC): Primary | ICD-10-CM

## 2020-05-26 DIAGNOSIS — I71.20 THORACIC AORTIC ANEURYSM WITHOUT RUPTURE (HCC): ICD-10-CM

## 2020-05-26 DIAGNOSIS — E78.2 MIXED HYPERLIPIDEMIA: ICD-10-CM

## 2020-05-26 PROCEDURE — 99214 OFFICE O/P EST MOD 30 MIN: CPT | Performed by: NURSE PRACTITIONER

## 2020-05-26 NOTE — PROGRESS NOTES
CVTS Office Progress Note     5/26/2020    Vishnu Waller  1940    Chief Complaint:    Chief Complaint   Patient presents with   • Follow-up   • Leg Swelling       HPI:       PCP:  Johnathon Shaikh MD  Cardiology:  Dr Wilkes     79 y.o. male with HTN(uncontrolled, increased risk stroke, rupture), Hyperlipidemia(stable, increased risk cardiovascular events) and Diabetes Mellitus(stable, increased risk cardiovascular events) , CAD(stable, PCI 2018, CABG 2003), thoracic aortic aneurysm(new, increased risk rupture).  former smoker.  Moderate numbness both legs x years.  Reported to emergency department 5/2020 with bruising, swelling, left leg pain.  Duplex negative, attending physician dopplered weak pulses.  Referred to [facility] for vascular evaluation.  No other associated signs, symptoms or modifying factors.     2/2018 Echocardiogram:  EF 50%, LVH, LA 21mm, LV 47mm, RVSP 35mmHg.  Mild to moderate MR, moderate AI.  Ascending aorta 46mm.  5/2018 CT Chest:  Ascending aorta 46mm, arch 31mm, descending 27mm, abdominal 20mm  5/2019 CT Chest:  Ascending aorta 46mm, arch 31mm, descending 29mm, abdominal 18mm    5/2020 Left venous: Negative for DVT  5/2020 LAYLA: Right 1.03 triphasic.  Left 1.05 triphasic.      2002 CABG  2/2018 ECG:  NSR 72, QTc 407  2/2018 Echocardiogram:  EF 50%, LA 21mm, LV 47mm, RVSP 35mmHg  2/2018 Cardiac Catheterization:  %, LIMA-LAD patent.  CX 95%, occluded SVG M2, D1.  %, SVG-RCA 99%, PCI RCA SVG 0%      The following portions of the patient's history were reviewed and updated as appropriate: allergies, current medications, past family history, past medical history, past social history, past surgical history and problem list.  Recent images independently reviewed.  Available laboratory values reviewed.    PMH:  Past Medical History:   Diagnosis Date   • AAA (abdominal aortic aneurysm) (CMS/HCC)    • Acute bacterial sinusitis    • Acute bronchitis    • Acute frontal  sinusitis    • Acute maxillary sinusitis    • Acute pharyngitis    • Allergic rhinitis    • Allergic rhinitis due to pollen    • Anxiety    • Artificial lens present     in position   • Backache    • Borderline glaucoma    • C. difficile diarrhea 2014   • Chronic laryngitis    • Chronic rhinitis    • Coronary artery disease    • Cough    • Degeneration of lumbar intervertebral disc    • Degenerative joint disease involving multiple joints    • Diabetes mellitus (CMS/HCC)    • Diverticular disease of colon    • Dizziness and giddiness    • Erectile dysfunction    • Essential hypertension    • Generalized anxiety disorder    • GERD (gastroesophageal reflux disease)    • Glaucoma    • Hemorrhoids     without bleeding   • Insomnia    • Kidney stone    • Kidney stones    • Malignant tumor of prostate (CMS/HCC)    • Need for prophylactic vaccination and inoculation against influenza    • Osteoarthritis    • Osteoarthritis of multiple joints    • Pain, lumbar region     Pain radiating to lumbar region of back     • PONV (postoperative nausea and vomiting)    • Postviral fatigue syndrome    • Presence of aortocoronary bypass graft    • Prostate cancer (CMS/HCC)    • Pseudomembranous enterocolitis     improved   • Rotator cuff syndrome     right   • Shoulder pain    • SOB (shortness of breath)    • Tenosynovitis    • Thrombocytopenia (CMS/HCC)    • Upper respiratory infection      Past Surgical History:   Procedure Laterality Date   • CARDIAC CATHETERIZATION  09/25/2003    Cardiac cath (Normal left ventricular systolic function, EF 60% Multi-vessel coronary artery disease with critical disease noted in the LAD coronary artery, diagonal coronary artery, obtuse marginal coronary and right coronary artery.)   • CARDIAC CATHETERIZATION  05/03/1996    Cardiac cath (Coronary atherosclerotic heart disease. 70% diagonal stenosis. Mild to moderate left anterior descending artery stenosis. Preserved left ventricular function.)   •  CARDIAC CATHETERIZATION N/A 2/26/2018    Procedure: Left Heart Cath/ pci if indicated ;  Surgeon: Radha Wilkes MD;  Location: [facility] CATH INVASIVE LOCATION;  Service:    • CATARACT EXTRACTION Bilateral    • CATARACT EXTRACTION  10/04/2011    Remove cataract, insert lens (Right)   • CATARACT EXTRACTION  08/23/2011    Remove cataract, insert lens (left)   • COLONOSCOPY     • COLONOSCOPY      Colon endoscopy 82672 (Rectal bleeding. Radiation proctitis w/bleeding. An abnormal CT of transverse colon due to mucosal ischemic colitis, that now is healed. Internal hemorrhoids w/possible bleeding.)   • COLONOSCOPY  05/10/2011    Colon endoscopy 38001 (Single sessile polyp found in transverse colon and sigmoid colon ,removed by cold biopsy polypectomy.divertic. found in sigmoid colon,descending colon. Friability/capillary friability in rectum sigmoid due to prior radiation.Inter. hem. Grade 1)   • COLONOSCOPY  05/02/2007    Colon endoscopy 80829 (Colon polyp. Diverticulosis without bleeding. Internal hemorrhoids without bleeding.)   • CORONARY ARTERY BYPASS GRAFT  2002   • CYSTOSCOPY  01/13/1995    Cystoscopy, stone removal (Right ureteroscopic lasertripsy.)   • CYSTOSCOPY Right 1/23/2019    Procedure: CYSTOSCOPY, RIGHT STENT REMOVAL;  Surgeon: Nirmal Gaines MD;  Location: [facility] OR;  Service: Urology   • CYSTOSCOPY, URETEROSCOPY, RETROGRADE PYELOGRAM, STENT INSERTION N/A 8/28/2017    Procedure: URETEROSCOPY LASER LITHOTRIPSY WITH STENT INSERTION AND RETROGRADE PYELOGRAM ;  Surgeon: Anna M. D'Amico, MD;  Location: [facility] OR;  Service:    • CYSTOSCOPY, URETEROSCOPY, RETROGRADE PYELOGRAM, STENT INSERTION Right 1/11/2019    Procedure: CYSTOSCOPY URETEROSCOPY RETROGRADE PYELOGRAM HOLMIUM LASER STENT INSERTION;  Surgeon: Nirmal Gaines MD;  Location: [facility] OR;  Service: Urology   • EPIDURAL  12/03/2014    Therapeutic/diag injection (Lumbar transforaminal epidural steroid injection, L5-S1, left side.)   •  EPIDURAL  10/22/2014    Therapeutic/diag injection (Lumbar transforaminal epidural steroid injection L5-S1 left side.)   • EPIDURAL  08/07/2014    Therapeutic/diag injection (Lumbar transforaminal epidural steroid injection.)   • EXCISION LESION  05/13/1999    REMOVE EAR LESION 41736 (1.3 cm basal cell carcinoma, right preauricular area. Excision)   • EXCISION LESION  03/13/2001    REMOVE LESION NECK/CHEST 21946 (Excision of irritated seborrheic keratosis, anterior neck, 1.1 cm and deep lipoma, posterior neck, 3.5 cm)   • INJECTION OF MEDICATION  11/15/2012    Kenalog (4)      • JOINT REPLACEMENT  2015    partial   • KNEE ARTHROSCOPY  01/17/2007    Knee arthroscopy, surgery (Arthroscopy with medial and lateral meniscectomies, right knee.)   • KNEE SURGERY  11/04/2015    Knee Surgery (Right unicompartmental arthroplasty.)   • LUMBAR LAMINECTOMY N/A 2/7/2017    Procedure: LUMBAR LAMINECTOMY LUMBAR THREE-FOUR, LUMBAR FOUR-FIVE, INTERLAMINAR DISTRACTION ;  Surgeon: Lucio Mayo MD;  Location: [facility];  Service:    • NOSE SURGERY     • OTHER SURGICAL HISTORY      EXTENDED VISUAL FIELDS STUDY 04096 (Borderline glaucoma) (3): 03/19/2015, 04/09/2014, 03/27/2013   • OTHER SURGICAL HISTORY  10/29/2003    Heart revascularize (TMR) (Directmyocardial revascularization times four; LIMA to LAD SVG to DARIUSZ SVG to OM1, SVG to RCA)   • OTHER SURGICAL HISTORY      OCT DISC NFL 58378 (Borderline glaucoma)  (3): 09/24/2015, 08/13/2014, 07/29/2013   • PROSTATECTOMY     • SEPTORHINOPLASTY  11/16/1989    Nasal surgery procedure (Septorhinoplasty with reconstruction of the nasal pyramid with a iliac crest graft, rhinoplasty was performed with external approach.)   • SHOULDER ARTHROSCOPY  07/24/2013    Arthroscopy of right shoulder with rotator repair, Carla procedure, Biceps tenotomy, subacromial decompression.   • SHOULDER SURGERY  11/01/2005    Shoulder surgery procedure (Decompression, subacromial, explore of the rotator  cuff, no tear found nor repaired, acromioplasty of the left shoulder and AC shoulder joint resection.)     Family History   Problem Relation Age of Onset   • Hypertension Mother    • Heart disease Mother    • Arthritis Mother    • Cancer Other    • Heart disease Other    • Hypertension Other      Social History     Tobacco Use   • Smoking status: Light Tobacco Smoker     Packs/day: 0.50     Years: 40.00     Pack years: 20.00     Types: Cigarettes   • Smokeless tobacco: Never Used   Substance Use Topics   • Alcohol use: Yes     Comment: socially   • Drug use: No       ALLERGIES:  Allergies   Allergen Reactions   • Molds & Smuts Other (See Comments)     Sinus infection   • Hydrocodone-Acetaminophen Itching         MEDICATIONS:    Current Outpatient Medications:   •  acetaminophen (TYLENOL) 500 MG tablet, Take 1,000 mg by mouth Every 6 (Six) Hours As Needed., Disp: , Rfl:   •  atorvastatin (LIPITOR) 40 MG tablet, Take 1 tablet by mouth Daily., Disp: 90 tablet, Rfl: 3  •  budesonide-formoterol (SYMBICORT) 80-4.5 MCG/ACT inhaler, Inhale 2 puffs 2 (Two) Times a Day., Disp: 3 inhaler, Rfl: 1  •  carvedilol (COREG) 3.125 MG tablet, Take 1 tablet by mouth Every 12 (Twelve) Hours., Disp: 180 tablet, Rfl: 3  •  cetirizine (zyrTEC) 10 MG tablet, Take 10 mg by mouth Daily., Disp: , Rfl:   •  clopidogrel (PLAVIX) 75 MG tablet, Take 1 tablet by mouth Daily., Disp: 90 tablet, Rfl: 3  •  Cyanocobalamin (VITAMIN B 12 PO), Take 500 mcg by mouth Daily. Three times weekly, MoWeFr, Disp: , Rfl:   •  cyclobenzaprine (FLEXERIL) 10 MG tablet, Take 10 mg by mouth 3 (Three) Times a Day As Needed for Muscle Spasms., Disp: , Rfl:   •  dilTIAZem CD (CARDIZEM CD) 180 MG 24 hr capsule, Take 1 capsule by mouth Daily., Disp: 90 capsule, Rfl: 3  •  DULoxetine (CYMBALTA) 30 MG capsule, Take 30 mg by mouth Daily., Disp: , Rfl: 0  •  fluticasone (FLONASE) 50 MCG/ACT nasal spray, 2 sprays into each nostril Every Night., Disp: , Rfl:   •  gabapentin  (NEURONTIN) 300 MG capsule, Take 600 mg by mouth 4 (Four) Times a Day., Disp: , Rfl:   •  isosorbide mononitrate (IMDUR) 30 MG 24 hr tablet, Take 1 tablet by mouth Daily., Disp: 30 tablet, Rfl: 0  •  latanoprost (XALATAN) 0.005 % ophthalmic solution, Administer 1 drop to both eyes every night., Disp: , Rfl:   •  losartan (COZAAR) 50 MG tablet, Take 50 mg by mouth Every Night., Disp: , Rfl:   •  losartan (COZAAR) 50 MG tablet, TAKE 1 TABLET EVERY NIGHT, Disp: 90 tablet, Rfl: 3  •  meloxicam (MOBIC) 15 MG tablet, Take 1 tablet by mouth Daily. Take with meal daily, Disp: 90 tablet, Rfl: 0  •  metFORMIN (GLUCOPHAGE) 1000 MG tablet, Take 1 tablet by mouth 2 (Two) Times a Day With Meals., Disp: , Rfl:   •  Multiple Vitamins-Minerals (CENTRUM PO), Take 1 tablet by mouth daily. Centrum 0.4 mg-162 mg-18 mg tablet  (unconfirmed), Disp: , Rfl:   •  nitroglycerin (NITROSTAT) 0.4 MG SL tablet, Place 1 tablet under the tongue Every 5 (Five) Minutes As Needed for Chest Pain. Take no more than 3 doses in 15 minutes., Disp: 30 tablet, Rfl: 0  •  omeprazole (priLOSEC) 20 MG capsule, Take 20 mg by mouth Daily., Disp: , Rfl:   •  oxybutynin XL (DITROPAN-XL) 5 MG 24 hr tablet, Take 5 mg by mouth Daily., Disp: , Rfl:   •  VENTOLIN  (90 Base) MCG/ACT inhaler, Inhale 2 puffs Every 4 (Four) Hours As Needed for Wheezing., Disp: 1 inhaler, Rfl: 11      Review of Systems   Constitution: Negative for chills, decreased appetite, fever and weight loss.   HENT: Negative for congestion, nosebleeds and sore throat.    Eyes: Negative for blurred vision, visual disturbance and visual halos.   Cardiovascular: Positive for leg swelling. Negative for chest pain and dyspnea on exertion.   Respiratory: Negative for cough, shortness of breath, sputum production and wheezing.    Endocrine: Negative for cold intolerance and polyuria.   Hematologic/Lymphatic: Negative for bleeding problem. Bruises/bleeds easily.        Does not report S/S of adverse  "bleeding event including nose bleeds, hematuria, melena, gingival bleeding, or hematemesis.   Skin: Positive for unusual hair distribution. Negative for flushing and nail changes.   Musculoskeletal: Positive for arthritis, back pain and joint pain.   Gastrointestinal: Negative for bloating, abdominal pain, hematemesis, melena, nausea and vomiting.   Genitourinary: Negative for flank pain and hematuria.   Neurological: Negative for brief paralysis, difficulty with concentration, focal weakness, light-headedness, loss of balance, numbness, paresthesias and weakness.        No amaurosis fugax, TIA, or CVA.     Psychiatric/Behavioral: Negative for altered mental status, depression, substance abuse and suicidal ideas.   Allergic/Immunologic: Negative for hives and persistent infections.         Vitals:    05/26/20 1411   BP: 160/70   BP Location: Right arm   Patient Position: Sitting   Cuff Size: Adult   Pulse: 92   Temp: 98.2 °F (36.8 °C)   TempSrc: Temporal   SpO2: 96%   Weight: 63.5 kg (140 lb)   Height: 160 cm (63\")     Physical Exam   Constitutional: He is oriented to person, place, and time. He appears well-developed and well-nourished.   HENT:   Head: Normocephalic and atraumatic.   Mouth/Throat: Oropharynx is clear and moist.   Eyes: Pupils are equal, round, and reactive to light. Conjunctivae and EOM are normal.   Neck: Normal range of motion. Neck supple.   Cardiovascular: Normal rate and intact distal pulses.   Pulmonary/Chest: Effort normal and breath sounds normal. No respiratory distress.   Abdominal: Soft. Bowel sounds are normal. He exhibits no distension.   Musculoskeletal: Normal range of motion. He exhibits edema (Left leg) and tenderness (LLE).   Neurological: He is alert and oriented to person, place, and time. No cranial nerve deficit.   Skin: Skin is warm and dry. Capillary refill takes less than 2 seconds.   Superficial varicose vein LLE   Psychiatric: He has a normal mood and affect. Judgment " normal.   Nursing note and vitals reviewed.      Assessment & Plan     Independent Review of Studies  5/2020 LAYLA: Right 1.03 triphasic.  Left 1.05 triphasic.     1. PVD (peripheral vascular disease) (CMS/HCC)  Normal resting perfusion BLE.   Plavix, Statin, BB, ARB.  Repeat studies on PRN basis    Bruising left calf resolved, possible related to prior gastroc tear.  Return to ortho for worsening symptoms    Utilize compression stockings, elevate for leg swelling  Repeat venous with reflux study if necessary     - Doppler Ankle Brachial Index Single Level CAR; Future    2. Mixed hyperlipidemia  Lipid-lowering therapy has been proven beneficial in patients with cardio-vascular disease. Current guidelines recommend statin treatment for all patients with PAD,CAD and carotid stenosis. Statins are beneficial in preventing cardiovascular events, increasing functional capacity and lower the risk of adverse limb loss in PAD. Statins decrease the progression of plaque formation and may improve peripheral vessel lining, and aid in reversing atherosclerosis.    3. Thoracic aortic aneurysm without rupture (CMS/HCC)  Recommend routine follow up with repeat CT as scheduled.  Follow up with Dr. Ocasio as scheduled    Contact heart and vascular center splinter hemorrhages.    Encourage smoking cessation, avoid strenuous lifting, systolic blood pressure goal of less than 120.        Detailed discussion regarding risks, benefits, and treatment plan. Images independently reviewed. Patient understands, agrees, and wishes to proceed with plan.       This document has been electronically signed by BRITANY Edwards-BC Rissa  On May 26, 2020 14:51      EMR Dragon/Transcription disclaimer:   Much of this encounter note is an electronic transcription/translation of spoken language to printed text. The electronic translation of spoken language may permit erroneous, or at times, nonsensical words or phrases to be inadvertently transcribed;  Although I have reviewed the note for such errors, some may still exist.

## 2020-05-26 NOTE — PATIENT INSTRUCTIONS
The results of your vascular studies of your right leg shows mild disease with good  circulation, in the left leg shows milddisease with good circulation.      If signs and symptoms of ischemia should occur including but not limited to pale/blue discoloration of limb, increasing pain with ambulation or at rest, or a non-healing wound. Patient is to notify [facility] and Vascular center for immediate evaluation.    Recommend continued use of compression stockings 20-30mmHg daily, may remove at night.  Advised elevation of legs while at rest.  Encouraged daily exercise.  If symptoms progress may consider venous reflux study.

## 2020-06-09 ENCOUNTER — HOSPITAL ENCOUNTER (OUTPATIENT)
Dept: CT IMAGING | Facility: HOSPITAL | Age: 80
Discharge: HOME | End: 2020-06-09
Admitting: UROLOGY

## 2020-06-09 DIAGNOSIS — N20.0 CALCULUS OF KIDNEY: ICD-10-CM

## 2020-06-09 PROCEDURE — 74176 CT ABD & PELVIS W/O CONTRAST: CPT

## 2020-06-15 DIAGNOSIS — M89.8X2 PAIN OF LEFT HUMERUS: Primary | ICD-10-CM

## 2020-06-18 ENCOUNTER — OFFICE VISIT (OUTPATIENT)
Dept: ORTHOPEDIC SURGERY | Facility: CLINIC | Age: 80
End: 2020-06-18

## 2020-06-18 VITALS — BODY MASS INDEX: 24.88 KG/M2 | WEIGHT: 140.4 LBS | HEIGHT: 63 IN

## 2020-06-18 DIAGNOSIS — R93.6 ABNORMAL X-RAY OF HUMERUS: Primary | ICD-10-CM

## 2020-06-18 DIAGNOSIS — M89.8X2 PAIN OF LEFT HUMERUS: ICD-10-CM

## 2020-06-18 DIAGNOSIS — E11.9 TYPE 2 DIABETES MELLITUS WITHOUT COMPLICATION, WITHOUT LONG-TERM CURRENT USE OF INSULIN (HCC): ICD-10-CM

## 2020-06-18 DIAGNOSIS — I10 ESSENTIAL HYPERTENSION: ICD-10-CM

## 2020-06-18 DIAGNOSIS — M51.36 DEGENERATIVE DISC DISEASE, LUMBAR: ICD-10-CM

## 2020-06-18 PROCEDURE — 99213 OFFICE O/P EST LOW 20 MIN: CPT | Performed by: ORTHOPAEDIC SURGERY

## 2020-06-18 NOTE — PROGRESS NOTES
"Vishnu Waller is a 80 y.o. male returns for     Chief Complaint   Patient presents with   • Left Shoulder - Follow-up       HISTORY OF PRESENT ILLNESS:  He is not having any problems involving his left arm.  No fevers or chills.  No numbness or tingling.  No change in weight.  No pain in the humerus of the left arm.     CONCURRENT MEDICAL HISTORY:    The following portions of the patient's history were reviewed and updated as appropriate: allergies, current medications, past family history, past medical history, past social history, past surgical history and problem list.     ROS  No fevers or chills.  No chest pain or shortness of air.  No GI or  disturbances.    PHYSICAL EXAMINATION:       Ht 160 cm (63\")   Wt 63.7 kg (140 lb 6.4 oz)   BMI 24.87 kg/m²     Physical Exam   Constitutional: He is oriented to person, place, and time. He appears well-developed and well-nourished.   Neurological: He is alert and oriented to person, place, and time.   Psychiatric: He has a normal mood and affect. His behavior is normal. Judgment and thought content normal.       GAIT:     [x]  Normal  []  Antalgic    Assistive device: [x]  None  []  Walker     []  Crutches  []  Cane     []  Wheelchair  []  Stretcher    Left Shoulder Exam     Tenderness   The patient is experiencing no tenderness.     Range of Motion   Active abduction: 170   Forward flexion: 170     Muscle Strength   Abduction: 5/5   Supraspinatus: 5/5     Tests   Carlin test: negative    Other   Erythema: absent  Sensation: normal  Pulse: present                     Xr Humerus Left    Result Date: 6/18/2020  Narrative: Ordering Provider:  Nirmal Paez MD Ordering Diagnosis/Indication:  Pain of left humerus Procedure:  XR HUMERUS LEFT Exam Date:  6/18/20 COMPARISON:  Todays X-rays were compared to previous images dated January 23, 2020.     Impression:  AP and lateral of the left humerus show acceptable position and alignment with no evidence of " acute bony abnormality.  No fracture or dislocation is noted.  Moderate osteoarthritic changes noted in the glenohumeral joint.  There is a lytic lesion in the midshaft of the humerus measuring approximately 20 mm x 15 mm and appears to be unchanged from prior x-ray.  No other acute findings noted. Nirmal Paez MD 6/18/20             ASSESSMENT:    Diagnoses and all orders for this visit:    Abnormal x-ray of humerus    Pain of left humerus    Type 2 diabetes mellitus without complication, without long-term current use of insulin (CMS/[facility])    Essential hypertension    Degenerative disc disease, lumbar          PLAN    No further evaluation necessary for his left humerus.  We discussed follow-up as needed if he has pain or if he has a change in symptoms.  No restrictions.    He is having some low back pain and has known degenerative disc disease.  He asked about seeing a spine surgeon.  We discussed several options and he is going to reach out to a back surgeon for further evaluation.    Patient's Body mass index is 24.87 kg/m². BMI is within normal parameters. No follow-up required..      Return if symptoms worsen or fail to improve, for recheck.    Nirmal Paez MD

## 2020-06-26 ENCOUNTER — TELEPHONE (OUTPATIENT)
Dept: ORTHOPEDIC SURGERY | Facility: CLINIC | Age: 80
End: 2020-06-26

## 2020-06-26 DIAGNOSIS — M51.36 DEGENERATIVE DISC DISEASE, LUMBAR: Primary | ICD-10-CM

## 2020-06-26 NOTE — TELEPHONE ENCOUNTER
SIMRAN          Pt CALLED AND STATES THAT HE IS NEEDING A REFERRAL TO [facility]     BUT THEY REQUIRE THE Pt HAVE A RECENT MRI BEFORE THEY WILL SCHEDULE AN APPOINTMENT      SO CAN WE ORDER AN MRI OF THE BACK AND THEN SEND A REFERRAL FOR HIM     CALL BACK # [phone]

## 2020-07-02 RX ORDER — CARVEDILOL 3.12 MG/1
3.12 TABLET ORAL EVERY 12 HOURS SCHEDULED
Qty: 180 TABLET | Refills: 3 | Status: SHIPPED | OUTPATIENT
Start: 2020-07-02 | End: 2020-07-08 | Stop reason: SDUPTHER

## 2020-07-06 ENCOUNTER — OFFICE VISIT (OUTPATIENT)
Dept: PULMONOLOGY | Facility: CLINIC | Age: 80
End: 2020-07-06

## 2020-07-06 VITALS
WEIGHT: 142 LBS | HEART RATE: 101 BPM | HEIGHT: 63 IN | OXYGEN SATURATION: 96 % | DIASTOLIC BLOOD PRESSURE: 84 MMHG | BODY MASS INDEX: 25.16 KG/M2 | SYSTOLIC BLOOD PRESSURE: 126 MMHG

## 2020-07-06 DIAGNOSIS — R53.81 PHYSICAL DECONDITIONING: ICD-10-CM

## 2020-07-06 DIAGNOSIS — J30.89 CHRONIC NON-SEASONAL ALLERGIC RHINITIS: ICD-10-CM

## 2020-07-06 DIAGNOSIS — F17.210 CIGARETTE NICOTINE DEPENDENCE, UNCOMPLICATED: ICD-10-CM

## 2020-07-06 DIAGNOSIS — J44.9 COPD WITH ASTHMA (HCC): Primary | ICD-10-CM

## 2020-07-06 PROBLEM — F17.218 NICOTINE DEPENDENCE, CIGARETTES, WITH OTHER NICOTINE-INDUCED DISORDERS: Status: RESOLVED | Noted: 2019-06-05 | Resolved: 2020-07-06

## 2020-07-06 PROCEDURE — 99214 OFFICE O/P EST MOD 30 MIN: CPT | Performed by: INTERNAL MEDICINE

## 2020-07-06 RX ORDER — BUDESONIDE AND FORMOTEROL FUMARATE DIHYDRATE 160; 4.5 UG/1; UG/1
2 AEROSOL RESPIRATORY (INHALATION) 2 TIMES DAILY
Qty: 1 INHALER | Refills: 11 | Status: SHIPPED | OUTPATIENT
Start: 2020-07-06 | End: 2020-07-06 | Stop reason: CLARIF

## 2020-07-06 RX ORDER — ALBUTEROL SULFATE 90 UG/1
2 AEROSOL, METERED RESPIRATORY (INHALATION) EVERY 4 HOURS PRN
Qty: 3 INHALER | Refills: 4 | Status: SHIPPED | OUTPATIENT
Start: 2020-07-06 | End: 2020-07-30

## 2020-07-06 NOTE — PROGRESS NOTES
Pulmonary Office Follow-up    Subjective     Vishnu Waller is seen today at the office for   Chief Complaint   Patient presents with   • COPD         HPI  Vishnu Waller is a 80 y.o. male with a PMH significant for COPD with asthma, past tobacco use, allergies, CAD s/p CABG, HTN, DM, and borderline glaucoma who presents for follow-up of COPD.      8/30/19: He remained stable on Symbicort with infrequent albuterol use. I again cautioned him on smoking marijuana.    3/5/20:  He had persistent dyspnea on Symbicort 80 so recommend increasing the dose to 160 and advised him continue on his allergy regimen.  He is also started smoking again so I advised him strongly to stop but he was not willing to set a quit date.    7/6/20: He states that his Symbicort 160 cost over $200 so he requested going back to Symbicort 80.  When he got that one filled it was still over $200 but he has continued on it.  Patient states that he continues to get out of breath with exertion and is using his albuterol 2-3 times a day.  He does admit he is not doing very much activity as he is having progression of his chronic back pain.  Patient does have a cough that is intermittently productive, but it is mostly composed of throat clearing due to mucus in his throat.  He did stop smoking tobacco in May and has also stopped smoking marijuana.  Patient is not going to pain management and has been referred to a neurosurgeon in [place] for an evaluation.  He denies any recent antibiotics or steroid use.      Tobacco use history:  Type: cigarettes  Amount: 1-2 ppd  Duration: 60 years  Cessation: 2017   Willing to quit: N/A      Patient Active Problem List   Diagnosis   • Degeneration of lumbar intervertebral disc   • Low back pain without sciatica   • Neuritis or radiculitis due to rupture of lumbar intervertebral disc   • Borderline glaucoma, open angle with borderline findings   • Pseudophakia   • History of coronary artery bypass  surgery   • Essential hypertension   • Mixed hyperlipidemia   • Thrombocytopenia (CMS/HCC)   • Spinal stenosis of lumbar region   • Degenerative disc disease, lumbar   • Chronic pain of right knee   • History of artificial joint   • B12 deficiency   • Degeneration of intervertebral disc of lumbosacral region   • Personal history of other infectious and parasitic diseases   • Status post lumbar spine operation   • History of pyelonephritis   • History of surgical procedure   • History of repair of rotator cuff   • Encounter for follow-up examination after completed treatment for conditions other than malignant neoplasm   • Encounter for general adult medical examination without abnormal findings   • Osteoarthritis of knee   • Osteoarthritis of multiple joints   • Primary insomnia   • Encounter for screening for malignant neoplasm of colon   • Encounter for screening for malignant neoplasm of prostate   • Type 2 diabetes mellitus without complication, without long-term current use of insulin (CMS/HCC)   • Coronary artery disease involving native coronary artery of native heart without angina pectoris   • SOB (shortness of breath)   • Presence of aortocoronary bypass graft   • Dizziness and giddiness   • Postviral fatigue syndrome   • Postviral fatigue syndrome   • Recurrent kidney stones   • Thrombocytopenia (CMS/HCC)   • Pyelonephritis   • COPD with asthma (CMS/HCC)   • Chronic non-seasonal allergic rhinitis   • Unstable angina (CMS/HCC)   • NSTEMI (non-ST elevated myocardial infarction) (CMS/HCC)   • History of PTCA 1   • Medicare annual wellness visit, subsequent   • Thoracic aortic aneurysm without rupture (CMS/HCC)   • Chronic kidney disease, stage 2 (mild)   • Personal history of tobacco use, presenting hazards to health   • Cervical pain (neck)   • Thoracic spine pain   • Calculus of ureter   • Foreign body in bladder and urethra   • Physical deconditioning   • Left hip pain   • Pain of left humerus   • PVD  (peripheral vascular disease) (CMS/[facility])       Review of Systems  Review of Systems   Constitutional: Positive for fatigue. Negative for fever and unexpected weight change.   HENT: Positive for postnasal drip. Negative for congestion.    Respiratory: Positive for cough and shortness of breath. Negative for chest tightness and wheezing.    Cardiovascular: Negative for chest pain and leg swelling.   Musculoskeletal: Positive for arthralgias and back pain.     As described in the HPI. Otherwise, remainder of ROS (14 systems) were negative.    Medications, Allergies, Social, and Family Histories reviewed as per EMR.    Objective     Vitals:    07/06/20 1348   BP: 126/84   Pulse: 101   SpO2: 96%     Physical Exam   Constitutional: He is oriented to person, place, and time. Vital signs are normal. He appears well-developed and well-nourished.   HENT:   Head: Normocephalic and atraumatic.   Masked   Eyes: Pupils are equal, round, and reactive to light. Conjunctivae, EOM and lids are normal.   Neck: Trachea normal and normal range of motion. No tracheal tenderness present. No thyroid mass present.   Cardiovascular: Normal rate, regular rhythm and normal heart sounds. PMI is not displaced. Exam reveals no gallop.   No murmur heard.  Pulmonary/Chest: Effort normal and breath sounds normal. No respiratory distress. He has no decreased breath sounds. He has no wheezes. He has no rhonchi. He exhibits no tenderness.   Abdominal: Soft. Normal appearance and bowel sounds are normal. There is no hepatomegaly. There is no tenderness.   Musculoskeletal:   Normal gait, no extremity edema     Vascular Status -  His right foot exhibits no edema. His left foot exhibits no edema.  Lymphadenopathy:        Head (right side): No submandibular adenopathy present.        Head (left side): No submandibular adenopathy present.     He has no cervical adenopathy.        Right: No supraclavicular adenopathy present.        Left: No supraclavicular  adenopathy present.   Neurological: He is alert and oriented to person, place, and time. Gait normal.   Skin: Skin is warm and dry. No rash noted. No cyanosis. Nails show no clubbing.   Psychiatric: He has a normal mood and affect. His speech is normal and behavior is normal. Judgment normal.   Nursing note and vitals reviewed.          Assessment/Plan     Vishnu was seen today for copd.    Diagnoses and all orders for this visit:    COPD with asthma (CMS/[facility])  -     Discontinue: budesonide-formoterol (SYMBICORT) 160-4.5 MCG/ACT inhaler; Inhale 2 puffs 2 (Two) Times a Day.  -     Fluticasone Furoate-Vilanterol (BREO ELLIPTA) 100-25 MCG/INH inhaler; Inhale 1 puff Daily.  -     VENTOLIN  (90 Base) MCG/ACT inhaler; Inhale 2 puffs Every 4 (Four) Hours As Needed for Wheezing or Shortness of Air.    Chronic non-seasonal allergic rhinitis    Physical deconditioning    Cigarette nicotine dependence, uncomplicated         Discussion/ Recommendations:   Unfortunately, does not seem like his insurance cover Symbicort any longer.  It does appear that they cover Breo so I recommended transitioning over to it.  I would recommend starting at the lower dose and we can escalate if necessary.  I did congratulate him on stopping smoking and encouraged him to maintain it lifelong.    -Complete current Symbicort and then change over to Breo 100 daily.  Sample provided instructed on use.  -Use albuterol only as needed for dyspnea or wheeze.  -Continue Flonase and Singulair daily.  Encouraged frequent nasal saline.  -Qualifies for LDCT, but also having CT chest annually to follow thoracic aortic aneurysm. Will use these scans as his screening (1-2ppd x 60yrs, quit 2017).  -Encouraged lifelong tobacco and marijuana cessation.  -Up-to-date with flu and pneumococcal vaccines.     Patient's Body mass index is 25.15 kg/m². BMI is within normal parameters. No follow-up required.        Return in about 3 months (around 10/6/2020) for  Recheck COPD.          This document has been electronically signed by Camryn Koehler MD on July 6, 2020 14:07      Dictated using Dragon

## 2020-07-07 ENCOUNTER — APPOINTMENT (OUTPATIENT)
Dept: MRI IMAGING | Facility: HOSPITAL | Age: 80
End: 2020-07-07

## 2020-07-08 ENCOUNTER — HOSPITAL ENCOUNTER (OUTPATIENT)
Dept: MRI IMAGING | Facility: HOSPITAL | Age: 80
Discharge: HOME | End: 2020-07-08
Admitting: ORTHOPAEDIC SURGERY

## 2020-07-08 DIAGNOSIS — M51.36 DEGENERATIVE DISC DISEASE, LUMBAR: ICD-10-CM

## 2020-07-08 PROCEDURE — 72148 MRI LUMBAR SPINE W/O DYE: CPT

## 2020-07-08 RX ORDER — CARVEDILOL 3.12 MG/1
3.12 TABLET ORAL EVERY 12 HOURS SCHEDULED
Qty: 180 TABLET | Refills: 3 | Status: SHIPPED | OUTPATIENT
Start: 2020-07-08 | End: 2020-07-08 | Stop reason: SDUPTHER

## 2020-07-08 RX ORDER — CARVEDILOL 3.12 MG/1
3.12 TABLET ORAL EVERY 12 HOURS SCHEDULED
Qty: 180 TABLET | Refills: 3 | Status: SHIPPED | OUTPATIENT
Start: 2020-07-08 | End: 2021-07-19

## 2020-07-30 ENCOUNTER — OFFICE VISIT (OUTPATIENT)
Dept: ORTHOPEDIC SURGERY | Facility: CLINIC | Age: 80
End: 2020-07-30

## 2020-07-30 VITALS — HEIGHT: 63 IN | BODY MASS INDEX: 25.52 KG/M2 | WEIGHT: 144 LBS

## 2020-07-30 DIAGNOSIS — M25.561 ACUTE PAIN OF RIGHT KNEE: Primary | ICD-10-CM

## 2020-07-30 DIAGNOSIS — Z96.651 STATUS POST RIGHT UNICOMPARTMENTAL KNEE REPLACEMENT: ICD-10-CM

## 2020-07-30 PROCEDURE — 96372 THER/PROPH/DIAG INJ SC/IM: CPT | Performed by: NURSE PRACTITIONER

## 2020-07-30 PROCEDURE — 99213 OFFICE O/P EST LOW 20 MIN: CPT | Performed by: NURSE PRACTITIONER

## 2020-07-30 RX ORDER — CLOPIDOGREL BISULFATE 75 MG/1
75 TABLET ORAL DAILY
COMMUNITY
End: 2021-10-06 | Stop reason: SDUPTHER

## 2020-07-30 RX ORDER — TRIAMCINOLONE ACETONIDE 40 MG/ML
80 INJECTION, SUSPENSION INTRA-ARTICULAR; INTRAMUSCULAR ONCE
Status: COMPLETED | OUTPATIENT
Start: 2020-07-30 | End: 2020-07-30

## 2020-07-30 RX ADMIN — TRIAMCINOLONE ACETONIDE 80 MG: 40 INJECTION, SUSPENSION INTRA-ARTICULAR; INTRAMUSCULAR at 16:00

## 2020-07-30 NOTE — PROGRESS NOTES
"  Vishnu Waller is a 80 y.o. male   Primary provider:  Johnathon Shaikh MD       Chief Complaint   Patient presents with   • Right Knee - Pain   • Establish Care       HISTORY OF PRESENT ILLNESS: Patient is a 80-year-old male who presents today with complaints of right knee pain that started yesterday.  Patient reports that he was getting up from the breakfast table when his knee felt like it may \" give out\".  Since then he has had increasing pain that he now rates a 7-9 out of 10.  He denies specific injury or twisting motion when getting up from the breakfast table yesterday.  He reports popping in right knee that is painful, he denies catching and locking.  He has history of osteoarthritis in right knee and in 2015 had a right unicompartmental arthroplasty.  He reports increased pain with standing/weightbearing, extension, and flexion.  He also reports a tightness in the back of his knee.  Patient indicates lateral and posterior knee as areas of concern.  He denies fever, nausea, vomiting.  He does report some associated swelling in right knee but denies redness and warmth.    Pain   The problem occurs constantly. Associated symptoms include joint swelling. Associated symptoms comments: Grinding, aching, swelling. The symptoms are aggravated by standing and walking (driving).        CONCURRENT MEDICAL HISTORY:    Past Medical History:   Diagnosis Date   • AAA (abdominal aortic aneurysm) (CMS/[facility])    • Acute bacterial sinusitis    • Acute bronchitis    • Acute frontal sinusitis    • Acute maxillary sinusitis    • Acute pharyngitis    • Allergic rhinitis    • Allergic rhinitis due to pollen    • Anxiety    • Artificial lens present     in position   • Backache    • Borderline glaucoma    • C. difficile diarrhea 2014   • Chronic laryngitis    • Chronic rhinitis    • Coronary artery disease    • Cough    • Degeneration of lumbar intervertebral disc    • Degenerative joint disease involving multiple joints  "   • Diabetes mellitus (CMS/HCC)    • Diverticular disease of colon    • Dizziness and giddiness    • Erectile dysfunction    • Essential hypertension    • Generalized anxiety disorder    • GERD (gastroesophageal reflux disease)    • Glaucoma    • Hemorrhoids     without bleeding   • Insomnia    • Kidney stone    • Kidney stones    • Malignant tumor of prostate (CMS/HCC)    • Need for prophylactic vaccination and inoculation against influenza    • Osteoarthritis    • Osteoarthritis of multiple joints    • Pain, lumbar region     Pain radiating to lumbar region of back     • PONV (postoperative nausea and vomiting)    • Postviral fatigue syndrome    • Presence of aortocoronary bypass graft    • Prostate cancer (CMS/HCC)    • Pseudomembranous enterocolitis     improved   • Rotator cuff syndrome     right   • Shoulder pain    • SOB (shortness of breath)    • Tenosynovitis    • Thrombocytopenia (CMS/HCC)    • Upper respiratory infection        Allergies   Allergen Reactions   • Molds & Smuts Other (See Comments)     Sinus infection   • Hydrocodone-Acetaminophen Itching         Current Outpatient Medications:   •  acetaminophen (TYLENOL) 500 MG tablet, Take 1,000 mg by mouth Every 6 (Six) Hours As Needed., Disp: , Rfl:   •  atorvastatin (LIPITOR) 40 MG tablet, Take 1 tablet by mouth Daily., Disp: 90 tablet, Rfl: 3  •  carvedilol (COREG) 3.125 MG tablet, Take 1 tablet by mouth Every 12 (Twelve) Hours., Disp: 180 tablet, Rfl: 3  •  clopidogrel (PLAVIX) 75 MG tablet, Take 75 mg by mouth Daily., Disp: , Rfl:   •  Cyanocobalamin (VITAMIN B 12 PO), Take 500 mcg by mouth Daily. Three times weekly, MoWeFr, Disp: , Rfl:   •  cyclobenzaprine (FLEXERIL) 10 MG tablet, Take 10 mg by mouth 3 (Three) Times a Day As Needed for Muscle Spasms., Disp: , Rfl:   •  dilTIAZem CD (CARDIZEM CD) 180 MG 24 hr capsule, Take 1 capsule by mouth Daily., Disp: 90 capsule, Rfl: 3  •  DULoxetine (CYMBALTA) 30 MG capsule, Take 30 mg by mouth Daily., Disp:  , Rfl: 0  •  fluticasone (FLONASE) 50 MCG/ACT nasal spray, 2 sprays into each nostril Every Night., Disp: , Rfl:   •  Fluticasone Furoate-Vilanterol (BREO ELLIPTA) 100-25 MCG/INH inhaler, Inhale 1 puff Daily., Disp: 3 each, Rfl: 4  •  gabapentin (NEURONTIN) 600 MG tablet, Take 600 mg by mouth 4 (Four) Times a Day., Disp: , Rfl:   •  isosorbide mononitrate (IMDUR) 30 MG 24 hr tablet, Take 1 tablet by mouth Daily., Disp: 30 tablet, Rfl: 0  •  latanoprost (XALATAN) 0.005 % ophthalmic solution, Administer 1 drop to both eyes every night., Disp: , Rfl:   •  losartan (COZAAR) 50 MG tablet, TAKE 1 TABLET EVERY NIGHT, Disp: 90 tablet, Rfl: 3  •  meloxicam (MOBIC) 15 MG tablet, Take 1 tablet by mouth Daily. Take with meal daily, Disp: 90 tablet, Rfl: 0  •  metFORMIN (GLUCOPHAGE) 1000 MG tablet, Take 1 tablet by mouth 2 (Two) Times a Day With Meals., Disp: , Rfl:   •  Multiple Vitamins-Minerals (CENTRUM PO), Take 1 tablet by mouth daily. Centrum 0.4 mg-162 mg-18 mg tablet  (unconfirmed), Disp: , Rfl:   •  nitroglycerin (NITROSTAT) 0.4 MG SL tablet, Place 1 tablet under the tongue Every 5 (Five) Minutes As Needed for Chest Pain. Take no more than 3 doses in 15 minutes., Disp: 30 tablet, Rfl: 0  •  omeprazole (priLOSEC) 20 MG capsule, Take 20 mg by mouth Daily., Disp: , Rfl:   •  oxybutynin XL (DITROPAN-XL) 5 MG 24 hr tablet, Take 5 mg by mouth Daily., Disp: , Rfl:   No current facility-administered medications for this visit.     Past Surgical History:   Procedure Laterality Date   • CARDIAC CATHETERIZATION  09/25/2003    Cardiac cath (Normal left ventricular systolic function, EF 60% Multi-vessel coronary artery disease with critical disease noted in the LAD coronary artery, diagonal coronary artery, obtuse marginal coronary and right coronary artery.)   • CARDIAC CATHETERIZATION  05/03/1996    Cardiac cath (Coronary atherosclerotic heart disease. 70% diagonal stenosis. Mild to moderate left anterior descending artery  stenosis. Preserved left ventricular function.)   • CARDIAC CATHETERIZATION N/A 2/26/2018    Procedure: Left Heart Cath/ pci if indicated ;  Surgeon: Radha Wilkes MD;  Location: [facility] CATH INVASIVE LOCATION;  Service:    • CATARACT EXTRACTION Bilateral    • CATARACT EXTRACTION  10/04/2011    Remove cataract, insert lens (Right)   • CATARACT EXTRACTION  08/23/2011    Remove cataract, insert lens (left)   • COLONOSCOPY     • COLONOSCOPY      Colon endoscopy 94989 (Rectal bleeding. Radiation proctitis w/bleeding. An abnormal CT of transverse colon due to mucosal ischemic colitis, that now is healed. Internal hemorrhoids w/possible bleeding.)   • COLONOSCOPY  05/10/2011    Colon endoscopy 29485 (Single sessile polyp found in transverse colon and sigmoid colon ,removed by cold biopsy polypectomy.divertic. found in sigmoid colon,descending colon. Friability/capillary friability in rectum sigmoid due to prior radiation.Inter. hem. Grade 1)   • COLONOSCOPY  05/02/2007    Colon endoscopy 22931 (Colon polyp. Diverticulosis without bleeding. Internal hemorrhoids without bleeding.)   • CORONARY ARTERY BYPASS GRAFT  2002   • CYSTOSCOPY  01/13/1995    Cystoscopy, stone removal (Right ureteroscopic lasertripsy.)   • CYSTOSCOPY Right 1/23/2019    Procedure: CYSTOSCOPY, RIGHT STENT REMOVAL;  Surgeon: Nirmal Gaines MD;  Location: [facility] OR;  Service: Urology   • CYSTOSCOPY, URETEROSCOPY, RETROGRADE PYELOGRAM, STENT INSERTION N/A 8/28/2017    Procedure: URETEROSCOPY LASER LITHOTRIPSY WITH STENT INSERTION AND RETROGRADE PYELOGRAM ;  Surgeon: Anna M. D'Amico, MD;  Location: [facility] OR;  Service:    • CYSTOSCOPY, URETEROSCOPY, RETROGRADE PYELOGRAM, STENT INSERTION Right 1/11/2019    Procedure: CYSTOSCOPY URETEROSCOPY RETROGRADE PYELOGRAM HOLMIUM LASER STENT INSERTION;  Surgeon: Nirmal Gaines MD;  Location: [facility] OR;  Service: Urology   • EPIDURAL  12/03/2014    Therapeutic/diag injection (Lumbar transforaminal  epidural steroid injection, L5-S1, left side.)   • EPIDURAL  10/22/2014    Therapeutic/diag injection (Lumbar transforaminal epidural steroid injection L5-S1 left side.)   • EPIDURAL  08/07/2014    Therapeutic/diag injection (Lumbar transforaminal epidural steroid injection.)   • EXCISION LESION  05/13/1999    REMOVE EAR LESION 89751 (1.3 cm basal cell carcinoma, right preauricular area. Excision)   • EXCISION LESION  03/13/2001    REMOVE LESION NECK/CHEST 63056 (Excision of irritated seborrheic keratosis, anterior neck, 1.1 cm and deep lipoma, posterior neck, 3.5 cm)   • INJECTION OF MEDICATION  11/15/2012    Kenalog (4)      • JOINT REPLACEMENT  2015    partial   • KNEE ARTHROSCOPY  01/17/2007    Knee arthroscopy, surgery (Arthroscopy with medial and lateral meniscectomies, right knee.)   • KNEE SURGERY  11/04/2015    Knee Surgery (Right unicompartmental arthroplasty.)   • LUMBAR LAMINECTOMY N/A 2/7/2017    Procedure: LUMBAR LAMINECTOMY LUMBAR THREE-FOUR, LUMBAR FOUR-FIVE, INTERLAMINAR DISTRACTION ;  Surgeon: Lucio Mayo MD;  Location: [facility];  Service:    • NOSE SURGERY     • OTHER SURGICAL HISTORY      EXTENDED VISUAL FIELDS STUDY 95107 (Borderline glaucoma) (3): 03/19/2015, 04/09/2014, 03/27/2013   • OTHER SURGICAL HISTORY  10/29/2003    Heart revascularize (TMR) (Directmyocardial revascularization times four; LIMA to LAD SVG to DARIUSZ SVG to OM1, SVG to RCA)   • OTHER SURGICAL HISTORY      OCT DISC NFL 78090 (Borderline glaucoma)  (3): 09/24/2015, 08/13/2014, 07/29/2013   • PROSTATECTOMY     • SEPTORHINOPLASTY  11/16/1989    Nasal surgery procedure (Septorhinoplasty with reconstruction of the nasal pyramid with a iliac crest graft, rhinoplasty was performed with external approach.)   • SHOULDER ARTHROSCOPY  07/24/2013    Arthroscopy of right shoulder with rotator repair, Carla procedure, Biceps tenotomy, subacromial decompression.   • SHOULDER SURGERY  11/01/2005    Shoulder surgery procedure  "(Decompression, subacromial, explore of the rotator cuff, no tear found nor repaired, acromioplasty of the left shoulder and AC shoulder joint resection.)       Family History   Problem Relation Age of Onset   • Hypertension Mother    • Heart disease Mother    • Arthritis Mother    • Cancer Other    • Heart disease Other    • Hypertension Other        Social History     Socioeconomic History   • Marital status: Significant Other     Spouse name: Not on file   • Number of children: Not on file   • Years of education: Not on file   • Highest education level: Not on file   Tobacco Use   • Smoking status: Light Tobacco Smoker     Packs/day: 0.50     Years: 40.00     Pack years: 20.00     Types: Cigarettes   • Smokeless tobacco: Never Used   Substance and Sexual Activity   • Alcohol use: Yes     Comment: socially   • Drug use: No   • Sexual activity: Defer        Review of Systems   HENT: Positive for postnasal drip.    Musculoskeletal: Positive for joint swelling.        Right knee pain   All other systems reviewed and are negative.      PHYSICAL EXAMINATION:       Ht 160 cm (63\")   Wt 65.3 kg (144 lb)   BMI 25.51 kg/m²     Physical Exam   Constitutional: He is oriented to person, place, and time. He appears well-developed and well-nourished.  Non-toxic appearance. No distress.   HENT:   Head: Normocephalic.   Pulmonary/Chest: Effort normal. No respiratory distress.   Neurological: He is alert and oriented to person, place, and time.   Skin: Skin is warm and dry.   Psychiatric: He has a normal mood and affect. His behavior is normal. Judgment and thought content normal.   Nursing note and vitals reviewed.      GAIT:     [x]  Normal  []  Antalgic    Assistive device: [x]  None  []  Walker     []  Crutches  []  Cane     []  Wheelchair  []  Stretcher    Right Knee Exam     Tenderness   The patient is experiencing no tenderness.     Range of Motion   Extension: 0   Flexion: 140     Tests   Lou:  Medial - negative " Lateral - negative  Varus: negative Valgus: negative  Drawer:  Anterior - negative    Posterior - negative    Other   Erythema: absent  Swelling: mild    Comments:  There is mild swelling of right knee, no redness or warmth.  No obvious signs of infection. Patient reports objective feelings of instability, knee joint feels stable on exam.                  Xr Knee 1 Or 2 View Right    Result Date: 7/31/2020  Narrative: Study: XR lateral knee, right Comparison: November 4, 2015, portable x-ray lateral right Narrative: There is osteophyte formation on posterior femoral condyle as well as superior and inferior poles of patella.  Right knee is in good alignment.  No evidence of fracture or dislocation. Shelly ZAMORA     Xr Knee Bilateral Ap Standing    Result Date: 7/31/2020  Narrative: Study: XR Knee, AP standing, bilateral Comparison: November 4, 2015 portable x-ray (right knee only) Narrative: Left knee reveals marginal osteophyte formation on medial and lateral sides of tibial and femoral condyles as well as tibial eminence.  There is tricompartmental joint space narrowing in left knee, worst in medial compartment.  There are sclerotic changes on the articular surfaces of the left tibia, again worse on medial side.  Right knee also with marginal osteophyte formation on medial and lateral tibial and femoral condyles as well as tibial eminence.  There is significant joint space narrowing of the lateral compartment in the right knee along with sclerotic changes of the articular surface on the lateral side, this is progressed from comparison study.  Right knee is status post medial unicompartmental arthroplasty.  There is a small, circular area of hypodensity just below lateal side of implant in the tibial condyle and a small amount of diffuse hypodensity surrounding implants in tibia and femoral condyle that could indicate hardware loosening.  Bilateral knees are in acceptable alignment.  No evidence of fracture or  dislocation. Shelly Herbert APRN 7/30/2020    Mri Lumbar Spine Without Contrast    Result Date: 7/8/2020  Narrative: Procedure: MRI lumbar spine without contrast Reason for exam: Back pain. FINDINGS: Multisequence multiplanar MR imaging of the lumbar spine was performed without contrast. Comparison exam dated March 22, 2018. Lumbar spine vertebral body heights and alignment are normal. There is no evidence of fracture or dislocation. Metallic devices fixating the spinous process of the L4 and L5 vertebral body with associated susceptibility artifact. Bony fusion of the right SI joint. The conus of the spinal cord appears normal with tip at the L1 vertebral body level. T12-L1: Disc space normal. No disc protrusion or extrusion. Spinal cord and nerve roots exit without compromise. L1-L2: Loss of disc space height as well as mild diffuse disc protrusion which is mildly indenting the anterior thecal sac. Nerve roots exit without compromise. L2-L3: Diffuse mild disc protrusion mildly indenting the anterior thecal sac. Nerve roots exit without compromise. L3-L4: Loss of disc space height with diffuse mild disc protrusion mildly indenting the anterior thecal sac. Nerve roots exit without compromise. L4-L5: Diffuse mild disc protrusion mildly indenting the anterior thecal sac. Stable soft tissue density in the left lateral recess and abutting the left lamina most likely representing scar tissue from prior surgery in this region. Recommend clinical correlation and/or if clinically indicated enhanced exam to further characterize. Nerve roots appear to exit this disc space level without compromise. L5-S1: Disc space normal. No disc protrusion or extrusion. Nerve roots exit without compromise.     Impression: 1.  Metallic devices fixating the spinous process of the L4 and L5 vertebral body with associated susceptibility artifact. 2.  Bony fusion of the right SI joint. 3.  L1-L2: Loss of disc space height as well as mild diffuse  disc protrusion which is mildly indenting the anterior thecal sac. Nerve roots exit without compromise. 4.  L2-L3: Diffuse mild disc protrusion mildly indenting the anterior thecal sac. Nerve roots exit without compromise. 5.  L3-L4: Loss of disc space height with diffuse mild disc protrusion mildly indenting the anterior thecal sac. Nerve roots exit without compromise. 6.  L4-L5: Diffuse mild disc protrusion mildly indenting the anterior thecal sac. Stable soft tissue density in the left lateral recess and abutting the left lamina most likely representing scar tissue from prior surgery in this region. Recommend clinical correlation and/or if clinically indicated enhanced exam to further characterize. Nerve roots appear to exit this disc space level without compromise. Electronically signed by:  Alexey Alba MD  7/8/2020 4:42 PM CDT Workstation: [id number]              ASSESSMENT:    Diagnoses and all orders for this visit:    Acute pain of right knee  -     triamcinolone acetonide (KENALOG-40) injection 80 mg  -     CT knee right wo contrast; Future    Status post right unicompartmental knee replacement  -     CT knee right wo contrast; Future    Other orders  -     clopidogrel (PLAVIX) 75 MG tablet; Take 75 mg by mouth Daily.          PLAN    X-rays reviewed.  There is some suspicion for hardware loosening, though patient's symptoms started more suddenly than what would be expected in hardware loosening/failure.  Patient denies history of osteoporosis though I do not see any past DEXA scans to review.  CT of right knee ordered to assess for possibility of stress fracture.  Acute knee pain treated with intramuscular Kenalog 80 mg injection this visit.  Patient cannot take NSAIDs due to being on Plavix.  Hinged knee brace applied.  Patient instructed to use cane with modified weightbearing.  Rest, ice, impression, elevation also recommended.  Patient to return after CT to review results.  If patient is not improving with  conservative measures or symptoms are worsening, plan to proceed with joint aspiration, lab work, and bone scan to further evaluate hardware and assess for possible infection.    Return for Return after CT .    Shelly Herbert, APRN

## 2020-08-21 ENCOUNTER — APPOINTMENT (OUTPATIENT)
Dept: CT IMAGING | Facility: HOSPITAL | Age: 80
End: 2020-08-21

## 2020-10-19 ENCOUNTER — OFFICE VISIT (OUTPATIENT)
Dept: CARDIOLOGY | Facility: CLINIC | Age: 80
End: 2020-10-19

## 2020-10-19 VITALS
OXYGEN SATURATION: 98 % | SYSTOLIC BLOOD PRESSURE: 142 MMHG | HEART RATE: 85 BPM | WEIGHT: 145.4 LBS | BODY MASS INDEX: 25.76 KG/M2 | HEIGHT: 63 IN | DIASTOLIC BLOOD PRESSURE: 68 MMHG

## 2020-10-19 DIAGNOSIS — I73.9 PVD (PERIPHERAL VASCULAR DISEASE) (HCC): ICD-10-CM

## 2020-10-19 DIAGNOSIS — R06.09 DYSPNEA ON EXERTION: ICD-10-CM

## 2020-10-19 DIAGNOSIS — I10 ESSENTIAL HYPERTENSION: ICD-10-CM

## 2020-10-19 DIAGNOSIS — E78.2 MIXED HYPERLIPIDEMIA: ICD-10-CM

## 2020-10-19 DIAGNOSIS — Z95.1 PRESENCE OF AORTOCORONARY BYPASS GRAFT: ICD-10-CM

## 2020-10-19 DIAGNOSIS — I25.10 CORONARY ARTERY DISEASE INVOLVING NATIVE CORONARY ARTERY OF NATIVE HEART WITHOUT ANGINA PECTORIS: Primary | ICD-10-CM

## 2020-10-19 DIAGNOSIS — I71.20 THORACIC AORTIC ANEURYSM WITHOUT RUPTURE (HCC): ICD-10-CM

## 2020-10-19 PROCEDURE — 93000 ELECTROCARDIOGRAM COMPLETE: CPT | Performed by: INTERNAL MEDICINE

## 2020-10-19 PROCEDURE — 99214 OFFICE O/P EST MOD 30 MIN: CPT | Performed by: INTERNAL MEDICINE

## 2020-10-19 RX ORDER — BUDESONIDE AND FORMOTEROL FUMARATE DIHYDRATE 160; 4.5 UG/1; UG/1
AEROSOL RESPIRATORY (INHALATION)
COMMUNITY
End: 2020-10-19 | Stop reason: SDUPTHER

## 2020-10-19 NOTE — PROGRESS NOTES
Vishnu Waller  80 y.o. male      1. Coronary artery disease involving native coronary artery of native heart without angina pectoris    2. Essential hypertension    3. Mixed hyperlipidemia    4. Presence of aortocoronary bypass graft    5. Thoracic aortic aneurysm without rupture (CMS/HCC)    6. PVD (peripheral vascular disease) (CMS/HCC)    7. Dyspnea on exertion        History of Present Illness:  Mr. Waller is a 80-year-old  male with the history of diabetes mellitus, hypertension, hyperlipidemia, COPD, tobacco abuse, CAD status post CABG in 2003 when he received LIMA to LAD, SVG to diagonal, SVG to OM 2 and SVG to distal right coronary artery.  In February 2018 he presented with prolonged chest pain and ruled in for a non-ST segment elevation myocardial infarctions and cardiac catheterization showed the following findings:  Impression:  99% eccentric lesion noted in the proximal segment of the SVG to distal right coronary artery graft.  Successful PCI with placement of a 4.0 x 18 mm Xience Alpine stent.  Patent LIMA to Left Anterior Descending Coronary Artery.  Native LAD beyond the anastomosis was widely patent  Severe native vessel coronary artery disease with 100% occlusion of the mid LAD, mid right coronary artery.  A small to medium-sized tortuous circumflex had a proximal lesion up to 99%.  Occluded SVG to diagonal and occluded SVG to obtuse marginal 2  Ascending aortogram showing ectatic ascending aorta.     CT angiogram of the chest showed aorta measuring 4.4 cm.  He follows up with vascular surgery on a regular basis.    He denies any cardiac symptoms with no chest pain but does have chronic NYHA class II dyspnea on exertion.  He has history of longstanding tobacco use and has fortunately quit smoking a few months prior.  Clinical exam today did not reveal any signs of congestive heart failure.  No bronchospasm was noted.  His blood pressure was in the normal range.    EKG today showed  sinus rhythm with heart rate of 69 bpm.  Old inferior wall myocardial infarction.  Baseline artifacts.    SUBJECTIVE    Allergies   Allergen Reactions   • Molds & Smuts Other (See Comments)     Sinus infection   • Hydrocodone-Acetaminophen Itching         Past Medical History:   Diagnosis Date   • AAA (abdominal aortic aneurysm) (CMS/HCC)    • Acute bacterial sinusitis    • Acute bronchitis    • Acute frontal sinusitis    • Acute maxillary sinusitis    • Acute pharyngitis    • Allergic rhinitis    • Allergic rhinitis due to pollen    • Anxiety    • Artificial lens present     in position   • Backache    • Borderline glaucoma    • C. difficile diarrhea 2014   • Chronic laryngitis    • Chronic rhinitis    • Coronary artery disease    • Cough    • Degeneration of lumbar intervertebral disc    • Degenerative joint disease involving multiple joints    • Diabetes mellitus (CMS/HCC)    • Diverticular disease of colon    • Dizziness and giddiness    • Erectile dysfunction    • Essential hypertension    • Generalized anxiety disorder    • GERD (gastroesophageal reflux disease)    • Glaucoma    • Hemorrhoids     without bleeding   • Insomnia    • Kidney stone    • Kidney stones    • Malignant tumor of prostate (CMS/HCC)    • Need for prophylactic vaccination and inoculation against influenza    • Osteoarthritis    • Osteoarthritis of multiple joints    • Pain, lumbar region     Pain radiating to lumbar region of back     • PONV (postoperative nausea and vomiting)    • Postviral fatigue syndrome    • Presence of aortocoronary bypass graft    • Prostate cancer (CMS/HCC)    • Pseudomembranous enterocolitis     improved   • Rotator cuff syndrome     right   • Shoulder pain    • SOB (shortness of breath)    • Tenosynovitis    • Thrombocytopenia (CMS/HCC)    • Upper respiratory infection          Past Surgical History:   Procedure Laterality Date   • CARDIAC CATHETERIZATION  09/25/2003    Cardiac cath (Normal left ventricular  systolic function, EF 60% Multi-vessel coronary artery disease with critical disease noted in the LAD coronary artery, diagonal coronary artery, obtuse marginal coronary and right coronary artery.)   • CARDIAC CATHETERIZATION  05/03/1996    Cardiac cath (Coronary atherosclerotic heart disease. 70% diagonal stenosis. Mild to moderate left anterior descending artery stenosis. Preserved left ventricular function.)   • CARDIAC CATHETERIZATION N/A 2/26/2018    Procedure: Left Heart Cath/ pci if indicated ;  Surgeon: Radha Wilkes MD;  Location: [facility] CATH INVASIVE LOCATION;  Service:    • CATARACT EXTRACTION Bilateral    • CATARACT EXTRACTION  10/04/2011    Remove cataract, insert lens (Right)   • CATARACT EXTRACTION  08/23/2011    Remove cataract, insert lens (left)   • COLONOSCOPY     • COLONOSCOPY      Colon endoscopy 29370 (Rectal bleeding. Radiation proctitis w/bleeding. An abnormal CT of transverse colon due to mucosal ischemic colitis, that now is healed. Internal hemorrhoids w/possible bleeding.)   • COLONOSCOPY  05/10/2011    Colon endoscopy 88941 (Single sessile polyp found in transverse colon and sigmoid colon ,removed by cold biopsy polypectomy.divertic. found in sigmoid colon,descending colon. Friability/capillary friability in rectum sigmoid due to prior radiation.Inter. hem. Grade 1)   • COLONOSCOPY  05/02/2007    Colon endoscopy 55663 (Colon polyp. Diverticulosis without bleeding. Internal hemorrhoids without bleeding.)   • CORONARY ARTERY BYPASS GRAFT  2002   • CYSTOSCOPY  01/13/1995    Cystoscopy, stone removal (Right ureteroscopic lasertripsy.)   • CYSTOSCOPY Right 1/23/2019    Procedure: CYSTOSCOPY, RIGHT STENT REMOVAL;  Surgeon: Nirmal Gaines MD;  Location: [facility] OR;  Service: Urology   • CYSTOSCOPY, URETEROSCOPY, RETROGRADE PYELOGRAM, STENT INSERTION N/A 8/28/2017    Procedure: URETEROSCOPY LASER LITHOTRIPSY WITH STENT INSERTION AND RETROGRADE PYELOGRAM ;  Surgeon: Anna M. D'Amico,  MD;  Location: [facility];  Service:    • CYSTOSCOPY, URETEROSCOPY, RETROGRADE PYELOGRAM, STENT INSERTION Right 1/11/2019    Procedure: CYSTOSCOPY URETEROSCOPY RETROGRADE PYELOGRAM HOLMIUM LASER STENT INSERTION;  Surgeon: Nirmal Gaines MD;  Location: [facility];  Service: Urology   • EPIDURAL  12/03/2014    Therapeutic/diag injection (Lumbar transforaminal epidural steroid injection, L5-S1, left side.)   • EPIDURAL  10/22/2014    Therapeutic/diag injection (Lumbar transforaminal epidural steroid injection L5-S1 left side.)   • EPIDURAL  08/07/2014    Therapeutic/diag injection (Lumbar transforaminal epidural steroid injection.)   • EXCISION LESION  05/13/1999    REMOVE EAR LESION 81874 (1.3 cm basal cell carcinoma, right preauricular area. Excision)   • EXCISION LESION  03/13/2001    REMOVE LESION NECK/CHEST 79659 (Excision of irritated seborrheic keratosis, anterior neck, 1.1 cm and deep lipoma, posterior neck, 3.5 cm)   • INJECTION OF MEDICATION  11/15/2012    Kenalog (4)      • JOINT REPLACEMENT  2015    partial   • KNEE ARTHROSCOPY  01/17/2007    Knee arthroscopy, surgery (Arthroscopy with medial and lateral meniscectomies, right knee.)   • KNEE SURGERY  11/04/2015    Knee Surgery (Right unicompartmental arthroplasty.)   • LUMBAR LAMINECTOMY N/A 2/7/2017    Procedure: LUMBAR LAMINECTOMY LUMBAR THREE-FOUR, LUMBAR FOUR-FIVE, INTERLAMINAR DISTRACTION ;  Surgeon: Lucio Mayo MD;  Location: [facility];  Service:    • NOSE SURGERY     • OTHER SURGICAL HISTORY      EXTENDED VISUAL FIELDS STUDY 89462 (Borderline glaucoma) (3): 03/19/2015, 04/09/2014, 03/27/2013   • OTHER SURGICAL HISTORY  10/29/2003    Heart revascularize (TMR) (Directmyocardial revascularization times four; LIMA to LAD SVG to DARIUSZ SVG to OM1, SVG to RCA)   • OTHER SURGICAL HISTORY      OCT DISC NFL 93479 (Borderline glaucoma)  (3): 09/24/2015, 08/13/2014, 07/29/2013   • PROSTATECTOMY     • SEPTORHINOPLASTY  11/16/1989    Nasal surgery  procedure (Septorhinoplasty with reconstruction of the nasal pyramid with a iliac crest graft, rhinoplasty was performed with external approach.)   • SHOULDER ARTHROSCOPY  07/24/2013    Arthroscopy of right shoulder with rotator repair, Carla procedure, Biceps tenotomy, subacromial decompression.   • SHOULDER SURGERY  11/01/2005    Shoulder surgery procedure (Decompression, subacromial, explore of the rotator cuff, no tear found nor repaired, acromioplasty of the left shoulder and AC shoulder joint resection.)         Family History   Problem Relation Age of Onset   • Hypertension Mother    • Heart disease Mother    • Arthritis Mother    • Cancer Other    • Heart disease Other    • Hypertension Other          Social History     Socioeconomic History   • Marital status: Significant Other     Spouse name: Not on file   • Number of children: Not on file   • Years of education: Not on file   • Highest education level: Not on file   Tobacco Use   • Smoking status: Light Tobacco Smoker     Packs/day: 0.50     Years: 40.00     Pack years: 20.00     Types: Cigarettes   • Smokeless tobacco: Never Used   Substance and Sexual Activity   • Alcohol use: Yes     Comment: socially   • Drug use: No   • Sexual activity: Defer         Current Outpatient Medications   Medication Sig Dispense Refill   • acetaminophen (TYLENOL) 500 MG tablet Take 1,000 mg by mouth Every 6 (Six) Hours As Needed.     • atorvastatin (LIPITOR) 40 MG tablet Take 1 tablet by mouth Daily. 90 tablet 3   • carvedilol (COREG) 3.125 MG tablet Take 1 tablet by mouth Every 12 (Twelve) Hours. 180 tablet 3   • clopidogrel (PLAVIX) 75 MG tablet Take 75 mg by mouth Daily.     • Cyanocobalamin (VITAMIN B 12 PO) Take 500 mcg by mouth Daily. Three times weekly, MoWeFr     • cyclobenzaprine (FLEXERIL) 10 MG tablet Take 10 mg by mouth 3 (Three) Times a Day As Needed for Muscle Spasms.     • dilTIAZem CD (CARDIZEM CD) 180 MG 24 hr capsule Take 1 capsule by mouth Daily. 90  "capsule 3   • DULoxetine (CYMBALTA) 30 MG capsule Take 30 mg by mouth Daily.  0   • fluticasone (FLONASE) 50 MCG/ACT nasal spray 2 sprays into each nostril Every Night.     • Fluticasone Furoate-Vilanterol (BREO ELLIPTA) 100-25 MCG/INH inhaler Inhale 1 puff Daily. 3 each 4   • gabapentin (NEURONTIN) 600 MG tablet Take 600 mg by mouth 4 (Four) Times a Day.     • isosorbide mononitrate (IMDUR) 30 MG 24 hr tablet Take 1 tablet by mouth Daily. 30 tablet 0   • latanoprost (XALATAN) 0.005 % ophthalmic solution Administer 1 drop to both eyes every night.     • losartan (COZAAR) 50 MG tablet TAKE 1 TABLET EVERY NIGHT 90 tablet 3   • meloxicam (MOBIC) 15 MG tablet Take 1 tablet by mouth Daily. Take with meal daily 90 tablet 0   • metFORMIN (GLUCOPHAGE) 1000 MG tablet Take 1 tablet by mouth 2 (Two) Times a Day With Meals.     • Multiple Vitamins-Minerals (CENTRUM PO) Take 1 tablet by mouth daily. Centrum 0.4 mg-162 mg-18 mg tablet  (unconfirmed)     • nitroglycerin (NITROSTAT) 0.4 MG SL tablet Place 1 tablet under the tongue Every 5 (Five) Minutes As Needed for Chest Pain. Take no more than 3 doses in 15 minutes. 30 tablet 0   • omeprazole (priLOSEC) 20 MG capsule Take 20 mg by mouth Daily.     • oxybutynin XL (DITROPAN-XL) 5 MG 24 hr tablet Take 5 mg by mouth Daily.       No current facility-administered medications for this visit.          OBJECTIVE    /68 (BP Location: Left arm, Patient Position: Sitting, Cuff Size: Adult)   Pulse 85   Ht 160 cm (63\")   Wt 66 kg (145 lb 6.4 oz)   SpO2 98%   BMI 25.76 kg/m²         Review of Systems     Constitutional:  Denies recent weight loss, weight gain, fever or chills     HENT:  Denies any hearing loss, epistaxis, hoarseness, or difficulty speaking.     Eyes: Wears eyeglasses or contact lenses     Respiratory:  Dyspnea with exertion,no cough, wheezing, or hemoptysis.     Cardiovascular: Negative for palpations, chest pain, orthopnea, PND    Gastrointestinal:  Denies change " in bowel habits, dyspepsia, ulcer disease, hematochezia, or melena.     Endocrine: Negative for cold intolerance, heat intolerance, polydipsia, polyphagia and polyuria.     Genitourinary: Negative.      Musculoskeletal: DJD    Skin:  Denies any change in hair or nails, rashes, or skin lesions.     Allergic/Immunologic: Negative.  Negative for environmental allergies, food allergies and immunocompromised state.     Neurological:  Denies any history of recurrent headaches, strokes, TIA, or seizure disorder.     Hematological: Denies any food allergies, seasonal allergies, bleeding disorders, or lymphadenopathy.     Psychiatric/Behavioral: Denies any history of depression, substance abuse, or change in cognitive function.     Physical Exam      Constitutional: Cooperative, alert and oriented,  in no acute distress.      HENT:   Head: Normocephalic, normal hair patterns, no masses or tenderness.  Ears, Nose, and Throat: No gross abnormalities. No pallor or cyanosis.   Eyes: EOMS intact, PERRL, conjunctivae and lids unremarkable. Fundoscopic exam and visual fields not performed.   Neck: No palpable masses or adenopathy, no thyromegaly, no JVD, carotid pulses are full and equal bilaterally and without  Bruits.      Cardiovascular: Regular rhythm, S1 and S2 normal, no S3 or S4.  No murmurs, gallops, or rubs detected.      Pulmonary/Chest: Chest: normal symmetry,  normal respiratory excursion, no intercostal retraction, no use of accessory muscles.                                  Pulmonary: Normal breath sounds. No rales or ronchi.     Abdominal: Abdomen soft, bowel sounds normoactive, no masses, no hepatosplenomegaly, non-tender, no bruits.     Musculoskeletal: No deformities, clubbing, cyanosis, erythema, or edema observed.     Neurological: No gross motor or sensory deficits noted, affect appropriate, oriented to time, person, place.      Skin: Warm and dry to the touch, no apparent skin lesions or masses noted.      Psychiatric: He has a normal mood and affect. His behavior is normal. Judgment and thought content normal.         Lab Results   Component Value Date    WBC 16.59 (H) 05/10/2020    HGB 10.3 (L) 05/10/2020    HCT 32.0 (L) 05/10/2020    .9 (H) 05/10/2020     05/10/2020     Lab Results   Component Value Date    GLUCOSE 218 (H) 05/10/2020    BUN 23 05/10/2020    CREATININE 1.17 05/10/2020    EGFRIFNONA 60 (L) 05/10/2020    BCR 19.7 05/10/2020    CO2 25.0 05/10/2020    CALCIUM 9.5 05/10/2020    ALBUMIN 4.20 01/07/2019    AST 23 01/07/2019    ALT 18 (L) 01/07/2019     Lab Results   Component Value Date    CHOL 128 07/17/2020    CHOL 183 01/16/2020    CHOL 113 12/28/2018     Lab Results   Component Value Date    TRIG 130 07/17/2020    TRIG 148 01/16/2020    TRIG 141 12/28/2018     Lab Results   Component Value Date    HDL 34 07/17/2020    HDL 38 01/16/2020    HDL 30 (L) 12/28/2018     No components found for: LDLCALC  Lab Results   Component Value Date    LDL 85 07/17/2020     (H) 01/16/2020    LDL 68 12/28/2018     No results found for: HDLLDLRATIO  No components found for: CHOLHDL  Lab Results   Component Value Date    HGBA1C 6.5 (H) 07/17/2020     Lab Results   Component Value Date    TSH 1.43 12/28/2018           ASSESSMENT AND PLAN  Mr. Waller is stable at this time with no clinical evidence of angina, arrhythmia or congestive heart failure.  His dyspnea is most likely related to his pulmonary status.  He has quit smoking.   To reassess his left ventricular and valvular function and echocardiogram is being arranged. I have continued antihypertensive therapy with carvedilol, Cardizem CD, losartan, antianginal therapy with isosorbide mononitrate, antiplatelet therapy with Plavix, lipid-lowering therapy with atorvastatin.   His LDL has improved to 85 when checked in July this year.     Diagnoses and all orders for this visit:    1. Coronary artery disease involving native coronary artery of  native heart without angina pectoris (Primary)  -     ECG 12 Lead  -     Adult Transthoracic Echo Complete W/ Cont if Necessary Per Protocol; Future    2. Essential hypertension  -     Adult Transthoracic Echo Complete W/ Cont if Necessary Per Protocol; Future    3. Mixed hyperlipidemia  -     Adult Transthoracic Echo Complete W/ Cont if Necessary Per Protocol; Future    4. Presence of aortocoronary bypass graft  -     Adult Transthoracic Echo Complete W/ Cont if Necessary Per Protocol; Future    5. Thoracic aortic aneurysm without rupture (CMS/HCC)  -     Adult Transthoracic Echo Complete W/ Cont if Necessary Per Protocol; Future    6. PVD (peripheral vascular disease) (CMS/HCC)  -     Adult Transthoracic Echo Complete W/ Cont if Necessary Per Protocol; Future    7. Dyspnea on exertion  -     Adult Transthoracic Echo Complete W/ Cont if Necessary Per Protocol; Future        Patient's Body mass index is 25.76 kg/m². BMI is within normal parameters. No follow-up required..      Vishnu Waller has h/o tobacco abuse for several decades.  He has quit smoking.      Radha Wilkes MD  10/19/2020  14:07 CDT

## 2020-10-23 ENCOUNTER — OFFICE VISIT (OUTPATIENT)
Dept: PULMONOLOGY | Facility: CLINIC | Age: 80
End: 2020-10-23

## 2020-10-23 VITALS
BODY MASS INDEX: 26.05 KG/M2 | HEIGHT: 63 IN | DIASTOLIC BLOOD PRESSURE: 68 MMHG | WEIGHT: 147 LBS | OXYGEN SATURATION: 94 % | SYSTOLIC BLOOD PRESSURE: 142 MMHG | HEART RATE: 82 BPM

## 2020-10-23 DIAGNOSIS — J30.89 CHRONIC NON-SEASONAL ALLERGIC RHINITIS: ICD-10-CM

## 2020-10-23 DIAGNOSIS — J44.9 COPD WITH ASTHMA (HCC): Primary | ICD-10-CM

## 2020-10-23 DIAGNOSIS — R53.81 PHYSICAL DECONDITIONING: ICD-10-CM

## 2020-10-23 DIAGNOSIS — Z87.891 PERSONAL HISTORY OF TOBACCO USE, PRESENTING HAZARDS TO HEALTH: ICD-10-CM

## 2020-10-23 PROCEDURE — 99214 OFFICE O/P EST MOD 30 MIN: CPT | Performed by: INTERNAL MEDICINE

## 2020-10-23 RX ORDER — MECLIZINE HYDROCHLORIDE 25 MG/1
TABLET ORAL
COMMUNITY
End: 2021-02-12 | Stop reason: HOSPADM

## 2020-10-23 RX ORDER — TRAVOPROST OPHTHALMIC SOLUTION 0.04 MG/ML
1 SOLUTION OPHTHALMIC DAILY
COMMUNITY
Start: 2022-01-01

## 2020-10-23 RX ORDER — ALBUTEROL SULFATE 90 UG/1
2 AEROSOL, METERED RESPIRATORY (INHALATION) EVERY 4 HOURS PRN
Qty: 54 G | Refills: 4 | Status: SHIPPED | OUTPATIENT
Start: 2020-10-23 | End: 2022-01-01

## 2020-11-25 ENCOUNTER — TELEPHONE (OUTPATIENT)
Dept: CARDIOLOGY | Facility: CLINIC | Age: 80
End: 2020-11-25

## 2020-11-25 NOTE — TELEPHONE ENCOUNTER
Echo results per dr alan    No change in ascending aortic aneurysm per Dr Alan    Recommending to follow up with Dr Ocasio as before    Left patient a message to call me

## 2020-12-07 RX ORDER — LOSARTAN POTASSIUM 50 MG/1
TABLET ORAL
Qty: 90 TABLET | Refills: 3 | Status: SHIPPED | OUTPATIENT
Start: 2020-12-07 | End: 2021-01-01 | Stop reason: SDUPTHER

## 2020-12-10 ENCOUNTER — TELEPHONE (OUTPATIENT)
Dept: CARDIOLOGY | Facility: CLINIC | Age: 80
End: 2020-12-10

## 2020-12-10 NOTE — TELEPHONE ENCOUNTER
Called pt to inform him that his Losartan was sent in to [facility] on December 7th. Also informed him of what NICKO Moss had in her notes regarding his Echo Results. According to the noted, there was no change in ascending aortic aneurysm and that it is recommended that he follow up with Dr. Ocasio as before. Pt verbalized his understanding.

## 2021-01-01 ENCOUNTER — LAB (OUTPATIENT)
Dept: LAB | Facility: HOSPITAL | Age: 81
End: 2021-01-01

## 2021-01-01 ENCOUNTER — TRANSCRIBE ORDERS (OUTPATIENT)
Dept: LAB | Facility: HOSPITAL | Age: 81
End: 2021-01-01

## 2021-01-01 DIAGNOSIS — R80.9 PROTEINURIA, UNSPECIFIED TYPE: Primary | ICD-10-CM

## 2021-01-01 DIAGNOSIS — Z01.89 ENCOUNTER FOR OTHER SPECIFIED SPECIAL EXAMINATIONS: Primary | ICD-10-CM

## 2021-01-01 DIAGNOSIS — R80.9 PROTEINURIA, UNSPECIFIED TYPE: ICD-10-CM

## 2021-01-01 LAB
ALBUMIN SERPL ELPH-MCNC: 4 G/DL (ref 2.9–4.4)
ALBUMIN SERPL-MCNC: 4.3 G/DL (ref 3.5–5.2)
ALBUMIN UR-MCNC: 5.7 MG/DL
ALBUMIN/GLOB SERPL: 1.4 {RATIO} (ref 0.7–1.7)
ALPHA1 GLOB SERPL ELPH-MCNC: 0.2 G/DL (ref 0–0.4)
ALPHA2 GLOB SERPL ELPH-MCNC: 0.8 G/DL (ref 0.4–1)
ANION GAP SERPL CALCULATED.3IONS-SCNC: 10.8 MMOL/L (ref 5–15)
B-GLOBULIN SERPL ELPH-MCNC: 1.1 G/DL (ref 0.7–1.3)
BACTERIA UR QL AUTO: ABNORMAL /HPF
BILIRUB UR QL STRIP: NEGATIVE
BUN SERPL-MCNC: 23 MG/DL (ref 8–23)
BUN/CREAT SERPL: 24.5 (ref 7–25)
C3 SERPL-MCNC: 180 MG/DL (ref 82–167)
C4 SERPL-MCNC: 47 MG/DL (ref 12–38)
CALCIUM SPEC-SCNC: 10 MG/DL (ref 8.6–10.5)
CHLORIDE SERPL-SCNC: 102 MMOL/L (ref 98–107)
CLARITY UR: CLEAR
CO2 SERPL-SCNC: 30.2 MMOL/L (ref 22–29)
COLOR UR: ABNORMAL
CREAT SERPL-MCNC: 0.94 MG/DL (ref 0.76–1.27)
CREAT UR-MCNC: 126.7 MG/DL
CREAT UR-MCNC: 129.8 MG/DL
GAMMA GLOB SERPL ELPH-MCNC: 0.8 G/DL (ref 0.4–1.8)
GFR SERPL CREATININE-BSD FRML MDRD: 77 ML/MIN/1.73
GLOBULIN SER CALC-MCNC: 2.9 G/DL (ref 2.2–3.9)
GLUCOSE SERPL-MCNC: 147 MG/DL (ref 65–99)
GLUCOSE UR STRIP-MCNC: NEGATIVE MG/DL
HGB UR QL STRIP.AUTO: NEGATIVE
HYALINE CASTS UR QL AUTO: ABNORMAL /LPF
IRON 24H UR-MRATE: 177 MCG/DL (ref 59–158)
IRON SATN MFR SERPL: 44 % (ref 20–50)
KETONES UR QL STRIP: ABNORMAL
LABORATORY COMMENT REPORT: ABNORMAL
LEUKOCYTE ESTERASE UR QL STRIP.AUTO: ABNORMAL
M PROTEIN SERPL ELPH-MCNC: 0.1 G/DL
MICROALBUMIN/CREAT UR: 45 MG/G
MRSA DNA SPEC QL NAA+PROBE: NEGATIVE
NITRITE UR QL STRIP: NEGATIVE
PH UR STRIP.AUTO: 5.5 [PH] (ref 5–8)
PHOSPHATE SERPL-MCNC: 3.8 MG/DL (ref 2.5–4.5)
POTASSIUM SERPL-SCNC: 4.7 MMOL/L (ref 3.5–5.2)
PROT ?TM UR-MCNC: 35 MG/DL
PROT PATTERN SERPL ELPH-IMP: ABNORMAL
PROT SERPL-MCNC: 6.9 G/DL (ref 6–8.5)
PROT UR QL STRIP: ABNORMAL
PROT/CREAT UR: 269.6 MG/G CREA (ref 0–200)
RBC # UR STRIP: ABNORMAL /HPF
REF LAB TEST METHOD: ABNORMAL
SODIUM SERPL-SCNC: 143 MMOL/L (ref 136–145)
SP GR UR STRIP: 1.02 (ref 1–1.03)
SQUAMOUS #/AREA URNS HPF: ABNORMAL /HPF
TIBC SERPL-MCNC: 402 MCG/DL (ref 298–536)
TRANSFERRIN SERPL-MCNC: 270 MG/DL (ref 200–360)
UROBILINOGEN UR QL STRIP: ABNORMAL
WBC # UR STRIP: ABNORMAL /HPF

## 2021-01-01 PROCEDURE — 86160 COMPLEMENT ANTIGEN: CPT | Performed by: INTERNAL MEDICINE

## 2021-01-01 PROCEDURE — 83540 ASSAY OF IRON: CPT | Performed by: INTERNAL MEDICINE

## 2021-01-01 PROCEDURE — 82570 ASSAY OF URINE CREATININE: CPT

## 2021-01-01 PROCEDURE — 84466 ASSAY OF TRANSFERRIN: CPT | Performed by: INTERNAL MEDICINE

## 2021-01-01 PROCEDURE — 87641 MR-STAPH DNA AMP PROBE: CPT | Performed by: ANESTHESIOLOGY

## 2021-01-01 PROCEDURE — 84165 PROTEIN E-PHORESIS SERUM: CPT | Performed by: INTERNAL MEDICINE

## 2021-01-01 PROCEDURE — 84155 ASSAY OF PROTEIN SERUM: CPT | Performed by: INTERNAL MEDICINE

## 2021-01-01 PROCEDURE — 36415 COLL VENOUS BLD VENIPUNCTURE: CPT | Performed by: INTERNAL MEDICINE

## 2021-01-01 PROCEDURE — 82043 UR ALBUMIN QUANTITATIVE: CPT

## 2021-01-01 PROCEDURE — 84156 ASSAY OF PROTEIN URINE: CPT

## 2021-01-01 PROCEDURE — 80069 RENAL FUNCTION PANEL: CPT | Performed by: INTERNAL MEDICINE

## 2021-01-01 PROCEDURE — 81001 URINALYSIS AUTO W/SCOPE: CPT | Performed by: INTERNAL MEDICINE

## 2021-01-01 RX ORDER — LOSARTAN POTASSIUM 50 MG/1
50 TABLET ORAL NIGHTLY
Qty: 90 TABLET | Refills: 3 | Status: SHIPPED | OUTPATIENT
Start: 2021-01-01 | End: 2022-01-01 | Stop reason: SDUPTHER

## 2021-01-06 RX ORDER — CYCLOBENZAPRINE HCL 10 MG
10 TABLET ORAL 3 TIMES DAILY PRN
Qty: 30 TABLET | Refills: 0 | Status: SHIPPED | OUTPATIENT
Start: 2021-01-06 | End: 2022-01-01

## 2021-01-12 NOTE — TELEPHONE ENCOUNTER
Pt called stating that he had not received his Losartan from the mail yet. I called [facility] to verify. They stated that it was shipped on Dec 22nd and that as of Jan 8th it was still in transit with [employer]. The tracking # is [id number]. I attempted to call the patient back and inform him of this. He did not answer so I left a voicemail with this information.

## 2021-01-13 ENCOUNTER — PRIOR AUTHORIZATION (OUTPATIENT)
Dept: ORTHOPEDIC SURGERY | Facility: CLINIC | Age: 81
End: 2021-01-13

## 2021-01-13 NOTE — TELEPHONE ENCOUNTER
PA submitted and approved for Cyclobenzaprine 10mg.    Dates approved:  10/15/2020-04/13/2021    Case Number [id number]

## 2021-02-09 ENCOUNTER — HOSPITAL ENCOUNTER (OUTPATIENT)
Facility: HOSPITAL | Age: 81
Setting detail: OBSERVATION
Discharge: HOME | End: 2021-02-12
Attending: EMERGENCY MEDICINE | Admitting: FAMILY MEDICINE

## 2021-02-09 ENCOUNTER — APPOINTMENT (OUTPATIENT)
Dept: GENERAL RADIOLOGY | Facility: HOSPITAL | Age: 81
End: 2021-02-09

## 2021-02-09 DIAGNOSIS — G89.29 CHRONIC MIDLINE LOW BACK PAIN WITHOUT SCIATICA: ICD-10-CM

## 2021-02-09 DIAGNOSIS — M54.50 CHRONIC MIDLINE LOW BACK PAIN WITHOUT SCIATICA: ICD-10-CM

## 2021-02-09 DIAGNOSIS — J44.1 COPD WITH ACUTE EXACERBATION (HCC): ICD-10-CM

## 2021-02-09 DIAGNOSIS — Z74.09 IMPAIRED FUNCTIONAL MOBILITY, BALANCE, GAIT, AND ENDURANCE: ICD-10-CM

## 2021-02-09 DIAGNOSIS — Z74.09 IMPAIRED MOBILITY AND ADLS: ICD-10-CM

## 2021-02-09 DIAGNOSIS — E83.42 HYPOMAGNESEMIA: ICD-10-CM

## 2021-02-09 DIAGNOSIS — R06.09 EXERTIONAL DYSPNEA: Primary | ICD-10-CM

## 2021-02-09 DIAGNOSIS — Z78.9 IMPAIRED MOBILITY AND ADLS: ICD-10-CM

## 2021-02-09 LAB
ALBUMIN SERPL-MCNC: 3.5 G/DL (ref 3.5–5.2)
ALBUMIN/GLOB SERPL: 1.1 G/DL
ALP SERPL-CCNC: 52 U/L (ref 39–117)
ALT SERPL W P-5'-P-CCNC: 17 U/L (ref 1–41)
ANION GAP SERPL CALCULATED.3IONS-SCNC: 13 MMOL/L (ref 5–15)
ANISOCYTOSIS BLD QL: ABNORMAL
AST SERPL-CCNC: 15 U/L (ref 1–40)
BACTERIA UR QL AUTO: NORMAL /HPF
BASOPHILS # BLD AUTO: 0.12 10*3/MM3 (ref 0–0.2)
BASOPHILS NFR BLD AUTO: 0.8 % (ref 0–1.5)
BILIRUB SERPL-MCNC: 0.4 MG/DL (ref 0–1.2)
BILIRUB UR QL STRIP: ABNORMAL
BUN SERPL-MCNC: 21 MG/DL (ref 8–23)
BUN/CREAT SERPL: 18.8 (ref 7–25)
CALCIUM SPEC-SCNC: 9.4 MG/DL (ref 8.6–10.5)
CHLORIDE SERPL-SCNC: 102 MMOL/L (ref 98–107)
CHOLEST SERPL-MCNC: 98 MG/DL (ref 0–200)
CLARITY UR: CLEAR
CO2 SERPL-SCNC: 21 MMOL/L (ref 22–29)
COLOR UR: ABNORMAL
CREAT SERPL-MCNC: 1.12 MG/DL (ref 0.76–1.27)
DEPRECATED RDW RBC AUTO: 69.4 FL (ref 37–54)
EOSINOPHIL # BLD AUTO: 0.13 10*3/MM3 (ref 0–0.4)
EOSINOPHIL # BLD MANUAL: 0.16 10*3/MM3 (ref 0–0.4)
EOSINOPHIL NFR BLD AUTO: 0.8 % (ref 0.3–6.2)
EOSINOPHIL NFR BLD MANUAL: 1 % (ref 0.3–6.2)
ERYTHROCYTE [DISTWIDTH] IN BLOOD BY AUTOMATED COUNT: 17.9 % (ref 12.3–15.4)
FLUAV RNA RESP QL NAA+PROBE: NOT DETECTED
FLUBV RNA RESP QL NAA+PROBE: NOT DETECTED
GFR SERPL CREATININE-BSD FRML MDRD: 63 ML/MIN/1.73
GLOBULIN UR ELPH-MCNC: 3.3 GM/DL
GLUCOSE BLDC GLUCOMTR-MCNC: 151 MG/DL (ref 70–130)
GLUCOSE BLDC GLUCOMTR-MCNC: 154 MG/DL (ref 70–130)
GLUCOSE SERPL-MCNC: 234 MG/DL (ref 65–99)
GLUCOSE UR STRIP-MCNC: NEGATIVE MG/DL
HBA1C MFR BLD: 7.8 % (ref 4.8–5.6)
HCT VFR BLD AUTO: 29 % (ref 37.5–51)
HDLC SERPL-MCNC: 29 MG/DL (ref 40–60)
HGB BLD-MCNC: 9.8 G/DL (ref 13–17.7)
HGB UR QL STRIP.AUTO: NEGATIVE
HOLD SPECIMEN: NORMAL
HYALINE CASTS UR QL AUTO: NORMAL /LPF
HYPOCHROMIA BLD QL: ABNORMAL
IMM GRANULOCYTES # BLD AUTO: 0.66 10*3/MM3 (ref 0–0.05)
IMM GRANULOCYTES NFR BLD AUTO: 4.2 % (ref 0–0.5)
KETONES UR QL STRIP: ABNORMAL
LDLC SERPL CALC-MCNC: 43 MG/DL (ref 0–100)
LDLC/HDLC SERPL: 1.37 {RATIO}
LEUKOCYTE ESTERASE UR QL STRIP.AUTO: NEGATIVE
LYMPHOCYTES # BLD AUTO: 1.45 10*3/MM3 (ref 0.7–3.1)
LYMPHOCYTES # BLD MANUAL: 1.42 10*3/MM3 (ref 0.7–3.1)
LYMPHOCYTES NFR BLD AUTO: 9.2 % (ref 19.6–45.3)
LYMPHOCYTES NFR BLD MANUAL: 15 % (ref 5–12)
LYMPHOCYTES NFR BLD MANUAL: 9 % (ref 19.6–45.3)
MACROCYTES BLD QL SMEAR: ABNORMAL
MAGNESIUM SERPL-MCNC: 1 MG/DL (ref 1.6–2.4)
MAGNESIUM SERPL-MCNC: 2.5 MG/DL (ref 1.6–2.4)
MAGNESIUM SERPL-MCNC: 2.5 MG/DL (ref 1.6–2.4)
MCH RBC QN AUTO: 39.2 PG (ref 26.6–33)
MCHC RBC AUTO-ENTMCNC: 33.8 G/DL (ref 31.5–35.7)
MCV RBC AUTO: 116 FL (ref 79–97)
METAMYELOCYTES NFR BLD MANUAL: 1 % (ref 0–0)
MONOCYTES # BLD AUTO: 1.85 10*3/MM3 (ref 0.1–0.9)
MONOCYTES # BLD AUTO: 2.36 10*3/MM3 (ref 0.1–0.9)
MONOCYTES NFR BLD AUTO: 11.8 % (ref 5–12)
MYELOCYTES NFR BLD MANUAL: 2 % (ref 0–0)
NEUTROPHILS # BLD AUTO: 11.33 10*3/MM3 (ref 1.7–7)
NEUTROPHILS NFR BLD AUTO: 11.52 10*3/MM3 (ref 1.7–7)
NEUTROPHILS NFR BLD AUTO: 73.2 % (ref 42.7–76)
NEUTROPHILS NFR BLD MANUAL: 60 % (ref 42.7–76)
NEUTS BAND NFR BLD MANUAL: 12 % (ref 0–5)
NITRITE UR QL STRIP: NEGATIVE
NRBC BLD AUTO-RTO: 1 /100 WBC (ref 0–0.2)
NT-PROBNP SERPL-MCNC: 488.9 PG/ML (ref 0–1800)
PH UR STRIP.AUTO: <=5 [PH] (ref 5–9)
PLAT MORPH BLD: NORMAL
PLATELET # BLD AUTO: 373 10*3/MM3 (ref 140–450)
PMV BLD AUTO: 9.3 FL (ref 6–12)
POTASSIUM SERPL-SCNC: 4.2 MMOL/L (ref 3.5–5.2)
PROT SERPL-MCNC: 6.8 G/DL (ref 6–8.5)
PROT UR QL STRIP: ABNORMAL
QT INTERVAL: 328 MS
QTC INTERVAL: 412 MS
RBC # BLD AUTO: 2.5 10*6/MM3 (ref 4.14–5.8)
RBC # UR: NORMAL /HPF
REF LAB TEST METHOD: NORMAL
SARS-COV-2 RNA RESP QL NAA+PROBE: NOT DETECTED
SODIUM SERPL-SCNC: 136 MMOL/L (ref 136–145)
SP GR UR STRIP: 1.04 (ref 1–1.03)
SQUAMOUS #/AREA URNS HPF: NORMAL /HPF
TRIGL SERPL-MCNC: 147 MG/DL (ref 0–150)
TROPONIN T SERPL-MCNC: <0.01 NG/ML (ref 0–0.03)
TROPONIN T SERPL-MCNC: <0.01 NG/ML (ref 0–0.03)
TSH SERPL DL<=0.05 MIU/L-ACNC: 0.68 UIU/ML (ref 0.27–4.2)
UROBILINOGEN UR QL STRIP: ABNORMAL
VLDLC SERPL-MCNC: 26 MG/DL (ref 5–40)
WBC # BLD AUTO: 15.73 10*3/MM3 (ref 3.4–10.8)
WBC MORPH BLD: NORMAL
WBC UR QL AUTO: NORMAL /HPF
WHOLE BLOOD HOLD SPECIMEN: NORMAL

## 2021-02-09 PROCEDURE — 93005 ELECTROCARDIOGRAM TRACING: CPT | Performed by: EMERGENCY MEDICINE

## 2021-02-09 PROCEDURE — 25010000002 MAGNESIUM SULFATE 2 GM/50ML SOLUTION: Performed by: STUDENT IN AN ORGANIZED HEALTH CARE EDUCATION/TRAINING PROGRAM

## 2021-02-09 PROCEDURE — G0378 HOSPITAL OBSERVATION PER HR: HCPCS

## 2021-02-09 PROCEDURE — 96366 THER/PROPH/DIAG IV INF ADDON: CPT

## 2021-02-09 PROCEDURE — 84484 ASSAY OF TROPONIN QUANT: CPT | Performed by: EMERGENCY MEDICINE

## 2021-02-09 PROCEDURE — 63710000001 INSULIN DETEMIR PER 5 UNITS: Performed by: STUDENT IN AN ORGANIZED HEALTH CARE EDUCATION/TRAINING PROGRAM

## 2021-02-09 PROCEDURE — 94799 UNLISTED PULMONARY SVC/PX: CPT

## 2021-02-09 PROCEDURE — 80053 COMPREHEN METABOLIC PANEL: CPT | Performed by: EMERGENCY MEDICINE

## 2021-02-09 PROCEDURE — 80061 LIPID PANEL: CPT | Performed by: STUDENT IN AN ORGANIZED HEALTH CARE EDUCATION/TRAINING PROGRAM

## 2021-02-09 PROCEDURE — 99285 EMERGENCY DEPT VISIT HI MDM: CPT

## 2021-02-09 PROCEDURE — 84443 ASSAY THYROID STIM HORMONE: CPT | Performed by: STUDENT IN AN ORGANIZED HEALTH CARE EDUCATION/TRAINING PROGRAM

## 2021-02-09 PROCEDURE — C9803 HOPD COVID-19 SPEC COLLECT: HCPCS

## 2021-02-09 PROCEDURE — 94760 N-INVAS EAR/PLS OXIMETRY 1: CPT

## 2021-02-09 PROCEDURE — 93010 ELECTROCARDIOGRAM REPORT: CPT | Performed by: INTERNAL MEDICINE

## 2021-02-09 PROCEDURE — 96372 THER/PROPH/DIAG INJ SC/IM: CPT

## 2021-02-09 PROCEDURE — 25010000002 MAGNESIUM SULFATE 2 GM/50ML SOLUTION: Performed by: EMERGENCY MEDICINE

## 2021-02-09 PROCEDURE — 82607 VITAMIN B-12: CPT | Performed by: STUDENT IN AN ORGANIZED HEALTH CARE EDUCATION/TRAINING PROGRAM

## 2021-02-09 PROCEDURE — 99222 1ST HOSP IP/OBS MODERATE 55: CPT | Performed by: STUDENT IN AN ORGANIZED HEALTH CARE EDUCATION/TRAINING PROGRAM

## 2021-02-09 PROCEDURE — 83735 ASSAY OF MAGNESIUM: CPT | Performed by: STUDENT IN AN ORGANIZED HEALTH CARE EDUCATION/TRAINING PROGRAM

## 2021-02-09 PROCEDURE — 81001 URINALYSIS AUTO W/SCOPE: CPT | Performed by: EMERGENCY MEDICINE

## 2021-02-09 PROCEDURE — 36415 COLL VENOUS BLD VENIPUNCTURE: CPT | Performed by: STUDENT IN AN ORGANIZED HEALTH CARE EDUCATION/TRAINING PROGRAM

## 2021-02-09 PROCEDURE — 85007 BL SMEAR W/DIFF WBC COUNT: CPT | Performed by: EMERGENCY MEDICINE

## 2021-02-09 PROCEDURE — 83036 HEMOGLOBIN GLYCOSYLATED A1C: CPT | Performed by: STUDENT IN AN ORGANIZED HEALTH CARE EDUCATION/TRAINING PROGRAM

## 2021-02-09 PROCEDURE — 83880 ASSAY OF NATRIURETIC PEPTIDE: CPT | Performed by: EMERGENCY MEDICINE

## 2021-02-09 PROCEDURE — 82746 ASSAY OF FOLIC ACID SERUM: CPT | Performed by: STUDENT IN AN ORGANIZED HEALTH CARE EDUCATION/TRAINING PROGRAM

## 2021-02-09 PROCEDURE — 82962 GLUCOSE BLOOD TEST: CPT

## 2021-02-09 PROCEDURE — 63710000001 INSULIN ASPART PER 5 UNITS: Performed by: STUDENT IN AN ORGANIZED HEALTH CARE EDUCATION/TRAINING PROGRAM

## 2021-02-09 PROCEDURE — 25010000002 ENOXAPARIN PER 10 MG: Performed by: STUDENT IN AN ORGANIZED HEALTH CARE EDUCATION/TRAINING PROGRAM

## 2021-02-09 PROCEDURE — 96365 THER/PROPH/DIAG IV INF INIT: CPT

## 2021-02-09 PROCEDURE — 85025 COMPLETE CBC W/AUTO DIFF WBC: CPT | Performed by: EMERGENCY MEDICINE

## 2021-02-09 PROCEDURE — 71045 X-RAY EXAM CHEST 1 VIEW: CPT

## 2021-02-09 PROCEDURE — 83735 ASSAY OF MAGNESIUM: CPT | Performed by: EMERGENCY MEDICINE

## 2021-02-09 PROCEDURE — 87636 SARSCOV2 & INF A&B AMP PRB: CPT | Performed by: EMERGENCY MEDICINE

## 2021-02-09 RX ORDER — CLOPIDOGREL BISULFATE 75 MG/1
75 TABLET ORAL DAILY
Status: DISCONTINUED | OUTPATIENT
Start: 2021-02-09 | End: 2021-02-12 | Stop reason: HOSPADM

## 2021-02-09 RX ORDER — FLUTICASONE PROPIONATE 50 MCG
2 SPRAY, SUSPENSION (ML) NASAL NIGHTLY
Status: DISCONTINUED | OUTPATIENT
Start: 2021-02-09 | End: 2021-02-12 | Stop reason: HOSPADM

## 2021-02-09 RX ORDER — IPRATROPIUM BROMIDE AND ALBUTEROL SULFATE 2.5; .5 MG/3ML; MG/3ML
3 SOLUTION RESPIRATORY (INHALATION)
Status: DISCONTINUED | OUTPATIENT
Start: 2021-02-09 | End: 2021-02-12 | Stop reason: HOSPADM

## 2021-02-09 RX ORDER — PREGABALIN 75 MG/1
75 CAPSULE ORAL 2 TIMES DAILY
Status: DISCONTINUED | OUTPATIENT
Start: 2021-02-09 | End: 2021-02-12 | Stop reason: HOSPADM

## 2021-02-09 RX ORDER — NICOTINE POLACRILEX 4 MG
15 LOZENGE BUCCAL
Status: DISCONTINUED | OUTPATIENT
Start: 2021-02-09 | End: 2021-02-12 | Stop reason: HOSPADM

## 2021-02-09 RX ORDER — ISOSORBIDE MONONITRATE 30 MG/1
30 TABLET, EXTENDED RELEASE ORAL
Status: DISCONTINUED | OUTPATIENT
Start: 2021-02-09 | End: 2021-02-12 | Stop reason: HOSPADM

## 2021-02-09 RX ORDER — OXYBUTYNIN CHLORIDE 5 MG/1
5 TABLET, EXTENDED RELEASE ORAL DAILY
Status: DISCONTINUED | OUTPATIENT
Start: 2021-02-09 | End: 2021-02-12 | Stop reason: HOSPADM

## 2021-02-09 RX ORDER — ACETAMINOPHEN 325 MG/1
650 TABLET ORAL EVERY 4 HOURS PRN
Status: DISCONTINUED | OUTPATIENT
Start: 2021-02-09 | End: 2021-02-12 | Stop reason: HOSPADM

## 2021-02-09 RX ORDER — MAGNESIUM SULFATE HEPTAHYDRATE 40 MG/ML
2 INJECTION, SOLUTION INTRAVENOUS ONCE
Status: COMPLETED | OUTPATIENT
Start: 2021-02-09 | End: 2021-02-09

## 2021-02-09 RX ORDER — CARVEDILOL 3.12 MG/1
3.12 TABLET ORAL EVERY 12 HOURS SCHEDULED
Status: DISCONTINUED | OUTPATIENT
Start: 2021-02-09 | End: 2021-02-12 | Stop reason: HOSPADM

## 2021-02-09 RX ORDER — SODIUM CHLORIDE 0.9 % (FLUSH) 0.9 %
10 SYRINGE (ML) INJECTION AS NEEDED
Status: DISCONTINUED | OUTPATIENT
Start: 2021-02-09 | End: 2021-02-12 | Stop reason: HOSPADM

## 2021-02-09 RX ORDER — SODIUM CHLORIDE 0.9 % (FLUSH) 0.9 %
10 SYRINGE (ML) INJECTION EVERY 12 HOURS SCHEDULED
Status: DISCONTINUED | OUTPATIENT
Start: 2021-02-09 | End: 2021-02-12 | Stop reason: HOSPADM

## 2021-02-09 RX ORDER — DEXTROSE MONOHYDRATE 25 G/50ML
25 INJECTION, SOLUTION INTRAVENOUS
Status: DISCONTINUED | OUTPATIENT
Start: 2021-02-09 | End: 2021-02-12 | Stop reason: HOSPADM

## 2021-02-09 RX ORDER — LOSARTAN POTASSIUM 50 MG/1
50 TABLET ORAL NIGHTLY
Status: DISCONTINUED | OUTPATIENT
Start: 2021-02-09 | End: 2021-02-12 | Stop reason: HOSPADM

## 2021-02-09 RX ORDER — ONDANSETRON 2 MG/ML
4 INJECTION INTRAMUSCULAR; INTRAVENOUS EVERY 6 HOURS PRN
Status: DISCONTINUED | OUTPATIENT
Start: 2021-02-09 | End: 2021-02-12 | Stop reason: HOSPADM

## 2021-02-09 RX ORDER — AMOXICILLIN 250 MG
2 CAPSULE ORAL 2 TIMES DAILY
Status: DISCONTINUED | OUTPATIENT
Start: 2021-02-09 | End: 2021-02-12 | Stop reason: HOSPADM

## 2021-02-09 RX ORDER — ATORVASTATIN CALCIUM 40 MG/1
40 TABLET, FILM COATED ORAL NIGHTLY
Status: DISCONTINUED | OUTPATIENT
Start: 2021-02-09 | End: 2021-02-12 | Stop reason: HOSPADM

## 2021-02-09 RX ORDER — ONDANSETRON 4 MG/1
4 TABLET, FILM COATED ORAL EVERY 6 HOURS PRN
Status: DISCONTINUED | OUTPATIENT
Start: 2021-02-09 | End: 2021-02-12 | Stop reason: HOSPADM

## 2021-02-09 RX ORDER — PREGABALIN 50 MG/1
50 CAPSULE ORAL 2 TIMES DAILY
COMMUNITY
End: 2021-02-12 | Stop reason: HOSPADM

## 2021-02-09 RX ORDER — DULOXETIN HYDROCHLORIDE 30 MG/1
30 CAPSULE, DELAYED RELEASE ORAL DAILY
Status: DISCONTINUED | OUTPATIENT
Start: 2021-02-09 | End: 2021-02-12 | Stop reason: HOSPADM

## 2021-02-09 RX ORDER — DILTIAZEM HYDROCHLORIDE 180 MG/1
180 CAPSULE, COATED, EXTENDED RELEASE ORAL DAILY
Status: DISCONTINUED | OUTPATIENT
Start: 2021-02-09 | End: 2021-02-12 | Stop reason: HOSPADM

## 2021-02-09 RX ORDER — PREDNISONE 20 MG/1
40 TABLET ORAL
Status: DISCONTINUED | OUTPATIENT
Start: 2021-02-10 | End: 2021-02-12 | Stop reason: HOSPADM

## 2021-02-09 RX ADMIN — LOSARTAN POTASSIUM 50 MG: 50 TABLET, FILM COATED ORAL at 21:16

## 2021-02-09 RX ADMIN — MAGNESIUM SULFATE HEPTAHYDRATE 2 G: 40 INJECTION, SOLUTION INTRAVENOUS at 18:11

## 2021-02-09 RX ADMIN — MAGNESIUM SULFATE HEPTAHYDRATE 2 G: 40 INJECTION, SOLUTION INTRAVENOUS at 15:48

## 2021-02-09 RX ADMIN — ISOSORBIDE MONONITRATE 30 MG: 30 TABLET, EXTENDED RELEASE ORAL at 18:10

## 2021-02-09 RX ADMIN — ATORVASTATIN CALCIUM 40 MG: 40 TABLET, FILM COATED ORAL at 21:00

## 2021-02-09 RX ADMIN — DOCUSATE SODIUM 50 MG AND SENNOSIDES 8.6 MG 2 TABLET: 8.6; 5 TABLET, FILM COATED ORAL at 21:16

## 2021-02-09 RX ADMIN — SODIUM CHLORIDE, PRESERVATIVE FREE 10 ML: 5 INJECTION INTRAVENOUS at 18:09

## 2021-02-09 RX ADMIN — DILTIAZEM HYDROCHLORIDE 180 MG: 180 CAPSULE, COATED, EXTENDED RELEASE ORAL at 18:10

## 2021-02-09 RX ADMIN — CLOPIDOGREL BISULFATE 75 MG: 75 TABLET ORAL at 18:10

## 2021-02-09 RX ADMIN — ACETAMINOPHEN 650 MG: 325 TABLET, FILM COATED ORAL at 18:09

## 2021-02-09 RX ADMIN — ENOXAPARIN SODIUM 40 MG: 40 INJECTION SUBCUTANEOUS at 18:11

## 2021-02-09 RX ADMIN — OXYBUTYNIN CHLORIDE 5 MG: 5 TABLET, EXTENDED RELEASE ORAL at 18:11

## 2021-02-09 RX ADMIN — SODIUM CHLORIDE, PRESERVATIVE FREE 10 ML: 5 INJECTION INTRAVENOUS at 21:21

## 2021-02-09 RX ADMIN — INSULIN ASPART 2 UNITS: 100 INJECTION, SOLUTION INTRAVENOUS; SUBCUTANEOUS at 18:10

## 2021-02-09 RX ADMIN — INSULIN DETEMIR 20 UNITS: 100 INJECTION, SOLUTION SUBCUTANEOUS at 21:20

## 2021-02-09 RX ADMIN — DULOXETINE HYDROCHLORIDE 30 MG: 30 CAPSULE, DELAYED RELEASE ORAL at 18:10

## 2021-02-09 RX ADMIN — CARVEDILOL 3.12 MG: 3.12 TABLET, FILM COATED ORAL at 21:16

## 2021-02-09 RX ADMIN — PREGABALIN 75 MG: 75 CAPSULE ORAL at 21:16

## 2021-02-09 RX ADMIN — INSULIN ASPART 7 UNITS: 100 INJECTION, SOLUTION INTRAVENOUS; SUBCUTANEOUS at 18:10

## 2021-02-09 NOTE — ED NOTES
Pt arrives via EMS from home with c/o SOB x2 days. PT has hx of CHF. Pt arrived on 4L NC. Pt transfered from EMS stretcher to bed. Pt does not wear O2 at home. Pt was given 324 ASA and 1 SL nitro per EMS.     Robe Wilkes, RN  02/09/21 [id number]

## 2021-02-09 NOTE — ED PROVIDER NOTES
Subjective   81-year-old male presents the emergency department via EMS with complaint of shortness of breath.  He initially states 2 days of shortness of breath but reports he is actually having exertional symptoms to the point of not being able to walk to the bathroom without becoming significantly short of breath for the last few weeks.  History of abdominal aortic aneurysm, coronary artery disease with CABG, hypertension and hyperlipidemia.  Not oxygen dependent at home but hypoxic upon EMS arrival and placed on 4 L nasal cannula with improvement.  He has had stenting of his CABG grafts.  Denies any chest pain.    Family history, surgical history, social history, current medications and allergies are reviewed with the patient and triage documentation and vitals are reviewed.      History provided by:  Patient and medical records   used: No        Review of Systems   Constitutional: Negative for chills, diaphoresis and fever.   HENT: Negative for congestion and sore throat.    Eyes: Negative for photophobia and visual disturbance.   Respiratory: Negative for cough and shortness of breath.    Cardiovascular: Negative for chest pain, palpitations and leg swelling.   Gastrointestinal: Negative for abdominal pain, diarrhea, nausea and vomiting.   Endocrine: Negative for polydipsia, polyphagia and polyuria.   Genitourinary: Negative for dysuria, frequency and urgency.   Musculoskeletal: Negative for arthralgias, back pain, myalgias and neck pain.   Skin: Negative for color change, pallor, rash and wound.   Allergic/Immunologic: Negative.    Neurological: Negative.    Hematological: Negative.    Psychiatric/Behavioral: Negative.        Past Medical History:   Diagnosis Date   • AAA (abdominal aortic aneurysm) (CMS/[facility])    • Acute bacterial sinusitis    • Acute bronchitis    • Acute frontal sinusitis    • Acute maxillary sinusitis    • Acute pharyngitis    • Allergic rhinitis    • Allergic rhinitis  due to pollen    • Anxiety    • Artificial lens present     in position   • Backache    • Borderline glaucoma    • C. difficile diarrhea 2014   • Chronic laryngitis    • Chronic rhinitis    • Coronary artery disease    • Cough    • Degeneration of lumbar intervertebral disc    • Degenerative joint disease involving multiple joints    • Diabetes mellitus (CMS/HCC)    • Diverticular disease of colon    • Dizziness and giddiness    • Erectile dysfunction    • Essential hypertension    • Generalized anxiety disorder    • GERD (gastroesophageal reflux disease)    • Glaucoma    • Hemorrhoids     without bleeding   • Insomnia    • Kidney stone    • Kidney stones    • Malignant tumor of prostate (CMS/HCC)    • Need for prophylactic vaccination and inoculation against influenza    • Osteoarthritis    • Osteoarthritis of multiple joints    • Pain, lumbar region     Pain radiating to lumbar region of back     • PONV (postoperative nausea and vomiting)    • Postviral fatigue syndrome    • Presence of aortocoronary bypass graft    • Prostate cancer (CMS/HCC)    • Pseudomembranous enterocolitis     improved   • Rotator cuff syndrome     right   • Shoulder pain    • SOB (shortness of breath)    • Tenosynovitis    • Thrombocytopenia (CMS/HCC)    • Upper respiratory infection        Allergies   Allergen Reactions   • Molds & Smuts Other (See Comments)     Sinus infection   • Hydrocodone-Acetaminophen Itching       Past Surgical History:   Procedure Laterality Date   • CARDIAC CATHETERIZATION  09/25/2003    Cardiac cath (Normal left ventricular systolic function, EF 60% Multi-vessel coronary artery disease with critical disease noted in the LAD coronary artery, diagonal coronary artery, obtuse marginal coronary and right coronary artery.)   • CARDIAC CATHETERIZATION  05/03/1996    Cardiac cath (Coronary atherosclerotic heart disease. 70% diagonal stenosis. Mild to moderate left anterior descending artery stenosis. Preserved left  ventricular function.)   • CARDIAC CATHETERIZATION N/A 2/26/2018    Procedure: Left Heart Cath/ pci if indicated ;  Surgeon: Radha Wilkes MD;  Location: [facility] CATH INVASIVE LOCATION;  Service:    • CATARACT EXTRACTION Bilateral    • CATARACT EXTRACTION  10/04/2011    Remove cataract, insert lens (Right)   • CATARACT EXTRACTION  08/23/2011    Remove cataract, insert lens (left)   • COLONOSCOPY     • COLONOSCOPY      Colon endoscopy 35342 (Rectal bleeding. Radiation proctitis w/bleeding. An abnormal CT of transverse colon due to mucosal ischemic colitis, that now is healed. Internal hemorrhoids w/possible bleeding.)   • COLONOSCOPY  05/10/2011    Colon endoscopy 15800 (Single sessile polyp found in transverse colon and sigmoid colon ,removed by cold biopsy polypectomy.divertic. found in sigmoid colon,descending colon. Friability/capillary friability in rectum sigmoid due to prior radiation.Inter. hem. Grade 1)   • COLONOSCOPY  05/02/2007    Colon endoscopy 21722 (Colon polyp. Diverticulosis without bleeding. Internal hemorrhoids without bleeding.)   • CORONARY ARTERY BYPASS GRAFT  2002   • CYSTOSCOPY  01/13/1995    Cystoscopy, stone removal (Right ureteroscopic lasertripsy.)   • CYSTOSCOPY Right 1/23/2019    Procedure: CYSTOSCOPY, RIGHT STENT REMOVAL;  Surgeon: Nirmal Gaines MD;  Location: [facility] OR;  Service: Urology   • CYSTOSCOPY, URETEROSCOPY, RETROGRADE PYELOGRAM, STENT INSERTION N/A 8/28/2017    Procedure: URETEROSCOPY LASER LITHOTRIPSY WITH STENT INSERTION AND RETROGRADE PYELOGRAM ;  Surgeon: Anna M. D'Amico, MD;  Location: [facility] OR;  Service:    • CYSTOSCOPY, URETEROSCOPY, RETROGRADE PYELOGRAM, STENT INSERTION Right 1/11/2019    Procedure: CYSTOSCOPY URETEROSCOPY RETROGRADE PYELOGRAM HOLMIUM LASER STENT INSERTION;  Surgeon: Nirmal Gaines MD;  Location: [facility] OR;  Service: Urology   • EPIDURAL  12/03/2014    Therapeutic/diag injection (Lumbar transforaminal epidural steroid injection,  L5-S1, left side.)   • EPIDURAL  10/22/2014    Therapeutic/diag injection (Lumbar transforaminal epidural steroid injection L5-S1 left side.)   • EPIDURAL  08/07/2014    Therapeutic/diag injection (Lumbar transforaminal epidural steroid injection.)   • EXCISION LESION  05/13/1999    REMOVE EAR LESION 93607 (1.3 cm basal cell carcinoma, right preauricular area. Excision)   • EXCISION LESION  03/13/2001    REMOVE LESION NECK/CHEST 35906 (Excision of irritated seborrheic keratosis, anterior neck, 1.1 cm and deep lipoma, posterior neck, 3.5 cm)   • INJECTION OF MEDICATION  11/15/2012    Kenalog (4)      • JOINT REPLACEMENT  2015    partial   • KNEE ARTHROSCOPY  01/17/2007    Knee arthroscopy, surgery (Arthroscopy with medial and lateral meniscectomies, right knee.)   • KNEE SURGERY  11/04/2015    Knee Surgery (Right unicompartmental arthroplasty.)   • LUMBAR LAMINECTOMY N/A 2/7/2017    Procedure: LUMBAR LAMINECTOMY LUMBAR THREE-FOUR, LUMBAR FOUR-FIVE, INTERLAMINAR DISTRACTION ;  Surgeon: Lucio Mayo MD;  Location: [facility];  Service:    • NOSE SURGERY     • OTHER SURGICAL HISTORY      EXTENDED VISUAL FIELDS STUDY 28379 (Borderline glaucoma) (3): 03/19/2015, 04/09/2014, 03/27/2013   • OTHER SURGICAL HISTORY  10/29/2003    Heart revascularize (TMR) (Directmyocardial revascularization times four; LIMA to LAD SVG to DARIUSZ SVG to OM1, SVG to RCA)   • OTHER SURGICAL HISTORY      OCT DISC NFL 30497 (Borderline glaucoma)  (3): 09/24/2015, 08/13/2014, 07/29/2013   • PROSTATECTOMY     • SEPTORHINOPLASTY  11/16/1989    Nasal surgery procedure (Septorhinoplasty with reconstruction of the nasal pyramid with a iliac crest graft, rhinoplasty was performed with external approach.)   • SHOULDER ARTHROSCOPY  07/24/2013    Arthroscopy of right shoulder with rotator repair, Carla procedure, Biceps tenotomy, subacromial decompression.   • SHOULDER SURGERY  11/01/2005    Shoulder surgery procedure (Decompression, subacromial,  explore of the rotator cuff, no tear found nor repaired, acromioplasty of the left shoulder and AC shoulder joint resection.)       Family History   Problem Relation Age of Onset   • Hypertension Mother    • Heart disease Mother    • Arthritis Mother    • Cancer Other    • Heart disease Other    • Hypertension Other        Social History     Socioeconomic History   • Marital status:      Spouse name: Not on file   • Number of children: Not on file   • Years of education: Not on file   • Highest education level: Not on file   Tobacco Use   • Smoking status: Light Tobacco Smoker     Packs/day: 0.50     Years: 40.00     Pack years: 20.00     Types: Cigarettes   • Smokeless tobacco: Never Used   Substance and Sexual Activity   • Alcohol use: Yes     Comment: socially   • Drug use: No   • Sexual activity: Defer           Objective   Physical Exam  Vitals signs and nursing note reviewed.   Constitutional:       General: He is not in acute distress.     Appearance: He is well-developed and normal weight. He is not ill-appearing, toxic-appearing or diaphoretic.   HENT:      Head: Normocephalic.   Eyes:      Pupils: Pupils are equal, round, and reactive to light.   Neck:      Musculoskeletal: Normal range of motion and neck supple.   Cardiovascular:      Rate and Rhythm: Normal rate and regular rhythm.  No extrasystoles are present.     Pulses: Normal pulses.   Pulmonary:      Effort: Tachypnea present. No accessory muscle usage or respiratory distress.      Breath sounds: Examination of the right-lower field reveals decreased breath sounds. Examination of the left-lower field reveals decreased breath sounds. Decreased breath sounds present. No wheezing, rhonchi or rales.   Chest:      Chest wall: No tenderness or crepitus.   Abdominal:      General: Bowel sounds are normal.      Palpations: Abdomen is soft. There is no hepatomegaly or splenomegaly.      Tenderness: There is no abdominal tenderness. There is no  guarding or rebound.   Musculoskeletal: Normal range of motion.      Right lower leg: He exhibits no tenderness. No edema.      Left lower leg: He exhibits no tenderness. No edema.   Skin:     General: Skin is warm and dry.      Capillary Refill: Capillary refill takes less than 2 seconds.   Neurological:      General: No focal deficit present.      Mental Status: He is alert and oriented to person, place, and time.   Psychiatric:         Mood and Affect: Mood normal.         Behavior: Behavior normal.         Procedures  none         ED Course      Labs Reviewed   COMPREHENSIVE METABOLIC PANEL - Abnormal; Notable for the following components:       Result Value    Glucose 234 (*)     CO2 21.0 (*)     All other components within normal limits    Narrative:     GFR Normal >60  Chronic Kidney Disease <60  Kidney Failure <15     MAGNESIUM - Abnormal; Notable for the following components:    Magnesium 1.0 (*)     All other components within normal limits   CBC WITH AUTO DIFFERENTIAL - Abnormal; Notable for the following components:    WBC 15.73 (*)     RBC 2.50 (*)     Hemoglobin 9.8 (*)     Hematocrit 29.0 (*)     .0 (*)     MCH 39.2 (*)     RDW 17.9 (*)     RDW-SD 69.4 (*)     Lymphocyte % 9.2 (*)     Immature Grans % 4.2 (*)     Neutrophils, Absolute 11.52 (*)     Monocytes, Absolute 1.85 (*)     Immature Grans, Absolute 0.66 (*)     nRBC 1.0 (*)     All other components within normal limits   MANUAL DIFFERENTIAL - Abnormal; Notable for the following components:    Lymphocyte % 9.0 (*)     Monocyte % 15.0 (*)     Bands %  12.0 (*)     Metamyelocyte % 1.0 (*)     Myelocyte % 2.0 (*)     Neutrophils Absolute 11.33 (*)     Monocytes Absolute 2.36 (*)     All other components within normal limits   COVID-19 AND FLU A/B, NP SWAB IN TRANSPORT MEDIA 8-12 HR TAT - Normal    Narrative:     Fact sheet for providers: https://www.fda.gov/media/971430/download    Fact sheet for patients:  https://www.fda.gov/media/417342/download    Test performed by PCR.   BNP (IN-HOUSE) - Normal    Narrative:     Among patients with dyspnea, NT-proBNP is highly sensitive for the detection of acute congestive heart failure. In addition NT-proBNP of <300 pg/ml effectively rules out acute congestive heart failure with 99% negative predictive value.    Results may be falsely decreased if patient taking Biotin.     TROPONIN (IN-HOUSE) - Normal    Narrative:     Troponin T Reference Range:  <= 0.03 ng/mL-   Negative for AMI  >0.03 ng/mL-     Abnormal for myocardial necrosis.  Clinicians would have to utilize clinical acumen, EKG, Troponin and serial changes to determine if it is an Acute Myocardial Infarction or myocardial injury due to an underlying chronic condition.       Results may be falsely decreased if patient taking Biotin.     TROPONIN (IN-HOUSE) - Normal    Narrative:     Troponin T Reference Range:  <= 0.03 ng/mL-   Negative for AMI  >0.03 ng/mL-     Abnormal for myocardial necrosis.  Clinicians would have to utilize clinical acumen, EKG, Troponin and serial changes to determine if it is an Acute Myocardial Infarction or myocardial injury due to an underlying chronic condition.       Results may be falsely decreased if patient taking Biotin.     URINALYSIS W/ MICROSCOPIC IF INDICATED (NO CULTURE)   URINALYSIS, MICROSCOPIC ONLY   CBC AND DIFFERENTIAL    Narrative:     The following orders were created for panel order CBC & Differential.  Procedure                               Abnormality         Status                     ---------                               -----------         ------                     Scan Slide[[id number]]                                                                  CBC Auto Differential[[id number]]        Abnormal            Final result                 Please view results for these tests on the individual orders.   EXTRA TUBES    Narrative:     The following orders were created for  panel order Extra Tubes.  Procedure                               Abnormality         Status                     ---------                               -----------         ------                     Light Blue Top[[id number]]                                   Final result               Gold Top - [facility][[id number]]                                   Final result                 Please view results for these tests on the individual orders.   LIGHT BLUE TOP   GOLD TOP - SST     Xr Chest 1 View    Result Date: 2/9/2021  Narrative: EXAM DESCRIPTION:  XR CHEST 1 VW CLINICAL HISTORY: 81 years  Male  short of breath COMPARISON: February 24, 2018 TECHNIQUE: One view-AP portable radiograph of the chest FINDINGS: The lungs are well-expanded and clear. The cardiac silhouette and pulmonary vasculature are within normal limits. There is evidence of prior median sternotomy. There are no pleural effusions.     Impression: 1. Stable appearance the chest without radiographic evidence of acute cardiopulmonary disease. Electronically signed by:  Ias Douglass MD  2/9/2021 2:46 PM CST Workstation: [id number]    EKG February 9, 2021 at 1348 reveals normal sinus rhythm rate of 95 bpm.  Some baseline artifact with T wave inversion in aVL.  T wave flattening in V2.  No ST elevation or depression.  No evidence of acute ischemia.  .          MDM  Number of Diagnoses or Management Options  Exertional dyspnea:   Hypomagnesemia:      Amount and/or Complexity of Data Reviewed  Clinical lab tests: reviewed  Tests in the radiology section of CPT®: reviewed  Discuss the patient with other providers: yes    Patient Progress  Patient progress: stable    Patient found to have hypomagnesemia at 1.  Patient with exertional dyspnea and history of significant coronary artery disease.  Last heart cath appears to be in 2018 when he received a stent in his saphenous vein graft.  Advised patient of need for admission for replacement of magnesium and further  evaluation of his exertional dyspnea.  Currently on 2 L nasal cannula. Patient acknowledges understanding and agreeable to admission.    Final diagnoses:   Exertional dyspnea   Hypomagnesemia            Yair Lozoya,   02/09/21 1600

## 2021-02-09 NOTE — ACP (ADVANCE CARE PLANNING)
ACP NOTE  Of note: Patient voiced desire to be a   Full code  Patient nominated Julissa Ryan  to be their Healthcare surrogate,  contact information is [phone]    Signed,   Akbar Zapien MD  Family Medicine Resident PGY3  [facility]        This document has been electronically signed by Akbar Zapien MD on February 9, 2021 17:32 CST

## 2021-02-09 NOTE — H&P
"    HISTORY AND PHYSICAL  NAME: Vishnu Waller  : 1940  MRN: [id number]    DATE OF ADMISSION: 21    DATE & TIME SEEN: 21 4:56 PM CST    PCP: Johnathon Shaikh MD    CODE STATUS: Full code    CHIEF COMPLAINT Worsening SOA     HPI:  Vishnu Waller is a 81 y.o. male with a CMH of COPD, CAD with CABG, hx of stenting of CABG, hypertension, degenerative disc disease, AAA, hyperlipidemia who presents complaining of worsening shortness of air.  Patient reported he has had gradual worsening exertional shortness of air over the past 6 weeks.  Patient reports he gets \"out of breath when walking to the bathroom \".  SOA is associated with increased sputum production and wheezing.  Of note patient was followed by pulmonology in the past and had been on inhalers however due to cost has not had them in over 6 to 8 weeks.  Patient does endorse using his rescue inhaler up to 2-3 daily.   Patient also reports dizziness and vertigo when he turns from side to side.    Of note: Patient voiced desire to be a   Full code  Patient nominated Julissa Ryan  to be their Healthcare surrogate,  contact information is [phone]    CONCURRENT MEDICAL HISTORY:  Past Medical History:   Diagnosis Date   • AAA (abdominal aortic aneurysm) (CMS/[facility])    • Acute bacterial sinusitis    • Acute bronchitis    • Acute frontal sinusitis    • Acute maxillary sinusitis    • Acute pharyngitis    • Allergic rhinitis    • Allergic rhinitis due to pollen    • Anxiety    • Artificial lens present     in position   • Asthma    • Backache    • Borderline glaucoma    • C. difficile diarrhea    • Chronic laryngitis    • Chronic rhinitis    • COPD (chronic obstructive pulmonary disease) (CMS/[facility])    • Coronary artery disease    • Cough    • Degeneration of lumbar intervertebral disc    • Degenerative joint disease involving multiple joints    • Diabetes mellitus (CMS/[facility])    • Diverticular disease of colon    • Dizziness and giddiness "    • Elevated cholesterol    • Erectile dysfunction    • Essential hypertension    • Generalized anxiety disorder    • GERD (gastroesophageal reflux disease)    • Glaucoma    • Hemorrhoids     without bleeding   • Insomnia    • Kidney stone    • Kidney stones    • Malignant tumor of prostate (CMS/HCC)    • Need for prophylactic vaccination and inoculation against influenza    • Osteoarthritis    • Osteoarthritis of multiple joints    • Pain, lumbar region     Pain radiating to lumbar region of back     • PONV (postoperative nausea and vomiting)    • Postviral fatigue syndrome    • Presence of aortocoronary bypass graft    • Prostate cancer (CMS/HCC)    • Pseudomembranous enterocolitis     improved   • Rotator cuff syndrome     right   • Shoulder pain    • SOB (shortness of breath)    • Tenosynovitis    • Thrombocytopenia (CMS/HCC)    • Upper respiratory infection        PAST SURGICAL HISTORY:  Past Surgical History:   Procedure Laterality Date   • CARDIAC CATHETERIZATION  09/25/2003    Cardiac cath (Normal left ventricular systolic function, EF 60% Multi-vessel coronary artery disease with critical disease noted in the LAD coronary artery, diagonal coronary artery, obtuse marginal coronary and right coronary artery.)   • CARDIAC CATHETERIZATION  05/03/1996    Cardiac cath (Coronary atherosclerotic heart disease. 70% diagonal stenosis. Mild to moderate left anterior descending artery stenosis. Preserved left ventricular function.)   • CARDIAC CATHETERIZATION N/A 2/26/2018    Procedure: Left Heart Cath/ pci if indicated ;  Surgeon: Radha Wilkes MD;  Location: [facility] INVASIVE LOCATION;  Service:    • CATARACT EXTRACTION Bilateral    • CATARACT EXTRACTION  10/04/2011    Remove cataract, insert lens (Right)   • CATARACT EXTRACTION  08/23/2011    Remove cataract, insert lens (left)   • COLONOSCOPY     • COLONOSCOPY      Colon endoscopy 12476 (Rectal bleeding. Radiation proctitis w/bleeding. An abnormal  CT of transverse colon due to mucosal ischemic colitis, that now is healed. Internal hemorrhoids w/possible bleeding.)   • COLONOSCOPY  05/10/2011    Colon endoscopy 15646 (Single sessile polyp found in transverse colon and sigmoid colon ,removed by cold biopsy polypectomy.divertic. found in sigmoid colon,descending colon. Friability/capillary friability in rectum sigmoid due to prior radiation.Inter. hem. Grade 1)   • COLONOSCOPY  05/02/2007    Colon endoscopy 36050 (Colon polyp. Diverticulosis without bleeding. Internal hemorrhoids without bleeding.)   • CORONARY ARTERY BYPASS GRAFT  2002   • CYSTOSCOPY  01/13/1995    Cystoscopy, stone removal (Right ureteroscopic lasertripsy.)   • CYSTOSCOPY Right 1/23/2019    Procedure: CYSTOSCOPY, RIGHT STENT REMOVAL;  Surgeon: Nirmal Gaines MD;  Location: [facility];  Service: Urology   • CYSTOSCOPY, URETEROSCOPY, RETROGRADE PYELOGRAM, STENT INSERTION N/A 8/28/2017    Procedure: URETEROSCOPY LASER LITHOTRIPSY WITH STENT INSERTION AND RETROGRADE PYELOGRAM ;  Surgeon: Anna M. D'Amico, MD;  Location: [facility];  Service:    • CYSTOSCOPY, URETEROSCOPY, RETROGRADE PYELOGRAM, STENT INSERTION Right 1/11/2019    Procedure: CYSTOSCOPY URETEROSCOPY RETROGRADE PYELOGRAM HOLMIUM LASER STENT INSERTION;  Surgeon: Nirmal Gaines MD;  Location: [facility];  Service: Urology   • EPIDURAL  12/03/2014    Therapeutic/diag injection (Lumbar transforaminal epidural steroid injection, L5-S1, left side.)   • EPIDURAL  10/22/2014    Therapeutic/diag injection (Lumbar transforaminal epidural steroid injection L5-S1 left side.)   • EPIDURAL  08/07/2014    Therapeutic/diag injection (Lumbar transforaminal epidural steroid injection.)   • EXCISION LESION  05/13/1999    REMOVE EAR LESION 45533 (1.3 cm basal cell carcinoma, right preauricular area. Excision)   • EXCISION LESION  03/13/2001    REMOVE LESION NECK/CHEST 37361 (Excision of irritated seborrheic keratosis, anterior neck, 1.1 cm and deep  lipoma, posterior neck, 3.5 cm)   • INJECTION OF MEDICATION  11/15/2012    Kenalog (4)      • JOINT REPLACEMENT  2015    partial   • KNEE ARTHROSCOPY  01/17/2007    Knee arthroscopy, surgery (Arthroscopy with medial and lateral meniscectomies, right knee.)   • KNEE SURGERY  11/04/2015    Knee Surgery (Right unicompartmental arthroplasty.)   • LUMBAR LAMINECTOMY N/A 2/7/2017    Procedure: LUMBAR LAMINECTOMY LUMBAR THREE-FOUR, LUMBAR FOUR-FIVE, INTERLAMINAR DISTRACTION ;  Surgeon: Lucio Mayo MD;  Location: [facility];  Service:    • NOSE SURGERY     • OTHER SURGICAL HISTORY      EXTENDED VISUAL FIELDS STUDY 28173 (Borderline glaucoma) (3): 03/19/2015, 04/09/2014, 03/27/2013   • OTHER SURGICAL HISTORY  10/29/2003    Heart revascularize (TMR) (Directmyocardial revascularization times four; LIMA to LAD SVG to DARIUSZ SVG to OM1, SVG to RCA)   • OTHER SURGICAL HISTORY      OCT DISC NFL 94750 (Borderline glaucoma)  (3): 09/24/2015, 08/13/2014, 07/29/2013   • PROSTATECTOMY     • SEPTORHINOPLASTY  11/16/1989    Nasal surgery procedure (Septorhinoplasty with reconstruction of the nasal pyramid with a iliac crest graft, rhinoplasty was performed with external approach.)   • SHOULDER ARTHROSCOPY  07/24/2013    Arthroscopy of right shoulder with rotator repair, Carla procedure, Biceps tenotomy, subacromial decompression.   • SHOULDER SURGERY  11/01/2005    Shoulder surgery procedure (Decompression, subacromial, explore of the rotator cuff, no tear found nor repaired, acromioplasty of the left shoulder and AC shoulder joint resection.)       FAMILY HISTORY:  Family History   Problem Relation Age of Onset   • Hypertension Mother    • Heart disease Mother    • Arthritis Mother    • Cancer Other    • Heart disease Other    • Hypertension Other         SOCIAL HISTORY:  Social History     Socioeconomic History   • Marital status:      Spouse name: Not on file   • Number of children: Not on file   • Years of  education: Not on file   • Highest education level: Not on file   Tobacco Use   • Smoking status: Light Tobacco Smoker     Packs/day: 0.50     Years: 40.00     Pack years: 20.00     Types: Cigarettes   • Smokeless tobacco: Never Used   Substance and Sexual Activity   • Alcohol use: Yes     Comment: socially   • Drug use: No   • Sexual activity: Defer       HOME MEDICATIONS:  Prior to Admission medications    Medication Sig Start Date End Date Taking? Authorizing Provider   pregabalin (LYRICA) 50 MG capsule Take 50 mg by mouth 2 (Two) Times a Day.   Yes Vicky Kent MD   acetaminophen (TYLENOL) 500 MG tablet Take 1,000 mg by mouth Every 6 (Six) Hours As Needed.    Vicky Kent MD   albuterol sulfate  (90 Base) MCG/ACT inhaler Inhale 2 puffs Every 4 (Four) Hours As Needed for Wheezing or Shortness of Air. 10/23/20   Camryn Koheler MD   atorvastatin (LIPITOR) 40 MG tablet Take 1 tablet by mouth Daily. 4/16/20   Radha Wilkes MD   carvedilol (COREG) 3.125 MG tablet Take 1 tablet by mouth Every 12 (Twelve) Hours. 7/8/20   Radha Wilkes MD   clopidogrel (PLAVIX) 75 MG tablet Take 75 mg by mouth Daily.    Vicky Kent MD   Cyanocobalamin (VITAMIN B 12 PO) Take 500 mcg by mouth Daily. Three times weekly, MoWeFr    Vicky Kent MD   cyclobenzaprine (FLEXERIL) 10 MG tablet Take 1 tablet by mouth 3 (Three) Times a Day As Needed for Muscle Spasms. 1/6/21   Cordell Guardado MD   dilTIAZem CD (CARDIZEM CD) 180 MG 24 hr capsule Take 1 capsule by mouth Daily. 3/20/20   Radha Wilkes MD   DULoxetine (CYMBALTA) 30 MG capsule Take 30 mg by mouth Daily. 9/20/19   Vicky Kent MD   fluticasone (FLONASE) 50 MCG/ACT nasal spray 2 sprays into each nostril Every Night. 2/14/18   Camryn Koehler MD   isosorbide mononitrate (IMDUR) 30 MG 24 hr tablet Take 1 tablet by mouth Daily. 2/28/18   Julio Cesar Moss SERA   latanoprost (XALATAN)  0.005 % ophthalmic solution Administer 1 drop to both eyes every night. 4/1/16   Vicky Kent MD   losartan (COZAAR) 50 MG tablet TAKE 1 TABLET EVERY NIGHT 12/7/20   Radha Wilkes MD   meclizine (ANTIVERT) 25 MG tablet meclizine 25 mg tablet    Vicky Kent MD   meloxicam (MOBIC) 15 MG tablet Take 1 tablet by mouth Daily. Take with meal daily 3/14/19   Nirmal Paez MD   metFORMIN (GLUCOPHAGE) 1000 MG tablet Take 1 tablet by mouth 2 (Two) Times a Day With Meals. 2/28/18   Julio Cesar Moss APRN   Multiple Vitamins-Minerals (CENTRUM PO) Take 1 tablet by mouth daily. Centrum 0.4 mg-162 mg-18 mg tablet  (unconfirmed) 11/16/15   Vicky Kent MD   nitroglycerin (NITROSTAT) 0.4 MG SL tablet Place 1 tablet under the tongue Every 5 (Five) Minutes As Needed for Chest Pain. Take no more than 3 doses in 15 minutes. 2/27/18   Julio Cesar Moss APRN   omeprazole (priLOSEC) 20 MG capsule Take 20 mg by mouth Daily.    Vicky Kent MD   oxybutynin XL (DITROPAN-XL) 5 MG 24 hr tablet Take 5 mg by mouth Daily.    Vicky Kent MD   travoprost, BAK free, (TRAVATAN) 0.004 % solution ophthalmic solution 1 drop.    Vicky Kent MD   gabapentin (NEURONTIN) 600 MG tablet Take 600 mg by mouth 4 (Four) Times a Day.  2/9/21  Vicky Kent MD       ALLERGIES:  Molds & smuts and Hydrocodone-acetaminophen    REVIEW OF SYSTEMS  Review of Systems   Constitutional: Positive for fatigue and unexpected weight change. Negative for activity change, appetite change, chills and fever.   HENT: Negative for congestion, hearing loss, sinus pressure, sinus pain, sneezing, sore throat and trouble swallowing.    Eyes: Negative for pain, discharge and itching.   Respiratory: Positive for cough and shortness of breath. Negative for apnea, choking, chest tightness, wheezing and stridor.    Cardiovascular: Negative for chest pain, palpitations and leg swelling.    Gastrointestinal: Positive for nausea. Negative for abdominal distention, abdominal pain, anal bleeding, constipation, diarrhea and vomiting.   Genitourinary: Negative for difficulty urinating, dysuria, enuresis and flank pain.   Musculoskeletal: Positive for back pain. Negative for arthralgias and gait problem.   Skin: Negative for color change.   Neurological: Positive for dizziness. Negative for seizures, syncope, facial asymmetry, light-headedness, numbness and headaches.   Psychiatric/Behavioral: Negative for agitation and behavioral problems.       PHYSICAL EXAM:  Temp:  [96.2 °F (35.7 °C)-97.9 °F (36.6 °C)] 97.5 °F (36.4 °C)  Heart Rate:  [] 73  Resp:  [18-26] 18  BP: (110-183)/(59-88) 110/59  Body mass index is 23.19 kg/m².  Physical Exam  Constitutional:       General: He is not in acute distress.     Appearance: He is well-developed. He is not diaphoretic.   HENT:      Head: Normocephalic and atraumatic.      Right Ear: External ear normal.      Left Ear: External ear normal.      Nose: Nose normal.   Eyes:      General:         Right eye: No discharge.         Left eye: No discharge.      Pupils: Pupils are equal, round, and reactive to light.   Neck:      Musculoskeletal: Normal range of motion and neck supple.      Thyroid: No thyromegaly.      Trachea: No tracheal deviation.   Cardiovascular:      Rate and Rhythm: Normal rate and regular rhythm.      Heart sounds: Normal heart sounds.   Pulmonary:      Effort: Pulmonary effort is normal. No respiratory distress.      Breath sounds: No stridor. Examination of the right-middle field reveals wheezing. Examination of the left-middle field reveals wheezing. Examination of the right-lower field reveals wheezing. Examination of the left-lower field reveals wheezing. Wheezing present. No rales.   Abdominal:      General: Bowel sounds are normal. There is no distension.      Palpations: Abdomen is soft.      Tenderness: There is no abdominal tenderness.    Musculoskeletal: Normal range of motion.         General: No tenderness or deformity.      Right lower leg: No edema.      Left lower leg: No edema.   Lymphadenopathy:      Cervical: No cervical adenopathy.   Skin:     General: Skin is warm and dry.   Neurological:      Mental Status: He is alert and oriented to person, place, and time.      Cranial Nerves: No cranial nerve deficit.      Comments: Cn2-12 grossly intact         DIAGNOSTIC DATA:   Lab Results (last 24 hours)     Procedure Component Value Units Date/Time    Comprehensive Metabolic Panel [[id number]]  (Abnormal) Collected: 02/10/21 0526    Specimen: Blood Updated: 02/10/21 0559     Glucose 82 mg/dL      BUN 23 mg/dL      Creatinine 0.93 mg/dL      Sodium 139 mmol/L      Potassium 4.3 mmol/L      Chloride 105 mmol/L      CO2 27.0 mmol/L      Calcium 9.0 mg/dL      Total Protein 5.8 g/dL      Albumin 3.10 g/dL      ALT (SGPT) 14 U/L      AST (SGOT) 14 U/L      Alkaline Phosphatase 45 U/L      Total Bilirubin 0.3 mg/dL      eGFR Non African Amer 78 mL/min/1.73      Globulin 2.7 gm/dL      A/G Ratio 1.1 g/dL      BUN/Creatinine Ratio 24.7     Anion Gap 7.0 mmol/L     Narrative:      GFR Normal >60  Chronic Kidney Disease <60  Kidney Failure <15      Magnesium [[id number]]  (Normal) Collected: 02/10/21 0526    Specimen: Blood Updated: 02/10/21 0559     Magnesium 2.0 mg/dL     CBC Auto Differential [[id number]]  (Abnormal) Collected: 02/10/21 0526    Specimen: Blood Updated: 02/10/21 0542     WBC 11.81 10*3/mm3      RBC 2.14 10*6/mm3      Hemoglobin 8.4 g/dL      Hematocrit 24.7 %      .4 fL      MCH 39.3 pg      MCHC 34.0 g/dL      RDW 17.8 %      RDW-SD 67.4 fl      MPV 9.0 fL      Platelets 305 10*3/mm3      Neutrophil % 70.6 %      Lymphocyte % 12.1 %      Monocyte % 10.7 %      Eosinophil % 1.4 %      Basophil % 0.6 %      Immature Grans % 4.6 %      Neutrophils, Absolute 8.35 10*3/mm3      Lymphocytes, Absolute 1.43 10*3/mm3      Monocytes,  Absolute 1.26 10*3/mm3      Eosinophils, Absolute 0.16 10*3/mm3      Basophils, Absolute 0.07 10*3/mm3      Immature Grans, Absolute 0.54 10*3/mm3      nRBC 0.3 /100 WBC     Magnesium [[id number]]  (Abnormal) Collected: 02/09/21 2026    Specimen: Blood Updated: 02/09/21 2100     Magnesium 2.5 mg/dL     POC Glucose Once [[id number]]  (Abnormal) Collected: 02/09/21 2012    Specimen: Blood Updated: 02/09/21 2027     Glucose 154 mg/dL      Comment: RN NotifiedOperator: [id number] NOAH STEFANIMeter ID: [id number]       Magnesium [[id number]]  (Abnormal) Collected: 02/09/21 1930    Specimen: Blood Updated: 02/09/21 2013     Magnesium 2.5 mg/dL     Vitamin B12 [[id number]] Collected: 02/09/21 1412    Specimen: Blood Updated: 02/09/21 1921    TSH [[id number]]  (Normal) Collected: 02/09/21 1457    Specimen: Blood Updated: 02/09/21 1800     TSH 0.679 uIU/mL     Lipid Panel [[id number]]  (Abnormal) Collected: 02/09/21 1457    Specimen: Blood Updated: 02/09/21 1752     Total Cholesterol 98 mg/dL      Triglycerides 147 mg/dL      HDL Cholesterol 29 mg/dL      LDL Cholesterol  43 mg/dL      VLDL Cholesterol 26 mg/dL      LDL/HDL Ratio 1.37    Narrative:      Cholesterol Reference Ranges  (U.S. Department of Health and Human Services ATP III Classifications)    Desirable          <200 mg/dL  Borderline High    200-239 mg/dL  High Risk          >240 mg/dL      Triglyceride Reference Ranges  (U.S. Department of Health and Human Services ATP III Classifications)    Normal           <150 mg/dL  Borderline High  150-199 mg/dL  High             200-499 mg/dL  Very High        >500 mg/dL    HDL Reference Ranges  (U.S. Department of Health and Human Services ATP III Classifcations)    Low     <40 mg/dl (major risk factor for CHD)  High    >60 mg/dl ('negative' risk factor for CHD)        LDL Reference Ranges  (U.S. Department of Health and Human Services ATP III Classifcations)    Optimal          <100 mg/dL  Near Optimal     100-129  mg/dL  Borderline High  130-159 mg/dL  High             160-189 mg/dL  Very High        >189 mg/dL    Hemoglobin A1c [[id number]]  (Abnormal) Collected: 02/09/21 1405    Specimen: Blood Updated: 02/09/21 1752     Hemoglobin A1C 7.80 %     Narrative:      Hemoglobin A1C Ranges:    Increased Risk for Diabetes  5.7% to 6.4%  Diabetes                     >= 6.5%  Diabetic Goal                < 7.0%    POC Glucose Once [[id number]]  (Abnormal) Collected: 02/09/21 1711    Specimen: Blood Updated: 02/09/21 1723     Glucose 151 mg/dL      Comment: RN NotifiedOperator: [id number] LUDIN LEE ANNMeter ID: [id number]       Urinalysis, Microscopic Only - Urine, Clean Catch [[id number]] Collected: 02/09/21 1545    Specimen: Urine, Clean Catch Updated: 02/09/21 1621     RBC, UA None Seen /HPF      WBC, UA 0-2 /HPF      Bacteria, UA None Seen /HPF      Squamous Epithelial Cells, UA 0-2 /HPF      Hyaline Casts, UA 21-30 /LPF      Methodology Manual Light Microscopy    Urinalysis With Microscopic If Indicated (No Culture) - Urine, Clean Catch [[id number]]  (Abnormal) Collected: 02/09/21 1545    Specimen: Urine, Clean Catch Updated: 02/09/21 1610     Color, UA Dark Yellow     Appearance, UA Clear     pH, UA <=5.0     Specific Gravity, UA 1.037     Glucose, UA Negative     Ketones, UA Trace     Bilirubin, UA Small (1+)     Blood, UA Negative     Protein, UA 30 mg/dL (1+)     Leuk Esterase, UA Negative     Nitrite, UA Negative     Urobilinogen, UA 1.0 E.U./dL    COVID-19 and FLU A/B PCR - Swab, Nasopharynx [[id number]]  (Normal) Collected: 02/09/21 1504    Specimen: Swab from Nasopharynx Updated: 02/09/21 1543     COVID19 Not Detected     Influenza A PCR Not Detected     Influenza B PCR Not Detected    Narrative:      Fact sheet for providers: https://www.fda.gov/media/308508/download    Fact sheet for patients: https://www.fda.gov/media/826115/download    Test performed by PCR.    Troponin [[id number]]  (Normal) Collected: 02/09/21 1455     Specimen: Blood Updated: 02/09/21 1536     Troponin T <0.010 ng/mL     Narrative:      Troponin T Reference Range:  <= 0.03 ng/mL-   Negative for AMI  >0.03 ng/mL-     Abnormal for myocardial necrosis.  Clinicians would have to utilize clinical acumen, EKG, Troponin and serial changes to determine if it is an Acute Myocardial Infarction or myocardial injury due to an underlying chronic condition.       Results may be falsely decreased if patient taking Biotin.      Magnesium [[id number]]  (Abnormal) Collected: 02/09/21 1457    Specimen: Blood Updated: 02/09/21 1532     Magnesium 1.0 mg/dL     Manual Differential [[id number]]  (Abnormal) Collected: 02/09/21 1405    Specimen: Blood Updated: 02/09/21 1523     Neutrophil % 60.0 %      Lymphocyte % 9.0 %      Monocyte % 15.0 %      Eosinophil % 1.0 %      Bands %  12.0 %      Metamyelocyte % 1.0 %      Myelocyte % 2.0 %      Neutrophils Absolute 11.33 10*3/mm3      Lymphocytes Absolute 1.42 10*3/mm3      Monocytes Absolute 2.36 10*3/mm3      Eosinophils Absolute 0.16 10*3/mm3      Anisocytosis Mod/2+     Hypochromia Mod/2+     Macrocytes Mod/2+     WBC Morphology Normal     Platelet Morphology Normal    Extra Tubes [[id number]] Collected: 02/09/21 1412    Specimen: Blood Updated: 02/09/21 1516    Narrative:      The following orders were created for panel order Extra Tubes.  Procedure                               Abnormality         Status                     ---------                               -----------         ------                     Light Blue Top[[id number]]                                   Final result               Gold Top - SST[[id number]]                                   Final result                 Please view results for these tests on the individual orders.    Light Blue Top [[id number]] Collected: 02/09/21 1412    Specimen: Blood Updated: 02/09/21 1516     Extra Tube hold for add-on     Comment: Auto resulted       Gold Top - SST [[id number]]  Collected: 02/09/21 1412    Specimen: Blood Updated: 02/09/21 1516     Extra Tube Hold for add-ons.     Comment: Auto resulted.       Comprehensive Metabolic Panel [[id number]]  (Abnormal) Collected: 02/09/21 1405    Specimen: Blood Updated: 02/09/21 1436     Glucose 234 mg/dL      BUN 21 mg/dL      Creatinine 1.12 mg/dL      Sodium 136 mmol/L      Potassium 4.2 mmol/L      Chloride 102 mmol/L      CO2 21.0 mmol/L      Calcium 9.4 mg/dL      Total Protein 6.8 g/dL      Albumin 3.50 g/dL      ALT (SGPT) 17 U/L      AST (SGOT) 15 U/L      Alkaline Phosphatase 52 U/L      Total Bilirubin 0.4 mg/dL      eGFR Non African Amer 63 mL/min/1.73      Globulin 3.3 gm/dL      A/G Ratio 1.1 g/dL      BUN/Creatinine Ratio 18.8     Anion Gap 13.0 mmol/L     Narrative:      GFR Normal >60  Chronic Kidney Disease <60  Kidney Failure <15      Troponin [[id number]]  (Normal) Collected: 02/09/21 1405    Specimen: Blood Updated: 02/09/21 1435     Troponin T <0.010 ng/mL     Narrative:      Troponin T Reference Range:  <= 0.03 ng/mL-   Negative for AMI  >0.03 ng/mL-     Abnormal for myocardial necrosis.  Clinicians would have to utilize clinical acumen, EKG, Troponin and serial changes to determine if it is an Acute Myocardial Infarction or myocardial injury due to an underlying chronic condition.       Results may be falsely decreased if patient taking Biotin.      BNP [[id number]]  (Normal) Collected: 02/09/21 1405    Specimen: Blood Updated: 02/09/21 1433     proBNP 488.9 pg/mL     Narrative:      Among patients with dyspnea, NT-proBNP is highly sensitive for the detection of acute congestive heart failure. In addition NT-proBNP of <300 pg/ml effectively rules out acute congestive heart failure with 99% negative predictive value.    Results may be falsely decreased if patient taking Biotin.      CBC & Differential [[id number]]  (Abnormal) Collected: 02/09/21 1405    Specimen: Blood Updated: 02/09/21 1416    Narrative:      The following  orders were created for panel order CBC & Differential.  Procedure                               Abnormality         Status                     ---------                               -----------         ------                     Scan Slide[[id number]]                                                                  CBC Auto Differential[[id number]]        Abnormal            Final result                 Please view results for these tests on the individual orders.    CBC Auto Differential [[id number]]  (Abnormal) Collected: 02/09/21 1405    Specimen: Blood Updated: 02/09/21 1416     WBC 15.73 10*3/mm3      RBC 2.50 10*6/mm3      Hemoglobin 9.8 g/dL      Hematocrit 29.0 %      .0 fL      MCH 39.2 pg      MCHC 33.8 g/dL      RDW 17.9 %      RDW-SD 69.4 fl      MPV 9.3 fL      Platelets 373 10*3/mm3      Neutrophil % 73.2 %      Lymphocyte % 9.2 %      Monocyte % 11.8 %      Eosinophil % 0.8 %      Basophil % 0.8 %      Immature Grans % 4.2 %      Neutrophils, Absolute 11.52 10*3/mm3      Lymphocytes, Absolute 1.45 10*3/mm3      Monocytes, Absolute 1.85 10*3/mm3      Eosinophils, Absolute 0.13 10*3/mm3      Basophils, Absolute 0.12 10*3/mm3      Immature Grans, Absolute 0.66 10*3/mm3      nRBC 1.0 /100 WBC            Imaging Results (Last 24 Hours)     Procedure Component Value Units Date/Time    XR Chest 1 View [[id number]] Collected: 02/09/21 1416     Updated: 02/09/21 1448    Narrative:      EXAM DESCRIPTION:     XR CHEST 1 VW    CLINICAL HISTORY:     81 years  Male  short of breath    COMPARISON:     February 24, 2018    TECHNIQUE:     One view-AP portable radiograph of the chest    FINDINGS:     The lungs are well-expanded and clear. The cardiac silhouette and  pulmonary vasculature are within normal limits. There is evidence  of prior median sternotomy. There are no pleural effusions.      Impression:        1. Stable appearance the chest without radiographic evidence of  acute cardiopulmonary  "disease.        Electronically signed by:  Isa Douglass MD  2/9/2021 2:46 PM CST  Workstation: [id number]            I reviewed the patient's new clinical results.    ASSESSMENT AND PLAN: This is a 81 y.o. male with:    Active Hospital Problems    Diagnosis POA   • **COPD exacerbation (CMS/formerly [facility]) [J44.1] Yes     -duoneb  -albuterol   -replace magnesium  -Prednisone 40 mg daily x5 days   -talk to pharmacist for medication cost      • Hypomagnesemia [E83.42] Yes     -Mag 1.0 on admission  -mag 4 g IVPB day of admission  -monitor mag daily      • AAA (abdominal aortic aneurysm) (CMS/formerly [facility]) [I71.4] Yes     - is followed by Dr Ocasio        • Renal mass [N28.89] Yes     \"There is a hyperdense exophytic left renal  mass measuring 1.1 cm which is present in the previous study.  There are two nonobstructing calculi in the upper left kidney the  largest measures 5 mm of. No hydronephrosis or ureteral calculi.\" seen on imaging on CT 6/2/20  -consider outpatient follow up      • BPPV (benign paroxysmal positional vertigo) [H81.10] Yes     -consult PT - for epleys and dixhalpike      • Degenerative disc disease, lumbar [M51.36] Unknown     -hx of DDD  -order Pt/Ot  -increased lyrica to 75 mg BID      • Chronic pain of right knee [M25.561, G89.29] Yes     On lyrica- home dose was 50 mg BID  -will increase to 75 mg nightly BID     • History of coronary artery bypass surgery [Z95.1] Not Applicable       2003 had 4 vessel CABG  -will monitor      • Essential hypertension [I10] Yes     Will continue home dose Cozaar 50 mg nightly, Cardizem 180 mg daily, Coreg 3.125 mg twice daily\  -We will monitor and adjust medications as needed  -If SBP is greater than 180 consider IV hydralazine     • Degeneration of lumbar intervertebral disc [M51.36] Yes     Hx of chronic pain   -sees pain management   -increased home dose lyrica to 75mg BID     • Type 2 diabetes mellitus, without long-term current use of insulin (CMS/formerly [facility]) [E11.9] Yes     " Patient is on metformin 1000 mg twice daily we will hold at this time  Start patient on basal/bolus insulin dosing-  20 units Levemir and 7 units 3 times daily AC with SSI.  -HbA1c 7.8 on admission          I will continue to monitor and adjust patient's care as need   DVT prophylaxis: Lovenox    BASHIR -  Reviewed as per Epic PDMP  Reviewed and consistent with patient reported medications.    Expected Length of Stay: Place of Residence in 1-2 days     I discussed the patient's findings and my recommendations with patient and family.     Vishnu Waller and I have discussed pain goals for this hospitalization after reviewing their current clinical condition, medical history and prior pain experiences.  The goal is to keep the pain level at a 2-3/10.  To help achieve this, I plan to prescribe Tylenol and consider opioid medications if medically appropriate.     Dr. Ivy  is the attending on record at time of admission and is aware of the patient's status and agrees with the above history and physical.      Signed,   Akbar Zapien MD  Family Medicine Resident PGY3  [facility]        This document has been electronically signed by Akbar Zapien MD on February 10, 2021 06:32 CST    Part of this note may be an electronic transcription/translation of spoken language to printed text using the Dragon Dictation System.

## 2021-02-10 PROBLEM — D53.1 MEGALOBLASTIC ANEMIA: Status: ACTIVE | Noted: 2021-02-10

## 2021-02-10 LAB
ALBUMIN SERPL-MCNC: 3.1 G/DL (ref 3.5–5.2)
ALBUMIN/GLOB SERPL: 1.1 G/DL
ALP SERPL-CCNC: 45 U/L (ref 39–117)
ALT SERPL W P-5'-P-CCNC: 14 U/L (ref 1–41)
ANION GAP SERPL CALCULATED.3IONS-SCNC: 7 MMOL/L (ref 5–15)
AST SERPL-CCNC: 14 U/L (ref 1–40)
BASOPHILS # BLD AUTO: 0.07 10*3/MM3 (ref 0–0.2)
BASOPHILS NFR BLD AUTO: 0.6 % (ref 0–1.5)
BILIRUB SERPL-MCNC: 0.3 MG/DL (ref 0–1.2)
BUN SERPL-MCNC: 23 MG/DL (ref 8–23)
BUN/CREAT SERPL: 24.7 (ref 7–25)
CALCIUM SPEC-SCNC: 9 MG/DL (ref 8.6–10.5)
CHLORIDE SERPL-SCNC: 105 MMOL/L (ref 98–107)
CO2 SERPL-SCNC: 27 MMOL/L (ref 22–29)
CREAT SERPL-MCNC: 0.93 MG/DL (ref 0.76–1.27)
DEPRECATED RDW RBC AUTO: 67.4 FL (ref 37–54)
EOSINOPHIL # BLD AUTO: 0.16 10*3/MM3 (ref 0–0.4)
EOSINOPHIL NFR BLD AUTO: 1.4 % (ref 0.3–6.2)
ERYTHROCYTE [DISTWIDTH] IN BLOOD BY AUTOMATED COUNT: 17.8 % (ref 12.3–15.4)
FOLATE SERPL-MCNC: >20 NG/ML (ref 4.78–24.2)
GFR SERPL CREATININE-BSD FRML MDRD: 78 ML/MIN/1.73
GLOBULIN UR ELPH-MCNC: 2.7 GM/DL
GLUCOSE BLDC GLUCOMTR-MCNC: 235 MG/DL (ref 70–130)
GLUCOSE BLDC GLUCOMTR-MCNC: 271 MG/DL (ref 70–130)
GLUCOSE BLDC GLUCOMTR-MCNC: 278 MG/DL (ref 70–130)
GLUCOSE BLDC GLUCOMTR-MCNC: 85 MG/DL (ref 70–130)
GLUCOSE SERPL-MCNC: 82 MG/DL (ref 65–99)
HCT VFR BLD AUTO: 24.7 % (ref 37.5–51)
HGB BLD-MCNC: 8.4 G/DL (ref 13–17.7)
IMM GRANULOCYTES # BLD AUTO: 0.54 10*3/MM3 (ref 0–0.05)
IMM GRANULOCYTES NFR BLD AUTO: 4.6 % (ref 0–0.5)
IRON 24H UR-MRATE: 53 MCG/DL (ref 59–158)
IRON SATN MFR SERPL: 22 % (ref 20–50)
LYMPHOCYTES # BLD AUTO: 1.43 10*3/MM3 (ref 0.7–3.1)
LYMPHOCYTES NFR BLD AUTO: 12.1 % (ref 19.6–45.3)
MAGNESIUM SERPL-MCNC: 2 MG/DL (ref 1.6–2.4)
MCH RBC QN AUTO: 39.3 PG (ref 26.6–33)
MCHC RBC AUTO-ENTMCNC: 34 G/DL (ref 31.5–35.7)
MCV RBC AUTO: 115.4 FL (ref 79–97)
MONOCYTES # BLD AUTO: 1.26 10*3/MM3 (ref 0.1–0.9)
MONOCYTES NFR BLD AUTO: 10.7 % (ref 5–12)
NEUTROPHILS NFR BLD AUTO: 70.6 % (ref 42.7–76)
NEUTROPHILS NFR BLD AUTO: 8.35 10*3/MM3 (ref 1.7–7)
NRBC BLD AUTO-RTO: 0.3 /100 WBC (ref 0–0.2)
PLATELET # BLD AUTO: 305 10*3/MM3 (ref 140–450)
PMV BLD AUTO: 9 FL (ref 6–12)
POTASSIUM SERPL-SCNC: 4.3 MMOL/L (ref 3.5–5.2)
PROCALCITONIN SERPL-MCNC: 0.14 NG/ML (ref 0–0.25)
PROT SERPL-MCNC: 5.8 G/DL (ref 6–8.5)
RBC # BLD AUTO: 2.14 10*6/MM3 (ref 4.14–5.8)
SODIUM SERPL-SCNC: 139 MMOL/L (ref 136–145)
TIBC SERPL-MCNC: 241 MCG/DL (ref 298–536)
TRANSFERRIN SERPL-MCNC: 162 MG/DL (ref 200–360)
VIT B12 BLD-MCNC: 1020 PG/ML (ref 211–946)
WBC # BLD AUTO: 11.81 10*3/MM3 (ref 3.4–10.8)

## 2021-02-10 PROCEDURE — 84466 ASSAY OF TRANSFERRIN: CPT | Performed by: STUDENT IN AN ORGANIZED HEALTH CARE EDUCATION/TRAINING PROGRAM

## 2021-02-10 PROCEDURE — 82962 GLUCOSE BLOOD TEST: CPT

## 2021-02-10 PROCEDURE — 94799 UNLISTED PULMONARY SVC/PX: CPT

## 2021-02-10 PROCEDURE — G0378 HOSPITAL OBSERVATION PER HR: HCPCS

## 2021-02-10 PROCEDURE — 85025 COMPLETE CBC W/AUTO DIFF WBC: CPT | Performed by: STUDENT IN AN ORGANIZED HEALTH CARE EDUCATION/TRAINING PROGRAM

## 2021-02-10 PROCEDURE — 97162 PT EVAL MOD COMPLEX 30 MIN: CPT

## 2021-02-10 PROCEDURE — 95992 CANALITH REPOSITIONING PROC: CPT

## 2021-02-10 PROCEDURE — 84145 PROCALCITONIN (PCT): CPT | Performed by: STUDENT IN AN ORGANIZED HEALTH CARE EDUCATION/TRAINING PROGRAM

## 2021-02-10 PROCEDURE — 63710000001 PREDNISONE PER 1 MG: Performed by: STUDENT IN AN ORGANIZED HEALTH CARE EDUCATION/TRAINING PROGRAM

## 2021-02-10 PROCEDURE — 83540 ASSAY OF IRON: CPT | Performed by: STUDENT IN AN ORGANIZED HEALTH CARE EDUCATION/TRAINING PROGRAM

## 2021-02-10 PROCEDURE — 97166 OT EVAL MOD COMPLEX 45 MIN: CPT

## 2021-02-10 PROCEDURE — 99232 SBSQ HOSP IP/OBS MODERATE 35: CPT | Performed by: STUDENT IN AN ORGANIZED HEALTH CARE EDUCATION/TRAINING PROGRAM

## 2021-02-10 PROCEDURE — 63710000001 INSULIN ASPART PER 5 UNITS: Performed by: STUDENT IN AN ORGANIZED HEALTH CARE EDUCATION/TRAINING PROGRAM

## 2021-02-10 PROCEDURE — 80053 COMPREHEN METABOLIC PANEL: CPT | Performed by: STUDENT IN AN ORGANIZED HEALTH CARE EDUCATION/TRAINING PROGRAM

## 2021-02-10 PROCEDURE — 83735 ASSAY OF MAGNESIUM: CPT | Performed by: STUDENT IN AN ORGANIZED HEALTH CARE EDUCATION/TRAINING PROGRAM

## 2021-02-10 PROCEDURE — 63710000001 INSULIN DETEMIR PER 5 UNITS: Performed by: STUDENT IN AN ORGANIZED HEALTH CARE EDUCATION/TRAINING PROGRAM

## 2021-02-10 RX ORDER — IPRATROPIUM BROMIDE AND ALBUTEROL SULFATE 2.5; .5 MG/3ML; MG/3ML
3 SOLUTION RESPIRATORY (INHALATION) EVERY 4 HOURS PRN
Qty: 360 ML | Refills: 3 | Status: SHIPPED | OUTPATIENT
Start: 2021-02-10 | End: 2021-03-12

## 2021-02-10 RX ORDER — AZITHROMYCIN 250 MG/1
250 TABLET, FILM COATED ORAL DAILY
Status: DISCONTINUED | OUTPATIENT
Start: 2021-02-11 | End: 2021-02-12 | Stop reason: HOSPADM

## 2021-02-10 RX ORDER — AZITHROMYCIN 250 MG/1
500 TABLET, FILM COATED ORAL DAILY
Status: COMPLETED | OUTPATIENT
Start: 2021-02-10 | End: 2021-02-10

## 2021-02-10 RX ADMIN — SODIUM CHLORIDE, PRESERVATIVE FREE 10 ML: 5 INJECTION INTRAVENOUS at 20:34

## 2021-02-10 RX ADMIN — IPRATROPIUM BROMIDE AND ALBUTEROL SULFATE 3 ML: 2.5; .5 SOLUTION RESPIRATORY (INHALATION) at 11:31

## 2021-02-10 RX ADMIN — PREDNISONE 40 MG: 20 TABLET ORAL at 08:27

## 2021-02-10 RX ADMIN — ISOSORBIDE MONONITRATE 30 MG: 30 TABLET, EXTENDED RELEASE ORAL at 08:26

## 2021-02-10 RX ADMIN — PREGABALIN 75 MG: 75 CAPSULE ORAL at 20:32

## 2021-02-10 RX ADMIN — DILTIAZEM HYDROCHLORIDE 180 MG: 180 CAPSULE, COATED, EXTENDED RELEASE ORAL at 08:26

## 2021-02-10 RX ADMIN — DULOXETINE HYDROCHLORIDE 30 MG: 30 CAPSULE, DELAYED RELEASE ORAL at 08:26

## 2021-02-10 RX ADMIN — IPRATROPIUM BROMIDE AND ALBUTEROL SULFATE 3 ML: 2.5; .5 SOLUTION RESPIRATORY (INHALATION) at 19:00

## 2021-02-10 RX ADMIN — INSULIN ASPART 3 UNITS: 100 INJECTION, SOLUTION INTRAVENOUS; SUBCUTANEOUS at 17:06

## 2021-02-10 RX ADMIN — DOCUSATE SODIUM 50 MG AND SENNOSIDES 8.6 MG 2 TABLET: 8.6; 5 TABLET, FILM COATED ORAL at 20:32

## 2021-02-10 RX ADMIN — CLOPIDOGREL BISULFATE 75 MG: 75 TABLET ORAL at 08:26

## 2021-02-10 RX ADMIN — OXYBUTYNIN CHLORIDE 5 MG: 5 TABLET, EXTENDED RELEASE ORAL at 08:26

## 2021-02-10 RX ADMIN — PREGABALIN 75 MG: 75 CAPSULE ORAL at 08:26

## 2021-02-10 RX ADMIN — CARVEDILOL 3.12 MG: 3.12 TABLET, FILM COATED ORAL at 20:32

## 2021-02-10 RX ADMIN — SODIUM CHLORIDE, PRESERVATIVE FREE 10 ML: 5 INJECTION INTRAVENOUS at 08:27

## 2021-02-10 RX ADMIN — IPRATROPIUM BROMIDE AND ALBUTEROL SULFATE 3 ML: 2.5; .5 SOLUTION RESPIRATORY (INHALATION) at 08:14

## 2021-02-10 RX ADMIN — IPRATROPIUM BROMIDE AND ALBUTEROL SULFATE 3 ML: 2.5; .5 SOLUTION RESPIRATORY (INHALATION) at 14:44

## 2021-02-10 RX ADMIN — ATORVASTATIN CALCIUM 40 MG: 40 TABLET, FILM COATED ORAL at 20:32

## 2021-02-10 RX ADMIN — AZITHROMYCIN 500 MG: 250 TABLET, FILM COATED ORAL at 11:13

## 2021-02-10 RX ADMIN — LOSARTAN POTASSIUM 50 MG: 50 TABLET, FILM COATED ORAL at 20:32

## 2021-02-10 RX ADMIN — INSULIN ASPART 4 UNITS: 100 INJECTION, SOLUTION INTRAVENOUS; SUBCUTANEOUS at 11:13

## 2021-02-10 RX ADMIN — CARVEDILOL 3.12 MG: 3.12 TABLET, FILM COATED ORAL at 08:27

## 2021-02-10 RX ADMIN — INSULIN DETEMIR 20 UNITS: 100 INJECTION, SOLUTION SUBCUTANEOUS at 20:32

## 2021-02-10 RX ADMIN — INSULIN ASPART 7 UNITS: 100 INJECTION, SOLUTION INTRAVENOUS; SUBCUTANEOUS at 11:13

## 2021-02-10 RX ADMIN — INSULIN ASPART 7 UNITS: 100 INJECTION, SOLUTION INTRAVENOUS; SUBCUTANEOUS at 17:07

## 2021-02-10 NOTE — PROGRESS NOTES
Adult Nutrition  Assessment    Patient Name:  Vishnu Waller  YOB: 1940  MRN: [id number]  Admit Date:  2/9/2021    Assessment Date:  2/10/2021    Comments:   82yo males admit w/ COPD exacerbation. ?renal mass noted. PMH includes DM, AAA, CAD/CABG. Labs and meds noted- Alb 3.1L. Reg diet- intakes 75 & 100% x2 meals. Per Epic 11/23/2020 174# and currently 130#/BMI 23.1- noted -11.6% w tloss in <3mo, significant. Pt notes small meals at home, doesn't snack much. NFPE noted fat loss and muscle wasting to body. Pt meets criteria for severe, acute malnutrition likely r/t to COPD and increase energy demands. Discussed increasing protein and calories (without increasing Glucose) into diet if unable to maintain wt and encouraged multiple small meals thru day- will attach diet information to print at d/c. Agreed to try [facility] strawberry, chocolate milk at L/D. RD to follow hospital course.    Reason for Assessment     Row Name 02/10/21 1415          Reason for Assessment    Reason For Assessment  identified at risk by screening criteria     Diagnosis  pulmonary disease     Identified At Risk by Screening Criteria  MST SCORE 2+;unintentional loss of 10 lbs or more in the past 2 mos;reduced oral intake over the last month         Nutrition/Diet History     Row Name 02/10/21 1416          Nutrition/Diet History    Typical Food/Fluid Intake  Pt states nkfa, no c/s problems. Discussed increasing protein and calories into diet if unable to maintain wt- will attach diet information to print at d/c     Food Preferences  likes chocolate milk     Meal/Snack Patterns  B- coffee and something sweet, lunch- something small, dinner- more of a meal     Supplemental Drinks/Foods/Additives  will try strawberry     Factors Affecting Nutritional Intake  abdominal distension           Labs/Tests/Procedures/Meds     Row Name 02/10/21 1418          Labs/Procedures/Meds    Lab Results Reviewed  reviewed     Lab Results Comments   A1C 7.8, 85/277, Alb 3.1L        Diagnostic Tests/Procedures    Diagnostic Test/Procedure Reviewed  reviewed        Medications    Pertinent Medications Reviewed  reviewed     Pertinent Medications Comments  SSI, novolog TID, levemir hs, deltasone Qd           Estimated/Assessed Needs     Row Name 02/10/21 1419          Calculation Measurements    Weight Used For Calculations  59 kg (130 lb)        Estimated/Assessed Needs    Additional Documentation  Calorie Requirements (Group);KCAL/KG (Group);Protein Requirements (Group);Fluid Requirements (Group)        Calorie Requirements    Estimated Calorie Requirement (kcal/day)  1700        KCAL/KG    KCAL/KG  25 Kcal/Kg (kcal);30 Kcal/Kg (kcal)     25 Kcal/Kg (kcal)  1474.2     30 Kcal/Kg (kcal)  1769.04        Protein Requirements    Weight Used For Protein Calculations  59 kg (130 lb)     Est Protein Requirement Amount (gms/kg)  1.0 gm protein     Estimated Protein Requirements (gms/day)  58.97        Fluid Requirements    Fluid Requirements (mL/day)  1600     RDA Method (mL)  1600         Nutrition Prescription Ordered     Row Name 02/10/21 1420          Nutrition Prescription PO    Current PO Diet  Regular     Common Modifiers  Cardiac         Evaluation of Received Nutrient/Fluid Intake     Row Name 02/10/21 1420          PO Evaluation    Number of Meals  2     % PO Intake  75/100           Malnutrition Severity Assessment     Row Name 02/10/21 1420          Malnutrition Severity Assessment    Malnutrition Type  Acute Disease or Injury - Related Malnutrition        Insufficient Energy Intake     Insufficient Energy Intake Findings  Moderate     Insufficient Energy Intake   -- very small Bkft, small lunch, more of meal at dinner        Unintentional Weight Loss     Unintentional Weight Loss Findings  Severe     Unintentional Weight Loss   Weight loss greater than 7.5% in three months -11.6% in <3months        Muscle Loss    Loss of Muscle Mass Findings  Moderate      Temple Region  Moderate - slight depression     Clavicle Bone Region  -- mild     Acromion Bone Region  Moderate - acromion may slightly protrude     Scapular Bone Region  -- mild     Dorsal Hand Region  Severe - prominent depression     Patellar Region  Severe - prominent bone, square looking, very little muscle definition     Anterior Thigh Region  Moderate - mild depression on inner thigh     Posterior Calf Region  Severe - thin with very little definition/firmness        Fat Loss    Subcutaneous Fat Loss Findings  Moderate     Orbital Region   Moderate -  somewhat hollowness, slightly dark circles     Upper Arm Region  Moderate - some fat tissue, not ample     Thoracic & Lumbar Region  -- unable to determine d/t abdominal distention        Criteria Met (Must meet criteria for severity in at least 2 of these categories: M Wasting, Fat Loss, Fluid, Secondary Signs, Wt. Status, Intake)    Patient meets criteria for   Severe Malnutrition           Electronically signed by:  Jenny Barillas RD  02/10/21 14:31 CST

## 2021-02-10 NOTE — PLAN OF CARE
Goal Outcome Evaluation:  Plan of Care Reviewed With: patient     Outcome Summary: OT eval completed; co-eval with PT. Pt pleasant and cooperative. Pt performed supine>sit with mod I, sit<>stand with SBA, and in room fxnl mobility with CGA (no AD). Pt required min A for toilet t/f. Pt rec'd on RA and RN request monitoring O2 during activity. Pt initally desat to 88% on RA supine>sit, however able to recover <1 minute with PLB to 91-92%. Pt able to sustain O2 90-92% on RA during activity with PLB. Pt reports he feels SOB and is unable to take a deep breath. RN notified of pt status. Pt presents with decreased activity tolerance, balance, and mobility limiting safe participation in ADLs. Pt would benefit from continued skilled OT services to promote return to PLOF. Rec home with assist at d/c.

## 2021-02-10 NOTE — PLAN OF CARE
Goal Outcome Evaluation:  Plan of Care Reviewed With: patient  Progress: no change  Pt arrived on [address] just prior to supper. Vital signs stable.

## 2021-02-10 NOTE — PROGRESS NOTES
FAMILY MEDICINE DAILY PROGRESS NOTE    NAME: Vishnu Waller  : 1940  MRN: [id number]      LOS: 0 days     PROVIDER OF SERVICE: Richard Mueller MD    Chief Complaint: COPD exacerbation (CMS/[facility], [facility])    Subjective:     Interval History:  History taken from: patient chart  Patient reports improvement in breathing this morning.  Vital signs are stable on 2 L nasal cannula.  Patient denies SOA, chest pain, palpitations, or abdominal pain.     Review of Systems:   Review of Systems   Constitutional: Negative for chills and fever.   HENT: Negative for congestion, rhinorrhea and sore throat.    Eyes: Negative for visual disturbance.   Respiratory: Negative for cough, chest tightness and shortness of breath.    Cardiovascular: Negative for chest pain, palpitations and leg swelling.   Gastrointestinal: Negative for constipation, nausea and vomiting.   Endocrine: Negative for polydipsia and polyuria.   Genitourinary: Negative for difficulty urinating, dysuria, frequency and urgency.   Musculoskeletal: Positive for back pain. Negative for joint swelling and myalgias.   Skin: Negative for rash and wound.   Neurological: Negative for dizziness, weakness, light-headedness and headaches.   Psychiatric/Behavioral: Negative for dysphoric mood and sleep disturbance.       Objective:     Vital Signs  Temp:  [96.2 °F (35.7 °C)-97.9 °F (36.6 °C)] 96.3 °F (35.7 °C)  Heart Rate:  [] 78  Resp:  [18-26] 20  BP: (110-183)/(59-88) 135/64  Flow (L/min):  [2-4] 2   Body mass index is 23.19 kg/m².    Physical Exam  Physical Exam  Vitals signs and nursing note reviewed.   Constitutional:       General: He is not in acute distress.     Appearance: Normal appearance. He is not diaphoretic.   HENT:      Head: Normocephalic and atraumatic.      Right Ear: External ear normal.      Left Ear: External ear normal.   Eyes:      General: No scleral icterus.     Conjunctiva/sclera: Conjunctivae normal.   Neck:      Musculoskeletal:  Normal range of motion and neck supple.   Cardiovascular:      Rate and Rhythm: Normal rate and regular rhythm.      Heart sounds: Normal heart sounds.      Comments: Distant heart sounds  Pulmonary:      Effort: Pulmonary effort is normal. No respiratory distress.      Breath sounds: Normal breath sounds. No wheezing.   Chest:      Chest wall: No tenderness.   Abdominal:      General: Bowel sounds are normal.      Palpations: Abdomen is soft.      Tenderness: There is no abdominal tenderness.   Musculoskeletal:         General: No swelling or tenderness.      Right lower leg: No edema.      Left lower leg: No edema.   Skin:     General: Skin is warm and dry.      Capillary Refill: Capillary refill takes less than 2 seconds.      Findings: No erythema or rash.   Neurological:      General: No focal deficit present.      Mental Status: He is alert and oriented to person, place, and time.      Motor: No weakness.   Psychiatric:         Behavior: Behavior normal.         Scheduled Meds   atorvastatin, 40 mg, Oral, Nightly  carvedilol, 3.125 mg, Oral, Q12H  clopidogrel, 75 mg, Oral, Daily  dilTIAZem CD, 180 mg, Oral, Daily  DULoxetine, 30 mg, Oral, Daily  enoxaparin, 40 mg, Subcutaneous, Q24H  fluticasone, 2 spray, Nasal, Nightly  insulin aspart, 0-7 Units, Subcutaneous, TID AC  insulin aspart, 7 Units, Subcutaneous, TID With Meals  insulin detemir, 20 Units, Subcutaneous, Nightly  ipratropium-albuterol, 3 mL, Nebulization, 4x Daily - RT  isosorbide mononitrate, 30 mg, Oral, Q24H  losartan, 50 mg, Oral, Nightly  oxybutynin XL, 5 mg, Oral, Daily  predniSONE, 40 mg, Oral, Daily With Breakfast  pregabalin, 75 mg, Oral, BID  senna-docusate sodium, 2 tablet, Oral, BID  sodium chloride, 10 mL, Intravenous, Q12H       PRN Meds   •  acetaminophen  •  dextrose  •  dextrose  •  glucagon (human recombinant)  •  ondansetron **OR** ondansetron  •  [COMPLETED] Insert peripheral IV **AND** sodium chloride  •  sodium chloride  "     Diagnostic Data    Results from last 7 days   Lab Units 02/10/21  0526   WBC 10*3/mm3 11.81*   HEMOGLOBIN g/dL 8.4*   HEMATOCRIT % 24.7*   PLATELETS 10*3/mm3 305   GLUCOSE mg/dL 82   CREATININE mg/dL 0.93   BUN mg/dL 23   SODIUM mmol/L 139   POTASSIUM mmol/L 4.3   AST (SGOT) U/L 14   ALT (SGPT) U/L 14   ALK PHOS U/L 45   BILIRUBIN mg/dL 0.3   ANION GAP mmol/L 7.0       Xr Chest 1 View    Result Date: 2/9/2021  1. Stable appearance the chest without radiographic evidence of acute cardiopulmonary disease. Electronically signed by:  Isa Douglass MD  2/9/2021 2:46 PM CST Workstation: [id number]        I reviewed the patient's new clinical results.    Assessment/Plan:     Active Hospital Problems    Diagnosis POA   • **COPD exacerbation (CMS/[facility]) [J44.1] Yes     -duoneb  -albuterol   -replace magnesium  -Prednisone 40 mg daily x5 days   -talk to pharmacist for medication cost   - Procalcitonin        • Megaloblastic anemia [D53.1] Unknown     HGB 11.81  MCV: 115.4   Check serum Folate   Serum B12     May consider iron panel        • Hypomagnesemia [E83.42] Yes     -Mag 1.0 on admission  -mag 4 g IVPB day of admission  -monitor mag daily     -consider nephrology consult for potential Magnesium wasting syndrome     -Serum magnesium within normal limits 2/10/21     • AAA (abdominal aortic aneurysm) (CMS/[facility]) [I71.4] Yes     - is followed by Dr Ocasio         • Renal mass [N28.89] Yes     \"There is a hyperdense exophytic left renal  mass measuring 1.1 cm which is present in the previous study.  There are two nonobstructing calculi in the upper left kidney the  largest measures 5 mm of. No hydronephrosis or ureteral calculi.\" seen on imaging on CT 6/2/20  -consider outpatient follow up       • BPPV (benign paroxysmal positional vertigo) [H81.10] Yes     -consult PT - for epleys and dixhalpike       • Degenerative disc disease, lumbar [M51.36] Yes     -hx of DDD  -order Pt/Ot  -increased lyrica to 75 mg BID       • " Chronic pain of right knee [M25.561, G89.29] Yes     On lyrica- home dose was 50 mg BID  -will increase to 75 mg nightly BID      • History of coronary artery bypass surgery [Z95.1] Not Applicable       2003 had 4 vessel CABG  -will monitor       • Essential hypertension [I10] Yes     Will continue home dose Cozaar 50 mg nightly, Cardizem 180 mg daily, Coreg 3.125 mg twice daily\  -We will monitor and adjust medications as needed  -If SBP is greater than 180 consider IV hydralazine     • Degeneration of lumbar intervertebral disc [M51.36] Yes     Hx of chronic pain   -sees pain management   -increased home dose lyrica to 75mg BID      • Type 2 diabetes mellitus, without long-term current use of insulin (CMS/[facility]) [E11.9] Yes     Patient is on metformin 1000 mg twice daily we will hold at this time  Start patient on basal/bolus insulin dosing-  20 units Levemir and 7 units 3 times daily AC with SSI.  -HbA1c 7.8 on admission           DVT prophylaxis: Lovenox  Code status is   Code Status and Medical Interventions:   Ordered at: 02/09/21 1649     Level Of Support Discussed With:    Patient    Health Care Surrogate     Code Status:    CPR     Medical Interventions (Level of Support Prior to Arrest):    Full       Plan for disposition:Where: current living arrangements and When:  1days      Time: 15 mins        Richard Mueller M.D. PGY 1  [facility]  [address] [place] [address]  Office: [phone]  This document has been electronically signed by Richard Mueller MD on February 10, 2021 08:22 CST

## 2021-02-10 NOTE — PLAN OF CARE
Problem: Adult Inpatient Plan of Care  Goal: Plan of Care Review  Outcome: Ongoing, Progressing  Flowsheets  Taken 2/10/2021 1205  Plan of Care Reviewed With: patient  Taken 2/10/2021 0840  Plan of Care Reviewed With: patient  Outcome Summary: PT co-eval completed with OT today. Pt pleasant and cooperative. Pt reports history of vertigo but state this vertigo is different than what he is feeling. His current symptoms have been happening for 4-6 months. (-) Orthostatic BP/HR. Attempted RA per nursing but patient initally desaturated to 88% on RA while sitting EOB and recovered with PLB quickly to 90-92%. Patient had increased labor of breathing with any activity. Patient stayed in 90s during tx short ambulation.  Bed mobility: SBA supine<>sit with HOB up and bed rails Transfer: CGA sit<>stand. Gait: Ambulated CGA for  64'x3 32'x2. Balance: Components of Functional gait assessment completed. Patient reported reproduction of dizziness/vertigo with head turns looking down. During other tasks such eyes closed ambulation, pivot and stop, bakwards ambulation, and horizontal head turns patient tend to lose balance to L/R.  Sidney Hallpike R(+) L(-). On positive side pt reported reproduction of vertigo at 5/10. Epley Maneuver x2. After 1st Epley patient reported vertigo was only 1-2/10 and reported decrease in symptoms during each position of epley. Patient would benefit from skilled PT to address deficits in functional mobility and return to PLOF. Recommend Home with haim and OPPT.   Goal Outcome Evaluation:  Plan of Care Reviewed With: patient     Outcome Summary: PT co-eval completed with OT today. Pt pleasant and cooperative. Pt reports history of vertigo but state this vertigo is different than what he is feeling. His current symptoms have been happening for 4-6 months. (-) Orthostatic BP/HR. Attempted RA per nursing but patient initally desaturated to 88% on RA while sitting EOB and recovered with PLB quickly to  90-92%. Patient had increased labor of breathing with any activity. Patient stayed in 90s during tx short ambulation.  Bed mobility: SBA supine<>sit with HOB up and bed rails Transfer: CGA sit<>stand. Gait: Ambulated [facility] for  64'x3 32'x2. Balance: Components of Functional gait assessment completed. Patient reported reproduction of dizziness/vertigo with head turns looking down. During other tasks such eyes closed ambulation, pivot and stop, bakwards ambulation, and horizontal head turns patient tend to lose balance to L/R.  [place]ford Hallpike R(+) L(-). On positive side pt reported reproduction of vertigo at 5/10. Epley Maneuver x2. After 1st Epley patient reported vertigo was only 1-2/10 and reported decrease in symptoms during each position of epley. Patient would benefit from skilled PT to address deficits in functional mobility and return to PLOF. Recommend Home with haim and OPPT.

## 2021-02-10 NOTE — PLAN OF CARE
Goal Outcome Evaluation:         Vital signs stable. Patient remains on nasal cannula at 2 lpm tolerating well. No complaints during the night. Patient slept soundly

## 2021-02-10 NOTE — THERAPY EVALUATION
Patient Name: Vishnu Waller  : 1940    MRN: [id number]                              Today's Date: 2/10/2021       Admit Date: 2021    Visit Dx:     ICD-10-CM ICD-9-CM   1. Exertional dyspnea  R06.00 786.09   2. Hypomagnesemia  E83.42 275.2   3. Impaired mobility and ADLs  Z74.09 V49.89    Z78.9      Patient Active Problem List   Diagnosis   • Degeneration of lumbar intervertebral disc   • Low back pain without sciatica   • Neuritis or radiculitis due to rupture of lumbar intervertebral disc   • Borderline glaucoma, open angle with borderline findings   • Pseudophakia   • History of coronary artery bypass surgery   • Essential hypertension   • Mixed hyperlipidemia   • Thrombocytopenia (CMS/HCC)   • Spinal stenosis of lumbar region   • Degenerative disc disease, lumbar   • Chronic pain of right knee   • History of artificial joint   • B12 deficiency   • Degeneration of intervertebral disc of lumbosacral region   • Personal history of other infectious and parasitic diseases   • Status post lumbar spine operation   • History of pyelonephritis   • History of surgical procedure   • History of repair of rotator cuff   • Encounter for follow-up examination after completed treatment for conditions other than malignant neoplasm   • Encounter for general adult medical examination without abnormal findings   • Osteoarthritis of knee   • Osteoarthritis of multiple joints   • Primary insomnia   • Encounter for screening for malignant neoplasm of colon   • Encounter for screening for malignant neoplasm of prostate   • Type 2 diabetes mellitus, without long-term current use of insulin (CMS/HCC)   • Coronary artery disease involving native coronary artery of native heart without angina pectoris   • Dyspnea on exertion   • Presence of aortocoronary bypass graft   • Dizziness and giddiness   • Postviral fatigue syndrome   • Postviral fatigue syndrome   • Recurrent kidney stones   • Thrombocytopenia (CMS/HCC)   •  Pyelonephritis   • COPD with asthma (CMS/[facility])   • Chronic non-seasonal allergic rhinitis   • Unstable angina (CMS/[facility])   • NSTEMI (non-ST elevated myocardial infarction) (CMS/[facility])   • History of PTCA 1   • Medicare annual wellness visit, subsequent   • Thoracic aortic aneurysm without rupture (CMS/[facility])   • Chronic kidney disease, stage 2 (mild)   • Personal history of tobacco use, presenting hazards to health   • Cervical pain (neck)   • Thoracic spine pain   • Calculus of ureter   • Foreign body in bladder and urethra   • Physical deconditioning   • Left hip pain   • Pain of left humerus   • PVD (peripheral vascular disease) (CMS/[facility])   • COPD with acute exacerbation (CMS/[facility])   • AAA (abdominal aortic aneurysm) (CMS/[facility])   • Renal mass   • BPPV (benign paroxysmal positional vertigo)   • Degenerative disc disease, lumbar   • Hypomagnesemia   • Megaloblastic anemia     Past Medical History:   Diagnosis Date   • AAA (abdominal aortic aneurysm) (CMS/[facility])    • Acute bacterial sinusitis    • Acute bronchitis    • Acute frontal sinusitis    • Acute maxillary sinusitis    • Acute pharyngitis    • Allergic rhinitis    • Allergic rhinitis due to pollen    • Anxiety    • Artificial lens present     in position   • Asthma    • Backache    • Borderline glaucoma    • C. difficile diarrhea 2014   • Chronic laryngitis    • Chronic rhinitis    • COPD (chronic obstructive pulmonary disease) (CMS/[facility])    • Coronary artery disease    • Cough    • Degeneration of lumbar intervertebral disc    • Degenerative joint disease involving multiple joints    • Diabetes mellitus (CMS/[facility])    • Diverticular disease of colon    • Dizziness and giddiness    • Elevated cholesterol    • Erectile dysfunction    • Essential hypertension    • Generalized anxiety disorder    • GERD (gastroesophageal reflux disease)    • Glaucoma    • Hemorrhoids     without bleeding   • Insomnia    • Kidney stone    • Kidney stones    • Malignant tumor of prostate  (CMS/[facility])    • Need for prophylactic vaccination and inoculation against influenza    • Osteoarthritis    • Osteoarthritis of multiple joints    • Pain, lumbar region     Pain radiating to lumbar region of back     • PONV (postoperative nausea and vomiting)    • Postviral fatigue syndrome    • Presence of aortocoronary bypass graft    • Prostate cancer (CMS/HCC)    • Pseudomembranous enterocolitis     improved   • Rotator cuff syndrome     right   • Shoulder pain    • SOB (shortness of breath)    • Tenosynovitis    • Thrombocytopenia (CMS/HCC)    • Upper respiratory infection      Past Surgical History:   Procedure Laterality Date   • CARDIAC CATHETERIZATION  09/25/2003    Cardiac cath (Normal left ventricular systolic function, EF 60% Multi-vessel coronary artery disease with critical disease noted in the LAD coronary artery, diagonal coronary artery, obtuse marginal coronary and right coronary artery.)   • CARDIAC CATHETERIZATION  05/03/1996    Cardiac cath (Coronary atherosclerotic heart disease. 70% diagonal stenosis. Mild to moderate left anterior descending artery stenosis. Preserved left ventricular function.)   • CARDIAC CATHETERIZATION N/A 2/26/2018    Procedure: Left Heart Cath/ pci if indicated ;  Surgeon: Radha Wilkes MD;  Location: [facility] INVASIVE LOCATION;  Service:    • CATARACT EXTRACTION Bilateral    • CATARACT EXTRACTION  10/04/2011    Remove cataract, insert lens (Right)   • CATARACT EXTRACTION  08/23/2011    Remove cataract, insert lens (left)   • COLONOSCOPY     • COLONOSCOPY      Colon endoscopy 57568 (Rectal bleeding. Radiation proctitis w/bleeding. An abnormal CT of transverse colon due to mucosal ischemic colitis, that now is healed. Internal hemorrhoids w/possible bleeding.)   • COLONOSCOPY  05/10/2011    Colon endoscopy 41106 (Single sessile polyp found in transverse colon and sigmoid colon ,removed by cold biopsy polypectomy.divertic. found in sigmoid colon,descending  colon. Friability/capillary friability in rectum sigmoid due to prior radiation.Inter. hem. Grade 1)   • COLONOSCOPY  05/02/2007    Colon endoscopy 67873 (Colon polyp. Diverticulosis without bleeding. Internal hemorrhoids without bleeding.)   • CORONARY ARTERY BYPASS GRAFT  2002   • CYSTOSCOPY  01/13/1995    Cystoscopy, stone removal (Right ureteroscopic lasertripsy.)   • CYSTOSCOPY Right 1/23/2019    Procedure: CYSTOSCOPY, RIGHT STENT REMOVAL;  Surgeon: Nirmal Gaines MD;  Location: [facility];  Service: Urology   • CYSTOSCOPY, URETEROSCOPY, RETROGRADE PYELOGRAM, STENT INSERTION N/A 8/28/2017    Procedure: URETEROSCOPY LASER LITHOTRIPSY WITH STENT INSERTION AND RETROGRADE PYELOGRAM ;  Surgeon: Anna M. D'Amico, MD;  Location: [facility];  Service:    • CYSTOSCOPY, URETEROSCOPY, RETROGRADE PYELOGRAM, STENT INSERTION Right 1/11/2019    Procedure: CYSTOSCOPY URETEROSCOPY RETROGRADE PYELOGRAM HOLMIUM LASER STENT INSERTION;  Surgeon: Nirmal Gaines MD;  Location: [facility];  Service: Urology   • EPIDURAL  12/03/2014    Therapeutic/diag injection (Lumbar transforaminal epidural steroid injection, L5-S1, left side.)   • EPIDURAL  10/22/2014    Therapeutic/diag injection (Lumbar transforaminal epidural steroid injection L5-S1 left side.)   • EPIDURAL  08/07/2014    Therapeutic/diag injection (Lumbar transforaminal epidural steroid injection.)   • EXCISION LESION  05/13/1999    REMOVE EAR LESION 58770 (1.3 cm basal cell carcinoma, right preauricular area. Excision)   • EXCISION LESION  03/13/2001    REMOVE LESION NECK/CHEST 36498 (Excision of irritated seborrheic keratosis, anterior neck, 1.1 cm and deep lipoma, posterior neck, 3.5 cm)   • INJECTION OF MEDICATION  11/15/2012    Kenalog (4)      • JOINT REPLACEMENT  2015    partial   • KNEE ARTHROSCOPY  01/17/2007    Knee arthroscopy, surgery (Arthroscopy with medial and lateral meniscectomies, right knee.)   • KNEE SURGERY  11/04/2015    Knee Surgery (Right  unicompartmental arthroplasty.)   • LUMBAR LAMINECTOMY N/A 2/7/2017    Procedure: LUMBAR LAMINECTOMY LUMBAR THREE-FOUR, LUMBAR FOUR-FIVE, INTERLAMINAR DISTRACTION ;  Surgeon: Lucio Mayo MD;  Location: [facility];  Service:    • NOSE SURGERY     • OTHER SURGICAL HISTORY      EXTENDED VISUAL FIELDS STUDY 26332 (Borderline glaucoma) (3): 03/19/2015, 04/09/2014, 03/27/2013   • OTHER SURGICAL HISTORY  10/29/2003    Heart revascularize (TMR) (Directmyocardial revascularization times four; LIMA to LAD SVG to DARIUSZ SVG to OM1, SVG to RCA)   • OTHER SURGICAL HISTORY      OCT DISC NFL 45729 (Borderline glaucoma)  (3): 09/24/2015, 08/13/2014, 07/29/2013   • PROSTATECTOMY     • SEPTORHINOPLASTY  11/16/1989    Nasal surgery procedure (Septorhinoplasty with reconstruction of the nasal pyramid with a iliac crest graft, rhinoplasty was performed with external approach.)   • SHOULDER ARTHROSCOPY  07/24/2013    Arthroscopy of right shoulder with rotator repair, Carla procedure, Biceps tenotomy, subacromial decompression.   • SHOULDER SURGERY  11/01/2005    Shoulder surgery procedure (Decompression, subacromial, explore of the rotator cuff, no tear found nor repaired, acromioplasty of the left shoulder and AC shoulder joint resection.)     General Information     Row Name 02/10/21 0839          OT Time and Intention    Document Type  evaluation  -AS     Mode of Treatment  co-treatment;physical therapy;occupational therapy  -AS     Row Name 02/10/21 0839          General Information    Patient Profile Reviewed  yes  -AS     Prior Level of Function  independent:;ADL's;gait;transfer;all household mobility;community mobility  -AS     Existing Precautions/Restrictions  fall  -AS     Row Name 02/10/21 0839          Occupational Profile    Patient Goals (Occupational Profile)  has walker, cane, shower chair-not using; has reacher and long handle shoehorn  -AS     Row Name 02/10/21 0839          Living Environment    Lives With   spouse  -AS     [facility] Name 02/10/21 0839          Home Main Entrance    Number of Stairs, Main Entrance  one  -AS     Stair Railings, Main Entrance  none  -AS     Row Name 02/10/21 0839          Stairs Within Home, Primary    Number of Stairs, Within Home, Primary  none  -AS     Row Name 02/10/21 08          Cognition    Orientation Status (Cognition)  oriented x 4  -AS       User Key  (r) = Recorded By, (t) = Taken By, (c) = Cosigned By    Initials Name Provider Type    AS Cherry Long OT Occupational Therapist          Mobility/ADL's     Row Name 02/10/21 0839          Bed Mobility    Bed Mobility  supine-sit;sit-supine  -AS     Supine-Sit [place] (Bed Mobility)  modified independence  -AS     Row Name 02/10/21 0839          Transfers    Transfers  sit-stand transfer;toilet transfer  -AS     Sit-Stand [place] (Transfers)  standby assist  -AS     [place] Level (Toilet Transfer)  minimum assist (75% patient effort)  -AS     Row Name 02/10/21 0839          Sit-Stand Transfer    Assistive Device (Sit-Stand Transfers)  other (see comments) no AD  -AS     Row Name 02/10/21 0839          Toilet Transfer    Type (Toilet Transfer)  sit-stand;stand-sit  -AS     Row Name 02/10/21 08          Functional Mobility    Functional Mobility- Ind. Level  contact guard assist  -AS     Row Name 02/10/21 0839          Activities of Daily Living    BADL Assessment/Intervention  upper body dressing  -AS     Row Name 02/10/21 0839          Upper Body Dressing Assessment/Training    [place] Level (Upper Body Dressing)  set up  -AS     Position (Upper Body Dressing)  edge of bed sitting  -AS     Comment (Upper Body Dressing)  hospital gown  -AS       User Key  (r) = Recorded By, (t) = Taken By, (c) = Cosigned By    Initials Name Provider Type    AS Cherry Long OT Occupational Therapist        Obj/Interventions     [facility] Name 02/10/21 0839          Sensory Assessment (Somatosensory)    Sensory Assessment  (Somatosensory)  UE sensation intact  -AS     [facility] Name 02/10/21 0839          Range of Motion Comprehensive    General Range of Motion  bilateral upper extremity ROM WFL  -AS     Row Name 02/10/21 0839          Strength Comprehensive (MMT)    Comment, General Manual Muscle Testing (MMT) Assessment  grossly 4+/5 throughout  -AS       User Key  (r) = Recorded By, (t) = Taken By, (c) = Cosigned By    Initials Name Provider Type    AS Cherry Long, OT Occupational Therapist        Goals/Plan     Row Name 02/10/21 0839          Transfer Goal 1 (OT)    Activity/Assistive Device (Transfer Goal 1, OT)  sit-to-stand/stand-to-sit;bed-to-chair/chair-to-bed;toilet  -AS     [place] Level/Cues Needed (Transfer Goal 1, OT)  independent  -AS     Time Frame (Transfer Goal 1, OT)  long term goal (LTG);by discharge  -AS     Progress/Outcome (Transfer Goal 1, OT)  goal not met  -AS     Row Name 02/10/21 0839          Bathing Goal 1 (OT)    Activity/Device (Bathing Goal 1, OT)  bathing skills, all;long-handled sponge  -AS     [place] Level/Cues Needed (Bathing Goal 1, OT)  set-up required  -AS     Time Frame (Bathing Goal 1, OT)  long term goal (LTG);by discharge  -AS     Progress/Outcomes (Bathing Goal 1, OT)  goal not met  -AS     Row Name 02/10/21 0839          Dressing Goal 1 (OT)    Activity/Device (Dressing Goal 1, OT)  lower body dressing;sock-aid;reacher;long-handled shoe horn  -AS     [place]/Cues Needed (Dressing Goal 1, OT)  set-up required  -AS     Time Frame (Dressing Goal 1, OT)  long term goal (LTG);by discharge  -AS     Progress/Outcome (Dressing Goal 1, OT)  goal not met  -AS     Row Name 02/10/21 0839          Toileting Goal 1 (OT)    Activity/Device (Toileting Goal 1, OT)  toileting skills, all  -AS     [place] Level/Cues Needed (Toileting Goal 1, OT)  independent  -AS     Time Frame (Toileting Goal 1, OT)  long term goal (LTG);by discharge  -AS     Progress/Outcome (Toileting Goal 1, OT)  goal  not met  -AS     [facility] Name 02/10/21 0839          ROM Goal 1 (OT)    ROM Goal 1 (OT)  Pt will tolerate 15 min of fxnl/therapeutic activities with stable O2 >90%  -AS     Time Frame (ROM Goal 1, OT)  long term goal (LTG);by discharge  -AS     Progress/Outcome (ROM Goal 1, OT)  goal not met  -AS     [facility] Name 02/10/21 0839          Strength Goal 1 (OT)    Strength Goal 1 (OT)  Pt will be independent in BUE strengthening HEP  -AS     Time Frame (Strength Goal 1, OT)  long term goal (LTG);by discharge  -AS     Progress/Outcome (Strength Goal 1, OT)  goal not met  -AS     Row Name 02/10/21 0839          Therapy Assessment/Plan (OT)    Planned Therapy Interventions (OT)  activity tolerance training;functional balance retraining;occupation/activity based interventions;BADL retraining;adaptive equipment training;ROM/therapeutic exercise;strengthening exercise;transfer/mobility retraining;patient/caregiver education/training  -AS       User Key  (r) = Recorded By, (t) = Taken By, (c) = Cosigned By    Initials Name Provider Type    AS Cherry Long OT Occupational Therapist        Clinical Impression     [facility] Name 02/10/21 0839          Pain Assessment    Additional Documentation  Pain Scale: Numbers Pre/Post-Treatment (Group)  -AS     Row Name 02/10/21 0839          Pain Scale: Numbers Pre/Post-Treatment    Pretreatment Pain Rating  5/10  -AS     Pain Location  back  -AS     Pre/Posttreatment Pain Comment  chronic in nature  -AS     Pain Intervention(s)  Medication (See MAR);Ambulation/increased activity  -AS     [facility] Name 02/10/21 0839          Plan of Care Review    Plan of Care Reviewed With  patient  -AS     Row Name 02/10/21 0839          Therapy Assessment/Plan (OT)    Patient/Family Therapy Goal Statement (OT)  return home  -AS     Rehab Potential (OT)  good, to achieve stated therapy goals  -AS     Criteria for Skilled Therapeutic Interventions Met (OT)  yes;meets criteria;skilled treatment is necessary  -AS      Therapy Frequency (OT)  other (see comments) 5-7days/wk  -AS     Predicted Duration of Therapy Intervention (OT)  until goals met or d/c from facility  -AS     Row Name 02/10/21 0839          Therapy Plan Review/Discharge Plan (OT)    Anticipated Discharge Disposition (OT)  home with assist  -AS     Row Name 02/10/21 0839          Vital Signs    Pre Systolic BP Rehab  152  -AS     Pre Treatment Diastolic BP  70  -AS     Intra Systolic BP Rehab  143  -AS     Intra Treatment Diastolic BP  63  -AS     Post Systolic BP Rehab  147  -AS     Post Treatment Diastolic BP  65  -AS     Pretreatment Heart Rate (beats/min)  89  -AS     Intratreatment Heart Rate (beats/min)  100  -AS     Posttreatment Heart Rate (beats/min)  98  -AS     Pre SpO2 (%)  92  -AS     O2 Delivery Pre Treatment  room air  -AS     Intra SpO2 (%)  88  -AS     O2 Delivery Intra Treatment  room air 93% after PLB  -AS     Post SpO2 (%)  91  -AS     O2 Delivery Post Treatment  room air  -AS     Pre Patient Position  Supine  -AS     Intra Patient Position  Sitting  -AS     Row Name 02/10/21 0839          Positioning and Restraints    Pre-Treatment Position  in bed  -AS       User Key  (r) = Recorded By, (t) = Taken By, (c) = Cosigned By    Initials Name Provider Type    AS Cherry Long, OT Occupational Therapist        Outcome Measures     Row Name 02/10/21 0839          How much help from another is currently needed...    Putting on and taking off regular lower body clothing?  3  -AS     Bathing (including washing, rinsing, and drying)  3  -AS     Toileting (which includes using toilet bed pan or urinal)  3  -AS     Putting on and taking off regular upper body clothing  4  -AS     Taking care of personal grooming (such as brushing teeth)  4  -AS     Eating meals  4  -AS     AM-PAC 6 Clicks Score (OT)  21  -AS     Row Name 02/10/21 0839          Functional Assessment    Outcome Measure Options  AM-PAC 6 Clicks Daily Activity (OT)  -AS       User Key  (r) =  Recorded By, (t) = Taken By, (c) = Cosigned By    Initials Name Provider Type    AS Cherry Long OT Occupational Therapist        Occupational Therapy Education                 Title: PT OT SLP Therapies (In Progress)     Topic: Occupational Therapy (In Progress)     Point: ADL training (Done)     Description:   Instruct learner(s) on proper safety adaptation and remediation techniques during self care or transfers.   Instruct in proper use of assistive devices.              Learning Progress Summary           Patient Acceptance, E, VU by AS at 2/10/2021 0913    Comment: role of OT, OT POC, ADL training, transfer training                   Point: Home exercise program (Not Started)     Description:   Instruct learner(s) on appropriate technique for monitoring, assisting and/or progressing therapeutic exercises/activities.              Learner Progress:  Not documented in this visit.          Point: Precautions (Done)     Description:   Instruct learner(s) on prescribed precautions during self-care and functional transfers.              Learning Progress Summary           Patient Acceptance, E, VU by AS at 2/10/2021 0913    Comment: role of OT, OT POC, ADL training, transfer training                   Point: Body mechanics (Not Started)     Description:   Instruct learner(s) on proper positioning and spine alignment during self-care, functional mobility activities and/or exercises.              Learner Progress:  Not documented in this visit.                      User Key     Initials Effective Dates Name Provider Type Discipline    AS 07/05/20 -  Cherry Long OT Occupational Therapist OT              OT Recommendation and Plan  Planned Therapy Interventions (OT): activity tolerance training, functional balance retraining, occupation/activity based interventions, BADL retraining, adaptive equipment training, ROM/therapeutic exercise, strengthening exercise, transfer/mobility retraining, patient/caregiver  education/training  Therapy Frequency (OT): other (see comments)(5-7days/wk)  Plan of Care Review  Plan of Care Reviewed With: patient  Outcome Summary: OT eval completed; co-eval with PT. Pt pleasant and cooperative. Pt performed supine>sit with mod I, sit<>stand with SBA, and in room fxnl mobility with CGA (no AD). Pt required min A for toilet t/f. Pt rec'd on RA and RN request monitoring O2 during activity. Pt initally desat to 88% on RA supine>sit, however able to recover <1 minute with PLB to 91-92%. Pt able to sustain O2 90-92% on RA during activity with PLB. Pt reports he feels SOB and is unable to take a deep breath. RN notified of pt status. Pt presents with decreased activity tolerance, balance, and mobility limiting safe participation in ADLs. Pt would benefit from continued skilled OT services to promote return to PLOF. Rec home with assist at d/c.     Time Calculation:   Time Calculation- OT     Row Name 02/10/21 0919             Time Calculation- OT    OT Start Time  0839  -AS      OT Stop Time  0920  -AS      OT Time Calculation (min)  41 min  -AS      OT Received On  02/10/21  -AS      OT Goal Re-Cert Due Date  02/23/21  -AS        User Key  (r) = Recorded By, (t) = Taken By, (c) = Cosigned By    Initials Name Provider Type    AS Cherry Long OT Occupational Therapist        Therapy Charges for Today     Code Description Service Date Service Provider Modifiers Qty    [id number]  OT EVAL MOD COMPLEXITY 3 2/10/2021 Cherry Long OT GO 1               Cherry Long OT  2/10/2021

## 2021-02-10 NOTE — THERAPY EVALUATION
Patient Name: Vishnu Waller  : 1940    MRN: [id number]                              Today's Date: 2/10/2021       Admit Date: 2021    Visit Dx:     ICD-10-CM ICD-9-CM   1. Exertional dyspnea  R06.00 786.09   2. Hypomagnesemia  E83.42 275.2   3. Impaired mobility and ADLs  Z74.09 V49.89    Z78.9    4. COPD with acute exacerbation (CMS/[facility])  J44.1 491.21   5. Impaired functional mobility, balance, gait, and endurance  Z74.09 V49.89     Patient Active Problem List   Diagnosis   • Degeneration of lumbar intervertebral disc   • Low back pain without sciatica   • Neuritis or radiculitis due to rupture of lumbar intervertebral disc   • Borderline glaucoma, open angle with borderline findings   • Pseudophakia   • History of coronary artery bypass surgery   • Essential hypertension   • Mixed hyperlipidemia   • Thrombocytopenia (CMS/HCC)   • Spinal stenosis of lumbar region   • Degenerative disc disease, lumbar   • Chronic pain of right knee   • History of artificial joint   • B12 deficiency   • Degeneration of intervertebral disc of lumbosacral region   • Personal history of other infectious and parasitic diseases   • Status post lumbar spine operation   • History of pyelonephritis   • History of surgical procedure   • History of repair of rotator cuff   • Encounter for follow-up examination after completed treatment for conditions other than malignant neoplasm   • Encounter for general adult medical examination without abnormal findings   • Osteoarthritis of knee   • Osteoarthritis of multiple joints   • Primary insomnia   • Encounter for screening for malignant neoplasm of colon   • Encounter for screening for malignant neoplasm of prostate   • Type 2 diabetes mellitus, without long-term current use of insulin (CMS/HCC)   • Coronary artery disease involving native coronary artery of native heart without angina pectoris   • Dyspnea on exertion   • Presence of aortocoronary bypass graft   • Dizziness and  giddiness   • Postviral fatigue syndrome   • Postviral fatigue syndrome   • Recurrent kidney stones   • Thrombocytopenia (CMS/[facility])   • Pyelonephritis   • COPD with asthma (CMS/[facility])   • Chronic non-seasonal allergic rhinitis   • Unstable angina (CMS/[facility])   • NSTEMI (non-ST elevated myocardial infarction) (CMS/[facility])   • History of PTCA 1   • Medicare annual wellness visit, subsequent   • Thoracic aortic aneurysm without rupture (CMS/[facility])   • Chronic kidney disease, stage 2 (mild)   • Personal history of tobacco use, presenting hazards to health   • Cervical pain (neck)   • Thoracic spine pain   • Calculus of ureter   • Foreign body in bladder and urethra   • Physical deconditioning   • Left hip pain   • Pain of left humerus   • PVD (peripheral vascular disease) (CMS/[facility])   • COPD with acute exacerbation (CMS/[facility])   • AAA (abdominal aortic aneurysm) (CMS/[facility])   • Renal mass   • BPPV (benign paroxysmal positional vertigo)   • Degenerative disc disease, lumbar   • Hypomagnesemia   • Megaloblastic anemia     Past Medical History:   Diagnosis Date   • AAA (abdominal aortic aneurysm) (CMS/[facility])    • Acute bacterial sinusitis    • Acute bronchitis    • Acute frontal sinusitis    • Acute maxillary sinusitis    • Acute pharyngitis    • Allergic rhinitis    • Allergic rhinitis due to pollen    • Anxiety    • Artificial lens present     in position   • Asthma    • Backache    • Borderline glaucoma    • C. difficile diarrhea 2014   • Chronic laryngitis    • Chronic rhinitis    • COPD (chronic obstructive pulmonary disease) (CMS/[facility])    • Coronary artery disease    • Cough    • Degeneration of lumbar intervertebral disc    • Degenerative joint disease involving multiple joints    • Diabetes mellitus (CMS/[facility])    • Diverticular disease of colon    • Dizziness and giddiness    • Elevated cholesterol    • Erectile dysfunction    • Essential hypertension    • Generalized anxiety disorder    • GERD (gastroesophageal reflux disease)    •  Glaucoma    • Hemorrhoids     without bleeding   • Insomnia    • Kidney stone    • Kidney stones    • Malignant tumor of prostate (CMS/HCC)    • Need for prophylactic vaccination and inoculation against influenza    • Osteoarthritis    • Osteoarthritis of multiple joints    • Pain, lumbar region     Pain radiating to lumbar region of back     • PONV (postoperative nausea and vomiting)    • Postviral fatigue syndrome    • Presence of aortocoronary bypass graft    • Prostate cancer (CMS/HCC)    • Pseudomembranous enterocolitis     improved   • Rotator cuff syndrome     right   • Shoulder pain    • SOB (shortness of breath)    • Tenosynovitis    • Thrombocytopenia (CMS/HCC)    • Upper respiratory infection      Past Surgical History:   Procedure Laterality Date   • CARDIAC CATHETERIZATION  09/25/2003    Cardiac cath (Normal left ventricular systolic function, EF 60% Multi-vessel coronary artery disease with critical disease noted in the LAD coronary artery, diagonal coronary artery, obtuse marginal coronary and right coronary artery.)   • CARDIAC CATHETERIZATION  05/03/1996    Cardiac cath (Coronary atherosclerotic heart disease. 70% diagonal stenosis. Mild to moderate left anterior descending artery stenosis. Preserved left ventricular function.)   • CARDIAC CATHETERIZATION N/A 2/26/2018    Procedure: Left Heart Cath/ pci if indicated ;  Surgeon: Radha Wilkes MD;  Location: [facility] INVASIVE LOCATION;  Service:    • CATARACT EXTRACTION Bilateral    • CATARACT EXTRACTION  10/04/2011    Remove cataract, insert lens (Right)   • CATARACT EXTRACTION  08/23/2011    Remove cataract, insert lens (left)   • COLONOSCOPY     • COLONOSCOPY      Colon endoscopy 53612 (Rectal bleeding. Radiation proctitis w/bleeding. An abnormal CT of transverse colon due to mucosal ischemic colitis, that now is healed. Internal hemorrhoids w/possible bleeding.)   • COLONOSCOPY  05/10/2011    Colon endoscopy 75191 (Single sessile  polyp found in transverse colon and sigmoid colon ,removed by cold biopsy polypectomy.divertic. found in sigmoid colon,descending colon. Friability/capillary friability in rectum sigmoid due to prior radiation.Inter. hem. Grade 1)   • COLONOSCOPY  05/02/2007    Colon endoscopy 32566 (Colon polyp. Diverticulosis without bleeding. Internal hemorrhoids without bleeding.)   • CORONARY ARTERY BYPASS GRAFT  2002   • CYSTOSCOPY  01/13/1995    Cystoscopy, stone removal (Right ureteroscopic lasertripsy.)   • CYSTOSCOPY Right 1/23/2019    Procedure: CYSTOSCOPY, RIGHT STENT REMOVAL;  Surgeon: Nirmal Gaines MD;  Location: [facility];  Service: Urology   • CYSTOSCOPY, URETEROSCOPY, RETROGRADE PYELOGRAM, STENT INSERTION N/A 8/28/2017    Procedure: URETEROSCOPY LASER LITHOTRIPSY WITH STENT INSERTION AND RETROGRADE PYELOGRAM ;  Surgeon: Anna M. D'Amico, MD;  Location: [facility];  Service:    • CYSTOSCOPY, URETEROSCOPY, RETROGRADE PYELOGRAM, STENT INSERTION Right 1/11/2019    Procedure: CYSTOSCOPY URETEROSCOPY RETROGRADE PYELOGRAM HOLMIUM LASER STENT INSERTION;  Surgeon: Nirmal Gaines MD;  Location: [facility];  Service: Urology   • EPIDURAL  12/03/2014    Therapeutic/diag injection (Lumbar transforaminal epidural steroid injection, L5-S1, left side.)   • EPIDURAL  10/22/2014    Therapeutic/diag injection (Lumbar transforaminal epidural steroid injection L5-S1 left side.)   • EPIDURAL  08/07/2014    Therapeutic/diag injection (Lumbar transforaminal epidural steroid injection.)   • EXCISION LESION  05/13/1999    REMOVE EAR LESION 29578 (1.3 cm basal cell carcinoma, right preauricular area. Excision)   • EXCISION LESION  03/13/2001    REMOVE LESION NECK/CHEST 74711 (Excision of irritated seborrheic keratosis, anterior neck, 1.1 cm and deep lipoma, posterior neck, 3.5 cm)   • INJECTION OF MEDICATION  11/15/2012    Kenalog (4)      • JOINT REPLACEMENT  2015    partial   • KNEE ARTHROSCOPY  01/17/2007    Knee arthroscopy, surgery  (Arthroscopy with medial and lateral meniscectomies, right knee.)   • KNEE SURGERY  11/04/2015    Knee Surgery (Right unicompartmental arthroplasty.)   • LUMBAR LAMINECTOMY N/A 2/7/2017    Procedure: LUMBAR LAMINECTOMY LUMBAR THREE-FOUR, LUMBAR FOUR-FIVE, INTERLAMINAR DISTRACTION ;  Surgeon: Lucio Mayo MD;  Location: [facility];  Service:    • NOSE SURGERY     • OTHER SURGICAL HISTORY      EXTENDED VISUAL FIELDS STUDY 41644 (Borderline glaucoma) (3): 03/19/2015, 04/09/2014, 03/27/2013   • OTHER SURGICAL HISTORY  10/29/2003    Heart revascularize (TMR) (Directmyocardial revascularization times four; LIMA to LAD SVG to DARIUSZ SVG to OM1, SVG to RCA)   • OTHER SURGICAL HISTORY      OCT DISC NFL 73677 (Borderline glaucoma)  (3): 09/24/2015, 08/13/2014, 07/29/2013   • PROSTATECTOMY     • SEPTORHINOPLASTY  11/16/1989    Nasal surgery procedure (Septorhinoplasty with reconstruction of the nasal pyramid with a iliac crest graft, rhinoplasty was performed with external approach.)   • SHOULDER ARTHROSCOPY  07/24/2013    Arthroscopy of right shoulder with rotator repair, Carla procedure, Biceps tenotomy, subacromial decompression.   • SHOULDER SURGERY  11/01/2005    Shoulder surgery procedure (Decompression, subacromial, explore of the rotator cuff, no tear found nor repaired, acromioplasty of the left shoulder and AC shoulder joint resection.)     General Information     Row Name 02/10/21 0877          Physical Therapy Time and Intention    Document Type  evaluation  -LR     Mode of Treatment  co-treatment;physical therapy;occupational therapy  -LR     Row Name 02/10/21 0843          General Information    Patient Profile Reviewed  yes  -LR     Prior Level of Function  independent:;community mobility;gait;driving  -LR     Existing Precautions/Restrictions  fall  -LR     Row Name 02/10/21 0840          Living Environment    Lives With  spouse  -LR     Row Name 02/10/21 0840          Home Main Entrance    Number  of Stairs, Main Entrance  one  -LR     Stair Railings, Main Entrance  none  -LR     Row Name 02/10/21 0840          Stairs Within Home, Primary    Number of Stairs, Within Home, Primary  none  -LR     Stair Railings, Within Home, Primary  none  -LR     Row Name 02/10/21 0840          Cognition    Orientation Status (Cognition)  oriented x 4  -LR     Row Name 02/10/21 0840          Safety Issues, Functional Mobility    Safety Issues Affecting Function (Mobility)  at risk behavior observed;safety precautions follow-through/compliance;safety precaution awareness;awareness of need for assistance;insight into deficits/self-awareness  -LR     Impairments Affecting Function (Mobility)  balance;coordination;strength;shortness of breath;endurance/activity tolerance;pain  -LR       User Key  (r) = Recorded By, (t) = Taken By, (c) = Cosigned By    Initials Name Provider Type    LR Elkin Chavez Physical Therapist        Mobility     Row Name 02/10/21 0840          Bed Mobility    Bed Mobility  supine-sit;sit-supine  -LR     Supine-Sit [place] (Bed Mobility)  modified independence  -LR     Sit-Supine [place] (Bed Mobility)  modified independence  -LR     Assistive Device (Bed Mobility)  bed rails  -LR     Row Name 02/10/21 0840          Bed-Chair Transfer    Bed-Chair [place] (Transfers)  contact guard  -LR     Row Name 02/10/21 0840          Sit-Stand Transfer    Sit-Stand [place] (Transfers)  standby assist  -LR     Row Name 02/10/21 0840          Gait/Stairs (Locomotion)    [place] Level (Gait)  contact guard  -LR     Distance in Feet (Gait)  64'x3, 32x2  -LR     Deviations/Abnormal Patterns (Gait)  rafiq decreased;gait speed decreased;stride length decreased;weight shifting decreased  -LR     Bilateral Gait Deviations  leans left;leans right  -LR     [place] Level (Stairs)  not tested  -LR       User Key  (r) = Recorded By, (t) = Taken By, (c) = Cosigned By    Initials Name Provider Type     LR Elkin Chavez Physical Therapist        Obj/Interventions     Row Name 02/10/21 0840          Range of Motion Comprehensive    General Range of Motion  no range of motion deficits identified  -LR     Row Name 02/10/21 0840          Strength Comprehensive (MMT)    General Manual Muscle Testing (MMT) Assessment  no strength deficits identified  -LR     Comment, General Manual Muscle Testing (MMT) Assessment  grossly 4+/5 except B hip flexion 4/5  -LR     Row Name 02/10/21 0840          Balance    Balance Assessment  sitting static balance  -LR     Static Sitting Balance  WNL  -LR     Row Name 02/10/21 0840          Sensory Assessment (Somatosensory)    Sensory Assessment (Somatosensory)  sensation intact;LE sensation intact  -LR       User Key  (r) = Recorded By, (t) = Taken By, (c) = Cosigned By    Initials Name Provider Type    LR Elkin Chavez Physical Therapist        Goals/Plan     Row Name 02/10/21 0959          Bed Mobility Goal 1 (PT)    Activity/Assistive Device (Bed Mobility Goal 1, PT)  sit to supine;supine to sit  -LR     [place] Level/Cues Needed (Bed Mobility Goal 1, PT)  independent  -LR     Time Frame (Bed Mobility Goal 1, PT)  by discharge  -LR     Progress/Outcomes (Bed Mobility Goal 1, PT)  goal not met  -LR     Row Name 02/10/21 0959          Transfer Goal 1 (PT)    Activity/Assistive Device (Transfer Goal 1, PT)  sit-to-stand/stand-to-sit;bed-to-chair/chair-to-bed  -LR     [place] Level/Cues Needed (Transfer Goal 1, PT)  independent  -LR     Time Frame (Transfer Goal 1, PT)  by discharge  -LR     Progress/Outcome (Transfer Goal 1, PT)  goal not met  -LR     Row Name 02/10/21 0959          Gait Training Goal 1 (PT)    Activity/Assistive Device (Gait Training Goal 1, PT)  gait (walking locomotion);backward stepping  -LR     [place] Level (Gait Training Goal 1, PT)  independent  -LR     Distance (Gait Training Goal 1, PT)  50'x3  -LR     Time Frame (Gait Training Goal 1, PT)  by  discharge  -LR     Progress/Outcome (Gait Training Goal 1, PT)  goal not met  -LR     Row Name 02/10/21 0959          Patient Education Goal (PT)    Activity (Patient Education Goal, PT)  Patient will report decrease in symptoms in R mateusz-hallpike position by >70%.  -LR     [place]/Cues/Accuracy (Memory Goal 2, PT)  demonstrates adequately;independent  -LR     Time Frame (Patient Education Goal, PT)  by discharge  -LR     Progress/Outcome (Patient Education Goal, PT)  goal not met  -LR       User Key  (r) = Recorded By, (t) = Taken By, (c) = Cosigned By    Initials Name Provider Type    LR Elkin Chavez Physical Therapist        Clinical Impression     Row Name 02/10/21 0840          Pain    Additional Documentation  Pain Scale: Numbers Pre/Post-Treatment (Group)  -LR     Row Name 02/10/21 0882          Pain Scale: Numbers Pre/Post-Treatment    Pretreatment Pain Rating  5/10  -LR     Posttreatment Pain Rating  5/10  -LR     Pain Location  back  -LR     Pre/Posttreatment Pain Comment  chronic in nature  -LR     Pain Intervention(s)  Rest;Ambulation/increased activity  -LR     Row Name 02/10/21 4753          Plan of Care Review    Plan of Care Reviewed With  patient  -LR     Outcome Summary  PT co-eval completed with OT today. Pt pleasant and cooperative. Pt reports history of vertigo but state this vertigo is different than what he is feeling. His current symptoms have been happening for 4-6 months. (-) Orthostatic BP/HR. Attempted RA per nursing but patient initally desaturated to 88% on RA while sitting EOB and recovered with PLB quickly to 90-92%. Patient had increased labor of breathing with any activity. Patient stayed in 90s during tx short ambulation.  Bed mobility: SBA supine<>sit with HOB up and bed rails Transfer: CGA sit<>stand. Gait: Ambulated CGA for  64'x3 32'x2. Balance: Components of Functional gait assessment completed. Patient reported reproduction of dizziness/vertigo with head turns looking  down. During other tasks such eyes closed ambulation, pivot and stop, bakwards ambulation, and horizontal head turns patient tend to lose balance to L/R.  Myra Hallpike R(+) L(-). On positive side pt reported reproduction of vertigo at 5/10. Epley Maneuver x2. After 1st Epley patient reported vertigo was only 1-2/10 and reported decrease in symptoms during each position of epley. Patient would benefit from skilled PT to address deficits in functional mobility and return to PLOF. Recommend Home with haim and OPPT.  -LR     Row Name 02/10/21 0840          Therapy Assessment/Plan (PT)    Patient/Family Therapy Goals Statement (PT)  Patient wants to breath better and be less dizzy  -LR     Rehab Potential (PT)  good, to achieve stated therapy goals  -LR     Criteria for Skilled Interventions Met (PT)  yes;skilled treatment is necessary  -LR     Predicted Duration of Therapy Intervention (PT)  Until d/c or until all goals met.  -LR     Row Name 02/10/21 0840          Vital Signs    Pre Systolic BP Rehab  152  -LR     Pre Treatment Diastolic BP  70  -LR     Intra Systolic BP Rehab  143  -LR     Intra Treatment Diastolic BP  63  -LR     Post Systolic BP Rehab  147  -LR     Post Treatment Diastolic BP  65  -LR     Pretreatment Heart Rate (beats/min)  89  -LR     Posttreatment Heart Rate (beats/min)  98  -LR     Pre SpO2 (%)  92  -LR     O2 Delivery Pre Treatment  room air  -LR     Intra SpO2 (%)  88  -LR     O2 Delivery Intra Treatment  room air  -LR     Post SpO2 (%)  94 post O2  -LR     O2 Delivery Post Treatment  supplemental O2  -LR     Pre Patient Position  Supine  -LR     Intra Patient Position  Sitting  -LR     Post Patient Position  Standing  -LR     Row Name 02/10/21 0840          Positioning and Restraints    Pre-Treatment Position  in bed  -LR     Post Treatment Position  bed  -LR     In Bed  notified nsg;call light within reach;encouraged to call for assist;exit alarm on  -LR       User Key  (r) = Recorded By,  (t) = Taken By, (c) = Cosigned By    Initials Name Provider Type    LR Elkin Chavez Physical Therapist        Outcome Measures     Row Name 02/10/21 1001          How much help from another person do you currently need...    Turning from your back to your side while in flat bed without using bedrails?  4  -LR     Moving from lying on back to sitting on the side of a flat bed without bedrails?  3  -LR     Moving to and from a bed to a chair (including a wheelchair)?  3  -LR     Standing up from a chair using your arms (e.g., wheelchair, bedside chair)?  3  -LR     Climbing 3-5 steps with a railing?  3  -LR     To walk in hospital room?  3  -LR     AM-PAC 6 Clicks Score (PT)  19  -LR     Row Name 02/10/21 1001          Functional Assessment    Outcome Measure Options  AM-PAC 6 Clicks Basic Mobility (PT)  -LR       User Key  (r) = Recorded By, (t) = Taken By, (c) = Cosigned By    Initials Name Provider Type    LR Elkin Chavez Physical Therapist        Physical Therapy Education                 Title: PT OT SLP Therapies (In Progress)     Topic: Physical Therapy (Done)     Point: Mobility training (Done)     Learning Progress Summary           Patient Acceptance, E,TB, VU by  at 2/10/2021 1001    Comment: PT POC, goals, and BPPV                   Point: Home exercise program (Done)     Learning Progress Summary           Patient Acceptance, E,TB, VU by LR at 2/10/2021 1001    Comment: PT POC, goals, and BPPV                   Point: Body mechanics (Done)     Learning Progress Summary           Patient Acceptance, E,TB, VU by  at 2/10/2021 1001    Comment: PT POC, goals, and BPPV                   Point: Precautions (Done)     Learning Progress Summary           Patient Acceptance, E,TB, VU by  at 2/10/2021 1001    Comment: PT POC, goals, and BPPV                               User Key     Initials Effective Dates Name Provider Type [facility] 06/25/19 -  Elkin Chavez Physical Therapist PT               PT Recommendation and Plan  Planned Therapy Interventions (PT): balance training, neuromuscular re-education, transfer training, bed mobility training, orthotic fitting/training, home exercise program, gait training, wheelchair management/propulsion training, vestibular therapy, patient/family education, postural re-education, joint mobilization, lumbar stabilization, prosthetic fitting/training, ROM (range of motion), manual therapy techniques, stair training, strengthening, stretching  Plan of Care Reviewed With: patient  Outcome Summary: PT co-eval completed with OT today. Pt pleasant and cooperative. Pt reports history of vertigo but state this vertigo is different than what he is feeling. His current symptoms have been happening for 4-6 months. (-) Orthostatic BP/HR. Attempted RA per nursing but patient initally desaturated to 88% on RA while sitting EOB and recovered with PLB quickly to 90-92%. Patient had increased labor of breathing with any activity. Patient stayed in 90s during tx short ambulation.  Bed mobility: SBA supine<>sit with HOB up and bed rails Transfer: CGA sit<>stand. Gait: Ambulated CGA for  64'x3 32'x2. Balance: Components of Functional gait assessment completed. Patient reported reproduction of dizziness/vertigo with head turns looking down. During other tasks such eyes closed ambulation, pivot and stop, bakwards ambulation, and horizontal head turns patient tend to lose balance to L/R.  Myra Hallpike R(+) L(-). On positive side pt reported reproduction of vertigo at 5/10. Epley Maneuver x2. After 1st Epley patient reported vertigo was only 1-2/10 and reported decrease in symptoms during each position of epley. Patient would benefit from skilled PT to address deficits in functional mobility and return to PLOF. Recommend Home with haim and OPPT.     Time Calculation:   PT Charges     Row Name 02/10/21 1151             Time Calculation    Start Time  0839  -LR      Stop Time  1002  -LR       Time Calculation (min)  83 min  -LR      PT Received On  02/10/21  -LR      PT Goal Re-Cert Due Date  02/23/21  -LR        User Key  (r) = Recorded By, (t) = Taken By, (c) = Cosigned By    Initials Name Provider Type    LR Elkin Chavez Physical Therapist        Therapy Charges for Today     Code Description Service Date Service Provider Modifiers Qty    38174704517 HC PT EVAL MOD COMPLEXITY 4 2/10/2021 Elkin Chavez GP 1    [id number] HC PT CANALITH REPOSITIONING PER DAY 2/10/2021 Elkin Chavez GP 1          PT G-Codes  Outcome Measure Options: AM-PAC 6 Clicks Basic Mobility (PT)  AM-PAC 6 Clicks Score (PT): 19  AM-PAC 6 Clicks Score (OT): 21    Elkin Chavez  2/10/2021

## 2021-02-11 PROBLEM — J96.01 ACUTE RESPIRATORY FAILURE WITH HYPOXIA (HCC): Status: ACTIVE | Noted: 2021-02-11

## 2021-02-11 LAB
ALBUMIN SERPL-MCNC: 3.3 G/DL (ref 3.5–5.2)
ALBUMIN/GLOB SERPL: 1.1 G/DL
ALP SERPL-CCNC: 46 U/L (ref 39–117)
ALT SERPL W P-5'-P-CCNC: 13 U/L (ref 1–41)
ANION GAP SERPL CALCULATED.3IONS-SCNC: 5 MMOL/L (ref 5–15)
AST SERPL-CCNC: 17 U/L (ref 1–40)
BASOPHILS # BLD AUTO: 0.04 10*3/MM3 (ref 0–0.2)
BASOPHILS NFR BLD AUTO: 0.4 % (ref 0–1.5)
BILIRUB SERPL-MCNC: 0.3 MG/DL (ref 0–1.2)
BUN SERPL-MCNC: 18 MG/DL (ref 8–23)
BUN/CREAT SERPL: 22.5 (ref 7–25)
CALCIUM SPEC-SCNC: 8.7 MG/DL (ref 8.6–10.5)
CHLORIDE SERPL-SCNC: 105 MMOL/L (ref 98–107)
CO2 SERPL-SCNC: 28 MMOL/L (ref 22–29)
CREAT SERPL-MCNC: 0.8 MG/DL (ref 0.76–1.27)
DEPRECATED RDW RBC AUTO: 65.4 FL (ref 37–54)
EOSINOPHIL # BLD AUTO: 0.03 10*3/MM3 (ref 0–0.4)
EOSINOPHIL NFR BLD AUTO: 0.3 % (ref 0.3–6.2)
ERYTHROCYTE [DISTWIDTH] IN BLOOD BY AUTOMATED COUNT: 17.4 % (ref 12.3–15.4)
GFR SERPL CREATININE-BSD FRML MDRD: 93 ML/MIN/1.73
GLOBULIN UR ELPH-MCNC: 3.1 GM/DL
GLUCOSE BLDC GLUCOMTR-MCNC: 137 MG/DL (ref 70–130)
GLUCOSE BLDC GLUCOMTR-MCNC: 240 MG/DL (ref 70–130)
GLUCOSE BLDC GLUCOMTR-MCNC: 319 MG/DL (ref 70–130)
GLUCOSE BLDC GLUCOMTR-MCNC: 343 MG/DL (ref 70–130)
GLUCOSE SERPL-MCNC: 126 MG/DL (ref 65–99)
HCT VFR BLD AUTO: 26.1 % (ref 37.5–51)
HGB BLD-MCNC: 8.8 G/DL (ref 13–17.7)
IMM GRANULOCYTES # BLD AUTO: 0.57 10*3/MM3 (ref 0–0.05)
IMM GRANULOCYTES NFR BLD AUTO: 5.3 % (ref 0–0.5)
LYMPHOCYTES # BLD AUTO: 0.92 10*3/MM3 (ref 0.7–3.1)
LYMPHOCYTES NFR BLD AUTO: 8.6 % (ref 19.6–45.3)
MAGNESIUM SERPL-MCNC: 1.9 MG/DL (ref 1.6–2.4)
MCH RBC QN AUTO: 38.9 PG (ref 26.6–33)
MCHC RBC AUTO-ENTMCNC: 33.7 G/DL (ref 31.5–35.7)
MCV RBC AUTO: 115.5 FL (ref 79–97)
MONOCYTES # BLD AUTO: 1.36 10*3/MM3 (ref 0.1–0.9)
MONOCYTES NFR BLD AUTO: 12.7 % (ref 5–12)
NEUTROPHILS NFR BLD AUTO: 7.77 10*3/MM3 (ref 1.7–7)
NEUTROPHILS NFR BLD AUTO: 72.7 % (ref 42.7–76)
NRBC BLD AUTO-RTO: 0.9 /100 WBC (ref 0–0.2)
PLATELET # BLD AUTO: 333 10*3/MM3 (ref 140–450)
PMV BLD AUTO: 9.4 FL (ref 6–12)
POTASSIUM SERPL-SCNC: 4.8 MMOL/L (ref 3.5–5.2)
PROT SERPL-MCNC: 6.4 G/DL (ref 6–8.5)
RBC # BLD AUTO: 2.26 10*6/MM3 (ref 4.14–5.8)
SODIUM SERPL-SCNC: 138 MMOL/L (ref 136–145)
WBC # BLD AUTO: 10.69 10*3/MM3 (ref 3.4–10.8)

## 2021-02-11 PROCEDURE — 80053 COMPREHEN METABOLIC PANEL: CPT | Performed by: STUDENT IN AN ORGANIZED HEALTH CARE EDUCATION/TRAINING PROGRAM

## 2021-02-11 PROCEDURE — 63710000001 INSULIN ASPART PER 5 UNITS: Performed by: STUDENT IN AN ORGANIZED HEALTH CARE EDUCATION/TRAINING PROGRAM

## 2021-02-11 PROCEDURE — 63710000001 INSULIN DETEMIR PER 5 UNITS: Performed by: STUDENT IN AN ORGANIZED HEALTH CARE EDUCATION/TRAINING PROGRAM

## 2021-02-11 PROCEDURE — 82962 GLUCOSE BLOOD TEST: CPT

## 2021-02-11 PROCEDURE — 94799 UNLISTED PULMONARY SVC/PX: CPT

## 2021-02-11 PROCEDURE — G0378 HOSPITAL OBSERVATION PER HR: HCPCS

## 2021-02-11 PROCEDURE — 63710000001 PREDNISONE PER 1 MG: Performed by: STUDENT IN AN ORGANIZED HEALTH CARE EDUCATION/TRAINING PROGRAM

## 2021-02-11 PROCEDURE — 94760 N-INVAS EAR/PLS OXIMETRY 1: CPT

## 2021-02-11 PROCEDURE — 95992 CANALITH REPOSITIONING PROC: CPT

## 2021-02-11 PROCEDURE — 83735 ASSAY OF MAGNESIUM: CPT | Performed by: STUDENT IN AN ORGANIZED HEALTH CARE EDUCATION/TRAINING PROGRAM

## 2021-02-11 PROCEDURE — 85025 COMPLETE CBC W/AUTO DIFF WBC: CPT | Performed by: STUDENT IN AN ORGANIZED HEALTH CARE EDUCATION/TRAINING PROGRAM

## 2021-02-11 PROCEDURE — 99232 SBSQ HOSP IP/OBS MODERATE 35: CPT | Performed by: STUDENT IN AN ORGANIZED HEALTH CARE EDUCATION/TRAINING PROGRAM

## 2021-02-11 PROCEDURE — 97112 NEUROMUSCULAR REEDUCATION: CPT

## 2021-02-11 RX ADMIN — ATORVASTATIN CALCIUM 40 MG: 40 TABLET, FILM COATED ORAL at 20:02

## 2021-02-11 RX ADMIN — PREGABALIN 75 MG: 75 CAPSULE ORAL at 09:35

## 2021-02-11 RX ADMIN — IPRATROPIUM BROMIDE AND ALBUTEROL SULFATE 3 ML: 2.5; .5 SOLUTION RESPIRATORY (INHALATION) at 08:27

## 2021-02-11 RX ADMIN — INSULIN ASPART 7 UNITS: 100 INJECTION, SOLUTION INTRAVENOUS; SUBCUTANEOUS at 12:30

## 2021-02-11 RX ADMIN — CLOPIDOGREL BISULFATE 75 MG: 75 TABLET ORAL at 09:35

## 2021-02-11 RX ADMIN — DULOXETINE HYDROCHLORIDE 30 MG: 30 CAPSULE, DELAYED RELEASE ORAL at 09:35

## 2021-02-11 RX ADMIN — IPRATROPIUM BROMIDE AND ALBUTEROL SULFATE 3 ML: 2.5; .5 SOLUTION RESPIRATORY (INHALATION) at 15:42

## 2021-02-11 RX ADMIN — CARVEDILOL 3.12 MG: 3.12 TABLET, FILM COATED ORAL at 20:02

## 2021-02-11 RX ADMIN — INSULIN DETEMIR 20 UNITS: 100 INJECTION, SOLUTION SUBCUTANEOUS at 20:03

## 2021-02-11 RX ADMIN — PREDNISONE 40 MG: 20 TABLET ORAL at 09:35

## 2021-02-11 RX ADMIN — INSULIN ASPART 7 UNITS: 100 INJECTION, SOLUTION INTRAVENOUS; SUBCUTANEOUS at 18:06

## 2021-02-11 RX ADMIN — CARVEDILOL 3.12 MG: 3.12 TABLET, FILM COATED ORAL at 09:36

## 2021-02-11 RX ADMIN — DOCUSATE SODIUM 50 MG AND SENNOSIDES 8.6 MG 2 TABLET: 8.6; 5 TABLET, FILM COATED ORAL at 20:03

## 2021-02-11 RX ADMIN — PREGABALIN 75 MG: 75 CAPSULE ORAL at 20:03

## 2021-02-11 RX ADMIN — SODIUM CHLORIDE, PRESERVATIVE FREE 10 ML: 5 INJECTION INTRAVENOUS at 20:03

## 2021-02-11 RX ADMIN — INSULIN ASPART 3 UNITS: 100 INJECTION, SOLUTION INTRAVENOUS; SUBCUTANEOUS at 18:07

## 2021-02-11 RX ADMIN — ISOSORBIDE MONONITRATE 30 MG: 30 TABLET, EXTENDED RELEASE ORAL at 09:36

## 2021-02-11 RX ADMIN — SODIUM CHLORIDE, PRESERVATIVE FREE 10 ML: 5 INJECTION INTRAVENOUS at 09:36

## 2021-02-11 RX ADMIN — AZITHROMYCIN MONOHYDRATE 250 MG: 250 TABLET ORAL at 09:36

## 2021-02-11 RX ADMIN — INSULIN ASPART 5 UNITS: 100 INJECTION, SOLUTION INTRAVENOUS; SUBCUTANEOUS at 12:30

## 2021-02-11 RX ADMIN — DOCUSATE SODIUM 50 MG AND SENNOSIDES 8.6 MG 2 TABLET: 8.6; 5 TABLET, FILM COATED ORAL at 09:35

## 2021-02-11 RX ADMIN — DILTIAZEM HYDROCHLORIDE 180 MG: 180 CAPSULE, COATED, EXTENDED RELEASE ORAL at 09:35

## 2021-02-11 RX ADMIN — LOSARTAN POTASSIUM 50 MG: 50 TABLET, FILM COATED ORAL at 20:03

## 2021-02-11 RX ADMIN — IPRATROPIUM BROMIDE AND ALBUTEROL SULFATE 3 ML: 2.5; .5 SOLUTION RESPIRATORY (INHALATION) at 11:57

## 2021-02-11 NOTE — PLAN OF CARE
Goal Outcome Evaluation:  Plan of Care Reviewed With: patient     Outcome Summary: PT tx completed on this date. PT facilitated dynamic balance activities with ambulation for 120'x1 with SBA. Patient still had difficulty with Eyes closed (drifted to R), and vertical head turns. Patient does report increased steadiness and decreased work of breathing. Myra-halpike R (+) vertigo 5/10. Epley performed x2 during second manuver patient had really strong reaction with nystagmus beating that too 45-60 seconds to subside. At end of session [place]-hallpike R still (+) no nystagmus seen and very minimal dizziness 1/10. Patient reports he is 80% in regards to his dizziness in the myra-hallpike position. One goal met today. Recommend f/u with OPPT.

## 2021-02-11 NOTE — THERAPY TREATMENT NOTE
Patient Name: Vishnu Waller  : 1940    MRN: [id number]                              Today's Date: 2021       Admit Date: 2021    Visit Dx:     ICD-10-CM ICD-9-CM   1. Exertional dyspnea  R06.00 786.09   2. Hypomagnesemia  E83.42 275.2   3. Impaired mobility and ADLs  Z74.09 V49.89    Z78.9    4. COPD with acute exacerbation (CMS/[facility])  J44.1 491.21   5. Impaired functional mobility, balance, gait, and endurance  Z74.09 V49.89     Patient Active Problem List   Diagnosis   • Degeneration of lumbar intervertebral disc   • Low back pain without sciatica   • Neuritis or radiculitis due to rupture of lumbar intervertebral disc   • Borderline glaucoma, open angle with borderline findings   • Pseudophakia   • History of coronary artery bypass surgery   • Essential hypertension   • Mixed hyperlipidemia   • Thrombocytopenia (CMS/HCC)   • Spinal stenosis of lumbar region   • Degenerative disc disease, lumbar   • Chronic pain of right knee   • History of artificial joint   • B12 deficiency   • Degeneration of intervertebral disc of lumbosacral region   • Personal history of other infectious and parasitic diseases   • Status post lumbar spine operation   • History of pyelonephritis   • History of surgical procedure   • History of repair of rotator cuff   • Encounter for follow-up examination after completed treatment for conditions other than malignant neoplasm   • Encounter for general adult medical examination without abnormal findings   • Osteoarthritis of knee   • Osteoarthritis of multiple joints   • Primary insomnia   • Encounter for screening for malignant neoplasm of colon   • Encounter for screening for malignant neoplasm of prostate   • Type 2 diabetes mellitus, without long-term current use of insulin (CMS/HCC)   • Coronary artery disease involving native coronary artery of native heart without angina pectoris   • Dyspnea on exertion   • Presence of aortocoronary bypass graft   • Dizziness and  giddiness   • Postviral fatigue syndrome   • Postviral fatigue syndrome   • Recurrent kidney stones   • Thrombocytopenia (CMS/[facility])   • Pyelonephritis   • COPD with asthma (CMS/[facility])   • Chronic non-seasonal allergic rhinitis   • Unstable angina (CMS/[facility])   • NSTEMI (non-ST elevated myocardial infarction) (CMS/[facility])   • History of PTCA 1   • Medicare annual wellness visit, subsequent   • Thoracic aortic aneurysm without rupture (CMS/[facility])   • Chronic kidney disease, stage 2 (mild)   • Personal history of tobacco use, presenting hazards to health   • Cervical pain (neck)   • Thoracic spine pain   • Calculus of ureter   • Foreign body in bladder and urethra   • Physical deconditioning   • Left hip pain   • Pain of left humerus   • PVD (peripheral vascular disease) (CMS/[facility])   • COPD with acute exacerbation (CMS/HCC)   • AAA (abdominal aortic aneurysm) (CMS/[facility])   • Renal mass   • BPPV (benign paroxysmal positional vertigo)   • Degenerative disc disease, lumbar   • Hypomagnesemia   • Megaloblastic anemia   • Acute respiratory failure with hypoxia (CMS/[facility])     Past Medical History:   Diagnosis Date   • AAA (abdominal aortic aneurysm) (CMS/[facility])    • Acute bacterial sinusitis    • Acute bronchitis    • Acute frontal sinusitis    • Acute maxillary sinusitis    • Acute pharyngitis    • Allergic rhinitis    • Allergic rhinitis due to pollen    • Anxiety    • Artificial lens present     in position   • Asthma    • Backache    • Borderline glaucoma    • C. difficile diarrhea 2014   • Chronic laryngitis    • Chronic rhinitis    • COPD (chronic obstructive pulmonary disease) (CMS/[facility])    • Coronary artery disease    • Cough    • Degeneration of lumbar intervertebral disc    • Degenerative joint disease involving multiple joints    • Diabetes mellitus (CMS/[facility])    • Diverticular disease of colon    • Dizziness and giddiness    • Elevated cholesterol    • Erectile dysfunction    • Essential hypertension    • Generalized anxiety  disorder    • GERD (gastroesophageal reflux disease)    • Glaucoma    • Hemorrhoids     without bleeding   • Insomnia    • Kidney stone    • Kidney stones    • Malignant tumor of prostate (CMS/HCC)    • Need for prophylactic vaccination and inoculation against influenza    • Osteoarthritis    • Osteoarthritis of multiple joints    • Pain, lumbar region     Pain radiating to lumbar region of back     • PONV (postoperative nausea and vomiting)    • Postviral fatigue syndrome    • Presence of aortocoronary bypass graft    • Prostate cancer (CMS/HCC)    • Pseudomembranous enterocolitis     improved   • Rotator cuff syndrome     right   • Shoulder pain    • SOB (shortness of breath)    • Tenosynovitis    • Thrombocytopenia (CMS/HCC)    • Upper respiratory infection      Past Surgical History:   Procedure Laterality Date   • CARDIAC CATHETERIZATION  09/25/2003    Cardiac cath (Normal left ventricular systolic function, EF 60% Multi-vessel coronary artery disease with critical disease noted in the LAD coronary artery, diagonal coronary artery, obtuse marginal coronary and right coronary artery.)   • CARDIAC CATHETERIZATION  05/03/1996    Cardiac cath (Coronary atherosclerotic heart disease. 70% diagonal stenosis. Mild to moderate left anterior descending artery stenosis. Preserved left ventricular function.)   • CARDIAC CATHETERIZATION N/A 2/26/2018    Procedure: Left Heart Cath/ pci if indicated ;  Surgeon: Radha Wilkes MD;  Location: [facility] INVASIVE LOCATION;  Service:    • CATARACT EXTRACTION Bilateral    • CATARACT EXTRACTION  10/04/2011    Remove cataract, insert lens (Right)   • CATARACT EXTRACTION  08/23/2011    Remove cataract, insert lens (left)   • COLONOSCOPY     • COLONOSCOPY      Colon endoscopy 38668 (Rectal bleeding. Radiation proctitis w/bleeding. An abnormal CT of transverse colon due to mucosal ischemic colitis, that now is healed. Internal hemorrhoids w/possible bleeding.)   •  COLONOSCOPY  05/10/2011    Colon endoscopy 53135 (Single sessile polyp found in transverse colon and sigmoid colon ,removed by cold biopsy polypectomy.divertic. found in sigmoid colon,descending colon. Friability/capillary friability in rectum sigmoid due to prior radiation.Inter. hem. Grade 1)   • COLONOSCOPY  05/02/2007    Colon endoscopy 17591 (Colon polyp. Diverticulosis without bleeding. Internal hemorrhoids without bleeding.)   • CORONARY ARTERY BYPASS GRAFT  2002   • CYSTOSCOPY  01/13/1995    Cystoscopy, stone removal (Right ureteroscopic lasertripsy.)   • CYSTOSCOPY Right 1/23/2019    Procedure: CYSTOSCOPY, RIGHT STENT REMOVAL;  Surgeon: Nirmal Gaines MD;  Location: [facility];  Service: Urology   • CYSTOSCOPY, URETEROSCOPY, RETROGRADE PYELOGRAM, STENT INSERTION N/A 8/28/2017    Procedure: URETEROSCOPY LASER LITHOTRIPSY WITH STENT INSERTION AND RETROGRADE PYELOGRAM ;  Surgeon: Anna M. D'Amico, MD;  Location: [facility];  Service:    • CYSTOSCOPY, URETEROSCOPY, RETROGRADE PYELOGRAM, STENT INSERTION Right 1/11/2019    Procedure: CYSTOSCOPY URETEROSCOPY RETROGRADE PYELOGRAM HOLMIUM LASER STENT INSERTION;  Surgeon: Nirmal Gaines MD;  Location: [facility];  Service: Urology   • EPIDURAL  12/03/2014    Therapeutic/diag injection (Lumbar transforaminal epidural steroid injection, L5-S1, left side.)   • EPIDURAL  10/22/2014    Therapeutic/diag injection (Lumbar transforaminal epidural steroid injection L5-S1 left side.)   • EPIDURAL  08/07/2014    Therapeutic/diag injection (Lumbar transforaminal epidural steroid injection.)   • EXCISION LESION  05/13/1999    REMOVE EAR LESION 82768 (1.3 cm basal cell carcinoma, right preauricular area. Excision)   • EXCISION LESION  03/13/2001    REMOVE LESION NECK/CHEST 83322 (Excision of irritated seborrheic keratosis, anterior neck, 1.1 cm and deep lipoma, posterior neck, 3.5 cm)   • INJECTION OF MEDICATION  11/15/2012    Kenalog (4)      • JOINT REPLACEMENT  2015     partial   • KNEE ARTHROSCOPY  01/17/2007    Knee arthroscopy, surgery (Arthroscopy with medial and lateral meniscectomies, right knee.)   • KNEE SURGERY  11/04/2015    Knee Surgery (Right unicompartmental arthroplasty.)   • LUMBAR LAMINECTOMY N/A 2/7/2017    Procedure: LUMBAR LAMINECTOMY LUMBAR THREE-FOUR, LUMBAR FOUR-FIVE, INTERLAMINAR DISTRACTION ;  Surgeon: Lucio Mayo MD;  Location: [facility];  Service:    • NOSE SURGERY     • OTHER SURGICAL HISTORY      EXTENDED VISUAL FIELDS STUDY 19270 (Borderline glaucoma) (3): 03/19/2015, 04/09/2014, 03/27/2013   • OTHER SURGICAL HISTORY  10/29/2003    Heart revascularize (TMR) (Directmyocardial revascularization times four; LIMA to LAD SVG to DARIUSZ SVG to OM1, SVG to RCA)   • OTHER SURGICAL HISTORY      OCT DISC NFL 33050 (Borderline glaucoma)  (3): 09/24/2015, 08/13/2014, 07/29/2013   • PROSTATECTOMY     • SEPTORHINOPLASTY  11/16/1989    Nasal surgery procedure (Septorhinoplasty with reconstruction of the nasal pyramid with a iliac crest graft, rhinoplasty was performed with external approach.)   • SHOULDER ARTHROSCOPY  07/24/2013    Arthroscopy of right shoulder with rotator repair, Carla procedure, Biceps tenotomy, subacromial decompression.   • SHOULDER SURGERY  11/01/2005    Shoulder surgery procedure (Decompression, subacromial, explore of the rotator cuff, no tear found nor repaired, acromioplasty of the left shoulder and AC shoulder joint resection.)     General Information     Row Name 02/11/21 0823          Physical Therapy Time and Intention    Document Type  therapy note (daily note)  -LR     Mode of Treatment  individual therapy;physical therapy  -LR     Row Name 02/11/21 0823          General Information    Patient Profile Reviewed  yes  -LR     Existing Precautions/Restrictions  fall  -LR     Row Name 02/11/21 0823          Cognition    Orientation Status (Cognition)  oriented x 4  -LR       User Key  (r) = Recorded By, (t) = Taken By, (c) =  Cosigned By    Initials Name Provider Type    LR Elkin Chavez Physical Therapist        Mobility     Row Name 02/11/21 0823          Bed Mobility    Bed Mobility  supine-sit;sit-supine  -LR     Supine-Sit [place] (Bed Mobility)  modified independence  -LR     Sit-Supine [place] (Bed Mobility)  modified independence  -LR     Assistive Device (Bed Mobility)  bed rails;head of bed elevated  -LR     Row Name 02/11/21 0823          Bed-Chair Transfer    Bed-Chair [place] (Transfers)  standby assist  -LR     Row Name 02/11/21 0823          Sit-Stand Transfer    Sit-Stand [place] (Transfers)  standby assist  -LR     Row Name 02/11/21 0823          Gait/Stairs (Locomotion)    [place] Level (Gait)  standby assist  -LR     Distance in Feet (Gait)  120'x1  -LR       User Key  (r) = Recorded By, (t) = Taken By, (c) = Cosigned By    Initials Name Provider Type    LR Elkin Chavez Physical Therapist        Obj/Interventions    No documentation.       Goals/Plan     Row Name 02/11/21 0823          Bed Mobility Goal 1 (PT)    Activity/Assistive Device (Bed Mobility Goal 1, PT)  sit to supine;supine to sit  -LR     [place] Level/Cues Needed (Bed Mobility Goal 1, PT)  independent  -LR     Time Frame (Bed Mobility Goal 1, PT)  by discharge  -LR     Progress/Outcomes (Bed Mobility Goal 1, PT)  good progress toward goal  -LR     Row Name 02/11/21 0823          Transfer Goal 1 (PT)    Activity/Assistive Device (Transfer Goal 1, PT)  sit-to-stand/stand-to-sit;bed-to-chair/chair-to-bed  -LR     [place] Level/Cues Needed (Transfer Goal 1, PT)  independent  -LR     Time Frame (Transfer Goal 1, PT)  by discharge  -LR     Progress/Outcome (Transfer Goal 1, PT)  goal not met;good progress toward goal  -LR     Row Name 02/11/21 0823          Gait Training Goal 1 (PT)    Activity/Assistive Device (Gait Training Goal 1, PT)  gait (walking locomotion);backward stepping  -LR     [place] Level (Gait  Training Goal 1, PT)  independent  -LR     Distance (Gait Training Goal 1, PT)  50'x3  -LR     Time Frame (Gait Training Goal 1, PT)  by discharge  -LR     Progress/Outcome (Gait Training Goal 1, PT)  goal not met;good progress toward goal  -LR     Row Name 02/11/21 0823          Patient Education Goal (PT)    Activity (Patient Education Goal, PT)  Patient will report decrease in symptoms in R myra-hallpike position by >70%.  -LR     [place]/Cues/Accuracy (Memory Goal 2, PT)  demonstrates adequately;independent  -LR     Time Frame (Patient Education Goal, PT)  by discharge  -LR     Progress/Outcome (Patient Education Goal, PT)  (S) goal met  -LR       User Key  (r) = Recorded By, (t) = Taken By, (c) = Cosigned By    Initials Name Provider Type    LR Elkin Chavez Physical Therapist        Clinical Impression     Row Name 02/11/21 0823          Pain Scale: Numbers Pre/Post-Treatment    Pretreatment Pain Rating  0/10 - no pain  -LR     Posttreatment Pain Rating  0/10 - no pain  -LR     Row Name 02/11/21 0823          Plan of Care Review    Plan of Care Reviewed With  patient  -LR     Outcome Summary  PT tx completed on this date. PT facilitated dynamic balance activities with ambulation for 120'x1 with SBA. Patient still had difficulty with Eyes closed (drifted to R), and vertical head turns. Patient does report increased steadiness and decreased work of breathing. Myra-halpike R (+) vertigo 5/10. Epley performed x2 during second manuver patient had really strong reaction with nystagmus beating that too 45-60 seconds to subside. At end of session Myra-hallpike R still (+) no nystagmus seen and very minimal dizziness 1/10. Patient reports he is 80% in regards to his dizziness in the myra-hallpike position. One goal met today. Recommend f/u with OPPT.  -LR     Row Name 02/11/21 0823          Vital Signs    Pretreatment Heart Rate (beats/min)  89  -LR     Posttreatment Heart Rate (beats/min)  108  -LR     Pre SpO2 (%)   98  -LR     O2 Delivery Pre Treatment  supplemental O2  -LR     Post SpO2 (%)  96  -LR     O2 Delivery Post Treatment  supplemental O2  -LR     Pre Patient Position  Supine  -LR     Post Patient Position  Sitting  -LR     Row Name 02/11/21 0823          Positioning and Restraints    Pre-Treatment Position  in bed  -LR     Post Treatment Position  bed  -LR     In Bed  notified nsg;call light within reach;encouraged to call for assist;exit alarm on  -LR       User Key  (r) = Recorded By, (t) = Taken By, (c) = Cosigned By    Initials Name Provider Type    LR Elkin Chavez Physical Therapist        Outcome Measures    No documentation.       Physical Therapy Education                 Title: PT OT SLP Therapies (In Progress)     Topic: Physical Therapy (Done)     Point: Mobility training (Done)     Learning Progress Summary           Patient Acceptance, E,TB, VU by LR at 2/10/2021 1001    Comment: PT POC, goals, and BPPV                   Point: Home exercise program (Done)     Learning Progress Summary           Patient Acceptance, E,TB, VU by LR at 2/10/2021 1001    Comment: PT POC, goals, and BPPV                   Point: Body mechanics (Done)     Learning Progress Summary           Patient Acceptance, E,TB, VU by LR at 2/10/2021 1001    Comment: PT POC, goals, and BPPV                   Point: Precautions (Done)     Learning Progress Summary           Patient Acceptance, E,TB, VU by LR at 2/10/2021 1001    Comment: PT POC, goals, and BPPV                               User Key     Initials Effective Dates Name Provider Type Discipline    LR 06/25/19 -  Elkin Chavez Physical Therapist PT              PT Recommendation and Plan  Planned Therapy Interventions (PT): balance training, neuromuscular re-education, transfer training, bed mobility training, orthotic fitting/training, home exercise program, gait training, wheelchair management/propulsion training, vestibular therapy, patient/family education, postural  re-education, joint mobilization, lumbar stabilization, prosthetic fitting/training, ROM (range of motion), manual therapy techniques, stair training, strengthening, stretching  Plan of Care Reviewed With: patient  Outcome Summary: PT tx completed on this date. PT facilitated dynamic balance activities with ambulation for 120'x1 with SBA. Patient still had difficulty with Eyes closed (drifted to R), and vertical head turns. Patient does report increased steadiness and decreased work of breathing. Oak[place]-halpike R (+) vertigo 5/10. Epley performed x2 during second manuver patient had really strong reaction with nystagmus beating that too 45-60 seconds to subside. At end of session Oakley-hallpike R still (+) no nystagmus seen and very minimal dizziness 1/10. Patient reports he is 80% in regards to his dizziness in the mateusz-hallpike position. One goal met today. Recommend f/u with OPPT.     Time Calculation:   PT Charges     Row Name 02/11/21 0958             Time Calculation    Start Time  0823  -LR      Stop Time  0900  -LR      Time Calculation (min)  37 min  -LR      PT Received On  02/11/21  -LR         Time Calculation- PT    Total Timed Code Minutes- PT  13 minute(s)  -LR         Timed Charges    45665 -  PT Neuromuscular Reeducation Minutes  13  -LR        User Key  (r) = Recorded By, (t) = Taken By, (c) = Cosigned By    Initials Name Provider Type    LR Elkin Chavez Physical Therapist        Therapy Charges for Today     Code Description Service Date Service Provider Modifiers Qty    01398730984 HC PT EVAL MOD COMPLEXITY 4 2/10/2021 Elkin Chavez GP 1    78439011712 HC PT CANALITH REPOSITIONING PER DAY 2/10/2021 Elkin Chavez GP 1    96833229126 HC PT NEUROMUSC RE EDUCATION EA 15 MIN 2/11/2021 Elkin Chavez GP 1    25481030578 HC PT CANALITH REPOSITIONING PER DAY 2/11/2021 Elkin Chavez GP 1          PT G-Codes  Outcome Measure Options: AM-PAC 6 Clicks Basic Mobility (PT)  AM-PAC 6 Clicks Score (PT):  19  AM-PAC 6 Clicks Score (OT): 21    Elkin Chavez  2/11/2021

## 2021-02-11 NOTE — PROGRESS NOTES
FAMILY MEDICINE DAILY PROGRESS NOTE    NAME: Vishnu Waller  : 1940  MRN: [id number]      LOS: 0 days     PROVIDER OF SERVICE: Richard Mueller MD    Chief Complaint: COPD with acute exacerbation (CMS/[facility])    Subjective:     Interval History:  History taken from: patient chart  Patient reports no acute event overnight.  He was laying down comfortably in bed during visit this morning.  Vital signs are stable at 1 L NC with O2 saturation 96%.      Review of Systems:   Review of Systems   Constitutional: Negative for chills and fever.   HENT: Negative for congestion, rhinorrhea and sore throat.    Eyes: Negative for visual disturbance.   Respiratory: Negative for cough, chest tightness and shortness of breath.    Cardiovascular: Negative for chest pain, palpitations and leg swelling.   Gastrointestinal: Negative for constipation, nausea and vomiting.   Endocrine: Negative for polydipsia and polyuria.   Genitourinary: Negative for difficulty urinating, dysuria, frequency and urgency.   Musculoskeletal: Negative for joint swelling and myalgias.   Skin: Negative for rash and wound.   Neurological: Negative for dizziness, weakness, light-headedness and headaches.   Psychiatric/Behavioral: Negative for dysphoric mood and sleep disturbance.       Objective:     Vital Signs  Temp:  [96.2 °F (35.7 °C)-97.8 °F (36.6 °C)] 97.8 °F (36.6 °C)  Heart Rate:  [74-93] 88  Resp:  [16-20] 18  BP: (102-156)/(55-70) 130/61  Flow (L/min):  [1-2] 1   Body mass index is 23.17 kg/m².    Physical Exam  Physical Exam  Vitals signs and nursing note reviewed.   Constitutional:       General: He is not in acute distress.     Appearance: Normal appearance. He is not diaphoretic.   HENT:      Head: Normocephalic and atraumatic.      Right Ear: External ear normal.      Left Ear: External ear normal.   Eyes:      General: No scleral icterus.     Extraocular Movements: Extraocular movements intact.      Conjunctiva/sclera:  Conjunctivae normal.      Pupils: Pupils are equal, round, and reactive to light.   Neck:      Musculoskeletal: Normal range of motion and neck supple.   Cardiovascular:      Rate and Rhythm: Normal rate and regular rhythm.      Heart sounds: Normal heart sounds.   Pulmonary:      Effort: Respiratory distress (1L NC) present.      Breath sounds: Normal breath sounds.   Chest:      Chest wall: No tenderness.   Abdominal:      General: Bowel sounds are normal.      Palpations: Abdomen is soft.      Tenderness: There is no abdominal tenderness.   Musculoskeletal:         General: No swelling or tenderness.      Right lower leg: No edema.      Left lower leg: No edema.   Skin:     General: Skin is warm and dry.      Capillary Refill: Capillary refill takes less than 2 seconds.      Findings: No erythema or rash.   Neurological:      General: No focal deficit present.      Mental Status: He is alert and oriented to person, place, and time.      Motor: No weakness.   Psychiatric:         Behavior: Behavior normal.         Scheduled Meds   atorvastatin, 40 mg, Oral, Nightly  azithromycin, 250 mg, Oral, Daily  carvedilol, 3.125 mg, Oral, Q12H  clopidogrel, 75 mg, Oral, Daily  dilTIAZem CD, 180 mg, Oral, Daily  DULoxetine, 30 mg, Oral, Daily  enoxaparin, 40 mg, Subcutaneous, Q24H  fluticasone, 2 spray, Nasal, Nightly  insulin aspart, 0-7 Units, Subcutaneous, TID AC  insulin aspart, 7 Units, Subcutaneous, TID With Meals  insulin detemir, 20 Units, Subcutaneous, Nightly  ipratropium-albuterol, 3 mL, Nebulization, 4x Daily - RT  isosorbide mononitrate, 30 mg, Oral, Q24H  losartan, 50 mg, Oral, Nightly  oxybutynin XL, 5 mg, Oral, Daily  predniSONE, 40 mg, Oral, Daily With Breakfast  pregabalin, 75 mg, Oral, BID  senna-docusate sodium, 2 tablet, Oral, BID  sodium chloride, 10 mL, Intravenous, Q12H       PRN Meds   •  acetaminophen  •  dextrose  •  dextrose  •  glucagon (human recombinant)  •  ondansetron **OR** ondansetron  •   "[COMPLETED] Insert peripheral IV **AND** sodium chloride  •  sodium chloride      Diagnostic Data    Results from last 7 days   Lab Units 02/11/21  0605   WBC 10*3/mm3 10.69   HEMOGLOBIN g/dL 8.8*   HEMATOCRIT % 26.1*   PLATELETS 10*3/mm3 333   GLUCOSE mg/dL 126*   CREATININE mg/dL 0.80   BUN mg/dL 18   SODIUM mmol/L 138   POTASSIUM mmol/L 4.8   AST (SGOT) U/L 17   ALT (SGPT) U/L 13   ALK PHOS U/L 46   BILIRUBIN mg/dL 0.3   ANION GAP mmol/L 5.0       Xr Chest 1 View    Result Date: 2/9/2021  1. Stable appearance the chest without radiographic evidence of acute cardiopulmonary disease. Electronically signed by:  Isa Douglass MD  2/9/2021 2:46 PM CST Workstation: [id number]        I reviewed the patient's new clinical results.    Assessment/Plan:     Active Hospital Problems    Diagnosis POA   • **COPD with acute exacerbation (CMS/HCC) [J44.1] Yes     GOLD D  -duoneb  -albuterol   -replace magnesium  -Prednisone 40 mg daily x5 days   -talk to pharmacist for medication cost   - Procalcitonin        • Acute respiratory failure with hypoxia (CMS/HCC) [J96.01] Unknown     Likely due to COPD exacerbation.  LISA at baseline  Patient required oxygen support now weaned to 1 L nasal cannula oxygen with a goal of getting him to room air before discharge.       • Megaloblastic anemia [D53.1] Yes     HGB 11.81  MCV: 115.4   Check serum Folate   Serum B12     May consider iron panel        • Hypomagnesemia [E83.42] Yes     -Mag 1.0 on admission  -mag 4 g IVPB day of admission  -monitor mag daily     -consider nephrology consult for potential Magnesium wasting syndrome     -Serum magnesium within normal limits 2/10/21     • AAA (abdominal aortic aneurysm) (CMS/HCC) [I71.4] Yes     - is followed by Dr Ocasio         • Renal mass [N28.89] Yes     \"There is a hyperdense exophytic left renal  mass measuring 1.1 cm which is present in the previous study.  There are two nonobstructing calculi in the upper left kidney the  largest " "measures 5 mm of. No hydronephrosis or ureteral calculi.\" seen on imaging on CT 6/2/20  -consider outpatient follow up       • BPPV (benign paroxysmal positional vertigo) [H81.10] Yes     -consult PT - for epleys and dixerenpike       • Degenerative disc disease, lumbar [M51.36] Yes     -hx of DDD  -order Pt/Ot  -increased lyrica to 75 mg BID       • Chronic pain of right knee [M25.561, G89.29] Yes     On lyrica- home dose was 50 mg BID  -will increase to 75 mg nightly BID      • History of coronary artery bypass surgery [Z95.1] Not Applicable       2003 had 4 vessel CABG  -will monitor       • Essential hypertension [I10] Yes     Will continue home dose Cozaar 50 mg nightly, Cardizem 180 mg daily, Coreg 3.125 mg twice daily\  -We will monitor and adjust medications as needed  -If SBP is greater than 180 consider IV hydralazine     • Degeneration of lumbar intervertebral disc [M51.36] Yes     Hx of chronic pain   -sees pain management   -increased home dose lyrica to 75mg BID      • Type 2 diabetes mellitus, without long-term current use of insulin (CMS/[facility]) [E11.9] Yes     Patient is on metformin 1000 mg twice daily we will hold at this time  Start patient on basal/bolus insulin dosing-  20 units Levemir and 7 units 3 times daily AC with SSI.  -HbA1c 7.8 on admission           DVT prophylaxis: Lovenox  Code status is   Code Status and Medical Interventions:   Ordered at: 02/09/21 1649     Level Of Support Discussed With:    Patient    Health Care Surrogate     Code Status:    CPR     Medical Interventions (Level of Support Prior to Arrest):    Full       Plan for disposition:Where: current living arrangements and When:  today      Time: 15 mins       Richard Mueller M.D. PGY 1  [facility]  [address] [place] [address]  Office: [phone]  This document has been electronically signed by Richard Mueller MD on February 11, 2021 08:07 CST      "

## 2021-02-11 NOTE — PLAN OF CARE
Goal Outcome Evaluation:     Progress: no change  Outcome Summary: Pt vitals stable.  No s/s of pain or discomfort.  Pt o2 drops with exertion/talking while on room air.  MD notified.  Pt will require home o2. Will continue to monitor.

## 2021-02-11 NOTE — PLAN OF CARE
Goal Outcome Evaluation:  Plan of Care Reviewed With: patient  Progress: no change  Outcome Summary: VSS, pt remains on 2L NC.

## 2021-02-12 VITALS
HEART RATE: 81 BPM | HEIGHT: 63 IN | OXYGEN SATURATION: 93 % | SYSTOLIC BLOOD PRESSURE: 108 MMHG | BODY MASS INDEX: 23.42 KG/M2 | TEMPERATURE: 97.1 F | RESPIRATION RATE: 18 BRPM | WEIGHT: 132.2 LBS | DIASTOLIC BLOOD PRESSURE: 60 MMHG

## 2021-02-12 PROBLEM — J96.01 ACUTE RESPIRATORY FAILURE WITH HYPOXIA (HCC): Status: RESOLVED | Noted: 2021-02-11 | Resolved: 2021-02-12

## 2021-02-12 PROBLEM — E83.42 HYPOMAGNESEMIA: Status: RESOLVED | Noted: 2021-02-09 | Resolved: 2021-02-12

## 2021-02-12 PROBLEM — J44.1 COPD WITH ACUTE EXACERBATION (HCC): Status: RESOLVED | Noted: 2021-02-09 | Resolved: 2021-02-12

## 2021-02-12 LAB
ALBUMIN SERPL-MCNC: 3.1 G/DL (ref 3.5–5.2)
ALBUMIN/GLOB SERPL: 1.1 G/DL
ALP SERPL-CCNC: 48 U/L (ref 39–117)
ALT SERPL W P-5'-P-CCNC: 16 U/L (ref 1–41)
ANION GAP SERPL CALCULATED.3IONS-SCNC: 5 MMOL/L (ref 5–15)
AST SERPL-CCNC: 17 U/L (ref 1–40)
BASOPHILS # BLD AUTO: 0.05 10*3/MM3 (ref 0–0.2)
BASOPHILS NFR BLD AUTO: 0.4 % (ref 0–1.5)
BILIRUB SERPL-MCNC: 0.2 MG/DL (ref 0–1.2)
BUN SERPL-MCNC: 21 MG/DL (ref 8–23)
BUN/CREAT SERPL: 26.3 (ref 7–25)
CALCIUM SPEC-SCNC: 8.7 MG/DL (ref 8.6–10.5)
CHLORIDE SERPL-SCNC: 104 MMOL/L (ref 98–107)
CO2 SERPL-SCNC: 29 MMOL/L (ref 22–29)
CREAT SERPL-MCNC: 0.8 MG/DL (ref 0.76–1.27)
DEPRECATED RDW RBC AUTO: 65.6 FL (ref 37–54)
EOSINOPHIL # BLD AUTO: 0.02 10*3/MM3 (ref 0–0.4)
EOSINOPHIL NFR BLD AUTO: 0.2 % (ref 0.3–6.2)
ERYTHROCYTE [DISTWIDTH] IN BLOOD BY AUTOMATED COUNT: 17.6 % (ref 12.3–15.4)
GFR SERPL CREATININE-BSD FRML MDRD: 93 ML/MIN/1.73
GLOBULIN UR ELPH-MCNC: 2.9 GM/DL
GLUCOSE BLDC GLUCOMTR-MCNC: 130 MG/DL (ref 70–130)
GLUCOSE BLDC GLUCOMTR-MCNC: 174 MG/DL (ref 70–130)
GLUCOSE SERPL-MCNC: 129 MG/DL (ref 65–99)
HCT VFR BLD AUTO: 24.9 % (ref 37.5–51)
HGB BLD-MCNC: 8.6 G/DL (ref 13–17.7)
IMM GRANULOCYTES # BLD AUTO: 0.67 10*3/MM3 (ref 0–0.05)
IMM GRANULOCYTES NFR BLD AUTO: 5.6 % (ref 0–0.5)
LYMPHOCYTES # BLD AUTO: 1.09 10*3/MM3 (ref 0.7–3.1)
LYMPHOCYTES NFR BLD AUTO: 9.2 % (ref 19.6–45.3)
MAGNESIUM SERPL-MCNC: 1.6 MG/DL (ref 1.6–2.4)
MCH RBC QN AUTO: 39.1 PG (ref 26.6–33)
MCHC RBC AUTO-ENTMCNC: 34.5 G/DL (ref 31.5–35.7)
MCV RBC AUTO: 113.2 FL (ref 79–97)
MONOCYTES # BLD AUTO: 1.57 10*3/MM3 (ref 0.1–0.9)
MONOCYTES NFR BLD AUTO: 13.2 % (ref 5–12)
NEUTROPHILS NFR BLD AUTO: 71.4 % (ref 42.7–76)
NEUTROPHILS NFR BLD AUTO: 8.5 10*3/MM3 (ref 1.7–7)
NRBC BLD AUTO-RTO: 0.8 /100 WBC (ref 0–0.2)
PLATELET # BLD AUTO: 330 10*3/MM3 (ref 140–450)
PMV BLD AUTO: 9.2 FL (ref 6–12)
POTASSIUM SERPL-SCNC: 4.7 MMOL/L (ref 3.5–5.2)
PROT SERPL-MCNC: 6 G/DL (ref 6–8.5)
RBC # BLD AUTO: 2.2 10*6/MM3 (ref 4.14–5.8)
SODIUM SERPL-SCNC: 138 MMOL/L (ref 136–145)
WBC # BLD AUTO: 11.9 10*3/MM3 (ref 3.4–10.8)

## 2021-02-12 PROCEDURE — 97110 THERAPEUTIC EXERCISES: CPT

## 2021-02-12 PROCEDURE — 97535 SELF CARE MNGMENT TRAINING: CPT

## 2021-02-12 PROCEDURE — 80053 COMPREHEN METABOLIC PANEL: CPT | Performed by: STUDENT IN AN ORGANIZED HEALTH CARE EDUCATION/TRAINING PROGRAM

## 2021-02-12 PROCEDURE — G0378 HOSPITAL OBSERVATION PER HR: HCPCS

## 2021-02-12 PROCEDURE — 63710000001 INSULIN ASPART PER 5 UNITS: Performed by: STUDENT IN AN ORGANIZED HEALTH CARE EDUCATION/TRAINING PROGRAM

## 2021-02-12 PROCEDURE — 97530 THERAPEUTIC ACTIVITIES: CPT

## 2021-02-12 PROCEDURE — 85025 COMPLETE CBC W/AUTO DIFF WBC: CPT | Performed by: STUDENT IN AN ORGANIZED HEALTH CARE EDUCATION/TRAINING PROGRAM

## 2021-02-12 PROCEDURE — 82962 GLUCOSE BLOOD TEST: CPT

## 2021-02-12 PROCEDURE — 83735 ASSAY OF MAGNESIUM: CPT | Performed by: STUDENT IN AN ORGANIZED HEALTH CARE EDUCATION/TRAINING PROGRAM

## 2021-02-12 PROCEDURE — 63710000001 PREDNISONE PER 1 MG: Performed by: STUDENT IN AN ORGANIZED HEALTH CARE EDUCATION/TRAINING PROGRAM

## 2021-02-12 PROCEDURE — 99239 HOSP IP/OBS DSCHRG MGMT >30: CPT | Performed by: STUDENT IN AN ORGANIZED HEALTH CARE EDUCATION/TRAINING PROGRAM

## 2021-02-12 RX ORDER — ALBUTEROL SULFATE 0.63 MG/3ML
1 SOLUTION RESPIRATORY (INHALATION) EVERY 6 HOURS PRN
Qty: 3 ML | Refills: 12 | Status: SHIPPED | OUTPATIENT
Start: 2021-02-12 | End: 2021-03-14

## 2021-02-12 RX ORDER — PREGABALIN 75 MG/1
75 CAPSULE ORAL 2 TIMES DAILY
Qty: 60 CAPSULE | Refills: 0 | Status: SHIPPED | OUTPATIENT
Start: 2021-02-12 | End: 2021-07-12

## 2021-02-12 RX ORDER — AZITHROMYCIN 250 MG/1
250 TABLET, FILM COATED ORAL DAILY
Qty: 4 TABLET | Refills: 0 | Status: SHIPPED | OUTPATIENT
Start: 2021-02-12 | End: 2021-02-16

## 2021-02-12 RX ORDER — PREDNISONE 20 MG/1
40 TABLET ORAL
Qty: 4 TABLET | Refills: 0 | Status: SHIPPED | OUTPATIENT
Start: 2021-02-13 | End: 2021-02-15

## 2021-02-12 RX ADMIN — AZITHROMYCIN MONOHYDRATE 250 MG: 250 TABLET ORAL at 08:46

## 2021-02-12 RX ADMIN — ISOSORBIDE MONONITRATE 30 MG: 30 TABLET, EXTENDED RELEASE ORAL at 08:45

## 2021-02-12 RX ADMIN — PREGABALIN 75 MG: 75 CAPSULE ORAL at 08:45

## 2021-02-12 RX ADMIN — DILTIAZEM HYDROCHLORIDE 180 MG: 180 CAPSULE, COATED, EXTENDED RELEASE ORAL at 08:46

## 2021-02-12 RX ADMIN — OXYBUTYNIN CHLORIDE 5 MG: 5 TABLET, EXTENDED RELEASE ORAL at 08:45

## 2021-02-12 RX ADMIN — PREDNISONE 40 MG: 20 TABLET ORAL at 08:45

## 2021-02-12 RX ADMIN — DULOXETINE HYDROCHLORIDE 30 MG: 30 CAPSULE, DELAYED RELEASE ORAL at 08:45

## 2021-02-12 RX ADMIN — ACETAMINOPHEN 650 MG: 325 TABLET, FILM COATED ORAL at 12:35

## 2021-02-12 RX ADMIN — CARVEDILOL 3.12 MG: 3.12 TABLET, FILM COATED ORAL at 08:46

## 2021-02-12 RX ADMIN — CLOPIDOGREL BISULFATE 75 MG: 75 TABLET ORAL at 08:46

## 2021-02-12 RX ADMIN — INSULIN ASPART 2 UNITS: 100 INJECTION, SOLUTION INTRAVENOUS; SUBCUTANEOUS at 12:28

## 2021-02-12 NOTE — SIGNIFICANT NOTE
02/11/21 1157 02/11/21 1310 02/11/21 1311   Oxygen Therapy   SpO2 98 %  --  93 %   Pulse Oximetry Type Intermittent  --   --    Device (Oxygen Therapy) nasal cannula room air room air   Flow (L/min) 1  --   --       02/11/21 1529 02/11/21 1533 02/11/21 1538   Oxygen Therapy   SpO2 95 % (!) 86 % 93 %   Pulse Oximetry Type  --   --   --    Device (Oxygen Therapy) room air room air  (drops to 86 when talking, back to 95 when not) nasal cannula   Flow (L/min)  --   --  1      02/11/21 1542 02/11/21 1550   Oxygen Therapy   SpO2 91 %  --    Pulse Oximetry Type Intermittent  --    Device (Oxygen Therapy) nasal cannula nasal cannula   Flow (L/min) 1 2

## 2021-02-12 NOTE — THERAPY TREATMENT NOTE
Patient Name: Vishnu Waller  : 1940    MRN: [id number]                              Today's Date: 2021       Admit Date: 2021    Visit Dx:     ICD-10-CM ICD-9-CM   1. Exertional dyspnea  R06.00 786.09   2. Hypomagnesemia  E83.42 275.2   3. Impaired mobility and ADLs  Z74.09 V49.89    Z78.9    4. COPD with acute exacerbation (CMS/[facility])  J44.1 491.21   5. Impaired functional mobility, balance, gait, and endurance  Z74.09 V49.89   6. Chronic midline low back pain without sciatica  M54.5 724.2    G89.29 338.29     Patient Active Problem List   Diagnosis   • Degeneration of lumbar intervertebral disc   • Low back pain without sciatica   • Neuritis or radiculitis due to rupture of lumbar intervertebral disc   • Borderline glaucoma, open angle with borderline findings   • Pseudophakia   • History of coronary artery bypass surgery   • Essential hypertension   • Mixed hyperlipidemia   • Thrombocytopenia (CMS/[facility])   • Spinal stenosis of lumbar region   • Degenerative disc disease, lumbar   • Chronic pain of right knee   • History of artificial joint   • B12 deficiency   • Degeneration of intervertebral disc of lumbosacral region   • Personal history of other infectious and parasitic diseases   • Status post lumbar spine operation   • History of pyelonephritis   • History of surgical procedure   • History of repair of rotator cuff   • Encounter for follow-up examination after completed treatment for conditions other than malignant neoplasm   • Encounter for general adult medical examination without abnormal findings   • Osteoarthritis of knee   • Osteoarthritis of multiple joints   • Primary insomnia   • Encounter for screening for malignant neoplasm of colon   • Encounter for screening for malignant neoplasm of prostate   • Type 2 diabetes mellitus, without long-term current use of insulin (CMS/[facility])   • Coronary artery disease involving native coronary artery of native heart without angina pectoris   •  Dyspnea on exertion   • Presence of aortocoronary bypass graft   • Dizziness and giddiness   • Postviral fatigue syndrome   • Postviral fatigue syndrome   • Recurrent kidney stones   • Thrombocytopenia (CMS/[facility])   • Pyelonephritis   • COPD with asthma (CMS/[facility])   • Chronic non-seasonal allergic rhinitis   • Unstable angina (CMS/[facility])   • NSTEMI (non-ST elevated myocardial infarction) (CMS/[facility])   • History of PTCA 1   • Medicare annual wellness visit, subsequent   • Thoracic aortic aneurysm without rupture (CMS/[facility])   • Chronic kidney disease, stage 2 (mild)   • Personal history of tobacco use, presenting hazards to health   • Cervical pain (neck)   • Thoracic spine pain   • Calculus of ureter   • Foreign body in bladder and urethra   • Physical deconditioning   • Left hip pain   • Pain of left humerus   • PVD (peripheral vascular disease) (CMS/[facility])   • AAA (abdominal aortic aneurysm) (CMS/HCC)   • Renal mass   • BPPV (benign paroxysmal positional vertigo)   • Degenerative disc disease, lumbar   • Megaloblastic anemia     Past Medical History:   Diagnosis Date   • AAA (abdominal aortic aneurysm) (CMS/[facility])    • Acute bacterial sinusitis    • Acute bronchitis    • Acute frontal sinusitis    • Acute maxillary sinusitis    • Acute pharyngitis    • Allergic rhinitis    • Allergic rhinitis due to pollen    • Anxiety    • Artificial lens present     in position   • Asthma    • Backache    • Borderline glaucoma    • C. difficile diarrhea 2014   • Chronic laryngitis    • Chronic rhinitis    • COPD (chronic obstructive pulmonary disease) (CMS/[facility])    • Coronary artery disease    • Cough    • Degeneration of lumbar intervertebral disc    • Degenerative joint disease involving multiple joints    • Diabetes mellitus (CMS/[facility])    • Diverticular disease of colon    • Dizziness and giddiness    • Elevated cholesterol    • Erectile dysfunction    • Essential hypertension    • Generalized anxiety disorder    • GERD (gastroesophageal  reflux disease)    • Glaucoma    • Hemorrhoids     without bleeding   • Insomnia    • Kidney stone    • Kidney stones    • Malignant tumor of prostate (CMS/HCC)    • Need for prophylactic vaccination and inoculation against influenza    • Osteoarthritis    • Osteoarthritis of multiple joints    • Pain, lumbar region     Pain radiating to lumbar region of back     • PONV (postoperative nausea and vomiting)    • Postviral fatigue syndrome    • Presence of aortocoronary bypass graft    • Prostate cancer (CMS/HCC)    • Pseudomembranous enterocolitis     improved   • Rotator cuff syndrome     right   • Shoulder pain    • SOB (shortness of breath)    • Tenosynovitis    • Thrombocytopenia (CMS/HCC)    • Upper respiratory infection      Past Surgical History:   Procedure Laterality Date   • CARDIAC CATHETERIZATION  09/25/2003    Cardiac cath (Normal left ventricular systolic function, EF 60% Multi-vessel coronary artery disease with critical disease noted in the LAD coronary artery, diagonal coronary artery, obtuse marginal coronary and right coronary artery.)   • CARDIAC CATHETERIZATION  05/03/1996    Cardiac cath (Coronary atherosclerotic heart disease. 70% diagonal stenosis. Mild to moderate left anterior descending artery stenosis. Preserved left ventricular function.)   • CARDIAC CATHETERIZATION N/A 2/26/2018    Procedure: Left Heart Cath/ pci if indicated ;  Surgeon: Radha Wilkes MD;  Location: [facility] INVASIVE LOCATION;  Service:    • CATARACT EXTRACTION Bilateral    • CATARACT EXTRACTION  10/04/2011    Remove cataract, insert lens (Right)   • CATARACT EXTRACTION  08/23/2011    Remove cataract, insert lens (left)   • COLONOSCOPY     • COLONOSCOPY      Colon endoscopy 14147 (Rectal bleeding. Radiation proctitis w/bleeding. An abnormal CT of transverse colon due to mucosal ischemic colitis, that now is healed. Internal hemorrhoids w/possible bleeding.)   • COLONOSCOPY  05/10/2011    Colon endoscopy  40057 (Single sessile polyp found in transverse colon and sigmoid colon ,removed by cold biopsy polypectomy.divertic. found in sigmoid colon,descending colon. Friability/capillary friability in rectum sigmoid due to prior radiation.Inter. hem. Grade 1)   • COLONOSCOPY  05/02/2007    Colon endoscopy 71119 (Colon polyp. Diverticulosis without bleeding. Internal hemorrhoids without bleeding.)   • CORONARY ARTERY BYPASS GRAFT  2002   • CYSTOSCOPY  01/13/1995    Cystoscopy, stone removal (Right ureteroscopic lasertripsy.)   • CYSTOSCOPY Right 1/23/2019    Procedure: CYSTOSCOPY, RIGHT STENT REMOVAL;  Surgeon: Nirmal Gaines MD;  Location: [facility];  Service: Urology   • CYSTOSCOPY, URETEROSCOPY, RETROGRADE PYELOGRAM, STENT INSERTION N/A 8/28/2017    Procedure: URETEROSCOPY LASER LITHOTRIPSY WITH STENT INSERTION AND RETROGRADE PYELOGRAM ;  Surgeon: Anna M. D'Amico, MD;  Location: [facility];  Service:    • CYSTOSCOPY, URETEROSCOPY, RETROGRADE PYELOGRAM, STENT INSERTION Right 1/11/2019    Procedure: CYSTOSCOPY URETEROSCOPY RETROGRADE PYELOGRAM HOLMIUM LASER STENT INSERTION;  Surgeon: Nirmal Gaines MD;  Location: [facility];  Service: Urology   • EPIDURAL  12/03/2014    Therapeutic/diag injection (Lumbar transforaminal epidural steroid injection, L5-S1, left side.)   • EPIDURAL  10/22/2014    Therapeutic/diag injection (Lumbar transforaminal epidural steroid injection L5-S1 left side.)   • EPIDURAL  08/07/2014    Therapeutic/diag injection (Lumbar transforaminal epidural steroid injection.)   • EXCISION LESION  05/13/1999    REMOVE EAR LESION 32059 (1.3 cm basal cell carcinoma, right preauricular area. Excision)   • EXCISION LESION  03/13/2001    REMOVE LESION NECK/CHEST 66441 (Excision of irritated seborrheic keratosis, anterior neck, 1.1 cm and deep lipoma, posterior neck, 3.5 cm)   • INJECTION OF MEDICATION  11/15/2012    Kenalog (4)      • JOINT REPLACEMENT  2015    partial   • KNEE ARTHROSCOPY  01/17/2007    Knee  arthroscopy, surgery (Arthroscopy with medial and lateral meniscectomies, right knee.)   • KNEE SURGERY  11/04/2015    Knee Surgery (Right unicompartmental arthroplasty.)   • LUMBAR LAMINECTOMY N/A 2/7/2017    Procedure: LUMBAR LAMINECTOMY LUMBAR THREE-FOUR, LUMBAR FOUR-FIVE, INTERLAMINAR DISTRACTION ;  Surgeon: Lucio Mayo MD;  Location: [facility];  Service:    • NOSE SURGERY     • OTHER SURGICAL HISTORY      EXTENDED VISUAL FIELDS STUDY 25244 (Borderline glaucoma) (3): 03/19/2015, 04/09/2014, 03/27/2013   • OTHER SURGICAL HISTORY  10/29/2003    Heart revascularize (TMR) (Directmyocardial revascularization times four; LIMA to LAD SVG to DARIUSZ SVG to OM1, SVG to RCA)   • OTHER SURGICAL HISTORY      OCT DISC NFL 87578 (Borderline glaucoma)  (3): 09/24/2015, 08/13/2014, 07/29/2013   • PROSTATECTOMY     • SEPTORHINOPLASTY  11/16/1989    Nasal surgery procedure (Septorhinoplasty with reconstruction of the nasal pyramid with a iliac crest graft, rhinoplasty was performed with external approach.)   • SHOULDER ARTHROSCOPY  07/24/2013    Arthroscopy of right shoulder with rotator repair, Carla procedure, Biceps tenotomy, subacromial decompression.   • SHOULDER SURGERY  11/01/2005    Shoulder surgery procedure (Decompression, subacromial, explore of the rotator cuff, no tear found nor repaired, acromioplasty of the left shoulder and AC shoulder joint resection.)     General Information     Row Name 02/12/21 1438          OT Time and Intention    Document Type  therapy note (daily note)  -LM     Mode of Treatment  individual therapy;occupational therapy  -LM       User Key  (r) = Recorded By, (t) = Taken By, (c) = Cosigned By    Initials Name Provider Type    LM Gladis Spears COTA/L Occupational Therapy Assistant          Mobility/ADL's     Row Name 02/12/21 1504 02/12/21 1438       Bed Mobility    Bed Mobility  bed mobility (all) activities  -LM  bed mobility (all)  activities;supine-sit;sit-supine;supine-sit-supine  -LM    All Activities, [place] (Bed Mobility)  independent  -LM  contact guard assist  -LM    Supine-Sit [place] (Bed Mobility)  independent  -LM  modified independence  -LM    Sit-Supine [place] (Bed Mobility)  independent  -LM  modified independence  -LM    Supine-Sit-Supine [place] (Bed Mobility)  independent  -LM  --    Row Name 02/12/21 1504 02/12/21 1438       Transfers    Transfers  sit-stand transfer  -LM  sit-stand transfer;bed-chair transfer  -LM    Sit-Stand [place] (Transfers)  independent  -LM  minimum assist (75% patient effort)  -LM    Row Name 02/12/21 1438          Functional Mobility    Functional Mobility- Device  rolling walker  -LM     Row Name 02/12/21 1504          Activities of Daily Living    BADL Assessment/Intervention  upper body dressing;lower body dressing  -LM     Row Name 02/12/21 1504          Upper Body Dressing Assessment/Training    [place] Level (Upper Body Dressing)  upper body dressing skills;doff;don;pull-over garment;independent  -LM     Row Name 02/12/21 1438          Bathing Assessment/Intervention    Comment (Bathing)  ED ON HOME SAFETY FALL PREVENTION SHOWER SAFETY USE OF EQUIPMENT FOR FALL PREVENTION AND SAFTY  -LM     Row Name 02/12/21 1504          Lower Body Dressing Assessment/Training    [place] Level (Lower Body Dressing)  lower body dressing skills;doff;don;pants/bottoms;shoes/slippers;socks;independent  -LM     Position (Lower Body Dressing)  edge of bed sitting;unsupported sitting;unsupported standing  -LM       User Key  (r) = Recorded By, (t) = Taken By, (c) = Cosigned By    Initials Name Provider Type    LM Gladis Spears COTA/L Occupational Therapy Assistant        Obj/Interventions     Row Name 02/12/21 1509          Shoulder (Therapeutic Exercise)    Shoulder (Therapeutic Exercise)  AROM (active range of motion)  -LM     Shoulder AROM (Therapeutic Exercise)   bilateral THER EX LEVEL 3 TBAND ALL PLANES  ED ON HEP  -LM     Row Name 02/12/21 1509          Therapeutic Exercise    Therapeutic Exercise  shoulder;elbow/forearm  -LM       User Key  (r) = Recorded By, (t) = Taken By, (c) = Cosigned By    Initials Name Provider Type    Gladis Bray COTA/L Occupational Therapy Assistant        Goals/Plan     Row Name 02/12/21 1438          Transfer Goal 1 (OT)    Activity/Assistive Device (Transfer Goal 1, OT)  sit-to-stand/stand-to-sit;bed-to-chair/chair-to-bed;toilet  -LM     [place] Level/Cues Needed (Transfer Goal 1, OT)  independent  -LM     Time Frame (Transfer Goal 1, OT)  long term goal (LTG);by discharge  -LM     Progress/Outcome (Transfer Goal 1, OT)  goal not met  -     Row Name 02/12/21 1438          Bathing Goal 1 (OT)    Activity/Device (Bathing Goal 1, OT)  bathing skills, all;long-handled sponge  -LM     [place] Level/Cues Needed (Bathing Goal 1, OT)  set-up required  -LM     Time Frame (Bathing Goal 1, OT)  long term goal (LTG);by discharge  -LM     Progress/Outcomes (Bathing Goal 1, OT)  goal not met  -[facility] Name 02/12/21 1438          Dressing Goal 1 (OT)    Activity/Device (Dressing Goal 1, OT)  lower body dressing;sock-aid;reacher;long-handled shoe horn  -LM     [place]/Cues Needed (Dressing Goal 1, OT)  set-up required  -LM     Time Frame (Dressing Goal 1, OT)  long term goal (LTG);by discharge  -LM     Progress/Outcome (Dressing Goal 1, OT)  goal not met  -     Row Name 02/12/21 1438          Toileting Goal 1 (OT)    Activity/Device (Toileting Goal 1, OT)  toileting skills, all  -LM     [place] Level/Cues Needed (Toileting Goal 1, OT)  independent  -LM     Time Frame (Toileting Goal 1, OT)  long term goal (LTG);by discharge  -LM     Progress/Outcome (Toileting Goal 1, OT)  goal not met  -     Row Name 02/12/21 1438          ROM Goal 1 (OT)    ROM Goal 1 (OT)  Pt will tolerate 15 min of fxnl/therapeutic activities with  stable O2 >90%  -LM     Time Frame (ROM Goal 1, OT)  long term goal (LTG);by discharge  -LM     Progress/Outcome (ROM Goal 1, OT)  goal not met  -LM     Row Name 02/12/21 1438          Strength Goal 1 (OT)    Strength Goal 1 (OT)  Pt will be independent in BUE strengthening HEP  -LM     Time Frame (Strength Goal 1, OT)  long term goal (LTG);by discharge  -LM     Progress/Outcome (Strength Goal 1, OT)  goal not met  -LM       User Key  (r) = Recorded By, (t) = Taken By, (c) = Cosigned By    Initials Name Provider Type    Gladis Brya, [place]/L Occupational Therapy Assistant        Clinical Impression    No documentation.       Outcome Measures    No documentation.       Occupational Therapy Education                 Title: PT OT SLP Therapies (In Progress)     Topic: Occupational Therapy (In Progress)     Point: ADL training (Done)     Description:   Instruct learner(s) on proper safety adaptation and remediation techniques during self care or transfers.   Instruct in proper use of assistive devices.              Learning Progress Summary           Patient Acceptance, E, VU by AS at 2/10/2021 0913    Comment: role of OT, OT POC, ADL training, transfer training                   Point: Home exercise program (Not Started)     Description:   Instruct learner(s) on appropriate technique for monitoring, assisting and/or progressing therapeutic exercises/activities.              Learner Progress:  Not documented in this visit.          Point: Precautions (Done)     Description:   Instruct learner(s) on prescribed precautions during self-care and functional transfers.              Learning Progress Summary           Patient Acceptance, E, VU by AS at 2/10/2021 0913    Comment: role of OT, OT POC, ADL training, transfer training                   Point: Body mechanics (Not Started)     Description:   Instruct learner(s) on proper positioning and spine alignment during self-care, functional mobility activities and/or  exercises.              Learner Progress:  Not documented in this visit.                      User Key     Initials Effective Dates Name Provider Type Discipline    AS 07/05/20 -  Cherry Long, OT Occupational Therapist OT              OT Recommendation and Plan           Time Calculation:   Time Calculation- OT     Row Name 02/12/21 1436             Time Calculation- OT    OT Start Time  1430  -LM      OT Stop Time  1524  -LM      OT Time Calculation (min)  54 min  -LM      Total Timed Code Minutes- OT  54 minute(s)  -LM      OT Received On  02/12/21  -LM        User Key  (r) = Recorded By, (t) = Taken By, (c) = Cosigned By    Initials Name Provider Type    LM Gladis Spears, [place]/L Occupational Therapy Assistant        Therapy Charges for Today     Code Description Service Date Service Provider Modifiers Qty    82568938801 HC OT THERAPEUTIC ACT EA 15 MIN 2/12/2021 Gladis Spears [place]/L GO 1    08090739813 HC OT THER PROC EA 15 MIN 2/12/2021 Gladis Spears [place]/L GO 1    66405789139 HC OT SELF CARE/MGMT/TRAIN EA 15 MIN 2/12/2021 Gladis Spears [place]/L GO 2               PARIS Fonseca/DAVID  2/12/2021

## 2021-02-12 NOTE — PLAN OF CARE
Goal Outcome Evaluation:  Plan of Care Reviewed With: patient  Progress: no change  Outcome Summary: VSS, pt remains on O2. plan is to discharge with home oxygen due to pt O2 sat dropping when up ambulating or talking on room air. pt voices no complaints at this time

## 2021-02-12 NOTE — DISCHARGE SUMMARY
[facility]   HOSPITAL DISCHARGE SUMMARY    PATIENT NAME: Vishnu Waller   PHYSICIAN: Akbar Zapien MD   : 1940  MRN: [id number]    ADMITTED: 2021  DISCHARGED: 21     ADMISSION DIAGNOSES:  Active Hospital Problems    Diagnosis  POA   • Megaloblastic anemia [D53.1]  Yes   • AAA (abdominal aortic aneurysm) (CMS/HCC) [I71.4]  Yes   • Renal mass [N28.89]  Yes   • BPPV (benign paroxysmal positional vertigo) [H81.10]  Yes   • Degenerative disc disease, lumbar [M51.36]  Yes   • Chronic pain of right knee [M25.561, G89.29]  Yes   • History of coronary artery bypass surgery [Z95.1]  Not Applicable   • Essential hypertension [I10]  Yes   • Degeneration of lumbar intervertebral disc [M51.36]  Yes   • Type 2 diabetes mellitus, without long-term current use of insulin (CMS/HCC) [E11.9]  Yes      Resolved Hospital Problems    Diagnosis Date Resolved POA   • **COPD with acute exacerbation (CMS/HCC) [J44.1] 2021 Yes   • Acute respiratory failure with hypoxia (CMS/HCC) [J96.01] 2021 Unknown   • Hypomagnesemia [E83.42] 2021 Yes     DISCHARGE DIAGNOSES:   Active Hospital Problems    Diagnosis  POA   • Megaloblastic anemia [D53.1]  Yes   • AAA (abdominal aortic aneurysm) (CMS/HCC) [I71.4]  Yes   • Renal mass [N28.89]  Yes   • BPPV (benign paroxysmal positional vertigo) [H81.10]  Yes   • Degenerative disc disease, lumbar [M51.36]  Yes   • Chronic pain of right knee [M25.561, G89.29]  Yes   • History of coronary artery bypass surgery [Z95.1]  Not Applicable   • Essential hypertension [I10]  Yes   • Degeneration of lumbar intervertebral disc [M51.36]  Yes   • Type 2 diabetes mellitus, without long-term current use of insulin (CMS/HCC) [E11.9]  Yes      Resolved Hospital Problems    Diagnosis Date Resolved POA   • **COPD with acute exacerbation (CMS/HCC) [J44.1] 2021 Yes   • Acute respiratory failure with hypoxia (CMS/HCC) [J96.01] 2021 Unknown   • Hypomagnesemia [E83.42]  "02/12/2021 Yes       SERVICE: Family Medicine. Attending Dominic Bellamy M.D., Resident Akbar Zapien MD    CONSULTS:   Consult Orders (all) (From admission, onward)     Start     Ordered    02/12/21 0859  Inpatient Case Management  Consult  Once     Provider:  (Not yet assigned)    02/12/21 0858    02/10/21 1020  Inpatient Case Management  Consult  Once     Provider:  (Not yet assigned)    02/10/21 1019                PROCEDURES:     HISTORY OF PRESENT ILLNESS   Vishnu Waller is a 81 y.o. male with a CMH of COPD, CAD with CABG, hx of stenting of CABG, hypertension, degenerative disc disease, AAA, hyperlipidemia who presents complaining of worsening shortness of air.  Patient reported he has had gradual worsening exertional shortness of air over the past 6 weeks.  Patient reports he gets \"out of breath when walking to the bathroom \".  SOA is associated with increased sputum production and wheezing.  Of note patient was followed by pulmonology in the past and had been on inhalers however due to cost has not had them in over 6 to 8 weeks.  Patient does endorse using his rescue inhaler up to 2-3 daily.   Patient also reports dizziness and vertigo when he turns from side to side.    COPIED FROM ADMISSION H&P WRITTEN BY: Dr Zapien on 2/9/21    DIAGNOSTIC DATA:   Lab Results (last 24 hours)     Procedure Component Value Units Date/Time    POC Glucose Once [[id number]]  (Abnormal) Collected: 02/12/21 1022    Specimen: Blood Updated: 02/12/21 1117     Glucose 174 mg/dL      Comment: RN NotifiedOperator: [id number] MARIANO Guzman ID: [id number]       Comprehensive Metabolic Panel [[id number]]  (Abnormal) Collected: 02/12/21 0602    Specimen: Blood Updated: 02/12/21 0628     Glucose 129 mg/dL      BUN 21 mg/dL      Creatinine 0.80 mg/dL      Sodium 138 mmol/L      Potassium 4.7 mmol/L      Chloride 104 mmol/L      CO2 29.0 mmol/L      Calcium 8.7 mg/dL      Total Protein 6.0 g/dL      " Albumin 3.10 g/dL      ALT (SGPT) 16 U/L      AST (SGOT) 17 U/L      Alkaline Phosphatase 48 U/L      Total Bilirubin 0.2 mg/dL      eGFR Non African Amer 93 mL/min/1.73      Globulin 2.9 gm/dL      A/G Ratio 1.1 g/dL      BUN/Creatinine Ratio 26.3     Anion Gap 5.0 mmol/L     Narrative:      GFR Normal >60  Chronic Kidney Disease <60  Kidney Failure <15      Magnesium [[id number]]  (Normal) Collected: 02/12/21 0602    Specimen: Blood Updated: 02/12/21 0628     Magnesium 1.6 mg/dL     POC Glucose Once [[id number]]  (Normal) Collected: 02/12/21 0606    Specimen: Blood Updated: 02/12/21 0627     Glucose 130 mg/dL      Comment: RN NotifiedOperator: [id number] ARLEEN JOHNSONMeter ID: [id number]       CBC Auto Differential [[id number]]  (Abnormal) Collected: 02/12/21 0602    Specimen: Blood Updated: 02/12/21 0616     WBC 11.90 10*3/mm3      RBC 2.20 10*6/mm3      Hemoglobin 8.6 g/dL      Hematocrit 24.9 %      .2 fL      MCH 39.1 pg      MCHC 34.5 g/dL      RDW 17.6 %      RDW-SD 65.6 fl      MPV 9.2 fL      Platelets 330 10*3/mm3      Neutrophil % 71.4 %      Lymphocyte % 9.2 %      Monocyte % 13.2 %      Eosinophil % 0.2 %      Basophil % 0.4 %      Immature Grans % 5.6 %      Neutrophils, Absolute 8.50 10*3/mm3      Lymphocytes, Absolute 1.09 10*3/mm3      Monocytes, Absolute 1.57 10*3/mm3      Eosinophils, Absolute 0.02 10*3/mm3      Basophils, Absolute 0.05 10*3/mm3      Immature Grans, Absolute 0.67 10*3/mm3      nRBC 0.8 /100 WBC     POC Glucose Once [[id number]]  (Abnormal) Collected: 02/11/21 1915    Specimen: Blood Updated: 02/11/21 1957     Glucose 319 mg/dL      Comment: RN NotifiedOperator: [id number] ARLEEN JOHNSONMaster Routesandhya ID: [id number]       POC Glucose Once [[id number]]  (Abnormal) Collected: 02/11/21 1623    Specimen: Blood Updated: 02/11/21 1636     Glucose 240 mg/dL      Comment: RN NotifiedOperator: [id number] MARIANO Guzman ID: [id number]            Xr Chest 1 View    Result Date:  2/9/2021  1. Stable appearance the chest without radiographic evidence of acute cardiopulmonary disease. Electronically signed by:  Isa Douglass MD  2/9/2021 2:46 PM CST Workstation: [id number]    Results for orders placed during the hospital encounter of 11/23/20   Adult Transthoracic Echo Complete W/ Cont if Necessary Per Protocol    Narrative · The study is technically difficult for diagnosis.  · Left ventricular wall thickness is consistent with concentric   hypertrophy.  · Estimated left ventricular EF = 53% Left ventricular ejection fraction   appears to be 51 - 55%. Left ventricular systolic function is normal.  · Left ventricular diastolic function is consistent with (grade I)   impaired relaxation.  · The right ventricular cavity is mildly dilated.  · Mild aortic valve regurgitation is present  · Estimated right ventricular systolic pressure from tricuspid   regurgitation is mildly elevated (35-45 mmHg).  · Moderate dilation of the proximal aorta is present (4.6-4.7 cms)          HOSPITAL COURSE:  Vishnu Waller is a 81 y.o. male with a CMH of COPD, CAD with CABG, hx of stenting of CABG, hypertension, degenerative disc disease, AAA, hyperlipidemia who presents complaining of worsening shortness of air.  Patient reported he has had gradual worsening exertional shortness of air over the past 6 weeks.    Patient was admitted for COPD exacerbation.  Patient was provided DuoNeb breathing treatments.  Started on prednisone 40 mg for 5 days.  And Z-Sami for empiric antibiotic coverage.  Patient required supplemental oxygen to maintain saturations, and was unable to wean back to room air.  Patient requires 2 L of oxygen via nasal cannula to maintain saturations above 88%.  he was discharged home on Breo inhaler, provided a  sample, albuterol inhaler, albuterol nebulizer treatments, DuoNeb nebulizer treatments and prescription for home nebulizer machine.  Patient was stable upon discharge on 2 L of oxygen via  nasal cannula.    Patient has history of chronic pain, pregabalin was increased to 75 mg twice daily from 50 mg twice daily.  Patient reports pain is well controlled at new dose.     Patient was diagnosed with BPPV on admission.  PT OT was consulted for Epley's and Myra-Hallpike.  Patient reported after maneuvers 80% improvement in symptoms.     Patient was stable upon discharge.  Patient was advised to follow-up with PCP (Johnathon Shaikh MD) in 1 week, pulmonology in 1 week and nephrology in 1 week.    DISCHARGE CONDITION:   Stable     DISPOSITION:  Home or Self Care [1]    DISCHARGE MEDICATIONS     Discharge Medications      New Medications      Instructions Start Date   azithromycin 250 MG tablet  Commonly known as: ZITHROMAX   250 mg, Oral, Daily      Breo Ellipta 100-25 MCG/INH inhaler  Generic drug: Fluticasone Furoate-Vilanterol   1 puff, Inhalation, Daily - RT      ipratropium-albuterol 0.5-2.5 mg/3 ml nebulizer  Commonly known as: DUO-NEB   Inhale the contents of 1 vial by nebulization Every 4 (Four) Hours As Needed for Wheezing for up to 30 days.      predniSONE 20 MG tablet  Commonly known as: DELTASONE   40 mg, Oral, Daily With Breakfast   Start Date: February 13, 2021        Changes to Medications      Instructions Start Date   albuterol sulfate  (90 Base) MCG/ACT inhaler  Commonly known as: PROVENTIL HFA;VENTOLIN HFA;PROAIR HFA  What changed: Another medication with the same name was added. Make sure you understand how and when to take each.   2 puffs, Inhalation, Every 4 Hours PRN      albuterol 0.63 MG/3ML nebulizer solution  Commonly known as: ACCUNEB  What changed: You were already taking a medication with the same name, and this prescription was added. Make sure you understand how and when to take each.   0.63 mg, Nebulization, Every 6 Hours PRN      pregabalin 75 MG capsule  Commonly known as: LYRICA  What changed:   · medication strength  · how much to take   75 mg, Oral, 2 Times  Daily         Continue These Medications      Instructions Start Date   acetaminophen 500 MG tablet  Commonly known as: TYLENOL   1,000 mg, Oral, Every 6 Hours PRN      atorvastatin 40 MG tablet  Commonly known as: LIPITOR   40 mg, Oral, Daily      carvedilol 3.125 MG tablet  Commonly known as: COREG   3.125 mg, Oral, Every 12 Hours Scheduled      clopidogrel 75 MG tablet  Commonly known as: PLAVIX   75 mg, Oral, Daily      cyclobenzaprine 10 MG tablet  Commonly known as: FLEXERIL   10 mg, Oral, 3 Times Daily PRN      dilTIAZem  MG 24 hr capsule  Commonly known as: CARDIZEM CD   180 mg, Oral, Daily      DULoxetine 30 MG capsule  Commonly known as: CYMBALTA   30 mg, Oral, Daily      fluticasone 50 MCG/ACT nasal spray  Commonly known as: FLONASE   2 sprays, Nasal, Nightly      isosorbide mononitrate 30 MG 24 hr tablet  Commonly known as: IMDUR   30 mg, Oral, Every 24 Hours Scheduled      latanoprost 0.005 % ophthalmic solution  Commonly known as: XALATAN   1 drop, Both Eyes, Nightly      losartan 50 MG tablet  Commonly known as: COZAAR   TAKE 1 TABLET EVERY NIGHT      meloxicam 15 MG tablet  Commonly known as: MOBIC   15 mg, Oral, Daily, Take with meal daily      metFORMIN 1000 MG tablet  Commonly known as: GLUCOPHAGE   1,000 mg, Oral, 2 Times Daily With Meals      multivitamin with minerals tablet tablet   1 tablet, Oral, Daily, Centrum 0.4 mg-162 mg-18 mg tablet  (unconfirmed)       nitroglycerin 0.4 MG SL tablet  Commonly known as: NITROSTAT   0.4 mg, Sublingual, Every 5 Minutes PRN, Take no more than 3 doses in 15 minutes.      omeprazole 20 MG capsule  Commonly known as: priLOSEC   20 mg, Oral, Daily      oxybutynin XL 5 MG 24 hr tablet  Commonly known as: DITROPAN-XL   5 mg, Oral, Daily      travoprost (KIARA free) 0.004 % solution ophthalmic solution  Commonly known as: TRAVATAN   1 drop      VITAMIN B 12 PO   500 mcg, Oral, Daily, Three times weekly, MoWeFr         Stop These Medications    meclizine 25 MG  tablet  Commonly known as: ANTIVERT            INSTRUCTIONS:  Activity:   Activity Instructions     Activity as Tolerated          Diet:   Diet Instructions     Diet: Regular, Consistent Carbohydrate, Cardiac      Discharge Diet:  Regular  Consistent Carbohydrate  Cardiac           Special instructions: Patient instructed to call MD or return to ED with worsening shortness of breath, chest pain, fever greater than 100.4 degrees F or any other medical concerns.    FOLLOW UP:   Additional Instructions for the Follow-ups that You Need to Schedule     Ambulatory Referral to Home Health   As directed      Transitional Home shabbir visit    Order Comments: Transitional Home shabbir visit     Face to Face Visit Date: 2/11/2021    Follow-up provider for Plan of Care?: I treated the patient in an acute care facility and will not continue treatment after discharge.    Follow-up provider: JOHNATHON MURRY [[id number]]    Reason/Clinical Findings: COPD    Describe mobility limitations that make leaving home difficult: COPD ,    Nursing/Therapeutic Services Requested: Skilled Nursing    Skilled nursing orders: Medication education COPD management Other    Frequency: 1 Week 1         Discharge Follow-up with PCP   As directed       Currently Documented PCP:    Johnathon Murry MD    PCP Phone Number:    None     Follow Up Details: follow up in one week         Discharge Follow-up with Specialty: CTVS- Dr Ocasio; 1 Week   As directed      Specialty: CATS- Dr Ocasio    Follow Up: 1 Week    Follow Up Details: AAA         Discharge Follow-up with Specified Provider: nephrology; 1 Week   As directed      To: nephrology    Follow Up: 1 Week    Follow Up Details: renal mass         Discharge Follow-up with Specified Provider: pulmonology; 1 Week   As directed      To: pulmonology    Follow Up: 1 Week            Contact information for follow-up providers     Johnathon Murry MD Follow up.    Specialty: Family Medicine  Contact  information:  [address] [facility] [address]  [phone]                   Contact information for after-discharge care     Durable Medical Equipment     [facility] .    Service: Durable Medical Equipment  Contact information:  [address] [facility] [address]  [phone]                 Home Medical Care     [facility] .    Service: Home Health Services  Contact information:  [address]  [place] [address]  [phone]                             PENDING TEST RESULTS AT DISCHARGE      Time: I spent 30 minutes on this discharge activity which included: face to face encounter with the patient, reviewing the data in the system, coordination of care with the nursing staff as well as consultants, documentation, and entering orders.      Dominic Bellamy M.D. is the attending at time of discharge, is aware of the patient's status and agrees with the above discharge summary.    Signed,  Akbar Zapien MD  Family Medicine Resident PGY3  [facility]      This document has been electronically signed by Akbar Zapien MD on 02/12/21 3:08 PM CST    Part of this note may be an electronic transcription/translation of spoken language to printed text using the Dragon Dictation System.

## 2021-02-12 NOTE — PROGRESS NOTES
FAMILY MEDICINE DAILY PROGRESS NOTE    NAME: Vishnu Waller  : 1940  MRN: [id number]      LOS: 0 days     PROVIDER OF SERVICE: Richard Mueller MD    Chief Complaint: COPD with acute exacerbation (CMS/formerly [facility])    Subjective:     Interval History:  History taken from: patient chart  Patient was no acute event overnight.  Patient was laying down comfortably in bed during visit.  Vital signs are stable on 2 L nasal cannula.  Patient continued to desaturate while ambulating the halls.  Patient denies dizziness, SOA, chest pain, palpitations or abdominal pain.    Review of Systems:   Review of Systems   Constitutional: Negative for chills and fever.   HENT: Negative for congestion, rhinorrhea and sore throat.    Eyes: Negative for visual disturbance.   Respiratory: Negative for cough, chest tightness and shortness of breath.    Cardiovascular: Negative for chest pain, palpitations and leg swelling.   Gastrointestinal: Negative for constipation, nausea and vomiting.   Endocrine: Negative for polydipsia and polyuria.   Genitourinary: Negative for difficulty urinating, dysuria, frequency and urgency.   Musculoskeletal: Negative for joint swelling and myalgias.   Skin: Negative for rash and wound.   Neurological: Negative for dizziness, weakness, light-headedness and headaches.   Psychiatric/Behavioral: Negative for dysphoric mood and sleep disturbance.       Objective:     Vital Signs  Temp:  [97 °F (36.1 °C)-98.8 °F (37.1 °C)] 97.1 °F (36.2 °C)  Heart Rate:  [79-94] 81  Resp:  [16-21] 18  BP: (108-150)/(53-72) 108/60  Flow (L/min):  [1-2] 2   Body mass index is 23.42 kg/m².    Physical Exam  Physical Exam  Vitals signs and nursing note reviewed.   Constitutional:       General: He is not in acute distress.     Appearance: Normal appearance. He is not diaphoretic.   HENT:      Head: Normocephalic and atraumatic.      Right Ear: External ear normal.      Left Ear: External ear normal.   Eyes:      General: No  scleral icterus.     Conjunctiva/sclera: Conjunctivae normal.   Neck:      Musculoskeletal: Normal range of motion and neck supple.   Cardiovascular:      Rate and Rhythm: Normal rate and regular rhythm.      Heart sounds: Normal heart sounds.   Pulmonary:      Effort: Respiratory distress present.      Breath sounds: Normal breath sounds.      Comments: On 2 L nasal cannula  Chest:      Chest wall: No tenderness.   Abdominal:      General: Bowel sounds are normal.      Palpations: Abdomen is soft.      Tenderness: There is no abdominal tenderness.   Musculoskeletal:         General: No swelling or tenderness.      Right lower leg: No edema.      Left lower leg: No edema.   Skin:     General: Skin is warm and dry.      Capillary Refill: Capillary refill takes less than 2 seconds.      Findings: No erythema or rash.   Neurological:      General: No focal deficit present.      Mental Status: He is alert and oriented to person, place, and time.      Motor: No weakness.   Psychiatric:         Behavior: Behavior normal.         Scheduled Meds   atorvastatin, 40 mg, Oral, Nightly  azithromycin, 250 mg, Oral, Daily  carvedilol, 3.125 mg, Oral, Q12H  clopidogrel, 75 mg, Oral, Daily  dilTIAZem CD, 180 mg, Oral, Daily  DULoxetine, 30 mg, Oral, Daily  enoxaparin, 40 mg, Subcutaneous, Q24H  fluticasone, 2 spray, Nasal, Nightly  insulin aspart, 0-7 Units, Subcutaneous, TID AC  insulin aspart, 7 Units, Subcutaneous, TID With Meals  insulin detemir, 20 Units, Subcutaneous, Nightly  ipratropium-albuterol, 3 mL, Nebulization, 4x Daily - RT  isosorbide mononitrate, 30 mg, Oral, Q24H  losartan, 50 mg, Oral, Nightly  oxybutynin XL, 5 mg, Oral, Daily  predniSONE, 40 mg, Oral, Daily With Breakfast  pregabalin, 75 mg, Oral, BID  senna-docusate sodium, 2 tablet, Oral, BID  sodium chloride, 10 mL, Intravenous, Q12H       PRN Meds   •  acetaminophen  •  dextrose  •  dextrose  •  glucagon (human recombinant)  •  ondansetron **OR**  "ondansetron  •  [COMPLETED] Insert peripheral IV **AND** sodium chloride  •  sodium chloride      Diagnostic Data    Results from last 7 days   Lab Units 02/12/21  0602   WBC 10*3/mm3 11.90*   HEMOGLOBIN g/dL 8.6*   HEMATOCRIT % 24.9*   PLATELETS 10*3/mm3 330   GLUCOSE mg/dL 129*   CREATININE mg/dL 0.80   BUN mg/dL 21   SODIUM mmol/L 138   POTASSIUM mmol/L 4.7   AST (SGOT) U/L 17   ALT (SGPT) U/L 16   ALK PHOS U/L 48   BILIRUBIN mg/dL 0.2   ANION GAP mmol/L 5.0       No radiology results for the last day      I reviewed the patient's new clinical results.    Assessment/Plan:     Active Hospital Problems    Diagnosis POA   • **COPD with acute exacerbation (CMS/[facility]) [J44.1] Yes     GOLD D  -duoneb  -albuterol   -replace magnesium  -Prednisone 40 mg daily x5 days   -talk to pharmacist for medication cost   - Procalcitonin        • Acute respiratory failure with hypoxia (CMS/[facility]) [J96.01] Unknown     Likely due to COPD exacerbation.  LISA at baseline  Patient required oxygen support now weaned to 1 L nasal cannula oxygen with a goal of getting him to room air before discharge.       • Megaloblastic anemia [D53.1] Yes     HGB 11.81  MCV: 115.4   Check serum Folate   Serum B12     May consider iron panel        • Hypomagnesemia [E83.42] Yes     -Mag 1.0 on admission  -mag 4 g IVPB day of admission  -monitor mag daily     -consider nephrology consult for potential Magnesium wasting syndrome     -Serum magnesium within normal limits 2/10/21     • AAA (abdominal aortic aneurysm) (CMS/[facility]) [I71.4] Yes     - is followed by Dr Ocasio         • Renal mass [N28.89] Yes     \"There is a hyperdense exophytic left renal  mass measuring 1.1 cm which is present in the previous study.  There are two nonobstructing calculi in the upper left kidney the  largest measures 5 mm of. No hydronephrosis or ureteral calculi.\" seen on imaging on CT 6/2/20  -consider outpatient follow up       • BPPV (benign paroxysmal positional vertigo) " [H81.10] Yes     -consult PT - for epleys and francisco       • Degenerative disc disease, lumbar [M51.36] Yes     -hx of DDD  -order Pt/Ot  -increased lyrica to 75 mg BID       • Chronic pain of right knee [M25.561, G89.29] Yes     On lyrica- home dose was 50 mg BID  -will increase to 75 mg nightly BID      • History of coronary artery bypass surgery [Z95.1] Not Applicable       2003 had 4 vessel CABG  -will monitor       • Essential hypertension [I10] Yes     Will continue home dose Cozaar 50 mg nightly, Cardizem 180 mg daily, Coreg 3.125 mg twice daily\  -We will monitor and adjust medications as needed  -If SBP is greater than 180 consider IV hydralazine     • Degeneration of lumbar intervertebral disc [M51.36] Yes     Hx of chronic pain   -sees pain management   -increased home dose lyrica to 75mg BID      • Type 2 diabetes mellitus, without long-term current use of insulin (CMS/[facility]) [E11.9] Yes     Patient is on metformin 1000 mg twice daily we will hold at this time  Start patient on basal/bolus insulin dosing-  20 units Levemir and 7 units 3 times daily AC with SSI.  -HbA1c 7.8 on admission           DVT prophylaxis: Lovenox  Code status is   Code Status and Medical Interventions:   Ordered at: 02/09/21 1649     Level Of Support Discussed With:    Patient    Health Care Surrogate     Code Status:    CPR     Medical Interventions (Level of Support Prior to Arrest):    Full       Plan for disposition:Where: home and When:  today      Time: 15 mins        Richard Mueller M.D. PGY 1  [facility]  [address] [place] [address]  Office: [phone]  This document has been electronically signed by Richard Mueller MD on February 12, 2021 13:41 CST

## 2021-02-12 NOTE — PROGRESS NOTES
Adult Nutrition  Assessment    Patient Name:  Vishnu Waller  YOB: 1940  MRN: [id number]  Admit Date:  2/9/2021    Assessment Date:  2/12/2021    Comments:  Pt improved per MD notes and d/c today likely. Alb 3.1L. Reg diet- intakes >75% most meals. BGD starwberry and chocolate meal both BID. Per Epic 11/23/2020 174# and currently 130#/BMI 23.1- noted -11.6% w tloss in <3mo, significant. Pt notes small meals at home, doesn't snack much. NFPE noted fat loss and muscle wasting to body. Pt met criteria for severe, acute malnutrition likely r/t to COPD and increase energy demands on 2/11. RD to follow hospital course.      Anthropometrics     Row Name 02/12/21 0607          Anthropometrics    Weight  60 kg (132 lb 3.2 oz)             Electronically signed by:  Jenny Barillas RD  02/12/21 08:58 CST

## 2021-02-12 NOTE — DISCHARGE PLACEMENT REQUEST
"Marcio Waller (81 y.o. Male)     Date of Birth Social Security Number Address Home Phone MRN    1940  [address] [place] [address] [facility] [id number] [phone] [id number]    [employer]oda[employer] Marital Status          Adventism        Admission Date Admission Type Admitting Provider Attending Provider Department, Room/Bed    2/9/21 Emergency Nims, MD Ervin Waite Folaranmi, MD [address], 332/1    Discharge Date Discharge Disposition Discharge Destination                       Attending Provider: Richard Mueller MD    Allergies: Molds & Smuts, Hydrocodone-acetaminophen    Isolation: None   Infection: None   Code Status: CPR    Ht: 160 cm (63\")   Wt: 60 kg (132 lb 3.2 oz)    Admission Cmt: None   Principal Problem: COPD with acute exacerbation (CMS/[facility]) [J44.1] More...                 Active Insurance as of 2/9/2021     Primary Coverage     Payor Plan Insurance Group Employer/Plan Group    MEDICARE MEDICARE A & B      Payor Plan Address Payor Plan Phone Number Payor Plan Fax Number Effective Dates    [address] [phone]  2/1/2007 - None Entered    [facility] [place] [address]       Subscriber Name Subscriber Birth Date Member ID       MARCIO WALLER 1940 [id number]           Secondary Coverage     Payor Plan Insurance Group Employer/Plan Group    [employer] PLAN F     Payor Plan Address Payor Plan Phone Number Payor Plan Fax Number Effective Dates    [address] [address] [phone]  1/1/2015 - None Entered    [facility] [id number]       Subscriber Name Subscriber Birth Date Member ID       MARCIO WALLER 1940 [id number]                 Emergency Contacts      (Rel.) Home Phone Work Phone Mobile Phone    Julissa Ryan (Spouse) [phone] -- [phone]        Katie Milan RN [facility]  [phone] phone  [phone] fax         02/11/21 1157 02/11/21 1310 02/11/21 1311 " "  Oxygen Therapy   SpO2 98 %  --  93 %   Pulse Oximetry Type Intermittent  --   --    Device (Oxygen Therapy) nasal cannula room air room air   Flow (L/min) 1  --   --       21 1529 21 1533 21 1538   Oxygen Therapy   SpO2 95 % (!) 86 % 93 %   Pulse Oximetry Type  --   --   --    Device (Oxygen Therapy) room air room air  (drops to 86 when talking, back to 95 when not) nasal cannula   Flow (L/min)  --   --  1      21 1542 21 1550   Oxygen Therapy   SpO2 91 %  --    Pulse Oximetry Type Intermittent  --    Device (Oxygen Therapy) nasal cannula nasal cannula   Flow (L/min) 1 2       [facility] 3 [address] [address]  Dept. Phone:  [phone]  Dept. Fax:  [phone] Date Ordered: 2021         Patient:  Vishnu Waller MRN:  [id number]   [address] [place] [id number] :  1940  SSN:    Phone: [phone] Sex:  M     Weight: 60 kg (132 lb 3.2 oz)         Ht Readings from Last 1 Encounters:   21 160 cm (63\")         Oxygen Therapy         (Order ID: [id number])    Diagnosis:  Exertional dyspnea (R06.00 [ICD-10-CM] 786.09 [ICD-9-CM])  COPD with acute exacerbation (CMS/[facility]) (J44.1 [ICD-10-CM] 491.21 [ICD-9-CM])   Quantity:  1     Delivery Modality: Nasal Cannula  Liters Per Minute: 2  Duration: Continuous  Equipment:  Oxygen Concentrator &  &  Portable Gaseous Oxygen System & Portable Oxygen Contents Gaseous &  Conserving Regulator  Length of Need (99 Months = Lifetime): 99 Months = Lifetime        Authorizing Provider's Phone: [phone]   Authorizing Provider:Akbar Zapien MD  Authorizing Provider's NPI: [id number]  Order Entered By: Akbar Zapien MD 2021  4:11 PM     Electronically signed by: Akbar Zapien MD 2021  4:11 PM               History & Physical      Akbar Zapien MD at 21 1383              HISTORY AND PHYSICAL  NAME: Vishnu Villatoro " "Sridhar  : 1940  MRN: [id number]    DATE OF ADMISSION: 21    DATE & TIME SEEN: 21 4:56 PM CST    PCP: Johnathon Shaikh MD    CODE STATUS: Full code    CHIEF COMPLAINT Worsening SOA     HPI:  Vishnu Waller is a 81 y.o. male with a CMH of COPD, CAD with CABG, hx of stenting of CABG, hypertension, degenerative disc disease, AAA, hyperlipidemia who presents complaining of worsening shortness of air.  Patient reported he has had gradual worsening exertional shortness of air over the past 6 weeks.  Patient reports he gets \"out of breath when walking to the bathroom \".  SOA is associated with increased sputum production and wheezing.  Of note patient was followed by pulmonology in the past and had been on inhalers however due to cost has not had them in over 6 to 8 weeks.  Patient does endorse using his rescue inhaler up to 2-3 daily.   Patient also reports dizziness and vertigo when he turns from side to side.    Of note: Patient voiced desire to be a   Full code  Patient nominated Julissa Ryan  to be their Healthcare surrogate,  contact information is [phone]    CONCURRENT MEDICAL HISTORY:  Past Medical History:   Diagnosis Date   • AAA (abdominal aortic aneurysm) (CMS/[facility])    • Acute bacterial sinusitis    • Acute bronchitis    • Acute frontal sinusitis    • Acute maxillary sinusitis    • Acute pharyngitis    • Allergic rhinitis    • Allergic rhinitis due to pollen    • Anxiety    • Artificial lens present     in position   • Asthma    • Backache    • Borderline glaucoma    • C. difficile diarrhea    • Chronic laryngitis    • Chronic rhinitis    • COPD (chronic obstructive pulmonary disease) (CMS/[facility])    • Coronary artery disease    • Cough    • Degeneration of lumbar intervertebral disc    • Degenerative joint disease involving multiple joints    • Diabetes mellitus (CMS/[facility])    • Diverticular disease of colon    • Dizziness and giddiness    • Elevated cholesterol    • Erectile " dysfunction    • Essential hypertension    • Generalized anxiety disorder    • GERD (gastroesophageal reflux disease)    • Glaucoma    • Hemorrhoids     without bleeding   • Insomnia    • Kidney stone    • Kidney stones    • Malignant tumor of prostate (CMS/HCC)    • Need for prophylactic vaccination and inoculation against influenza    • Osteoarthritis    • Osteoarthritis of multiple joints    • Pain, lumbar region     Pain radiating to lumbar region of back     • PONV (postoperative nausea and vomiting)    • Postviral fatigue syndrome    • Presence of aortocoronary bypass graft    • Prostate cancer (CMS/HCC)    • Pseudomembranous enterocolitis     improved   • Rotator cuff syndrome     right   • Shoulder pain    • SOB (shortness of breath)    • Tenosynovitis    • Thrombocytopenia (CMS/HCC)    • Upper respiratory infection        PAST SURGICAL HISTORY:  Past Surgical History:   Procedure Laterality Date   • CARDIAC CATHETERIZATION  09/25/2003    Cardiac cath (Normal left ventricular systolic function, EF 60% Multi-vessel coronary artery disease with critical disease noted in the LAD coronary artery, diagonal coronary artery, obtuse marginal coronary and right coronary artery.)   • CARDIAC CATHETERIZATION  05/03/1996    Cardiac cath (Coronary atherosclerotic heart disease. 70% diagonal stenosis. Mild to moderate left anterior descending artery stenosis. Preserved left ventricular function.)   • CARDIAC CATHETERIZATION N/A 2/26/2018    Procedure: Left Heart Cath/ pci if indicated ;  Surgeon: Radha Wilkes MD;  Location: [facility] INVASIVE LOCATION;  Service:    • CATARACT EXTRACTION Bilateral    • CATARACT EXTRACTION  10/04/2011    Remove cataract, insert lens (Right)   • CATARACT EXTRACTION  08/23/2011    Remove cataract, insert lens (left)   • COLONOSCOPY     • COLONOSCOPY      Colon endoscopy 44664 (Rectal bleeding. Radiation proctitis w/bleeding. An abnormal CT of transverse colon due to mucosal  ischemic colitis, that now is healed. Internal hemorrhoids w/possible bleeding.)   • COLONOSCOPY  05/10/2011    Colon endoscopy 48363 (Single sessile polyp found in transverse colon and sigmoid colon ,removed by cold biopsy polypectomy.divertic. found in sigmoid colon,descending colon. Friability/capillary friability in rectum sigmoid due to prior radiation.Inter. hem. Grade 1)   • COLONOSCOPY  05/02/2007    Colon endoscopy 33211 (Colon polyp. Diverticulosis without bleeding. Internal hemorrhoids without bleeding.)   • CORONARY ARTERY BYPASS GRAFT  2002   • CYSTOSCOPY  01/13/1995    Cystoscopy, stone removal (Right ureteroscopic lasertripsy.)   • CYSTOSCOPY Right 1/23/2019    Procedure: CYSTOSCOPY, RIGHT STENT REMOVAL;  Surgeon: Nirmal Gaines MD;  Location: [facility];  Service: Urology   • CYSTOSCOPY, URETEROSCOPY, RETROGRADE PYELOGRAM, STENT INSERTION N/A 8/28/2017    Procedure: URETEROSCOPY LASER LITHOTRIPSY WITH STENT INSERTION AND RETROGRADE PYELOGRAM ;  Surgeon: Anna M. D'Amico, MD;  Location: [facility];  Service:    • CYSTOSCOPY, URETEROSCOPY, RETROGRADE PYELOGRAM, STENT INSERTION Right 1/11/2019    Procedure: CYSTOSCOPY URETEROSCOPY RETROGRADE PYELOGRAM HOLMIUM LASER STENT INSERTION;  Surgeon: Nirmal Gaines MD;  Location: [facility];  Service: Urology   • EPIDURAL  12/03/2014    Therapeutic/diag injection (Lumbar transforaminal epidural steroid injection, L5-S1, left side.)   • EPIDURAL  10/22/2014    Therapeutic/diag injection (Lumbar transforaminal epidural steroid injection L5-S1 left side.)   • EPIDURAL  08/07/2014    Therapeutic/diag injection (Lumbar transforaminal epidural steroid injection.)   • EXCISION LESION  05/13/1999    REMOVE EAR LESION 36350 (1.3 cm basal cell carcinoma, right preauricular area. Excision)   • EXCISION LESION  03/13/2001    REMOVE LESION NECK/CHEST 34465 (Excision of irritated seborrheic keratosis, anterior neck, 1.1 cm and deep lipoma, posterior neck, 3.5 cm)   •  INJECTION OF MEDICATION  11/15/2012    Kenalog (4)      • JOINT REPLACEMENT  2015    partial   • KNEE ARTHROSCOPY  01/17/2007    Knee arthroscopy, surgery (Arthroscopy with medial and lateral meniscectomies, right knee.)   • KNEE SURGERY  11/04/2015    Knee Surgery (Right unicompartmental arthroplasty.)   • LUMBAR LAMINECTOMY N/A 2/7/2017    Procedure: LUMBAR LAMINECTOMY LUMBAR THREE-FOUR, LUMBAR FOUR-FIVE, INTERLAMINAR DISTRACTION ;  Surgeon: Lucio Mayo MD;  Location: [facility];  Service:    • NOSE SURGERY     • OTHER SURGICAL HISTORY      EXTENDED VISUAL FIELDS STUDY 98617 (Borderline glaucoma) (3): 03/19/2015, 04/09/2014, 03/27/2013   • OTHER SURGICAL HISTORY  10/29/2003    Heart revascularize (TMR) (Directmyocardial revascularization times four; LIMA to LAD SVG to DARIUSZ SVG to OM1, SVG to RCA)   • OTHER SURGICAL HISTORY      OCT DISC NFL 93417 (Borderline glaucoma)  (3): 09/24/2015, 08/13/2014, 07/29/2013   • PROSTATECTOMY     • SEPTORHINOPLASTY  11/16/1989    Nasal surgery procedure (Septorhinoplasty with reconstruction of the nasal pyramid with a iliac crest graft, rhinoplasty was performed with external approach.)   • SHOULDER ARTHROSCOPY  07/24/2013    Arthroscopy of right shoulder with rotator repair, Carla procedure, Biceps tenotomy, subacromial decompression.   • SHOULDER SURGERY  11/01/2005    Shoulder surgery procedure (Decompression, subacromial, explore of the rotator cuff, no tear found nor repaired, acromioplasty of the left shoulder and AC shoulder joint resection.)       FAMILY HISTORY:  Family History   Problem Relation Age of Onset   • Hypertension Mother    • Heart disease Mother    • Arthritis Mother    • Cancer Other    • Heart disease Other    • Hypertension Other         SOCIAL HISTORY:  Social History     Socioeconomic History   • Marital status:      Spouse name: Not on file   • Number of children: Not on file   • Years of education: Not on file   • Highest  education level: Not on file   Tobacco Use   • Smoking status: Light Tobacco Smoker     Packs/day: 0.50     Years: 40.00     Pack years: 20.00     Types: Cigarettes   • Smokeless tobacco: Never Used   Substance and Sexual Activity   • Alcohol use: Yes     Comment: socially   • Drug use: No   • Sexual activity: Defer       HOME MEDICATIONS:  Prior to Admission medications    Medication Sig Start Date End Date Taking? Authorizing Provider   pregabalin (LYRICA) 50 MG capsule Take 50 mg by mouth 2 (Two) Times a Day.   Yes Vicky Kent MD   acetaminophen (TYLENOL) 500 MG tablet Take 1,000 mg by mouth Every 6 (Six) Hours As Needed.    Vicky Kent MD   albuterol sulfate  (90 Base) MCG/ACT inhaler Inhale 2 puffs Every 4 (Four) Hours As Needed for Wheezing or Shortness of Air. 10/23/20   Camryn Koehler MD   atorvastatin (LIPITOR) 40 MG tablet Take 1 tablet by mouth Daily. 4/16/20   Radha Wilkes MD   carvedilol (COREG) 3.125 MG tablet Take 1 tablet by mouth Every 12 (Twelve) Hours. 7/8/20   Radha Wilkes MD   clopidogrel (PLAVIX) 75 MG tablet Take 75 mg by mouth Daily.    Vicky Kent MD   Cyanocobalamin (VITAMIN B 12 PO) Take 500 mcg by mouth Daily. Three times weekly, MoWeFr    Vicky Kent MD   cyclobenzaprine (FLEXERIL) 10 MG tablet Take 1 tablet by mouth 3 (Three) Times a Day As Needed for Muscle Spasms. 1/6/21   Cordell Guardado MD   dilTIAZem CD (CARDIZEM CD) 180 MG 24 hr capsule Take 1 capsule by mouth Daily. 3/20/20   Radha Wilkes MD   DULoxetine (CYMBALTA) 30 MG capsule Take 30 mg by mouth Daily. 9/20/19   Vicky Kent MD   fluticasone (FLONASE) 50 MCG/ACT nasal spray 2 sprays into each nostril Every Night. 2/14/18   Camryn Koehler MD   isosorbide mononitrate (IMDUR) 30 MG 24 hr tablet Take 1 tablet by mouth Daily. 2/28/18   Julio Cesar Moss APRN   latanoprost (XALATAN) 0.005 % ophthalmic solution  Administer 1 drop to both eyes every night. 4/1/16   Vicky Kent MD   losartan (COZAAR) 50 MG tablet TAKE 1 TABLET EVERY NIGHT 12/7/20   Radha Wilkes MD   meclizine (ANTIVERT) 25 MG tablet meclizine 25 mg tablet    Vicky Kent MD   meloxicam (MOBIC) 15 MG tablet Take 1 tablet by mouth Daily. Take with meal daily 3/14/19   Nirmal Paez MD   metFORMIN (GLUCOPHAGE) 1000 MG tablet Take 1 tablet by mouth 2 (Two) Times a Day With Meals. 2/28/18   Julio Cesar Moss APRN   Multiple Vitamins-Minerals (CENTRUM PO) Take 1 tablet by mouth daily. Centrum 0.4 mg-162 mg-18 mg tablet  (unconfirmed) 11/16/15   Vicky Kent MD   nitroglycerin (NITROSTAT) 0.4 MG SL tablet Place 1 tablet under the tongue Every 5 (Five) Minutes As Needed for Chest Pain. Take no more than 3 doses in 15 minutes. 2/27/18   Julio Cesar Moss APRN   omeprazole (priLOSEC) 20 MG capsule Take 20 mg by mouth Daily.    Vicky Kent MD   oxybutynin XL (DITROPAN-XL) 5 MG 24 hr tablet Take 5 mg by mouth Daily.    Vicky Kent MD   travoprost, BAK free, (TRAVATAN) 0.004 % solution ophthalmic solution 1 drop.    Vicky Kent MD   gabapentin (NEURONTIN) 600 MG tablet Take 600 mg by mouth 4 (Four) Times a Day.  2/9/21  Vicky Kent MD       ALLERGIES:  Molds & smuts and Hydrocodone-acetaminophen    REVIEW OF SYSTEMS  Review of Systems   Constitutional: Positive for fatigue and unexpected weight change. Negative for activity change, appetite change, chills and fever.   HENT: Negative for congestion, hearing loss, sinus pressure, sinus pain, sneezing, sore throat and trouble swallowing.    Eyes: Negative for pain, discharge and itching.   Respiratory: Positive for cough and shortness of breath. Negative for apnea, choking, chest tightness, wheezing and stridor.    Cardiovascular: Negative for chest pain, palpitations and leg swelling.   Gastrointestinal: Positive for  nausea. Negative for abdominal distention, abdominal pain, anal bleeding, constipation, diarrhea and vomiting.   Genitourinary: Negative for difficulty urinating, dysuria, enuresis and flank pain.   Musculoskeletal: Positive for back pain. Negative for arthralgias and gait problem.   Skin: Negative for color change.   Neurological: Positive for dizziness. Negative for seizures, syncope, facial asymmetry, light-headedness, numbness and headaches.   Psychiatric/Behavioral: Negative for agitation and behavioral problems.       PHYSICAL EXAM:  Temp:  [96.2 °F (35.7 °C)-97.9 °F (36.6 °C)] 97.5 °F (36.4 °C)  Heart Rate:  [] 73  Resp:  [18-26] 18  BP: (110-183)/(59-88) 110/59  Body mass index is 23.19 kg/m².  Physical Exam  Constitutional:       General: He is not in acute distress.     Appearance: He is well-developed. He is not diaphoretic.   HENT:      Head: Normocephalic and atraumatic.      Right Ear: External ear normal.      Left Ear: External ear normal.      Nose: Nose normal.   Eyes:      General:         Right eye: No discharge.         Left eye: No discharge.      Pupils: Pupils are equal, round, and reactive to light.   Neck:      Musculoskeletal: Normal range of motion and neck supple.      Thyroid: No thyromegaly.      Trachea: No tracheal deviation.   Cardiovascular:      Rate and Rhythm: Normal rate and regular rhythm.      Heart sounds: Normal heart sounds.   Pulmonary:      Effort: Pulmonary effort is normal. No respiratory distress.      Breath sounds: No stridor. Examination of the right-middle field reveals wheezing. Examination of the left-middle field reveals wheezing. Examination of the right-lower field reveals wheezing. Examination of the left-lower field reveals wheezing. Wheezing present. No rales.   Abdominal:      General: Bowel sounds are normal. There is no distension.      Palpations: Abdomen is soft.      Tenderness: There is no abdominal tenderness.   Musculoskeletal: Normal range  of motion.         General: No tenderness or deformity.      Right lower leg: No edema.      Left lower leg: No edema.   Lymphadenopathy:      Cervical: No cervical adenopathy.   Skin:     General: Skin is warm and dry.   Neurological:      Mental Status: He is alert and oriented to person, place, and time.      Cranial Nerves: No cranial nerve deficit.      Comments: Cn2-12 grossly intact         DIAGNOSTIC DATA:   Lab Results (last 24 hours)     Procedure Component Value Units Date/Time    Comprehensive Metabolic Panel [[id number]]  (Abnormal) Collected: 02/10/21 0526    Specimen: Blood Updated: 02/10/21 0559     Glucose 82 mg/dL      BUN 23 mg/dL      Creatinine 0.93 mg/dL      Sodium 139 mmol/L      Potassium 4.3 mmol/L      Chloride 105 mmol/L      CO2 27.0 mmol/L      Calcium 9.0 mg/dL      Total Protein 5.8 g/dL      Albumin 3.10 g/dL      ALT (SGPT) 14 U/L      AST (SGOT) 14 U/L      Alkaline Phosphatase 45 U/L      Total Bilirubin 0.3 mg/dL      eGFR Non African Amer 78 mL/min/1.73      Globulin 2.7 gm/dL      A/G Ratio 1.1 g/dL      BUN/Creatinine Ratio 24.7     Anion Gap 7.0 mmol/L     Narrative:      GFR Normal >60  Chronic Kidney Disease <60  Kidney Failure <15      Magnesium [[id number]]  (Normal) Collected: 02/10/21 0526    Specimen: Blood Updated: 02/10/21 0559     Magnesium 2.0 mg/dL     CBC Auto Differential [[id number]]  (Abnormal) Collected: 02/10/21 0526    Specimen: Blood Updated: 02/10/21 0542     WBC 11.81 10*3/mm3      RBC 2.14 10*6/mm3      Hemoglobin 8.4 g/dL      Hematocrit 24.7 %      .4 fL      MCH 39.3 pg      MCHC 34.0 g/dL      RDW 17.8 %      RDW-SD 67.4 fl      MPV 9.0 fL      Platelets 305 10*3/mm3      Neutrophil % 70.6 %      Lymphocyte % 12.1 %      Monocyte % 10.7 %      Eosinophil % 1.4 %      Basophil % 0.6 %      Immature Grans % 4.6 %      Neutrophils, Absolute 8.35 10*3/mm3      Lymphocytes, Absolute 1.43 10*3/mm3      Monocytes, Absolute 1.26 10*3/mm3       Eosinophils, Absolute 0.16 10*3/mm3      Basophils, Absolute 0.07 10*3/mm3      Immature Grans, Absolute 0.54 10*3/mm3      nRBC 0.3 /100 WBC     Magnesium [[id number]]  (Abnormal) Collected: 02/09/21 2026    Specimen: Blood Updated: 02/09/21 2100     Magnesium 2.5 mg/dL     POC Glucose Once [[id number]]  (Abnormal) Collected: 02/09/21 2012    Specimen: Blood Updated: 02/09/21 2027     Glucose 154 mg/dL      Comment: RN NotifiedOperator: [id number] NOAH STEFANIMeter ID: [id number]       Magnesium [[id number]]  (Abnormal) Collected: 02/09/21 1930    Specimen: Blood Updated: 02/09/21 2013     Magnesium 2.5 mg/dL     Vitamin B12 [[id number]] Collected: 02/09/21 1412    Specimen: Blood Updated: 02/09/21 1921    TSH [[id number]]  (Normal) Collected: 02/09/21 1457    Specimen: Blood Updated: 02/09/21 1800     TSH 0.679 uIU/mL     Lipid Panel [[id number]]  (Abnormal) Collected: 02/09/21 1457    Specimen: Blood Updated: 02/09/21 1752     Total Cholesterol 98 mg/dL      Triglycerides 147 mg/dL      HDL Cholesterol 29 mg/dL      LDL Cholesterol  43 mg/dL      VLDL Cholesterol 26 mg/dL      LDL/HDL Ratio 1.37    Narrative:      Cholesterol Reference Ranges  (U.S. Department of Health and Human Services ATP III Classifications)    Desirable          <200 mg/dL  Borderline High    200-239 mg/dL  High Risk          >240 mg/dL      Triglyceride Reference Ranges  (U.S. Department of Health and Human Services ATP III Classifications)    Normal           <150 mg/dL  Borderline High  150-199 mg/dL  High             200-499 mg/dL  Very High        >500 mg/dL    HDL Reference Ranges  (U.S. Department of Health and Human Services ATP III Classifcations)    Low     <40 mg/dl (major risk factor for CHD)  High    >60 mg/dl ('negative' risk factor for CHD)        LDL Reference Ranges  (U.S. Department of Health and Human Services ATP III Classifcations)    Optimal          <100 mg/dL  Near Optimal     100-129 mg/dL  Borderline High  130-159  mg/dL  High             160-189 mg/dL  Very High        >189 mg/dL    Hemoglobin A1c [[id number]]  (Abnormal) Collected: 02/09/21 1405    Specimen: Blood Updated: 02/09/21 1752     Hemoglobin A1C 7.80 %     Narrative:      Hemoglobin A1C Ranges:    Increased Risk for Diabetes  5.7% to 6.4%  Diabetes                     >= 6.5%  Diabetic Goal                < 7.0%    POC Glucose Once [[id number]]  (Abnormal) Collected: 02/09/21 1711    Specimen: Blood Updated: 02/09/21 1723     Glucose 151 mg/dL      Comment: RN NotifiedOperator: [id number] LUDIN LEE ANNMeter ID: [id number]       Urinalysis, Microscopic Only - Urine, Clean Catch [[id number]] Collected: 02/09/21 1545    Specimen: Urine, Clean Catch Updated: 02/09/21 1621     RBC, UA None Seen /HPF      WBC, UA 0-2 /HPF      Bacteria, UA None Seen /HPF      Squamous Epithelial Cells, UA 0-2 /HPF      Hyaline Casts, UA 21-30 /LPF      Methodology Manual Light Microscopy    Urinalysis With Microscopic If Indicated (No Culture) - Urine, Clean Catch [[id number]]  (Abnormal) Collected: 02/09/21 1545    Specimen: Urine, Clean Catch Updated: 02/09/21 1610     Color, UA Dark Yellow     Appearance, UA Clear     pH, UA <=5.0     Specific Gravity, UA 1.037     Glucose, UA Negative     Ketones, UA Trace     Bilirubin, UA Small (1+)     Blood, UA Negative     Protein, UA 30 mg/dL (1+)     Leuk Esterase, UA Negative     Nitrite, UA Negative     Urobilinogen, UA 1.0 E.U./dL    COVID-19 and FLU A/B PCR - Swab, Nasopharynx [[id number]]  (Normal) Collected: 02/09/21 1504    Specimen: Swab from Nasopharynx Updated: 02/09/21 1543     COVID19 Not Detected     Influenza A PCR Not Detected     Influenza B PCR Not Detected    Narrative:      Fact sheet for providers: https://www.fda.gov/media/454541/download    Fact sheet for patients: https://www.fda.gov/media/871008/download    Test performed by PCR.    Troponin [[id number]]  (Normal) Collected: 02/09/21 1457    Specimen: Blood Updated:  02/09/21 1536     Troponin T <0.010 ng/mL     Narrative:      Troponin T Reference Range:  <= 0.03 ng/mL-   Negative for AMI  >0.03 ng/mL-     Abnormal for myocardial necrosis.  Clinicians would have to utilize clinical acumen, EKG, Troponin and serial changes to determine if it is an Acute Myocardial Infarction or myocardial injury due to an underlying chronic condition.       Results may be falsely decreased if patient taking Biotin.      Magnesium [[id number]]  (Abnormal) Collected: 02/09/21 1457    Specimen: Blood Updated: 02/09/21 1532     Magnesium 1.0 mg/dL     Manual Differential [[id number]]  (Abnormal) Collected: 02/09/21 1405    Specimen: Blood Updated: 02/09/21 1523     Neutrophil % 60.0 %      Lymphocyte % 9.0 %      Monocyte % 15.0 %      Eosinophil % 1.0 %      Bands %  12.0 %      Metamyelocyte % 1.0 %      Myelocyte % 2.0 %      Neutrophils Absolute 11.33 10*3/mm3      Lymphocytes Absolute 1.42 10*3/mm3      Monocytes Absolute 2.36 10*3/mm3      Eosinophils Absolute 0.16 10*3/mm3      Anisocytosis Mod/2+     Hypochromia Mod/2+     Macrocytes Mod/2+     WBC Morphology Normal     Platelet Morphology Normal    Extra Tubes [[id number]] Collected: 02/09/21 1412    Specimen: Blood Updated: 02/09/21 1516    Narrative:      The following orders were created for panel order Extra Tubes.  Procedure                               Abnormality         Status                     ---------                               -----------         ------                     Light Blue Top[[id number]]                                   Final result               Gold Top - SST[[id number]]                                   Final result                 Please view results for these tests on the individual orders.    Light Blue Top [[id number]] Collected: 02/09/21 1412    Specimen: Blood Updated: 02/09/21 1516     Extra Tube hold for add-on     Comment: Auto resulted       Gold Top - SST [[id number]] Collected: 02/09/21 1412     Specimen: Blood Updated: 02/09/21 1516     Extra Tube Hold for add-ons.     Comment: Auto resulted.       Comprehensive Metabolic Panel [[id number]]  (Abnormal) Collected: 02/09/21 1405    Specimen: Blood Updated: 02/09/21 1436     Glucose 234 mg/dL      BUN 21 mg/dL      Creatinine 1.12 mg/dL      Sodium 136 mmol/L      Potassium 4.2 mmol/L      Chloride 102 mmol/L      CO2 21.0 mmol/L      Calcium 9.4 mg/dL      Total Protein 6.8 g/dL      Albumin 3.50 g/dL      ALT (SGPT) 17 U/L      AST (SGOT) 15 U/L      Alkaline Phosphatase 52 U/L      Total Bilirubin 0.4 mg/dL      eGFR Non African Amer 63 mL/min/1.73      Globulin 3.3 gm/dL      A/G Ratio 1.1 g/dL      BUN/Creatinine Ratio 18.8     Anion Gap 13.0 mmol/L     Narrative:      GFR Normal >60  Chronic Kidney Disease <60  Kidney Failure <15      Troponin [[id number]]  (Normal) Collected: 02/09/21 1405    Specimen: Blood Updated: 02/09/21 1435     Troponin T <0.010 ng/mL     Narrative:      Troponin T Reference Range:  <= 0.03 ng/mL-   Negative for AMI  >0.03 ng/mL-     Abnormal for myocardial necrosis.  Clinicians would have to utilize clinical acumen, EKG, Troponin and serial changes to determine if it is an Acute Myocardial Infarction or myocardial injury due to an underlying chronic condition.       Results may be falsely decreased if patient taking Biotin.      BNP [[id number]]  (Normal) Collected: 02/09/21 1405    Specimen: Blood Updated: 02/09/21 1433     proBNP 488.9 pg/mL     Narrative:      Among patients with dyspnea, NT-proBNP is highly sensitive for the detection of acute congestive heart failure. In addition NT-proBNP of <300 pg/ml effectively rules out acute congestive heart failure with 99% negative predictive value.    Results may be falsely decreased if patient taking Biotin.      CBC & Differential [[id number]]  (Abnormal) Collected: 02/09/21 1405    Specimen: Blood Updated: 02/09/21 1416    Narrative:      The following orders were created for panel  order CBC & Differential.  Procedure                               Abnormality         Status                     ---------                               -----------         ------                     Scan Slide[[id number]]                                                                  CBC Auto Differential[[id number]]        Abnormal            Final result                 Please view results for these tests on the individual orders.    CBC Auto Differential [[id number]]  (Abnormal) Collected: 02/09/21 1405    Specimen: Blood Updated: 02/09/21 1416     WBC 15.73 10*3/mm3      RBC 2.50 10*6/mm3      Hemoglobin 9.8 g/dL      Hematocrit 29.0 %      .0 fL      MCH 39.2 pg      MCHC 33.8 g/dL      RDW 17.9 %      RDW-SD 69.4 fl      MPV 9.3 fL      Platelets 373 10*3/mm3      Neutrophil % 73.2 %      Lymphocyte % 9.2 %      Monocyte % 11.8 %      Eosinophil % 0.8 %      Basophil % 0.8 %      Immature Grans % 4.2 %      Neutrophils, Absolute 11.52 10*3/mm3      Lymphocytes, Absolute 1.45 10*3/mm3      Monocytes, Absolute 1.85 10*3/mm3      Eosinophils, Absolute 0.13 10*3/mm3      Basophils, Absolute 0.12 10*3/mm3      Immature Grans, Absolute 0.66 10*3/mm3      nRBC 1.0 /100 WBC            Imaging Results (Last 24 Hours)     Procedure Component Value Units Date/Time    XR Chest 1 View [[id number]] Collected: 02/09/21 1416     Updated: 02/09/21 1448    Narrative:      EXAM DESCRIPTION:     XR CHEST 1 VW    CLINICAL HISTORY:     81 years  Male  short of breath    COMPARISON:     February 24, 2018    TECHNIQUE:     One view-AP portable radiograph of the chest    FINDINGS:     The lungs are well-expanded and clear. The cardiac silhouette and  pulmonary vasculature are within normal limits. There is evidence  of prior median sternotomy. There are no pleural effusions.      Impression:        1. Stable appearance the chest without radiographic evidence of  acute cardiopulmonary disease.        Electronically signed by:   "Isa Douglass MD  2/9/2021 2:46 PM CST  Workstation: [id number]            I reviewed the patient's new clinical results.    ASSESSMENT AND PLAN: This is a 81 y.o. male with:    Active Hospital Problems    Diagnosis POA   • **COPD exacerbation (CMS/[facility]) [J44.1] Yes     -duoneb  -albuterol   -replace magnesium  -Prednisone 40 mg daily x5 days   -talk to pharmacist for medication cost      • Hypomagnesemia [E83.42] Yes     -Mag 1.0 on admission  -mag 4 g IVPB day of admission  -monitor mag daily      • AAA (abdominal aortic aneurysm) (CMS/[facility]) [I71.4] Yes     - is followed by Dr Ocasio        • Renal mass [N28.89] Yes     \"There is a hyperdense exophytic left renal  mass measuring 1.1 cm which is present in the previous study.  There are two nonobstructing calculi in the upper left kidney the  largest measures 5 mm of. No hydronephrosis or ureteral calculi.\" seen on imaging on CT 6/2/20  -consider outpatient follow up      • BPPV (benign paroxysmal positional vertigo) [H81.10] Yes     -consult PT - for epleys and dixerenpike      • Degenerative disc disease, lumbar [M51.36] Unknown     -hx of DDD  -order Pt/Ot  -increased lyrica to 75 mg BID      • Chronic pain of right knee [M25.561, G89.29] Yes     On lyrica- home dose was 50 mg BID  -will increase to 75 mg nightly BID     • History of coronary artery bypass surgery [Z95.1] Not Applicable       2003 had 4 vessel CABG  -will monitor      • Essential hypertension [I10] Yes     Will continue home dose Cozaar 50 mg nightly, Cardizem 180 mg daily, Coreg 3.125 mg twice daily\  -We will monitor and adjust medications as needed  -If SBP is greater than 180 consider IV hydralazine     • Degeneration of lumbar intervertebral disc [M51.36] Yes     Hx of chronic pain   -sees pain management   -increased home dose lyrica to 75mg BID     • Type 2 diabetes mellitus, without long-term current use of insulin (CMS/[facility]) [E11.9] Yes     Patient is on metformin 1000 mg twice daily we " will hold at this time  Start patient on basal/bolus insulin dosing-  20 units Levemir and 7 units 3 times daily AC with SSI.  -HbA1c 7.8 on admission          I will continue to monitor and adjust patient's care as need   DVT prophylaxis: Lovenox    BASHIR -  Reviewed as per Epic PDMP  Reviewed and consistent with patient reported medications.    Expected Length of Stay: Place of Residence in 1-2 days     I discussed the patient's findings and my recommendations with patient and family.     Vishnu Waller and I have discussed pain goals for this hospitalization after reviewing their current clinical condition, medical history and prior pain experiences.  The goal is to keep the pain level at a 2-3/10.  To help achieve this, I plan to prescribe Tylenol and consider opioid medications if medically appropriate.     Dr. Ivy  is the attending on record at time of admission and is aware of the patient's status and agrees with the above history and physical.      Signed,   Akbar Zapien MD  Family Medicine Resident PGY3  [facility]        This document has been electronically signed by Akbar Zapien MD on February 10, 2021 06:32 CST    Part of this note may be an electronic transcription/translation of spoken language to printed text using the Dragon Dictation System.        Electronically signed by Akbar Zapien MD at 02/10/21 0632

## 2021-02-13 ENCOUNTER — READMISSION MANAGEMENT (OUTPATIENT)
Dept: CALL CENTER | Facility: HOSPITAL | Age: 81
End: 2021-02-13

## 2021-02-13 NOTE — OUTREACH NOTE
Prep Survey      Responses   [facility] facility patient discharged from?  [facility]   Is LACE score < 7 ?  No   Emergency Room discharge w/ pulse ox?  No   Eligibility  Readm Mgmt   Discharge diagnosis  **COPD with acute exacerbation    Does the patient have one of the following disease processes/diagnoses(primary or secondary)?  COPD/Pneumonia   Does the patient have Home health ordered?  Yes   What is the Home health agency?   [facility]    Is there a DME ordered?  Yes   What DME was ordered?  Oxygen and Nebulizer  per [facility]    Prep survey completed?  Yes          Manisha Hernandez RN

## 2021-02-15 ENCOUNTER — READMISSION MANAGEMENT (OUTPATIENT)
Dept: CALL CENTER | Facility: HOSPITAL | Age: 81
End: 2021-02-15

## 2021-02-15 NOTE — OUTREACH NOTE
COPD/PN Week 1 Survey      Responses   [facility] patient discharged from?  [place]   Does the patient have one of the following disease processes/diagnoses(primary or secondary)?  COPD/Pneumonia   Was the primary reason for admission:  COPD exacerbation   Week 1 attempt successful?  Yes   Call start time  1221   Call end time  1227   Discharge diagnosis  **COPD with acute exacerbation    Meds reviewed with patient/caregiver?  Yes   Is the patient having any side effects they believe may be caused by any medication additions or changes?  No   Does the patient have all medications ordered at discharge?  Yes   Is the patient taking all medications as directed (includes completed medication regime)?  Yes   Does the patient have a primary care provider?   Yes   Does the patient have an appointment with their PCP or specialist within 7 days of discharge?  Yes   Has the patient kept scheduled appointments due by today?  Yes   What DME was ordered?  Oxygen and Nebulizer  per [facility]    Has all DME been delivered?  Yes   Pulse Ox monitoring  None   Psychosocial issues?  No   Did the patient receive a copy of their discharge instructions?  Yes   Nursing interventions  Reviewed instructions with patient   What is the patient's perception of their health status since discharge?  Improving   Nursing Interventions  Nurse provided patient education   Are the patient's immunizations up to date?   Yes   Nursing interventions  Educated on importance of maintaining up to date immunizations as advised by provider   Is the patient/caregiver able to teach back the hierarchy of who to call/visit for symptoms/problems? PCP, Specialist, Home health nurse, Urgent Care, ED, 911  Yes   Is the patient able to teach back COPD zones?  Yes   Nursing interventions  Education provided on various zones   Patient reports what zone on this call?  Green Zone   Green Zone  Reports doing well, Sleeping well, Breathing without shortness of  breath, Usual activity and exercise level, Appetite is good, Usual amount of phlegm/mucus without difficulty coughing up   Green Zone interventions:  Take daily medications, Avoid indoor/outdoor triggers   Week 1 call completed?  Yes          Krishna Medrano RN

## 2021-02-23 ENCOUNTER — IMMUNIZATION (OUTPATIENT)
Dept: VACCINE CLINIC | Facility: HOSPITAL | Age: 81
End: 2021-02-23

## 2021-02-23 PROCEDURE — 0001A: CPT | Performed by: NURSE PRACTITIONER

## 2021-02-23 PROCEDURE — 91300 HC SARSCOV02 VAC 30MCG/0.3ML IM: CPT | Performed by: NURSE PRACTITIONER

## 2021-02-24 ENCOUNTER — READMISSION MANAGEMENT (OUTPATIENT)
Dept: CALL CENTER | Facility: HOSPITAL | Age: 81
End: 2021-02-24

## 2021-02-25 ENCOUNTER — TELEPHONE (OUTPATIENT)
Dept: PULMONOLOGY | Facility: HOSPITAL | Age: 81
End: 2021-02-25

## 2021-02-25 ENCOUNTER — TRANSCRIBE ORDERS (OUTPATIENT)
Dept: RESPIRATORY THERAPY | Facility: HOSPITAL | Age: 81
End: 2021-02-25

## 2021-02-25 DIAGNOSIS — J44.9 CHRONIC OBSTRUCTIVE PULMONARY DISEASE, UNSPECIFIED COPD TYPE (HCC): Primary | ICD-10-CM

## 2021-02-25 NOTE — TELEPHONE ENCOUNTER
I called Mr. Waller to discuss Pulmonary Rehab and need to schedule a PFT before scheduling HI.  I left a message with a call back number.

## 2021-03-01 ENCOUNTER — HOSPITAL ENCOUNTER (OUTPATIENT)
Dept: PULMONOLOGY | Facility: HOSPITAL | Age: 81
Discharge: HOME | End: 2021-03-01
Admitting: INTERNAL MEDICINE

## 2021-03-01 DIAGNOSIS — J44.9 CHRONIC OBSTRUCTIVE PULMONARY DISEASE, UNSPECIFIED COPD TYPE (HCC): ICD-10-CM

## 2021-03-01 PROCEDURE — 94010 BREATHING CAPACITY TEST: CPT | Performed by: INTERNAL MEDICINE

## 2021-03-01 PROCEDURE — 94010 BREATHING CAPACITY TEST: CPT

## 2021-03-01 PROCEDURE — 94729 DIFFUSING CAPACITY: CPT | Performed by: INTERNAL MEDICINE

## 2021-03-01 PROCEDURE — 94729 DIFFUSING CAPACITY: CPT

## 2021-03-02 ENCOUNTER — READMISSION MANAGEMENT (OUTPATIENT)
Dept: CALL CENTER | Facility: HOSPITAL | Age: 81
End: 2021-03-02

## 2021-03-03 NOTE — OUTREACH NOTE
COPD/PN Week 3 Survey      Responses   [facility] patient discharged from?  [place]   Does the patient have one of the following disease processes/diagnoses(primary or secondary)?  COPD/Pneumonia   Was the primary reason for admission:  COPD exacerbation   Week 3 attempt successful?  Yes   Call start time  1819   Call end time  1823   Discharge diagnosis  **COPD with acute exacerbation    Meds reviewed with patient/caregiver?  Yes   Is the patient taking all medications as directed (includes completed medication regime)?  Yes   Has the patient kept scheduled appointments due by today?  Yes   What is the Home health agency?   [facility]    Has home health visited the patient within 72 hours of discharge?  N/A   Home health comments  HH just made one visit   DME comments  Sent O2 back states did not need it. States did not need nebulizer.     Pulse Ox monitoring  Intermittent   Pulse Ox device source  Patient   O2 Sat comments  95-97 % on room air per pt testing   O2 Sat education comments  Pt talked with provider who okay sending DME back   What is the patient's perception of their health status since discharge?  Improving   Is the patient able to teach back COPD zones?  Yes   Patient reports what zone on this call?  Green Zone   Week 3 call completed?  Yes   Revoked  No further contact(revokes)-requires comment   Is the patient interested in additional calls from an ambulatory ?  NOTE:  applies to high risk patients requiring additional follow-up.  No   Graduated/Revoked comments  Pt states is fine   Wrap up additional comments  Pt sent O2 and nebulizer back to DME provider, he notified PCP that his O2 is > 95 % on room air and states was told it was okay if not needed.  Pulmonary function test was done 3/1 has not had that appt to get results yet.            Bertha Strickland, RN

## 2021-03-16 ENCOUNTER — APPOINTMENT (OUTPATIENT)
Dept: VACCINE CLINIC | Facility: HOSPITAL | Age: 81
End: 2021-03-16

## 2021-03-18 ENCOUNTER — TRANSCRIBE ORDERS (OUTPATIENT)
Dept: PULMONOLOGY | Facility: HOSPITAL | Age: 81
End: 2021-03-18

## 2021-03-18 DIAGNOSIS — J44.9 CHRONIC OBSTRUCTIVE PULMONARY DISEASE, UNSPECIFIED COPD TYPE (HCC): Primary | ICD-10-CM

## 2021-03-25 ENCOUNTER — HOSPITAL ENCOUNTER (OUTPATIENT)
Dept: PULMONOLOGY | Facility: HOSPITAL | Age: 81
Setting detail: THERAPIES SERIES
Discharge: HOME | End: 2021-03-25

## 2021-03-25 VITALS
HEART RATE: 89 BPM | DIASTOLIC BLOOD PRESSURE: 67 MMHG | WEIGHT: 141.8 LBS | BODY MASS INDEX: 25.12 KG/M2 | HEIGHT: 63 IN | SYSTOLIC BLOOD PRESSURE: 154 MMHG | OXYGEN SATURATION: 96 %

## 2021-03-25 DIAGNOSIS — J44.9 CHRONIC OBSTRUCTIVE PULMONARY DISEASE, UNSPECIFIED COPD TYPE (HCC): Primary | ICD-10-CM

## 2021-03-25 PROCEDURE — G0424 PULMONARY REHAB W EXER: HCPCS

## 2021-03-25 NOTE — PROGRESS NOTES
"Pulmonary Rehab Initial Assessment      Name: Vishnu Waller  :1940 Allergies:Molds & smuts and Hydrocodone-acetaminophen   MRN: [id number] 81 y.o. Physician: Johnathon Shaikh MD   Primary Diagnosis:    Diagnosis Plan   1. Chronic obstructive pulmonary disease, unspecified COPD type (CMS/[facility])      Event Date: 3/1/2021 Specialist: Pulmonary rehab   Secondary Diagnosis: Osteoarthritis  Note Author: Poornima Mendoza, RRT     Cardiovascular History: CAD; Hx CABG HTN Thoracic AAA , PVD     EXERCISE AT HOME  No          Ambulatory Status:Amb  Ambulatory Fall Risk Assessed on Initial Visit: Yes Score  70 6 Minute Walk Pre- Pulmonary Rehab:  Distance:216ft      RPE:13        RPD: 4              MPH: 0.4mph  Max. HR: 92        SPO2:92% room air      Resting BP: 154/67     Peak BP: 138/80  Recovery BP: 150/78  Comments: Pt had to stop after 1 minute to muscle spasm in back and legs.     NUTRITION  Lipids:yes If yes, labs as follows;  Total: 98  HDL:   HDL Cholesterol   Date Value Ref Range Status   2021 29 (L) 40 - 60 mg/dL Final   2020 34 32 - 72 mg/dL Final    Lipids continued:  LDL:  LDL Cholesterol    Date Value Ref Range Status   2021 43 0 - 100 mg/dL Final   2020 85 5 - 99 mg/dL Final     Triglyceride: 147   Weight Management:                 Weight: 141.8lb  Height: 63\"                                   BMI: There is no height or weight on file to calculate BMI.    Alcohol Use: None Diabetes:Yes    Last HGBA1C with date if applicable:  21  7.80  FSBS done occasionally  This am 127         SOCIAL HISTORY  Social History     Socioeconomic History   • Marital status:      Spouse name: Not on file   • Number of children: Not on file   • Years of education: Not on file   • Highest education level: Not on file   Tobacco Use   • Smoking status: Former Smoker     Packs/day: 0.50     Years: 40.00     Pack years: 20.00     Types: Cigarettes     Start date:      Quit date: " 2020     Years since quittin.8   • Smokeless tobacco: Never Used   Vaping Use   • Vaping Use: Never used   Substance and Sexual Activity   • Alcohol use: Not Currently     Comment: socially   • Drug use: No   • Sexual activity: Defer    Learning Barriers:None  Family Support:Yes  Living Arrangement: At home with SO Tobacco Adjunct: No  Do you live with a smoker:No     PSYCHOSOCIAL  Clinical Depression:Moderate    Stress: None     Assess presence or absence of depression using a valid screening tool: Yes; PHQ9   Score= 12      Are you being hurt, hit, or freightened by anyone at home or in your life? No    Are you being neglected by a caregiver? No       COMORBIDITIES  Sleep Apnea:No       Cancer: Yes; Prostate    Stroke: No   Pneumonia: No    Osteoporosis: No    GI Problems: GERD; Taking Prilosec Frequent colds/allergies: Yes    Other illnesses, surgeries, or comments NA   PAIN:  Are you having pain? Yes  If yes where is pain? Back If yes, pain scale: 8      PULMONARY:  Do you use a nebulizer?:No at this time      Do you use oxygen at home?: No   Do you have a daily cough?: No      Do you every notice yourself wheezing?:No  Other pulmonary/breathing problems?: No    BS; Clear and equal Bilaterally   OTHER:  Do you have physical limitations?:Back pain and SOA      Do you need assistance with ADLs?: No Do you climb stairs at home?:No      Have you ever attended a pulmonary rehab?: No [facility] Dyspnea Scale: 0 - 4:  3 = stops for breath after walking about 100 yards or after a few minutes on the level     Patient Goals: Increase Strength and stamina     DISCHARGE PLANNING:  Do you have any home exercise equipment?:No    What are you plans for continuing exercise after completion of pulmonary rehab? Phase 3     EDUCATION:  A/P of the Pulmonary System and diseases  Breathing techniques  Equipment safety       PRE-PROGRAM ASSESSMENT:  PFT Date:3/1/21    FEV1/FVC:52% FEV1: 51%    FVC:75% DLCO: 74%     Time of arrival:  1250    Time of departure:1350           3/25/2021  13:01 CDT  Poornima Mendoza, RRT

## 2021-03-30 ENCOUNTER — APPOINTMENT (OUTPATIENT)
Dept: PULMONOLOGY | Facility: HOSPITAL | Age: 81
End: 2021-03-30

## 2021-04-01 ENCOUNTER — APPOINTMENT (OUTPATIENT)
Dept: PULMONOLOGY | Facility: HOSPITAL | Age: 81
End: 2021-04-01

## 2021-04-06 ENCOUNTER — APPOINTMENT (OUTPATIENT)
Dept: PULMONOLOGY | Facility: HOSPITAL | Age: 81
End: 2021-04-06

## 2021-04-08 ENCOUNTER — HOSPITAL ENCOUNTER (OUTPATIENT)
Dept: PULMONOLOGY | Facility: HOSPITAL | Age: 81
Setting detail: THERAPIES SERIES
Discharge: HOME | End: 2021-04-08

## 2021-04-08 VITALS — OXYGEN SATURATION: 95 % | DIASTOLIC BLOOD PRESSURE: 63 MMHG | SYSTOLIC BLOOD PRESSURE: 133 MMHG | HEART RATE: 105 BPM

## 2021-04-08 DIAGNOSIS — J44.9 CHRONIC OBSTRUCTIVE PULMONARY DISEASE, UNSPECIFIED COPD TYPE (HCC): Primary | ICD-10-CM

## 2021-04-08 PROCEDURE — G0424 PULMONARY REHAB W EXER: HCPCS

## 2021-04-13 ENCOUNTER — APPOINTMENT (OUTPATIENT)
Dept: PULMONOLOGY | Facility: HOSPITAL | Age: 81
End: 2021-04-13

## 2021-04-15 ENCOUNTER — APPOINTMENT (OUTPATIENT)
Dept: PULMONOLOGY | Facility: HOSPITAL | Age: 81
End: 2021-04-15

## 2021-04-22 ENCOUNTER — APPOINTMENT (OUTPATIENT)
Dept: PULMONOLOGY | Facility: HOSPITAL | Age: 81
End: 2021-04-22

## 2021-04-26 DIAGNOSIS — I71.20 THORACIC AORTIC ANEURYSM WITHOUT RUPTURE (HCC): Primary | ICD-10-CM

## 2021-04-28 DIAGNOSIS — M25.551 BILATERAL HIP PAIN: Primary | ICD-10-CM

## 2021-04-28 DIAGNOSIS — M25.552 BILATERAL HIP PAIN: Primary | ICD-10-CM

## 2021-04-29 ENCOUNTER — OFFICE VISIT (OUTPATIENT)
Dept: CARDIOLOGY | Facility: CLINIC | Age: 81
End: 2021-04-29

## 2021-04-29 ENCOUNTER — OFFICE VISIT (OUTPATIENT)
Dept: ORTHOPEDIC SURGERY | Facility: CLINIC | Age: 81
End: 2021-04-29

## 2021-04-29 VITALS
HEIGHT: 63 IN | OXYGEN SATURATION: 98 % | HEART RATE: 102 BPM | WEIGHT: 146 LBS | BODY MASS INDEX: 25.87 KG/M2 | SYSTOLIC BLOOD PRESSURE: 150 MMHG | DIASTOLIC BLOOD PRESSURE: 78 MMHG

## 2021-04-29 VITALS — WEIGHT: 144 LBS | HEIGHT: 63 IN | BODY MASS INDEX: 25.52 KG/M2

## 2021-04-29 DIAGNOSIS — I71.20 THORACIC AORTIC ANEURYSM WITHOUT RUPTURE (HCC): Primary | ICD-10-CM

## 2021-04-29 DIAGNOSIS — M25.552 BILATERAL HIP PAIN: Primary | ICD-10-CM

## 2021-04-29 DIAGNOSIS — M25.551 BILATERAL HIP PAIN: Primary | ICD-10-CM

## 2021-04-29 DIAGNOSIS — I10 ESSENTIAL HYPERTENSION: ICD-10-CM

## 2021-04-29 DIAGNOSIS — I25.10 CORONARY ARTERY DISEASE INVOLVING NATIVE CORONARY ARTERY OF NATIVE HEART WITHOUT ANGINA PECTORIS: ICD-10-CM

## 2021-04-29 DIAGNOSIS — M79.604 BILATERAL LEG PAIN: ICD-10-CM

## 2021-04-29 DIAGNOSIS — I73.9 VASCULAR CLAUDICATION (HCC): ICD-10-CM

## 2021-04-29 DIAGNOSIS — M79.605 BILATERAL LEG PAIN: ICD-10-CM

## 2021-04-29 DIAGNOSIS — Z95.1 PRESENCE OF AORTOCORONARY BYPASS GRAFT: ICD-10-CM

## 2021-04-29 DIAGNOSIS — E11.9 TYPE 2 DIABETES MELLITUS WITHOUT COMPLICATION, WITHOUT LONG-TERM CURRENT USE OF INSULIN (HCC): ICD-10-CM

## 2021-04-29 PROCEDURE — 99214 OFFICE O/P EST MOD 30 MIN: CPT | Performed by: INTERNAL MEDICINE

## 2021-04-29 PROCEDURE — 99213 OFFICE O/P EST LOW 20 MIN: CPT | Performed by: ORTHOPAEDIC SURGERY

## 2021-04-29 RX ORDER — FUROSEMIDE 20 MG/1
20 TABLET ORAL EVERY MORNING
COMMUNITY
Start: 2021-03-19 | End: 2022-01-01

## 2021-04-29 RX ORDER — PREDNISONE 20 MG/1
TABLET ORAL
COMMUNITY
End: 2022-01-01

## 2021-04-29 RX ORDER — POTASSIUM CHLORIDE 1500 MG/1
TABLET, EXTENDED RELEASE ORAL
COMMUNITY
Start: 2021-03-19 | End: 2021-11-04

## 2021-04-29 RX ORDER — MAGNESIUM OXIDE 400 MG/1
1 TABLET ORAL 2 TIMES DAILY
COMMUNITY
Start: 2021-04-15

## 2021-04-29 NOTE — PROGRESS NOTES
Vishnu Waller  81 y.o. male      1. Thoracic aortic aneurysm without rupture (CMS/HCC)    2. Presence of aortocoronary bypass graft    3. Coronary artery disease involving native coronary artery of native heart without angina pectoris    4. Essential hypertension        History of Present Illness:  Mr. Waller is a 81-year-old  male with the history of diabetes mellitus, hypertension, hyperlipidemia, COPD, tobacco abuse, CAD status post CABG in 2003 when he received LIMA to LAD, SVG to diagonal, SVG to OM 2 and SVG to distal right coronary artery.  In February 2018 he presented with prolonged chest pain and ruled in for a non-ST segment elevation myocardial infarctions and cardiac catheterization showed the following findings:  Impression:  99% eccentric lesion noted in the proximal segment of the SVG to distal right coronary artery graft.  Successful PCI with placement of a 4.0 x 18 mm Xience Alpine stent.  Patent LIMA to Left Anterior Descending Coronary Artery.  Native LAD beyond the anastomosis was widely patent  Severe native vessel coronary artery disease with 100% occlusion of the mid LAD, mid right coronary artery.  A small to medium-sized tortuous circumflex had a proximal lesion up to 99%.  Occluded SVG to diagonal and occluded SVG to obtuse marginal 2  Ascending aortogram showing ectatic ascending aorta.    Patient was admitted to [facility] in February 2021 with increasing dyspnea and was managed for exacerbation of COPD and hypoxic respiratory failure.  He was treated with bronchodilators, antibiotics and his symptoms did improve.  He was noted to be anemic with microcytic anemia.  His predominant complaint today is back pain for which she is seeing a specialist in [place].  He is on Lyrica for this.    Clinical exam today showed mildly elevated blood pressure at 150/78.  Heart rate was 88 bpm when I checked.  No bronchospasm or signs of congestive heart failure was  noted.    Echocardiogram in October 2020 showed:  · The study is technically difficult for diagnosis.  · Left ventricular wall thickness is consistent with concentric hypertrophy.  · Estimated left ventricular EF = 53% Left ventricular ejection fraction appears to be 51 - 55%. Left ventricular systolic function is normal.  · Left ventricular diastolic function is consistent with (grade I) impaired relaxation.  · The right ventricular cavity is mildly dilated.  · Mild aortic valve regurgitation is present  · Estimated right ventricular systolic pressure from tricuspid regurgitation is mildly elevated (35-45 mmHg).  · Moderate dilation of the proximal aorta is present (4.6-4.7 cms)    He is followed by Dr. Ocasio on a regular basis for ascending aortic aneurysm.    SUBJECTIVE    Allergies   Allergen Reactions   • Molds & Smuts Other (See Comments)     Sinus infection   • Hydrocodone-Acetaminophen Itching         Past Medical History:   Diagnosis Date   • AAA (abdominal aortic aneurysm) (CMS/[facility])    • Acute bacterial sinusitis    • Acute bronchitis    • Acute frontal sinusitis    • Acute maxillary sinusitis    • Acute pharyngitis    • Allergic rhinitis    • Allergic rhinitis due to pollen    • Anxiety    • Artificial lens present     in position   • Asthma    • Backache    • Borderline glaucoma    • C. difficile diarrhea 2014   • Chronic laryngitis    • Chronic rhinitis    • COPD (chronic obstructive pulmonary disease) (CMS/[facility])    • Coronary artery disease    • Cough    • Degeneration of lumbar intervertebral disc    • Degenerative joint disease involving multiple joints    • Diabetes mellitus (CMS/[facility])    • Diverticular disease of colon    • Dizziness and giddiness    • Elevated cholesterol    • Erectile dysfunction    • Essential hypertension    • Generalized anxiety disorder    • GERD (gastroesophageal reflux disease)    • Glaucoma    • Hemorrhoids     without bleeding   • Insomnia    • Kidney stone    • Kidney  stones    • Malignant tumor of prostate (CMS/HCC)    • Need for prophylactic vaccination and inoculation against influenza    • Osteoarthritis    • Osteoarthritis of multiple joints    • Pain, lumbar region     Pain radiating to lumbar region of back     • PONV (postoperative nausea and vomiting)    • Postviral fatigue syndrome    • Presence of aortocoronary bypass graft    • Prostate cancer (CMS/HCC)    • Pseudomembranous enterocolitis     improved   • Rotator cuff syndrome     right   • Shoulder pain    • SOB (shortness of breath)    • Tenosynovitis    • Thrombocytopenia (CMS/HCC)    • Upper respiratory infection          Past Surgical History:   Procedure Laterality Date   • CARDIAC CATHETERIZATION  09/25/2003    Cardiac cath (Normal left ventricular systolic function, EF 60% Multi-vessel coronary artery disease with critical disease noted in the LAD coronary artery, diagonal coronary artery, obtuse marginal coronary and right coronary artery.)   • CARDIAC CATHETERIZATION  05/03/1996    Cardiac cath (Coronary atherosclerotic heart disease. 70% diagonal stenosis. Mild to moderate left anterior descending artery stenosis. Preserved left ventricular function.)   • CARDIAC CATHETERIZATION N/A 2/26/2018    Procedure: Left Heart Cath/ pci if indicated ;  Surgeon: Radha Wilkes MD;  Location: [facility] INVASIVE LOCATION;  Service:    • CATARACT EXTRACTION Bilateral    • CATARACT EXTRACTION  10/04/2011    Remove cataract, insert lens (Right)   • CATARACT EXTRACTION  08/23/2011    Remove cataract, insert lens (left)   • COLONOSCOPY     • COLONOSCOPY      Colon endoscopy 29766 (Rectal bleeding. Radiation proctitis w/bleeding. An abnormal CT of transverse colon due to mucosal ischemic colitis, that now is healed. Internal hemorrhoids w/possible bleeding.)   • COLONOSCOPY  05/10/2011    Colon endoscopy 15509 (Single sessile polyp found in transverse colon and sigmoid colon ,removed by cold biopsy  polypectomy.divertic. found in sigmoid colon,descending colon. Friability/capillary friability in rectum sigmoid due to prior radiation.Inter. hem. Grade 1)   • COLONOSCOPY  05/02/2007    Colon endoscopy 76014 (Colon polyp. Diverticulosis without bleeding. Internal hemorrhoids without bleeding.)   • CORONARY ARTERY BYPASS GRAFT  2002   • CYSTOSCOPY  01/13/1995    Cystoscopy, stone removal (Right ureteroscopic lasertripsy.)   • CYSTOSCOPY Right 1/23/2019    Procedure: CYSTOSCOPY, RIGHT STENT REMOVAL;  Surgeon: Nirmal Gaines MD;  Location: [facility];  Service: Urology   • CYSTOSCOPY, URETEROSCOPY, RETROGRADE PYELOGRAM, STENT INSERTION N/A 8/28/2017    Procedure: URETEROSCOPY LASER LITHOTRIPSY WITH STENT INSERTION AND RETROGRADE PYELOGRAM ;  Surgeon: Anna M. D'Amico, MD;  Location: [facility];  Service:    • CYSTOSCOPY, URETEROSCOPY, RETROGRADE PYELOGRAM, STENT INSERTION Right 1/11/2019    Procedure: CYSTOSCOPY URETEROSCOPY RETROGRADE PYELOGRAM HOLMIUM LASER STENT INSERTION;  Surgeon: Nirmal Gaines MD;  Location: [facility];  Service: Urology   • EPIDURAL  12/03/2014    Therapeutic/diag injection (Lumbar transforaminal epidural steroid injection, L5-S1, left side.)   • EPIDURAL  10/22/2014    Therapeutic/diag injection (Lumbar transforaminal epidural steroid injection L5-S1 left side.)   • EPIDURAL  08/07/2014    Therapeutic/diag injection (Lumbar transforaminal epidural steroid injection.)   • EXCISION LESION  05/13/1999    REMOVE EAR LESION 96831 (1.3 cm basal cell carcinoma, right preauricular area. Excision)   • EXCISION LESION  03/13/2001    REMOVE LESION NECK/CHEST 34492 (Excision of irritated seborrheic keratosis, anterior neck, 1.1 cm and deep lipoma, posterior neck, 3.5 cm)   • INJECTION OF MEDICATION  11/15/2012    Kenalog (4)      • JOINT REPLACEMENT  2015    partial   • KNEE ARTHROSCOPY  01/17/2007    Knee arthroscopy, surgery (Arthroscopy with medial and lateral meniscectomies, right knee.)   • KNEE  SURGERY  2015    Knee Surgery (Right unicompartmental arthroplasty.)   • LUMBAR LAMINECTOMY N/A 2017    Procedure: LUMBAR LAMINECTOMY LUMBAR THREE-FOUR, LUMBAR FOUR-FIVE, INTERLAMINAR DISTRACTION ;  Surgeon: Lucio Mayo MD;  Location: [facility];  Service:    • NOSE SURGERY     • OTHER SURGICAL HISTORY      EXTENDED VISUAL FIELDS STUDY 51239 (Borderline glaucoma) (3): 2015, 2014, 2013   • OTHER SURGICAL HISTORY  10/29/2003    Heart revascularize (TMR) (Directmyocardial revascularization times four; LIMA to LAD SVG to DARIUSZ SVG to OM1, SVG to RCA)   • OTHER SURGICAL HISTORY      OCT DISC NFL 31126 (Borderline glaucoma)  (3): 2015, 2014, 2013   • PROSTATECTOMY     • SEPTORHINOPLASTY  1989    Nasal surgery procedure (Septorhinoplasty with reconstruction of the nasal pyramid with a iliac crest graft, rhinoplasty was performed with external approach.)   • SHOULDER ARTHROSCOPY  2013    Arthroscopy of right shoulder with rotator repair, Carla procedure, Biceps tenotomy, subacromial decompression.   • SHOULDER SURGERY  2005    Shoulder surgery procedure (Decompression, subacromial, explore of the rotator cuff, no tear found nor repaired, acromioplasty of the left shoulder and AC shoulder joint resection.)         Family History   Problem Relation Age of Onset   • Hypertension Mother    • Heart disease Mother    • Arthritis Mother    • Cancer Other    • Heart disease Other    • Hypertension Other          Social History     Socioeconomic History   • Marital status:      Spouse name: Not on file   • Number of children: Not on file   • Years of education: Not on file   • Highest education level: Not on file   Tobacco Use   • Smoking status: Former Smoker     Packs/day: 0.50     Years: 40.00     Pack years: 20.00     Types: Cigarettes     Start date:      Quit date: 2020     Years since quittin.9   • Smokeless tobacco: Never Used    Vaping Use   • Vaping Use: Never used   Substance and Sexual Activity   • Alcohol use: Not Currently     Comment: socially   • Drug use: No   • Sexual activity: Defer         Current Outpatient Medications   Medication Sig Dispense Refill   • acetaminophen (TYLENOL) 500 MG tablet Take 1,000 mg by mouth 3 (Three) Times a Day.     • albuterol sulfate  (90 Base) MCG/ACT inhaler Inhale 2 puffs Every 4 (Four) Hours As Needed for Wheezing or Shortness of Air. 54 g 4   • atorvastatin (LIPITOR) 40 MG tablet Take 1 tablet by mouth Daily. 90 tablet 3   • carvedilol (COREG) 3.125 MG tablet Take 1 tablet by mouth Every 12 (Twelve) Hours. 180 tablet 3   • clopidogrel (PLAVIX) 75 MG tablet Take 75 mg by mouth Daily.     • Cyanocobalamin (VITAMIN B 12 PO) Take 500 mcg by mouth Daily. Three times weekly, MoWeFr     • cyclobenzaprine (FLEXERIL) 10 MG tablet Take 1 tablet by mouth 3 (Three) Times a Day As Needed for Muscle Spasms. 30 tablet 0   • dilTIAZem CD (CARDIZEM CD) 180 MG 24 hr capsule Take 1 capsule by mouth Daily. 90 capsule 3   • DULoxetine (CYMBALTA) 30 MG capsule Take 30 mg by mouth Daily.  0   • fluticasone (FLONASE) 50 MCG/ACT nasal spray 2 sprays into each nostril Every Night.     • Fluticasone Furoate-Vilanterol (Breo Ellipta) 100-25 MCG/INH inhaler Inhale 1 puff Daily for 30 days. 160 each 0   • ipratropium-albuterol (DUO-NEB) 0.5-2.5 mg/3 ml nebulizer Inhale the contents of 1 vial by nebulization Every 4 (Four) Hours As Needed for Wheezing for up to 30 days. 360 mL 3   • isosorbide mononitrate (IMDUR) 30 MG 24 hr tablet Take 1 tablet by mouth Daily. 30 tablet 0   • latanoprost (XALATAN) 0.005 % ophthalmic solution Administer 1 drop to both eyes every night.     • losartan (COZAAR) 50 MG tablet TAKE 1 TABLET EVERY NIGHT 90 tablet 3   • meloxicam (MOBIC) 15 MG tablet Take 1 tablet by mouth Daily. Take with meal daily 90 tablet 0   • metFORMIN (GLUCOPHAGE) 1000 MG tablet Take 1 tablet by mouth 2 (Two) Times a  "Day With Meals.     • Multiple Vitamins-Minerals (CENTRUM PO) Take 1 tablet by mouth daily. Centrum 0.4 mg-162 mg-18 mg tablet  (unconfirmed)     • nitroglycerin (NITROSTAT) 0.4 MG SL tablet Place 1 tablet under the tongue Every 5 (Five) Minutes As Needed for Chest Pain. Take no more than 3 doses in 15 minutes. 30 tablet 0   • omeprazole (priLOSEC) 20 MG capsule Take 20 mg by mouth Daily.     • oxybutynin XL (DITROPAN-XL) 5 MG 24 hr tablet Take 5 mg by mouth Daily.     • pregabalin (LYRICA) 75 MG capsule Take 1 capsule by mouth 2 (Two) Times a Day for 30 days. 60 capsule 0   • travoprost, BAK free, (TRAVATAN) 0.004 % solution ophthalmic solution 1 drop.       No current facility-administered medications for this visit.         OBJECTIVE    /78 (BP Location: Left arm, Patient Position: Sitting, Cuff Size: Adult)   Pulse 102   Ht 160 cm (63\")   Wt 66.2 kg (146 lb)   SpO2 98%   BMI 25.86 kg/m²         Review of Systems : The following systems were reviewed and no changes noted    Constitutional:  Denies recent weight loss, weight gain, fever or chills     HENT:  Denies any hearing loss, epistaxis, hoarseness, or difficulty speaking.     Eyes: Wears eyeglasses or contact lenses     Respiratory:  Dyspnea with exertion,no cough, wheezing, or hemoptysis.     Cardiovascular: Negative for palpations, chest pain, orthopnea, PND    Gastrointestinal:  Denies change in bowel habits, dyspepsia, ulcer disease, hematochezia, or melena.     Endocrine: Negative for cold intolerance, heat intolerance, polydipsia, polyphagia and polyuria.     Genitourinary: Negative.      Musculoskeletal: DJD, back pain    Neurological:  Denies any history of recurrent headaches, strokes, TIA, or seizure disorder.     Hematological: Denies any food allergies, seasonal allergies, bleeding disorders, or lymphadenopathy.     Psychiatric/Behavioral: Denies any history of depression, substance abuse, or change in cognitive function.     Physical " Exam : The following systems were reviewed and no changes noted     Constitutional: Cooperative, alert and oriented,  in no acute distress.      HENT:   Head: Normocephalic, normal hair patterns, no masses or tenderness.  Ears, Nose, and Throat: No gross abnormalities. No pallor or cyanosis.   Eyes: EOMS intact, PERRL, conjunctivae and lids unremarkable. Fundoscopic exam and visual fields not performed.   Neck: No palpable masses or adenopathy, no thyromegaly, no JVD, carotid pulses are full and equal bilaterally and without  Bruits.      Cardiovascular: Regular rhythm, S1 and S2 normal, no S3 or S4.  No murmurs, gallops, or rubs detected.      Pulmonary/Chest: Chest: normal symmetry,  normal respiratory excursion, no intercostal retraction, no use of accessory muscles.                                  Pulmonary: Normal breath sounds. No rales or ronchi.     Abdominal: Abdomen soft, bowel sounds normoactive, no masses, no hepatosplenomegaly, non-tender, no bruits.     Musculoskeletal: No deformities, clubbing, cyanosis, erythema, or edema observed.     Neurological: No gross motor or sensory deficits noted, affect appropriate, oriented to time, person, place.          Lab Results   Component Value Date    WBC 11.90 (H) 02/12/2021    HGB 8.6 (L) 02/12/2021    HCT 24.9 (L) 02/12/2021    .2 (H) 02/12/2021     02/12/2021     Lab Results   Component Value Date    GLUCOSE 129 (H) 02/12/2021    BUN 21 02/12/2021    CREATININE 0.80 02/12/2021    EGFRIFNONA 93 02/12/2021    BCR 26.3 (H) 02/12/2021    CO2 29.0 02/12/2021    CALCIUM 8.7 02/12/2021    ALBUMIN 3.10 (L) 02/12/2021    AST 17 02/12/2021    ALT 16 02/12/2021     Lab Results   Component Value Date    CHOL 112 04/13/2021    CHOL 98 02/09/2021    CHOL 128 07/17/2020     Lab Results   Component Value Date    TRIG 164 (H) 04/13/2021    TRIG 147 02/09/2021    TRIG 130 07/17/2020     Lab Results   Component Value Date    HDL 37 04/13/2021    HDL 29 (L)  02/09/2021    HDL 34 07/17/2020     No components found for: LDLCALC  Lab Results   Component Value Date    LDL 51 04/13/2021    LDL 43 02/09/2021    LDL 85 07/17/2020     No results found for: HDLLDLRATIO  No components found for: CHOLHDL  Lab Results   Component Value Date    HGBA1C 6.7 (H) 04/13/2021     Lab Results   Component Value Date    TSH 0.679 02/09/2021           ASSESSMENT AND PLAN  Mr. Waller has multiple medical issues as discussed in detail under history of present illness.  Fortunately stable from a cardiac standpoint with no evidence of angina or congestive heart failure.  I advised him to keep a close watch on his blood pressure.  He has mild pitting edema of the lower extremities which is most likely secondary to side effect of diltiazem CD.  Suspicion for congestive heart failure is low.   He has quit smoking.    I have continued antihypertensive therapy with carvedilol, Cardizem CD, losartan, antianginal therapy with isosorbide mononitrate, antiplatelet therapy with Plavix, lipid-lowering therapy with atorvastatin.   He had lab test done earlier this month and LDL was 51 and HDL 37.  Hemoglobin was 9.5.    He will continue follow-up with Dr. Ocasio.  If his blood pressure is consistently more than 150 systolic, I will consider increasing the dose of diltiazem CD to 240 mg daily     Diagnoses and all orders for this visit:    1. Thoracic aortic aneurysm without rupture (CMS/HCC) (Primary)    2. Presence of aortocoronary bypass graft    3. Coronary artery disease involving native coronary artery of native heart without angina pectoris    4. Essential hypertension        Patient's Body mass index is 25.86 kg/m². BMI is within normal parameters. No follow-up required..      Vishnu Waller has h/o tobacco abuse for several decades.  He has quit smoking.    Addendum: I understand that the patient is being considered for lumbar epidural steroid injection at L4/L5 by Dr. Aquino.  He could  stop Plavix for 7 days prior to the planned procedure and medication may be restarted when appropriate.    Radha Wilkes MD  4/29/2021  13:57 CDT

## 2021-05-04 ENCOUNTER — APPOINTMENT (OUTPATIENT)
Dept: PULMONOLOGY | Facility: HOSPITAL | Age: 81
End: 2021-05-04

## 2021-05-05 DIAGNOSIS — I73.9 PVD (PERIPHERAL VASCULAR DISEASE) (HCC): Primary | ICD-10-CM

## 2021-05-06 ENCOUNTER — APPOINTMENT (OUTPATIENT)
Dept: PULMONOLOGY | Facility: HOSPITAL | Age: 81
End: 2021-05-06

## 2021-05-11 ENCOUNTER — APPOINTMENT (OUTPATIENT)
Dept: PULMONOLOGY | Facility: HOSPITAL | Age: 81
End: 2021-05-11

## 2021-05-13 ENCOUNTER — APPOINTMENT (OUTPATIENT)
Dept: PULMONOLOGY | Facility: HOSPITAL | Age: 81
End: 2021-05-13

## 2021-05-18 ENCOUNTER — APPOINTMENT (OUTPATIENT)
Dept: PULMONOLOGY | Facility: HOSPITAL | Age: 81
End: 2021-05-18

## 2021-05-20 ENCOUNTER — APPOINTMENT (OUTPATIENT)
Dept: PULMONOLOGY | Facility: HOSPITAL | Age: 81
End: 2021-05-20

## 2021-05-25 ENCOUNTER — APPOINTMENT (OUTPATIENT)
Dept: PULMONOLOGY | Facility: HOSPITAL | Age: 81
End: 2021-05-25

## 2021-05-27 ENCOUNTER — APPOINTMENT (OUTPATIENT)
Dept: PULMONOLOGY | Facility: HOSPITAL | Age: 81
End: 2021-05-27

## 2021-06-01 ENCOUNTER — APPOINTMENT (OUTPATIENT)
Dept: PULMONOLOGY | Facility: HOSPITAL | Age: 81
End: 2021-06-01

## 2021-06-02 ENCOUNTER — HOSPITAL ENCOUNTER (OUTPATIENT)
Dept: CT IMAGING | Facility: HOSPITAL | Age: 81
Discharge: HOME | End: 2021-06-02
Admitting: THORACIC SURGERY (CARDIOTHORACIC VASCULAR SURGERY)

## 2021-06-02 DIAGNOSIS — I71.20 THORACIC AORTIC ANEURYSM WITHOUT RUPTURE (HCC): ICD-10-CM

## 2021-06-02 PROCEDURE — 71250 CT THORAX DX C-: CPT

## 2021-06-03 ENCOUNTER — APPOINTMENT (OUTPATIENT)
Dept: PULMONOLOGY | Facility: HOSPITAL | Age: 81
End: 2021-06-03

## 2021-06-08 ENCOUNTER — APPOINTMENT (OUTPATIENT)
Dept: PULMONOLOGY | Facility: HOSPITAL | Age: 81
End: 2021-06-08

## 2021-06-10 ENCOUNTER — APPOINTMENT (OUTPATIENT)
Dept: PULMONOLOGY | Facility: HOSPITAL | Age: 81
End: 2021-06-10

## 2021-06-14 ENCOUNTER — TRANSCRIBE ORDERS (OUTPATIENT)
Dept: LAB | Facility: HOSPITAL | Age: 81
End: 2021-06-14

## 2021-06-14 DIAGNOSIS — R80.9 PROTEINURIA, UNSPECIFIED TYPE: Primary | ICD-10-CM

## 2021-06-15 ENCOUNTER — APPOINTMENT (OUTPATIENT)
Dept: PULMONOLOGY | Facility: HOSPITAL | Age: 81
End: 2021-06-15

## 2021-06-17 ENCOUNTER — APPOINTMENT (OUTPATIENT)
Dept: PULMONOLOGY | Facility: HOSPITAL | Age: 81
End: 2021-06-17

## 2021-06-21 ENCOUNTER — HOSPITAL ENCOUNTER (OUTPATIENT)
Dept: CT IMAGING | Facility: HOSPITAL | Age: 81
Discharge: HOME | End: 2021-06-21
Admitting: UROLOGY

## 2021-06-21 DIAGNOSIS — N20.0 CALCULUS OF KIDNEY: ICD-10-CM

## 2021-06-21 PROCEDURE — 74176 CT ABD & PELVIS W/O CONTRAST: CPT

## 2021-06-22 ENCOUNTER — APPOINTMENT (OUTPATIENT)
Dept: PULMONOLOGY | Facility: HOSPITAL | Age: 81
End: 2021-06-22

## 2021-06-24 ENCOUNTER — APPOINTMENT (OUTPATIENT)
Dept: PULMONOLOGY | Facility: HOSPITAL | Age: 81
End: 2021-06-24

## 2021-06-29 ENCOUNTER — APPOINTMENT (OUTPATIENT)
Dept: PULMONOLOGY | Facility: HOSPITAL | Age: 81
End: 2021-06-29

## 2021-07-01 ENCOUNTER — APPOINTMENT (OUTPATIENT)
Dept: PULMONOLOGY | Facility: HOSPITAL | Age: 81
End: 2021-07-01

## 2021-07-02 ENCOUNTER — TRANSCRIBE ORDERS (OUTPATIENT)
Dept: LAB | Facility: HOSPITAL | Age: 81
End: 2021-07-02

## 2021-07-02 DIAGNOSIS — R80.9 PROTEINURIA, UNSPECIFIED TYPE: Primary | ICD-10-CM

## 2021-07-06 ENCOUNTER — APPOINTMENT (OUTPATIENT)
Dept: PULMONOLOGY | Facility: HOSPITAL | Age: 81
End: 2021-07-06

## 2021-07-08 ENCOUNTER — APPOINTMENT (OUTPATIENT)
Dept: PULMONOLOGY | Facility: HOSPITAL | Age: 81
End: 2021-07-08

## 2021-07-12 ENCOUNTER — OFFICE VISIT (OUTPATIENT)
Dept: CARDIAC SURGERY | Facility: CLINIC | Age: 81
End: 2021-07-12

## 2021-07-12 VITALS
HEIGHT: 63 IN | TEMPERATURE: 97.8 F | WEIGHT: 146.4 LBS | HEART RATE: 96 BPM | BODY MASS INDEX: 25.94 KG/M2 | DIASTOLIC BLOOD PRESSURE: 66 MMHG | SYSTOLIC BLOOD PRESSURE: 110 MMHG | OXYGEN SATURATION: 96 %

## 2021-07-12 DIAGNOSIS — E66.3 OVERWEIGHT WITH BODY MASS INDEX (BMI) OF 25 TO 25.9 IN ADULT: ICD-10-CM

## 2021-07-12 DIAGNOSIS — C61 PROSTATE CANCER (HCC): ICD-10-CM

## 2021-07-12 DIAGNOSIS — I10 ESSENTIAL HYPERTENSION: ICD-10-CM

## 2021-07-12 DIAGNOSIS — E78.2 MIXED HYPERLIPIDEMIA: ICD-10-CM

## 2021-07-12 DIAGNOSIS — J44.9 COPD WITH ASTHMA (HCC): ICD-10-CM

## 2021-07-12 DIAGNOSIS — Z95.1 PRESENCE OF AORTOCORONARY BYPASS GRAFT: ICD-10-CM

## 2021-07-12 DIAGNOSIS — I73.9 PVD (PERIPHERAL VASCULAR DISEASE) (HCC): ICD-10-CM

## 2021-07-12 DIAGNOSIS — E11.9 TYPE 2 DIABETES MELLITUS WITHOUT COMPLICATION, WITHOUT LONG-TERM CURRENT USE OF INSULIN (HCC): ICD-10-CM

## 2021-07-12 DIAGNOSIS — I25.10 CORONARY ARTERY DISEASE INVOLVING NATIVE CORONARY ARTERY OF NATIVE HEART WITHOUT ANGINA PECTORIS: ICD-10-CM

## 2021-07-12 DIAGNOSIS — I71.20 THORACIC AORTIC ANEURYSM WITHOUT RUPTURE (HCC): Primary | ICD-10-CM

## 2021-07-12 PROCEDURE — 99214 OFFICE O/P EST MOD 30 MIN: CPT | Performed by: THORACIC SURGERY (CARDIOTHORACIC VASCULAR SURGERY)

## 2021-07-13 ENCOUNTER — APPOINTMENT (OUTPATIENT)
Dept: PULMONOLOGY | Facility: HOSPITAL | Age: 81
End: 2021-07-13

## 2021-07-15 ENCOUNTER — APPOINTMENT (OUTPATIENT)
Dept: PULMONOLOGY | Facility: HOSPITAL | Age: 81
End: 2021-07-15

## 2021-07-19 ENCOUNTER — APPOINTMENT (OUTPATIENT)
Dept: ONCOLOGY | Facility: CLINIC | Age: 81
End: 2021-07-19

## 2021-07-19 ENCOUNTER — APPOINTMENT (OUTPATIENT)
Dept: ONCOLOGY | Facility: HOSPITAL | Age: 81
End: 2021-07-19

## 2021-07-19 RX ORDER — CARVEDILOL 3.12 MG/1
TABLET ORAL
Qty: 180 TABLET | Refills: 3 | Status: SHIPPED | OUTPATIENT
Start: 2021-07-19 | End: 2022-01-01 | Stop reason: SDUPTHER

## 2021-07-20 ENCOUNTER — APPOINTMENT (OUTPATIENT)
Dept: PULMONOLOGY | Facility: HOSPITAL | Age: 81
End: 2021-07-20

## 2021-07-22 ENCOUNTER — APPOINTMENT (OUTPATIENT)
Dept: PULMONOLOGY | Facility: HOSPITAL | Age: 81
End: 2021-07-22

## 2021-07-27 ENCOUNTER — APPOINTMENT (OUTPATIENT)
Dept: PULMONOLOGY | Facility: HOSPITAL | Age: 81
End: 2021-07-27

## 2021-07-28 PROBLEM — E66.3 OVERWEIGHT WITH BODY MASS INDEX (BMI) OF 25 TO 25.9 IN ADULT: Status: ACTIVE | Noted: 2021-07-28

## 2021-07-28 NOTE — PROGRESS NOTES
7/12/2021    Vishnu Waller  1940    Chief Complaint:  Thoracic aortic aneurysm    HPI:      PCP:  Johnathon Shaikh MD  Cardiology:  Dr Wilkes     81y.o. male with HTN(uncontrolled, increased risk stroke, rupture), Hyperlipidemia(stable, increased risk cardiovascular events) and Diabetes Mellitus(stable, increased risk cardiovascular events) , CAD(stable, PCI 2018, CABG 2003), thoracic aortic aneurysm(new, increased risk rupture).  former smoker. Asymptomatic thoracic aortic aneurysm. Moderate numbness both legs x years.  No new chest back pain..  No TIA stroke amaurosis.  No MI claudication. No other associated signs, symptoms or modifying factors.     2/2018 Echocardiogram:  EF 50%, LVH, LA 21mm, LV 47mm, RVSP 35mmHg.  Mild to moderate MR, moderate AI.  Ascending aorta 46mm.  5/2018 CT Chest:  Ascending aorta 46mm, arch 31mm, descending 27mm, abdominal 20mm  5/2019 CT Chest:  Ascending aorta 46mm, arch 31mm, descending 29mm, abdominal 18mm  6/2021 CT Chest:  Ascending aorta 46mm, arch 31mm, descending 27mm, abdominal 19mm     2002 CABG  2/2018 ECG:  NSR 72, QTc 407  2/2018 Echocardiogram:  EF 50%, LA 21mm, LV 47mm, RVSP 35mmHg  2/2018 Cardiac Catheterization:  %, LIMA-LAD patent.  CX 95%, occluded SVG M2, D1.  %, SVG-RCA 99%, PCI RCA SVG 0%    The following portions of the patient's history were reviewed and updated as appropriate: allergies, current medications, past family history, past medical history, past social history, past surgical history and problem list.  Recent images independently reviewed.  Available laboratory values reviewed.    PMH:  Past Medical History:   Diagnosis Date   • Acute bacterial sinusitis    • Acute bronchitis    • Acute frontal sinusitis    • Acute maxillary sinusitis    • Acute pharyngitis    • Allergic rhinitis    • Allergic rhinitis due to pollen    • Anxiety    • Artificial lens present     in position   • Asthma    • Backache    • Borderline glaucoma     • C. difficile diarrhea 2014   • Chronic laryngitis    • Chronic rhinitis    • Coronary artery disease    • Cough    • Degeneration of lumbar intervertebral disc    • Degenerative joint disease involving multiple joints    • Diabetes mellitus (CMS/HCC)    • Diverticular disease of colon    • Dizziness and giddiness    • Elevated cholesterol    • Erectile dysfunction    • Essential hypertension    • Generalized anxiety disorder    • GERD (gastroesophageal reflux disease)    • Glaucoma    • Hemorrhoids     without bleeding   • Insomnia    • Kidney stone    • Kidney stones    • Malignant tumor of prostate (CMS/HCC)    • Need for prophylactic vaccination and inoculation against influenza    • Osteoarthritis    • Osteoarthritis of multiple joints    • Pain, lumbar region     Pain radiating to lumbar region of back     • PONV (postoperative nausea and vomiting)    • Postviral fatigue syndrome    • Presence of aortocoronary bypass graft    • Prostate cancer (CMS/HCC)    • Pseudomembranous enterocolitis     improved   • Rotator cuff syndrome     right   • Shoulder pain    • SOB (shortness of breath)    • Tenosynovitis    • Thrombocytopenia (CMS/HCC)    • Upper respiratory infection      Past Surgical History:   Procedure Laterality Date   • CARDIAC CATHETERIZATION  09/25/2003    Cardiac cath (Normal left ventricular systolic function, EF 60% Multi-vessel coronary artery disease with critical disease noted in the LAD coronary artery, diagonal coronary artery, obtuse marginal coronary and right coronary artery.)   • CARDIAC CATHETERIZATION  05/03/1996    Cardiac cath (Coronary atherosclerotic heart disease. 70% diagonal stenosis. Mild to moderate left anterior descending artery stenosis. Preserved left ventricular function.)   • CARDIAC CATHETERIZATION N/A 2/26/2018    Procedure: Left Heart Cath/ pci if indicated ;  Surgeon: Radha Wilkes MD;  Location: [facility] INVASIVE LOCATION;  Service:    • CATARACT  EXTRACTION Bilateral    • CATARACT EXTRACTION  10/04/2011    Remove cataract, insert lens (Right)   • CATARACT EXTRACTION  08/23/2011    Remove cataract, insert lens (left)   • COLONOSCOPY     • COLONOSCOPY      Colon endoscopy 65360 (Rectal bleeding. Radiation proctitis w/bleeding. An abnormal CT of transverse colon due to mucosal ischemic colitis, that now is healed. Internal hemorrhoids w/possible bleeding.)   • COLONOSCOPY  05/10/2011    Colon endoscopy 63262 (Single sessile polyp found in transverse colon and sigmoid colon ,removed by cold biopsy polypectomy.divertic. found in sigmoid colon,descending colon. Friability/capillary friability in rectum sigmoid due to prior radiation.Inter. hem. Grade 1)   • COLONOSCOPY  05/02/2007    Colon endoscopy 58575 (Colon polyp. Diverticulosis without bleeding. Internal hemorrhoids without bleeding.)   • CORONARY ARTERY BYPASS GRAFT  2002   • CYSTOSCOPY  01/13/1995    Cystoscopy, stone removal (Right ureteroscopic lasertripsy.)   • CYSTOSCOPY Right 1/23/2019    Procedure: CYSTOSCOPY, RIGHT STENT REMOVAL;  Surgeon: Nirmal Gaines MD;  Location: [facility];  Service: Urology   • CYSTOSCOPY, URETEROSCOPY, RETROGRADE PYELOGRAM, STENT INSERTION N/A 8/28/2017    Procedure: URETEROSCOPY LASER LITHOTRIPSY WITH STENT INSERTION AND RETROGRADE PYELOGRAM ;  Surgeon: Anna M. D'Amico, MD;  Location: [facility];  Service:    • CYSTOSCOPY, URETEROSCOPY, RETROGRADE PYELOGRAM, STENT INSERTION Right 1/11/2019    Procedure: CYSTOSCOPY URETEROSCOPY RETROGRADE PYELOGRAM HOLMIUM LASER STENT INSERTION;  Surgeon: Nirmal Gaines MD;  Location: [facility];  Service: Urology   • EPIDURAL  12/03/2014    Therapeutic/diag injection (Lumbar transforaminal epidural steroid injection, L5-S1, left side.)   • EPIDURAL  10/22/2014    Therapeutic/diag injection (Lumbar transforaminal epidural steroid injection L5-S1 left side.)   • EPIDURAL  08/07/2014    Therapeutic/diag injection (Lumbar transforaminal  epidural steroid injection.)   • EXCISION LESION  05/13/1999    REMOVE EAR LESION 65816 (1.3 cm basal cell carcinoma, right preauricular area. Excision)   • EXCISION LESION  03/13/2001    REMOVE LESION NECK/CHEST 04809 (Excision of irritated seborrheic keratosis, anterior neck, 1.1 cm and deep lipoma, posterior neck, 3.5 cm)   • INJECTION OF MEDICATION  11/15/2012    Kenalog (4)      • JOINT REPLACEMENT  2015    partial   • KNEE ARTHROSCOPY  01/17/2007    Knee arthroscopy, surgery (Arthroscopy with medial and lateral meniscectomies, right knee.)   • KNEE SURGERY  11/04/2015    Knee Surgery (Right unicompartmental arthroplasty.)   • LUMBAR LAMINECTOMY N/A 2/7/2017    Procedure: LUMBAR LAMINECTOMY LUMBAR THREE-FOUR, LUMBAR FOUR-FIVE, INTERLAMINAR DISTRACTION ;  Surgeon: Lucio Mayo MD;  Location: [facility];  Service:    • NOSE SURGERY     • OTHER SURGICAL HISTORY      EXTENDED VISUAL FIELDS STUDY 90467 (Borderline glaucoma) (3): 03/19/2015, 04/09/2014, 03/27/2013   • OTHER SURGICAL HISTORY  10/29/2003    Heart revascularize (TMR) (Directmyocardial revascularization times four; LIMA to LAD SVG to DARIUSZ SVG to OM1, SVG to RCA)   • OTHER SURGICAL HISTORY      OCT DISC NFL 35599 (Borderline glaucoma)  (3): 09/24/2015, 08/13/2014, 07/29/2013   • PROSTATECTOMY     • SEPTORHINOPLASTY  11/16/1989    Nasal surgery procedure (Septorhinoplasty with reconstruction of the nasal pyramid with a iliac crest graft, rhinoplasty was performed with external approach.)   • SHOULDER ARTHROSCOPY  07/24/2013    Arthroscopy of right shoulder with rotator repair, Carla procedure, Biceps tenotomy, subacromial decompression.   • SHOULDER SURGERY  11/01/2005    Shoulder surgery procedure (Decompression, subacromial, explore of the rotator cuff, no tear found nor repaired, acromioplasty of the left shoulder and AC shoulder joint resection.)     Family History   Problem Relation Age of Onset   • Hypertension Mother    • Heart disease  Mother    • Arthritis Mother    • Cancer Other    • Heart disease Other    • Hypertension Other      Social History     Tobacco Use   • Smoking status: Former Smoker     Packs/day: 0.50     Years: 40.00     Pack years: 20.00     Types: Cigarettes     Start date:      Quit date: 2020     Years since quittin.2   • Smokeless tobacco: Never Used   Vaping Use   • Vaping Use: Never used   Substance Use Topics   • Alcohol use: Not Currently     Comment: socially   • Drug use: No       ALLERGIES:  Allergies   Allergen Reactions   • Molds & Smuts Other (See Comments)     Sinus infection   • Hydrocodone-Acetaminophen Itching         MEDICATIONS:    Current Outpatient Medications:   •  acetaminophen (TYLENOL) 500 MG tablet, Take 1,000 mg by mouth 3 (Three) Times a Day., Disp: , Rfl:   •  albuterol sulfate  (90 Base) MCG/ACT inhaler, Inhale 2 puffs Every 4 (Four) Hours As Needed for Wheezing or Shortness of Air., Disp: 54 g, Rfl: 4  •  atorvastatin (LIPITOR) 40 MG tablet, Take 1 tablet by mouth Daily., Disp: 90 tablet, Rfl: 3  •  clopidogrel (PLAVIX) 75 MG tablet, Take 75 mg by mouth Daily., Disp: , Rfl:   •  Cyanocobalamin (VITAMIN B 12 PO), Take 500 mcg by mouth Daily. Three times weekly, MoWeFr, Disp: , Rfl:   •  cyanocobalamin (VITAMIN B-12) 500 MCG tablet, Take 500 mcg by mouth., Disp: , Rfl:   •  cyclobenzaprine (FLEXERIL) 10 MG tablet, Take 1 tablet by mouth 3 (Three) Times a Day As Needed for Muscle Spasms., Disp: 30 tablet, Rfl: 0  •  dilTIAZem CD (CARDIZEM CD) 180 MG 24 hr capsule, Take 1 capsule by mouth Daily., Disp: 90 capsule, Rfl: 3  •  DULoxetine (CYMBALTA) 30 MG capsule, Take 30 mg by mouth Daily., Disp: , Rfl: 0  •  fluticasone (FLONASE) 50 MCG/ACT nasal spray, 2 sprays into each nostril Every Night., Disp: , Rfl:   •  furosemide (LASIX) 20 MG tablet, Take 20 mg by mouth Every Morning., Disp: , Rfl:   •  isosorbide mononitrate (IMDUR) 30 MG 24 hr tablet, Take 1 tablet by mouth Daily., Disp:  30 tablet, Rfl: 0  •  KLOR-CON 20 MEQ CR tablet, TAKE 1 TABLET BY MOUTH DAILY ONLY WHEN TAKING LASIX, Disp: , Rfl:   •  latanoprost (XALATAN) 0.005 % ophthalmic solution, Administer 1 drop to both eyes every night., Disp: , Rfl:   •  losartan (COZAAR) 50 MG tablet, TAKE 1 TABLET EVERY NIGHT, Disp: 90 tablet, Rfl: 3  •  magnesium oxide (MAG-OX) 400 MG tablet, Take 1 tablet by mouth 2 (Two) Times a Day., Disp: , Rfl:   •  metFORMIN (GLUCOPHAGE) 1000 MG tablet, Take 1 tablet by mouth 2 (Two) Times a Day With Meals., Disp: , Rfl:   •  Multiple Vitamins-Minerals (CENTRUM PO), Take 1 tablet by mouth daily. Centrum 0.4 mg-162 mg-18 mg tablet  (unconfirmed), Disp: , Rfl:   •  nitroglycerin (NITROSTAT) 0.4 MG SL tablet, Place 1 tablet under the tongue Every 5 (Five) Minutes As Needed for Chest Pain. Take no more than 3 doses in 15 minutes., Disp: 30 tablet, Rfl: 0  •  omeprazole (priLOSEC) 20 MG capsule, Take 20 mg by mouth Daily., Disp: , Rfl:   •  oxybutynin XL (DITROPAN-XL) 5 MG 24 hr tablet, Take 5 mg by mouth Daily., Disp: , Rfl:   •  predniSONE (DELTASONE) 20 MG tablet, prednisone 20 mg tablet, Disp: , Rfl:   •  pregabalin (LYRICA) 75 MG capsule, Take 1 capsule by mouth 2 (Two) Times a Day for 30 days., Disp: 60 capsule, Rfl: 0  •  travoprost, BAK free, (TRAVATAN) 0.004 % solution ophthalmic solution, 1 drop., Disp: , Rfl:   •  carvedilol (COREG) 3.125 MG tablet, TAKE 1 TABLET EVERY 12     HOURS, Disp: 180 tablet, Rfl: 3  •  Fluticasone Furoate-Vilanterol (Breo Ellipta) 100-25 MCG/INH inhaler, Inhale 1 puff Daily for 30 days., Disp: 160 each, Rfl: 0  •  ipratropium-albuterol (DUO-NEB) 0.5-2.5 mg/3 ml nebulizer, Inhale the contents of 1 vial by nebulization Every 4 (Four) Hours As Needed for Wheezing for up to 30 days., Disp: 360 mL, Rfl: 3    Review of Systems   Review of Systems   Constitutional: Negative for malaise/fatigue and weight loss.   Cardiovascular: Positive for dyspnea on exertion. Negative for chest pain  "and claudication.   Respiratory: Positive for shortness of breath. Negative for cough.    Skin: Negative for color change and poor wound healing.   Musculoskeletal: Positive for arthritis, back pain and neck pain.   Neurological: Negative for dizziness, numbness and weakness.       Physical Exam   Vitals:    07/12/21 1332 07/12/21 1333   BP: 112/68 110/66   BP Location: Left arm Right arm   Pulse: 96    Temp: 97.8 °F (36.6 °C)    TempSrc: Infrared    SpO2: 96%    Weight: 66.4 kg (146 lb 6.4 oz)    Height: 160 cm (63\")      Body surface area is 1.69 meters squared.  Body mass index is 25.93 kg/m².  Physical Exam  Constitutional:       General: He is not in acute distress.     Appearance: He is not ill-appearing.   HENT:      Right Ear: Hearing normal.      Left Ear: Hearing normal.      Nose: No nasal deformity.      Mouth/Throat:      Dentition: Normal dentition. Does not have dentures.   Cardiovascular:      Rate and Rhythm: Normal rate and regular rhythm.      Pulses:           Carotid pulses are 2+ on the right side and 2+ on the left side.       Radial pulses are 2+ on the right side and 2+ on the left side.        Posterior tibial pulses are 2+ on the right side and 2+ on the left side.      Heart sounds: No murmur heard.     Pulmonary:      Effort: Pulmonary effort is normal.      Breath sounds: Normal breath sounds.   Abdominal:      General: There is no distension.      Palpations: Abdomen is soft. There is no mass.      Tenderness: There is no abdominal tenderness.   Musculoskeletal:         General: No deformity.      Comments: Gait normal.    Skin:     General: Skin is warm and dry.      Coloration: Skin is not pale.      Findings: No erythema.      Comments: No venous staining   Neurological:      Mental Status: He is alert and oriented to person, place, and time.   Psychiatric:         Speech: Speech normal.         Behavior: Behavior is cooperative.         Thought Content: Thought content normal.    "      Judgment: Judgment normal.         BUN   Date Value Ref Range Status   02/12/2021 21 8 - 23 mg/dL Final   12/28/2018 17 7 - 18 mg/dL Final     Creatinine   Date Value Ref Range Status   02/12/2021 0.80 0.76 - 1.27 mg/dL Final   12/28/2018 0.9 0.6 - 1.3 mg/dL Final     Comment:     **The MDRD GFR formula is valid only for ages 18-70.    GFR will not be reported for individuals aging <18 or >70.     eGFR Non  Amer   Date Value Ref Range Status   02/12/2021 93 >60 mL/min/1.73 Final       ASSESSMENT:  Diagnoses and all orders for this visit:    1. Thoracic aortic aneurysm without rupture (CMS/HCC) (Primary)  -     CT Chest Without Contrast Diagnostic; Future    2. Type 2 diabetes mellitus without complication, without long-term current use of insulin (CMS/HCC)    3. PVD (peripheral vascular disease) (CMS/HCC)    4. Prostate cancer (CMS/HCC)    5. Presence of aortocoronary bypass graft    6. Mixed hyperlipidemia    7. Essential hypertension    8. Coronary artery disease involving native coronary artery of native heart without angina pectoris    9. COPD with asthma (CMS/HCC)    10. Overweight with body mass index (BMI) of 25 to 25.9 in adult    PLAN:  Detailed discussion with Vishnu Waller regarding situation and options.  Aneurysm ascending thoracic aorta.  Surgical intervention not indicated at this time.  Will plan to follow with interval imaging.  Smoking cessation, lipid management, BP control in 120 range advised.  Discussed symptoms of rupture, details of surgical repair including aortic root replacement, reimplantation of coronary arteries, possible valve replacement, endovascular and open repair.  Risks, benefits discussed.  Understands and wishes to proceed with plan    Return in 2 years with CT Chest without contrast    Return after above studies complete  Obesity Class  0. Increased risk cardiovascular events, sleep and breathing disorders, joint issues, type 2 diabetes mellitus. Options for  weight management, heart healthy diet, exercise programs, and associated health risks of obesity discussed.    Recommended regular physical activity, progressive walking program.  Continue current medications as directed.  Advance Care Planning   ACP discussion was declined by the patient. Patient has an advance directive in EMR which is still valid.     Thank you for the opportunity to participate in this patient's care.    Copy to primary care provider.

## 2021-07-29 ENCOUNTER — APPOINTMENT (OUTPATIENT)
Dept: PULMONOLOGY | Facility: HOSPITAL | Age: 81
End: 2021-07-29

## 2021-08-02 ENCOUNTER — CONSULT (OUTPATIENT)
Dept: ONCOLOGY | Facility: CLINIC | Age: 81
End: 2021-08-02

## 2021-08-02 ENCOUNTER — LAB (OUTPATIENT)
Dept: ONCOLOGY | Facility: HOSPITAL | Age: 81
End: 2021-08-02

## 2021-08-02 VITALS
WEIGHT: 145.8 LBS | HEART RATE: 84 BPM | HEIGHT: 63 IN | RESPIRATION RATE: 18 BRPM | BODY MASS INDEX: 25.83 KG/M2 | SYSTOLIC BLOOD PRESSURE: 156 MMHG | TEMPERATURE: 96.5 F | DIASTOLIC BLOOD PRESSURE: 73 MMHG

## 2021-08-02 DIAGNOSIS — C61 PROSTATE CANCER (HCC): ICD-10-CM

## 2021-08-02 DIAGNOSIS — D53.9 MACROCYTIC ANEMIA: ICD-10-CM

## 2021-08-02 DIAGNOSIS — D53.9 MACROCYTIC ANEMIA: Primary | ICD-10-CM

## 2021-08-02 LAB
ALBUMIN SERPL-MCNC: 4 G/DL (ref 3.5–5.2)
ALBUMIN/GLOB SERPL: 1.3 G/DL
ALP SERPL-CCNC: 76 U/L (ref 39–117)
ALT SERPL W P-5'-P-CCNC: 11 U/L (ref 1–41)
ANION GAP SERPL CALCULATED.3IONS-SCNC: 9 MMOL/L (ref 5–15)
ANISOCYTOSIS BLD QL: NORMAL
AST SERPL-CCNC: 17 U/L (ref 1–40)
B2 MICROGLOB SERPL-MCNC: 3.3 MG/L (ref 0.8–2.2)
BASOPHILS # BLD AUTO: 0.11 10*3/MM3 (ref 0–0.2)
BASOPHILS NFR BLD AUTO: 0.7 % (ref 0–1.5)
BILIRUB SERPL-MCNC: 0.3 MG/DL (ref 0–1.2)
BUN SERPL-MCNC: 20 MG/DL (ref 8–23)
BUN/CREAT SERPL: 18.5 (ref 7–25)
CALCIUM SPEC-SCNC: 9.1 MG/DL (ref 8.6–10.5)
CHLORIDE SERPL-SCNC: 102 MMOL/L (ref 98–107)
CLUMPED PLATELETS: PRESENT
CO2 SERPL-SCNC: 25 MMOL/L (ref 22–29)
CREAT SERPL-MCNC: 1.08 MG/DL (ref 0.76–1.27)
DEPRECATED RDW RBC AUTO: 96 FL (ref 37–54)
EOSINOPHIL # BLD AUTO: 0.26 10*3/MM3 (ref 0–0.4)
EOSINOPHIL NFR BLD AUTO: 1.7 % (ref 0.3–6.2)
ERYTHROCYTE [DISTWIDTH] IN BLOOD BY AUTOMATED COUNT: 26.7 % (ref 12.3–15.4)
FERRITIN SERPL-MCNC: 25.98 NG/ML (ref 30–400)
FOLATE SERPL-MCNC: >20 NG/ML (ref 4.78–24.2)
GFR SERPL CREATININE-BSD FRML MDRD: 66 ML/MIN/1.73
GLOBULIN UR ELPH-MCNC: 3 GM/DL
GLUCOSE SERPL-MCNC: 110 MG/DL (ref 65–99)
HCT VFR BLD AUTO: 30.3 % (ref 37.5–51)
HGB BLD-MCNC: 9.5 G/DL (ref 13–17.7)
HGB RETIC QN AUTO: 30.5 PG (ref 29.8–36.1)
HOLD SPECIMEN: NORMAL
IMM GRANULOCYTES # BLD AUTO: 0.18 10*3/MM3 (ref 0–0.05)
IMM GRANULOCYTES NFR BLD AUTO: 1.2 % (ref 0–0.5)
IMM RETICS NFR: 23.3 % (ref 3–15.8)
IRON 24H UR-MRATE: 87 MCG/DL (ref 59–158)
IRON SATN MFR SERPL: 22 % (ref 20–50)
LDH SERPL-CCNC: 200 U/L (ref 135–225)
LYMPHOCYTES # BLD AUTO: 2.03 10*3/MM3 (ref 0.7–3.1)
LYMPHOCYTES NFR BLD AUTO: 13.4 % (ref 19.6–45.3)
MACROCYTES BLD QL SMEAR: NORMAL
MCH RBC QN AUTO: 31.8 PG (ref 26.6–33)
MCHC RBC AUTO-ENTMCNC: 31.4 G/DL (ref 31.5–35.7)
MCV RBC AUTO: 101.3 FL (ref 79–97)
MONOCYTES # BLD AUTO: 2.1 10*3/MM3 (ref 0.1–0.9)
MONOCYTES NFR BLD AUTO: 13.9 % (ref 5–12)
NEUTROPHILS NFR BLD AUTO: 10.44 10*3/MM3 (ref 1.7–7)
NEUTROPHILS NFR BLD AUTO: 69.1 % (ref 42.7–76)
NRBC BLD AUTO-RTO: 0.3 /100 WBC (ref 0–0.2)
PLATELET # BLD AUTO: 344 10*3/MM3 (ref 140–450)
PMV BLD AUTO: 10 FL (ref 6–12)
POTASSIUM SERPL-SCNC: 4.6 MMOL/L (ref 3.5–5.2)
PROT SERPL-MCNC: 7 G/DL (ref 6–8.5)
PSA SERPL-MCNC: 10.4 NG/ML (ref 0–4)
RBC # BLD AUTO: 2.99 10*6/MM3 (ref 4.14–5.8)
RETICS # AUTO: 0.06 10*6/MM3 (ref 0.02–0.13)
RETICS/RBC NFR AUTO: 1.91 % (ref 0.7–1.9)
SMALL PLATELETS BLD QL SMEAR: ADEQUATE
SODIUM SERPL-SCNC: 136 MMOL/L (ref 136–145)
TIBC SERPL-MCNC: 395 MCG/DL (ref 298–536)
TRANSFERRIN SERPL-MCNC: 265 MG/DL (ref 200–360)
VIT B12 BLD-MCNC: 774 PG/ML (ref 211–946)
WBC # BLD AUTO: 15.12 10*3/MM3 (ref 3.4–10.8)
WBC MORPH BLD: NORMAL

## 2021-08-02 PROCEDURE — 82607 VITAMIN B-12: CPT

## 2021-08-02 PROCEDURE — 85046 RETICYTE/HGB CONCENTRATE: CPT

## 2021-08-02 PROCEDURE — 1125F AMNT PAIN NOTED PAIN PRSNT: CPT | Performed by: INTERNAL MEDICINE

## 2021-08-02 PROCEDURE — 84165 PROTEIN E-PHORESIS SERUM: CPT

## 2021-08-02 PROCEDURE — 83615 LACTATE (LD) (LDH) ENZYME: CPT

## 2021-08-02 PROCEDURE — 85025 COMPLETE CBC W/AUTO DIFF WBC: CPT

## 2021-08-02 PROCEDURE — 86334 IMMUNOFIX E-PHORESIS SERUM: CPT

## 2021-08-02 PROCEDURE — 82232 ASSAY OF BETA-2 PROTEIN: CPT

## 2021-08-02 PROCEDURE — 82746 ASSAY OF FOLIC ACID SERUM: CPT

## 2021-08-02 PROCEDURE — 83540 ASSAY OF IRON: CPT

## 2021-08-02 PROCEDURE — 80053 COMPREHEN METABOLIC PANEL: CPT

## 2021-08-02 PROCEDURE — 84153 ASSAY OF PSA TOTAL: CPT

## 2021-08-02 PROCEDURE — 1157F ADVNC CARE PLAN IN RCRD: CPT | Performed by: INTERNAL MEDICINE

## 2021-08-02 PROCEDURE — 84466 ASSAY OF TRANSFERRIN: CPT

## 2021-08-02 PROCEDURE — G0463 HOSPITAL OUTPT CLINIC VISIT: HCPCS | Performed by: INTERNAL MEDICINE

## 2021-08-02 PROCEDURE — 82728 ASSAY OF FERRITIN: CPT

## 2021-08-02 PROCEDURE — 85007 BL SMEAR W/DIFF WBC COUNT: CPT

## 2021-08-02 PROCEDURE — 82784 ASSAY IGA/IGD/IGG/IGM EACH: CPT

## 2021-08-02 PROCEDURE — 83883 ASSAY NEPHELOMETRY NOT SPEC: CPT

## 2021-08-02 PROCEDURE — G9903 PT SCRN TBCO ID AS NON USER: HCPCS | Performed by: INTERNAL MEDICINE

## 2021-08-02 PROCEDURE — 99204 OFFICE O/P NEW MOD 45 MIN: CPT | Performed by: INTERNAL MEDICINE

## 2021-08-02 NOTE — PATIENT INSTRUCTIONS
24-Hour Urine Collection  A 24-hour urine specimen is a lab test that requires collection of all your urine for an entire day. This is sometimes called a timed urine test. It can provide more information than a single urine sample.  There are many reasons to have this test. Your health care provider may order the test to check for or monitor the following conditions:  · High blood pressure.  · Kidney disease.  · Kidney stones.  · Urinary tract infections.  · Pregnancy.  · Diabetes.  How do I prepare for this test?  · You may be asked to follow a special diet during or before the collection period. Follow any instructions from your health care provider. If no special instructions are given, you may eat and drink normally.  · Take over-the-counter and prescription medicines only as told by your health care provider.  · Let your health care provider know about any medicines that you are taking, including over-the-counter medicines, vitamins, herbs, and supplements.  · Choose a collection day when you can be at home or when you have a place to store the urine. All urine must be collected during the testing period.  How do I do a 24-hour urine collection?    · When you get up in the morning, urinate in the toilet and flush. Write down the time. This will be your start time on the day of collection and your end time on the next morning.  · From the start time on, all of your urine should be kept in the collection jug that you received from the lab.  · If the jug that is given to you already has liquid in it, that is okay. Do not throw out the liquid or rinse out the jug. Some tests need the liquid to be added to your urine.  · Urinate into a specimen container, such as a urinal or pan that sits over the toilet. Pour the urine from the container into the collection jug. Be careful not to spill any of the urine. Use the equipment provided by the lab.  · Do not let any toilet paper or stool (feces) get into the jug. This  will contaminate the sample.  · Stop collecting your urine 24 hours after you started. Collect the last specimen as close as possible to the end of the 24-hour period.  · Keep the jug cool in an ice chest or keep it in the refrigerator during collection.  · When the 24-hour collection is complete, bring the jug to the lab. Keep the jug cool in an ice chest while you are bringing it to the lab.  What do the results mean?  Talk with your health care provider about what your results mean.  Questions to ask your health care provider  Ask your health care provider, or the department that is doing the test:  · When will my results be ready?  · How will I get my results?  · What are my treatment options?  · What other tests do I need?  · What are my next steps?  Summary  · A 24-hour urine specimen is a lab test that requires collection of all your urine for an entire day.  · When you get up in the morning, urinate in the toilet and flush. Write down the time. For the next 24 hours, collect all of your urine in the collection jug that you received from the lab.  · Keep the jug cool while collecting the urine and while bringing it back to the lab.  · Take the jug of urine back to the lab as soon as possible after the collection period has ended.  This information is not intended to replace advice given to you by your health care provider. Make sure you discuss any questions you have with your health care provider.  Document Revised: 09/09/2020 Document Reviewed: 12/31/2018  [employer] Patient Education © 2021 [employer]

## 2021-08-02 NOTE — PROGRESS NOTES
DATE OF CONSULT: 8/2/2021    REQUESTING SOURCE:  Benson Velarde MD  [address] [place] [address]      REASON FOR CONSULTATION: Abnormal serum protein electrophoresis, anemia with macrocytosis, history of prostate cancer with elevated PSA after definitive treatment, right kidney lesion      HISTORY OF PRESENT ILLNESS:    81-year-old male with medical problem consisting of type 2 diabetes mellitus, dyslipidemia, prostate cancer diagnosed around 2003 s/p prostatectomy at [place] followed by radiation treatment, chronic back pain for which patient has back surgeries done in the past, right kidney hyperdense cyst/mass, chronic anemia with macrocytosis has been referred to [facility] for further evaluation and recommendation regarding abnormal serum protein electrophoresis showing IgG lambda as well as IgG kappa immunofixation as well as rising PSA after definitive treatment for prostate cancer and anemia.  Patient complains of recent worsening of chronic back pain for which she is being followed by pain management clinic.  Denies any visible blood in stool or urine.  Denies any new lymph node enlargement.  Complains of chronic shortness of breath due to COPD.  Denies any new headache or dizziness.        PAST MEDICAL HISTORY:    Past Medical History:   Diagnosis Date   • Acute bacterial sinusitis    • Acute bronchitis    • Acute frontal sinusitis    • Acute maxillary sinusitis    • Acute pharyngitis    • Allergic rhinitis    • Allergic rhinitis due to pollen    • Anxiety    • Artificial lens present     in position   • Asthma    • Backache    • Borderline glaucoma    • C. difficile diarrhea 2014   • Chronic laryngitis    • Chronic rhinitis    • Coronary artery disease    • Cough    • Degeneration of lumbar intervertebral disc    • Degenerative joint disease involving multiple joints    • Diabetes mellitus (CMS/HCC)    • Diverticular disease of colon    • Dizziness and giddiness    •  Elevated cholesterol    • Erectile dysfunction    • Essential hypertension    • Generalized anxiety disorder    • GERD (gastroesophageal reflux disease)    • Glaucoma    • Hemorrhoids     without bleeding   • Insomnia    • Kidney stone    • Kidney stones    • Malignant tumor of prostate (CMS/HCC)    • Need for prophylactic vaccination and inoculation against influenza    • Osteoarthritis    • Osteoarthritis of multiple joints    • Pain, lumbar region     Pain radiating to lumbar region of back     • PONV (postoperative nausea and vomiting)    • Postviral fatigue syndrome    • Presence of aortocoronary bypass graft    • Prostate cancer (CMS/HCC)    • Pseudomembranous enterocolitis     improved   • Rotator cuff syndrome     right   • Shoulder pain    • SOB (shortness of breath)    • Tenosynovitis    • Thrombocytopenia (CMS/HCC)    • Upper respiratory infection        PAST SURGICAL HISTORY:  Past Surgical History:   Procedure Laterality Date   • CARDIAC CATHETERIZATION  09/25/2003    Cardiac cath (Normal left ventricular systolic function, EF 60% Multi-vessel coronary artery disease with critical disease noted in the LAD coronary artery, diagonal coronary artery, obtuse marginal coronary and right coronary artery.)   • CARDIAC CATHETERIZATION  05/03/1996    Cardiac cath (Coronary atherosclerotic heart disease. 70% diagonal stenosis. Mild to moderate left anterior descending artery stenosis. Preserved left ventricular function.)   • CARDIAC CATHETERIZATION N/A 2/26/2018    Procedure: Left Heart Cath/ pci if indicated ;  Surgeon: Radha Wilkes MD;  Location: [facility] INVASIVE LOCATION;  Service:    • CATARACT EXTRACTION Bilateral    • CATARACT EXTRACTION  10/04/2011    Remove cataract, insert lens (Right)   • CATARACT EXTRACTION  08/23/2011    Remove cataract, insert lens (left)   • COLONOSCOPY     • COLONOSCOPY      Colon endoscopy 27868 (Rectal bleeding. Radiation proctitis w/bleeding. An abnormal CT of  transverse colon due to mucosal ischemic colitis, that now is healed. Internal hemorrhoids w/possible bleeding.)   • COLONOSCOPY  05/10/2011    Colon endoscopy 12664 (Single sessile polyp found in transverse colon and sigmoid colon ,removed by cold biopsy polypectomy.divertic. found in sigmoid colon,descending colon. Friability/capillary friability in rectum sigmoid due to prior radiation.Inter. hem. Grade 1)   • COLONOSCOPY  05/02/2007    Colon endoscopy 03614 (Colon polyp. Diverticulosis without bleeding. Internal hemorrhoids without bleeding.)   • CORONARY ARTERY BYPASS GRAFT  2002   • CYSTOSCOPY  01/13/1995    Cystoscopy, stone removal (Right ureteroscopic lasertripsy.)   • CYSTOSCOPY Right 1/23/2019    Procedure: CYSTOSCOPY, RIGHT STENT REMOVAL;  Surgeon: Nirmal Gaines MD;  Location: [facility];  Service: Urology   • CYSTOSCOPY, URETEROSCOPY, RETROGRADE PYELOGRAM, STENT INSERTION N/A 8/28/2017    Procedure: URETEROSCOPY LASER LITHOTRIPSY WITH STENT INSERTION AND RETROGRADE PYELOGRAM ;  Surgeon: Anna M. D'Amico, MD;  Location: [facility];  Service:    • CYSTOSCOPY, URETEROSCOPY, RETROGRADE PYELOGRAM, STENT INSERTION Right 1/11/2019    Procedure: CYSTOSCOPY URETEROSCOPY RETROGRADE PYELOGRAM HOLMIUM LASER STENT INSERTION;  Surgeon: Nirmal Gaines MD;  Location: [facility];  Service: Urology   • EPIDURAL  12/03/2014    Therapeutic/diag injection (Lumbar transforaminal epidural steroid injection, L5-S1, left side.)   • EPIDURAL  10/22/2014    Therapeutic/diag injection (Lumbar transforaminal epidural steroid injection L5-S1 left side.)   • EPIDURAL  08/07/2014    Therapeutic/diag injection (Lumbar transforaminal epidural steroid injection.)   • EXCISION LESION  05/13/1999    REMOVE EAR LESION 91795 (1.3 cm basal cell carcinoma, right preauricular area. Excision)   • EXCISION LESION  03/13/2001    REMOVE LESION NECK/CHEST 75328 (Excision of irritated seborrheic keratosis, anterior neck, 1.1 cm and deep lipoma,  posterior neck, 3.5 cm)   • INJECTION OF MEDICATION  11/15/2012    Kenalog (4)      • JOINT REPLACEMENT  2015    partial   • KNEE ARTHROSCOPY  2007    Knee arthroscopy, surgery (Arthroscopy with medial and lateral meniscectomies, right knee.)   • KNEE SURGERY  2015    Knee Surgery (Right unicompartmental arthroplasty.)   • LUMBAR LAMINECTOMY N/A 2017    Procedure: LUMBAR LAMINECTOMY LUMBAR THREE-FOUR, LUMBAR FOUR-FIVE, INTERLAMINAR DISTRACTION ;  Surgeon: Lucio Mayo MD;  Location: [facility];  Service:    • NOSE SURGERY     • OTHER SURGICAL HISTORY      EXTENDED VISUAL FIELDS STUDY 32204 (Borderline glaucoma) (3): 2015, 2014, 2013   • OTHER SURGICAL HISTORY  10/29/2003    Heart revascularize (TMR) (Directmyocardial revascularization times four; LIMA to LAD SVG to DARIUSZ SVG to OM1, SVG to RCA)   • OTHER SURGICAL HISTORY      OCT DISC NFL 80795 (Borderline glaucoma)  (3): 2015, 2014, 2013   • PROSTATECTOMY     • SEPTORHINOPLASTY  1989    Nasal surgery procedure (Septorhinoplasty with reconstruction of the nasal pyramid with a iliac crest graft, rhinoplasty was performed with external approach.)   • SHOULDER ARTHROSCOPY  2013    Arthroscopy of right shoulder with rotator repair, Carla procedure, Biceps tenotomy, subacromial decompression.   • SHOULDER SURGERY  2005    Shoulder surgery procedure (Decompression, subacromial, explore of the rotator cuff, no tear found nor repaired, acromioplasty of the left shoulder and AC shoulder joint resection.)       ALLERGIES:    Allergies   Allergen Reactions   • Molds & Smuts Other (See Comments)     Sinus infection   • Hydrocodone-Acetaminophen Itching       SOCIAL HISTORY:   Social History     Tobacco Use   • Smoking status: Former Smoker     Packs/day: 0.50     Years: 40.00     Pack years: 20.00     Types: Cigarettes     Start date:      Quit date: 2020     Years since quittin.2   •  Smokeless tobacco: Never Used   Vaping Use   • Vaping Use: Never used   Substance Use Topics   • Alcohol use: Not Currently     Comment: socially   • Drug use: No       CURRENT MEDICATIONS:    Current Outpatient Medications   Medication Sig Dispense Refill   • acetaminophen (TYLENOL) 500 MG tablet Take 1,000 mg by mouth 3 (Three) Times a Day.     • albuterol sulfate  (90 Base) MCG/ACT inhaler Inhale 2 puffs Every 4 (Four) Hours As Needed for Wheezing or Shortness of Air. 54 g 4   • atorvastatin (LIPITOR) 40 MG tablet Take 1 tablet by mouth Daily. 90 tablet 3   • carvedilol (COREG) 3.125 MG tablet TAKE 1 TABLET EVERY 12     HOURS 180 tablet 3   • clopidogrel (PLAVIX) 75 MG tablet Take 75 mg by mouth Daily.     • Cyanocobalamin (VITAMIN B 12 PO) Take 500 mcg by mouth Daily. Three times weekly, MoWeFr     • cyanocobalamin (VITAMIN B-12) 500 MCG tablet Take 500 mcg by mouth.     • cyclobenzaprine (FLEXERIL) 10 MG tablet Take 1 tablet by mouth 3 (Three) Times a Day As Needed for Muscle Spasms. 30 tablet 0   • dilTIAZem CD (CARDIZEM CD) 180 MG 24 hr capsule Take 1 capsule by mouth Daily. 90 capsule 3   • DULoxetine (CYMBALTA) 30 MG capsule Take 30 mg by mouth Daily.  0   • fluticasone (FLONASE) 50 MCG/ACT nasal spray 2 sprays into each nostril Every Night.     • furosemide (LASIX) 20 MG tablet Take 20 mg by mouth Every Morning.     • isosorbide mononitrate (IMDUR) 30 MG 24 hr tablet Take 1 tablet by mouth Daily. 30 tablet 0   • KLOR-CON 20 MEQ CR tablet TAKE 1 TABLET BY MOUTH DAILY ONLY WHEN TAKING LASIX     • latanoprost (XALATAN) 0.005 % ophthalmic solution Administer 1 drop to both eyes every night.     • losartan (COZAAR) 50 MG tablet TAKE 1 TABLET EVERY NIGHT 90 tablet 3   • magnesium oxide (MAG-OX) 400 MG tablet Take 1 tablet by mouth 2 (Two) Times a Day.     • metFORMIN (GLUCOPHAGE) 1000 MG tablet Take 1 tablet by mouth 2 (Two) Times a Day With Meals.     • Multiple Vitamins-Minerals (CENTRUM PO) Take 1  tablet by mouth daily. Centrum 0.4 mg-162 mg-18 mg tablet  (unconfirmed)     • nitroglycerin (NITROSTAT) 0.4 MG SL tablet Place 1 tablet under the tongue Every 5 (Five) Minutes As Needed for Chest Pain. Take no more than 3 doses in 15 minutes. 30 tablet 0   • omeprazole (priLOSEC) 20 MG capsule Take 20 mg by mouth Daily.     • oxybutynin XL (DITROPAN-XL) 5 MG 24 hr tablet Take 5 mg by mouth Daily.     • predniSONE (DELTASONE) 20 MG tablet prednisone 20 mg tablet     • travoprost, BAK free, (TRAVATAN) 0.004 % solution ophthalmic solution 1 drop.     • Fluticasone Furoate-Vilanterol (Breo Ellipta) 100-25 MCG/INH inhaler Inhale 1 puff Daily for 30 days. 160 each 0   • ipratropium-albuterol (DUO-NEB) 0.5-2.5 mg/3 ml nebulizer Inhale the contents of 1 vial by nebulization Every 4 (Four) Hours As Needed for Wheezing for up to 30 days. 360 mL 3   • pregabalin (LYRICA) 75 MG capsule Take 1 capsule by mouth 2 (Two) Times a Day for 30 days. 60 capsule 0     No current facility-administered medications for this visit.        HOME MEDICATIONS:   Current Outpatient Medications on File Prior to Visit   Medication Sig Dispense Refill   • acetaminophen (TYLENOL) 500 MG tablet Take 1,000 mg by mouth 3 (Three) Times a Day.     • albuterol sulfate  (90 Base) MCG/ACT inhaler Inhale 2 puffs Every 4 (Four) Hours As Needed for Wheezing or Shortness of Air. 54 g 4   • atorvastatin (LIPITOR) 40 MG tablet Take 1 tablet by mouth Daily. 90 tablet 3   • carvedilol (COREG) 3.125 MG tablet TAKE 1 TABLET EVERY 12     HOURS 180 tablet 3   • clopidogrel (PLAVIX) 75 MG tablet Take 75 mg by mouth Daily.     • Cyanocobalamin (VITAMIN B 12 PO) Take 500 mcg by mouth Daily. Three times weekly, MoWeFr     • cyanocobalamin (VITAMIN B-12) 500 MCG tablet Take 500 mcg by mouth.     • cyclobenzaprine (FLEXERIL) 10 MG tablet Take 1 tablet by mouth 3 (Three) Times a Day As Needed for Muscle Spasms. 30 tablet 0   • dilTIAZem CD (CARDIZEM CD) 180 MG 24 hr  capsule Take 1 capsule by mouth Daily. 90 capsule 3   • DULoxetine (CYMBALTA) 30 MG capsule Take 30 mg by mouth Daily.  0   • fluticasone (FLONASE) 50 MCG/ACT nasal spray 2 sprays into each nostril Every Night.     • furosemide (LASIX) 20 MG tablet Take 20 mg by mouth Every Morning.     • isosorbide mononitrate (IMDUR) 30 MG 24 hr tablet Take 1 tablet by mouth Daily. 30 tablet 0   • KLOR-CON 20 MEQ CR tablet TAKE 1 TABLET BY MOUTH DAILY ONLY WHEN TAKING LASIX     • latanoprost (XALATAN) 0.005 % ophthalmic solution Administer 1 drop to both eyes every night.     • losartan (COZAAR) 50 MG tablet TAKE 1 TABLET EVERY NIGHT 90 tablet 3   • magnesium oxide (MAG-OX) 400 MG tablet Take 1 tablet by mouth 2 (Two) Times a Day.     • metFORMIN (GLUCOPHAGE) 1000 MG tablet Take 1 tablet by mouth 2 (Two) Times a Day With Meals.     • Multiple Vitamins-Minerals (CENTRUM PO) Take 1 tablet by mouth daily. Centrum 0.4 mg-162 mg-18 mg tablet  (unconfirmed)     • nitroglycerin (NITROSTAT) 0.4 MG SL tablet Place 1 tablet under the tongue Every 5 (Five) Minutes As Needed for Chest Pain. Take no more than 3 doses in 15 minutes. 30 tablet 0   • omeprazole (priLOSEC) 20 MG capsule Take 20 mg by mouth Daily.     • oxybutynin XL (DITROPAN-XL) 5 MG 24 hr tablet Take 5 mg by mouth Daily.     • predniSONE (DELTASONE) 20 MG tablet prednisone 20 mg tablet     • travoprost, BAK free, (TRAVATAN) 0.004 % solution ophthalmic solution 1 drop.     • Fluticasone Furoate-Vilanterol (Breo Ellipta) 100-25 MCG/INH inhaler Inhale 1 puff Daily for 30 days. 160 each 0   • ipratropium-albuterol (DUO-NEB) 0.5-2.5 mg/3 ml nebulizer Inhale the contents of 1 vial by nebulization Every 4 (Four) Hours As Needed for Wheezing for up to 30 days. 360 mL 3   • pregabalin (LYRICA) 75 MG capsule Take 1 capsule by mouth 2 (Two) Times a Day for 30 days. 60 capsule 0     No current facility-administered medications on file prior to visit.       FAMILY HISTORY:    Family History  "  Problem Relation Age of Onset   • Hypertension Mother    • Heart disease Mother    • Arthritis Mother    • Cancer Other    • Heart disease Other    • Hypertension Other        REVIEW OF SYSTEMS:        CONSTITUTIONAL:  Complains of fatigue. Denies any fever, chills or weight loss.     EYES: No visual disturbances. No discharge. No new lesions    ENMT:  No epistaxis, mouth sores or difficulty swallowing.    RESPIRATORY: Positive for chronic shortness of breath. No new cough or hemoptysis.    CARDIOVASCULAR:  No chest pain or palpitations.    GASTROINTESTINAL:  No abdominal pain nausea, vomiting or blood in the stool.    GENITOURINARY: Positive for intermittent hematuria due to kidney stones.    MUSCULOSKELETAL: Positive for recent worsening of chronic back pain.  Positive for arthralgias.    LYMPHATICS:  Denies any abnormal swollen glands anywhere in the body.    NEUROLOGICAL : No tingling or numbness. No headache or dizziness. No seizures or balance problems.    SKIN: No new skin lesions.    ENDOCRINE : No new hot or cold intolerance. No new polyuria . No polydipsia.        PHYSICAL EXAMINATION:      VITAL SIGNS:  /73   Pulse 84   Temp 96.5 °F (35.8 °C) (Temporal)   Resp 18   Ht 160 cm (63\")   Wt 66.1 kg (145 lb 12.8 oz)   BMI 25.83 kg/m²       08/02/21  1332   Weight: 66.1 kg (145 lb 12.8 oz)       ECOG performance status: 2    CONSTITUTIONAL: Appears uncomfortable due to pain    EYES: Mild conjunctival Pallor. No Icterus. No Pterygium. Extraocular Movements intact.No ptosis.    ENMT:  Normocephalic, Atraumatic.No Facial Asymmetry noted.    NECK:  No adenopathy.Trachea midline. NO JVD.    RESPIRATORY:  Fair air entry bilateral. No rhonchi or wheezing.Fair respiratory effort.    CARDIOVASCULAR:  S1, S2. Regular rate and rhythm. No murmur or gallop appreciated.    ABDOMEN:  Soft, obese, nontender. Bowel sounds present in all four quadrants.  No Hepatosplenomegaly appreciated.    MUSCULOSKELETAL:  No " edema.No Calf Tenderness.Decreased range of motion.    NEUROLOGIC:    No  Motor deficit appreciated. Cranial Nerves 2-12 grossly intact.    SKIN : No new skin lesion identified. Skin is warm and dry to touch.    LYMPHATICS: No new enlarged lymph nodes in neck or supraclavicular area.    PSYCHIATRY: Alert, awake and oriented ×3.Normal affect.  Normal judgment.  Makes good eye contact.          DIAGNOSTIC DATA:    WBC   Date Value Ref Range Status   08/02/2021 15.12 (H) 3.40 - 10.80 10*3/mm3 Final   12/28/2018 10 4.0 - 11.0 10*3/uL Final     RBC   Date Value Ref Range Status   08/02/2021 2.99 (L) 4.14 - 5.80 10*6/mm3 Final   12/28/2018 3.31 (L) 4.73 - 5.49 10*6/uL Final     Comment:     MACROCYTES 1+     Hemoglobin   Date Value Ref Range Status   08/02/2021 9.5 (L) 13.0 - 17.7 g/dL Final   12/28/2018 11.8 (L) 14.4 - 16.6 g/dL Final     Hematocrit   Date Value Ref Range Status   08/02/2021 30.3 (L) 37.5 - 51.0 % Final   12/28/2018 35.7 (L) 42.9 - 49.1 % Final     MCV   Date Value Ref Range Status   08/02/2021 101.3 (H) 79.0 - 97.0 fL Final   12/28/2018 108 (H) 85 - 95 fl Final     MCH   Date Value Ref Range Status   08/02/2021 31.8 26.6 - 33.0 pg Final   12/28/2018 35.6 (H) 28.0 - 32.0 pg Final     MCHC   Date Value Ref Range Status   08/02/2021 31.4 (L) 31.5 - 35.7 g/dL Final   12/28/2018 33.1 32.0 - 34.0 g/dL Final     RDW   Date Value Ref Range Status   08/02/2021 26.7 (H) 12.3 - 15.4 % Final   12/28/2018 62 (H) 37 - 54 fl Final     RDW-SD   Date Value Ref Range Status   08/02/2021 96.0 (H) 37.0 - 54.0 fl Final     MPV   Date Value Ref Range Status   08/02/2021 10.0 6.0 - 12.0 fL Final   12/28/2018 10.4 8.0 - 12.0 fl Final     Platelets   Date Value Ref Range Status   08/02/2021 344 140 - 450 10*3/mm3 Final   12/28/2018 261 150 - 450 10*3/uL Final     Comment:     PLATELETS ADEQUATE     Neutrophil %   Date Value Ref Range Status   08/02/2021 69.1 42.7 - 76.0 % Final     Lymphocyte Rel %   Date Value Ref Range Status    12/28/2018 18.7 5 - 55 % Final     Lymphocyte %   Date Value Ref Range Status   08/02/2021 13.4 (L) 19.6 - 45.3 % Final     Monocyte Rel %   Date Value Ref Range Status   12/28/2018 12.5 (H) 3 - 8 % Final     Monocyte %   Date Value Ref Range Status   08/02/2021 13.9 (H) 5.0 - 12.0 % Final     Eosinophil Rel %   Date Value Ref Range Status   12/28/2018 3.5 0 - 5 % Final     Eosinophil %   Date Value Ref Range Status   08/02/2021 1.7 0.3 - 6.2 % Final     Basophil Rel %   Date Value Ref Range Status   12/28/2018 0.8 0 - 2 % Final     Basophil %   Date Value Ref Range Status   08/02/2021 0.7 0.0 - 1.5 % Final     Immature Grans %   Date Value Ref Range Status   08/02/2021 1.2 (H) 0.0 - 0.5 % Final   12/28/2018 64.1 45 - 80 % Final     Neutrophils, Absolute   Date Value Ref Range Status   08/02/2021 10.44 (H) 1.70 - 7.00 10*3/mm3 Final     Lymphocytes, Absolute   Date Value Ref Range Status   08/02/2021 2.03 0.70 - 3.10 10*3/mm3 Final     Monocytes, Absolute   Date Value Ref Range Status   08/02/2021 2.10 (H) 0.10 - 0.90 10*3/mm3 Final     Eosinophils, Absolute   Date Value Ref Range Status   08/02/2021 0.26 0.00 - 0.40 10*3/mm3 Final     Basophils, Absolute   Date Value Ref Range Status   08/02/2021 0.11 0.00 - 0.20 10*3/mm3 Final     Immature Grans, Absolute   Date Value Ref Range Status   08/02/2021 0.18 (H) 0.00 - 0.05 10*3/mm3 Final     nRBC   Date Value Ref Range Status   08/02/2021 0.3 (H) 0.0 - 0.2 /100 WBC Final     Glucose   Date Value Ref Range Status   08/02/2021 110 (H) 65 - 99 mg/dL Final   07/19/2019 122 (H) 70 - 110 mg/dL Final     Sodium   Date Value Ref Range Status   08/02/2021 136 136 - 145 mmol/L Final   12/28/2018 139 134 - 144 mmol/L Final     Potassium   Date Value Ref Range Status   08/02/2021 4.6 3.5 - 5.2 mmol/L Final   12/28/2018 4.5 3.5 - 5.1 mmol/L Final     CO2   Date Value Ref Range Status   08/02/2021 25.0 22.0 - 29.0 mmol/L Final     Chloride   Date Value Ref Range Status    08/02/2021 102 98 - 107 mmol/L Final   12/28/2018 103 98 - 107 mmol/L Final     Anion Gap   Date Value Ref Range Status   08/02/2021 9.0 5.0 - 15.0 mmol/L Final     Creatinine   Date Value Ref Range Status   08/02/2021 1.08 0.76 - 1.27 mg/dL Final   12/28/2018 0.9 0.6 - 1.3 mg/dL Final     Comment:     **The MDRD GFR formula is valid only for ages 18-70.    GFR will not be reported for individuals aging <18 or >70.     BUN   Date Value Ref Range Status   08/02/2021 20 8 - 23 mg/dL Final   12/28/2018 17 7 - 18 mg/dL Final     BUN/Creatinine Ratio   Date Value Ref Range Status   08/02/2021 18.5 7.0 - 25.0 Final     Calcium   Date Value Ref Range Status   08/02/2021 9.1 8.6 - 10.5 mg/dL Final   12/28/2018 8.6 (L) 8.8 - 10.5 mg/dL Final     eGFR Non  Amer   Date Value Ref Range Status   08/02/2021 66 >60 mL/min/1.73 Final     Alkaline Phosphatase   Date Value Ref Range Status   08/02/2021 76 39 - 117 U/L Final   10/22/2019 43 (L) 46 - 116 U/L Final     Total Protein   Date Value Ref Range Status   08/02/2021 7.0 6.0 - 8.5 g/dL Final   05/26/2021 6.1 (L) 6.3 - 7.9 g/dL Final   12/28/2018 6.3 (L) 6.4 - 8.2 g/dL Final     ALT (SGPT)   Date Value Ref Range Status   08/02/2021 11 1 - 41 U/L Final   12/28/2018 18 (L) 30 - 65 U/L Final     AST (SGOT)   Date Value Ref Range Status   08/02/2021 17 1 - 40 U/L Final   12/28/2018 19 15 - 37 U/L Final     Total Bilirubin   Date Value Ref Range Status   08/02/2021 0.3 0.0 - 1.2 mg/dL Final   12/28/2018 0.4 0 - 1.0 mg/dL Final     Albumin   Date Value Ref Range Status   08/02/2021 4.00 3.50 - 5.20 g/dL Final   05/26/2021 3.0 (L) 3.4 - 4.7 g/dL Final     Globulin   Date Value Ref Range Status   08/02/2021 3.0 gm/dL Final     A/G Ratio   Date Value Ref Range Status   05/26/2021 0.98  Final     Lab Results   Component Value Date    IRON 87 08/02/2021    TIBC 395 08/02/2021    LABIRON 22 08/02/2021    FERRITIN 25.98 (L) 08/02/2021    [id number] 04/13/2021    FOLATE >20.00  02/09/2021     No results found for: , LABCA2, AFPTM, HCGQUANT, , CHROMGRNA, [id number], CEA, REFLABREPO]      PROTEIN ELECTROPHORESIS, TOTAL     Specimen type and source: Serum, Serum specimen (specimen)        Component   Ref Range & Units 2 mo ago   (5/26/21)   [facility] 5 mo ago   (2/12/21)   [facility] 5 mo ago   (2/11/21)   [facility]   Total Protein   6.3 - 7.9 g/dL 6.1Low   6.0 R  6.4 R    Albumin   3.4 - 4.7 g/dL 3.0Low   3.10Low  R  3.30Low  R    Alpha-1-Globulin   0.1 - 0.3 g/dL 0.3      Alpha-2-Globulin   0.6 - 1.0 g/dL 1.0      Beta Globulin, U   0.7 - 1.2 g/dL 1.1      Gamma Globulin, Urine   0.6 - 1.6 g/dL 0.8      A/G Ratio  0.98  1.1 R  1.1 R    SPE/UPE CONCLUSION  (NOTE)      Comment: Small abnormality in gamma fraction.     See Isotype.     Test Performed by:   [place] [address]   : Kaushik Pereyra M.D. Ph.D.; CLIA# [id number]   Resulting Agency [facility]         Specimen Collected: 05/26/21 11:18 Last Resulted: 06/01/21 17:33   Received From: [facility]  Result Received: 06/02/21 14:57               Component PSA   Latest Ref Rng & Units 0.0 - 4.0 ng/mL   12/28/2018 4.9 (H)   1/16/2020 9.5 (H)   4/13/2021 9.8 (H)         Radiology Data :  CT of chest without contrast done on June 2, 2021 showed:    FINDINGS:     LUNGS/PLEURA: There is atelectasis versus scarring within the  lingula and the right middle lobe. No parenchymal consolidation,  pleural effusion, or pneumothorax. Evidence of old granulomatous  disease.  TRACHEA AND BRONCHI:  The trachea and bronchi are patent.  MEDIASTINUM, BENTLEY AND LYMPH NODES: No suspicious appearing lymph  nodes based on size or morphologic criteria. The included thyroid  is unremarkable for mass or enlargement. The esophagus is  unremarkable.  HEART AND PERICARDIUM: No cardiac enlargement or pericardial  effusion.  There is coronary vascular calcification. Findings of  prior CABG.  VASCULAR: Vascular calcification of the thoracic aorta.  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 This is similar to the  prior exam where it measured 4.7 cm.  UPPER ABDOMEN: Stable subcentimeter hypodensity within the right  lobe of the liver anteriorly to small to characterize however  likely represents a small cyst or hemangioma. There is  cholelithiasis without pericholecystic inflammatory change. No  suspicious adrenal mass. There is an approximate 1.4 cm exophytic  hyperdense nodule projecting from the posterior lateral right  kidney increase in size from the prior exam where it measured 1  cm. While this may represent a cyst containing proteinaceous or  blood product follow-up will be recommended. Punctate  nonobstructive nephrolith on the right. Fatty infiltrative  changes of the pancreas are present.  OSSEOUS STRUCTURES: Demineralization and degenerative changes  within the spine.     IMPRESSION:  Redemonstration of the ascending aortic aneurysm measuring 4.8 cm  fairly similar to the prior exam of 4.7 cm.     Atelectasis versus scarring within the right middle lobe and the  lingula. Evidence of old granulomatous disease. No pulmonary  consolidation or acute pleural finding.     Stable suspected subcentimeter cyst right lobe of the liver.     Cholelithiasis.     Increase size of the exophytic hyperdense right renal lesion  measuring 1.4 cm compared to 1 cm previously. I would recommend  either a renal ultrasound or a CT renal mass protocol for further  evaluation to confirm benign etiology.          CT of abdomen and pelvis with contrast done on June 21, 2021 showed:    IMPRESSION:  Cholelithiasis. Tiny low-density lesion anterior  aspect right lobe of liver unchanged since prior examination  probably benign cyst or hemangioma. Nonobstructing stones right  kidney. No hydronephrotic  changes. Exophytic high density right  renal mass similar in size to prior CT examination June 9, 2020  but larger than on CT January 8, 2019. Probably a cyst with  hemorrhage or proteinaceous debris. Degenerative changes lumbar  spine. Surgical clips in the pelvis from prior prostatectomy. CT  abdomen, pelvis without contrast is otherwise unremarkable.          No radiology results for the last 7 days    Pathology :  * Cannot find OR log *        ASSESSMENT AND PLAN:      1.  Macrocytic anemia:  -Patient has ongoing microcytic anemia for last few years.  -Differential diagnosis includes nutritional deficiency versus GI blood loss versus anemia of chronic inflammation due to arthritis versus bone marrow pathology like myelodysplasia  -Anemia work-up done today shows hemoglobin is 9.5 with MCV of 101.  -Iron studies shows ferritin of 25.  Iron saturation is 22%.  We will start patient on ferrous sulfate 1 tablet p.o. daily with stool softener.  -Reticulocyte count shows adequate reticulocyte response.    2.  Abnormal serum protein electrophoresis:  -Serum protein electrophoresis done in June 2021 shows small M spike in the region of IgG kappa plus or minus IgG lambda  -We will do work-up today consisting of serum protein electrophoresis with immunofixation, free light chain ratio, LDH, beta-2 microglobulin and 24-hour urine protein electrophoresis with immunofixation  -CMP done earlier today does not show any endorgan damage or paraproteinemia  -We will get skeletal survey done prior to next clinic visit in 2 weeks to rule out any lytic lesion    3.  Prostate cancer history with elevated PSA:  -Patient states he was diagnosed with prostate cancer in 2003 and had a prostatectomy done at [facility].  -We do not have any pathology report available for review  -Patient also received radiation treatment at [facility]  -Recent PSA in April 2021 was elevated at 9.8  -PSA done earlier today is 10.4.  -We will get a  bone scan done next week to rule out any distant bone metastases  -Patient had a CT of chest, abdomen and pelvis done in June 2021 which was negative for any distant metastatic disease    4.  Right kidney lesion  -Recent CT scan done on June 21, 2021 shows stable lesion involving the right kidney.  Recommend continue with monitoring    5.  Chronic back pain:  -Being followed by pain management in [place]    6.  Health maintenance: Patient quit smoking in 2020.  Had a colonoscopy last 5 years    7. Advance Care Planning   ACP discussion was held with the patient during this visit. Patient has an advance directive in EMR which is still valid.     8.  Prescriptions: Prescription for ferrous sulfate and Colace has been sent to his pharmacy today.    PHQ-9 Total Score: 0   -Patient is not homicidal or suicidal.  No acute intervention required.      Vishnu Waller reports a pain score of 8.  Given his pain assessment as noted, treatment options were discussed and the following options were decided upon as a follow-up plan to address the patient's pain: continuation of current treatment plan for pain.             Thank you for this consultation.    Josef Covington MD  8/2/2021  16:21 CDT        Part of this note may be an electronic transcription/translation of spoken language to printed text using the Dragon Dictation System.

## 2021-08-03 LAB
ALBUMIN SERPL ELPH-MCNC: 3.6 G/DL (ref 2.9–4.4)
ALBUMIN/GLOB SERPL: 1.2 {RATIO} (ref 0.7–1.7)
ALPHA1 GLOB SERPL ELPH-MCNC: 0.3 G/DL (ref 0–0.4)
ALPHA2 GLOB SERPL ELPH-MCNC: 0.9 G/DL (ref 0.4–1)
B-GLOBULIN SERPL ELPH-MCNC: 1.1 G/DL (ref 0.7–1.3)
GAMMA GLOB SERPL ELPH-MCNC: 0.9 G/DL (ref 0.4–1.8)
GLOBULIN SER-MCNC: 3.2 G/DL (ref 2.2–3.9)
IGA SERPL-MCNC: 232 MG/DL (ref 61–437)
IGG SERPL-MCNC: 946 MG/DL (ref 603–1613)
IGM SERPL-MCNC: 42 MG/DL (ref 15–143)
INTERPRETATION SERPL IEP-IMP: ABNORMAL
KAPPA LC FREE SER-MCNC: 69.5 MG/L (ref 3.3–19.4)
KAPPA LC FREE/LAMBDA FREE SER: 2.04 {RATIO} (ref 0.26–1.65)
LABORATORY COMMENT REPORT: ABNORMAL
LAMBDA LC FREE SERPL-MCNC: 34 MG/L (ref 5.7–26.3)
M PROTEIN SERPL ELPH-MCNC: 0.1 G/DL
PROT SERPL-MCNC: 6.8 G/DL (ref 6–8.5)

## 2021-08-03 RX ORDER — FERROUS SULFATE 325(65) MG
325 TABLET ORAL
Qty: 30 TABLET | Refills: 3 | Status: SHIPPED | OUTPATIENT
Start: 2021-08-03 | End: 2021-11-04 | Stop reason: SDUPTHER

## 2021-08-03 RX ORDER — DOCUSATE SODIUM 100 MG/1
100 CAPSULE, LIQUID FILLED ORAL 2 TIMES DAILY PRN
Qty: 60 CAPSULE | Refills: 2 | Status: SHIPPED | OUTPATIENT
Start: 2021-08-03 | End: 2022-01-01

## 2021-08-04 ENCOUNTER — TELEPHONE (OUTPATIENT)
Dept: ONCOLOGY | Facility: CLINIC | Age: 81
End: 2021-08-04

## 2021-08-12 ENCOUNTER — HOSPITAL ENCOUNTER (OUTPATIENT)
Dept: NUCLEAR MEDICINE | Facility: HOSPITAL | Age: 81
Discharge: HOME | End: 2021-08-12

## 2021-08-12 ENCOUNTER — HOSPITAL ENCOUNTER (OUTPATIENT)
Dept: GENERAL RADIOLOGY | Facility: HOSPITAL | Age: 81
Discharge: HOME | End: 2021-08-12
Admitting: INTERNAL MEDICINE

## 2021-08-12 ENCOUNTER — APPOINTMENT (OUTPATIENT)
Dept: NUCLEAR MEDICINE | Facility: HOSPITAL | Age: 81
End: 2021-08-12

## 2021-08-12 DIAGNOSIS — D53.9 MACROCYTIC ANEMIA: ICD-10-CM

## 2021-08-12 DIAGNOSIS — C61 PROSTATE CANCER (HCC): ICD-10-CM

## 2021-08-12 PROCEDURE — 0 TECHNETIUM MEDRONATE KIT: Performed by: INTERNAL MEDICINE

## 2021-08-12 PROCEDURE — 77074 RADEX OSSEOUS SURVEY LMTD: CPT

## 2021-08-12 PROCEDURE — A9503 TC99M MEDRONATE: HCPCS | Performed by: INTERNAL MEDICINE

## 2021-08-12 PROCEDURE — 78306 BONE IMAGING WHOLE BODY: CPT

## 2021-08-12 RX ORDER — TC 99M MEDRONATE 20 MG/10ML
27.5 INJECTION, POWDER, LYOPHILIZED, FOR SOLUTION INTRAVENOUS
Status: COMPLETED | OUTPATIENT
Start: 2021-08-12 | End: 2021-08-12

## 2021-08-12 RX ADMIN — Medication 27.5 MILLICURIE: at 09:56

## 2021-08-16 ENCOUNTER — LAB (OUTPATIENT)
Dept: ONCOLOGY | Facility: HOSPITAL | Age: 81
End: 2021-08-16

## 2021-08-16 ENCOUNTER — APPOINTMENT (OUTPATIENT)
Dept: ONCOLOGY | Facility: CLINIC | Age: 81
End: 2021-08-16

## 2021-08-16 PROCEDURE — 84156 ASSAY OF PROTEIN URINE: CPT

## 2021-08-16 PROCEDURE — 86335 IMMUNFIX E-PHORSIS/URINE/CSF: CPT

## 2021-08-16 PROCEDURE — 81050 URINALYSIS VOLUME MEASURE: CPT

## 2021-08-16 PROCEDURE — 84166 PROTEIN E-PHORESIS/URINE/CSF: CPT

## 2021-08-18 LAB
ALBUMIN 24H MFR UR ELPH: 34.6 %
ALPHA1 GLOB 24H MFR UR ELPH: 3.7 %
ALPHA2 GLOB 24H MFR UR ELPH: 14.2 %
B-GLOBULIN MFR UR ELPH: 24.7 %
GAMMA GLOB 24H MFR UR ELPH: 22.7 %
HIV 1 & 2 AB SER-IMP: ABNORMAL
INTERPRETATION UR IFE-IMP: ABNORMAL
M PROTEIN 24H MFR UR ELPH: ABNORMAL %
PROT 24H UR-MRATE: 193 MG/24 HR (ref 30–150)
PROT UR-MCNC: 26.6 MG/DL

## 2021-08-20 ENCOUNTER — OFFICE VISIT (OUTPATIENT)
Dept: ONCOLOGY | Facility: CLINIC | Age: 81
End: 2021-08-20

## 2021-08-20 VITALS
HEIGHT: 63 IN | HEART RATE: 69 BPM | SYSTOLIC BLOOD PRESSURE: 164 MMHG | TEMPERATURE: 97.5 F | RESPIRATION RATE: 16 BRPM | WEIGHT: 146.8 LBS | DIASTOLIC BLOOD PRESSURE: 69 MMHG | BODY MASS INDEX: 26.01 KG/M2

## 2021-08-20 DIAGNOSIS — D53.9 MACROCYTIC ANEMIA: Primary | ICD-10-CM

## 2021-08-20 DIAGNOSIS — C61 PROSTATE CANCER (HCC): ICD-10-CM

## 2021-08-20 PROBLEM — R97.20 ELEVATED PSA: Status: ACTIVE | Noted: 2021-08-20

## 2021-08-20 PROCEDURE — 1157F ADVNC CARE PLAN IN RCRD: CPT | Performed by: INTERNAL MEDICINE

## 2021-08-20 PROCEDURE — G9903 PT SCRN TBCO ID AS NON USER: HCPCS | Performed by: INTERNAL MEDICINE

## 2021-08-20 PROCEDURE — G0463 HOSPITAL OUTPT CLINIC VISIT: HCPCS | Performed by: INTERNAL MEDICINE

## 2021-08-20 PROCEDURE — 1125F AMNT PAIN NOTED PAIN PRSNT: CPT | Performed by: INTERNAL MEDICINE

## 2021-08-20 PROCEDURE — 99214 OFFICE O/P EST MOD 30 MIN: CPT | Performed by: INTERNAL MEDICINE

## 2021-08-20 NOTE — PROGRESS NOTES
"DATE OF VISIT: 8/20/2021      REASON FOR VISIT: Abnormal serum protein electrophoresis, macrocytic anemia, history of prostate cancer with elevated PSA after definitive treatment, right kidney lesion      HISTORY OF PRESENT ILLNESS:   81-year-old male with medical problem consisting of type 2 diabetes mellitus, dyslipidemia, prostate cancer diagnosed in 2003 s/p prostatectomy, underbite followed by radiation treatment, chronic back pain for which patient has had back surgeries done in the past, right kidney hyperdense cyst/mass, microcytic anemia was initially seen in consultation on August 2, 2021 for evaluation of abnormal serum protein electrophoresis and elevated PSA with anemia.  Patient had extensive blood work, skeletal survey and bone scan done.  Patient is here to discuss the results and further recommendation.  Complains of chronic shortness of breath due to COPD.  Denies any bleeding.  Denies any new lymph node enlargement.          Past Medical History, Past Surgical History, Social History, Family History have been reviewed and are without significant changes except as mentioned.    Review of Systems   Constitutional: Positive for fatigue.   Respiratory: Positive for shortness of breath.    Genitourinary: Positive for hematuria (Intermittent hematuria due to kidney stones).   Musculoskeletal: Positive for back pain.   Hematological: Negative for adenopathy.      A comprehensive 14 point review of systems was performed and was negative except as mentioned.    Medications:  The current medication list was reviewed in the EMR    ALLERGIES:    Allergies   Allergen Reactions   • Molds & Smuts Other (See Comments)     Sinus infection   • Hydrocodone-Acetaminophen Itching       Objective      Vitals:    08/20/21 0836   BP: 164/69   Pulse: 69   Resp: 16   Temp: 97.5 °F (36.4 °C)   TempSrc: Temporal   Weight: 66.6 kg (146 lb 12.8 oz)   Height: 160 cm (63\")   PainSc:   8   PainLoc: Generalized     Current Status " 8/20/2021   ECOG score 0       Physical Exam  Cardiovascular:      Rate and Rhythm: Normal rate and regular rhythm.   Pulmonary:      Breath sounds: Normal breath sounds.   Neurological:      Mental Status: He is alert and oriented to person, place, and time.           RECENT LABS:  Glucose   Date Value Ref Range Status   08/02/2021 110 (H) 65 - 99 mg/dL Final   07/19/2019 122 (H) 70 - 110 mg/dL Final     Sodium   Date Value Ref Range Status   08/02/2021 136 136 - 145 mmol/L Final   12/28/2018 139 134 - 144 mmol/L Final     Potassium   Date Value Ref Range Status   08/02/2021 4.6 3.5 - 5.2 mmol/L Final   12/28/2018 4.5 3.5 - 5.1 mmol/L Final     CO2   Date Value Ref Range Status   08/02/2021 25.0 22.0 - 29.0 mmol/L Final     Chloride   Date Value Ref Range Status   08/02/2021 102 98 - 107 mmol/L Final   12/28/2018 103 98 - 107 mmol/L Final     Anion Gap   Date Value Ref Range Status   08/02/2021 9.0 5.0 - 15.0 mmol/L Final     Creatinine   Date Value Ref Range Status   08/02/2021 1.08 0.76 - 1.27 mg/dL Final   12/28/2018 0.9 0.6 - 1.3 mg/dL Final     Comment:     **The MDRD GFR formula is valid only for ages 18-70.    GFR will not be reported for individuals aging <18 or >70.     BUN   Date Value Ref Range Status   08/02/2021 20 8 - 23 mg/dL Final   12/28/2018 17 7 - 18 mg/dL Final     BUN/Creatinine Ratio   Date Value Ref Range Status   08/02/2021 18.5 7.0 - 25.0 Final     Calcium   Date Value Ref Range Status   08/02/2021 9.1 8.6 - 10.5 mg/dL Final   12/28/2018 8.6 (L) 8.8 - 10.5 mg/dL Final     eGFR Non  Amer   Date Value Ref Range Status   08/02/2021 66 >60 mL/min/1.73 Final     Alkaline Phosphatase   Date Value Ref Range Status   08/02/2021 76 39 - 117 U/L Final   10/22/2019 43 (L) 46 - 116 U/L Final     Total Protein   Date Value Ref Range Status   08/02/2021 7.0 6.0 - 8.5 g/dL Final   05/26/2021 6.1 (L) 6.3 - 7.9 g/dL Final   12/28/2018 6.3 (L) 6.4 - 8.2 g/dL Final     ALT (SGPT)   Date Value Ref Range  Status   08/02/2021 11 1 - 41 U/L Final   12/28/2018 18 (L) 30 - 65 U/L Final     AST (SGOT)   Date Value Ref Range Status   08/02/2021 17 1 - 40 U/L Final   12/28/2018 19 15 - 37 U/L Final     Total Bilirubin   Date Value Ref Range Status   08/02/2021 0.3 0.0 - 1.2 mg/dL Final   12/28/2018 0.4 0 - 1.0 mg/dL Final     Albumin   Date Value Ref Range Status   08/02/2021 4.00 3.50 - 5.20 g/dL Final   08/02/2021 3.6 2.9 - 4.4 g/dL Final   05/26/2021 3.0 (L) 3.4 - 4.7 g/dL Final     Globulin   Date Value Ref Range Status   08/02/2021 3.0 gm/dL Final     A/G Ratio   Date Value Ref Range Status   08/02/2021 1.2 0.7 - 1.7 Final   05/26/2021 0.98  Final     Lab Results   Component Value Date    WBC 15.12 (H) 08/02/2021    HGB 9.5 (L) 08/02/2021    HCT 30.3 (L) 08/02/2021    .3 (H) 08/02/2021     08/02/2021     Lab Results   Component Value Date    NEUTROABS 10.44 (H) 08/02/2021    IRON 87 08/02/2021    IRON 102 05/26/2021    IRON 53 (L) 02/10/2021    TIBC 395 08/02/2021    TIBC 351 05/26/2021    TIBC 241 (L) 02/10/2021    LABIRON 22 08/02/2021    LABIRON 22 02/10/2021    FERRITIN 25.98 (L) 08/02/2021    UKVFVPAF39 774 08/02/2021    FZBJVNLB71 828 04/13/2021    QTHXBFHS95 1,020 (H) 02/09/2021    FOLATE >20.00 08/02/2021    FOLATE >20.00 02/09/2021     No results found for: , LABCA2, AFPTM, HCGQUANT, , CHROMGRNA, [id number], CEA, REFLABREPO           JAD+Protein Electro, 24-Hr Urine - Urine, Clean Catch      Specimen Information: Urine, Clean Catch; 24 Hour Urine         0 Result Notes  Component   Ref Range & Units 4 d ago   Jessica/[place] 2 mo ago   [facility]   Total Protein, Urine   Not Estab. mg/dL 26.6     Protein, 24H Urine   30 - 150 mg/24 hr 193High      Albumin, U   % 34.6     Alpha-1-Globulin, U   % 3.7     Alpha-2-Globulin, U   % 14.2     Beta Globulin, U   % 24.7  1.1 R    Gamma Globulin, Urine   % 22.7     M-Blade, % U   Not Observed % Not Observed     Immunofixation Result, Urine   Comment:     Comment: Presence of monoclonal protein is unclear at this time. Suggest   repeat in 3 to 6 months if clinically indicated.   Note:  Comment     Comment: Protein electrophoresis scan will follow via computer, mail, or    delivery.   Resulting Agency [facility]      Narrative  Performed by: [facility]  Performed at:  01 - [facility] [address]  [id number]   : Indra Stapleton PhD, Phone:  [phone]      Specimen Collected: 08/16/21 14:36 Last Resulted: 08/18/21 13:09               Component PSA   Latest Ref Rng & Units 0.000 - 4.000 ng/mL   12/28/2018 4.9 (H)   1/16/2020 9.5 (H)   4/13/2021 9.8 (H)   8/2/2021 10.400 (H)           Immunoglobulin Free LT Chains Blood    Specimen Information: Blood         0 Result Notes  Component   Ref Range & Units 2 wk ago   Free Light Chain, Kappa   3.3 - 19.4 mg/L 69.5High     Free Lambda Light Chains   5.7 - 26.3 mg/L 34.0High     Kappa/Lambda Ratio   0.26 - 1.65 2.04High     Resulting Agency [facility]      Narrative  Performed by: [facility]  Performed at:  [address]  [address]  [id number]   : Indra Stapleton PhD, Phone:  [phone]      Specimen Collected: 08/02/21 14:03 Last Resulted: 08/03/21 14:08                   JAD + PE      Specimen Information: Blood         0 Result Notes  Component   Ref Range & Units 2 wk ago   (8/2/21)   Jessica/[place] 2 wk ago   (8/2/21)   Jessica/[place] 2 mo ago   (5/26/21)   [place]   IgG   603 - 1613 mg/dL 946      IgA   61 - 437 mg/dL 232      IgM   15 - 143 mg/dL 42      Total Protein   6.0 - 8.5 g/dL 6.8  7.0  6.1Low  R    Albumin   2.9 - 4.4 g/dL 3.6  4.00 R  3.0Low  R    Alpha-1-Globulin   0.0 - 0.4 g/dL 0.3   0.3 R    Alpha-2-Globulin   0.4 - 1.0 g/dL 0.9   1.0 R    Beta Globulin   0.7 - 1.3 g/dL 1.1      Gamma Globulin   0.4 - 1.8 g/dL 0.9      M-Blade   Not Observed g/dL 0.1High       Globulin   2.2 - 3.9 g/dL 3.2      A/G  "Ratio   0.7 - 1.7 1.2  1.3 R  0.98 R    Immunofixation Reflex, Serum  CommentAbnormal       Comment: Immunofixation shows IgG monoclonal protein with kappa light chain   specificity.   Please note that samples from patients receiving DARZALEX(R)   (daratumumab) treatment can appear as an \"IgG kappa\" and mask a   complete response. If this patient is receiving JONNATHAN, this JAD   assay interference can be removed by ordering test number   [id number]-\"Immunofixation, Daratumumab-Specific, Serum\" and   submitting a new sample for testing or by calling the lab to add   this test to the current sample.   Please note  Comment      Comment: Protein electrophoresis scan will follow via computer, mail, or    delivery.   [facility]      Narrative  Performed by: [facility]  Performed at:  01 - [facility]  [id number]   : Indra Stapleton PhD, Phone:  [phone]      Specimen Collected: 08/02/21 14:03 Last Resulted: 08/03/21 14:08                 PATHOLOGY:  * Cannot find OR log *             RADIOLOGY DATA :  No radiology results for the last 7 days        Assessment/Plan     1.  Macrocytic anemia:  -Patient has ongoing microcytic anemia since last few years.  -Extensive work-up done shows ferritin of 25 with iron saturation of 22%.  Patient has been started on ferrous sulfate 1 tablet daily with stool softener  -Otherwise anemia work-up is not showing any reason for persistent anemia  -Bone marrow biopsy was recommended to patient today.  Patient is refusing bone marrow biopsy at present  -Recommend continue with ferrous sulfate supplementation at present  -We will ask patient to return to clinic in 3 months with repeat CBC, CMP, iron studies, ferritin, B12, folate and PSA to be done prior to that      2.  Abnormal serum protein electrophoresis  -Serum protein electrophoresis done on August 2, 2021 shows M spike of 0.1 with IgG kappa " immunofixation.  -Free light chain ratio is minimally elevated at 2.  -24-hour urine protein electrophoresis does not show any evidence of abnormal protein at present  -Skeletal survey does not show any evidence of lytic lesion  -Due to persistent anemia recommend bone marrow biopsy for further evaluation    3.  Prostate cancer with elevated PSA  -Patient was diagnosed in 2003 had a prostatectomy done at [place].  -Also had a radiation treatment done at [facility]  -PSA done on August 2, 2021 is 10.4  -Bone scan does not show any evidence of bone metastases  -CT of chest abdomen and pelvis done in June 2021 did not show any evidence of lymph node metastases or bone metastasis  -Recommend starting Eligard due to elevated PSA and history of prostate cancer.  Side effect of Eligard were discussed with patient today.  We provided information to read about Eligard.  -We will start patient on Eligard next week once we have local insurance company.    4.  Right kidney lesion  -CT scan done on June 21, 2021 showed stable hypodensities/mass involving right kidney.    5.  Chronic back pain:  -Being followed by pain management in [place]    6.  Health maintenance: Patient quit smoking in 2020.  Had a colonoscopy last 5 years    7. Advance Care Planning   ACP discussion was held with the patient during this visit. Patient has an advance directive in EMR which is still valid.                  PHQ-9 Total Score: 0   -Patient is not homicidal or suicidal.  No acute intervention required.    Vishnu Waller reports a pain score of 8.  Given his pain assessment as noted, treatment options were discussed and the following options were decided upon as a follow-up plan to address the patient's pain: continuation of current treatment plan for pain.         Josef Covington MD  8/20/2021  17:16 CDT        Part of this note may be an electronic transcription/translation of spoken language to printed text using the Dragon  Dictation System.          CC:

## 2021-08-20 NOTE — PATIENT INSTRUCTIONS
Leuprolide depot injection  What is this medicine?  LEUPROLIDE (loo PROE lide) is a man-made protein that acts like a natural hormone in the body. It decreases testosterone in men and decreases estrogen in women. In men, this medicine is used to treat advanced prostate cancer. In women, some forms of this medicine may be used to treat endometriosis, uterine fibroids, or other female hormone-related problems.  This medicine may be used for other purposes; ask your health care provider or pharmacist if you have questions.  COMMON BRAND NAME(S): Eligard, Fensolv, Lupron Depot, Lupron Depot-Ped, Viadur  What should I tell my health care provider before I take this medicine?  They need to know if you have any of these conditions:  · diabetes  · heart disease or previous heart attack  · high blood pressure  · high cholesterol  · mental illness  · osteoporosis  · pain or difficulty passing urine  · seizures  · spinal cord metastasis  · stroke  · suicidal thoughts, plans, or attempt; a previous suicide attempt by you or a family member  · tobacco smoker  · unusual vaginal bleeding (women)  · an unusual or allergic reaction to leuprolide, benzyl alcohol, other medicines, foods, dyes, or preservatives  · pregnant or trying to get pregnant  · breast-feeding  How should I use this medicine?  This medicine is for injection into a muscle or for injection under the skin. It is given by a health care professional in a hospital or clinic setting. The specific product will determine how it will be given to you. Make sure you understand which product you receive and how often you will receive it.  Talk to your pediatrician regarding the use of this medicine in children. Special care may be needed.  Overdosage: If you think you have taken too much of this medicine contact a poison control center or emergency room at once.  NOTE: This medicine is only for you. Do not share this medicine with others.  What if I miss a dose?  It is  important not to miss a dose. Call your doctor or health care professional if you are unable to keep an appointment.  Depot injections: Depot injections are given either once-monthly, every 12 weeks, every 16 weeks, or every 24 weeks depending on the product you are prescribed. The product you are prescribed will be based on if you are male or female, and your condition. Make sure you understand your product and dosing.  What may interact with this medicine?  Do not take this medicine with any of the following medications:  · chasteberry  · cisapride  · dronedarone  · pimozide  · thioridazine  This medicine may also interact with the following medications:  · herbal or dietary supplements, like black cohosh or DHEA  · female hormones, like estrogens or progestins and birth control pills, patches, rings, or injections  · male hormones, like testosterone  · other medicines that prolong the QT interval (abnormal heart rhythm)  This list may not describe all possible interactions. Give your health care provider a list of all the medicines, herbs, non-prescription drugs, or dietary supplements you use. Also tell them if you smoke, drink alcohol, or use illegal drugs. Some items may interact with your medicine.  What should I watch for while using this medicine?  Visit your doctor or health care professional for regular checks on your progress. During the first weeks of treatment, your symptoms may get worse, but then will improve as you continue your treatment. You may get hot flashes, increased bone pain, increased difficulty passing urine, or an aggravation of nerve symptoms. Discuss these effects with your doctor or health care professional, some of them may improve with continued use of this medicine.  Female patients may experience a menstrual cycle or spotting during the first months of therapy with this medicine. If this continues, contact your doctor or health care professional.  This medicine may increase blood  sugar. Ask your healthcare provider if changes in diet or medicines are needed if you have diabetes.  What side effects may I notice from receiving this medicine?  Side effects that you should report to your doctor or health care professional as soon as possible:  · allergic reactions like skin rash, itching or hives, swelling of the face, lips, or tongue  · breathing problems  · chest pain  · depression or memory disorders  · pain in your legs or groin  · pain at site where injected or implanted  · seizures  · severe headache  · signs and symptoms of high blood sugar such as being more thirsty or hungry or having to urinate more than normal. You may also feel very tired or have blurry vision  · swelling of the feet and legs  · suicidal thoughts or other mood changes  · visual changes  · vomiting  Side effects that usually do not require medical attention (report to your doctor or health care professional if they continue or are bothersome):  · breast swelling or tenderness  · decrease in sex drive or performance  · diarrhea  · hot flashes  · loss of appetite  · muscle, joint, or bone pains  · nausea  · redness or irritation at site where injected or implanted  · skin problems or acne  This list may not describe all possible side effects. Call your doctor for medical advice about side effects. You may report side effects to FDA at 5-204-FDA-0397.  Where should I keep my medicine?  This drug is given in a hospital or clinic and will not be stored at home.  NOTE: This sheet is a summary. It may not cover all possible information. If you have questions about this medicine, talk to your doctor, pharmacist, or health care provider.  © 2021 [employer] (2020-11-18 10:35:13)

## 2021-10-06 RX ORDER — DILTIAZEM HYDROCHLORIDE 180 MG/1
180 CAPSULE, COATED, EXTENDED RELEASE ORAL DAILY
Qty: 90 CAPSULE | Refills: 3 | Status: SHIPPED | OUTPATIENT
Start: 2021-10-06

## 2021-10-06 RX ORDER — CLOPIDOGREL BISULFATE 75 MG/1
75 TABLET ORAL DAILY
Qty: 90 TABLET | Refills: 1 | Status: SHIPPED | OUTPATIENT
Start: 2021-10-06 | End: 2022-01-01 | Stop reason: SDUPTHER

## 2021-10-06 RX ORDER — ATORVASTATIN CALCIUM 40 MG/1
40 TABLET, FILM COATED ORAL DAILY
Qty: 90 TABLET | Refills: 1 | Status: SHIPPED | OUTPATIENT
Start: 2021-10-06

## 2021-10-14 NOTE — DISCHARGE PLACEMENT REQUEST
"Marcio Waller (81 y.o. Male)     Date of Birth Social Security Number Address Home Phone MRN    1940  [address] [place] [id number] [phone] [id number]    Congregation Marital Status          Sabianism        Admission Date Admission Type Admitting Provider Attending Provider Department, Room/Bed    2/9/21 Emergency Nims, MD Ervin Waite Folaranmi, MD [address], 332/1    Discharge Date Discharge Disposition Discharge Destination                       Attending Provider: Richard Mueller MD    Allergies: Molds & Smuts, Hydrocodone-acetaminophen    Isolation: None   Infection: None   Code Status: CPR    Ht: 160 cm (63\")   Wt: 59.3 kg (130 lb 12.8 oz)    Admission Cmt: None   Principal Problem: COPD with acute exacerbation (CMS/[facility], located within [facility]) [J44.1] More...                 Active Insurance as of 2/9/2021     Primary Coverage     Payor Plan Insurance Group Employer/Plan Group    MEDICARE MEDICARE A & B      Payor Plan Address Payor Plan Phone Number Payor Plan Fax Number Effective Dates    [address] [phone]  2/1/2007 - None Entered    [facility] [id number]       Subscriber Name Subscriber Birth Date Member ID       MARCIO WALLER 1940 [id number]           Secondary Coverage     Payor Plan Insurance Group Employer/Plan Group    [employer] PLAN F     Payor Plan Address Payor Plan Phone Number Payor Plan Fax Number Effective Dates    [address] [address] [phone]  1/1/2015 - None Entered    [facility] [id number]       Subscriber Name Subscriber Birth Date Member ID       MARCIO WALLER 1940 [id number]                 Emergency Contacts      (Rel.) Home Phone Work Phone Mobile Phone    ShelbyJulissa (Spouse) [phone] -- [phone]        [address] [address]  Dept. Phone:  [phone]  Dept. Fax:  " "[phone] Date Ordered: Feb 10, 2021         Patient:  Vishnu Waller MRN:  [id number]   [address] [place] [id number] :  1940  SSN:    Phone: [phone] Sex:  M     Weight: 59.3 kg (130 lb 12.8 oz)         Ht Readings from Last 1 Encounters:   21 160 cm (63\")         Home Nebulizer          (Order ID: [id number])    Diagnosis:  COPD with acute exacerbation (CMS/HCC) (J44.1 [ICD-10-CM] 491.21 [ICD-9-CM])   Quantity:  1     Nebulizer Equipment:  Nebulizer w/ Compressor  Nebulizer Accessories:  Nebulizer Kit - Administration Set, Non-Disposable  Length of Need (99 Months = Lifetime): 99 Months = Lifetime        Authorizing Provider's Phone: [phone]   Authorizing Provider:Akbar Zapien MD  Authorizing Provider's NPI: [id number]  Order Entered By: Akbar Zapien MD 2/10/2021 11:36 AM     Electronically signed by: Akbar Zapien MD 2/10/2021 11:36 AM        [address] [address]  Dept. Phone:  [phone]  Dept. Fax:  [phone] Date Ordered: Feb 10, 2021         Patient:  Vishnu Waller MRN:  [id number]   [address] [place], [place] [id number] :  1940  SSN:    Phone: [phone] Sex:  M     Weight: 59.3 kg (130 lb 12.8 oz)         Ht Readings from Last 1 Encounters:   21 160 cm (63\")         Home Nebulizer Accessories            (Order ID: [id number])    Diagnosis:  COPD with acute exacerbation (CMS/HCC) (J44.1 [ICD-10-CM] 491.21 [ICD-9-CM])   Quantity:  1     Nebulizer Accessories:  Nebulizer Kit - Admin. Set, Non-Disposable (1 per 6 months)  Length of Need (99 Months = Lifetime): 99 Months = Lifetime        Authorizing Provider's Phone: [phone]   Authorizing Provider:Akbra Zapien MD  Authorizing Provider's NPI: [id number]  Order Entered By: Akbar Zapien MD 2/10/2021 11:36 AM     Electronically signed by: Akbar Zapien MD 2/10/2021 11:36 AM " "           History & Physical      Akbar Zapien MD at 21 5050              HISTORY AND PHYSICAL  NAME: Vishnu Waller  : 1940  MRN: [id number]    DATE OF ADMISSION: 21    DATE & TIME SEEN: 21 4:56 PM CST    PCP: Johnathon Shaikh MD    CODE STATUS: Full code    CHIEF COMPLAINT Worsening SOA     HPI:  Vishnu Waller is a 81 y.o. male with a CMH of COPD, CAD with CABG, hx of stenting of CABG, hypertension, degenerative disc disease, AAA, hyperlipidemia who presents complaining of worsening shortness of air.  Patient reported he has had gradual worsening exertional shortness of air over the past 6 weeks.  Patient reports he gets \"out of breath when walking to the bathroom \".  SOA is associated with increased sputum production and wheezing.  Of note patient was followed by pulmonology in the past and had been on inhalers however due to cost has not had them in over 6 to 8 weeks.  Patient does endorse using his rescue inhaler up to 2-3 daily.   Patient also reports dizziness and vertigo when he turns from side to side.    Of note: Patient voiced desire to be a   Full code  Patient nominated Julissa Ryan  to be their Healthcare surrogate,  contact information is [phone]    CONCURRENT MEDICAL HISTORY:  Past Medical History:   Diagnosis Date   • AAA (abdominal aortic aneurysm) (CMS/[facility])    • Acute bacterial sinusitis    • Acute bronchitis    • Acute frontal sinusitis    • Acute maxillary sinusitis    • Acute pharyngitis    • Allergic rhinitis    • Allergic rhinitis due to pollen    • Anxiety    • Artificial lens present     in position   • Asthma    • Backache    • Borderline glaucoma    • C. difficile diarrhea    • Chronic laryngitis    • Chronic rhinitis    • COPD (chronic obstructive pulmonary disease) (CMS/[facility])    • Coronary artery disease    • Cough    • Degeneration of lumbar intervertebral disc    • Degenerative joint disease involving multiple joints    • Diabetes " mellitus (CMS/HCC)    • Diverticular disease of colon    • Dizziness and giddiness    • Elevated cholesterol    • Erectile dysfunction    • Essential hypertension    • Generalized anxiety disorder    • GERD (gastroesophageal reflux disease)    • Glaucoma    • Hemorrhoids     without bleeding   • Insomnia    • Kidney stone    • Kidney stones    • Malignant tumor of prostate (CMS/HCC)    • Need for prophylactic vaccination and inoculation against influenza    • Osteoarthritis    • Osteoarthritis of multiple joints    • Pain, lumbar region     Pain radiating to lumbar region of back     • PONV (postoperative nausea and vomiting)    • Postviral fatigue syndrome    • Presence of aortocoronary bypass graft    • Prostate cancer (CMS/HCC)    • Pseudomembranous enterocolitis     improved   • Rotator cuff syndrome     right   • Shoulder pain    • SOB (shortness of breath)    • Tenosynovitis    • Thrombocytopenia (CMS/HCC)    • Upper respiratory infection        PAST SURGICAL HISTORY:  Past Surgical History:   Procedure Laterality Date   • CARDIAC CATHETERIZATION  09/25/2003    Cardiac cath (Normal left ventricular systolic function, EF 60% Multi-vessel coronary artery disease with critical disease noted in the LAD coronary artery, diagonal coronary artery, obtuse marginal coronary and right coronary artery.)   • CARDIAC CATHETERIZATION  05/03/1996    Cardiac cath (Coronary atherosclerotic heart disease. 70% diagonal stenosis. Mild to moderate left anterior descending artery stenosis. Preserved left ventricular function.)   • CARDIAC CATHETERIZATION N/A 2/26/2018    Procedure: Left Heart Cath/ pci if indicated ;  Surgeon: Radha Wilkes MD;  Location: [facility] INVASIVE LOCATION;  Service:    • CATARACT EXTRACTION Bilateral    • CATARACT EXTRACTION  10/04/2011    Remove cataract, insert lens (Right)   • CATARACT EXTRACTION  08/23/2011    Remove cataract, insert lens (left)   • COLONOSCOPY     • COLONOSCOPY       Colon endoscopy 33533 (Rectal bleeding. Radiation proctitis w/bleeding. An abnormal CT of transverse colon due to mucosal ischemic colitis, that now is healed. Internal hemorrhoids w/possible bleeding.)   • COLONOSCOPY  05/10/2011    Colon endoscopy 66249 (Single sessile polyp found in transverse colon and sigmoid colon ,removed by cold biopsy polypectomy.divertic. found in sigmoid colon,descending colon. Friability/capillary friability in rectum sigmoid due to prior radiation.Inter. hem. Grade 1)   • COLONOSCOPY  05/02/2007    Colon endoscopy 37509 (Colon polyp. Diverticulosis without bleeding. Internal hemorrhoids without bleeding.)   • CORONARY ARTERY BYPASS GRAFT  2002   • CYSTOSCOPY  01/13/1995    Cystoscopy, stone removal (Right ureteroscopic lasertripsy.)   • CYSTOSCOPY Right 1/23/2019    Procedure: CYSTOSCOPY, RIGHT STENT REMOVAL;  Surgeon: Nirmal Gaines MD;  Location: [facility];  Service: Urology   • CYSTOSCOPY, URETEROSCOPY, RETROGRADE PYELOGRAM, STENT INSERTION N/A 8/28/2017    Procedure: URETEROSCOPY LASER LITHOTRIPSY WITH STENT INSERTION AND RETROGRADE PYELOGRAM ;  Surgeon: Anna M. D'Amico, MD;  Location: [facility];  Service:    • CYSTOSCOPY, URETEROSCOPY, RETROGRADE PYELOGRAM, STENT INSERTION Right 1/11/2019    Procedure: CYSTOSCOPY URETEROSCOPY RETROGRADE PYELOGRAM HOLMIUM LASER STENT INSERTION;  Surgeon: Nirmal Gaines MD;  Location: [facility];  Service: Urology   • EPIDURAL  12/03/2014    Therapeutic/diag injection (Lumbar transforaminal epidural steroid injection, L5-S1, left side.)   • EPIDURAL  10/22/2014    Therapeutic/diag injection (Lumbar transforaminal epidural steroid injection L5-S1 left side.)   • EPIDURAL  08/07/2014    Therapeutic/diag injection (Lumbar transforaminal epidural steroid injection.)   • EXCISION LESION  05/13/1999    REMOVE EAR LESION 52272 (1.3 cm basal cell carcinoma, right preauricular area. Excision)   • EXCISION LESION  03/13/2001    REMOVE LESION NECK/CHEST  22821 (Excision of irritated seborrheic keratosis, anterior neck, 1.1 cm and deep lipoma, posterior neck, 3.5 cm)   • INJECTION OF MEDICATION  11/15/2012    Kenalog (4)      • JOINT REPLACEMENT  2015    partial   • KNEE ARTHROSCOPY  01/17/2007    Knee arthroscopy, surgery (Arthroscopy with medial and lateral meniscectomies, right knee.)   • KNEE SURGERY  11/04/2015    Knee Surgery (Right unicompartmental arthroplasty.)   • LUMBAR LAMINECTOMY N/A 2/7/2017    Procedure: LUMBAR LAMINECTOMY LUMBAR THREE-FOUR, LUMBAR FOUR-FIVE, INTERLAMINAR DISTRACTION ;  Surgeon: Lucio Mayo MD;  Location: [facility];  Service:    • NOSE SURGERY     • OTHER SURGICAL HISTORY      EXTENDED VISUAL FIELDS STUDY 66231 (Borderline glaucoma) (3): 03/19/2015, 04/09/2014, 03/27/2013   • OTHER SURGICAL HISTORY  10/29/2003    Heart revascularize (TMR) (Directmyocardial revascularization times four; LIMA to LAD SVG to DARIUSZ SVG to OM1, SVG to RCA)   • OTHER SURGICAL HISTORY      OCT DISC NFL 41402 (Borderline glaucoma)  (3): 09/24/2015, 08/13/2014, 07/29/2013   • PROSTATECTOMY     • SEPTORHINOPLASTY  11/16/1989    Nasal surgery procedure (Septorhinoplasty with reconstruction of the nasal pyramid with a iliac crest graft, rhinoplasty was performed with external approach.)   • SHOULDER ARTHROSCOPY  07/24/2013    Arthroscopy of right shoulder with rotator repair, Carla procedure, Biceps tenotomy, subacromial decompression.   • SHOULDER SURGERY  11/01/2005    Shoulder surgery procedure (Decompression, subacromial, explore of the rotator cuff, no tear found nor repaired, acromioplasty of the left shoulder and AC shoulder joint resection.)       FAMILY HISTORY:  Family History   Problem Relation Age of Onset   • Hypertension Mother    • Heart disease Mother    • Arthritis Mother    • Cancer Other    • Heart disease Other    • Hypertension Other         SOCIAL HISTORY:  Social History     Socioeconomic History   • Marital status:       Spouse name: Not on file   • Number of children: Not on file   • Years of education: Not on file   • Highest education level: Not on file   Tobacco Use   • Smoking status: Light Tobacco Smoker     Packs/day: 0.50     Years: 40.00     Pack years: 20.00     Types: Cigarettes   • Smokeless tobacco: Never Used   Substance and Sexual Activity   • Alcohol use: Yes     Comment: socially   • Drug use: No   • Sexual activity: Defer       HOME MEDICATIONS:  Prior to Admission medications    Medication Sig Start Date End Date Taking? Authorizing Provider   pregabalin (LYRICA) 50 MG capsule Take 50 mg by mouth 2 (Two) Times a Day.   Yes Vicky Kent MD   acetaminophen (TYLENOL) 500 MG tablet Take 1,000 mg by mouth Every 6 (Six) Hours As Needed.    Vicky Kent MD   albuterol sulfate  (90 Base) MCG/ACT inhaler Inhale 2 puffs Every 4 (Four) Hours As Needed for Wheezing or Shortness of Air. 10/23/20   Camryn Koehler MD   atorvastatin (LIPITOR) 40 MG tablet Take 1 tablet by mouth Daily. 4/16/20   Radha Wilkes MD   carvedilol (COREG) 3.125 MG tablet Take 1 tablet by mouth Every 12 (Twelve) Hours. 7/8/20   Radha Wilkes MD   clopidogrel (PLAVIX) 75 MG tablet Take 75 mg by mouth Daily.    Vicky Kent MD   Cyanocobalamin (VITAMIN B 12 PO) Take 500 mcg by mouth Daily. Three times weekly, MoWeFr    Vicky Kent MD   cyclobenzaprine (FLEXERIL) 10 MG tablet Take 1 tablet by mouth 3 (Three) Times a Day As Needed for Muscle Spasms. 1/6/21   Cordell Guardado MD   dilTIAZem CD (CARDIZEM CD) 180 MG 24 hr capsule Take 1 capsule by mouth Daily. 3/20/20   Radha Wilkes MD   DULoxetine (CYMBALTA) 30 MG capsule Take 30 mg by mouth Daily. 9/20/19   Vicky Kent MD   fluticasone (FLONASE) 50 MCG/ACT nasal spray 2 sprays into each nostril Every Night. 2/14/18   Camryn Koehler MD   isosorbide mononitrate (IMDUR) 30 MG 24 hr tablet Take 1 tablet by  mouth Daily. 2/28/18   Julio Cesar Moss APRN   latanoprost (XALATAN) 0.005 % ophthalmic solution Administer 1 drop to both eyes every night. 4/1/16   Vicky Kent MD   losartan (COZAAR) 50 MG tablet TAKE 1 TABLET EVERY NIGHT 12/7/20   Radha Wilkes MD   meclizine (ANTIVERT) 25 MG tablet meclizine 25 mg tablet    Vicky Kent MD   meloxicam (MOBIC) 15 MG tablet Take 1 tablet by mouth Daily. Take with meal daily 3/14/19   Nirmal Paez MD   metFORMIN (GLUCOPHAGE) 1000 MG tablet Take 1 tablet by mouth 2 (Two) Times a Day With Meals. 2/28/18   Julio Cesar Moss APRN   Multiple Vitamins-Minerals (CENTRUM PO) Take 1 tablet by mouth daily. Centrum 0.4 mg-162 mg-18 mg tablet  (unconfirmed) 11/16/15   Vicky Kent MD   nitroglycerin (NITROSTAT) 0.4 MG SL tablet Place 1 tablet under the tongue Every 5 (Five) Minutes As Needed for Chest Pain. Take no more than 3 doses in 15 minutes. 2/27/18   Julio Cesar Moss APRN   omeprazole (priLOSEC) 20 MG capsule Take 20 mg by mouth Daily.    Vicky Kent MD   oxybutynin XL (DITROPAN-XL) 5 MG 24 hr tablet Take 5 mg by mouth Daily.    Vicky Kent MD   travoprost, BAK free, (TRAVATAN) 0.004 % solution ophthalmic solution 1 drop.    Vicky Kent MD   gabapentin (NEURONTIN) 600 MG tablet Take 600 mg by mouth 4 (Four) Times a Day.  2/9/21  Vicky Kent MD       ALLERGIES:  Molds & smuts and Hydrocodone-acetaminophen    REVIEW OF SYSTEMS  Review of Systems   Constitutional: Positive for fatigue and unexpected weight change. Negative for activity change, appetite change, chills and fever.   HENT: Negative for congestion, hearing loss, sinus pressure, sinus pain, sneezing, sore throat and trouble swallowing.    Eyes: Negative for pain, discharge and itching.   Respiratory: Positive for cough and shortness of breath. Negative for apnea, choking, chest tightness, wheezing and stridor.     Cardiovascular: Negative for chest pain, palpitations and leg swelling.   Gastrointestinal: Positive for nausea. Negative for abdominal distention, abdominal pain, anal bleeding, constipation, diarrhea and vomiting.   Genitourinary: Negative for difficulty urinating, dysuria, enuresis and flank pain.   Musculoskeletal: Positive for back pain. Negative for arthralgias and gait problem.   Skin: Negative for color change.   Neurological: Positive for dizziness. Negative for seizures, syncope, facial asymmetry, light-headedness, numbness and headaches.   Psychiatric/Behavioral: Negative for agitation and behavioral problems.       PHYSICAL EXAM:  Temp:  [96.2 °F (35.7 °C)-97.9 °F (36.6 °C)] 97.5 °F (36.4 °C)  Heart Rate:  [] 73  Resp:  [18-26] 18  BP: (110-183)/(59-88) 110/59  Body mass index is 23.19 kg/m².  Physical Exam  Constitutional:       General: He is not in acute distress.     Appearance: He is well-developed. He is not diaphoretic.   HENT:      Head: Normocephalic and atraumatic.      Right Ear: External ear normal.      Left Ear: External ear normal.      Nose: Nose normal.   Eyes:      General:         Right eye: No discharge.         Left eye: No discharge.      Pupils: Pupils are equal, round, and reactive to light.   Neck:      Musculoskeletal: Normal range of motion and neck supple.      Thyroid: No thyromegaly.      Trachea: No tracheal deviation.   Cardiovascular:      Rate and Rhythm: Normal rate and regular rhythm.      Heart sounds: Normal heart sounds.   Pulmonary:      Effort: Pulmonary effort is normal. No respiratory distress.      Breath sounds: No stridor. Examination of the right-middle field reveals wheezing. Examination of the left-middle field reveals wheezing. Examination of the right-lower field reveals wheezing. Examination of the left-lower field reveals wheezing. Wheezing present. No rales.   Abdominal:      General: Bowel sounds are normal. There is no distension.       Palpations: Abdomen is soft.      Tenderness: There is no abdominal tenderness.   Musculoskeletal: Normal range of motion.         General: No tenderness or deformity.      Right lower leg: No edema.      Left lower leg: No edema.   Lymphadenopathy:      Cervical: No cervical adenopathy.   Skin:     General: Skin is warm and dry.   Neurological:      Mental Status: He is alert and oriented to person, place, and time.      Cranial Nerves: No cranial nerve deficit.      Comments: Cn2-12 grossly intact         DIAGNOSTIC DATA:   Lab Results (last 24 hours)     Procedure Component Value Units Date/Time    Comprehensive Metabolic Panel [[id number]]  (Abnormal) Collected: 02/10/21 0526    Specimen: Blood Updated: 02/10/21 0559     Glucose 82 mg/dL      BUN 23 mg/dL      Creatinine 0.93 mg/dL      Sodium 139 mmol/L      Potassium 4.3 mmol/L      Chloride 105 mmol/L      CO2 27.0 mmol/L      Calcium 9.0 mg/dL      Total Protein 5.8 g/dL      Albumin 3.10 g/dL      ALT (SGPT) 14 U/L      AST (SGOT) 14 U/L      Alkaline Phosphatase 45 U/L      Total Bilirubin 0.3 mg/dL      eGFR Non African Amer 78 mL/min/1.73      Globulin 2.7 gm/dL      A/G Ratio 1.1 g/dL      BUN/Creatinine Ratio 24.7     Anion Gap 7.0 mmol/L     Narrative:      GFR Normal >60  Chronic Kidney Disease <60  Kidney Failure <15      Magnesium [[id number]]  (Normal) Collected: 02/10/21 0526    Specimen: Blood Updated: 02/10/21 0559     Magnesium 2.0 mg/dL     CBC Auto Differential [[id number]]  (Abnormal) Collected: 02/10/21 0526    Specimen: Blood Updated: 02/10/21 0542     WBC 11.81 10*3/mm3      RBC 2.14 10*6/mm3      Hemoglobin 8.4 g/dL      Hematocrit 24.7 %      .4 fL      MCH 39.3 pg      MCHC 34.0 g/dL      RDW 17.8 %      RDW-SD 67.4 fl      MPV 9.0 fL      Platelets 305 10*3/mm3      Neutrophil % 70.6 %      Lymphocyte % 12.1 %      Monocyte % 10.7 %      Eosinophil % 1.4 %      Basophil % 0.6 %      Immature Grans % 4.6 %      Neutrophils,  Absolute 8.35 10*3/mm3      Lymphocytes, Absolute 1.43 10*3/mm3      Monocytes, Absolute 1.26 10*3/mm3      Eosinophils, Absolute 0.16 10*3/mm3      Basophils, Absolute 0.07 10*3/mm3      Immature Grans, Absolute 0.54 10*3/mm3      nRBC 0.3 /100 WBC     Magnesium [[id number]]  (Abnormal) Collected: 02/09/21 2026    Specimen: Blood Updated: 02/09/21 2100     Magnesium 2.5 mg/dL     POC Glucose Once [[id number]]  (Abnormal) Collected: 02/09/21 2012    Specimen: Blood Updated: 02/09/21 2027     Glucose 154 mg/dL      Comment: RN NotifiedOperator: [id number] NOAH REINAMeter ID: [id number]       Magnesium [[id number]]  (Abnormal) Collected: 02/09/21 1930    Specimen: Blood Updated: 02/09/21 2013     Magnesium 2.5 mg/dL     Vitamin B12 [[id number]] Collected: 02/09/21 1412    Specimen: Blood Updated: 02/09/21 1921    TSH [[id number]]  (Normal) Collected: 02/09/21 1457    Specimen: Blood Updated: 02/09/21 1800     TSH 0.679 uIU/mL     Lipid Panel [[id number]]  (Abnormal) Collected: 02/09/21 1457    Specimen: Blood Updated: 02/09/21 1752     Total Cholesterol 98 mg/dL      Triglycerides 147 mg/dL      HDL Cholesterol 29 mg/dL      LDL Cholesterol  43 mg/dL      VLDL Cholesterol 26 mg/dL      LDL/HDL Ratio 1.37    Narrative:      Cholesterol Reference Ranges  (U.S. Department of Health and Human Services ATP III Classifications)    Desirable          <200 mg/dL  Borderline High    200-239 mg/dL  High Risk          >240 mg/dL      Triglyceride Reference Ranges  (U.S. Department of Health and Human Services ATP III Classifications)    Normal           <150 mg/dL  Borderline High  150-199 mg/dL  High             200-499 mg/dL  Very High        >500 mg/dL    HDL Reference Ranges  (U.S. Department of Health and Human Services ATP III Classifcations)    Low     <40 mg/dl (major risk factor for CHD)  High    >60 mg/dl ('negative' risk factor for CHD)        LDL Reference Ranges  (U.S. Department of Health and Human Services ATP III  Classifcations)    Optimal          <100 mg/dL  Near Optimal     100-129 mg/dL  Borderline High  130-159 mg/dL  High             160-189 mg/dL  Very High        >189 mg/dL    Hemoglobin A1c [[id number]]  (Abnormal) Collected: 02/09/21 1405    Specimen: Blood Updated: 02/09/21 1752     Hemoglobin A1C 7.80 %     Narrative:      Hemoglobin A1C Ranges:    Increased Risk for Diabetes  5.7% to 6.4%  Diabetes                     >= 6.5%  Diabetic Goal                < 7.0%    POC Glucose Once [[id number]]  (Abnormal) Collected: 02/09/21 1711    Specimen: Blood Updated: 02/09/21 1723     Glucose 151 mg/dL      Comment: RN NotifiedOperator: [id number] LUDIN LEE ANNMeter ID: [id number]       Urinalysis, Microscopic Only - Urine, Clean Catch [[id number]] Collected: 02/09/21 1545    Specimen: Urine, Clean Catch Updated: 02/09/21 1621     RBC, UA None Seen /HPF      WBC, UA 0-2 /HPF      Bacteria, UA None Seen /HPF      Squamous Epithelial Cells, UA 0-2 /HPF      Hyaline Casts, UA 21-30 /LPF      Methodology Manual Light Microscopy    Urinalysis With Microscopic If Indicated (No Culture) - Urine, Clean Catch [[id number]]  (Abnormal) Collected: 02/09/21 1545    Specimen: Urine, Clean Catch Updated: 02/09/21 1610     Color, UA Dark Yellow     Appearance, UA Clear     pH, UA <=5.0     Specific Gravity, UA 1.037     Glucose, UA Negative     Ketones, UA Trace     Bilirubin, UA Small (1+)     Blood, UA Negative     Protein, UA 30 mg/dL (1+)     Leuk Esterase, UA Negative     Nitrite, UA Negative     Urobilinogen, UA 1.0 E.U./dL    COVID-19 and FLU A/B PCR - Swab, Nasopharynx [[id number]]  (Normal) Collected: 02/09/21 1504    Specimen: Swab from Nasopharynx Updated: 02/09/21 1543     COVID19 Not Detected     Influenza A PCR Not Detected     Influenza B PCR Not Detected    Narrative:      Fact sheet for providers: https://www.fda.gov/media/269175/download    Fact sheet for patients: https://www.fda.gov/media/685380/download    Test  performed by PCR.    Troponin [[id number]]  (Normal) Collected: 02/09/21 1457    Specimen: Blood Updated: 02/09/21 1536     Troponin T <0.010 ng/mL     Narrative:      Troponin T Reference Range:  <= 0.03 ng/mL-   Negative for AMI  >0.03 ng/mL-     Abnormal for myocardial necrosis.  Clinicians would have to utilize clinical acumen, EKG, Troponin and serial changes to determine if it is an Acute Myocardial Infarction or myocardial injury due to an underlying chronic condition.       Results may be falsely decreased if patient taking Biotin.      Magnesium [[id number]]  (Abnormal) Collected: 02/09/21 1457    Specimen: Blood Updated: 02/09/21 1532     Magnesium 1.0 mg/dL     Manual Differential [[id number]]  (Abnormal) Collected: 02/09/21 1405    Specimen: Blood Updated: 02/09/21 1523     Neutrophil % 60.0 %      Lymphocyte % 9.0 %      Monocyte % 15.0 %      Eosinophil % 1.0 %      Bands %  12.0 %      Metamyelocyte % 1.0 %      Myelocyte % 2.0 %      Neutrophils Absolute 11.33 10*3/mm3      Lymphocytes Absolute 1.42 10*3/mm3      Monocytes Absolute 2.36 10*3/mm3      Eosinophils Absolute 0.16 10*3/mm3      Anisocytosis Mod/2+     Hypochromia Mod/2+     Macrocytes Mod/2+     WBC Morphology Normal     Platelet Morphology Normal    Extra Tubes [[id number]] Collected: 02/09/21 1412    Specimen: Blood Updated: 02/09/21 1516    Narrative:      The following orders were created for panel order Extra Tubes.  Procedure                               Abnormality         Status                     ---------                               -----------         ------                     Light Blue Top[[id number]]                                   Final result               Gold Top - SST[[id number]]                                   Final result                 Please view results for these tests on the individual orders.    Light Blue Top [[id number]] Collected: 02/09/21 1412    Specimen: Blood Updated: 02/09/21 1516     Extra Tube hold  Home for add-on     Comment: Auto resulted       Gold Top - SST [[id number]] Collected: 02/09/21 1412    Specimen: Blood Updated: 02/09/21 1516     Extra Tube Hold for add-ons.     Comment: Auto resulted.       Comprehensive Metabolic Panel [[id number]]  (Abnormal) Collected: 02/09/21 1405    Specimen: Blood Updated: 02/09/21 1436     Glucose 234 mg/dL      BUN 21 mg/dL      Creatinine 1.12 mg/dL      Sodium 136 mmol/L      Potassium 4.2 mmol/L      Chloride 102 mmol/L      CO2 21.0 mmol/L      Calcium 9.4 mg/dL      Total Protein 6.8 g/dL      Albumin 3.50 g/dL      ALT (SGPT) 17 U/L      AST (SGOT) 15 U/L      Alkaline Phosphatase 52 U/L      Total Bilirubin 0.4 mg/dL      eGFR Non African Amer 63 mL/min/1.73      Globulin 3.3 gm/dL      A/G Ratio 1.1 g/dL      BUN/Creatinine Ratio 18.8     Anion Gap 13.0 mmol/L     Narrative:      GFR Normal >60  Chronic Kidney Disease <60  Kidney Failure <15      Troponin [[id number]]  (Normal) Collected: 02/09/21 1405    Specimen: Blood Updated: 02/09/21 1435     Troponin T <0.010 ng/mL     Narrative:      Troponin T Reference Range:  <= 0.03 ng/mL-   Negative for AMI  >0.03 ng/mL-     Abnormal for myocardial necrosis.  Clinicians would have to utilize clinical acumen, EKG, Troponin and serial changes to determine if it is an Acute Myocardial Infarction or myocardial injury due to an underlying chronic condition.       Results may be falsely decreased if patient taking Biotin.      BNP [[id number]]  (Normal) Collected: 02/09/21 1405    Specimen: Blood Updated: 02/09/21 1433     proBNP 488.9 pg/mL     Narrative:      Among patients with dyspnea, NT-proBNP is highly sensitive for the detection of acute congestive heart failure. In addition NT-proBNP of <300 pg/ml effectively rules out acute congestive heart failure with 99% negative predictive value.    Results may be falsely decreased if patient taking Biotin.      CBC & Differential [[id number]]  (Abnormal) Collected: 02/09/21 1405     Specimen: Blood Updated: 02/09/21 1416    Narrative:      The following orders were created for panel order CBC & Differential.  Procedure                               Abnormality         Status                     ---------                               -----------         ------                     Scan Slide[[id number]]                                                                  CBC Auto Differential[[id number]]        Abnormal            Final result                 Please view results for these tests on the individual orders.    CBC Auto Differential [[id number]]  (Abnormal) Collected: 02/09/21 1405    Specimen: Blood Updated: 02/09/21 1416     WBC 15.73 10*3/mm3      RBC 2.50 10*6/mm3      Hemoglobin 9.8 g/dL      Hematocrit 29.0 %      .0 fL      MCH 39.2 pg      MCHC 33.8 g/dL      RDW 17.9 %      RDW-SD 69.4 fl      MPV 9.3 fL      Platelets 373 10*3/mm3      Neutrophil % 73.2 %      Lymphocyte % 9.2 %      Monocyte % 11.8 %      Eosinophil % 0.8 %      Basophil % 0.8 %      Immature Grans % 4.2 %      Neutrophils, Absolute 11.52 10*3/mm3      Lymphocytes, Absolute 1.45 10*3/mm3      Monocytes, Absolute 1.85 10*3/mm3      Eosinophils, Absolute 0.13 10*3/mm3      Basophils, Absolute 0.12 10*3/mm3      Immature Grans, Absolute 0.66 10*3/mm3      nRBC 1.0 /100 WBC            Imaging Results (Last 24 Hours)     Procedure Component Value Units Date/Time    XR Chest 1 View [[id number]] Collected: 02/09/21 1416     Updated: 02/09/21 1448    Narrative:      EXAM DESCRIPTION:     XR CHEST 1 VW    CLINICAL HISTORY:     81 years  Male  short of breath    COMPARISON:     February 24, 2018    TECHNIQUE:     One view-AP portable radiograph of the chest    FINDINGS:     The lungs are well-expanded and clear. The cardiac silhouette and  pulmonary vasculature are within normal limits. There is evidence  of prior median sternotomy. There are no pleural effusions.      Impression:        1. Stable appearance the  "chest without radiographic evidence of  acute cardiopulmonary disease.        Electronically signed by:  Isa Douglass MD  2/9/2021 2:46 PM CST  Workstation: [id number]            I reviewed the patient's new clinical results.    ASSESSMENT AND PLAN: This is a 81 y.o. male with:    Active Hospital Problems    Diagnosis POA   • **COPD exacerbation (CMS/Formerly [facility]) [J44.1] Yes     -duoneb  -albuterol   -replace magnesium  -Prednisone 40 mg daily x5 days   -talk to pharmacist for medication cost      • Hypomagnesemia [E83.42] Yes     -Mag 1.0 on admission  -mag 4 g IVPB day of admission  -monitor mag daily      • AAA (abdominal aortic aneurysm) (CMS/Formerly [facility]) [I71.4] Yes     - is followed by Dr Ocasio        • Renal mass [N28.89] Yes     \"There is a hyperdense exophytic left renal  mass measuring 1.1 cm which is present in the previous study.  There are two nonobstructing calculi in the upper left kidney the  largest measures 5 mm of. No hydronephrosis or ureteral calculi.\" seen on imaging on CT 6/2/20  -consider outpatient follow up      • BPPV (benign paroxysmal positional vertigo) [H81.10] Yes     -consult PT - for epleys and francisco      • Degenerative disc disease, lumbar [M51.36] Unknown     -hx of DDD  -order Pt/Ot  -increased lyrica to 75 mg BID      • Chronic pain of right knee [M25.561, G89.29] Yes     On lyrica- home dose was 50 mg BID  -will increase to 75 mg nightly BID     • History of coronary artery bypass surgery [Z95.1] Not Applicable       2003 had 4 vessel CABG  -will monitor      • Essential hypertension [I10] Yes     Will continue home dose Cozaar 50 mg nightly, Cardizem 180 mg daily, Coreg 3.125 mg twice daily\  -We will monitor and adjust medications as needed  -If SBP is greater than 180 consider IV hydralazine     • Degeneration of lumbar intervertebral disc [M51.36] Yes     Hx of chronic pain   -sees pain management   -increased home dose lyrica to 75mg BID     • Type 2 diabetes mellitus, without " long-term current use of insulin (CMS/[facility]) [E11.9] Yes     Patient is on metformin 1000 mg twice daily we will hold at this time  Start patient on basal/bolus insulin dosing-  20 units Levemir and 7 units 3 times daily AC with SSI.  -HbA1c 7.8 on admission          I will continue to monitor and adjust patient's care as need   DVT prophylaxis: Lovenox    BASHIR -  Reviewed as per Epic PDMP  Reviewed and consistent with patient reported medications.    Expected Length of Stay: Place of Residence in 1-2 days     I discussed the patient's findings and my recommendations with patient and family.     Vishnu Waller and I have discussed pain goals for this hospitalization after reviewing their current clinical condition, medical history and prior pain experiences.  The goal is to keep the pain level at a 2-3/10.  To help achieve this, I plan to prescribe Tylenol and consider opioid medications if medically appropriate.     Dr. Ivy  is the attending on record at time of admission and is aware of the patient's status and agrees with the above history and physical.      Signed,   Akbar Zapien MD  Family Medicine Resident PGY3  [facility]        This document has been electronically signed by Akbar Zapien MD on February 10, 2021 06:32 CST    Part of this note may be an electronic transcription/translation of spoken language to printed text using the Dragon Dictation System.        Electronically signed by Akbar Zapien MD at 02/10/21 0632

## 2021-11-02 NOTE — TELEPHONE ENCOUNTER
Rx Refill Note  Requested Prescriptions     Pending Prescriptions Disp Refills   • ferrous sulfate 325 (65 FE) MG tablet [Pharmacy Med Name: FERROUS SULFATE 325 MG TABLET] 90 tablet 1     Sig: TAKE 1 TABLET BY MOUTH EVERY DAY WITH BREAKFAST      Last office visit with prescribing clinician: 8/20/2021      Next office visit with prescribing clinician: 11/18/2021            Cari Burrell RN  11/02/21, 13:05 CDT

## 2021-11-03 ENCOUNTER — TELEPHONE (OUTPATIENT)
Dept: ONCOLOGY | Facility: CLINIC | Age: 81
End: 2021-11-03

## 2021-11-03 NOTE — TELEPHONE ENCOUNTER
Incoming Refill Request      Medication requested (name and dose): ferrous sulfate 325 (65 FE) mg    [facility]    Additional details provided by patient: patient is wanting a 90 day supply instead of 30 days. He is down to 10 pills    Best call back number: [phone]    Does the patient have less than a 3 day supply:  [] Yes  [x] No    Elyssa Barker  11/03/21, 13:36 CDT

## 2021-11-04 ENCOUNTER — OFFICE VISIT (OUTPATIENT)
Dept: CARDIOLOGY | Facility: CLINIC | Age: 81
End: 2021-11-04

## 2021-11-04 VITALS
HEART RATE: 72 BPM | DIASTOLIC BLOOD PRESSURE: 72 MMHG | OXYGEN SATURATION: 94 % | WEIGHT: 145 LBS | TEMPERATURE: 96.8 F | HEIGHT: 63 IN | SYSTOLIC BLOOD PRESSURE: 140 MMHG | BODY MASS INDEX: 25.69 KG/M2

## 2021-11-04 DIAGNOSIS — I10 ESSENTIAL HYPERTENSION: ICD-10-CM

## 2021-11-04 DIAGNOSIS — Z95.1 HISTORY OF CORONARY ARTERY BYPASS SURGERY: ICD-10-CM

## 2021-11-04 DIAGNOSIS — I71.20 THORACIC AORTIC ANEURYSM WITHOUT RUPTURE (HCC): ICD-10-CM

## 2021-11-04 DIAGNOSIS — E78.2 MIXED HYPERLIPIDEMIA: ICD-10-CM

## 2021-11-04 DIAGNOSIS — I25.10 CORONARY ARTERY DISEASE INVOLVING NATIVE CORONARY ARTERY OF NATIVE HEART WITHOUT ANGINA PECTORIS: Primary | ICD-10-CM

## 2021-11-04 PROCEDURE — 99213 OFFICE O/P EST LOW 20 MIN: CPT | Performed by: INTERNAL MEDICINE

## 2021-11-04 RX ORDER — FERROUS SULFATE 325(65) MG
325 TABLET ORAL
Qty: 30 TABLET | Refills: 3 | Status: SHIPPED | OUTPATIENT
Start: 2021-11-04 | End: 2022-01-01

## 2021-11-04 RX ORDER — FERROUS SULFATE 325(65) MG
TABLET ORAL
Qty: 90 TABLET | Refills: 1 | Status: SHIPPED | OUTPATIENT
Start: 2021-11-04 | End: 2022-01-01 | Stop reason: SDUPTHER

## 2021-11-04 RX ORDER — OXYBUTYNIN CHLORIDE 5 MG/1
TABLET ORAL
COMMUNITY
Start: 2021-10-06 | End: 2021-11-04 | Stop reason: SDUPTHER

## 2021-11-04 NOTE — PROGRESS NOTES
Vishnu Waller  81 y.o. male      1. Coronary artery disease involving native coronary artery of native heart without angina pectoris    2. Mixed hyperlipidemia    3. Essential hypertension    4. Thoracic aortic aneurysm without rupture (HCC)    5. History of coronary artery bypass surgery        History of Present Illness:  Mr. Waller is a 81-year-old  male with the history of diabetes mellitus, hypertension, hyperlipidemia, COPD, tobacco abuse, CAD status post CABG in 2003 when he received LIMA to LAD, SVG to diagonal, SVG to OM 2 and SVG to distal right coronary artery.  In February 2018 he presented with prolonged chest pain and ruled in for a non-ST segment elevation myocardial infarctions and cardiac catheterization showed the following findings:  Impression:  99% eccentric lesion noted in the proximal segment of the SVG to distal right coronary artery graft. Successful PCI with placement of a 4.0 x 18 mm Xience Alpine stent.  Patent LIMA to Left Anterior Descending Coronary Artery.  Native LAD beyond the anastomosis was widely patent  Severe native vessel coronary artery disease with 100% occlusion of the mid LAD, mid right coronary artery.  A small to medium-sized tortuous circumflex had a proximal lesion up to 99%.  Occluded SVG to diagonal and occluded SVG to obtuse marginal 2  Ascending aortogram showing ectatic ascending aorta.    Echocardiogram in October 2020 showed overall normal LV systolic function with an EF of 53% with late 1 diastolic dysfunction.  Right ventricle was mildly dilated.  RVSP was 35 to 45 mmHg.  There was moderate dilatation of the proximal aorta measuring 4.6 to 4.7 cm.  There was mild aortic valve insufficiency.    Patient is stable with no chest pain at the present time.  He has been very compliant with the medication.  He is a non-smoker at the present time.  Clinical exam today did not reveal any bronchospasm or signs of congestive heart failure.  He does have  back pain for which he is being considered for epidural steroid injections.    Allergies   Allergen Reactions   • Molds & Smuts Other (See Comments)     Sinus infection   • Hydrocodone-Acetaminophen Itching         Past Medical History:   Diagnosis Date   • Acute bacterial sinusitis    • Acute bronchitis    • Acute frontal sinusitis    • Acute maxillary sinusitis    • Acute pharyngitis    • Allergic rhinitis    • Allergic rhinitis due to pollen    • Anxiety    • Artificial lens present     in position   • Asthma    • Backache    • Borderline glaucoma    • C. difficile diarrhea 2014   • Chronic laryngitis    • Chronic rhinitis    • Coronary artery disease    • Cough    • Degeneration of lumbar intervertebral disc    • Degenerative joint disease involving multiple joints    • Diabetes mellitus (HCC)    • Diverticular disease of colon    • Dizziness and giddiness    • Elevated cholesterol    • Erectile dysfunction    • Essential hypertension    • Generalized anxiety disorder    • GERD (gastroesophageal reflux disease)    • Glaucoma    • Hemorrhoids     without bleeding   • Insomnia    • Kidney stone    • Kidney stones    • Malignant tumor of prostate (HCC)    • Need for prophylactic vaccination and inoculation against influenza    • Osteoarthritis    • Osteoarthritis of multiple joints    • Pain, lumbar region     Pain radiating to lumbar region of back     • PONV (postoperative nausea and vomiting)    • Postviral fatigue syndrome    • Presence of aortocoronary bypass graft    • Prostate cancer (HCC)    • Pseudomembranous enterocolitis     improved   • Rotator cuff syndrome     right   • Shoulder pain    • SOB (shortness of breath)    • Tenosynovitis    • Thrombocytopenia (HCC)    • Upper respiratory infection          Past Surgical History:   Procedure Laterality Date   • CARDIAC CATHETERIZATION  09/25/2003    Cardiac cath (Normal left ventricular systolic function, EF 60% Multi-vessel coronary artery disease with  critical disease noted in the LAD coronary artery, diagonal coronary artery, obtuse marginal coronary and right coronary artery.)   • CARDIAC CATHETERIZATION  05/03/1996    Cardiac cath (Coronary atherosclerotic heart disease. 70% diagonal stenosis. Mild to moderate left anterior descending artery stenosis. Preserved left ventricular function.)   • CARDIAC CATHETERIZATION N/A 2/26/2018    Procedure: Left Heart Cath/ pci if indicated ;  Surgeon: Radha Wilkes MD;  Location: [facility] CATH INVASIVE LOCATION;  Service:    • CATARACT EXTRACTION Bilateral    • CATARACT EXTRACTION  10/04/2011    Remove cataract, insert lens (Right)   • CATARACT EXTRACTION  08/23/2011    Remove cataract, insert lens (left)   • COLONOSCOPY     • COLONOSCOPY      Colon endoscopy 71523 (Rectal bleeding. Radiation proctitis w/bleeding. An abnormal CT of transverse colon due to mucosal ischemic colitis, that now is healed. Internal hemorrhoids w/possible bleeding.)   • COLONOSCOPY  05/10/2011    Colon endoscopy 50514 (Single sessile polyp found in transverse colon and sigmoid colon ,removed by cold biopsy polypectomy.divertic. found in sigmoid colon,descending colon. Friability/capillary friability in rectum sigmoid due to prior radiation.Inter. hem. Grade 1)   • COLONOSCOPY  05/02/2007    Colon endoscopy 03807 (Colon polyp. Diverticulosis without bleeding. Internal hemorrhoids without bleeding.)   • CORONARY ARTERY BYPASS GRAFT  2002   • CYSTOSCOPY  01/13/1995    Cystoscopy, stone removal (Right ureteroscopic lasertripsy.)   • CYSTOSCOPY Right 1/23/2019    Procedure: CYSTOSCOPY, RIGHT STENT REMOVAL;  Surgeon: Nirmal Gaines MD;  Location: [facility];  Service: Urology   • CYSTOSCOPY, URETEROSCOPY, RETROGRADE PYELOGRAM, STENT INSERTION N/A 8/28/2017    Procedure: URETEROSCOPY LASER LITHOTRIPSY WITH STENT INSERTION AND RETROGRADE PYELOGRAM ;  Surgeon: Anna M. D'Amico, MD;  Location: [facility] OR;  Service:    • CYSTOSCOPY, URETEROSCOPY,  RETROGRADE PYELOGRAM, STENT INSERTION Right 1/11/2019    Procedure: CYSTOSCOPY URETEROSCOPY RETROGRADE PYELOGRAM HOLMIUM LASER STENT INSERTION;  Surgeon: Nirmal Gaines MD;  Location: [facility];  Service: Urology   • EPIDURAL  12/03/2014    Therapeutic/diag injection (Lumbar transforaminal epidural steroid injection, L5-S1, left side.)   • EPIDURAL  10/22/2014    Therapeutic/diag injection (Lumbar transforaminal epidural steroid injection L5-S1 left side.)   • EPIDURAL  08/07/2014    Therapeutic/diag injection (Lumbar transforaminal epidural steroid injection.)   • EXCISION LESION  05/13/1999    REMOVE EAR LESION 23733 (1.3 cm basal cell carcinoma, right preauricular area. Excision)   • EXCISION LESION  03/13/2001    REMOVE LESION NECK/CHEST 71919 (Excision of irritated seborrheic keratosis, anterior neck, 1.1 cm and deep lipoma, posterior neck, 3.5 cm)   • INJECTION OF MEDICATION  11/15/2012    Kenalog (4)      • JOINT REPLACEMENT  2015    partial   • KNEE ARTHROSCOPY  01/17/2007    Knee arthroscopy, surgery (Arthroscopy with medial and lateral meniscectomies, right knee.)   • KNEE SURGERY  11/04/2015    Knee Surgery (Right unicompartmental arthroplasty.)   • LUMBAR LAMINECTOMY N/A 2/7/2017    Procedure: LUMBAR LAMINECTOMY LUMBAR THREE-FOUR, LUMBAR FOUR-FIVE, INTERLAMINAR DISTRACTION ;  Surgeon: Lucio Mayo MD;  Location: [facility];  Service:    • NOSE SURGERY     • OTHER SURGICAL HISTORY      EXTENDED VISUAL FIELDS STUDY 62137 (Borderline glaucoma) (3): 03/19/2015, 04/09/2014, 03/27/2013   • OTHER SURGICAL HISTORY  10/29/2003    Heart revascularize (TMR) (Directmyocardial revascularization times four; LIMA to LAD SVG to DARIUSZ SVG to OM1, SVG to RCA)   • OTHER SURGICAL HISTORY      OCT DISC NFL 64956 (Borderline glaucoma)  (3): 09/24/2015, 08/13/2014, 07/29/2013   • PROSTATECTOMY     • SEPTORHINOPLASTY  11/16/1989    Nasal surgery procedure (Septorhinoplasty with reconstruction of the nasal pyramid  with a iliac crest graft, rhinoplasty was performed with external approach.)   • SHOULDER ARTHROSCOPY  2013    Arthroscopy of right shoulder with rotator repair, Carla procedure, Biceps tenotomy, subacromial decompression.   • SHOULDER SURGERY  2005    Shoulder surgery procedure (Decompression, subacromial, explore of the rotator cuff, no tear found nor repaired, acromioplasty of the left shoulder and AC shoulder joint resection.)         Family History   Problem Relation Age of Onset   • Hypertension Mother    • Heart disease Mother    • Arthritis Mother    • Cancer Other    • Heart disease Other    • Hypertension Other          Social History     Socioeconomic History   • Marital status: Significant Other   Tobacco Use   • Smoking status: Former Smoker     Packs/day: 0.50     Years: 40.00     Pack years: 20.00     Types: Cigarettes     Start date:      Quit date: 2020     Years since quittin.5   • Smokeless tobacco: Never Used   Vaping Use   • Vaping Use: Never used   Substance and Sexual Activity   • Alcohol use: Not Currently     Comment: socially   • Drug use: No   • Sexual activity: Defer         Current Outpatient Medications   Medication Sig Dispense Refill   • albuterol sulfate  (90 Base) MCG/ACT inhaler Inhale 2 puffs Every 4 (Four) Hours As Needed for Wheezing or Shortness of Air. 54 g 4   • atorvastatin (LIPITOR) 40 MG tablet Take 1 tablet by mouth Daily. 90 tablet 1   • carvedilol (COREG) 3.125 MG tablet TAKE 1 TABLET EVERY 12     HOURS 180 tablet 3   • clopidogrel (PLAVIX) 75 MG tablet Take 1 tablet by mouth Daily. 90 tablet 1   • Cyanocobalamin (VITAMIN B 12 PO) Take 500 mcg by mouth Daily. Three times weekly, MoWeFr     • dilTIAZem CD (CARDIZEM CD) 180 MG 24 hr capsule Take 1 capsule by mouth Daily. 90 capsule 3   • ferrous sulfate 325 (65 FE) MG tablet Take 1 tablet by mouth Daily With Breakfast. 30 tablet 3   • fluticasone (FLONASE) 50 MCG/ACT nasal spray 2 sprays  into each nostril Every Night.     • latanoprost (XALATAN) 0.005 % ophthalmic solution Administer 1 drop to both eyes every night.     • losartan (COZAAR) 50 MG tablet TAKE 1 TABLET EVERY NIGHT 90 tablet 3   • magnesium oxide (MAG-OX) 400 MG tablet Take 1 tablet by mouth 2 (Two) Times a Day.     • metFORMIN (GLUCOPHAGE) 1000 MG tablet Take 1 tablet by mouth 2 (Two) Times a Day With Meals.     • Multiple Vitamins-Minerals (CENTRUM PO) Take 1 tablet by mouth daily. Centrum 0.4 mg-162 mg-18 mg tablet  (unconfirmed)     • nitroglycerin (NITROSTAT) 0.4 MG SL tablet Place 1 tablet under the tongue Every 5 (Five) Minutes As Needed for Chest Pain. Take no more than 3 doses in 15 minutes. 30 tablet 0   • omeprazole (priLOSEC) 20 MG capsule Take 20 mg by mouth Daily.     • oxybutynin XL (DITROPAN-XL) 5 MG 24 hr tablet Take 5 mg by mouth Daily.     • predniSONE (DELTASONE) 20 MG tablet prednisone 20 mg tablet     • pregabalin (Lyrica) 150 MG capsule Every 6 (Six) Hours.     • travoprost, BAK free, (TRAVATAN) 0.004 % solution ophthalmic solution 1 drop.     • cyclobenzaprine (FLEXERIL) 10 MG tablet Take 1 tablet by mouth 3 (Three) Times a Day As Needed for Muscle Spasms. 30 tablet 0   • dilTIAZem (TIAZAC) 180 MG 24 hr capsule Take 180 mg by mouth Daily.     • docusate sodium (COLACE) 100 MG capsule Take 1 capsule by mouth 2 (Two) Times a Day As Needed for Constipation. 60 capsule 2   • DULoxetine (CYMBALTA) 30 MG capsule Take 30 mg by mouth Daily.  0   • ferrous sulfate 325 (65 FE) MG tablet TAKE 1 TABLET BY MOUTH EVERY DAY WITH BREAKFAST 90 tablet 1   • Fluticasone Furoate-Vilanterol (Breo Ellipta) 100-25 MCG/INH inhaler Inhale 1 puff Daily for 30 days. 160 each 0   • furosemide (LASIX) 20 MG tablet Take 20 mg by mouth Every Morning.     • ipratropium-albuterol (DUO-NEB) 0.5-2.5 mg/3 ml nebulizer Inhale the contents of 1 vial by nebulization Every 4 (Four) Hours As Needed for Wheezing for up to 30 days. 360 mL 3   • isosorbide  "mononitrate (IMDUR) 30 MG 24 hr tablet Take 1 tablet by mouth Daily. 30 tablet 0   • KLOR-CON 20 MEQ CR tablet TAKE 1 TABLET BY MOUTH DAILY ONLY WHEN TAKING LASIX     • oxybutynin (DITROPAN) 5 MG tablet      • pregabalin (LYRICA) 75 MG capsule Take 1 capsule by mouth 2 (Two) Times a Day for 30 days. (Patient taking differently: Take 150 mg by mouth Daily.) 60 capsule 0     No current facility-administered medications for this visit.         OBJECTIVE    /72 (BP Location: Left arm, Patient Position: Sitting, Cuff Size: Adult)   Pulse 72   Temp 96.8 °F (36 °C)   Ht 160 cm (63\")   Wt 65.8 kg (145 lb)   SpO2 94%   BMI 25.69 kg/m²         Review of Systems : The following systems were reviewed and no changes noted    Constitutional:  Denies recent weight loss, weight gain, fever or chills     HENT:  Denies any hearing loss, epistaxis, hoarseness, or difficulty speaking.     Eyes: Wears eyeglasses or contact lenses     Respiratory:  Dyspnea with exertion,no cough, wheezing, or hemoptysis.     Cardiovascular: Negative for palpations, chest pain, orthopnea, PND    Gastrointestinal:  Denies change in bowel habits, dyspepsia, ulcer disease, hematochezia, or melena.     Endocrine: Negative for cold intolerance, heat intolerance, polydipsia, polyphagia and polyuria.     Genitourinary: Negative.      Musculoskeletal: DJD, back pain    Neurological:  Denies any history of recurrent headaches, strokes, TIA, or seizure disorder.     Hematological: Denies any food allergies, seasonal allergies, bleeding disorders, or lymphadenopathy.     Psychiatric/Behavioral: Denies any history of depression, substance abuse, or change in cognitive function.     Physical Exam : The following systems were reviewed and no changes noted     Constitutional: Cooperative, alert and oriented,  in no acute distress.      HENT:   Head: Normocephalic, normal hair patterns, no masses or tenderness.  Ears, Nose, and Throat: No gross abnormalities. " No pallor or cyanosis.   Eyes: EOMS intact, PERRL, conjunctivae and lids unremarkable. Fundoscopic exam and visual fields not performed.   Neck: No palpable masses or adenopathy, no thyromegaly, no JVD, carotid pulses are full and equal bilaterally and without  Bruits.      Cardiovascular: Regular rhythm, S1 and S2 normal, no S3 or S4.  No murmurs, gallops, or rubs detected.      Pulmonary/Chest: Chest: normal symmetry,  normal respiratory excursion, no intercostal retraction, no use of accessory muscles.                                  Pulmonary: Normal breath sounds. No rales or ronchi.     Abdominal: Abdomen soft, bowel sounds normoactive, no masses, no hepatosplenomegaly, non-tender, no bruits.     Musculoskeletal: No deformities, clubbing, cyanosis, erythema, or edema observed.     Neurological: No gross motor or sensory deficits noted, affect appropriate, oriented to time, person, place.          Lab Results   Component Value Date    WBC 15.12 (H) 08/02/2021    HGB 9.5 (L) 08/02/2021    HCT 30.3 (L) 08/02/2021    .3 (H) 08/02/2021     08/02/2021     Lab Results   Component Value Date    GLUCOSE 110 (H) 08/02/2021    BUN 20 08/02/2021    CREATININE 1.08 08/02/2021    EGFRIFNONA 66 08/02/2021    BCR 18.5 08/02/2021    CO2 25.0 08/02/2021    CALCIUM 9.1 08/02/2021    PROTENTOTREF 6.8 08/02/2021    ALBUMIN 4.00 08/02/2021    ALBUMIN 3.6 08/02/2021    LABIL2 1.2 08/02/2021    AST 17 08/02/2021    ALT 11 08/02/2021     Lab Results   Component Value Date    CHOL 112 04/13/2021    CHOL 98 02/09/2021    CHOL 128 07/17/2020     Lab Results   Component Value Date    TRIG 164 (H) 04/13/2021    TRIG 147 02/09/2021    TRIG 130 07/17/2020     Lab Results   Component Value Date    HDL 37 04/13/2021    HDL 29 (L) 02/09/2021    HDL 34 07/17/2020     No components found for: LDLCALC  Lab Results   Component Value Date    LDL 51 04/13/2021    LDL 43 02/09/2021    LDL 85 07/17/2020     No results found for:  HDLLDLRATIO  No components found for: CHOLHDL  Lab Results   Component Value Date    HGBA1C 6.7 (H) 04/13/2021     Lab Results   Component Value Date    TSH 0.679 02/09/2021           ASSESSMENT AND PLAN  Mr. Waller has multiple medical issues as discussed in detail under history of present illness.  He is progressing reasonably well with no clinical evidence of angina, arrhythmia or congestive heart failure.     I have continued antihypertensive therapy with carvedilol, Cardizem CD, losartan, antianginal therapy with isosorbide mononitrate, antiplatelet therapy with Plavix, lipid-lowering therapy with atorvastatin.     He continues to follow-up with vascular surgery for his ascending aortic aneurysm.  As mentioned above, he is being considered for lumbar epidural steroid injection at L4/L5 level.  He could go off antiplatelet therapy with Plavix for 7 days prior to the procedure and the medication may be restarted when appropriate.       Diagnoses and all orders for this visit:    1. Coronary artery disease involving native coronary artery of native heart without angina pectoris (Primary)    2. Mixed hyperlipidemia    3. Essential hypertension    4. Thoracic aortic aneurysm without rupture (HCC)    5. History of coronary artery bypass surgery        Patient's Body mass index is 25.69 kg/m². BMI is within normal parameters. No follow-up required..      Vishnu Waller has h/o tobacco abuse for several decades.  He has quit smoking.      Radha Wilkes MD  11/4/2021  18:41 CDT

## 2021-11-15 ENCOUNTER — LAB (OUTPATIENT)
Dept: LAB | Facility: HOSPITAL | Age: 81
End: 2021-11-15

## 2021-11-15 DIAGNOSIS — D53.9 MACROCYTIC ANEMIA: ICD-10-CM

## 2021-11-15 DIAGNOSIS — C61 PROSTATE CANCER (HCC): ICD-10-CM

## 2021-11-15 LAB
ALBUMIN SERPL-MCNC: 4.5 G/DL (ref 3.5–5.2)
ALBUMIN/GLOB SERPL: 1.6 G/DL
ALP SERPL-CCNC: 85 U/L (ref 39–117)
ALT SERPL W P-5'-P-CCNC: 19 U/L (ref 1–41)
ANION GAP SERPL CALCULATED.3IONS-SCNC: 8 MMOL/L (ref 5–15)
ANISOCYTOSIS BLD QL: ABNORMAL
AST SERPL-CCNC: 29 U/L (ref 1–40)
BASOPHILS # BLD AUTO: 0.08 10*3/MM3 (ref 0–0.2)
BASOPHILS NFR BLD AUTO: 0.5 % (ref 0–1.5)
BILIRUB SERPL-MCNC: 0.5 MG/DL (ref 0–1.2)
BUN SERPL-MCNC: 29 MG/DL (ref 8–23)
BUN/CREAT SERPL: 24.4 (ref 7–25)
CALCIUM SPEC-SCNC: 9.7 MG/DL (ref 8.6–10.5)
CHLORIDE SERPL-SCNC: 100 MMOL/L (ref 98–107)
CO2 SERPL-SCNC: 29 MMOL/L (ref 22–29)
CREAT SERPL-MCNC: 1.19 MG/DL (ref 0.76–1.27)
DEPRECATED RDW RBC AUTO: 110.5 FL (ref 37–54)
ELLIPTOCYTES BLD QL SMEAR: ABNORMAL
EOSINOPHIL # BLD AUTO: 0.1 10*3/MM3 (ref 0–0.4)
EOSINOPHIL # BLD MANUAL: 0.46 10*3/MM3 (ref 0–0.4)
EOSINOPHIL NFR BLD AUTO: 0.7 % (ref 0.3–6.2)
EOSINOPHIL NFR BLD MANUAL: 3 % (ref 0.3–6.2)
ERYTHROCYTE [DISTWIDTH] IN BLOOD BY AUTOMATED COUNT: 31 % (ref 12.3–15.4)
FERRITIN SERPL-MCNC: 74.39 NG/ML (ref 30–400)
GFR SERPL CREATININE-BSD FRML MDRD: 59 ML/MIN/1.73
GLOBULIN UR ELPH-MCNC: 2.8 GM/DL
GLUCOSE SERPL-MCNC: 114 MG/DL (ref 65–99)
HCT VFR BLD AUTO: 30.5 % (ref 37.5–51)
HGB BLD-MCNC: 9.6 G/DL (ref 13–17.7)
HYPOCHROMIA BLD QL: ABNORMAL
IMM GRANULOCYTES # BLD AUTO: 0.21 10*3/MM3 (ref 0–0.05)
IMM GRANULOCYTES NFR BLD AUTO: 1.4 % (ref 0–0.5)
IRON 24H UR-MRATE: 177 MCG/DL (ref 59–158)
IRON SATN MFR SERPL: 41 % (ref 20–50)
LYMPHOCYTES # BLD AUTO: 1.79 10*3/MM3 (ref 0.7–3.1)
LYMPHOCYTES # BLD MANUAL: 2.74 10*3/MM3 (ref 0.7–3.1)
LYMPHOCYTES NFR BLD AUTO: 11.8 % (ref 19.6–45.3)
LYMPHOCYTES NFR BLD MANUAL: 18 % (ref 19.6–45.3)
LYMPHOCYTES NFR BLD MANUAL: 9 % (ref 5–12)
MACROCYTES BLD QL SMEAR: ABNORMAL
MCH RBC QN AUTO: 32.5 PG (ref 26.6–33)
MCHC RBC AUTO-ENTMCNC: 31.5 G/DL (ref 31.5–35.7)
MCV RBC AUTO: 103.4 FL (ref 79–97)
MONOCYTES # BLD AUTO: 1.37 10*3/MM3 (ref 0.1–0.9)
MONOCYTES # BLD AUTO: 1.8 10*3/MM3 (ref 0.1–0.9)
MONOCYTES NFR BLD AUTO: 11.8 % (ref 5–12)
NEUTROPHILS # BLD AUTO: 10.66 10*3/MM3 (ref 1.7–7)
NEUTROPHILS NFR BLD AUTO: 11.25 10*3/MM3 (ref 1.7–7)
NEUTROPHILS NFR BLD AUTO: 73.8 % (ref 42.7–76)
NEUTROPHILS NFR BLD MANUAL: 70 % (ref 42.7–76)
NRBC BLD AUTO-RTO: 0.9 /100 WBC (ref 0–0.2)
NRBC SPEC MANUAL: 2 /100 WBC (ref 0–0.2)
PAPPENHEIMER BOD BLD QL SMEAR: PRESENT
PLATELET # BLD AUTO: 363 10*3/MM3 (ref 140–450)
PMV BLD AUTO: 10.2 FL (ref 6–12)
POTASSIUM SERPL-SCNC: 5 MMOL/L (ref 3.5–5.2)
PROT SERPL-MCNC: 7.3 G/DL (ref 6–8.5)
RBC # BLD AUTO: 2.95 10*6/MM3 (ref 4.14–5.8)
SMALL PLATELETS BLD QL SMEAR: ADEQUATE
SODIUM SERPL-SCNC: 137 MMOL/L (ref 136–145)
TIBC SERPL-MCNC: 431 MCG/DL (ref 298–536)
TRANSFERRIN SERPL-MCNC: 289 MG/DL (ref 200–360)
WBC # BLD AUTO: 15.23 10*3/MM3 (ref 3.4–10.8)
WBC MORPH BLD: NORMAL

## 2021-11-15 PROCEDURE — 82746 ASSAY OF FOLIC ACID SERUM: CPT

## 2021-11-15 PROCEDURE — 84153 ASSAY OF PSA TOTAL: CPT

## 2021-11-15 PROCEDURE — 82728 ASSAY OF FERRITIN: CPT

## 2021-11-15 PROCEDURE — 80053 COMPREHEN METABOLIC PANEL: CPT

## 2021-11-15 PROCEDURE — 85007 BL SMEAR W/DIFF WBC COUNT: CPT

## 2021-11-15 PROCEDURE — 83540 ASSAY OF IRON: CPT

## 2021-11-15 PROCEDURE — 85025 COMPLETE CBC W/AUTO DIFF WBC: CPT

## 2021-11-15 PROCEDURE — 82607 VITAMIN B-12: CPT

## 2021-11-15 PROCEDURE — 36415 COLL VENOUS BLD VENIPUNCTURE: CPT

## 2021-11-15 PROCEDURE — 84466 ASSAY OF TRANSFERRIN: CPT

## 2021-11-16 LAB
FOLATE SERPL-MCNC: >20 NG/ML (ref 4.78–24.2)
PSA SERPL-MCNC: 9.96 NG/ML (ref 0–4)
VIT B12 BLD-MCNC: 976 PG/ML (ref 211–946)

## 2021-11-18 ENCOUNTER — OFFICE VISIT (OUTPATIENT)
Dept: ONCOLOGY | Facility: CLINIC | Age: 81
End: 2021-11-18

## 2021-11-18 ENCOUNTER — INFUSION (OUTPATIENT)
Dept: ONCOLOGY | Facility: HOSPITAL | Age: 81
End: 2021-11-18

## 2021-11-18 VITALS
WEIGHT: 144.6 LBS | OXYGEN SATURATION: 95 % | BODY MASS INDEX: 25.61 KG/M2 | SYSTOLIC BLOOD PRESSURE: 162 MMHG | TEMPERATURE: 96.5 F | HEART RATE: 87 BPM | DIASTOLIC BLOOD PRESSURE: 79 MMHG

## 2021-11-18 DIAGNOSIS — C61 PROSTATE CANCER (HCC): ICD-10-CM

## 2021-11-18 DIAGNOSIS — R97.20 ELEVATED PSA: Primary | ICD-10-CM

## 2021-11-18 DIAGNOSIS — R77.8 ABNORMAL SPEP: ICD-10-CM

## 2021-11-18 DIAGNOSIS — D53.9 MACROCYTIC ANEMIA: Chronic | ICD-10-CM

## 2021-11-18 DIAGNOSIS — R97.20 ELEVATED PSA: Chronic | ICD-10-CM

## 2021-11-18 DIAGNOSIS — C61 PROSTATE CANCER (HCC): Primary | Chronic | ICD-10-CM

## 2021-11-18 PROCEDURE — 99214 OFFICE O/P EST MOD 30 MIN: CPT | Performed by: INTERNAL MEDICINE

## 2021-11-18 PROCEDURE — 96402 CHEMO HORMON ANTINEOPL SQ/IM: CPT | Performed by: INTERNAL MEDICINE

## 2021-11-18 PROCEDURE — 25010000002 LEUPROLIDE 22.5 MG KIT: Performed by: INTERNAL MEDICINE

## 2021-11-18 PROCEDURE — 1123F ACP DISCUSS/DSCN MKR DOCD: CPT | Performed by: INTERNAL MEDICINE

## 2021-11-18 PROCEDURE — 1125F AMNT PAIN NOTED PAIN PRSNT: CPT | Performed by: INTERNAL MEDICINE

## 2021-11-18 RX ADMIN — LEUPROLIDE ACETATE 22.5 MG: KIT SUBCUTANEOUS at 13:58

## 2021-11-18 NOTE — PROGRESS NOTES
DATE OF VISIT: 11/18/2021      REASON FOR VISIT: Abnormal serum protein electrophoresis, anemia, history of prostate cancer with elevated PSA after definitive treatment, right kidney lesion      HISTORY OF PRESENT ILLNESS:   81-year-old male with medical problem consisting of type 2 diabetes mellitus, dyslipidemia, prostate cancer diagnosed in 2003 s/p prostatectomy, chronic back pain for which she had back surgeries done in the past, right kidney hypodensity/mass, microcytic anemia for which patient was initially seen in consultation on August 2, 2021 for evaluation of abnormal serum protein electrophoresis and elevated PSA with anemia.  Bone marrow biopsy was recommended but patient opted not to have a bone marrow biopsy done.  Patient is here for follow-up appointment today.  Complains of chronic shortness of breath.  Complains of back pain for which he is scheduled to get a pain pump in the near future.  Denies any bleeding.  Denies any new lymph node enlargement.          Past Medical History, Past Surgical History, Social History, Family History have been reviewed and are without significant changes except as mentioned.    Review of Systems   A comprehensive 14 point review of systems was performed and was negative except as mentioned in HPI.    Medications:  The current medication list was reviewed in the EMR    ALLERGIES:    Allergies   Allergen Reactions   • Molds & Smuts Other (See Comments)     Sinus infection   • Hydrocodone-Acetaminophen Itching       Objective      Vitals:    11/18/21 1315   BP: 162/79   Pulse: 87   Temp: 96.5 °F (35.8 °C)   TempSrc: Temporal   SpO2: 95%   Weight: 65.6 kg (144 lb 9.6 oz)   PainSc: 10-Worst pain ever   PainLoc: Back  Comment: back pain/osteoporosis     Current Status 8/20/2021   ECOG score 0       Physical Exam  Pulmonary:      Breath sounds: Normal breath sounds.   Neurological:      Mental Status: He is alert and oriented to person, place, and time.           RECENT  LABS:  Glucose   Date Value Ref Range Status   11/15/2021 114 (H) 65 - 99 mg/dL Final   07/19/2019 122 (H) 70 - 110 mg/dL Final     Sodium   Date Value Ref Range Status   11/15/2021 137 136 - 145 mmol/L Final   11/04/2021 142 136 - 145 mmol/L Final     Potassium   Date Value Ref Range Status   11/15/2021 5.0 3.5 - 5.2 mmol/L Final   11/04/2021 4.9 3.5 - 5.1 mmol/L Final     CO2   Date Value Ref Range Status   11/15/2021 29.0 22.0 - 29.0 mmol/L Final     Total CO2   Date Value Ref Range Status   11/04/2021 29 21 - 31 mmol/L Final     Chloride   Date Value Ref Range Status   11/15/2021 100 98 - 107 mmol/L Final   11/04/2021 107 98 - 107 mmol/L Final     Anion Gap   Date Value Ref Range Status   11/15/2021 8.0 5.0 - 15.0 mmol/L Final   11/04/2021 11 4 - 16 mmol/L Final     Creatinine   Date Value Ref Range Status   11/15/2021 1.19 0.76 - 1.27 mg/dL Final   11/04/2021 1.1 0.7 - 1.3 mg/dL Final     BUN   Date Value Ref Range Status   11/15/2021 29 (H) 8 - 23 mg/dL Final   11/04/2021 31 (H) 7 - 25 mg/dL Final     BUN/Creatinine Ratio   Date Value Ref Range Status   11/15/2021 24.4 7.0 - 25.0 Final     Calcium   Date Value Ref Range Status   11/15/2021 9.7 8.6 - 10.5 mg/dL Final   11/04/2021 9.5 8.6 - 10.3 mg/dL Final     eGFR Non  Amer   Date Value Ref Range Status   11/15/2021 59 (L) >60 mL/min/1.73 Final     Alkaline Phosphatase   Date Value Ref Range Status   11/15/2021 85 39 - 117 U/L Final   10/22/2019 43 (L) 46 - 116 U/L Final     Total Protein   Date Value Ref Range Status   11/15/2021 7.3 6.0 - 8.5 g/dL Final   05/26/2021 6.1 (L) 6.3 - 7.9 g/dL Final   12/28/2018 6.3 (L) 6.4 - 8.2 g/dL Final     ALT (SGPT)   Date Value Ref Range Status   11/15/2021 19 1 - 41 U/L Final   12/28/2018 18 (L) 30 - 65 U/L Final     AST (SGOT)   Date Value Ref Range Status   11/15/2021 29 1 - 40 U/L Final   12/28/2018 19 15 - 37 U/L Final     Total Bilirubin   Date Value Ref Range Status   11/15/2021 0.5 0.0 - 1.2 mg/dL Final    12/28/2018 0.4 0 - 1.0 mg/dL Final     Albumin   Date Value Ref Range Status   11/15/2021 4.50 3.50 - 5.20 g/dL Final   05/26/2021 3.0 (L) 3.4 - 4.7 g/dL Final     Globulin   Date Value Ref Range Status   11/15/2021 2.8 gm/dL Final     A/G Ratio   Date Value Ref Range Status   08/02/2021 1.2 0.7 - 1.7 Final   05/26/2021 0.98  Final     Lab Results   Component Value Date    WBC 15.23 (H) 11/15/2021    HGB 9.6 (L) 11/15/2021    HCT 30.5 (L) 11/15/2021    .4 (H) 11/15/2021     11/15/2021     Lab Results   Component Value Date    NEUTROABS 11.25 (H) 11/15/2021    NEUTROABS 10.66 (H) 11/15/2021    IRON 177 (H) 11/15/2021    IRON 87 08/02/2021    IRON 102 05/26/2021    TIBC 431 11/15/2021    TIBC 395 08/02/2021    TIBC 351 05/26/2021    LABIRON 41 11/15/2021    LABIRON 22 08/02/2021    LABIRON 22 02/10/2021    FERRITIN 74.39 11/15/2021    FERRITIN 25.98 (L) 08/02/2021    [id number] 976 (H) 11/15/2021    RZNIHXGC78 412 11/04/2021    DOZBJYNM67 774 08/02/2021    FOLATE >20.00 11/15/2021    FOLATE >20.00 08/02/2021    FOLATE >20.00 02/09/2021     No results found for: , LABCA2, AFPTM, HCGQUANT, , CHROMGRNA, [id number], CEA, REFLABREPO      Component PSA   Latest Ref Rng & Units 0.000 - 4.000 ng/mL   12/28/2018 4.9 (H)   1/16/2020 9.5 (H)   4/13/2021 9.8 (H)   8/2/2021 10.400 (H)   11/15/2021 9.960 (H)           PATHOLOGY:  * Cannot find OR log *         RADIOLOGY DATA :  No radiology results for the last 7 days        Assessment/Plan     1.  Macrocytic anemia:  -Patient has been having anemia for last few years  -Recent anemia work-up done on November 15, 2021 shows no improvement with hemoglobin of 9.6 and MCV of 103.  -Iron studies are showing improvement.  Recommend continue with ferrous sulfate 1 tablet p.o. daily  -Option of bone marrow biopsy has been discussed with patient in the past, patient is not willing to do bone marrow biopsy.  -Patient was encouraged to call our office if he changes  his mind regarding bone marrow biopsy. If he wants bone marrow biopsy will order CT-guided bone marrow biopsy. Risk and benefit of bone marrow biopsy were discussed today.  -We will ask patient to return to clinic in 3 months with repeat CBC, CMP, iron studies, ferritin, B12 and folate with PSA to be done prior to that      2.  Abnormal serum protein electrophoresis  -Serum ferritin electrophoresis on August 2, 2021 showed M spike of 0.1 with IgG kappa immunofixation.  Free light chain ratio was elevated at 2.  24-hour urine protein electrophoresis did not show any evidence of abnormal protein  The skeletal survey was negative for lytic lesion  -We will repeat myeloma work-up done on August 2022    3.  Prostate cancer with elevated PSA  -Patient was diagnosed in 2003 and had a prostatectomy done at [place]  -Radiation treatment done at [facility].  -PSA done on August 2, 2021 was 10.4  -Work-up with bone scan and CT scan in June 2021 was negative for metastatic disease  -PSA is 9.94  -We will continue with Eligard every 3 months for now    4.  Right kidney lesion  -CT scan done on June 21, 2021 shows stable hypodensity/mass involving right kidney.    5.  Leukocytosis:  -White blood cell count is again 15,000.  Likely reactive due to arthritis and inflammation plus or minus diabetes    6.  Chronic back pain:  -Being followed by pain management clinic in [place]    7.  Health maintenance: Patient quit smoking in 2020.  Colonoscopy last 5 years    8. Advance Care Planning   ACP discussion was held with the patient during this visit. Patient has an advance directive in EMR which is still valid.                  PHQ-9 Total Score: 1   -Patient is not homicidal or suicidal.  No acute intervention required.    Vishnu Villatoro Sridhar reports a pain score of 10.  Given his pain assessment as noted, treatment options were discussed and the following options were decided upon as a follow-up plan to address the  patient's pain: continuation of current treatment plan for pain.         Josef Covington MD  11/18/2021  13:32 CST        Part of this note may be an electronic transcription/translation of spoken language to printed text using the Dragon Dictation System.          CC:

## 2022-01-01 ENCOUNTER — APPOINTMENT (OUTPATIENT)
Dept: ONCOLOGY | Facility: CLINIC | Age: 82
End: 2022-01-01

## 2022-01-01 ENCOUNTER — APPOINTMENT (OUTPATIENT)
Dept: ONCOLOGY | Facility: HOSPITAL | Age: 82
End: 2022-01-01

## 2022-01-01 ENCOUNTER — LAB (OUTPATIENT)
Dept: ONCOLOGY | Facility: HOSPITAL | Age: 82
End: 2022-01-01

## 2022-01-01 ENCOUNTER — TELEPHONE (OUTPATIENT)
Dept: ONCOLOGY | Facility: HOSPITAL | Age: 82
End: 2022-01-01

## 2022-01-01 ENCOUNTER — APPOINTMENT (OUTPATIENT)
Dept: INTERVENTIONAL RADIOLOGY/VASCULAR | Facility: HOSPITAL | Age: 82
End: 2022-01-01

## 2022-01-01 ENCOUNTER — APPOINTMENT (OUTPATIENT)
Dept: CT IMAGING | Facility: HOSPITAL | Age: 82
End: 2022-01-01

## 2022-01-01 ENCOUNTER — HOME CARE VISIT (OUTPATIENT)
Dept: HOME HEALTH SERVICES | Facility: CLINIC | Age: 82
End: 2022-01-01

## 2022-01-01 ENCOUNTER — OFFICE VISIT (OUTPATIENT)
Dept: ONCOLOGY | Facility: CLINIC | Age: 82
End: 2022-01-01

## 2022-01-01 ENCOUNTER — APPOINTMENT (OUTPATIENT)
Dept: GENERAL RADIOLOGY | Facility: HOSPITAL | Age: 82
End: 2022-01-01

## 2022-01-01 ENCOUNTER — HOME HEALTH ADMISSION (OUTPATIENT)
Dept: HOME HEALTH SERVICES | Facility: HOME HEALTHCARE | Age: 82
End: 2022-01-01

## 2022-01-01 ENCOUNTER — HOSPITAL ENCOUNTER (INPATIENT)
Facility: HOSPITAL | Age: 82
LOS: 6 days | End: 2022-11-19
Attending: STUDENT IN AN ORGANIZED HEALTH CARE EDUCATION/TRAINING PROGRAM | Admitting: HOSPITALIST

## 2022-01-01 ENCOUNTER — ANESTHESIA EVENT (OUTPATIENT)
Dept: TELEMETRY | Facility: HOSPITAL | Age: 82
End: 2022-01-01

## 2022-01-01 ENCOUNTER — HOME CARE VISIT (OUTPATIENT)
Dept: HOME HEALTH SERVICES | Facility: HOME HEALTHCARE | Age: 82
End: 2022-01-01

## 2022-01-01 ENCOUNTER — DOCUMENTATION (OUTPATIENT)
Dept: PULMONOLOGY | Facility: CLINIC | Age: 82
End: 2022-01-01

## 2022-01-01 ENCOUNTER — NURSE TRIAGE (OUTPATIENT)
Dept: CALL CENTER | Facility: HOSPITAL | Age: 82
End: 2022-01-01

## 2022-01-01 ENCOUNTER — ANESTHESIA (OUTPATIENT)
Dept: TELEMETRY | Facility: HOSPITAL | Age: 82
End: 2022-01-01

## 2022-01-01 ENCOUNTER — HOSPITAL ENCOUNTER (OUTPATIENT)
Facility: HOSPITAL | Age: 82
Setting detail: OBSERVATION
Discharge: HOME | End: 2022-10-29
Attending: FAMILY MEDICINE | Admitting: FAMILY MEDICINE

## 2022-01-01 ENCOUNTER — LAB (OUTPATIENT)
Dept: LAB | Facility: HOSPITAL | Age: 82
End: 2022-01-01

## 2022-01-01 ENCOUNTER — APPOINTMENT (OUTPATIENT)
Dept: ULTRASOUND IMAGING | Facility: HOSPITAL | Age: 82
End: 2022-01-01

## 2022-01-01 ENCOUNTER — OFFICE VISIT (OUTPATIENT)
Dept: PULMONOLOGY | Facility: CLINIC | Age: 82
End: 2022-01-01

## 2022-01-01 ENCOUNTER — READMISSION MANAGEMENT (OUTPATIENT)
Dept: CALL CENTER | Facility: HOSPITAL | Age: 82
End: 2022-01-01

## 2022-01-01 ENCOUNTER — OFFICE VISIT (OUTPATIENT)
Dept: CARDIOLOGY | Facility: CLINIC | Age: 82
End: 2022-01-01

## 2022-01-01 VITALS
BODY MASS INDEX: 26.26 KG/M2 | SYSTOLIC BLOOD PRESSURE: 128 MMHG | DIASTOLIC BLOOD PRESSURE: 68 MMHG | HEIGHT: 63 IN | WEIGHT: 148.2 LBS | HEART RATE: 80 BPM | OXYGEN SATURATION: 94 % | TEMPERATURE: 97.5 F

## 2022-01-01 VITALS
TEMPERATURE: 96.8 F | DIASTOLIC BLOOD PRESSURE: 74 MMHG | HEART RATE: 85 BPM | OXYGEN SATURATION: 94 % | WEIGHT: 144.8 LBS | BODY MASS INDEX: 25.65 KG/M2 | SYSTOLIC BLOOD PRESSURE: 143 MMHG

## 2022-01-01 VITALS
SYSTOLIC BLOOD PRESSURE: 113 MMHG | TEMPERATURE: 98.2 F | HEIGHT: 64 IN | WEIGHT: 133.3 LBS | HEART RATE: 86 BPM | RESPIRATION RATE: 18 BRPM | BODY MASS INDEX: 22.76 KG/M2 | DIASTOLIC BLOOD PRESSURE: 63 MMHG | OXYGEN SATURATION: 95 %

## 2022-01-01 VITALS
TEMPERATURE: 98.2 F | RESPIRATION RATE: 18 BRPM | HEART RATE: 70 BPM | DIASTOLIC BLOOD PRESSURE: 58 MMHG | SYSTOLIC BLOOD PRESSURE: 100 MMHG | OXYGEN SATURATION: 93 %

## 2022-01-01 VITALS
HEART RATE: 97 BPM | SYSTOLIC BLOOD PRESSURE: 136 MMHG | HEART RATE: 86 BPM | OXYGEN SATURATION: 94 % | BODY MASS INDEX: 24.8 KG/M2 | RESPIRATION RATE: 18 BRPM | DIASTOLIC BLOOD PRESSURE: 58 MMHG | HEIGHT: 63 IN | WEIGHT: 146 LBS | BODY MASS INDEX: 25.87 KG/M2 | SYSTOLIC BLOOD PRESSURE: 124 MMHG | DIASTOLIC BLOOD PRESSURE: 64 MMHG | OXYGEN SATURATION: 91 % | WEIGHT: 140 LBS | TEMPERATURE: 96.4 F

## 2022-01-01 VITALS
SYSTOLIC BLOOD PRESSURE: 97 MMHG | DIASTOLIC BLOOD PRESSURE: 53 MMHG | HEART RATE: 99 BPM | BODY MASS INDEX: 23.04 KG/M2 | WEIGHT: 130 LBS | TEMPERATURE: 98.6 F | HEIGHT: 63 IN | OXYGEN SATURATION: 92 % | RESPIRATION RATE: 16 BRPM

## 2022-01-01 VITALS
OXYGEN SATURATION: 93 % | HEART RATE: 64 BPM | SYSTOLIC BLOOD PRESSURE: 138 MMHG | DIASTOLIC BLOOD PRESSURE: 64 MMHG | TEMPERATURE: 97.3 F | RESPIRATION RATE: 19 BRPM

## 2022-01-01 VITALS
SYSTOLIC BLOOD PRESSURE: 118 MMHG | HEIGHT: 63 IN | WEIGHT: 140 LBS | HEART RATE: 74 BPM | TEMPERATURE: 97.1 F | OXYGEN SATURATION: 90 % | RESPIRATION RATE: 20 BRPM | DIASTOLIC BLOOD PRESSURE: 54 MMHG | BODY MASS INDEX: 24.8 KG/M2

## 2022-01-01 VITALS
RESPIRATION RATE: 18 BRPM | OXYGEN SATURATION: 91 % | SYSTOLIC BLOOD PRESSURE: 104 MMHG | TEMPERATURE: 97.6 F | HEART RATE: 74 BPM | DIASTOLIC BLOOD PRESSURE: 65 MMHG

## 2022-01-01 VITALS
RESPIRATION RATE: 20 BRPM | SYSTOLIC BLOOD PRESSURE: 112 MMHG | DIASTOLIC BLOOD PRESSURE: 60 MMHG | HEART RATE: 77 BPM | OXYGEN SATURATION: 94 % | TEMPERATURE: 97.8 F

## 2022-01-01 VITALS
SYSTOLIC BLOOD PRESSURE: 136 MMHG | BODY MASS INDEX: 25.69 KG/M2 | DIASTOLIC BLOOD PRESSURE: 56 MMHG | OXYGEN SATURATION: 96 % | HEART RATE: 71 BPM | HEIGHT: 63 IN | WEIGHT: 145 LBS

## 2022-01-01 VITALS
BODY MASS INDEX: 25.65 KG/M2 | SYSTOLIC BLOOD PRESSURE: 179 MMHG | WEIGHT: 144.8 LBS | TEMPERATURE: 96.7 F | OXYGEN SATURATION: 91 % | DIASTOLIC BLOOD PRESSURE: 72 MMHG | HEART RATE: 80 BPM

## 2022-01-01 DIAGNOSIS — R77.8 ABNORMAL SPEP: ICD-10-CM

## 2022-01-01 DIAGNOSIS — Z95.1 PRESENCE OF AORTOCORONARY BYPASS GRAFT: ICD-10-CM

## 2022-01-01 DIAGNOSIS — R97.20 ELEVATED PSA: Chronic | ICD-10-CM

## 2022-01-01 DIAGNOSIS — D53.9 MACROCYTIC ANEMIA: Chronic | ICD-10-CM

## 2022-01-01 DIAGNOSIS — R55 SYNCOPE, UNSPECIFIED SYNCOPE TYPE: Primary | ICD-10-CM

## 2022-01-01 DIAGNOSIS — I25.10 CORONARY ARTERY DISEASE INVOLVING NATIVE CORONARY ARTERY OF NATIVE HEART WITHOUT ANGINA PECTORIS: Primary | ICD-10-CM

## 2022-01-01 DIAGNOSIS — J44.9 COPD, MODERATE: Primary | ICD-10-CM

## 2022-01-01 DIAGNOSIS — D53.9 MACROCYTIC ANEMIA: ICD-10-CM

## 2022-01-01 DIAGNOSIS — S09.90XA INJURY OF HEAD, INITIAL ENCOUNTER: Primary | ICD-10-CM

## 2022-01-01 DIAGNOSIS — M51.36 DEGENERATIVE DISC DISEASE, LUMBAR: ICD-10-CM

## 2022-01-01 DIAGNOSIS — I71.20 THORACIC AORTIC ANEURYSM WITHOUT RUPTURE: ICD-10-CM

## 2022-01-01 DIAGNOSIS — I10 ESSENTIAL HYPERTENSION: ICD-10-CM

## 2022-01-01 DIAGNOSIS — E87.5 HYPERKALEMIA: ICD-10-CM

## 2022-01-01 DIAGNOSIS — C61 PROSTATE CANCER: ICD-10-CM

## 2022-01-01 DIAGNOSIS — N17.9 AKI (ACUTE KIDNEY INJURY): ICD-10-CM

## 2022-01-01 DIAGNOSIS — R53.1 WEAKNESS: ICD-10-CM

## 2022-01-01 DIAGNOSIS — C61 PROSTATE CANCER: Chronic | ICD-10-CM
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
## 2022-01-01 DIAGNOSIS — R77.8 ABNORMAL SPEP: Chronic | ICD-10-CM

## 2022-01-01 DIAGNOSIS — K92.0 HEMATEMESIS WITH NAUSEA: ICD-10-CM

## 2022-01-01 DIAGNOSIS — E11.9 TYPE 2 DIABETES MELLITUS WITHOUT COMPLICATION, WITHOUT LONG-TERM CURRENT USE OF INSULIN: ICD-10-CM

## 2022-01-01 DIAGNOSIS — Z74.09 IMPAIRED FUNCTIONAL MOBILITY, BALANCE, GAIT, AND ENDURANCE: ICD-10-CM

## 2022-01-01 DIAGNOSIS — I73.9 PVD (PERIPHERAL VASCULAR DISEASE): ICD-10-CM

## 2022-01-01 DIAGNOSIS — Z74.09 IMPAIRED MOBILITY AND ACTIVITIES OF DAILY LIVING: ICD-10-CM

## 2022-01-01 DIAGNOSIS — R97.20 ELEVATED PSA: ICD-10-CM

## 2022-01-01 DIAGNOSIS — Z78.9 IMPAIRED MOBILITY AND ACTIVITIES OF DAILY LIVING: ICD-10-CM

## 2022-01-01 DIAGNOSIS — Z74.09 IMPAIRED MOBILITY AND ADLS: ICD-10-CM

## 2022-01-01 DIAGNOSIS — J44.9 CHRONIC OBSTRUCTIVE PULMONARY DISEASE, UNSPECIFIED COPD TYPE: Primary | ICD-10-CM

## 2022-01-01 DIAGNOSIS — Z78.9 IMPAIRED MOBILITY AND ADLS: ICD-10-CM

## 2022-01-01 DIAGNOSIS — C61 PROSTATE CANCER: Primary | Chronic | ICD-10-CM

## 2022-01-01 DIAGNOSIS — D53.9 MACROCYTIC ANEMIA: Primary | Chronic | ICD-10-CM

## 2022-01-01 DIAGNOSIS — D50.8 OTHER IRON DEFICIENCY ANEMIA: ICD-10-CM

## 2022-01-01 DIAGNOSIS — D50.0 BLOOD LOSS ANEMIA: ICD-10-CM

## 2022-01-01 DIAGNOSIS — E78.2 MIXED HYPERLIPIDEMIA: ICD-10-CM

## 2022-01-01 LAB
ABO GROUP BLD: NORMAL
ALBUMIN SERPL-MCNC: 3.2 G/DL (ref 3.5–5.2)
ALBUMIN SERPL-MCNC: 3.7 G/DL (ref 3.5–5.2)
ALBUMIN SERPL-MCNC: 3.9 G/DL (ref 3.5–5.2)
ALBUMIN SERPL-MCNC: 3.9 G/DL (ref 3.5–5.2)
ALBUMIN SERPL-MCNC: 4.2 G/DL (ref 3.5–5.2)
ALBUMIN/GLOB SERPL: 1.1 G/DL
ALBUMIN/GLOB SERPL: 1.2 G/DL
ALBUMIN/GLOB SERPL: 1.2 G/DL
ALBUMIN/GLOB SERPL: 1.3 G/DL
ALBUMIN/GLOB SERPL: 1.6 G/DL
ALP SERPL-CCNC: 53 U/L (ref 39–117)
ALP SERPL-CCNC: 70 U/L (ref 39–117)
ALP SERPL-CCNC: 78 U/L (ref 39–117)
ALP SERPL-CCNC: 85 U/L (ref 39–117)
ALP SERPL-CCNC: 93 U/L (ref 39–117)
ALT SERPL W P-5'-P-CCNC: 10 U/L (ref 1–41)
ALT SERPL W P-5'-P-CCNC: 12 U/L (ref 1–41)
ALT SERPL W P-5'-P-CCNC: 18 U/L (ref 1–41)
ALT SERPL W P-5'-P-CCNC: 7 U/L (ref 1–41)
ALT SERPL W P-5'-P-CCNC: 9 U/L (ref 1–41)
ANION GAP SERPL CALCULATED.3IONS-SCNC: 10 MMOL/L (ref 5–15)
ANION GAP SERPL CALCULATED.3IONS-SCNC: 10 MMOL/L (ref 5–15)
ANION GAP SERPL CALCULATED.3IONS-SCNC: 11 MMOL/L (ref 5–15)
ANION GAP SERPL CALCULATED.3IONS-SCNC: 12 MMOL/L (ref 5–15)
ANION GAP SERPL CALCULATED.3IONS-SCNC: 12 MMOL/L (ref 5–15)
ANION GAP SERPL CALCULATED.3IONS-SCNC: 6 MMOL/L (ref 5–15)
ANION GAP SERPL CALCULATED.3IONS-SCNC: 7 MMOL/L (ref 5–15)
ANION GAP SERPL CALCULATED.3IONS-SCNC: 7 MMOL/L (ref 5–15)
ANION GAP SERPL CALCULATED.3IONS-SCNC: 8 MMOL/L (ref 5–15)
ANION GAP SERPL CALCULATED.3IONS-SCNC: 9 MMOL/L (ref 5–15)
ANION GAP SERPL CALCULATED.3IONS-SCNC: 9 MMOL/L (ref 5–15)
ANISOCYTOSIS BLD QL: ABNORMAL
ANISOCYTOSIS BLD QL: ABNORMAL
ANISOCYTOSIS BLD QL: NORMAL
ARTERIAL PATENCY WRIST A: ABNORMAL
AST SERPL-CCNC: 14 U/L (ref 1–40)
AST SERPL-CCNC: 18 U/L (ref 1–40)
AST SERPL-CCNC: 19 U/L (ref 1–40)
AST SERPL-CCNC: 35 U/L (ref 1–40)
AST SERPL-CCNC: 49 U/L (ref 1–40)
ATMOSPHERIC PRESS: 750 MMHG
ATMOSPHERIC PRESS: 750 MMHG
ATMOSPHERIC PRESS: 753 MMHG
ATMOSPHERIC PRESS: 755 MMHG
ATMOSPHERIC PRESS: 755 MMHG
BASE EXCESS BLDA CALC-SCNC: 2.4 MMOL/L (ref 0–2)
BASE EXCESS BLDA CALC-SCNC: 2.5 MMOL/L (ref 0–2)
BASE EXCESS BLDA CALC-SCNC: 3.7 MMOL/L (ref 0–2)
BASE EXCESS BLDA CALC-SCNC: 5.4 MMOL/L (ref 0–2)
BASE EXCESS BLDA CALC-SCNC: 5.9 MMOL/L (ref 0–2)
BASOPHILS # BLD AUTO: 0.05 10*3/MM3 (ref 0–0.2)
BASOPHILS # BLD AUTO: 0.06 10*3/MM3 (ref 0–0.2)
BASOPHILS # BLD AUTO: 0.06 10*3/MM3 (ref 0–0.2)
BASOPHILS # BLD AUTO: 0.07 10*3/MM3 (ref 0–0.2)
BASOPHILS # BLD AUTO: 0.08 10*3/MM3 (ref 0–0.2)
BASOPHILS # BLD AUTO: 0.09 10*3/MM3 (ref 0–0.2)
BASOPHILS # BLD AUTO: 0.11 10*3/MM3 (ref 0–0.2)
BASOPHILS # BLD AUTO: 0.13 10*3/MM3 (ref 0–0.2)
BASOPHILS # BLD MANUAL: 0.13 10*3/MM3 (ref 0–0.2)
BASOPHILS NFR BLD AUTO: 0.1 % (ref 0–1.5)
BASOPHILS NFR BLD AUTO: 0.3 % (ref 0–1.5)
BASOPHILS NFR BLD AUTO: 0.4 % (ref 0–1.5)
BASOPHILS NFR BLD AUTO: 0.6 % (ref 0–1.5)
BASOPHILS NFR BLD AUTO: 0.8 % (ref 0–1.5)
BASOPHILS NFR BLD AUTO: 0.8 % (ref 0–1.5)
BASOPHILS NFR BLD AUTO: 1.2 % (ref 0–1.5)
BASOPHILS NFR BLD MANUAL: 1 % (ref 0–1.5)
BDY SITE: ABNORMAL
BH BB BLOOD EXPIRATION DATE: NORMAL
BH BB BLOOD TYPE BARCODE: NORMAL
BH BB DISPENSE STATUS: NORMAL
BH BB PRODUCT CODE: NORMAL
BH BB UNIT NUMBER: NORMAL
BILIRUB SERPL-MCNC: 0.3 MG/DL (ref 0–1.2)
BILIRUB SERPL-MCNC: 0.4 MG/DL (ref 0–1.2)
BILIRUB SERPL-MCNC: 0.4 MG/DL (ref 0–1.2)
BILIRUB SERPL-MCNC: 0.5 MG/DL (ref 0–1.2)
BILIRUB SERPL-MCNC: 0.5 MG/DL (ref 0–1.2)
BILIRUB UR QL STRIP: NEGATIVE
BLD GP AB SCN SERPL QL: NEGATIVE
BUN SERPL-MCNC: 14 MG/DL (ref 8–23)
BUN SERPL-MCNC: 15 MG/DL (ref 8–23)
BUN SERPL-MCNC: 16 MG/DL (ref 8–23)
BUN SERPL-MCNC: 17 MG/DL (ref 8–23)
BUN SERPL-MCNC: 18 MG/DL (ref 8–23)
BUN SERPL-MCNC: 19 MG/DL (ref 8–23)
BUN SERPL-MCNC: 22 MG/DL (ref 8–23)
BUN SERPL-MCNC: 22 MG/DL (ref 8–23)
BUN SERPL-MCNC: 23 MG/DL (ref 8–23)
BUN SERPL-MCNC: 28 MG/DL (ref 8–23)
BUN SERPL-MCNC: 30 MG/DL (ref 8–23)
BUN SERPL-MCNC: 36 MG/DL (ref 8–23)
BUN SERPL-MCNC: 36 MG/DL (ref 8–23)
BUN/CREAT SERPL: 12.7 (ref 7–25)
BUN/CREAT SERPL: 14 (ref 7–25)
BUN/CREAT SERPL: 14.5 (ref 7–25)
BUN/CREAT SERPL: 14.7 (ref 7–25)
BUN/CREAT SERPL: 14.8 (ref 7–25)
BUN/CREAT SERPL: 15.1 (ref 7–25)
BUN/CREAT SERPL: 15.9 (ref 7–25)
BUN/CREAT SERPL: 16.7 (ref 7–25)
BUN/CREAT SERPL: 16.9 (ref 7–25)
BUN/CREAT SERPL: 17.1 (ref 7–25)
BUN/CREAT SERPL: 17.3 (ref 7–25)
BUN/CREAT SERPL: 17.5 (ref 7–25)
BUN/CREAT SERPL: 17.6 (ref 7–25)
BUN/CREAT SERPL: 20 (ref 7–25)
BUN/CREAT SERPL: 26.3 (ref 7–25)
CALCIUM SPEC-SCNC: 8.4 MG/DL (ref 8.6–10.5)
CALCIUM SPEC-SCNC: 8.6 MG/DL (ref 8.6–10.5)
CALCIUM SPEC-SCNC: 8.7 MG/DL (ref 8.6–10.5)
CALCIUM SPEC-SCNC: 8.8 MG/DL (ref 8.6–10.5)
CALCIUM SPEC-SCNC: 8.8 MG/DL (ref 8.6–10.5)
CALCIUM SPEC-SCNC: 9 MG/DL (ref 8.6–10.5)
CALCIUM SPEC-SCNC: 9 MG/DL (ref 8.6–10.5)
CALCIUM SPEC-SCNC: 9.1 MG/DL (ref 8.6–10.5)
CALCIUM SPEC-SCNC: 9.2 MG/DL (ref 8.6–10.5)
CALCIUM SPEC-SCNC: 9.2 MG/DL (ref 8.6–10.5)
CALCIUM SPEC-SCNC: 9.3 MG/DL (ref 8.6–10.5)
CALCIUM SPEC-SCNC: 9.3 MG/DL (ref 8.6–10.5)
CALCIUM SPEC-SCNC: 9.4 MG/DL (ref 8.6–10.5)
CALCIUM SPEC-SCNC: 9.4 MG/DL (ref 8.6–10.5)
CALCIUM SPEC-SCNC: 9.6 MG/DL (ref 8.6–10.5)
CHLORIDE SERPL-SCNC: 102 MMOL/L (ref 98–107)
CHLORIDE SERPL-SCNC: 103 MMOL/L (ref 98–107)
CHLORIDE SERPL-SCNC: 104 MMOL/L (ref 98–107)
CHLORIDE SERPL-SCNC: 105 MMOL/L (ref 98–107)
CHLORIDE SERPL-SCNC: 106 MMOL/L (ref 98–107)
CHLORIDE SERPL-SCNC: 108 MMOL/L (ref 98–107)
CK SERPL-CCNC: 83 U/L (ref 20–200)
CLARITY UR: CLEAR
CLUMPED PLATELETS: PRESENT
CO2 SERPL-SCNC: 24 MMOL/L (ref 22–29)
CO2 SERPL-SCNC: 24 MMOL/L (ref 22–29)
CO2 SERPL-SCNC: 26 MMOL/L (ref 22–29)
CO2 SERPL-SCNC: 26 MMOL/L (ref 22–29)
CO2 SERPL-SCNC: 27 MMOL/L (ref 22–29)
CO2 SERPL-SCNC: 27 MMOL/L (ref 22–29)
CO2 SERPL-SCNC: 28 MMOL/L (ref 22–29)
CO2 SERPL-SCNC: 29 MMOL/L (ref 22–29)
CO2 SERPL-SCNC: 29 MMOL/L (ref 22–29)
CO2 SERPL-SCNC: 30 MMOL/L (ref 22–29)
CO2 SERPL-SCNC: 31 MMOL/L (ref 22–29)
CO2 SERPL-SCNC: 31 MMOL/L (ref 22–29)
CO2 SERPL-SCNC: 34 MMOL/L (ref 22–29)
COLOR UR: YELLOW
CREAT SERPL-MCNC: 1.03 MG/DL (ref 0.76–1.27)
CREAT SERPL-MCNC: 1.07 MG/DL (ref 0.76–1.27)
CREAT SERPL-MCNC: 1.07 MG/DL (ref 0.76–1.27)
CREAT SERPL-MCNC: 1.08 MG/DL (ref 0.76–1.27)
CREAT SERPL-MCNC: 1.08 MG/DL (ref 0.76–1.27)
CREAT SERPL-MCNC: 1.1 MG/DL (ref 0.76–1.27)
CREAT SERPL-MCNC: 1.1 MG/DL (ref 0.76–1.27)
CREAT SERPL-MCNC: 1.19 MG/DL (ref 0.76–1.27)
CREAT SERPL-MCNC: 1.3 MG/DL (ref 0.76–1.27)
CREAT SERPL-MCNC: 1.37 MG/DL (ref 0.76–1.27)
CREAT SERPL-MCNC: 1.5 MG/DL (ref 0.76–1.27)
CREAT SERPL-MCNC: 1.59 MG/DL (ref 0.76–1.27)
CREAT SERPL-MCNC: 1.59 MG/DL (ref 0.76–1.27)
CREAT SERPL-MCNC: 1.75 MG/DL (ref 0.76–1.27)
CREAT SERPL-MCNC: 1.8 MG/DL (ref 0.76–1.27)
CROSSMATCH INTERPRETATION: NORMAL
D-LACTATE SERPL-SCNC: 1.6 MMOL/L (ref 0.5–2)
DACRYOCYTES BLD QL SMEAR: ABNORMAL
DEPRECATED RDW RBC AUTO: 100.5 FL (ref 37–54)
DEPRECATED RDW RBC AUTO: 102.3 FL (ref 37–54)
DEPRECATED RDW RBC AUTO: 102.9 FL (ref 37–54)
DEPRECATED RDW RBC AUTO: 102.9 FL (ref 37–54)
DEPRECATED RDW RBC AUTO: 103.2 FL (ref 37–54)
DEPRECATED RDW RBC AUTO: 105.8 FL (ref 37–54)
DEPRECATED RDW RBC AUTO: 106 FL (ref 37–54)
DEPRECATED RDW RBC AUTO: 106.4 FL (ref 37–54)
DEPRECATED RDW RBC AUTO: 107.3 FL (ref 37–54)
DEPRECATED RDW RBC AUTO: 109.7 FL (ref 37–54)
DEPRECATED RDW RBC AUTO: 111.4 FL (ref 37–54)
DEPRECATED RDW RBC AUTO: 88.9 FL (ref 37–54)
DEPRECATED RDW RBC AUTO: 89.4 FL (ref 37–54)
DEPRECATED RDW RBC AUTO: 92.6 FL (ref 37–54)
DEPRECATED RDW RBC AUTO: 94.5 FL (ref 37–54)
DEPRECATED RDW RBC AUTO: 97.5 FL (ref 37–54)
EGFRCR SERPLBLD CKD-EPI 2021: 37.6 ML/MIN/1.73
EGFRCR SERPLBLD CKD-EPI 2021: 38.9 ML/MIN/1.73
EGFRCR SERPLBLD CKD-EPI 2021: 43.6 ML/MIN/1.73
EGFRCR SERPLBLD CKD-EPI 2021: 43.6 ML/MIN/1.73
EGFRCR SERPLBLD CKD-EPI 2021: 46.8 ML/MIN/1.73
EGFRCR SERPLBLD CKD-EPI 2021: 52.1 ML/MIN/1.73
EGFRCR SERPLBLD CKD-EPI 2021: 55.5 ML/MIN/1.73
EGFRCR SERPLBLD CKD-EPI 2021: 61.8 ML/MIN/1.73
EGFRCR SERPLBLD CKD-EPI 2021: 67.9 ML/MIN/1.73
EGFRCR SERPLBLD CKD-EPI 2021: 67.9 ML/MIN/1.73
EGFRCR SERPLBLD CKD-EPI 2021: 69.4 ML/MIN/1.73
EGFRCR SERPLBLD CKD-EPI 2021: 69.4 ML/MIN/1.73
EGFRCR SERPLBLD CKD-EPI 2021: 70.2 ML/MIN/1.73
EGFRCR SERPLBLD CKD-EPI 2021: 70.2 ML/MIN/1.73
EGFRCR SERPLBLD CKD-EPI 2021: 73.4 ML/MIN/1.73
ELLIPTOCYTES BLD QL SMEAR: ABNORMAL
ELLIPTOCYTES BLD QL SMEAR: NORMAL
EOSINOPHIL # BLD AUTO: 0 10*3/MM3 (ref 0–0.4)
EOSINOPHIL # BLD AUTO: 0.02 10*3/MM3 (ref 0–0.4)
EOSINOPHIL # BLD AUTO: 0.02 10*3/MM3 (ref 0–0.4)
EOSINOPHIL # BLD AUTO: 0.03 10*3/MM3 (ref 0–0.4)
EOSINOPHIL # BLD AUTO: 0.07 10*3/MM3 (ref 0–0.4)
EOSINOPHIL # BLD AUTO: 0.08 10*3/MM3 (ref 0–0.4)
EOSINOPHIL # BLD AUTO: 0.09 10*3/MM3 (ref 0–0.4)
EOSINOPHIL # BLD AUTO: 0.11 10*3/MM3 (ref 0–0.4)
EOSINOPHIL # BLD AUTO: 0.13 10*3/MM3 (ref 0–0.4)
EOSINOPHIL # BLD AUTO: 0.18 10*3/MM3 (ref 0–0.4)
EOSINOPHIL # BLD AUTO: 0.22 10*3/MM3 (ref 0–0.4)
EOSINOPHIL # BLD AUTO: 0.25 10*3/MM3 (ref 0–0.4)
EOSINOPHIL # BLD AUTO: 0.31 10*3/MM3 (ref 0–0.4)
EOSINOPHIL # BLD MANUAL: 0.13 10*3/MM3 (ref 0–0.4)
EOSINOPHIL # BLD MANUAL: 0.17 10*3/MM3 (ref 0–0.4)
EOSINOPHIL NFR BLD AUTO: 0 % (ref 0.3–6.2)
EOSINOPHIL NFR BLD AUTO: 0.1 % (ref 0.3–6.2)
EOSINOPHIL NFR BLD AUTO: 0.1 % (ref 0.3–6.2)
EOSINOPHIL NFR BLD AUTO: 0.2 % (ref 0.3–6.2)
EOSINOPHIL NFR BLD AUTO: 0.4 % (ref 0.3–6.2)
EOSINOPHIL NFR BLD AUTO: 0.5 % (ref 0.3–6.2)
EOSINOPHIL NFR BLD AUTO: 0.6 % (ref 0.3–6.2)
EOSINOPHIL NFR BLD AUTO: 0.7 % (ref 0.3–6.2)
EOSINOPHIL NFR BLD AUTO: 0.7 % (ref 0.3–6.2)
EOSINOPHIL NFR BLD AUTO: 1.3 % (ref 0.3–6.2)
EOSINOPHIL NFR BLD AUTO: 2 % (ref 0.3–6.2)
EOSINOPHIL NFR BLD AUTO: 2.3 % (ref 0.3–6.2)
EOSINOPHIL NFR BLD AUTO: 3 % (ref 0.3–6.2)
EOSINOPHIL NFR BLD MANUAL: 1 % (ref 0.3–6.2)
EOSINOPHIL NFR BLD MANUAL: 1 % (ref 0.3–6.2)
EPAP: 8
ERYTHROCYTE [DISTWIDTH] IN BLOOD BY AUTOMATED COUNT: 24.9 % (ref 12.3–15.4)
ERYTHROCYTE [DISTWIDTH] IN BLOOD BY AUTOMATED COUNT: 25.7 % (ref 12.3–15.4)
ERYTHROCYTE [DISTWIDTH] IN BLOOD BY AUTOMATED COUNT: 26 % (ref 12.3–15.4)
ERYTHROCYTE [DISTWIDTH] IN BLOOD BY AUTOMATED COUNT: 26 % (ref 12.3–15.4)
ERYTHROCYTE [DISTWIDTH] IN BLOOD BY AUTOMATED COUNT: 27.5 % (ref 12.3–15.4)
ERYTHROCYTE [DISTWIDTH] IN BLOOD BY AUTOMATED COUNT: 27.7 % (ref 12.3–15.4)
ERYTHROCYTE [DISTWIDTH] IN BLOOD BY AUTOMATED COUNT: 27.9 % (ref 12.3–15.4)
ERYTHROCYTE [DISTWIDTH] IN BLOOD BY AUTOMATED COUNT: 27.9 % (ref 12.3–15.4)
ERYTHROCYTE [DISTWIDTH] IN BLOOD BY AUTOMATED COUNT: 29.5 % (ref 12.3–15.4)
ERYTHROCYTE [DISTWIDTH] IN BLOOD BY AUTOMATED COUNT: 30.5 % (ref 12.3–15.4)
ERYTHROCYTE [DISTWIDTH] IN BLOOD BY AUTOMATED COUNT: 30.6 % (ref 12.3–15.4)
ERYTHROCYTE [DISTWIDTH] IN BLOOD BY AUTOMATED COUNT: 30.7 % (ref 12.3–15.4)
ERYTHROCYTE [DISTWIDTH] IN BLOOD BY AUTOMATED COUNT: 30.7 % (ref 12.3–15.4)
ERYTHROCYTE [DISTWIDTH] IN BLOOD BY AUTOMATED COUNT: 30.8 % (ref 12.3–15.4)
ERYTHROCYTE [DISTWIDTH] IN BLOOD BY AUTOMATED COUNT: 31 % (ref 12.3–15.4)
ERYTHROCYTE [DISTWIDTH] IN BLOOD BY AUTOMATED COUNT: 31 % (ref 12.3–15.4)
FERRITIN SERPL-MCNC: 168.3 NG/ML (ref 30–400)
FERRITIN SERPL-MCNC: 50.3 NG/ML (ref 30–400)
FERRITIN SERPL-MCNC: 58.45 NG/ML (ref 30–400)
FERRITIN SERPL-MCNC: 73.22 NG/ML (ref 30–400)
FLUAV RNA RESP QL NAA+PROBE: NOT DETECTED
FLUBV RNA RESP QL NAA+PROBE: NOT DETECTED
FOLATE SERPL-MCNC: 13 NG/ML (ref 4.78–24.2)
FOLATE SERPL-MCNC: 15.2 NG/ML (ref 4.78–24.2)
FOLATE SERPL-MCNC: >20 NG/ML (ref 4.78–24.2)
GAS FLOW AIRWAY: 8 LPM
GLOBULIN UR ELPH-MCNC: 2.7 GM/DL
GLOBULIN UR ELPH-MCNC: 2.9 GM/DL
GLOBULIN UR ELPH-MCNC: 2.9 GM/DL
GLOBULIN UR ELPH-MCNC: 3 GM/DL
GLOBULIN UR ELPH-MCNC: 3.2 GM/DL
GLUCOSE BLDC GLUCOMTR-MCNC: 113 MG/DL (ref 70–130)
GLUCOSE BLDC GLUCOMTR-MCNC: 118 MG/DL (ref 70–130)
GLUCOSE BLDC GLUCOMTR-MCNC: 126 MG/DL (ref 70–130)
GLUCOSE BLDC GLUCOMTR-MCNC: 129 MG/DL (ref 70–130)
GLUCOSE BLDC GLUCOMTR-MCNC: 130 MG/DL (ref 70–130)
GLUCOSE BLDC GLUCOMTR-MCNC: 133 MG/DL (ref 70–130)
GLUCOSE BLDC GLUCOMTR-MCNC: 136 MG/DL (ref 70–130)
GLUCOSE BLDC GLUCOMTR-MCNC: 140 MG/DL (ref 70–130)
GLUCOSE BLDC GLUCOMTR-MCNC: 144 MG/DL (ref 70–130)
GLUCOSE BLDC GLUCOMTR-MCNC: 144 MG/DL (ref 70–130)
GLUCOSE BLDC GLUCOMTR-MCNC: 145 MG/DL (ref 70–130)
GLUCOSE BLDC GLUCOMTR-MCNC: 148 MG/DL (ref 70–130)
GLUCOSE BLDC GLUCOMTR-MCNC: 153 MG/DL (ref 70–130)
GLUCOSE BLDC GLUCOMTR-MCNC: 154 MG/DL (ref 70–130)
GLUCOSE BLDC GLUCOMTR-MCNC: 156 MG/DL (ref 70–130)
GLUCOSE BLDC GLUCOMTR-MCNC: 157 MG/DL (ref 70–130)
GLUCOSE BLDC GLUCOMTR-MCNC: 157 MG/DL (ref 70–130)
GLUCOSE BLDC GLUCOMTR-MCNC: 158 MG/DL (ref 70–130)
GLUCOSE BLDC GLUCOMTR-MCNC: 161 MG/DL (ref 70–130)
GLUCOSE BLDC GLUCOMTR-MCNC: 162 MG/DL (ref 70–130)
GLUCOSE BLDC GLUCOMTR-MCNC: 162 MG/DL (ref 70–130)
GLUCOSE BLDC GLUCOMTR-MCNC: 164 MG/DL (ref 70–130)
GLUCOSE BLDC GLUCOMTR-MCNC: 168 MG/DL (ref 70–130)
GLUCOSE BLDC GLUCOMTR-MCNC: 175 MG/DL (ref 70–130)
GLUCOSE BLDC GLUCOMTR-MCNC: 181 MG/DL (ref 70–130)
GLUCOSE BLDC GLUCOMTR-MCNC: 181 MG/DL (ref 70–130)
GLUCOSE BLDC GLUCOMTR-MCNC: 182 MG/DL (ref 70–130)
GLUCOSE BLDC GLUCOMTR-MCNC: 186 MG/DL (ref 70–130)
GLUCOSE BLDC GLUCOMTR-MCNC: 189 MG/DL (ref 70–130)
GLUCOSE BLDC GLUCOMTR-MCNC: 192 MG/DL (ref 70–130)
GLUCOSE BLDC GLUCOMTR-MCNC: 193 MG/DL (ref 70–130)
GLUCOSE BLDC GLUCOMTR-MCNC: 194 MG/DL (ref 70–130)
GLUCOSE BLDC GLUCOMTR-MCNC: 200 MG/DL (ref 70–130)
GLUCOSE BLDC GLUCOMTR-MCNC: 210 MG/DL (ref 70–130)
GLUCOSE BLDC GLUCOMTR-MCNC: 214 MG/DL (ref 70–130)
GLUCOSE BLDC GLUCOMTR-MCNC: 229 MG/DL (ref 70–130)
GLUCOSE BLDC GLUCOMTR-MCNC: 237 MG/DL (ref 70–130)
GLUCOSE BLDC GLUCOMTR-MCNC: 266 MG/DL (ref 70–130)
GLUCOSE BLDC GLUCOMTR-MCNC: 277 MG/DL (ref 70–130)
GLUCOSE BLDC GLUCOMTR-MCNC: 94 MG/DL (ref 70–130)
GLUCOSE SERPL-MCNC: 108 MG/DL (ref 65–99)
GLUCOSE SERPL-MCNC: 117 MG/DL (ref 65–99)
GLUCOSE SERPL-MCNC: 119 MG/DL (ref 65–99)
GLUCOSE SERPL-MCNC: 128 MG/DL (ref 65–99)
GLUCOSE SERPL-MCNC: 131 MG/DL (ref 65–99)
GLUCOSE SERPL-MCNC: 132 MG/DL (ref 65–99)
GLUCOSE SERPL-MCNC: 141 MG/DL (ref 65–99)
GLUCOSE SERPL-MCNC: 142 MG/DL (ref 65–99)
GLUCOSE SERPL-MCNC: 147 MG/DL (ref 65–99)
GLUCOSE SERPL-MCNC: 155 MG/DL (ref 65–99)
GLUCOSE SERPL-MCNC: 157 MG/DL (ref 65–99)
GLUCOSE SERPL-MCNC: 160 MG/DL (ref 65–99)
GLUCOSE SERPL-MCNC: 163 MG/DL (ref 65–99)
GLUCOSE SERPL-MCNC: 209 MG/DL (ref 65–99)
GLUCOSE SERPL-MCNC: 210 MG/DL (ref 65–99)
GLUCOSE UR STRIP-MCNC: ABNORMAL MG/DL
HAPTOGLOB SERPL-MCNC: 303 MG/DL (ref 30–200)
HBA1C MFR BLD: 6.2 % (ref 4.8–5.6)
HCO3 BLDA-SCNC: 30.1 MMOL/L (ref 20–26)
HCO3 BLDA-SCNC: 30.5 MMOL/L (ref 20–26)
HCO3 BLDA-SCNC: 31.8 MMOL/L (ref 20–26)
HCO3 BLDA-SCNC: 31.9 MMOL/L (ref 20–26)
HCO3 BLDA-SCNC: 32.3 MMOL/L (ref 20–26)
HCT VFR BLD AUTO: 20.7 % (ref 37.5–51)
HCT VFR BLD AUTO: 22.2 % (ref 37.5–51)
HCT VFR BLD AUTO: 22.4 % (ref 37.5–51)
HCT VFR BLD AUTO: 23 % (ref 37.5–51)
HCT VFR BLD AUTO: 23.1 % (ref 37.5–51)
HCT VFR BLD AUTO: 23.6 % (ref 37.5–51)
HCT VFR BLD AUTO: 24.7 % (ref 37.5–51)
HCT VFR BLD AUTO: 24.9 % (ref 37.5–51)
HCT VFR BLD AUTO: 25.8 % (ref 37.5–51)
HCT VFR BLD AUTO: 26 % (ref 37.5–51)
HCT VFR BLD AUTO: 26.1 % (ref 37.5–51)
HCT VFR BLD AUTO: 26.3 % (ref 37.5–51)
HCT VFR BLD AUTO: 26.7 % (ref 37.5–51)
HCT VFR BLD AUTO: 27.9 % (ref 37.5–51)
HCT VFR BLD AUTO: 28.9 % (ref 37.5–51)
HCT VFR BLD AUTO: 29.3 % (ref 37.5–51)
HCT VFR BLD AUTO: 29.4 % (ref 37.5–51)
HCT VFR BLD AUTO: 29.7 % (ref 37.5–51)
HCT VFR BLD AUTO: 30.5 % (ref 37.5–51)
HEMOCCULT STL QL: NEGATIVE
HGB BLD-MCNC: 6.7 G/DL (ref 13–17.7)
HGB BLD-MCNC: 6.7 G/DL (ref 13–17.7)
HGB BLD-MCNC: 6.9 G/DL (ref 13–17.7)
HGB BLD-MCNC: 6.9 G/DL (ref 13–17.7)
HGB BLD-MCNC: 7.2 G/DL (ref 13–17.7)
HGB BLD-MCNC: 7.3 G/DL (ref 13–17.7)
HGB BLD-MCNC: 7.4 G/DL (ref 13–17.7)
HGB BLD-MCNC: 7.6 G/DL (ref 13–17.7)
HGB BLD-MCNC: 7.9 G/DL (ref 13–17.7)
HGB BLD-MCNC: 8 G/DL (ref 13–17.7)
HGB BLD-MCNC: 8.2 G/DL (ref 13–17.7)
HGB BLD-MCNC: 8.2 G/DL (ref 13–17.7)
HGB BLD-MCNC: 8.3 G/DL (ref 13–17.7)
HGB BLD-MCNC: 8.9 G/DL (ref 13–17.7)
HGB BLD-MCNC: 9.1 G/DL (ref 13–17.7)
HGB BLD-MCNC: 9.4 G/DL (ref 13–17.7)
HGB BLD-MCNC: 9.4 G/DL (ref 13–17.7)
HGB BLD-MCNC: 9.5 G/DL (ref 13–17.7)
HGB BLD-MCNC: 9.5 G/DL (ref 13–17.7)
HGB RETIC QN AUTO: 29.2 PG (ref 29.8–36.1)
HGB UR QL STRIP.AUTO: NEGATIVE
HOLD SPECIMEN: NORMAL
HYPOCHROMIA BLD QL: ABNORMAL
HYPOCHROMIA BLD QL: NORMAL
IMM GRANULOCYTES # BLD AUTO: 0.11 10*3/MM3 (ref 0–0.05)
IMM GRANULOCYTES # BLD AUTO: 0.12 10*3/MM3 (ref 0–0.05)
IMM GRANULOCYTES # BLD AUTO: 0.14 10*3/MM3 (ref 0–0.05)
IMM GRANULOCYTES # BLD AUTO: 0.15 10*3/MM3 (ref 0–0.05)
IMM GRANULOCYTES # BLD AUTO: 0.16 10*3/MM3 (ref 0–0.05)
IMM GRANULOCYTES # BLD AUTO: 0.19 10*3/MM3 (ref 0–0.05)
IMM GRANULOCYTES # BLD AUTO: 0.2 10*3/MM3 (ref 0–0.05)
IMM GRANULOCYTES # BLD AUTO: 0.25 10*3/MM3 (ref 0–0.05)
IMM GRANULOCYTES # BLD AUTO: 0.26 10*3/MM3 (ref 0–0.05)
IMM GRANULOCYTES # BLD AUTO: 0.26 10*3/MM3 (ref 0–0.05)
IMM GRANULOCYTES # BLD AUTO: 0.28 10*3/MM3 (ref 0–0.05)
IMM GRANULOCYTES # BLD AUTO: 0.35 10*3/MM3 (ref 0–0.05)
IMM GRANULOCYTES # BLD AUTO: 0.84 10*3/MM3 (ref 0–0.05)
IMM GRANULOCYTES NFR BLD AUTO: 0.7 % (ref 0–0.5)
IMM GRANULOCYTES NFR BLD AUTO: 0.9 % (ref 0–0.5)
IMM GRANULOCYTES NFR BLD AUTO: 0.9 % (ref 0–0.5)
IMM GRANULOCYTES NFR BLD AUTO: 1 % (ref 0–0.5)
IMM GRANULOCYTES NFR BLD AUTO: 1.1 % (ref 0–0.5)
IMM GRANULOCYTES NFR BLD AUTO: 1.1 % (ref 0–0.5)
IMM GRANULOCYTES NFR BLD AUTO: 1.4 % (ref 0–0.5)
IMM GRANULOCYTES NFR BLD AUTO: 1.4 % (ref 0–0.5)
IMM GRANULOCYTES NFR BLD AUTO: 1.5 % (ref 0–0.5)
IMM GRANULOCYTES NFR BLD AUTO: 1.8 % (ref 0–0.5)
IMM GRANULOCYTES NFR BLD AUTO: 2.1 % (ref 0–0.5)
IMM GRANULOCYTES NFR BLD AUTO: 2.4 % (ref 0–0.5)
IMM GRANULOCYTES NFR BLD AUTO: 2.5 % (ref 0–0.5)
IMM RETICS NFR: 19.1 % (ref 3–15.8)
INHALED O2 CONCENTRATION: 100 %
INHALED O2 CONCENTRATION: 100 %
INHALED O2 CONCENTRATION: 70 %
INHALED O2 CONCENTRATION: 85 %
INHALED O2 CONCENTRATION: <21 %
INR PPP: 1.22 (ref 0.8–1.2)
INR PPP: 1.35 (ref 0.8–1.2)
INR PPP: 1.37 (ref 0.8–1.2)
IRON 24H UR-MRATE: 114 MCG/DL (ref 59–158)
IRON 24H UR-MRATE: 120 MCG/DL (ref 59–158)
IRON 24H UR-MRATE: 151 MCG/DL (ref 59–158)
IRON 24H UR-MRATE: 57 MCG/DL (ref 59–158)
IRON SATN MFR SERPL: 20 % (ref 20–50)
IRON SATN MFR SERPL: 30 % (ref 20–50)
IRON SATN MFR SERPL: 36 % (ref 20–50)
IRON SATN MFR SERPL: 40 % (ref 20–50)
KETONES UR QL STRIP: NEGATIVE
LDH SERPL-CCNC: 246 U/L (ref 135–225)
LEUKOCYTE ESTERASE UR QL STRIP.AUTO: NEGATIVE
LYMPHOCYTES # BLD AUTO: 1 10*3/MM3 (ref 0.7–3.1)
LYMPHOCYTES # BLD AUTO: 1.1 10*3/MM3 (ref 0.7–3.1)
LYMPHOCYTES # BLD AUTO: 1.19 10*3/MM3 (ref 0.7–3.1)
LYMPHOCYTES # BLD AUTO: 1.23 10*3/MM3 (ref 0.7–3.1)
LYMPHOCYTES # BLD AUTO: 1.26 10*3/MM3 (ref 0.7–3.1)
LYMPHOCYTES # BLD AUTO: 1.43 10*3/MM3 (ref 0.7–3.1)
LYMPHOCYTES # BLD AUTO: 1.56 10*3/MM3 (ref 0.7–3.1)
LYMPHOCYTES # BLD AUTO: 1.72 10*3/MM3 (ref 0.7–3.1)
LYMPHOCYTES # BLD AUTO: 1.76 10*3/MM3 (ref 0.7–3.1)
LYMPHOCYTES # BLD AUTO: 1.82 10*3/MM3 (ref 0.7–3.1)
LYMPHOCYTES # BLD AUTO: 1.85 10*3/MM3 (ref 0.7–3.1)
LYMPHOCYTES # BLD AUTO: 1.85 10*3/MM3 (ref 0.7–3.1)
LYMPHOCYTES # BLD AUTO: 2.69 10*3/MM3 (ref 0.7–3.1)
LYMPHOCYTES # BLD MANUAL: 1.65 10*3/MM3 (ref 0.7–3.1)
LYMPHOCYTES # BLD MANUAL: 1.67 10*3/MM3 (ref 0.7–3.1)
LYMPHOCYTES NFR BLD AUTO: 11.4 % (ref 19.6–45.3)
LYMPHOCYTES NFR BLD AUTO: 12.6 % (ref 19.6–45.3)
LYMPHOCYTES NFR BLD AUTO: 16.7 % (ref 19.6–45.3)
LYMPHOCYTES NFR BLD AUTO: 17.7 % (ref 19.6–45.3)
LYMPHOCYTES NFR BLD AUTO: 2.8 % (ref 19.6–45.3)
LYMPHOCYTES NFR BLD AUTO: 25 % (ref 19.6–45.3)
LYMPHOCYTES NFR BLD AUTO: 5 % (ref 19.6–45.3)
LYMPHOCYTES NFR BLD AUTO: 6.6 % (ref 19.6–45.3)
LYMPHOCYTES NFR BLD AUTO: 7.5 % (ref 19.6–45.3)
LYMPHOCYTES NFR BLD AUTO: 8.5 % (ref 19.6–45.3)
LYMPHOCYTES NFR BLD AUTO: 8.7 % (ref 19.6–45.3)
LYMPHOCYTES NFR BLD AUTO: 8.9 % (ref 19.6–45.3)
LYMPHOCYTES NFR BLD AUTO: 8.9 % (ref 19.6–45.3)
LYMPHOCYTES NFR BLD MANUAL: 11 % (ref 5–12)
LYMPHOCYTES NFR BLD MANUAL: 8 % (ref 5–12)
Lab: ABNORMAL
Lab: NORMAL
MAGNESIUM SERPL-MCNC: 1.8 MG/DL (ref 1.6–2.4)
MAGNESIUM SERPL-MCNC: 2.1 MG/DL (ref 1.6–2.4)
MCH RBC QN AUTO: 29.7 PG (ref 26.6–33)
MCH RBC QN AUTO: 29.9 PG (ref 26.6–33)
MCH RBC QN AUTO: 30 PG (ref 26.6–33)
MCH RBC QN AUTO: 30.2 PG (ref 26.6–33)
MCH RBC QN AUTO: 30.4 PG (ref 26.6–33)
MCH RBC QN AUTO: 30.5 PG (ref 26.6–33)
MCH RBC QN AUTO: 30.5 PG (ref 26.6–33)
MCH RBC QN AUTO: 30.6 PG (ref 26.6–33)
MCH RBC QN AUTO: 31.3 PG (ref 26.6–33)
MCH RBC QN AUTO: 31.3 PG (ref 26.6–33)
MCH RBC QN AUTO: 31.4 PG (ref 26.6–33)
MCH RBC QN AUTO: 31.6 PG (ref 26.6–33)
MCH RBC QN AUTO: 31.7 PG (ref 26.6–33)
MCH RBC QN AUTO: 32 PG (ref 26.6–33)
MCH RBC QN AUTO: 32.1 PG (ref 26.6–33)
MCH RBC QN AUTO: 33 PG (ref 26.6–33)
MCHC RBC AUTO-ENTMCNC: 30 G/DL (ref 31.5–35.7)
MCHC RBC AUTO-ENTMCNC: 30 G/DL (ref 31.5–35.7)
MCHC RBC AUTO-ENTMCNC: 30.2 G/DL (ref 31.5–35.7)
MCHC RBC AUTO-ENTMCNC: 30.5 G/DL (ref 31.5–35.7)
MCHC RBC AUTO-ENTMCNC: 30.5 G/DL (ref 31.5–35.7)
MCHC RBC AUTO-ENTMCNC: 30.6 G/DL (ref 31.5–35.7)
MCHC RBC AUTO-ENTMCNC: 30.7 G/DL (ref 31.5–35.7)
MCHC RBC AUTO-ENTMCNC: 30.8 G/DL (ref 31.5–35.7)
MCHC RBC AUTO-ENTMCNC: 30.8 G/DL (ref 31.5–35.7)
MCHC RBC AUTO-ENTMCNC: 31.4 G/DL (ref 31.5–35.7)
MCHC RBC AUTO-ENTMCNC: 31.5 G/DL (ref 31.5–35.7)
MCHC RBC AUTO-ENTMCNC: 31.6 G/DL (ref 31.5–35.7)
MCHC RBC AUTO-ENTMCNC: 31.9 G/DL (ref 31.5–35.7)
MCHC RBC AUTO-ENTMCNC: 32 G/DL (ref 31.5–35.7)
MCHC RBC AUTO-ENTMCNC: 32 G/DL (ref 31.5–35.7)
MCHC RBC AUTO-ENTMCNC: 32.4 G/DL (ref 31.5–35.7)
MCV RBC AUTO: 100 FL (ref 79–97)
MCV RBC AUTO: 100 FL (ref 79–97)
MCV RBC AUTO: 100.3 FL (ref 79–97)
MCV RBC AUTO: 100.4 FL (ref 79–97)
MCV RBC AUTO: 100.9 FL (ref 79–97)
MCV RBC AUTO: 101.6 FL (ref 79–97)
MCV RBC AUTO: 102.6 FL (ref 79–97)
MCV RBC AUTO: 107 FL (ref 79–97)
MCV RBC AUTO: 96.7 FL (ref 79–97)
MCV RBC AUTO: 97.3 FL (ref 79–97)
MCV RBC AUTO: 97.6 FL (ref 79–97)
MCV RBC AUTO: 98 FL (ref 79–97)
MCV RBC AUTO: 98.5 FL (ref 79–97)
MCV RBC AUTO: 98.6 FL (ref 79–97)
MCV RBC AUTO: 99.1 FL (ref 79–97)
MCV RBC AUTO: 99.3 FL (ref 79–97)
METAMYELOCYTES NFR BLD MANUAL: 1 % (ref 0–0)
MODALITY: ABNORMAL
MONOCYTES # BLD AUTO: 1.5 10*3/MM3 (ref 0.1–0.9)
MONOCYTES # BLD AUTO: 1.58 10*3/MM3 (ref 0.1–0.9)
MONOCYTES # BLD AUTO: 1.6 10*3/MM3 (ref 0.1–0.9)
MONOCYTES # BLD AUTO: 1.73 10*3/MM3 (ref 0.1–0.9)
MONOCYTES # BLD AUTO: 1.91 10*3/MM3 (ref 0.1–0.9)
MONOCYTES # BLD AUTO: 2.11 10*3/MM3 (ref 0.1–0.9)
MONOCYTES # BLD AUTO: 2.23 10*3/MM3 (ref 0.1–0.9)
MONOCYTES # BLD AUTO: 2.27 10*3/MM3 (ref 0.1–0.9)
MONOCYTES # BLD AUTO: 2.31 10*3/MM3 (ref 0.1–0.9)
MONOCYTES # BLD AUTO: 2.45 10*3/MM3 (ref 0.1–0.9)
MONOCYTES # BLD AUTO: 2.82 10*3/MM3 (ref 0.1–0.9)
MONOCYTES # BLD AUTO: 2.87 10*3/MM3 (ref 0.1–0.9)
MONOCYTES # BLD AUTO: 2.87 10*3/MM3 (ref 0.1–0.9)
MONOCYTES # BLD: 1.01 10*3/MM3 (ref 0.1–0.9)
MONOCYTES # BLD: 1.84 10*3/MM3 (ref 0.1–0.9)
MONOCYTES NFR BLD AUTO: 11.5 % (ref 5–12)
MONOCYTES NFR BLD AUTO: 12.7 % (ref 5–12)
MONOCYTES NFR BLD AUTO: 14 % (ref 5–12)
MONOCYTES NFR BLD AUTO: 14.2 % (ref 5–12)
MONOCYTES NFR BLD AUTO: 14.6 % (ref 5–12)
MONOCYTES NFR BLD AUTO: 14.9 % (ref 5–12)
MONOCYTES NFR BLD AUTO: 15.1 % (ref 5–12)
MONOCYTES NFR BLD AUTO: 15.6 % (ref 5–12)
MONOCYTES NFR BLD AUTO: 17.1 % (ref 5–12)
MONOCYTES NFR BLD AUTO: 19.8 % (ref 5–12)
MONOCYTES NFR BLD AUTO: 6.9 % (ref 5–12)
MONOCYTES NFR BLD AUTO: 9.6 % (ref 5–12)
MONOCYTES NFR BLD AUTO: 9.9 % (ref 5–12)
MRSA DNA SPEC QL NAA+PROBE: NEGATIVE
NEUTROPHILS # BLD AUTO: 13.03 10*3/MM3 (ref 1.7–7)
NEUTROPHILS # BLD AUTO: 9.63 10*3/MM3 (ref 1.7–7)
NEUTROPHILS NFR BLD AUTO: 10.05 10*3/MM3 (ref 1.7–7)
NEUTROPHILS NFR BLD AUTO: 11.78 10*3/MM3 (ref 1.7–7)
NEUTROPHILS NFR BLD AUTO: 11.98 10*3/MM3 (ref 1.7–7)
NEUTROPHILS NFR BLD AUTO: 12.86 10*3/MM3 (ref 1.7–7)
NEUTROPHILS NFR BLD AUTO: 13.52 10*3/MM3 (ref 1.7–7)
NEUTROPHILS NFR BLD AUTO: 14.22 10*3/MM3 (ref 1.7–7)
NEUTROPHILS NFR BLD AUTO: 15.27 10*3/MM3 (ref 1.7–7)
NEUTROPHILS NFR BLD AUTO: 18.54 10*3/MM3 (ref 1.7–7)
NEUTROPHILS NFR BLD AUTO: 30.97 10*3/MM3 (ref 1.7–7)
NEUTROPHILS NFR BLD AUTO: 5.95 10*3/MM3 (ref 1.7–7)
NEUTROPHILS NFR BLD AUTO: 55.2 % (ref 42.7–76)
NEUTROPHILS NFR BLD AUTO: 6.31 10*3/MM3 (ref 1.7–7)
NEUTROPHILS NFR BLD AUTO: 61.4 % (ref 42.7–76)
NEUTROPHILS NFR BLD AUTO: 63.8 % (ref 42.7–76)
NEUTROPHILS NFR BLD AUTO: 69.2 % (ref 42.7–76)
NEUTROPHILS NFR BLD AUTO: 7.05 10*3/MM3 (ref 1.7–7)
NEUTROPHILS NFR BLD AUTO: 70.3 % (ref 42.7–76)
NEUTROPHILS NFR BLD AUTO: 71.5 % (ref 42.7–76)
NEUTROPHILS NFR BLD AUTO: 72.6 % (ref 42.7–76)
NEUTROPHILS NFR BLD AUTO: 76.3 % (ref 42.7–76)
NEUTROPHILS NFR BLD AUTO: 76.7 % (ref 42.7–76)
NEUTROPHILS NFR BLD AUTO: 77.3 % (ref 42.7–76)
NEUTROPHILS NFR BLD AUTO: 80.8 % (ref 42.7–76)
NEUTROPHILS NFR BLD AUTO: 83.9 % (ref 42.7–76)
NEUTROPHILS NFR BLD AUTO: 87.8 % (ref 42.7–76)
NEUTROPHILS NFR BLD AUTO: 9.56 10*3/MM3 (ref 1.7–7)
NEUTROPHILS NFR BLD MANUAL: 74 % (ref 42.7–76)
NEUTROPHILS NFR BLD MANUAL: 78 % (ref 42.7–76)
NEUTS BAND NFR BLD MANUAL: 2 % (ref 0–5)
NEUTS HYPERSEG # BLD: ABNORMAL 10*3/UL
NITRITE UR QL STRIP: NEGATIVE
NRBC BLD AUTO-RTO: 0 /100 WBC (ref 0–0.2)
NRBC BLD AUTO-RTO: 0.1 /100 WBC (ref 0–0.2)
NRBC BLD AUTO-RTO: 0.1 /100 WBC (ref 0–0.2)
NRBC BLD AUTO-RTO: 0.2 /100 WBC (ref 0–0.2)
NRBC BLD AUTO-RTO: 0.2 /100 WBC (ref 0–0.2)
NRBC BLD AUTO-RTO: 0.3 /100 WBC (ref 0–0.2)
NRBC BLD AUTO-RTO: 0.4 /100 WBC (ref 0–0.2)
NRBC BLD AUTO-RTO: 0.4 /100 WBC (ref 0–0.2)
NRBC BLD AUTO-RTO: 0.5 /100 WBC (ref 0–0.2)
NRBC BLD AUTO-RTO: 0.6 /100 WBC (ref 0–0.2)
NRBC BLD AUTO-RTO: 0.7 /100 WBC (ref 0–0.2)
NRBC SPEC MANUAL: 1 /100 WBC (ref 0–0.2)
NT-PROBNP SERPL-MCNC: 4488 PG/ML (ref 0–1800)
OVALOCYTES BLD QL SMEAR: ABNORMAL
OVALOCYTES BLD QL SMEAR: NORMAL
PAPPENHEIMER BOD BLD QL SMEAR: PRESENT
PCO2 BLDA: 53.1 MM HG (ref 35–45)
PCO2 BLDA: 58.2 MM HG (ref 35–45)
PCO2 BLDA: 60.3 MM HG (ref 35–45)
PCO2 BLDA: 65.2 MM HG (ref 35–45)
PCO2 BLDA: 79.8 MM HG (ref 35–45)
PH BLDA: 7.21 PH UNITS (ref 7.35–7.45)
PH BLDA: 7.27 PH UNITS (ref 7.35–7.45)
PH BLDA: 7.33 PH UNITS (ref 7.35–7.45)
PH BLDA: 7.34 PH UNITS (ref 7.35–7.45)
PH BLDA: 7.39 PH UNITS (ref 7.35–7.45)
PH UR STRIP.AUTO: <=5 [PH] (ref 5–9)
PLAT MORPH BLD: NORMAL
PLAT MORPH BLD: NORMAL
PLATELET # BLD AUTO: 180 10*3/MM3 (ref 140–450)
PLATELET # BLD AUTO: 182 10*3/MM3 (ref 140–450)
PLATELET # BLD AUTO: 222 10*3/MM3 (ref 140–450)
PLATELET # BLD AUTO: 227 10*3/MM3 (ref 140–450)
PLATELET # BLD AUTO: 228 10*3/MM3 (ref 140–450)
PLATELET # BLD AUTO: 247 10*3/MM3 (ref 140–450)
PLATELET # BLD AUTO: 248 10*3/MM3 (ref 140–450)
PLATELET # BLD AUTO: 267 10*3/MM3 (ref 140–450)
PLATELET # BLD AUTO: 271 10*3/MM3 (ref 140–450)
PLATELET # BLD AUTO: 274 10*3/MM3 (ref 140–450)
PLATELET # BLD AUTO: 278 10*3/MM3 (ref 140–450)
PLATELET # BLD AUTO: 286 10*3/MM3 (ref 140–450)
PLATELET # BLD AUTO: 316 10*3/MM3 (ref 140–450)
PLATELET # BLD AUTO: 316 10*3/MM3 (ref 140–450)
PLATELET # BLD AUTO: 320 10*3/MM3 (ref 140–450)
PLATELET # BLD AUTO: 378 10*3/MM3 (ref 140–450)
PMV BLD AUTO: 10 FL (ref 6–12)
PMV BLD AUTO: 10.2 FL (ref 6–12)
PMV BLD AUTO: 10.4 FL (ref 6–12)
PMV BLD AUTO: 10.6 FL (ref 6–12)
PMV BLD AUTO: 10.6 FL (ref 6–12)
PMV BLD AUTO: 10.7 FL (ref 6–12)
PMV BLD AUTO: 10.9 FL (ref 6–12)
PMV BLD AUTO: 11 FL (ref 6–12)
PMV BLD AUTO: 11.1 FL (ref 6–12)
PMV BLD AUTO: 11.2 FL (ref 6–12)
PMV BLD AUTO: 9.9 FL (ref 6–12)
PO2 BLDA: 109 MM HG (ref 83–108)
PO2 BLDA: 115 MM HG (ref 83–108)
PO2 BLDA: 193 MM HG (ref 83–108)
PO2 BLDA: 46 MM HG (ref 83–108)
PO2 BLDA: 88.9 MM HG (ref 83–108)
POIKILOCYTOSIS BLD QL SMEAR: ABNORMAL
POIKILOCYTOSIS BLD QL SMEAR: NORMAL
POLYCHROMASIA BLD QL SMEAR: NORMAL
POTASSIUM SERPL-SCNC: 3.9 MMOL/L (ref 3.5–5.2)
POTASSIUM SERPL-SCNC: 4.2 MMOL/L (ref 3.5–5.2)
POTASSIUM SERPL-SCNC: 4.4 MMOL/L (ref 3.5–5.2)
POTASSIUM SERPL-SCNC: 4.5 MMOL/L (ref 3.5–5.2)
POTASSIUM SERPL-SCNC: 4.7 MMOL/L (ref 3.5–5.2)
POTASSIUM SERPL-SCNC: 4.7 MMOL/L (ref 3.5–5.2)
POTASSIUM SERPL-SCNC: 4.8 MMOL/L (ref 3.5–5.2)
POTASSIUM SERPL-SCNC: 4.9 MMOL/L (ref 3.5–5.2)
POTASSIUM SERPL-SCNC: 4.9 MMOL/L (ref 3.5–5.2)
POTASSIUM SERPL-SCNC: 5 MMOL/L (ref 3.5–5.2)
POTASSIUM SERPL-SCNC: 5 MMOL/L (ref 3.5–5.2)
POTASSIUM SERPL-SCNC: 5.4 MMOL/L (ref 3.5–5.2)
POTASSIUM SERPL-SCNC: 5.7 MMOL/L (ref 3.5–5.2)
POTASSIUM SERPL-SCNC: 6.1 MMOL/L (ref 3.5–5.2)
PROCALCITONIN SERPL-MCNC: 0.09 NG/ML (ref 0–0.25)
PROCALCITONIN SERPL-MCNC: 0.15 NG/ML (ref 0–0.25)
PROCALCITONIN SERPL-MCNC: 0.21 NG/ML (ref 0–0.25)
PROT SERPL-MCNC: 6.1 G/DL (ref 6–8.5)
PROT SERPL-MCNC: 6.7 G/DL (ref 6–8.5)
PROT SERPL-MCNC: 6.8 G/DL (ref 6–8.5)
PROT SERPL-MCNC: 6.9 G/DL (ref 6–8.5)
PROT SERPL-MCNC: 7.1 G/DL (ref 6–8.5)
PROT UR QL STRIP: NEGATIVE
PROTHROMBIN TIME: 15.4 SECONDS (ref 11.1–15.3)
PROTHROMBIN TIME: 16.6 SECONDS (ref 11.1–15.3)
PROTHROMBIN TIME: 16.9 SECONDS (ref 11.1–15.3)
PSA SERPL-MCNC: <0.014 NG/ML (ref 0–4)
QT INTERVAL: 362 MS
QT INTERVAL: 390 MS
QT INTERVAL: 406 MS
QTC INTERVAL: 415 MS
QTC INTERVAL: 444 MS
QTC INTERVAL: 449 MS
RBC # BLD AUTO: 2.12 10*6/MM3 (ref 4.14–5.8)
RBC # BLD AUTO: 2.2 10*6/MM3 (ref 4.14–5.8)
RBC # BLD AUTO: 2.3 10*6/MM3 (ref 4.14–5.8)
RBC # BLD AUTO: 2.3 10*6/MM3 (ref 4.14–5.8)
RBC # BLD AUTO: 2.32 10*6/MM3 (ref 4.14–5.8)
RBC # BLD AUTO: 2.47 10*6/MM3 (ref 4.14–5.8)
RBC # BLD AUTO: 2.54 10*6/MM3 (ref 4.14–5.8)
RBC # BLD AUTO: 2.56 10*6/MM3 (ref 4.14–5.8)
RBC # BLD AUTO: 2.62 10*6/MM3 (ref 4.14–5.8)
RBC # BLD AUTO: 2.69 10*6/MM3 (ref 4.14–5.8)
RBC # BLD AUTO: 2.7 10*6/MM3 (ref 4.14–5.8)
RBC # BLD AUTO: 2.83 10*6/MM3 (ref 4.14–5.8)
RBC # BLD AUTO: 2.85 10*6/MM3 (ref 4.14–5.8)
RBC # BLD AUTO: 2.94 10*6/MM3 (ref 4.14–5.8)
RBC # BLD AUTO: 2.96 10*6/MM3 (ref 4.14–5.8)
RBC # BLD AUTO: 2.97 10*6/MM3 (ref 4.14–5.8)
RETICS # AUTO: 0.05 10*6/MM3 (ref 0.02–0.13)
RETICS/RBC NFR AUTO: 1.74 % (ref 0.7–1.9)
RH BLD: POSITIVE
SAO2 % BLDCOA: 82.5 % (ref 94–99)
SAO2 % BLDCOA: 96.9 % (ref 94–99)
SAO2 % BLDCOA: 97.2 % (ref 94–99)
SAO2 % BLDCOA: 98.5 % (ref 94–99)
SAO2 % BLDCOA: 99.5 % (ref 94–99)
SARS-COV-2 RNA RESP QL NAA+PROBE: NOT DETECTED
SET MECH RESP RATE: 20
SET MECH RESP RATE: 22
SMALL PLATELETS BLD QL SMEAR: ADEQUATE
SODIUM SERPL-SCNC: 138 MMOL/L (ref 136–145)
SODIUM SERPL-SCNC: 139 MMOL/L (ref 136–145)
SODIUM SERPL-SCNC: 140 MMOL/L (ref 136–145)
SODIUM SERPL-SCNC: 140 MMOL/L (ref 136–145)
SODIUM SERPL-SCNC: 141 MMOL/L (ref 136–145)
SODIUM SERPL-SCNC: 143 MMOL/L (ref 136–145)
SODIUM SERPL-SCNC: 143 MMOL/L (ref 136–145)
SODIUM SERPL-SCNC: 144 MMOL/L (ref 136–145)
SODIUM SERPL-SCNC: 145 MMOL/L (ref 136–145)
SODIUM SERPL-SCNC: 146 MMOL/L (ref 136–145)
SP GR UR STRIP: 1.02 (ref 1–1.03)
T&S EXPIRATION DATE: NORMAL
TIBC SERPL-MCNC: 285 MCG/DL (ref 298–536)
TIBC SERPL-MCNC: 329 MCG/DL (ref 298–536)
TIBC SERPL-MCNC: 374 MCG/DL (ref 298–536)
TIBC SERPL-MCNC: 380 MCG/DL (ref 298–536)
TOTAL RATE: 20 BREATHS/MINUTE
TOTAL RATE: 20 BREATHS/MINUTE
TRANSFERRIN SERPL-MCNC: 191 MG/DL (ref 200–360)
TRANSFERRIN SERPL-MCNC: 221 MG/DL (ref 200–360)
TRANSFERRIN SERPL-MCNC: 251 MG/DL (ref 200–360)
TRANSFERRIN SERPL-MCNC: 255 MG/DL (ref 200–360)
TROPONIN T SERPL-MCNC: 0.04 NG/ML (ref 0–0.03)
TROPONIN T SERPL-MCNC: 0.04 NG/ML (ref 0–0.03)
TROPONIN T SERPL-MCNC: 0.06 NG/ML (ref 0–0.03)
TSH SERPL DL<=0.05 MIU/L-ACNC: 0.65 UIU/ML (ref 0.27–4.2)
UNIT  ABO: NORMAL
UNIT  RH: NORMAL
UROBILINOGEN UR QL STRIP: ABNORMAL
VANCOMYCIN SERPL-MCNC: 15 MCG/ML (ref 5–40)
VARIANT LYMPHS NFR BLD MANUAL: 13 % (ref 19.6–45.3)
VARIANT LYMPHS NFR BLD MANUAL: 2 % (ref 0–5)
VARIANT LYMPHS NFR BLD MANUAL: 8 % (ref 19.6–45.3)
VENTILATOR MODE: ABNORMAL
VIT B12 BLD-MCNC: 1057 PG/ML (ref 211–946)
VIT B12 BLD-MCNC: 1099 PG/ML (ref 211–946)
VIT B12 BLD-MCNC: 1919 PG/ML (ref 211–946)
VIT B12 BLD-MCNC: 948 PG/ML (ref 211–946)
VIT B12 BLD-MCNC: 959 PG/ML (ref 211–946)
VT ON VENT VENT: 550 ML
VT ON VENT VENT: 600 ML
WBC MORPH BLD: NORMAL
WBC NRBC COR # BLD: 10.28 10*3/MM3 (ref 3.4–10.8)
WBC NRBC COR # BLD: 10.77 10*3/MM3 (ref 3.4–10.8)
WBC NRBC COR # BLD: 11.06 10*3/MM3 (ref 3.4–10.8)
WBC NRBC COR # BLD: 12.67 10*3/MM3 (ref 3.4–10.8)
WBC NRBC COR # BLD: 13.61 10*3/MM3 (ref 3.4–10.8)
WBC NRBC COR # BLD: 14.52 10*3/MM3 (ref 3.4–10.8)
WBC NRBC COR # BLD: 15.91 10*3/MM3 (ref 3.4–10.8)
WBC NRBC COR # BLD: 16.23 10*3/MM3 (ref 3.4–10.8)
WBC NRBC COR # BLD: 16.66 10*3/MM3 (ref 3.4–10.8)
WBC NRBC COR # BLD: 16.7 10*3/MM3 (ref 3.4–10.8)
WBC NRBC COR # BLD: 16.76 10*3/MM3 (ref 3.4–10.8)
WBC NRBC COR # BLD: 17.61 10*3/MM3 (ref 3.4–10.8)
WBC NRBC COR # BLD: 18.64 10*3/MM3 (ref 3.4–10.8)
WBC NRBC COR # BLD: 19.74 10*3/MM3 (ref 3.4–10.8)
WBC NRBC COR # BLD: 22.09 10*3/MM3 (ref 3.4–10.8)
WBC NRBC COR # BLD: 35.31 10*3/MM3 (ref 3.4–10.8)
WHOLE BLOOD HOLD COAG: NORMAL
WHOLE BLOOD HOLD COAG: NORMAL
WHOLE BLOOD HOLD SPECIMEN: NORMAL

## 2022-01-01 PROCEDURE — 36430 TRANSFUSION BLD/BLD COMPNT: CPT

## 2022-01-01 PROCEDURE — 36415 COLL VENOUS BLD VENIPUNCTURE: CPT | Performed by: INTERNAL MEDICINE

## 2022-01-01 PROCEDURE — 84484 ASSAY OF TROPONIN QUANT: CPT | Performed by: PHYSICIAN ASSISTANT

## 2022-01-01 PROCEDURE — 84132 ASSAY OF SERUM POTASSIUM: CPT | Performed by: STUDENT IN AN ORGANIZED HEALTH CARE EDUCATION/TRAINING PROGRAM

## 2022-01-01 PROCEDURE — 85025 COMPLETE CBC W/AUTO DIFF WBC: CPT

## 2022-01-01 PROCEDURE — G0157 HHC PT ASSISTANT EA 15: HCPCS

## 2022-01-01 PROCEDURE — 63710000001 INSULIN ASPART PER 5 UNITS: Performed by: PHYSICIAN ASSISTANT

## 2022-01-01 PROCEDURE — 86901 BLOOD TYPING SEROLOGIC RH(D): CPT

## 2022-01-01 PROCEDURE — 1125F AMNT PAIN NOTED PAIN PRSNT: CPT | Performed by: INTERNAL MEDICINE

## 2022-01-01 PROCEDURE — 94660 CPAP INITIATION&MGMT: CPT

## 2022-01-01 PROCEDURE — 83540 ASSAY OF IRON: CPT

## 2022-01-01 PROCEDURE — 85014 HEMATOCRIT: CPT | Performed by: NURSE PRACTITIONER

## 2022-01-01 PROCEDURE — G0378 HOSPITAL OBSERVATION PER HR: HCPCS

## 2022-01-01 PROCEDURE — 36410 VNPNXR 3YR/> PHY/QHP DX/THER: CPT

## 2022-01-01 PROCEDURE — 99214 OFFICE O/P EST MOD 30 MIN: CPT | Performed by: INTERNAL MEDICINE

## 2022-01-01 PROCEDURE — 82728 ASSAY OF FERRITIN: CPT

## 2022-01-01 PROCEDURE — 80202 ASSAY OF VANCOMYCIN: CPT | Performed by: INTERNAL MEDICINE

## 2022-01-01 PROCEDURE — 70450 CT HEAD/BRAIN W/O DYE: CPT

## 2022-01-01 PROCEDURE — 94664 DEMO&/EVAL PT USE INHALER: CPT

## 2022-01-01 PROCEDURE — G0463 HOSPITAL OUTPT CLINIC VISIT: HCPCS | Performed by: INTERNAL MEDICINE

## 2022-01-01 PROCEDURE — 94799 UNLISTED PULMONARY SVC/PX: CPT

## 2022-01-01 PROCEDURE — 36415 COLL VENOUS BLD VENIPUNCTURE: CPT

## 2022-01-01 PROCEDURE — 63710000001 TRAMADOL 50 MG TABLET: Performed by: STUDENT IN AN ORGANIZED HEALTH CARE EDUCATION/TRAINING PROGRAM

## 2022-01-01 PROCEDURE — 85007 BL SMEAR W/DIFF WBC COUNT: CPT

## 2022-01-01 PROCEDURE — 25010000002 CEFEPIME PER 500 MG: Performed by: INTERNAL MEDICINE

## 2022-01-01 PROCEDURE — 93010 ELECTROCARDIOGRAM REPORT: CPT | Performed by: INTERNAL MEDICINE

## 2022-01-01 PROCEDURE — 1157F ADVNC CARE PLAN IN RCRD: CPT | Performed by: INTERNAL MEDICINE

## 2022-01-01 PROCEDURE — 86901 BLOOD TYPING SEROLOGIC RH(D): CPT | Performed by: NURSE PRACTITIONER

## 2022-01-01 PROCEDURE — 82962 GLUCOSE BLOOD TEST: CPT

## 2022-01-01 PROCEDURE — 36600 WITHDRAWAL OF ARTERIAL BLOOD: CPT

## 2022-01-01 PROCEDURE — 99285 EMERGENCY DEPT VISIT HI MDM: CPT

## 2022-01-01 PROCEDURE — 84466 ASSAY OF TRANSFERRIN: CPT

## 2022-01-01 PROCEDURE — 82803 BLOOD GASES ANY COMBINATION: CPT

## 2022-01-01 PROCEDURE — 71045 X-RAY EXAM CHEST 1 VIEW: CPT

## 2022-01-01 PROCEDURE — P9016 RBC LEUKOCYTES REDUCED: HCPCS

## 2022-01-01 PROCEDURE — 25010000002 FUROSEMIDE PER 20 MG: Performed by: STUDENT IN AN ORGANIZED HEALTH CARE EDUCATION/TRAINING PROGRAM

## 2022-01-01 PROCEDURE — 85046 RETICYTE/HGB CONCENTRATE: CPT | Performed by: INTERNAL MEDICINE

## 2022-01-01 PROCEDURE — 96372 THER/PROPH/DIAG INJ SC/IM: CPT

## 2022-01-01 PROCEDURE — 25010000002 IOPAMIDOL 61 % SOLUTION: Performed by: INTERNAL MEDICINE

## 2022-01-01 PROCEDURE — 25010000002 VANCOMYCIN 10 G RECONSTITUTED SOLUTION: Performed by: INTERNAL MEDICINE

## 2022-01-01 PROCEDURE — 85025 COMPLETE CBC W/AUTO DIFF WBC: CPT | Performed by: NURSE PRACTITIONER

## 2022-01-01 PROCEDURE — G0299 HHS/HOSPICE OF RN EA 15 MIN: HCPCS

## 2022-01-01 PROCEDURE — 85025 COMPLETE CBC W/AUTO DIFF WBC: CPT | Performed by: STUDENT IN AN ORGANIZED HEALTH CARE EDUCATION/TRAINING PROGRAM

## 2022-01-01 PROCEDURE — 82607 VITAMIN B-12: CPT | Performed by: NURSE PRACTITIONER

## 2022-01-01 PROCEDURE — 82272 OCCULT BLD FECES 1-3 TESTS: CPT | Performed by: NURSE PRACTITIONER

## 2022-01-01 PROCEDURE — 74177 CT ABD & PELVIS W/CONTRAST: CPT

## 2022-01-01 PROCEDURE — 83036 HEMOGLOBIN GLYCOSYLATED A1C: CPT | Performed by: INTERNAL MEDICINE

## 2022-01-01 PROCEDURE — 97162 PT EVAL MOD COMPLEX 30 MIN: CPT

## 2022-01-01 PROCEDURE — 71275 CT ANGIOGRAPHY CHEST: CPT

## 2022-01-01 PROCEDURE — 97535 SELF CARE MNGMENT TRAINING: CPT

## 2022-01-01 PROCEDURE — G0151 HHCP-SERV OF PT,EA 15 MIN: HCPCS

## 2022-01-01 PROCEDURE — 25010000002 ENOXAPARIN PER 10 MG: Performed by: PHYSICIAN ASSISTANT

## 2022-01-01 PROCEDURE — 97110 THERAPEUTIC EXERCISES: CPT

## 2022-01-01 PROCEDURE — 80048 BASIC METABOLIC PNL TOTAL CA: CPT | Performed by: STUDENT IN AN ORGANIZED HEALTH CARE EDUCATION/TRAINING PROGRAM

## 2022-01-01 PROCEDURE — 83735 ASSAY OF MAGNESIUM: CPT | Performed by: FAMILY MEDICINE

## 2022-01-01 PROCEDURE — C1751 CATH, INF, PER/CENT/MIDLINE: HCPCS

## 2022-01-01 PROCEDURE — A9270 NON-COVERED ITEM OR SERVICE: HCPCS | Performed by: STUDENT IN AN ORGANIZED HEALTH CARE EDUCATION/TRAINING PROGRAM

## 2022-01-01 PROCEDURE — 87641 MR-STAPH DNA AMP PROBE: CPT | Performed by: INTERNAL MEDICINE

## 2022-01-01 PROCEDURE — 86900 BLOOD TYPING SEROLOGIC ABO: CPT | Performed by: INTERNAL MEDICINE

## 2022-01-01 PROCEDURE — 80048 BASIC METABOLIC PNL TOTAL CA: CPT | Performed by: INTERNAL MEDICINE

## 2022-01-01 PROCEDURE — 80053 COMPREHEN METABOLIC PANEL: CPT | Performed by: INTERNAL MEDICINE

## 2022-01-01 PROCEDURE — 82746 ASSAY OF FOLIC ACID SERUM: CPT

## 2022-01-01 PROCEDURE — 25010000002 CEFTRIAXONE PER 250 MG: Performed by: PHYSICIAN ASSISTANT

## 2022-01-01 PROCEDURE — 80048 BASIC METABOLIC PNL TOTAL CA: CPT | Performed by: PHYSICIAN ASSISTANT

## 2022-01-01 PROCEDURE — 86850 RBC ANTIBODY SCREEN: CPT | Performed by: STUDENT IN AN ORGANIZED HEALTH CARE EDUCATION/TRAINING PROGRAM

## 2022-01-01 PROCEDURE — 94760 N-INVAS EAR/PLS OXIMETRY 1: CPT

## 2022-01-01 PROCEDURE — 63710000001 INSULIN ASPART PER 5 UNITS: Performed by: NURSE PRACTITIONER

## 2022-01-01 PROCEDURE — 25010000002 MORPHINE PER 10 MG: Performed by: NURSE PRACTITIONER

## 2022-01-01 PROCEDURE — 25010000002 CALCIUM GLUCONATE PER 10 ML: Performed by: INTERNAL MEDICINE

## 2022-01-01 PROCEDURE — 80053 COMPREHEN METABOLIC PANEL: CPT

## 2022-01-01 PROCEDURE — 73610 X-RAY EXAM OF ANKLE: CPT

## 2022-01-01 PROCEDURE — G0155 HHCP-SVS OF CSW,EA 15 MIN: HCPCS

## 2022-01-01 PROCEDURE — 87040 BLOOD CULTURE FOR BACTERIA: CPT | Performed by: INTERNAL MEDICINE

## 2022-01-01 PROCEDURE — 30233N1 TRANSFUSION OF NONAUTOLOGOUS RED BLOOD CELLS INTO PERIPHERAL VEIN, PERCUTANEOUS APPROACH: ICD-10-PCS | Performed by: HOSPITALIST

## 2022-01-01 PROCEDURE — 85007 BL SMEAR W/DIFF WBC COUNT: CPT | Performed by: STUDENT IN AN ORGANIZED HEALTH CARE EDUCATION/TRAINING PROGRAM

## 2022-01-01 PROCEDURE — 84145 PROCALCITONIN (PCT): CPT | Performed by: INTERNAL MEDICINE

## 2022-01-01 PROCEDURE — 97530 THERAPEUTIC ACTIVITIES: CPT

## 2022-01-01 PROCEDURE — 25010000002 LORAZEPAM PER 2 MG

## 2022-01-01 PROCEDURE — 86923 COMPATIBILITY TEST ELECTRIC: CPT

## 2022-01-01 PROCEDURE — 0 MORPHINE PER 10 MG: Performed by: NURSE PRACTITIONER

## 2022-01-01 PROCEDURE — 86850 RBC ANTIBODY SCREEN: CPT

## 2022-01-01 PROCEDURE — 85027 COMPLETE CBC AUTOMATED: CPT | Performed by: INTERNAL MEDICINE

## 2022-01-01 PROCEDURE — 85610 PROTHROMBIN TIME: CPT | Performed by: INTERNAL MEDICINE

## 2022-01-01 PROCEDURE — 25010000002 MORPHINE PER 10 MG: Performed by: FAMILY MEDICINE

## 2022-01-01 PROCEDURE — 99213 OFFICE O/P EST LOW 20 MIN: CPT | Performed by: INTERNAL MEDICINE

## 2022-01-01 PROCEDURE — 86900 BLOOD TYPING SEROLOGIC ABO: CPT | Performed by: STUDENT IN AN ORGANIZED HEALTH CARE EDUCATION/TRAINING PROGRAM

## 2022-01-01 PROCEDURE — 84145 PROCALCITONIN (PCT): CPT | Performed by: PHYSICIAN ASSISTANT

## 2022-01-01 PROCEDURE — 86850 RBC ANTIBODY SCREEN: CPT | Performed by: NURSE PRACTITIONER

## 2022-01-01 PROCEDURE — 80053 COMPREHEN METABOLIC PANEL: CPT | Performed by: FAMILY MEDICINE

## 2022-01-01 PROCEDURE — 97166 OT EVAL MOD COMPLEX 45 MIN: CPT

## 2022-01-01 PROCEDURE — 73502 X-RAY EXAM HIP UNI 2-3 VIEWS: CPT

## 2022-01-01 PROCEDURE — 86901 BLOOD TYPING SEROLOGIC RH(D): CPT | Performed by: STUDENT IN AN ORGANIZED HEALTH CARE EDUCATION/TRAINING PROGRAM

## 2022-01-01 PROCEDURE — 83605 ASSAY OF LACTIC ACID: CPT | Performed by: INTERNAL MEDICINE

## 2022-01-01 PROCEDURE — G0300 HHS/HOSPICE OF LPN EA 15 MIN: HCPCS

## 2022-01-01 PROCEDURE — 85018 HEMOGLOBIN: CPT | Performed by: NURSE PRACTITIONER

## 2022-01-01 PROCEDURE — 84153 ASSAY OF PSA TOTAL: CPT

## 2022-01-01 PROCEDURE — 94640 AIRWAY INHALATION TREATMENT: CPT

## 2022-01-01 PROCEDURE — 84443 ASSAY THYROID STIM HORMONE: CPT | Performed by: INTERNAL MEDICINE

## 2022-01-01 PROCEDURE — 86850 RBC ANTIBODY SCREEN: CPT | Performed by: INTERNAL MEDICINE

## 2022-01-01 PROCEDURE — 0 PHYTONADIONE 10 MG/ML SOLUTION: Performed by: INTERNAL MEDICINE

## 2022-01-01 PROCEDURE — 93005 ELECTROCARDIOGRAM TRACING: CPT | Performed by: FAMILY MEDICINE

## 2022-01-01 PROCEDURE — 97116 GAIT TRAINING THERAPY: CPT

## 2022-01-01 PROCEDURE — 93880 EXTRACRANIAL BILAT STUDY: CPT

## 2022-01-01 PROCEDURE — 99232 SBSQ HOSP IP/OBS MODERATE 35: CPT | Performed by: INTERNAL MEDICINE

## 2022-01-01 PROCEDURE — 96361 HYDRATE IV INFUSION ADD-ON: CPT

## 2022-01-01 PROCEDURE — 74018 RADEX ABDOMEN 1 VIEW: CPT

## 2022-01-01 PROCEDURE — 86900 BLOOD TYPING SEROLOGIC ABO: CPT

## 2022-01-01 PROCEDURE — 82746 ASSAY OF FOLIC ACID SERUM: CPT | Performed by: NURSE PRACTITIONER

## 2022-01-01 PROCEDURE — 82728 ASSAY OF FERRITIN: CPT | Performed by: NURSE PRACTITIONER

## 2022-01-01 PROCEDURE — 87636 SARSCOV2 & INF A&B AMP PRB: CPT | Performed by: STUDENT IN AN ORGANIZED HEALTH CARE EDUCATION/TRAINING PROGRAM

## 2022-01-01 PROCEDURE — 83010 ASSAY OF HAPTOGLOBIN QUANT: CPT | Performed by: INTERNAL MEDICINE

## 2022-01-01 PROCEDURE — 86900 BLOOD TYPING SEROLOGIC ABO: CPT | Performed by: NURSE PRACTITIONER

## 2022-01-01 PROCEDURE — 93005 ELECTROCARDIOGRAM TRACING: CPT | Performed by: INTERNAL MEDICINE

## 2022-01-01 PROCEDURE — 85025 COMPLETE CBC W/AUTO DIFF WBC: CPT | Performed by: PHYSICIAN ASSISTANT

## 2022-01-01 PROCEDURE — 63710000001 PREGABALIN 75 MG CAPSULE: Performed by: STUDENT IN AN ORGANIZED HEALTH CARE EDUCATION/TRAINING PROGRAM

## 2022-01-01 PROCEDURE — 80048 BASIC METABOLIC PNL TOTAL CA: CPT | Performed by: NURSE PRACTITIONER

## 2022-01-01 PROCEDURE — 73564 X-RAY EXAM KNEE 4 OR MORE: CPT

## 2022-01-01 PROCEDURE — 1123F ACP DISCUSS/DSCN MKR DOCD: CPT | Performed by: INTERNAL MEDICINE

## 2022-01-01 PROCEDURE — 72125 CT NECK SPINE W/O DYE: CPT

## 2022-01-01 PROCEDURE — 93005 ELECTROCARDIOGRAM TRACING: CPT

## 2022-01-01 PROCEDURE — 94761 N-INVAS EAR/PLS OXIMETRY MLT: CPT

## 2022-01-01 PROCEDURE — 76937 US GUIDE VASCULAR ACCESS: CPT

## 2022-01-01 PROCEDURE — 25010000002 MORPHINE PER 10 MG

## 2022-01-01 PROCEDURE — 96376 TX/PRO/DX INJ SAME DRUG ADON: CPT

## 2022-01-01 PROCEDURE — 85610 PROTHROMBIN TIME: CPT | Performed by: FAMILY MEDICINE

## 2022-01-01 PROCEDURE — 63710000001 ACETAMINOPHEN 325 MG TABLET: Performed by: STUDENT IN AN ORGANIZED HEALTH CARE EDUCATION/TRAINING PROGRAM

## 2022-01-01 PROCEDURE — 82607 VITAMIN B-12: CPT | Performed by: INTERNAL MEDICINE

## 2022-01-01 PROCEDURE — 99222 1ST HOSP IP/OBS MODERATE 55: CPT | Performed by: INTERNAL MEDICINE

## 2022-01-01 PROCEDURE — 25010000002 LORAZEPAM PER 2 MG: Performed by: NURSE PRACTITIONER

## 2022-01-01 PROCEDURE — 83880 ASSAY OF NATRIURETIC PEPTIDE: CPT | Performed by: FAMILY MEDICINE

## 2022-01-01 PROCEDURE — 82550 ASSAY OF CK (CPK): CPT | Performed by: FAMILY MEDICINE

## 2022-01-01 PROCEDURE — 84484 ASSAY OF TROPONIN QUANT: CPT | Performed by: FAMILY MEDICINE

## 2022-01-01 PROCEDURE — 83735 ASSAY OF MAGNESIUM: CPT | Performed by: INTERNAL MEDICINE

## 2022-01-01 PROCEDURE — 82746 ASSAY OF FOLIC ACID SERUM: CPT | Performed by: INTERNAL MEDICINE

## 2022-01-01 PROCEDURE — 93000 ELECTROCARDIOGRAM COMPLETE: CPT | Performed by: INTERNAL MEDICINE

## 2022-01-01 PROCEDURE — 83540 ASSAY OF IRON: CPT | Performed by: NURSE PRACTITIONER

## 2022-01-01 PROCEDURE — 86901 BLOOD TYPING SEROLOGIC RH(D): CPT | Performed by: INTERNAL MEDICINE

## 2022-01-01 PROCEDURE — 82607 VITAMIN B-12: CPT

## 2022-01-01 PROCEDURE — 85018 HEMOGLOBIN: CPT | Performed by: INTERNAL MEDICINE

## 2022-01-01 PROCEDURE — 83615 LACTATE (LD) (LDH) ENZYME: CPT | Performed by: INTERNAL MEDICINE

## 2022-01-01 PROCEDURE — 99231 SBSQ HOSP IP/OBS SF/LOW 25: CPT | Performed by: INTERNAL MEDICINE

## 2022-01-01 PROCEDURE — 81003 URINALYSIS AUTO W/O SCOPE: CPT | Performed by: INTERNAL MEDICINE

## 2022-01-01 PROCEDURE — 85014 HEMATOCRIT: CPT | Performed by: INTERNAL MEDICINE

## 2022-01-01 PROCEDURE — 96375 TX/PRO/DX INJ NEW DRUG ADDON: CPT

## 2022-01-01 PROCEDURE — 63710000001 INSULIN REGULAR HUMAN PER 5 UNITS: Performed by: INTERNAL MEDICINE

## 2022-01-01 PROCEDURE — 0 IOPAMIDOL PER 1 ML: Performed by: INTERNAL MEDICINE

## 2022-01-01 PROCEDURE — 96365 THER/PROPH/DIAG IV INF INIT: CPT

## 2022-01-01 PROCEDURE — 84466 ASSAY OF TRANSFERRIN: CPT | Performed by: NURSE PRACTITIONER

## 2022-01-01 RX ORDER — SODIUM CHLORIDE 9 MG/ML
125 INJECTION, SOLUTION INTRAVENOUS CONTINUOUS
Status: DISCONTINUED | OUTPATIENT
Start: 2022-01-01 | End: 2022-01-01

## 2022-01-01 RX ORDER — ACETAMINOPHEN 325 MG/1
650 TABLET ORAL EVERY 4 HOURS PRN
Status: DISCONTINUED | OUTPATIENT
Start: 2022-01-01 | End: 2022-01-01 | Stop reason: HOSPADM

## 2022-01-01 RX ORDER — BUDESONIDE AND FORMOTEROL FUMARATE DIHYDRATE 160; 4.5 UG/1; UG/1
2 AEROSOL RESPIRATORY (INHALATION)
Status: DISCONTINUED | OUTPATIENT
Start: 2022-01-01 | End: 2022-01-01 | Stop reason: HOSPADM

## 2022-01-01 RX ORDER — PREGABALIN 75 MG/1
150 CAPSULE ORAL ONCE
Status: COMPLETED | OUTPATIENT
Start: 2022-01-01 | End: 2022-01-01

## 2022-01-01 RX ORDER — MORPHINE SULFATE 2 MG/ML
INJECTION, SOLUTION INTRAMUSCULAR; INTRAVENOUS
Status: COMPLETED
Start: 2022-01-01 | End: 2022-01-01

## 2022-01-01 RX ORDER — SODIUM CHLORIDE 9 MG/ML
INJECTION, SOLUTION INTRAVENOUS
Status: DISPENSED
Start: 2022-01-01 | End: 2022-01-01

## 2022-01-01 RX ORDER — ISOSORBIDE MONONITRATE 30 MG/1
30 TABLET, EXTENDED RELEASE ORAL
Status: DISCONTINUED | OUTPATIENT
Start: 2022-01-01 | End: 2022-01-01 | Stop reason: HOSPADM

## 2022-01-01 RX ORDER — MORPHINE SULFATE 2 MG/ML
2 INJECTION, SOLUTION INTRAMUSCULAR; INTRAVENOUS
Status: DISCONTINUED | OUTPATIENT
Start: 2022-01-01 | End: 2022-01-01

## 2022-01-01 RX ORDER — CARVEDILOL 3.12 MG/1
3.12 TABLET ORAL EVERY 12 HOURS
Status: DISCONTINUED | OUTPATIENT
Start: 2022-01-01 | End: 2022-01-01

## 2022-01-01 RX ORDER — CLOPIDOGREL BISULFATE 75 MG/1
75 TABLET ORAL DAILY
Qty: 90 TABLET | Refills: 3 | Status: SHIPPED | OUTPATIENT
Start: 2022-01-01

## 2022-01-01 RX ORDER — ALBUTEROL SULFATE 2.5 MG/3ML
2.5 SOLUTION RESPIRATORY (INHALATION) EVERY 4 HOURS PRN
Status: DISCONTINUED | OUTPATIENT
Start: 2022-01-01 | End: 2022-01-01 | Stop reason: HOSPADM

## 2022-01-01 RX ORDER — DILTIAZEM HYDROCHLORIDE 180 MG/1
180 CAPSULE, COATED, EXTENDED RELEASE ORAL DAILY
Status: DISCONTINUED | OUTPATIENT
Start: 2022-01-01 | End: 2022-01-01

## 2022-01-01 RX ORDER — FERROUS SULFATE TAB EC 324 MG (65 MG FE EQUIVALENT) 324 (65 FE) MG
324 TABLET DELAYED RESPONSE ORAL
Status: DISCONTINUED | OUTPATIENT
Start: 2022-01-01 | End: 2022-01-01

## 2022-01-01 RX ORDER — NITROGLYCERIN 0.4 MG/1
0.4 TABLET SUBLINGUAL
Status: DISCONTINUED | OUTPATIENT
Start: 2022-01-01 | End: 2022-01-01

## 2022-01-01 RX ORDER — TRAMADOL HYDROCHLORIDE 50 MG/1
50 TABLET ORAL EVERY 6 HOURS PRN
Status: DISCONTINUED | OUTPATIENT
Start: 2022-01-01 | End: 2022-01-01

## 2022-01-01 RX ORDER — TIOTROPIUM BROMIDE AND OLODATEROL 3.124; 2.736 UG/1; UG/1
2 SPRAY, METERED RESPIRATORY (INHALATION) DAILY
Qty: 1 EACH | Refills: 11 | Status: SHIPPED | OUTPATIENT
Start: 2022-01-01 | End: 2022-01-01

## 2022-01-01 RX ORDER — ACETAMINOPHEN 325 MG/1
650 TABLET ORAL ONCE
Status: COMPLETED | OUTPATIENT
Start: 2022-01-01 | End: 2022-01-01

## 2022-01-01 RX ORDER — SODIUM CHLORIDE 0.9 % (FLUSH) 0.9 %
10 SYRINGE (ML) INJECTION EVERY 12 HOURS SCHEDULED
Status: DISCONTINUED | OUTPATIENT
Start: 2022-01-01 | End: 2022-01-01 | Stop reason: HOSPADM

## 2022-01-01 RX ORDER — MORPHINE SULFATE 1 MG/ML
INJECTION INTRAVENOUS CONTINUOUS
Status: DISCONTINUED | OUTPATIENT
Start: 2022-01-01 | End: 2022-01-01 | Stop reason: HOSPADM

## 2022-01-01 RX ORDER — PANTOPRAZOLE SODIUM 40 MG/1
40 TABLET, DELAYED RELEASE ORAL EVERY MORNING
Status: DISCONTINUED | OUTPATIENT
Start: 2022-01-01 | End: 2022-01-01

## 2022-01-01 RX ORDER — LORAZEPAM 2 MG/ML
1 INJECTION INTRAMUSCULAR EVERY 4 HOURS PRN
Status: DISCONTINUED | OUTPATIENT
Start: 2022-01-01 | End: 2022-01-01 | Stop reason: HOSPADM

## 2022-01-01 RX ORDER — ISOSORBIDE MONONITRATE 30 MG/1
30 TABLET, EXTENDED RELEASE ORAL
Status: DISCONTINUED | OUTPATIENT
Start: 2022-01-01 | End: 2022-01-01

## 2022-01-01 RX ORDER — BUDESONIDE AND FORMOTEROL FUMARATE DIHYDRATE 160; 4.5 UG/1; UG/1
2 AEROSOL RESPIRATORY (INHALATION)
Status: DISCONTINUED | OUTPATIENT
Start: 2022-01-01 | End: 2022-01-01

## 2022-01-01 RX ORDER — DEXTROSE MONOHYDRATE 25 G/50ML
25 INJECTION, SOLUTION INTRAVENOUS
Status: DISCONTINUED | OUTPATIENT
Start: 2022-01-01 | End: 2022-01-01 | Stop reason: HOSPADM

## 2022-01-01 RX ORDER — INSULIN ASPART 100 [IU]/ML
0-7 INJECTION, SOLUTION INTRAVENOUS; SUBCUTANEOUS
Status: DISCONTINUED | OUTPATIENT
Start: 2022-01-01 | End: 2022-01-01

## 2022-01-01 RX ORDER — TRAMADOL HYDROCHLORIDE 50 MG/1
50 TABLET ORAL 2 TIMES DAILY
Status: DISCONTINUED | OUTPATIENT
Start: 2022-01-01 | End: 2022-01-01 | Stop reason: HOSPADM

## 2022-01-01 RX ORDER — PREGABALIN 50 MG/1
100 CAPSULE ORAL EVERY 8 HOURS SCHEDULED
Status: DISCONTINUED | OUTPATIENT
Start: 2022-01-01 | End: 2022-01-01

## 2022-01-01 RX ORDER — ONDANSETRON 2 MG/ML
4 INJECTION INTRAMUSCULAR; INTRAVENOUS EVERY 6 HOURS PRN
Status: DISCONTINUED | OUTPATIENT
Start: 2022-01-01 | End: 2022-01-01 | Stop reason: HOSPADM

## 2022-01-01 RX ORDER — BUDESONIDE AND FORMOTEROL FUMARATE DIHYDRATE 160; 4.5 UG/1; UG/1
2 AEROSOL RESPIRATORY (INHALATION)
Qty: 1 EACH | Refills: 11 | Status: SHIPPED | OUTPATIENT
Start: 2022-01-01

## 2022-01-01 RX ORDER — ALBUTEROL SULFATE 90 UG/1
2 AEROSOL, METERED RESPIRATORY (INHALATION) EVERY 4 HOURS PRN
Qty: 18 G | Refills: 5 | Status: SHIPPED | OUTPATIENT
Start: 2022-01-01

## 2022-01-01 RX ORDER — LOSARTAN POTASSIUM 50 MG/1
50 TABLET ORAL NIGHTLY
Qty: 90 TABLET | Refills: 3 | Status: SHIPPED | OUTPATIENT
Start: 2022-01-01

## 2022-01-01 RX ORDER — SCOLOPAMINE TRANSDERMAL SYSTEM 1 MG/1
1 PATCH, EXTENDED RELEASE TRANSDERMAL
Status: DISCONTINUED | OUTPATIENT
Start: 2022-01-01 | End: 2022-01-01 | Stop reason: HOSPADM

## 2022-01-01 RX ORDER — FERROUS SULFATE 325(65) MG
TABLET ORAL
Qty: 90 TABLET | Refills: 0 | Status: SHIPPED | OUTPATIENT
Start: 2022-01-01 | End: 2022-01-01 | Stop reason: SDUPTHER

## 2022-01-01 RX ORDER — MORPHINE SULFATE 2 MG/ML
1 INJECTION, SOLUTION INTRAMUSCULAR; INTRAVENOUS EVERY 4 HOURS PRN
Status: DISCONTINUED | OUTPATIENT
Start: 2022-01-01 | End: 2022-01-01 | Stop reason: HOSPADM

## 2022-01-01 RX ORDER — MORPHINE SULFATE 2 MG/ML
1 INJECTION, SOLUTION INTRAMUSCULAR; INTRAVENOUS
Status: DISCONTINUED | OUTPATIENT
Start: 2022-01-01 | End: 2022-01-01

## 2022-01-01 RX ORDER — SODIUM CHLORIDE 0.9 % (FLUSH) 0.9 %
10 SYRINGE (ML) INJECTION AS NEEDED
Status: DISCONTINUED | OUTPATIENT
Start: 2022-01-01 | End: 2022-01-01 | Stop reason: HOSPADM

## 2022-01-01 RX ORDER — ATORVASTATIN CALCIUM 40 MG/1
40 TABLET, FILM COATED ORAL DAILY
Status: DISCONTINUED | OUTPATIENT
Start: 2022-01-01 | End: 2022-01-01

## 2022-01-01 RX ORDER — LOSARTAN POTASSIUM 50 MG/1
50 TABLET ORAL DAILY
COMMUNITY
End: 2022-01-01

## 2022-01-01 RX ORDER — PHYTONADIONE 2 MG/ML
5 INJECTION, EMULSION INTRAMUSCULAR; INTRAVENOUS; SUBCUTANEOUS ONCE
Status: COMPLETED | OUTPATIENT
Start: 2022-01-01 | End: 2022-01-01

## 2022-01-01 RX ORDER — FUROSEMIDE 10 MG/ML
40 INJECTION INTRAMUSCULAR; INTRAVENOUS ONCE
Status: COMPLETED | OUTPATIENT
Start: 2022-01-01 | End: 2022-01-01

## 2022-01-01 RX ORDER — DIPHENHYDRAMINE HYDROCHLORIDE, ZINC ACETATE 2; .1 G/100G; G/100G
1 CREAM TOPICAL 3 TIMES DAILY PRN
Status: DISCONTINUED | OUTPATIENT
Start: 2022-01-01 | End: 2022-01-01

## 2022-01-01 RX ORDER — ONDANSETRON 4 MG/1
4 TABLET, FILM COATED ORAL EVERY 6 HOURS PRN
Status: DISCONTINUED | OUTPATIENT
Start: 2022-01-01 | End: 2022-01-01 | Stop reason: HOSPADM

## 2022-01-01 RX ORDER — DEXTROSE MONOHYDRATE 25 G/50ML
25 INJECTION, SOLUTION INTRAVENOUS
Status: DISCONTINUED | OUTPATIENT
Start: 2022-01-01 | End: 2022-01-01

## 2022-01-01 RX ORDER — NICOTINE POLACRILEX 4 MG
15 LOZENGE BUCCAL
Status: DISCONTINUED | OUTPATIENT
Start: 2022-01-01 | End: 2022-01-01 | Stop reason: HOSPADM

## 2022-01-01 RX ORDER — DEXTROSE MONOHYDRATE 25 G/50ML
50 INJECTION, SOLUTION INTRAVENOUS ONCE
Status: COMPLETED | OUTPATIENT
Start: 2022-01-01 | End: 2022-01-01

## 2022-01-01 RX ORDER — TRAMADOL HYDROCHLORIDE 50 MG/1
50 TABLET ORAL 2 TIMES DAILY
COMMUNITY

## 2022-01-01 RX ORDER — ENOXAPARIN SODIUM 100 MG/ML
40 INJECTION SUBCUTANEOUS NIGHTLY
Status: DISCONTINUED | OUTPATIENT
Start: 2022-01-01 | End: 2022-01-01

## 2022-01-01 RX ORDER — OXYBUTYNIN CHLORIDE 5 MG/1
5 TABLET, EXTENDED RELEASE ORAL DAILY
Status: DISCONTINUED | OUTPATIENT
Start: 2022-01-01 | End: 2022-01-01

## 2022-01-01 RX ORDER — LORAZEPAM 2 MG/ML
1 INJECTION INTRAMUSCULAR EVERY 4 HOURS PRN
Status: DISCONTINUED | OUTPATIENT
Start: 2022-01-01 | End: 2022-01-01

## 2022-01-01 RX ORDER — NALOXONE HCL 0.4 MG/ML
0.4 VIAL (ML) INJECTION
Status: DISCONTINUED | OUTPATIENT
Start: 2022-01-01 | End: 2022-01-01 | Stop reason: HOSPADM

## 2022-01-01 RX ORDER — ACETAMINOPHEN 325 MG/1
650 TABLET ORAL EVERY 6 HOURS PRN
Status: DISCONTINUED | OUTPATIENT
Start: 2022-01-01 | End: 2022-01-01

## 2022-01-01 RX ORDER — CLOPIDOGREL BISULFATE 75 MG/1
75 TABLET ORAL DAILY
Status: DISCONTINUED | OUTPATIENT
Start: 2022-01-01 | End: 2022-01-01 | Stop reason: HOSPADM

## 2022-01-01 RX ORDER — ATROPINE SULFATE 10 MG/ML
2 SOLUTION/ DROPS OPHTHALMIC 4 TIMES DAILY
Status: DISCONTINUED | OUTPATIENT
Start: 2022-01-01 | End: 2022-01-01 | Stop reason: HOSPADM

## 2022-01-01 RX ORDER — SODIUM CHLORIDE 9 MG/ML
100 INJECTION, SOLUTION INTRAVENOUS CONTINUOUS
Status: DISCONTINUED | OUTPATIENT
Start: 2022-01-01 | End: 2022-01-01

## 2022-01-01 RX ORDER — FERROUS SULFATE 325(65) MG
TABLET ORAL
Qty: 90 TABLET | Refills: 0 | Status: SHIPPED | OUTPATIENT
Start: 2022-01-01

## 2022-01-01 RX ORDER — SODIUM CHLORIDE 9 MG/ML
INJECTION, SOLUTION INTRAVENOUS
Status: COMPLETED
Start: 2022-01-01 | End: 2022-01-01

## 2022-01-01 RX ORDER — LOSARTAN POTASSIUM 50 MG/1
50 TABLET ORAL NIGHTLY
Status: DISCONTINUED | OUTPATIENT
Start: 2022-01-01 | End: 2022-01-01

## 2022-01-01 RX ORDER — ACETAMINOPHEN 650 MG/1
650 SUPPOSITORY RECTAL EVERY 4 HOURS PRN
Status: DISCONTINUED | OUTPATIENT
Start: 2022-01-01 | End: 2022-01-01 | Stop reason: HOSPADM

## 2022-01-01 RX ORDER — TRAMADOL HYDROCHLORIDE 50 MG/1
50 TABLET ORAL EVERY 6 HOURS PRN
Status: DISCONTINUED | OUTPATIENT
Start: 2022-01-01 | End: 2022-01-01 | Stop reason: HOSPADM

## 2022-01-01 RX ORDER — DEXTROSE AND SODIUM CHLORIDE 5; .45 G/100ML; G/100ML
30 INJECTION, SOLUTION INTRAVENOUS CONTINUOUS PRN
Status: DISCONTINUED | OUTPATIENT
Start: 2022-01-01 | End: 2022-01-01

## 2022-01-01 RX ORDER — ACETAMINOPHEN 650 MG/1
650 SUPPOSITORY RECTAL EVERY 4 HOURS PRN
Status: COMPLETED | OUTPATIENT
Start: 2022-01-01 | End: 2022-01-01

## 2022-01-01 RX ORDER — DEXTROSE AND SODIUM CHLORIDE 5; .45 G/100ML; G/100ML
30 INJECTION, SOLUTION INTRAVENOUS CONTINUOUS PRN
Status: CANCELLED | OUTPATIENT
Start: 2022-01-01

## 2022-01-01 RX ORDER — OXYBUTYNIN CHLORIDE 5 MG/1
5 TABLET, EXTENDED RELEASE ORAL DAILY
Status: DISCONTINUED | OUTPATIENT
Start: 2022-01-01 | End: 2022-01-01 | Stop reason: HOSPADM

## 2022-01-01 RX ORDER — CARVEDILOL 3.12 MG/1
3.12 TABLET ORAL 2 TIMES DAILY WITH MEALS
Status: DISCONTINUED | OUTPATIENT
Start: 2022-01-01 | End: 2022-01-01 | Stop reason: HOSPADM

## 2022-01-01 RX ORDER — SODIUM CHLORIDE 9 MG/ML
100 INJECTION, SOLUTION INTRAVENOUS CONTINUOUS
Status: DISPENSED | OUTPATIENT
Start: 2022-01-01 | End: 2022-01-01

## 2022-01-01 RX ORDER — PREGABALIN 75 MG/1
150 CAPSULE ORAL 3 TIMES DAILY
Status: DISCONTINUED | OUTPATIENT
Start: 2022-01-01 | End: 2022-01-01 | Stop reason: HOSPADM

## 2022-01-01 RX ORDER — PANTOPRAZOLE SODIUM 40 MG/1
40 TABLET, DELAYED RELEASE ORAL
Status: DISCONTINUED | OUTPATIENT
Start: 2022-01-01 | End: 2022-01-01 | Stop reason: HOSPADM

## 2022-01-01 RX ORDER — NALOXONE HCL 0.4 MG/ML
0.1 VIAL (ML) INJECTION
Status: DISCONTINUED | OUTPATIENT
Start: 2022-01-01 | End: 2022-01-01 | Stop reason: HOSPADM

## 2022-01-01 RX ORDER — PANTOPRAZOLE SODIUM 40 MG/1
40 TABLET, DELAYED RELEASE ORAL
Qty: 30 TABLET | Refills: 0 | Status: SHIPPED | OUTPATIENT
Start: 2022-01-01

## 2022-01-01 RX ORDER — TRAMADOL HYDROCHLORIDE 50 MG/1
50 TABLET ORAL 2 TIMES DAILY PRN
Status: DISCONTINUED | OUTPATIENT
Start: 2022-01-01 | End: 2022-01-01

## 2022-01-01 RX ORDER — GABAPENTIN 100 MG/1
150 CAPSULE ORAL 4 TIMES DAILY
COMMUNITY
End: 2022-01-01

## 2022-01-01 RX ORDER — LANOLIN ALCOHOL/MO/W.PET/CERES
6 CREAM (GRAM) TOPICAL NIGHTLY PRN
Status: DISCONTINUED | OUTPATIENT
Start: 2022-01-01 | End: 2022-01-01 | Stop reason: HOSPADM

## 2022-01-01 RX ORDER — CARVEDILOL 3.12 MG/1
3.12 TABLET ORAL EVERY 12 HOURS
Qty: 180 TABLET | Refills: 3 | Status: SHIPPED | OUTPATIENT
Start: 2022-01-01

## 2022-01-01 RX ORDER — INSULIN ASPART 100 [IU]/ML
0-7 INJECTION, SOLUTION INTRAVENOUS; SUBCUTANEOUS
Status: DISCONTINUED | OUTPATIENT
Start: 2022-01-01 | End: 2022-01-01 | Stop reason: HOSPADM

## 2022-01-01 RX ORDER — MORPHINE SULFATE 2 MG/ML
2 INJECTION, SOLUTION INTRAMUSCULAR; INTRAVENOUS
Status: DISCONTINUED | OUTPATIENT
Start: 2022-01-01 | End: 2022-01-01 | Stop reason: HOSPADM

## 2022-01-01 RX ORDER — CHOLECALCIFEROL (VITAMIN D3) 125 MCG
500 CAPSULE ORAL DAILY
Status: DISCONTINUED | OUTPATIENT
Start: 2022-01-01 | End: 2022-01-01

## 2022-01-01 RX ORDER — NICOTINE POLACRILEX 4 MG
15 LOZENGE BUCCAL
Status: DISCONTINUED | OUTPATIENT
Start: 2022-01-01 | End: 2022-01-01

## 2022-01-01 RX ORDER — ATORVASTATIN CALCIUM 40 MG/1
40 TABLET, FILM COATED ORAL DAILY
Status: DISCONTINUED | OUTPATIENT
Start: 2022-01-01 | End: 2022-01-01 | Stop reason: HOSPADM

## 2022-01-01 RX ORDER — SODIUM POLYSTYRENE SULFONATE 15 G/60ML
15 SUSPENSION ORAL; RECTAL ONCE
Qty: 60 ML | Refills: 0 | Status: SHIPPED | OUTPATIENT
Start: 2022-01-01 | End: 2022-01-01

## 2022-01-01 RX ORDER — LATANOPROST 50 UG/ML
1 SOLUTION/ DROPS OPHTHALMIC NIGHTLY
Status: DISCONTINUED | OUTPATIENT
Start: 2022-01-01 | End: 2022-01-01

## 2022-01-01 RX ORDER — DEXTROSE MONOHYDRATE 50 MG/ML
75 INJECTION, SOLUTION INTRAVENOUS CONTINUOUS
Status: DISPENSED | OUTPATIENT
Start: 2022-01-01 | End: 2022-01-01

## 2022-01-01 RX ADMIN — MORPHINE SULFATE 2 MG: 2 INJECTION, SOLUTION INTRAMUSCULAR; INTRAVENOUS at 02:31

## 2022-01-01 RX ADMIN — DEXTROSE MONOHYDRATE 75 ML/HR: 50 INJECTION, SOLUTION INTRAVENOUS at 06:36

## 2022-01-01 RX ADMIN — ATORVASTATIN CALCIUM 40 MG: 40 TABLET, FILM COATED ORAL at 09:29

## 2022-01-01 RX ADMIN — Medication 10 ML: at 21:21

## 2022-01-01 RX ADMIN — OXYBUTYNIN CHLORIDE 5 MG: 5 TABLET, EXTENDED RELEASE ORAL at 09:52

## 2022-01-01 RX ADMIN — CLOPIDOGREL BISULFATE 75 MG: 75 TABLET ORAL at 09:28

## 2022-01-01 RX ADMIN — IOPAMIDOL 90 ML: 612 INJECTION, SOLUTION INTRAVENOUS at 07:58

## 2022-01-01 RX ADMIN — DEXTROSE MONOHYDRATE 75 ML/HR: 50 INJECTION, SOLUTION INTRAVENOUS at 10:45

## 2022-01-01 RX ADMIN — PREGABALIN 150 MG: 75 CAPSULE ORAL at 17:15

## 2022-01-01 RX ADMIN — PREGABALIN 100 MG: 50 CAPSULE ORAL at 21:20

## 2022-01-01 RX ADMIN — CARVEDILOL 3.12 MG: 3.12 TABLET, FILM COATED ORAL at 12:16

## 2022-01-01 RX ADMIN — PANTOPRAZOLE SODIUM 40 MG: 40 TABLET, DELAYED RELEASE ORAL at 11:41

## 2022-01-01 RX ADMIN — BUDESONIDE AND FORMOTEROL FUMARATE DIHYDRATE 2 PUFF: 160; 4.5 AEROSOL RESPIRATORY (INHALATION) at 08:07

## 2022-01-01 RX ADMIN — SODIUM CHLORIDE 500 ML: 9 INJECTION, SOLUTION INTRAVENOUS at 12:52

## 2022-01-01 RX ADMIN — Medication 10 ML: at 20:38

## 2022-01-01 RX ADMIN — MORPHINE SULFATE 2 MG: 2 INJECTION, SOLUTION INTRAMUSCULAR; INTRAVENOUS at 23:00

## 2022-01-01 RX ADMIN — CEFTRIAXONE SODIUM 1 G: 1 INJECTION, POWDER, FOR SOLUTION INTRAMUSCULAR; INTRAVENOUS at 21:46

## 2022-01-01 RX ADMIN — MORPHINE SULFATE 1 MG: 2 INJECTION, SOLUTION INTRAMUSCULAR; INTRAVENOUS at 19:29

## 2022-01-01 RX ADMIN — LATANOPROST 1 DROP: 50 SOLUTION OPHTHALMIC at 21:21

## 2022-01-01 RX ADMIN — OXYBUTYNIN CHLORIDE 5 MG: 5 TABLET, EXTENDED RELEASE ORAL at 09:30

## 2022-01-01 RX ADMIN — ISOSORBIDE MONONITRATE 30 MG: 30 TABLET, EXTENDED RELEASE ORAL at 08:49

## 2022-01-01 RX ADMIN — PREGABALIN 150 MG: 75 CAPSULE ORAL at 09:04

## 2022-01-01 RX ADMIN — PANTOPRAZOLE SODIUM 40 MG: 40 TABLET, DELAYED RELEASE ORAL at 06:12

## 2022-01-01 RX ADMIN — ISOSORBIDE MONONITRATE 30 MG: 30 TABLET, EXTENDED RELEASE ORAL at 09:01

## 2022-01-01 RX ADMIN — CLOPIDOGREL BISULFATE 75 MG: 75 TABLET ORAL at 08:18

## 2022-01-01 RX ADMIN — OXYBUTYNIN CHLORIDE 5 MG: 5 TABLET, EXTENDED RELEASE ORAL at 09:04

## 2022-01-01 RX ADMIN — OXYBUTYNIN CHLORIDE 5 MG: 5 TABLET, EXTENDED RELEASE ORAL at 08:56

## 2022-01-01 RX ADMIN — CLOPIDOGREL BISULFATE 75 MG: 75 TABLET ORAL at 08:49

## 2022-01-01 RX ADMIN — BUDESONIDE AND FORMOTEROL FUMARATE DIHYDRATE 2 PUFF: 160; 4.5 AEROSOL RESPIRATORY (INHALATION) at 20:40

## 2022-01-01 RX ADMIN — CYANOCOBALAMIN TAB 500 MCG 500 MCG: 500 TAB at 09:01

## 2022-01-01 RX ADMIN — LATANOPROST 1 DROP: 50 SOLUTION OPHTHALMIC at 21:09

## 2022-01-01 RX ADMIN — BUDESONIDE AND FORMOTEROL FUMARATE DIHYDRATE 2 PUFF: 160; 4.5 AEROSOL RESPIRATORY (INHALATION) at 19:56

## 2022-01-01 RX ADMIN — SODIUM CHLORIDE 8 MG/HR: 900 INJECTION INTRAVENOUS at 19:33

## 2022-01-01 RX ADMIN — DILTIAZEM HYDROCHLORIDE 180 MG: 180 CAPSULE, COATED, EXTENDED RELEASE ORAL at 09:01

## 2022-01-01 RX ADMIN — PREGABALIN 150 MG: 75 CAPSULE ORAL at 17:53

## 2022-01-01 RX ADMIN — TRAMADOL HYDROCHLORIDE 50 MG: 50 TABLET, COATED ORAL at 08:18

## 2022-01-01 RX ADMIN — CEFEPIME HYDROCHLORIDE 2 G: 2 INJECTION, POWDER, FOR SOLUTION INTRAVENOUS at 17:26

## 2022-01-01 RX ADMIN — FERROUS SULFATE TAB EC 324 MG (65 MG FE EQUIVALENT) 324 MG: 324 (65 FE) TABLET DELAYED RESPONSE at 09:26

## 2022-01-01 RX ADMIN — MORPHINE SULFATE 2 MG: 2 INJECTION, SOLUTION INTRAMUSCULAR; INTRAVENOUS at 09:32

## 2022-01-01 RX ADMIN — Medication 10 ML: at 20:33

## 2022-01-01 RX ADMIN — LATANOPROST 1 DROP: 50 SOLUTION OPHTHALMIC at 20:03

## 2022-01-01 RX ADMIN — IOPAMIDOL 59 ML: 755 INJECTION, SOLUTION INTRAVENOUS at 23:44

## 2022-01-01 RX ADMIN — ACETAMINOPHEN 650 MG: 325 TABLET, FILM COATED ORAL at 08:56

## 2022-01-01 RX ADMIN — FERROUS SULFATE TAB EC 324 MG (65 MG FE EQUIVALENT) 324 MG: 324 (65 FE) TABLET DELAYED RESPONSE at 09:00

## 2022-01-01 RX ADMIN — TRAMADOL HYDROCHLORIDE 50 MG: 50 TABLET, COATED ORAL at 14:10

## 2022-01-01 RX ADMIN — LORAZEPAM 1 MG: 2 INJECTION INTRAMUSCULAR; INTRAVENOUS at 03:23

## 2022-01-01 RX ADMIN — SCOPOLAMINE 1 PATCH: 1.5 PATCH, EXTENDED RELEASE TRANSDERMAL at 13:52

## 2022-01-01 RX ADMIN — CYANOCOBALAMIN TAB 500 MCG 500 MCG: 500 TAB at 09:03

## 2022-01-01 RX ADMIN — ENOXAPARIN SODIUM 40 MG: 40 INJECTION SUBCUTANEOUS at 21:47

## 2022-01-01 RX ADMIN — PREGABALIN 100 MG: 50 CAPSULE ORAL at 14:42

## 2022-01-01 RX ADMIN — HUMAN INSULIN 7 UNITS: 100 INJECTION, SOLUTION SUBCUTANEOUS at 02:05

## 2022-01-01 RX ADMIN — Medication 10 ML: at 01:40

## 2022-01-01 RX ADMIN — BUDESONIDE AND FORMOTEROL FUMARATE DIHYDRATE 2 PUFF: 160; 4.5 AEROSOL RESPIRATORY (INHALATION) at 10:41

## 2022-01-01 RX ADMIN — PREGABALIN 100 MG: 50 CAPSULE ORAL at 20:36

## 2022-01-01 RX ADMIN — BUDESONIDE AND FORMOTEROL FUMARATE DIHYDRATE 2 PUFF: 160; 4.5 AEROSOL RESPIRATORY (INHALATION) at 07:57

## 2022-01-01 RX ADMIN — Medication 10 ML: at 20:05

## 2022-01-01 RX ADMIN — TRAMADOL HYDROCHLORIDE 50 MG: 50 TABLET, COATED ORAL at 12:13

## 2022-01-01 RX ADMIN — SODIUM CHLORIDE 8 MG/HR: 900 INJECTION INTRAVENOUS at 06:33

## 2022-01-01 RX ADMIN — CEFEPIME HYDROCHLORIDE 2 G: 2 INJECTION, POWDER, FOR SOLUTION INTRAVENOUS at 18:11

## 2022-01-01 RX ADMIN — ATORVASTATIN CALCIUM 40 MG: 40 TABLET, FILM COATED ORAL at 09:52

## 2022-01-01 RX ADMIN — SODIUM CHLORIDE 500 ML: 9 INJECTION, SOLUTION INTRAVENOUS at 15:28

## 2022-01-01 RX ADMIN — MORPHINE SULFATE: 1 INJECTION INTRAVENOUS at 16:15

## 2022-01-01 RX ADMIN — OXYBUTYNIN CHLORIDE 5 MG: 5 TABLET, EXTENDED RELEASE ORAL at 09:01

## 2022-01-01 RX ADMIN — CARVEDILOL 3.12 MG: 3.12 TABLET, FILM COATED ORAL at 09:30

## 2022-01-01 RX ADMIN — Medication 10 ML: at 08:20

## 2022-01-01 RX ADMIN — ISOSORBIDE MONONITRATE 30 MG: 30 TABLET, EXTENDED RELEASE ORAL at 09:53

## 2022-01-01 RX ADMIN — Medication 10 ML: at 09:53

## 2022-01-01 RX ADMIN — CLOPIDOGREL BISULFATE 75 MG: 75 TABLET ORAL at 09:04

## 2022-01-01 RX ADMIN — LATANOPROST 1 DROP: 50 SOLUTION OPHTHALMIC at 20:35

## 2022-01-01 RX ADMIN — SODIUM CHLORIDE 250 ML: 9 INJECTION, SOLUTION INTRAVENOUS at 03:30

## 2022-01-01 RX ADMIN — BUDESONIDE AND FORMOTEROL FUMARATE DIHYDRATE 2 PUFF: 160; 4.5 AEROSOL RESPIRATORY (INHALATION) at 20:43

## 2022-01-01 RX ADMIN — INSULIN ASPART 2 UNITS: 100 INJECTION, SOLUTION INTRAVENOUS; SUBCUTANEOUS at 09:06

## 2022-01-01 RX ADMIN — TRAMADOL HYDROCHLORIDE 50 MG: 50 TABLET, COATED ORAL at 01:36

## 2022-01-01 RX ADMIN — SODIUM CHLORIDE 8 MG/HR: 900 INJECTION INTRAVENOUS at 16:38

## 2022-01-01 RX ADMIN — MORPHINE SULFATE 2 MG: 2 INJECTION, SOLUTION INTRAMUSCULAR; INTRAVENOUS at 14:43

## 2022-01-01 RX ADMIN — ISOSORBIDE MONONITRATE 30 MG: 30 TABLET, EXTENDED RELEASE ORAL at 09:28

## 2022-01-01 RX ADMIN — Medication 10 ML: at 20:28

## 2022-01-01 RX ADMIN — ATORVASTATIN CALCIUM 40 MG: 40 TABLET, FILM COATED ORAL at 08:49

## 2022-01-01 RX ADMIN — Medication 10 ML: at 09:28

## 2022-01-01 RX ADMIN — FERROUS SULFATE TAB EC 324 MG (65 MG FE EQUIVALENT) 324 MG: 324 (65 FE) TABLET DELAYED RESPONSE at 09:03

## 2022-01-01 RX ADMIN — LATANOPROST 1 DROP: 50 SOLUTION OPHTHALMIC at 21:25

## 2022-01-01 RX ADMIN — CARVEDILOL 3.12 MG: 3.12 TABLET, FILM COATED ORAL at 11:01

## 2022-01-01 RX ADMIN — BUDESONIDE AND FORMOTEROL FUMARATE DIHYDRATE 2 PUFF: 160; 4.5 AEROSOL RESPIRATORY (INHALATION) at 20:23

## 2022-01-01 RX ADMIN — MORPHINE SULFATE 2 MG: 2 INJECTION, SOLUTION INTRAMUSCULAR; INTRAVENOUS at 11:18

## 2022-01-01 RX ADMIN — TRAMADOL HYDROCHLORIDE 50 MG: 50 TABLET, COATED ORAL at 21:47

## 2022-01-01 RX ADMIN — SODIUM CHLORIDE 8 MG/HR: 900 INJECTION INTRAVENOUS at 21:37

## 2022-01-01 RX ADMIN — CYANOCOBALAMIN TAB 500 MCG 500 MCG: 500 TAB at 09:26

## 2022-01-01 RX ADMIN — DILTIAZEM HYDROCHLORIDE 180 MG: 180 CAPSULE, COATED, EXTENDED RELEASE ORAL at 09:26

## 2022-01-01 RX ADMIN — ATROPINE SULFATE 2 DROP: 10 SOLUTION/ DROPS OPHTHALMIC at 11:45

## 2022-01-01 RX ADMIN — TRAMADOL HYDROCHLORIDE 50 MG: 50 TABLET, COATED ORAL at 09:05

## 2022-01-01 RX ADMIN — MORPHINE SULFATE 2 MG: 2 INJECTION, SOLUTION INTRAMUSCULAR; INTRAVENOUS at 13:20

## 2022-01-01 RX ADMIN — TRAMADOL HYDROCHLORIDE 50 MG: 50 TABLET, COATED ORAL at 09:04

## 2022-01-01 RX ADMIN — BUDESONIDE AND FORMOTEROL FUMARATE DIHYDRATE 2 PUFF: 160; 4.5 AEROSOL RESPIRATORY (INHALATION) at 07:51

## 2022-01-01 RX ADMIN — BUDESONIDE AND FORMOTEROL FUMARATE DIHYDRATE 2 PUFF: 160; 4.5 AEROSOL RESPIRATORY (INHALATION) at 21:10

## 2022-01-01 RX ADMIN — FERROUS SULFATE TAB EC 324 MG (65 MG FE EQUIVALENT) 324 MG: 324 (65 FE) TABLET DELAYED RESPONSE at 09:30

## 2022-01-01 RX ADMIN — BUDESONIDE AND FORMOTEROL FUMARATE DIHYDRATE 2 PUFF: 160; 4.5 AEROSOL RESPIRATORY (INHALATION) at 07:01

## 2022-01-01 RX ADMIN — INSULIN ASPART 2 UNITS: 100 INJECTION, SOLUTION INTRAVENOUS; SUBCUTANEOUS at 09:31

## 2022-01-01 RX ADMIN — CARVEDILOL 3.12 MG: 3.12 TABLET, FILM COATED ORAL at 22:02

## 2022-01-01 RX ADMIN — LORAZEPAM 1 MG: 2 INJECTION INTRAMUSCULAR; INTRAVENOUS at 13:30

## 2022-01-01 RX ADMIN — CYANOCOBALAMIN TAB 500 MCG 500 MCG: 500 TAB at 09:52

## 2022-01-01 RX ADMIN — PANTOPRAZOLE SODIUM 40 MG: 40 TABLET, DELAYED RELEASE ORAL at 05:49

## 2022-01-01 RX ADMIN — PANTOPRAZOLE SODIUM 40 MG: 40 TABLET, DELAYED RELEASE ORAL at 05:32

## 2022-01-01 RX ADMIN — SODIUM CHLORIDE 100 ML/HR: 9 INJECTION, SOLUTION INTRAVENOUS at 14:39

## 2022-01-01 RX ADMIN — LATANOPROST 1 DROP: 50 SOLUTION OPHTHALMIC at 22:00

## 2022-01-01 RX ADMIN — TRAMADOL HYDROCHLORIDE 50 MG: 50 TABLET, COATED ORAL at 05:42

## 2022-01-01 RX ADMIN — PREGABALIN 100 MG: 50 CAPSULE ORAL at 16:58

## 2022-01-01 RX ADMIN — CARVEDILOL 3.12 MG: 3.12 TABLET, FILM COATED ORAL at 09:28

## 2022-01-01 RX ADMIN — ISOSORBIDE MONONITRATE 30 MG: 30 TABLET, EXTENDED RELEASE ORAL at 09:30

## 2022-01-01 RX ADMIN — CARVEDILOL 3.12 MG: 3.12 TABLET, FILM COATED ORAL at 21:20

## 2022-01-01 RX ADMIN — PREGABALIN 100 MG: 50 CAPSULE ORAL at 22:02

## 2022-01-01 RX ADMIN — MORPHINE SULFATE 2 MG: 2 INJECTION, SOLUTION INTRAMUSCULAR; INTRAVENOUS at 09:51

## 2022-01-01 RX ADMIN — ISOSORBIDE MONONITRATE 30 MG: 30 TABLET, EXTENDED RELEASE ORAL at 09:05

## 2022-01-01 RX ADMIN — CARVEDILOL 3.12 MG: 3.12 TABLET, FILM COATED ORAL at 11:04

## 2022-01-01 RX ADMIN — MORPHINE SULFATE 1 MG: 2 INJECTION, SOLUTION INTRAMUSCULAR; INTRAVENOUS at 01:30

## 2022-01-01 RX ADMIN — SODIUM CHLORIDE 125 ML/HR: 9 INJECTION, SOLUTION INTRAVENOUS at 15:28

## 2022-01-01 RX ADMIN — SODIUM CHLORIDE 8 MG/HR: 900 INJECTION INTRAVENOUS at 00:45

## 2022-01-01 RX ADMIN — ACETAMINOPHEN 650 MG: 325 TABLET, FILM COATED ORAL at 23:28

## 2022-01-01 RX ADMIN — PREGABALIN 100 MG: 50 CAPSULE ORAL at 06:26

## 2022-01-01 RX ADMIN — Medication 10 ML: at 09:02

## 2022-01-01 RX ADMIN — ATORVASTATIN CALCIUM 40 MG: 40 TABLET, FILM COATED ORAL at 08:17

## 2022-01-01 RX ADMIN — ATORVASTATIN CALCIUM 40 MG: 40 TABLET, FILM COATED ORAL at 09:26

## 2022-01-01 RX ADMIN — CYANOCOBALAMIN TAB 500 MCG 500 MCG: 500 TAB at 09:30

## 2022-01-01 RX ADMIN — MELATONIN 6 MG: 3 TAB ORAL at 21:48

## 2022-01-01 RX ADMIN — CARVEDILOL 3.12 MG: 3.12 TABLET, FILM COATED ORAL at 17:07

## 2022-01-01 RX ADMIN — ISOSORBIDE MONONITRATE 30 MG: 30 TABLET, EXTENDED RELEASE ORAL at 09:03

## 2022-01-01 RX ADMIN — PREGABALIN 150 MG: 75 CAPSULE ORAL at 17:07

## 2022-01-01 RX ADMIN — ATORVASTATIN CALCIUM 40 MG: 40 TABLET, FILM COATED ORAL at 09:01

## 2022-01-01 RX ADMIN — CARVEDILOL 3.12 MG: 3.12 TABLET, FILM COATED ORAL at 09:05

## 2022-01-01 RX ADMIN — INSULIN ASPART 2 UNITS: 100 INJECTION, SOLUTION INTRAVENOUS; SUBCUTANEOUS at 11:15

## 2022-01-01 RX ADMIN — Medication 10 ML: at 09:27

## 2022-01-01 RX ADMIN — CARVEDILOL 3.12 MG: 3.12 TABLET, FILM COATED ORAL at 12:02

## 2022-01-01 RX ADMIN — ISOSORBIDE MONONITRATE 30 MG: 30 TABLET, EXTENDED RELEASE ORAL at 08:18

## 2022-01-01 RX ADMIN — SODIUM CHLORIDE 8 MG/HR: 900 INJECTION INTRAVENOUS at 03:09

## 2022-01-01 RX ADMIN — PANTOPRAZOLE SODIUM 40 MG: 40 TABLET, DELAYED RELEASE ORAL at 08:18

## 2022-01-01 RX ADMIN — Medication 10 ML: at 19:55

## 2022-01-01 RX ADMIN — INSULIN ASPART 2 UNITS: 100 INJECTION, SOLUTION INTRAVENOUS; SUBCUTANEOUS at 17:20

## 2022-01-01 RX ADMIN — Medication 10 ML: at 11:46

## 2022-01-01 RX ADMIN — MORPHINE SULFATE 2 MG: 2 INJECTION, SOLUTION INTRAMUSCULAR; INTRAVENOUS at 04:57

## 2022-01-01 RX ADMIN — CARVEDILOL 3.12 MG: 3.12 TABLET, FILM COATED ORAL at 17:20

## 2022-01-01 RX ADMIN — PHYTONADIONE 5 MG: 2 INJECTION, EMULSION INTRAMUSCULAR; INTRAVENOUS; SUBCUTANEOUS at 05:45

## 2022-01-01 RX ADMIN — PREGABALIN 150 MG: 75 CAPSULE ORAL at 21:47

## 2022-01-01 RX ADMIN — SODIUM CHLORIDE 8 MG/HR: 900 INJECTION INTRAVENOUS at 21:45

## 2022-01-01 RX ADMIN — CARVEDILOL 3.12 MG: 3.12 TABLET, FILM COATED ORAL at 19:55

## 2022-01-01 RX ADMIN — PREGABALIN 150 MG: 75 CAPSULE ORAL at 17:20

## 2022-01-01 RX ADMIN — CARVEDILOL 3.12 MG: 3.12 TABLET, FILM COATED ORAL at 22:55

## 2022-01-01 RX ADMIN — OXYBUTYNIN CHLORIDE 5 MG: 5 TABLET, EXTENDED RELEASE ORAL at 09:26

## 2022-01-01 RX ADMIN — LORAZEPAM 1 MG: 2 INJECTION INTRAMUSCULAR; INTRAVENOUS at 09:32

## 2022-01-01 RX ADMIN — TRAMADOL HYDROCHLORIDE 50 MG: 50 TABLET, COATED ORAL at 17:53

## 2022-01-01 RX ADMIN — LORAZEPAM 1 MG: 2 INJECTION INTRAMUSCULAR; INTRAVENOUS at 19:42

## 2022-01-01 RX ADMIN — CARVEDILOL 3.12 MG: 3.12 TABLET, FILM COATED ORAL at 12:13

## 2022-01-01 RX ADMIN — MORPHINE SULFATE: 1 INJECTION INTRAVENOUS at 11:15

## 2022-01-01 RX ADMIN — CARVEDILOL 3.12 MG: 3.12 TABLET, FILM COATED ORAL at 11:28

## 2022-01-01 RX ADMIN — FERROUS SULFATE TAB EC 324 MG (65 MG FE EQUIVALENT) 324 MG: 324 (65 FE) TABLET DELAYED RESPONSE at 11:01

## 2022-01-01 RX ADMIN — BUDESONIDE AND FORMOTEROL FUMARATE DIHYDRATE 2 PUFF: 160; 4.5 AEROSOL RESPIRATORY (INHALATION) at 20:44

## 2022-01-01 RX ADMIN — PREGABALIN 150 MG: 75 CAPSULE ORAL at 09:28

## 2022-01-01 RX ADMIN — TRAMADOL HYDROCHLORIDE 50 MG: 50 TABLET, COATED ORAL at 04:05

## 2022-01-01 RX ADMIN — MORPHINE SULFATE 2 MG: 2 INJECTION, SOLUTION INTRAMUSCULAR; INTRAVENOUS at 17:56

## 2022-01-01 RX ADMIN — BUDESONIDE AND FORMOTEROL FUMARATE DIHYDRATE 2 PUFF: 160; 4.5 AEROSOL RESPIRATORY (INHALATION) at 07:27

## 2022-01-01 RX ADMIN — PREGABALIN 150 MG: 75 CAPSULE ORAL at 08:49

## 2022-01-01 RX ADMIN — PREGABALIN 150 MG: 75 CAPSULE ORAL at 20:27

## 2022-01-01 RX ADMIN — OXYBUTYNIN CHLORIDE 5 MG: 5 TABLET, EXTENDED RELEASE ORAL at 11:01

## 2022-01-01 RX ADMIN — BUDESONIDE AND FORMOTEROL FUMARATE DIHYDRATE 2 PUFF: 160; 4.5 AEROSOL RESPIRATORY (INHALATION) at 20:28

## 2022-01-01 RX ADMIN — PANTOPRAZOLE SODIUM 40 MG: 40 TABLET, DELAYED RELEASE ORAL at 06:14

## 2022-01-01 RX ADMIN — ACETAMINOPHEN 650 MG: 325 TABLET ORAL at 03:08

## 2022-01-01 RX ADMIN — ATORVASTATIN CALCIUM 40 MG: 40 TABLET, FILM COATED ORAL at 09:30

## 2022-01-01 RX ADMIN — BUDESONIDE AND FORMOTEROL FUMARATE DIHYDRATE 2 PUFF: 160; 4.5 AEROSOL RESPIRATORY (INHALATION) at 20:25

## 2022-01-01 RX ADMIN — Medication 10 ML: at 08:44

## 2022-01-01 RX ADMIN — FUROSEMIDE 40 MG: 10 INJECTION, SOLUTION INTRAMUSCULAR; INTRAVENOUS at 11:28

## 2022-01-01 RX ADMIN — TRAMADOL HYDROCHLORIDE 50 MG: 50 TABLET, COATED ORAL at 11:04

## 2022-01-01 RX ADMIN — INSULIN ASPART 2 UNITS: 100 INJECTION, SOLUTION INTRAVENOUS; SUBCUTANEOUS at 17:31

## 2022-01-01 RX ADMIN — PANTOPRAZOLE SODIUM 40 MG: 40 TABLET, DELAYED RELEASE ORAL at 06:26

## 2022-01-01 RX ADMIN — ATORVASTATIN CALCIUM 40 MG: 40 TABLET, FILM COATED ORAL at 21:47

## 2022-01-01 RX ADMIN — DEXTROSE MONOHYDRATE 50 ML: 25 INJECTION, SOLUTION INTRAVENOUS at 02:05

## 2022-01-01 RX ADMIN — PREGABALIN 100 MG: 50 CAPSULE ORAL at 05:49

## 2022-01-01 RX ADMIN — MORPHINE SULFATE 2 MG: 2 INJECTION, SOLUTION INTRAMUSCULAR; INTRAVENOUS at 19:44

## 2022-01-01 RX ADMIN — LATANOPROST 1 DROP: 50 SOLUTION OPHTHALMIC at 01:42

## 2022-01-01 RX ADMIN — MAGNESIUM HYDROXIDE 5 ML: 2400 SUSPENSION ORAL at 17:20

## 2022-01-01 RX ADMIN — TRAMADOL HYDROCHLORIDE 50 MG: 50 TABLET, COATED ORAL at 18:05

## 2022-01-01 RX ADMIN — TRAMADOL HYDROCHLORIDE 50 MG: 50 TABLET, COATED ORAL at 23:28

## 2022-01-01 RX ADMIN — INSULIN ASPART 4 UNITS: 100 INJECTION, SOLUTION INTRAVENOUS; SUBCUTANEOUS at 11:41

## 2022-01-01 RX ADMIN — DILTIAZEM HYDROCHLORIDE 180 MG: 180 CAPSULE, COATED, EXTENDED RELEASE ORAL at 09:03

## 2022-01-01 RX ADMIN — ACETAMINOPHEN 650 MG: 325 TABLET ORAL at 21:20

## 2022-01-01 RX ADMIN — LORAZEPAM 1 MG: 2 INJECTION INTRAMUSCULAR; INTRAVENOUS at 06:15

## 2022-01-01 RX ADMIN — INSULIN ASPART 2 UNITS: 100 INJECTION, SOLUTION INTRAVENOUS; SUBCUTANEOUS at 12:16

## 2022-01-01 RX ADMIN — ACETAMINOPHEN 650 MG: 325 TABLET ORAL at 19:55

## 2022-01-01 RX ADMIN — VANCOMYCIN HYDROCHLORIDE 1250 MG: 500 INJECTION, POWDER, LYOPHILIZED, FOR SOLUTION INTRAVENOUS at 11:06

## 2022-01-01 RX ADMIN — MORPHINE SULFATE 2 MG: 2 INJECTION, SOLUTION INTRAMUSCULAR; INTRAVENOUS at 06:07

## 2022-01-01 RX ADMIN — BUDESONIDE AND FORMOTEROL FUMARATE DIHYDRATE 2 PUFF: 160; 4.5 AEROSOL RESPIRATORY (INHALATION) at 07:16

## 2022-01-01 RX ADMIN — OXYBUTYNIN CHLORIDE 5 MG: 5 TABLET, EXTENDED RELEASE ORAL at 09:03

## 2022-01-01 RX ADMIN — PREGABALIN 150 MG: 75 CAPSULE ORAL at 20:34

## 2022-01-01 RX ADMIN — BUDESONIDE AND FORMOTEROL FUMARATE DIHYDRATE 2 PUFF: 160; 4.5 AEROSOL RESPIRATORY (INHALATION) at 19:36

## 2022-01-01 RX ADMIN — Medication 10 ML: at 21:09

## 2022-01-01 RX ADMIN — PREGABALIN 100 MG: 50 CAPSULE ORAL at 06:14

## 2022-01-01 RX ADMIN — SODIUM CHLORIDE 8 MG/HR: 900 INJECTION INTRAVENOUS at 08:42

## 2022-01-01 RX ADMIN — INSULIN ASPART 2 UNITS: 100 INJECTION, SOLUTION INTRAVENOUS; SUBCUTANEOUS at 12:01

## 2022-01-01 RX ADMIN — BUDESONIDE AND FORMOTEROL FUMARATE DIHYDRATE 2 PUFF: 160; 4.5 AEROSOL RESPIRATORY (INHALATION) at 09:52

## 2022-01-01 RX ADMIN — Medication 10 ML: at 21:49

## 2022-01-01 RX ADMIN — ACETAMINOPHEN 650 MG: 650 SUPPOSITORY RECTAL at 18:09

## 2022-01-01 RX ADMIN — BUDESONIDE AND FORMOTEROL FUMARATE DIHYDRATE 2 PUFF: 160; 4.5 AEROSOL RESPIRATORY (INHALATION) at 07:25

## 2022-01-01 RX ADMIN — OXYBUTYNIN CHLORIDE 5 MG: 5 TABLET, EXTENDED RELEASE ORAL at 09:28

## 2022-01-01 RX ADMIN — TRAMADOL HYDROCHLORIDE 50 MG: 50 TABLET, COATED ORAL at 21:48

## 2022-01-01 RX ADMIN — INSULIN ASPART 3 UNITS: 100 INJECTION, SOLUTION INTRAVENOUS; SUBCUTANEOUS at 17:27

## 2022-01-01 RX ADMIN — SODIUM CHLORIDE 8 MG/HR: 900 INJECTION INTRAVENOUS at 14:30

## 2022-01-01 RX ADMIN — CARVEDILOL 3.12 MG: 3.12 TABLET, FILM COATED ORAL at 22:42

## 2022-01-01 RX ADMIN — PREGABALIN 150 MG: 75 CAPSULE ORAL at 21:48

## 2022-01-01 RX ADMIN — TRAMADOL HYDROCHLORIDE 50 MG: 50 TABLET, COATED ORAL at 09:29

## 2022-01-01 RX ADMIN — DILTIAZEM HYDROCHLORIDE 180 MG: 180 CAPSULE, COATED, EXTENDED RELEASE ORAL at 09:52

## 2022-01-01 RX ADMIN — SODIUM ZIRCONIUM CYCLOSILICATE 10 G: 10 POWDER, FOR SUSPENSION ORAL at 02:06

## 2022-01-01 RX ADMIN — PREGABALIN 150 MG: 75 CAPSULE ORAL at 08:17

## 2022-01-01 RX ADMIN — PREGABALIN 100 MG: 50 CAPSULE ORAL at 14:29

## 2022-01-01 RX ADMIN — BUDESONIDE AND FORMOTEROL FUMARATE DIHYDRATE 2 PUFF: 160; 4.5 AEROSOL RESPIRATORY (INHALATION) at 21:06

## 2022-01-01 RX ADMIN — ATORVASTATIN CALCIUM 40 MG: 40 TABLET, FILM COATED ORAL at 09:45

## 2022-01-01 RX ADMIN — FERROUS SULFATE TAB EC 324 MG (65 MG FE EQUIVALENT) 324 MG: 324 (65 FE) TABLET DELAYED RESPONSE at 09:52

## 2022-01-01 RX ADMIN — LORAZEPAM 1 MG: 2 INJECTION INTRAMUSCULAR; INTRAVENOUS at 02:26

## 2022-01-01 RX ADMIN — SODIUM BICARBONATE 50 MEQ: 84 INJECTION INTRAVENOUS at 01:42

## 2022-01-01 RX ADMIN — Medication 10 ML: at 21:20

## 2022-01-01 RX ADMIN — CEFEPIME HYDROCHLORIDE 2 G: 2 INJECTION, POWDER, FOR SOLUTION INTRAVENOUS at 10:46

## 2022-01-01 RX ADMIN — CALCIUM GLUCONATE 1 G: 98 INJECTION, SOLUTION INTRAVENOUS at 01:42

## 2022-01-01 RX ADMIN — TRAMADOL HYDROCHLORIDE 50 MG: 50 TABLET, COATED ORAL at 20:27

## 2022-01-01 RX ADMIN — Medication 10 ML: at 09:05

## 2022-01-01 RX ADMIN — PREGABALIN 150 MG: 75 CAPSULE ORAL at 23:28

## 2022-01-01 RX ADMIN — ATORVASTATIN CALCIUM 40 MG: 40 TABLET, FILM COATED ORAL at 09:03

## 2022-01-01 RX ADMIN — DILTIAZEM HYDROCHLORIDE 180 MG: 180 CAPSULE, COATED, EXTENDED RELEASE ORAL at 09:30

## 2022-01-01 RX ADMIN — ISOSORBIDE MONONITRATE 30 MG: 30 TABLET, EXTENDED RELEASE ORAL at 09:26

## 2022-01-01 RX ADMIN — INSULIN ASPART 2 UNITS: 100 INJECTION, SOLUTION INTRAVENOUS; SUBCUTANEOUS at 11:36

## 2022-01-01 RX ADMIN — Medication 10 ML: at 09:04

## 2022-01-01 RX ADMIN — PANTOPRAZOLE SODIUM 40 MG: 40 TABLET, DELAYED RELEASE ORAL at 05:53

## 2022-01-01 RX ADMIN — INSULIN ASPART 2 UNITS: 100 INJECTION, SOLUTION INTRAVENOUS; SUBCUTANEOUS at 09:27

## 2022-01-01 RX ADMIN — CEFEPIME HYDROCHLORIDE 2 G: 2 INJECTION, POWDER, FOR SOLUTION INTRAVENOUS at 04:32

## 2022-01-01 RX ADMIN — OXYBUTYNIN CHLORIDE 5 MG: 5 TABLET, EXTENDED RELEASE ORAL at 08:18

## 2022-01-01 RX ADMIN — DILTIAZEM HYDROCHLORIDE 180 MG: 180 CAPSULE, COATED, EXTENDED RELEASE ORAL at 11:01

## 2022-01-01 RX ADMIN — CYANOCOBALAMIN TAB 500 MCG 500 MCG: 500 TAB at 11:01

## 2022-01-01 RX ADMIN — ATORVASTATIN CALCIUM 40 MG: 40 TABLET, FILM COATED ORAL at 11:02

## 2022-01-01 RX ADMIN — ISOSORBIDE MONONITRATE 30 MG: 30 TABLET, EXTENDED RELEASE ORAL at 11:00

## 2022-03-04 NOTE — PROGRESS NOTES
DATE OF VISIT: 3/4/2022      REASON FOR VISIT: Abnormal serum protein electrophoresis, anemia, history of prostate cancer with elevated PSA after definitive treatment, right kidney lesion      HISTORY OF PRESENT ILLNESS:   82-year-old male with medical problem consisting of type 2 diabetes mellitus, dyslipidemia, prostate cancer diagnosed in 2003 s/p prostatectomy, chronic back pain for which patient had back surgeries done in the past, right kidney hypodensity/mass, microcytic anemia for which patient was initially seen in consultation on August 2, 2021 for evaluation of abnormal serum protein electrophoresis, anemia and elevated PSA.  Patient was recommended bone marrow biopsy which he refused in the past.  Patient is here for follow-up appointment today to discuss recently done blood work.  Complains of chronic shortness of breath.  Complains of back pain.  Denies any bleeding.  Denies any new lymph node enlargement.          Past Medical History, Past Surgical History, Social History, Family History have been reviewed and are without significant changes except as mentioned.    Review of Systems   A comprehensive 14 point review of systems was performed and was negative except as mentioned in HPI.    Medications:  The current medication list was reviewed in the EMR    ALLERGIES:    Allergies   Allergen Reactions   • Molds & Smuts Other (See Comments)     Sinus infection   • Hydrocodone-Acetaminophen Itching       Objective      Vitals:    03/04/22 1305   BP: 143/74   Pulse: 85   Temp: 96.8 °F (36 °C)   TempSrc: Temporal   SpO2: 94%   Weight: 65.7 kg (144 lb 12.8 oz)   PainSc: 10-Worst pain ever   PainLoc: Back     Current Status 8/20/2021   ECOG score 0       Physical Exam  Pulmonary:      Breath sounds: Normal breath sounds.   Neurological:      Mental Status: He is alert and oriented to person, place, and time.           RECENT LABS:  Glucose   Date Value Ref Range Status   03/02/2022 141 (H) 65 - 99 mg/dL  Final   07/19/2019 122 (H) 70 - 110 mg/dL Final     Sodium   Date Value Ref Range Status   03/02/2022 141 136 - 145 mmol/L Final   11/04/2021 142 136 - 145 mmol/L Final     Potassium   Date Value Ref Range Status   03/02/2022 4.4 3.5 - 5.2 mmol/L Final   11/04/2021 4.9 3.5 - 5.1 mmol/L Final     CO2   Date Value Ref Range Status   03/02/2022 27.0 22.0 - 29.0 mmol/L Final     Total CO2   Date Value Ref Range Status   11/04/2021 29 21 - 31 mmol/L Final     Chloride   Date Value Ref Range Status   03/02/2022 104 98 - 107 mmol/L Final   11/04/2021 107 98 - 107 mmol/L Final     Anion Gap   Date Value Ref Range Status   03/02/2022 10.0 5.0 - 15.0 mmol/L Final   11/04/2021 11 4 - 16 mmol/L Final     Creatinine   Date Value Ref Range Status   03/02/2022 1.07 0.76 - 1.27 mg/dL Final   11/04/2021 1.1 0.7 - 1.3 mg/dL Final     BUN   Date Value Ref Range Status   03/02/2022 17 8 - 23 mg/dL Final   11/04/2021 31 (H) 7 - 25 mg/dL Final     BUN/Creatinine Ratio   Date Value Ref Range Status   03/02/2022 15.9 7.0 - 25.0 Final     Calcium   Date Value Ref Range Status   03/02/2022 9.2 8.6 - 10.5 mg/dL Final   11/04/2021 9.5 8.6 - 10.3 mg/dL Final     eGFR Non  Amer   Date Value Ref Range Status   12/23/2021 77 >60 mL/min/1.73 Final     Alkaline Phosphatase   Date Value Ref Range Status   03/02/2022 93 39 - 117 U/L Final   10/22/2019 43 (L) 46 - 116 U/L Final     Total Protein   Date Value Ref Range Status   03/02/2022 6.8 6.0 - 8.5 g/dL Final   05/26/2021 6.1 (L) 6.3 - 7.9 g/dL Final   12/28/2018 6.3 (L) 6.4 - 8.2 g/dL Final     ALT (SGPT)   Date Value Ref Range Status   03/02/2022 12 1 - 41 U/L Final   12/28/2018 18 (L) 30 - 65 U/L Final     AST (SGOT)   Date Value Ref Range Status   03/02/2022 49 (H) 1 - 40 U/L Final   12/28/2018 19 15 - 37 U/L Final     Total Bilirubin   Date Value Ref Range Status   03/02/2022 0.3 0.0 - 1.2 mg/dL Final   12/28/2018 0.4 0 - 1.0 mg/dL Final     Albumin   Date Value Ref Range Status    03/02/2022 3.90 3.50 - 5.20 g/dL Final   05/26/2021 3.0 (L) 3.4 - 4.7 g/dL Final     Globulin   Date Value Ref Range Status   03/02/2022 2.9 gm/dL Final     A/G Ratio   Date Value Ref Range Status   12/23/2021 1.4 0.7 - 1.7 Final   05/26/2021 0.98  Final     Lab Results   Component Value Date    WBC 10.28 03/02/2022    HGB 9.4 (L) 03/02/2022    HCT 30.5 (L) 03/02/2022    .0 (H) 03/02/2022     03/02/2022     Lab Results   Component Value Date    NEUTROABS 6.31 03/02/2022    IRON 114 03/02/2022    IRON 177 (H) 12/23/2021    IRON 177 (H) 11/15/2021    TIBC 374 03/02/2022    TIBC 402 12/23/2021    TIBC 431 11/15/2021    LABIRON 30 03/02/2022    LABIRON 44 12/23/2021    LABIRON 41 11/15/2021    FERRITIN 58.45 03/02/2022    FERRITIN 74.39 11/15/2021    FERRITIN 25.98 (L) 08/02/2021    [id number] 948 (H) 03/02/2022    [id number] 976 (H) 11/15/2021    [id number] 412 11/04/2021    FOLATE >20.00 03/02/2022    FOLATE >20.00 11/15/2021    FOLATE >20.00 08/02/2021     No results found for: , LABCA2, AFPTM, HCGQUANT, , CHROMGRNA, [id number], CEA, REFLABREPO    Component PSA   Latest Ref Rng & Units 0.000 - 4.000 ng/mL   12/28/2018 4.9 (H)   1/16/2020 9.5 (H)   4/13/2021 9.8 (H)   8/2/2021 10.400 (H)   11/15/2021 9.960 (H)   3/2/2022 <0.014               PATHOLOGY:  * Cannot find OR log *         RADIOLOGY DATA :  No radiology results for the last 7 days        Assessment/Plan     1.  Macrocytic anemia:  -Patient has been having ongoing anemia for last few years  -Anemia work-up done on March 2, 2022 showed hemoglobin is 9.4 with MCV of 107.  -Recommend continue with ferrous sulfate 3 times a week along with B12 3 times a week.  Patient stated not able to tolerate ferrous sulfate higher than 3 times a week secondary to diarrhea.  -Patient's anemia is not explained by blood work which was discussed with patient.  Again option of bone marrow biopsy was discussed with patient, patient is refusing bone marrow  biopsy at present  -We will have patient return to clinic in 3 months with repeat CBC, CMP, iron studies, ferritin, B12 folate and PSA to be done prior to that    2.  Abnormal serum protein electrophoresis  -Serum protein electrophoresis on August 2, 2021 showed M spike of 0.1 with IgG kappa immunofixation.  Free light chain ratio was elevated at 2.  24-hour urine protein electrophoresis did not show any evidence of abnormal protein.  -Skeletal survey was negative for lytic lesion.  -We will repeat myeloma work-up around August 2022    3.  Prostate cancer with elevated PSA  -Patient was diagnosed with prostate cancer in 2003 and had a prostatectomy done at [place].  -Patient had radiation treatment done at [facility]  -Due to persistently elevated PSA with negative staging work-up patient was started on Eligard November 18, 2021.  -PSA is less than 0.014.  We will hold Eligard injection at present    4.  Right kidney lesion  -CT scan done on June 21, 2021 showed stable hypodensity/mass involving right kidney.    5.  Leukocytosis:  -Reactive.  -White blood cell count is 10.28.  Will monitor with CBC    6.  Chronic back pain:  -Being followed by pain management at [place].  Patient states he cannot make trips to [place] now.  He is requesting referral to be placed for pain management clinic here in town.  Referral will be placed today.    7.  Health maintenance: Patient quit smoking in 2020.  Had a colonoscopy last 5 years    8. Advance Care Planning   ACP discussion was held with the patient during this visit. Patient has an advance directive in EMR which is still valid.                  PHQ-9 Total Score: 0   -Patient is not homicidal or suicidal.  No acute intervention required.    Vishnu Villatoro Sridhar reports a pain score of 10.  Given his pain assessment as noted, treatment options were discussed and the following options were decided upon as a follow-up plan to address the patient's pain:  continuation of current treatment plan for pain.         Josef Covington MD  3/4/2022  13:29 CST        Part of this note may be an electronic transcription/translation of spoken language to printed text using the Dragon Dictation System.          CC:

## 2022-03-29 NOTE — PROGRESS NOTES
Pulmonary Consultation    No ref. provider found,    Thank you for asking me to see Vishnu Waller for   Chief Complaint   Patient presents with   • COPD   • Asthma   .      History of Present Illness  Vishnu Waller is a 82 y.o. male     Patinet with history of COPD, used to see Dr Koehler last time in 2020. He is not currently using any inhalers or nebulizers. He has Breo listed but reports he couldn't afford it.   Has exertional shortness of breath and chronic cough  Has a lot of chonic pain issues as well.        Tobacco use history:  Type: cigarettes  Amount: 1 ppd  Duration: 60 years  Cessation: 2019         Review of Systems: History obtained from chart review and the patient.  Review of Systems  As described in the HPI. Otherwise, remainder of ROS (14 systems) were negative.    Patient Active Problem List   Diagnosis   • Degeneration of lumbar intervertebral disc   • Low back pain without sciatica   • Neuritis or radiculitis due to rupture of lumbar intervertebral disc   • Borderline glaucoma, open angle with borderline findings   • Pseudophakia   • History of coronary artery bypass surgery   • Essential hypertension   • Mixed hyperlipidemia   • Spinal stenosis of lumbar region   • Degenerative disc disease, lumbar   • Chronic pain of right knee   • History of artificial joint   • B12 deficiency   • Degeneration of intervertebral disc of lumbosacral region   • Personal history of other infectious and parasitic diseases   • Status post lumbar spine operation   • History of pyelonephritis   • History of surgical procedure   • History of repair of rotator cuff   • Encounter for follow-up examination after completed treatment for conditions other than malignant neoplasm   • Encounter for general adult medical examination without abnormal findings   • Osteoarthritis of knee   • Osteoarthritis of multiple joints   • Primary insomnia   • Encounter for screening for malignant neoplasm of colon   •  Encounter for screening for malignant neoplasm of prostate   • Type 2 diabetes mellitus, without long-term current use of insulin (HCC)   • Coronary artery disease involving native coronary artery of native heart without angina pectoris   • Dyspnea on exertion   • Presence of aortocoronary bypass graft   • Dizziness and giddiness   • Postviral fatigue syndrome   • Postviral fatigue syndrome   • Recurrent kidney stones   • Thrombocytopenia ([facility])   • Pyelonephritis   • COPD with asthma (HCC)   • Chronic non-seasonal allergic rhinitis   • Unstable angina ([facility])   • NSTEMI (non-ST elevated myocardial infarction) ([facility])   • History of PTCA 1   • Medicare annual wellness visit, subsequent   • Thoracic aortic aneurysm without rupture ([facility])   • Chronic kidney disease, stage 2 (mild)   • Personal history of tobacco use, presenting hazards to health   • Cervical pain (neck)   • Thoracic spine pain   • Calculus of ureter   • Foreign body in bladder and urethra   • Physical deconditioning   • Bilateral hip pain   • Pain of left humerus   • PVD (peripheral vascular disease) ([facility])   • Renal mass   • BPPV (benign paroxysmal positional vertigo)   • Degenerative disc disease, lumbar   • Macrocytic anemia   • Prostate cancer ([facility])   • Overweight with body mass index (BMI) of 25 to 25.9 in adult   • Elevated PSA   • Abnormal SPEP         Current Outpatient Medications:   •  atorvastatin (LIPITOR) 40 MG tablet, Take 1 tablet by mouth Daily., Disp: 90 tablet, Rfl: 1  •  carvedilol (COREG) 3.125 MG tablet, Take 1 tablet by mouth Every 12 (Twelve) Hours., Disp: 180 tablet, Rfl: 3  •  clopidogrel (PLAVIX) 75 MG tablet, Take 1 tablet by mouth Daily., Disp: 90 tablet, Rfl: 3  •  Cyanocobalamin (VITAMIN B 12 PO), Take 500 mcg by mouth Daily. Three times weekly, MoWeFr, Disp: , Rfl:   •  cyclobenzaprine (FLEXERIL) 10 MG tablet, Take 1 tablet by mouth 3 (Three) Times a Day As Needed for Muscle Spasms., Disp: 30 tablet, Rfl: 0  •  dilTIAZem CD  (CARDIZEM CD) 180 MG 24 hr capsule, Take 1 capsule by mouth Daily., Disp: 90 capsule, Rfl: 3  •  docusate sodium (COLACE) 100 MG capsule, Take 1 capsule by mouth 2 (Two) Times a Day As Needed for Constipation., Disp: 60 capsule, Rfl: 2  •  DULoxetine (CYMBALTA) 30 MG capsule, Take 30 mg by mouth Daily., Disp: , Rfl: 0  •  ferrous sulfate 325 (65 FE) MG tablet, TAKE 1 TABLET BY MOUTH EVERY DAY WITH BREAKFAST, Disp: 90 tablet, Rfl: 1  •  ferrous sulfate 325 (65 FE) MG tablet, TAKE 1 TABLET BY MOUTH EVERY DAY WITH BREAKFAST, Disp: 90 tablet, Rfl: 0  •  fluticasone (FLONASE) 50 MCG/ACT nasal spray, 2 sprays into each nostril Every Night., Disp: , Rfl:   •  furosemide (LASIX) 20 MG tablet, Take 20 mg by mouth Every Morning., Disp: , Rfl:   •  gabapentin (NEURONTIN) 100 MG capsule, Take 150 mg by mouth 4 (Four) Times a Day., Disp: , Rfl:   •  isosorbide mononitrate (IMDUR) 30 MG 24 hr tablet, Take 1 tablet by mouth Daily., Disp: 30 tablet, Rfl: 0  •  latanoprost (XALATAN) 0.005 % ophthalmic solution, Administer 1 drop to both eyes every night., Disp: , Rfl:   •  losartan (COZAAR) 50 MG tablet, Take 1 tablet by mouth Every Night., Disp: 90 tablet, Rfl: 3  •  magnesium oxide (MAG-OX) 400 MG tablet, Take 1 tablet by mouth 2 (Two) Times a Day., Disp: , Rfl:   •  metFORMIN (GLUCOPHAGE) 1000 MG tablet, Take 1 tablet by mouth 2 (Two) Times a Day With Meals., Disp: , Rfl:   •  Multiple Vitamins-Minerals (CENTRUM PO), Take 1 tablet by mouth Daily. Centrum 0.4 mg-162 mg-18 mg tablet  (unconfirmed), Disp: , Rfl:   •  nitroglycerin (NITROSTAT) 0.4 MG SL tablet, Place 1 tablet under the tongue Every 5 (Five) Minutes As Needed for Chest Pain. Take no more than 3 doses in 15 minutes., Disp: 30 tablet, Rfl: 0  •  omeprazole (priLOSEC) 20 MG capsule, Take 20 mg by mouth Daily., Disp: , Rfl:   •  oxybutynin XL (DITROPAN-XL) 5 MG 24 hr tablet, Take 5 mg by mouth Daily., Disp: , Rfl:   •  predniSONE (DELTASONE) 20 MG tablet, prednisone 20 mg  tablet, Disp: , Rfl:   •  pregabalin (LYRICA) 150 MG capsule, Every 6 (Six) Hours., Disp: , Rfl:   •  travoprost, BAK free, (TRAVATAN) 0.004 % solution ophthalmic solution, 1 drop., Disp: , Rfl:   •  albuterol sulfate HFA (Ventolin HFA) 108 (90 Base) MCG/ACT inhaler, Inhale 2 puffs Every 4 (Four) Hours As Needed for Wheezing or Shortness of Air., Disp: 18 g, Rfl: 5  •  ipratropium-albuterol (DUO-NEB) 0.5-2.5 mg/3 ml nebulizer, Inhale the contents of 1 vial by nebulization Every 4 (Four) Hours As Needed for Wheezing for up to 30 days., Disp: 360 mL, Rfl: 3  •  pregabalin (LYRICA) 75 MG capsule, Take 1 capsule by mouth 2 (Two) Times a Day for 30 days. (Patient taking differently: Take 150 mg by mouth Daily.), Disp: 60 capsule, Rfl: 0  •  tiotropium bromide-olodaterol (Stiolto Respimat) 2.5-2.5 MCG/ACT aerosol solution inhaler, Inhale 2 puffs Daily., Disp: 1 each, Rfl: 11    Allergies   Allergen Reactions   • Molds & Smuts Other (See Comments)     Sinus infection   • Hydrocodone-Acetaminophen Itching       Past Medical History:   Diagnosis Date   • Acute bacterial sinusitis    • Acute bronchitis    • Acute frontal sinusitis    • Acute maxillary sinusitis    • Acute pharyngitis    • Allergic rhinitis    • Allergic rhinitis due to pollen    • Anxiety    • Artificial lens present     in position   • Asthma    • Backache    • Borderline glaucoma    • C. difficile diarrhea 2014   • Chronic laryngitis    • Chronic rhinitis    • Coronary artery disease    • Cough    • Degeneration of lumbar intervertebral disc    • Degenerative joint disease involving multiple joints    • Diabetes mellitus (HCC)    • Diverticular disease of colon    • Dizziness and giddiness    • Elevated cholesterol    • Erectile dysfunction    • Essential hypertension    • Generalized anxiety disorder    • GERD (gastroesophageal reflux disease)    • Glaucoma    • Hemorrhoids     without bleeding   • Insomnia    • Kidney stone    • Kidney stones    •  Malignant tumor of prostate (HCC)    • Need for prophylactic vaccination and inoculation against influenza    • Osteoarthritis    • Osteoarthritis of multiple joints    • Pain, lumbar region     Pain radiating to lumbar region of back     • PONV (postoperative nausea and vomiting)    • Postviral fatigue syndrome    • Presence of aortocoronary bypass graft    • Prostate cancer (HCC)    • Pseudomembranous enterocolitis     improved   • Rotator cuff syndrome     right   • Shoulder pain    • SOB (shortness of breath)    • Tenosynovitis    • Thrombocytopenia (HCC)    • Upper respiratory infection      Past Surgical History:   Procedure Laterality Date   • CARDIAC CATHETERIZATION  09/25/2003    Cardiac cath (Normal left ventricular systolic function, EF 60% Multi-vessel coronary artery disease with critical disease noted in the LAD coronary artery, diagonal coronary artery, obtuse marginal coronary and right coronary artery.)   • CARDIAC CATHETERIZATION  05/03/1996    Cardiac cath (Coronary atherosclerotic heart disease. 70% diagonal stenosis. Mild to moderate left anterior descending artery stenosis. Preserved left ventricular function.)   • CARDIAC CATHETERIZATION N/A 2/26/2018    Procedure: Left Heart Cath/ pci if indicated ;  Surgeon: Radha Wilkes MD;  Location: [facility] INVASIVE LOCATION;  Service:    • CATARACT EXTRACTION Bilateral    • CATARACT EXTRACTION  10/04/2011    Remove cataract, insert lens (Right)   • CATARACT EXTRACTION  08/23/2011    Remove cataract, insert lens (left)   • COLONOSCOPY     • COLONOSCOPY      Colon endoscopy 61809 (Rectal bleeding. Radiation proctitis w/bleeding. An abnormal CT of transverse colon due to mucosal ischemic colitis, that now is healed. Internal hemorrhoids w/possible bleeding.)   • COLONOSCOPY  05/10/2011    Colon endoscopy 49958 (Single sessile polyp found in transverse colon and sigmoid colon ,removed by cold biopsy polypectomy.divertic. found in sigmoid  colon,descending colon. Friability/capillary friability in rectum sigmoid due to prior radiation.Inter. hem. Grade 1)   • COLONOSCOPY  05/02/2007    Colon endoscopy 60734 (Colon polyp. Diverticulosis without bleeding. Internal hemorrhoids without bleeding.)   • CORONARY ARTERY BYPASS GRAFT  2002   • CYSTOSCOPY  01/13/1995    Cystoscopy, stone removal (Right ureteroscopic lasertripsy.)   • CYSTOSCOPY Right 1/23/2019    Procedure: CYSTOSCOPY, RIGHT STENT REMOVAL;  Surgeon: Nirmal Gaines MD;  Location: [facility];  Service: Urology   • CYSTOSCOPY, URETEROSCOPY, RETROGRADE PYELOGRAM, STENT INSERTION N/A 8/28/2017    Procedure: URETEROSCOPY LASER LITHOTRIPSY WITH STENT INSERTION AND RETROGRADE PYELOGRAM ;  Surgeon: Anna M. D'Amico, MD;  Location: [facility];  Service:    • CYSTOSCOPY, URETEROSCOPY, RETROGRADE PYELOGRAM, STENT INSERTION Right 1/11/2019    Procedure: CYSTOSCOPY URETEROSCOPY RETROGRADE PYELOGRAM HOLMIUM LASER STENT INSERTION;  Surgeon: Nirmal Gaines MD;  Location: [facility];  Service: Urology   • EPIDURAL  12/03/2014    Therapeutic/diag injection (Lumbar transforaminal epidural steroid injection, L5-S1, left side.)   • EPIDURAL  10/22/2014    Therapeutic/diag injection (Lumbar transforaminal epidural steroid injection L5-S1 left side.)   • EPIDURAL  08/07/2014    Therapeutic/diag injection (Lumbar transforaminal epidural steroid injection.)   • EXCISION LESION  05/13/1999    REMOVE EAR LESION 61861 (1.3 cm basal cell carcinoma, right preauricular area. Excision)   • EXCISION LESION  03/13/2001    REMOVE LESION NECK/CHEST 45917 (Excision of irritated seborrheic keratosis, anterior neck, 1.1 cm and deep lipoma, posterior neck, 3.5 cm)   • INJECTION OF MEDICATION  11/15/2012    Kenalog (4)      • JOINT REPLACEMENT  2015    partial   • KNEE ARTHROSCOPY  01/17/2007    Knee arthroscopy, surgery (Arthroscopy with medial and lateral meniscectomies, right knee.)   • KNEE SURGERY  11/04/2015    Knee Surgery  (Right unicompartmental arthroplasty.)   • LUMBAR LAMINECTOMY N/A 2017    Procedure: LUMBAR LAMINECTOMY LUMBAR THREE-FOUR, LUMBAR FOUR-FIVE, INTERLAMINAR DISTRACTION ;  Surgeon: Lucio Mayo MD;  Location: [facility];  Service:    • NOSE SURGERY     • OTHER SURGICAL HISTORY      EXTENDED VISUAL FIELDS STUDY 87071 (Borderline glaucoma) (3): 2015, 2014, 2013   • OTHER SURGICAL HISTORY  10/29/2003    Heart revascularize (TMR) (Directmyocardial revascularization times four; LIMA to LAD SVG to DARIUSZ SVG to OM1, SVG to RCA)   • OTHER SURGICAL HISTORY      OCT DISC NFL 00321 (Borderline glaucoma)  (3): 2015, 2014, 2013   • PROSTATECTOMY     • SEPTORHINOPLASTY  1989    Nasal surgery procedure (Septorhinoplasty with reconstruction of the nasal pyramid with a iliac crest graft, rhinoplasty was performed with external approach.)   • SHOULDER ARTHROSCOPY  2013    Arthroscopy of right shoulder with rotator repair, Carla procedure, Biceps tenotomy, subacromial decompression.   • SHOULDER SURGERY  2005    Shoulder surgery procedure (Decompression, subacromial, explore of the rotator cuff, no tear found nor repaired, acromioplasty of the left shoulder and AC shoulder joint resection.)     Social History     Socioeconomic History   • Marital status: Significant Other   Tobacco Use   • Smoking status: Former Smoker     Packs/day: 0.50     Years: 40.00     Pack years: 20.00     Types: Cigarettes     Start date:      Quit date: 2020     Years since quittin.9   • Smokeless tobacco: Never Used   Vaping Use   • Vaping Use: Never used   Substance and Sexual Activity   • Alcohol use: Not Currently     Comment: socially   • Drug use: No   • Sexual activity: Defer     Family History   Problem Relation Age of Onset   • Hypertension Mother    • Heart disease Mother    • Arthritis Mother    • Cancer Other    • Heart disease Other    • Hypertension Other          "  Objective     Blood pressure 136/64, pulse 97, height 160 cm (63\"), weight 66.2 kg (146 lb), SpO2 94 %.  Physical Exam  Vitals reviewed.   Constitutional:       Appearance: Normal appearance.   HENT:      Head: Normocephalic and atraumatic.      Right Ear: Tympanic membrane normal.      Left Ear: Tympanic membrane normal.      Nose: Nose normal.      Mouth/Throat:      Mouth: Mucous membranes are moist.      Pharynx: Oropharynx is clear.   Eyes:      Conjunctiva/sclera: Conjunctivae normal.      Pupils: Pupils are equal, round, and reactive to light.   Cardiovascular:      Rate and Rhythm: Normal rate and regular rhythm.      Pulses: Normal pulses.      Heart sounds: Normal heart sounds.   Pulmonary:      Effort: Pulmonary effort is normal.      Breath sounds: Normal breath sounds.   Abdominal:      General: Abdomen is flat. Bowel sounds are normal.      Palpations: Abdomen is soft.   Musculoskeletal:         General: Normal range of motion.      Cervical back: Normal range of motion and neck supple.   Skin:     General: Skin is warm and dry.   Neurological:      General: No focal deficit present.      Mental Status: He is alert and oriented to person, place, and time.   Psychiatric:         Mood and Affect: Mood normal.         Behavior: Behavior normal.         PFTs:  (independently reviewed and interpreted by me)  3/2/21  FVC 1.78L, 75%  FEV1  0.93L, 51%  Ratio 52%  DLCO 74%    Moderately severe obstruction with normal DLCO    Radiology (independently reviewed and interpreted by me):   CT chest 6/2/21- clear, no concerning nodules       Assessment/Plan     Diagnoses and all orders for this visit:    1. COPD, moderate (HCC) (Primary)    Other orders  -     tiotropium bromide-olodaterol (Stiolto Respimat) 2.5-2.5 MCG/ACT aerosol solution inhaler; Inhale 2 puffs Daily.  Dispense: 1 each; Refill: 11  -     albuterol sulfate HFA (Ventolin HFA) 108 (90 Base) MCG/ACT inhaler; Inhale 2 puffs Every 4 (Four) Hours As " Needed for Wheezing or Shortness of Air.  Dispense: 18 g; Refill: 5         Discussion/ Recommendations:   Patient with moderately severe obstruciton on PFTs last year, not on any contoller medications. Will try Stiolto and see if that's affordable and effective for him.   Also need to check a nocturnal oximetry study, he reports low normal O2 sats during the day. He's not interested in using supplemental O2 with exertion yet, but would be amenable to night time oxygen.    -Start Stiolto 2 puffs daily  -Overnight oximetry test         Return in about 4 weeks (around 4/26/2022).      Thank you for allowing me to participate in the care of Vishnu Waller. Please do not hesitate to contact me with any questions.         This document has been electronically signed by Katherin Case DO on March 29, 2022 14:24 CDT

## 2022-03-29 NOTE — PROGRESS NOTES
An order for Overnight oximetry has been filled out and faxed to [facility].

## 2022-04-07 NOTE — TELEPHONE ENCOUNTER
Rx Refill Note  Requested Prescriptions     Pending Prescriptions Disp Refills   • FeroSul 325 (65 Fe) MG tablet [Pharmacy Med Name: FEROSUL 325 (65 Fe) MG Tablet] 90 tablet      Sig: TAKE 1 TABLET EVERY DAY WITH BREAKFAST      Last office visit with prescribing clinician: 3/4/2022      Next office visit with prescribing clinician: 6/3/2022            Pari Collins RN  04/07/22, 08:01 CDT

## 2022-04-12 NOTE — PROGRESS NOTES
Received paperwork from [email or web address] stating that Mr. Waller refused oxygen setup.  He stated that he does not have a problem with his oxygen at night and if he starts to have one he will contact [facility].

## 2022-04-26 PROBLEM — J44.9 CHRONIC OBSTRUCTIVE PULMONARY DISEASE: Status: ACTIVE | Noted: 2022-01-01

## 2022-04-26 PROBLEM — J44.9 COPD WITH ASTHMA (HCC): Status: RESOLVED | Noted: 2018-02-14 | Resolved: 2022-01-01

## 2022-04-26 NOTE — PROGRESS NOTES
Pulmonary Consultation    No ref. provider found,    Thank you for asking me to see Vishnu Waller for   Chief Complaint   Patient presents with   • COPD   • Asthma   .      History of Present Illness  Vishnu Waller is a 82 y.o. male     4/26/22  Patient here for follow up. He was not able to afford the Stiolto so didn't get that. He was able to get the albuterol though. He did his overnight oximetry study and did qualify for O2 but then declined it when it was being delivered.      3/29/22  Patinet with history of COPD, used to see Dr Koehler last time in 2020. He is not currently using any inhalers or nebulizers. He has Breo listed but reports he couldn't afford it.   Has exertional shortness of breath and chronic cough  Has a lot of chonic pain issues as well.    Tobacco use history:  Type: cigarettes  Amount: 1 ppd  Duration: 60 years  Cessation: 2019         Review of Systems: History obtained from chart review and the patient.  Review of Systems  As described in the HPI. Otherwise, remainder of ROS (14 systems) were negative.    Patient Active Problem List   Diagnosis   • Degeneration of lumbar intervertebral disc   • Low back pain without sciatica   • Neuritis or radiculitis due to rupture of lumbar intervertebral disc   • Borderline glaucoma, open angle with borderline findings   • Pseudophakia   • History of coronary artery bypass surgery   • Essential hypertension   • Mixed hyperlipidemia   • Spinal stenosis of lumbar region   • Degenerative disc disease, lumbar   • Chronic pain of right knee   • History of artificial joint   • B12 deficiency   • Degeneration of intervertebral disc of lumbosacral region   • Personal history of other infectious and parasitic diseases   • Status post lumbar spine operation   • History of pyelonephritis   • History of surgical procedure   • History of repair of rotator cuff   • Encounter for follow-up examination after completed treatment for conditions other  than malignant neoplasm   • Encounter for general adult medical examination without abnormal findings   • Osteoarthritis of knee   • Osteoarthritis of multiple joints   • Primary insomnia   • Encounter for screening for malignant neoplasm of colon   • Encounter for screening for malignant neoplasm of prostate   • Type 2 diabetes mellitus, without long-term current use of insulin ([facility])   • Coronary artery disease involving native coronary artery of native heart without angina pectoris   • Dyspnea on exertion   • Presence of aortocoronary bypass graft   • Dizziness and giddiness   • Postviral fatigue syndrome   • Postviral fatigue syndrome   • Recurrent kidney stones   • Thrombocytopenia ([facility])   • Pyelonephritis   • Chronic non-seasonal allergic rhinitis   • Unstable angina ([facility])   • NSTEMI (non-ST elevated myocardial infarction) ([facility])   • History of PTCA 1   • Medicare annual wellness visit, subsequent   • Thoracic aortic aneurysm without rupture ([facility])   • Chronic kidney disease, stage 2 (mild)   • Personal history of tobacco use, presenting hazards to health   • Cervical pain (neck)   • Thoracic spine pain   • Calculus of ureter   • Foreign body in bladder and urethra   • Physical deconditioning   • Bilateral hip pain   • Pain of left humerus   • PVD (peripheral vascular disease) ([facility])   • Renal mass   • BPPV (benign paroxysmal positional vertigo)   • Degenerative disc disease, lumbar   • Macrocytic anemia   • Prostate cancer ([facility])   • Overweight with body mass index (BMI) of 25 to 25.9 in adult   • Elevated PSA   • Abnormal SPEP   • Chronic obstructive pulmonary disease ([facility])         Current Outpatient Medications:   •  albuterol sulfate HFA (Ventolin HFA) 108 (90 Base) MCG/ACT inhaler, Inhale 2 puffs Every 4 (Four) Hours As Needed for Wheezing or Shortness of Air., Disp: 18 g, Rfl: 5  •  atorvastatin (LIPITOR) 40 MG tablet, Take 1 tablet by mouth Daily., Disp: 90 tablet, Rfl: 1  •  carvedilol (COREG) 3.125 MG  tablet, Take 1 tablet by mouth Every 12 (Twelve) Hours., Disp: 180 tablet, Rfl: 3  •  clopidogrel (PLAVIX) 75 MG tablet, Take 1 tablet by mouth Daily., Disp: 90 tablet, Rfl: 3  •  Cyanocobalamin (VITAMIN B 12 PO), Take 500 mcg by mouth Daily. Three times weekly, MoWeFr, Disp: , Rfl:   •  cyclobenzaprine (FLEXERIL) 10 MG tablet, Take 1 tablet by mouth 3 (Three) Times a Day As Needed for Muscle Spasms., Disp: 30 tablet, Rfl: 0  •  dilTIAZem CD (CARDIZEM CD) 180 MG 24 hr capsule, Take 1 capsule by mouth Daily., Disp: 90 capsule, Rfl: 3  •  docusate sodium (COLACE) 100 MG capsule, Take 1 capsule by mouth 2 (Two) Times a Day As Needed for Constipation., Disp: 60 capsule, Rfl: 2  •  DULoxetine (CYMBALTA) 30 MG capsule, Take 30 mg by mouth Daily., Disp: , Rfl: 0  •  FeroSul 325 (65 Fe) MG tablet, TAKE 1 TABLET EVERY DAY WITH BREAKFAST, Disp: 90 tablet, Rfl: 0  •  fluticasone (FLONASE) 50 MCG/ACT nasal spray, 2 sprays into each nostril Every Night., Disp: , Rfl:   •  isosorbide mononitrate (IMDUR) 30 MG 24 hr tablet, Take 1 tablet by mouth Daily., Disp: 30 tablet, Rfl: 0  •  latanoprost (XALATAN) 0.005 % ophthalmic solution, Administer 1 drop to both eyes every night., Disp: , Rfl:   •  losartan (COZAAR) 50 MG tablet, Take 1 tablet by mouth Every Night., Disp: 90 tablet, Rfl: 3  •  magnesium oxide (MAG-OX) 400 MG tablet, Take 1 tablet by mouth 2 (Two) Times a Day., Disp: , Rfl:   •  metFORMIN (GLUCOPHAGE) 1000 MG tablet, Take 1 tablet by mouth 2 (Two) Times a Day With Meals., Disp: , Rfl:   •  Multiple Vitamins-Minerals (CENTRUM PO), Take 1 tablet by mouth Daily. Centrum 0.4 mg-162 mg-18 mg tablet  (unconfirmed), Disp: , Rfl:   •  nitroglycerin (NITROSTAT) 0.4 MG SL tablet, Place 1 tablet under the tongue Every 5 (Five) Minutes As Needed for Chest Pain. Take no more than 3 doses in 15 minutes., Disp: 30 tablet, Rfl: 0  •  omeprazole (priLOSEC) 20 MG capsule, Take 20 mg by mouth Daily., Disp: , Rfl:   •  oxybutynin XL  (DITROPAN-XL) 5 MG 24 hr tablet, Take 5 mg by mouth Daily., Disp: , Rfl:   •  predniSONE (DELTASONE) 20 MG tablet, prednisone 20 mg tablet, Disp: , Rfl:   •  pregabalin (LYRICA) 150 MG capsule, Every 6 (Six) Hours., Disp: , Rfl:   •  travoprost, BAK free, (TRAVATAN) 0.004 % solution ophthalmic solution, 1 drop., Disp: , Rfl:   •  ipratropium-albuterol (DUO-NEB) 0.5-2.5 mg/3 ml nebulizer, Inhale the contents of 1 vial by nebulization Every 4 (Four) Hours As Needed for Wheezing for up to 30 days., Disp: 360 mL, Rfl: 3  •  pregabalin (LYRICA) 75 MG capsule, Take 1 capsule by mouth 2 (Two) Times a Day for 30 days. (Patient taking differently: Take 150 mg by mouth Daily.), Disp: 60 capsule, Rfl: 0  •  tiotropium bromide-olodaterol (Stiolto Respimat) 2.5-2.5 MCG/ACT aerosol solution inhaler, Inhale 2 puffs Daily., Disp: 1 each, Rfl: 11    Allergies   Allergen Reactions   • Molds & Smuts Other (See Comments)     Sinus infection   • Hydrocodone-Acetaminophen Itching       Past Medical History:   Diagnosis Date   • Acute bacterial sinusitis    • Acute bronchitis    • Acute frontal sinusitis    • Acute maxillary sinusitis    • Acute pharyngitis    • Allergic rhinitis    • Allergic rhinitis due to pollen    • Anxiety    • Artificial lens present     in position   • Asthma    • Backache    • Borderline glaucoma    • C. difficile diarrhea 2014   • Chronic laryngitis    • Chronic rhinitis    • Coronary artery disease    • Cough    • Degeneration of lumbar intervertebral disc    • Degenerative joint disease involving multiple joints    • Diabetes mellitus (HCC)    • Diverticular disease of colon    • Dizziness and giddiness    • Elevated cholesterol    • Erectile dysfunction    • Essential hypertension    • Generalized anxiety disorder    • GERD (gastroesophageal reflux disease)    • Glaucoma    • Hemorrhoids     without bleeding   • Insomnia    • Kidney stone    • Kidney stones    • Malignant tumor of prostate (HCC)    • Need for  prophylactic vaccination and inoculation against influenza    • Osteoarthritis    • Osteoarthritis of multiple joints    • Pain, lumbar region     Pain radiating to lumbar region of back     • PONV (postoperative nausea and vomiting)    • Postviral fatigue syndrome    • Presence of aortocoronary bypass graft    • Prostate cancer (HCC)    • Pseudomembranous enterocolitis     improved   • Rotator cuff syndrome     right   • Shoulder pain    • SOB (shortness of breath)    • Tenosynovitis    • Thrombocytopenia (HCC)    • Upper respiratory infection      Past Surgical History:   Procedure Laterality Date   • CARDIAC CATHETERIZATION  09/25/2003    Cardiac cath (Normal left ventricular systolic function, EF 60% Multi-vessel coronary artery disease with critical disease noted in the LAD coronary artery, diagonal coronary artery, obtuse marginal coronary and right coronary artery.)   • CARDIAC CATHETERIZATION  05/03/1996    Cardiac cath (Coronary atherosclerotic heart disease. 70% diagonal stenosis. Mild to moderate left anterior descending artery stenosis. Preserved left ventricular function.)   • CARDIAC CATHETERIZATION N/A 2/26/2018    Procedure: Left Heart Cath/ pci if indicated ;  Surgeon: Radha Wilkes MD;  Location: [facility] INVASIVE LOCATION;  Service:    • CATARACT EXTRACTION Bilateral    • CATARACT EXTRACTION  10/04/2011    Remove cataract, insert lens (Right)   • CATARACT EXTRACTION  08/23/2011    Remove cataract, insert lens (left)   • COLONOSCOPY     • COLONOSCOPY      Colon endoscopy 41131 (Rectal bleeding. Radiation proctitis w/bleeding. An abnormal CT of transverse colon due to mucosal ischemic colitis, that now is healed. Internal hemorrhoids w/possible bleeding.)   • COLONOSCOPY  05/10/2011    Colon endoscopy 85294 (Single sessile polyp found in transverse colon and sigmoid colon ,removed by cold biopsy polypectomy.divertic. found in sigmoid colon,descending colon. Friability/capillary  friability in rectum sigmoid due to prior radiation.Inter. hem. Grade 1)   • COLONOSCOPY  05/02/2007    Colon endoscopy 93431 (Colon polyp. Diverticulosis without bleeding. Internal hemorrhoids without bleeding.)   • CORONARY ARTERY BYPASS GRAFT  2002   • CYSTOSCOPY  01/13/1995    Cystoscopy, stone removal (Right ureteroscopic lasertripsy.)   • CYSTOSCOPY Right 1/23/2019    Procedure: CYSTOSCOPY, RIGHT STENT REMOVAL;  Surgeon: Nirmal Gaines MD;  Location: [facility];  Service: Urology   • CYSTOSCOPY, URETEROSCOPY, RETROGRADE PYELOGRAM, STENT INSERTION N/A 8/28/2017    Procedure: URETEROSCOPY LASER LITHOTRIPSY WITH STENT INSERTION AND RETROGRADE PYELOGRAM ;  Surgeon: Anna M. D'Amico, MD;  Location: [facility];  Service:    • CYSTOSCOPY, URETEROSCOPY, RETROGRADE PYELOGRAM, STENT INSERTION Right 1/11/2019    Procedure: CYSTOSCOPY URETEROSCOPY RETROGRADE PYELOGRAM HOLMIUM LASER STENT INSERTION;  Surgeon: Nirmal Gaines MD;  Location: [facility];  Service: Urology   • EPIDURAL  12/03/2014    Therapeutic/diag injection (Lumbar transforaminal epidural steroid injection, L5-S1, left side.)   • EPIDURAL  10/22/2014    Therapeutic/diag injection (Lumbar transforaminal epidural steroid injection L5-S1 left side.)   • EPIDURAL  08/07/2014    Therapeutic/diag injection (Lumbar transforaminal epidural steroid injection.)   • EXCISION LESION  05/13/1999    REMOVE EAR LESION 94782 (1.3 cm basal cell carcinoma, right preauricular area. Excision)   • EXCISION LESION  03/13/2001    REMOVE LESION NECK/CHEST 35673 (Excision of irritated seborrheic keratosis, anterior neck, 1.1 cm and deep lipoma, posterior neck, 3.5 cm)   • INJECTION OF MEDICATION  11/15/2012    Kenalog (4)      • JOINT REPLACEMENT  2015    partial   • KNEE ARTHROSCOPY  01/17/2007    Knee arthroscopy, surgery (Arthroscopy with medial and lateral meniscectomies, right knee.)   • KNEE SURGERY  11/04/2015    Knee Surgery (Right unicompartmental arthroplasty.)   •  LUMBAR LAMINECTOMY N/A 2017    Procedure: LUMBAR LAMINECTOMY LUMBAR THREE-FOUR, LUMBAR FOUR-FIVE, INTERLAMINAR DISTRACTION ;  Surgeon: Lucio Mayo MD;  Location: [facility];  Service:    • NOSE SURGERY     • OTHER SURGICAL HISTORY      EXTENDED VISUAL FIELDS STUDY 54656 (Borderline glaucoma) (3): 2015, 2014, 2013   • OTHER SURGICAL HISTORY  10/29/2003    Heart revascularize (TMR) (Directmyocardial revascularization times four; LIMA to LAD SVG to DARIUSZ SVG to OM1, SVG to RCA)   • OTHER SURGICAL HISTORY      OCT DISC NFL 42863 (Borderline glaucoma)  (3): 2015, 2014, 2013   • PROSTATECTOMY     • SEPTORHINOPLASTY  1989    Nasal surgery procedure (Septorhinoplasty with reconstruction of the nasal pyramid with a iliac crest graft, rhinoplasty was performed with external approach.)   • SHOULDER ARTHROSCOPY  2013    Arthroscopy of right shoulder with rotator repair, Carla procedure, Biceps tenotomy, subacromial decompression.   • SHOULDER SURGERY  2005    Shoulder surgery procedure (Decompression, subacromial, explore of the rotator cuff, no tear found nor repaired, acromioplasty of the left shoulder and AC shoulder joint resection.)     Social History     Socioeconomic History   • Marital status: Significant Other   Tobacco Use   • Smoking status: Former Smoker     Packs/day: 0.50     Years: 40.00     Pack years: 20.00     Types: Cigarettes     Start date:      Quit date: 2020     Years since quittin.9   • Smokeless tobacco: Never Used   Vaping Use   • Vaping Use: Never used   Substance and Sexual Activity   • Alcohol use: Not Currently     Comment: socially   • Drug use: No   • Sexual activity: Defer     Family History   Problem Relation Age of Onset   • Hypertension Mother    • Heart disease Mother    • Arthritis Mother    • Cancer Other    • Heart disease Other    • Hypertension Other           Objective     Blood pressure 136/56, pulse  "71, height 160 cm (63\"), weight 65.8 kg (145 lb), SpO2 96 %.  Physical Exam  Vitals reviewed.   Constitutional:       Appearance: Normal appearance.   HENT:      Head: Normocephalic and atraumatic.      Right Ear: Tympanic membrane normal.      Left Ear: Tympanic membrane normal.      Nose: Nose normal.      Mouth/Throat:      Mouth: Mucous membranes are moist.      Pharynx: Oropharynx is clear.   Eyes:      Conjunctiva/sclera: Conjunctivae normal.      Pupils: Pupils are equal, round, and reactive to light.   Cardiovascular:      Rate and Rhythm: Normal rate and regular rhythm.      Pulses: Normal pulses.      Heart sounds: Normal heart sounds.   Pulmonary:      Effort: Pulmonary effort is normal.      Breath sounds: Normal breath sounds.   Abdominal:      General: Abdomen is flat. Bowel sounds are normal.      Palpations: Abdomen is soft.   Musculoskeletal:         General: Normal range of motion.      Cervical back: Normal range of motion and neck supple.   Skin:     General: Skin is warm and dry.   Neurological:      General: No focal deficit present.      Mental Status: He is alert and oriented to person, place, and time.   Psychiatric:         Mood and Affect: Mood normal.         Behavior: Behavior normal.         PFTs:  (independently reviewed and interpreted by me)  3/2/21  FVC 1.78L, 75%  FEV1  0.93L, 51%  Ratio 52%  DLCO 74%    Moderately severe obstruction with normal DLCO    Radiology (independently reviewed and interpreted by me):   CT chest 6/2/21- clear, no concerning nodules       Assessment/Plan     Diagnoses and all orders for this visit:    1. Chronic obstructive pulmonary disease, unspecified COPD type (HCC) (Primary)         Discussion/ Recommendations:   Moderate COPD. Has not been able to afford many meds (Breo and Stiolto so far). Will try Symbicort. Patient to call and let me know if it's too expensive         No follow-ups on file.      Thank you for allowing me to participate in the care " of Vishnu Waller. Please do not hesitate to contact me with any questions.         This document has been electronically signed by Katherin Case DO on April 26, 2022 13:29 CDT

## 2022-04-27 NOTE — PROGRESS NOTES
Mr. Waller called stating he can not afford the Symbicort inhaler.  I asked him to go online and check with [email or web address] and fill out the patient assistance paperwork and see if that will qualify for their help.  He is to let us know when he hears back from them.

## 2022-05-10 NOTE — PROGRESS NOTES
Vishnu Waller  82 y.o. male      1. Coronary artery disease involving native coronary artery of native heart without angina pectoris    2. Essential hypertension    3. Mixed hyperlipidemia    4. Presence of aortocoronary bypass graft    5. PVD (peripheral vascular disease) ([facility])    6. Type 2 diabetes mellitus without complication, without long-term current use of insulin (Former[facility])    7. Thoracic aortic aneurysm without rupture ([facility])        History of Present Illness:  Mr. Waller is a 82-year-old  male with the history of diabetes mellitus, hypertension, hyperlipidemia, COPD, tobacco abuse, CAD status post CABG in 2003 when he received LIMA to LAD, SVG to diagonal, SVG to OM 2 and SVG to distal right coronary artery.  In February 2018 he presented with prolonged chest pain and ruled in for a non-ST segment elevation myocardial infarctions and cardiac catheterization showed the following findings:  Impression:  99% eccentric lesion noted in the proximal segment of the SVG to distal right coronary artery graft. Successful PCI with placement of a 4.0 x 18 mm Xience Alpine stent.  Patent LIMA to Left Anterior Descending Coronary Artery.  Native LAD beyond the anastomosis was widely patent  Severe native vessel coronary artery disease with 100% occlusion of the mid LAD, mid right coronary artery.  A small to medium-sized tortuous circumflex had a proximal lesion up to 99%.  Occluded SVG to diagonal and occluded SVG to obtuse marginal 2  Ascending aortogram showing ectatic ascending aorta.    6/2021 CT Chest:  Ascending aorta 46mm, arch 31mm, descending 27mm, abdominal 19mm    Echocardiogram in October 2020 showed overall normal LV systolic function with an EF of 53% with late 1 diastolic dysfunction.  Right ventricle was mildly dilated.  RVSP was 35 to 45 mmHg.  There was moderate dilatation of the proximal aorta measuring 4.6 to 4.7 cm.  There was mild aortic valve insufficiency.    He denied any chest pain  but does have longstanding NYHA class II dyspnea on exertion.  His dyspnea is most likely related to his pulmonary status secondary to longstanding tobacco use.  He quit smoking in May 2020.    EKG today showed sinus rhythm with heart rate of 80 bpm.  Baseline artifacts.  No ST-T wave changes diagnostic of ischemia.    Allergies   Allergen Reactions   • Molds & Smuts Other (See Comments)     Sinus infection   • Hydrocodone-Acetaminophen Itching         Past Medical History:   Diagnosis Date   • Acute bacterial sinusitis    • Acute bronchitis    • Acute frontal sinusitis    • Acute maxillary sinusitis    • Acute pharyngitis    • Allergic rhinitis    • Allergic rhinitis due to pollen    • Anxiety    • Artificial lens present     in position   • Asthma    • Backache    • Borderline glaucoma    • C. difficile diarrhea 2014   • Chronic laryngitis    • Chronic rhinitis    • Coronary artery disease    • Cough    • Degeneration of lumbar intervertebral disc    • Degenerative joint disease involving multiple joints    • Diabetes mellitus (HCC)    • Diverticular disease of colon    • Dizziness and giddiness    • Elevated cholesterol    • Erectile dysfunction    • Essential hypertension    • Generalized anxiety disorder    • GERD (gastroesophageal reflux disease)    • Glaucoma    • Hemorrhoids     without bleeding   • Insomnia    • Kidney stone    • Kidney stones    • Malignant tumor of prostate (HCC)    • Need for prophylactic vaccination and inoculation against influenza    • Osteoarthritis    • Osteoarthritis of multiple joints    • Pain, lumbar region     Pain radiating to lumbar region of back     • PONV (postoperative nausea and vomiting)    • Postviral fatigue syndrome    • Presence of aortocoronary bypass graft    • Prostate cancer (HCC)    • Pseudomembranous enterocolitis     improved   • Rotator cuff syndrome     right   • Shoulder pain    • SOB (shortness of breath)    • Tenosynovitis    • Thrombocytopenia (HCC)     • Upper respiratory infection          Past Surgical History:   Procedure Laterality Date   • CARDIAC CATHETERIZATION  09/25/2003    Cardiac cath (Normal left ventricular systolic function, EF 60% Multi-vessel coronary artery disease with critical disease noted in the LAD coronary artery, diagonal coronary artery, obtuse marginal coronary and right coronary artery.)   • CARDIAC CATHETERIZATION  05/03/1996    Cardiac cath (Coronary atherosclerotic heart disease. 70% diagonal stenosis. Mild to moderate left anterior descending artery stenosis. Preserved left ventricular function.)   • CARDIAC CATHETERIZATION N/A 2/26/2018    Procedure: Left Heart Cath/ pci if indicated ;  Surgeon: Radha Wilkes MD;  Location: [facility] CATH INVASIVE LOCATION;  Service:    • CATARACT EXTRACTION Bilateral    • CATARACT EXTRACTION  10/04/2011    Remove cataract, insert lens (Right)   • CATARACT EXTRACTION  08/23/2011    Remove cataract, insert lens (left)   • COLONOSCOPY     • COLONOSCOPY      Colon endoscopy 05714 (Rectal bleeding. Radiation proctitis w/bleeding. An abnormal CT of transverse colon due to mucosal ischemic colitis, that now is healed. Internal hemorrhoids w/possible bleeding.)   • COLONOSCOPY  05/10/2011    Colon endoscopy 20460 (Single sessile polyp found in transverse colon and sigmoid colon ,removed by cold biopsy polypectomy.divertic. found in sigmoid colon,descending colon. Friability/capillary friability in rectum sigmoid due to prior radiation.Inter. hem. Grade 1)   • COLONOSCOPY  05/02/2007    Colon endoscopy 73395 (Colon polyp. Diverticulosis without bleeding. Internal hemorrhoids without bleeding.)   • CORONARY ARTERY BYPASS GRAFT  2002   • CYSTOSCOPY  01/13/1995    Cystoscopy, stone removal (Right ureteroscopic lasertripsy.)   • CYSTOSCOPY Right 1/23/2019    Procedure: CYSTOSCOPY, RIGHT STENT REMOVAL;  Surgeon: Nirmal Gaines MD;  Location: [facility] OR;  Service: Urology   • CYSTOSCOPY,  URETEROSCOPY, RETROGRADE PYELOGRAM, STENT INSERTION N/A 8/28/2017    Procedure: URETEROSCOPY LASER LITHOTRIPSY WITH STENT INSERTION AND RETROGRADE PYELOGRAM ;  Surgeon: Anna M. D'Amico, MD;  Location: [facility];  Service:    • CYSTOSCOPY, URETEROSCOPY, RETROGRADE PYELOGRAM, STENT INSERTION Right 1/11/2019    Procedure: CYSTOSCOPY URETEROSCOPY RETROGRADE PYELOGRAM HOLMIUM LASER STENT INSERTION;  Surgeon: Nirmal Gaines MD;  Location: [facility];  Service: Urology   • EPIDURAL  12/03/2014    Therapeutic/diag injection (Lumbar transforaminal epidural steroid injection, L5-S1, left side.)   • EPIDURAL  10/22/2014    Therapeutic/diag injection (Lumbar transforaminal epidural steroid injection L5-S1 left side.)   • EPIDURAL  08/07/2014    Therapeutic/diag injection (Lumbar transforaminal epidural steroid injection.)   • EXCISION LESION  05/13/1999    REMOVE EAR LESION 96240 (1.3 cm basal cell carcinoma, right preauricular area. Excision)   • EXCISION LESION  03/13/2001    REMOVE LESION NECK/CHEST 73970 (Excision of irritated seborrheic keratosis, anterior neck, 1.1 cm and deep lipoma, posterior neck, 3.5 cm)   • INJECTION OF MEDICATION  11/15/2012    Kenalog (4)      • JOINT REPLACEMENT  2015    partial   • KNEE ARTHROSCOPY  01/17/2007    Knee arthroscopy, surgery (Arthroscopy with medial and lateral meniscectomies, right knee.)   • KNEE SURGERY  11/04/2015    Knee Surgery (Right unicompartmental arthroplasty.)   • LUMBAR LAMINECTOMY N/A 2/7/2017    Procedure: LUMBAR LAMINECTOMY LUMBAR THREE-FOUR, LUMBAR FOUR-FIVE, INTERLAMINAR DISTRACTION ;  Surgeon: Lucio Mayo MD;  Location: [facility];  Service:    • NOSE SURGERY     • OTHER SURGICAL HISTORY      EXTENDED VISUAL FIELDS STUDY 83391 (Borderline glaucoma) (3): 03/19/2015, 04/09/2014, 03/27/2013   • OTHER SURGICAL HISTORY  10/29/2003    Heart revascularize (TMR) (Directmyocardial revascularization times four; LIMA to LAD SVG to DARIUSZ SVG to OM1, SVG to RCA)    • OTHER SURGICAL HISTORY      OCT DISC NFL 36364 (Borderline glaucoma)  (3): 2015, 2014, 2013   • PROSTATECTOMY     • SEPTORHINOPLASTY  1989    Nasal surgery procedure (Septorhinoplasty with reconstruction of the nasal pyramid with a iliac crest graft, rhinoplasty was performed with external approach.)   • SHOULDER ARTHROSCOPY  2013    Arthroscopy of right shoulder with rotator repair, Carla procedure, Biceps tenotomy, subacromial decompression.   • SHOULDER SURGERY  2005    Shoulder surgery procedure (Decompression, subacromial, explore of the rotator cuff, no tear found nor repaired, acromioplasty of the left shoulder and AC shoulder joint resection.)         Family History   Problem Relation Age of Onset   • Hypertension Mother    • Heart disease Mother    • Arthritis Mother    • Cancer Other    • Heart disease Other    • Hypertension Other          Social History     Socioeconomic History   • Marital status: Significant Other   Tobacco Use   • Smoking status: Former Smoker     Packs/day: 0.50     Years: 40.00     Pack years: 20.00     Types: Cigarettes     Start date:      Quit date: 2020     Years since quittin.0   • Smokeless tobacco: Never Used   Vaping Use   • Vaping Use: Never used   Substance and Sexual Activity   • Alcohol use: Not Currently     Comment: socially   • Drug use: No   • Sexual activity: Defer         Current Outpatient Medications   Medication Sig Dispense Refill   • metFORMIN (GLUCOPHAGE) 1000 MG tablet Take 1 tablet by mouth 2 (Two) Times a Day With Meals.     • albuterol sulfate HFA (Ventolin HFA) 108 (90 Base) MCG/ACT inhaler Inhale 2 puffs Every 4 (Four) Hours As Needed for Wheezing or Shortness of Air. 18 g 5   • atorvastatin (LIPITOR) 40 MG tablet Take 1 tablet by mouth Daily. 90 tablet 1   • budesonide-formoterol (SYMBICORT) 160-4.5 MCG/ACT inhaler Inhale 2 puffs 2 (Two) Times a Day. 1 each 11   • carvedilol (COREG) 3.125 MG tablet  Take 1 tablet by mouth Every 12 (Twelve) Hours. 180 tablet 3   • clopidogrel (PLAVIX) 75 MG tablet Take 1 tablet by mouth Daily. 90 tablet 3   • Cyanocobalamin (VITAMIN B 12 PO) Take 500 mcg by mouth Daily. Three times weekly, MoWeFr     • cyclobenzaprine (FLEXERIL) 10 MG tablet Take 1 tablet by mouth 3 (Three) Times a Day As Needed for Muscle Spasms. 30 tablet 0   • dilTIAZem CD (CARDIZEM CD) 180 MG 24 hr capsule Take 1 capsule by mouth Daily. 90 capsule 3   • docusate sodium (COLACE) 100 MG capsule Take 1 capsule by mouth 2 (Two) Times a Day As Needed for Constipation. 60 capsule 2   • DULoxetine (CYMBALTA) 30 MG capsule Take 30 mg by mouth Daily.  0   • FeroSul 325 (65 Fe) MG tablet TAKE 1 TABLET EVERY DAY WITH BREAKFAST 90 tablet 0   • fluticasone (FLONASE) 50 MCG/ACT nasal spray 2 sprays into each nostril Every Night.     • ipratropium-albuterol (DUO-NEB) 0.5-2.5 mg/3 ml nebulizer Inhale the contents of 1 vial by nebulization Every 4 (Four) Hours As Needed for Wheezing for up to 30 days. 360 mL 3   • isosorbide mononitrate (IMDUR) 30 MG 24 hr tablet Take 1 tablet by mouth Daily. 30 tablet 0   • latanoprost (XALATAN) 0.005 % ophthalmic solution Administer 1 drop to both eyes every night.     • losartan (COZAAR) 50 MG tablet Take 1 tablet by mouth Every Night. 90 tablet 3   • magnesium oxide (MAG-OX) 400 MG tablet Take 1 tablet by mouth 2 (Two) Times a Day.     • metFORMIN (GLUCOPHAGE) 1000 MG tablet Take 1 tablet by mouth 2 (Two) Times a Day With Meals.     • Multiple Vitamins-Minerals (CENTRUM PO) Take 1 tablet by mouth Daily. Centrum 0.4 mg-162 mg-18 mg tablet  (unconfirmed)     • nitroglycerin (NITROSTAT) 0.4 MG SL tablet Place 1 tablet under the tongue Every 5 (Five) Minutes As Needed for Chest Pain. Take no more than 3 doses in 15 minutes. 30 tablet 0   • omeprazole (priLOSEC) 20 MG capsule Take 20 mg by mouth Daily.     • oxybutynin XL (DITROPAN-XL) 5 MG 24 hr tablet Take 5 mg by mouth Daily.     •  "predniSONE (DELTASONE) 20 MG tablet prednisone 20 mg tablet     • pregabalin (LYRICA) 150 MG capsule Every 6 (Six) Hours.     • pregabalin (LYRICA) 75 MG capsule Take 1 capsule by mouth 2 (Two) Times a Day for 30 days. (Patient taking differently: Take 150 mg by mouth Daily.) 60 capsule 0   • travoprost, KIARA free, (TRAVATAN) 0.004 % solution ophthalmic solution 1 drop.       No current facility-administered medications for this visit.         OBJECTIVE    /68 (BP Location: Left arm, Patient Position: Sitting, Cuff Size: Adult)   Temp 97.5 °F (36.4 °C)   Ht 160 cm (63\")   Wt 67.2 kg (148 lb 3.2 oz)   SpO2 94%   BMI 26.25 kg/m²         Review of Systems : The following systems were reviewed and no changes noted    Constitutional:  Denies recent weight loss, weight gain, fever or chills     HENT:  Denies any hearing loss, epistaxis, hoarseness, or difficulty speaking.     Eyes: Wears eyeglasses or contact lenses     Respiratory:  Dyspnea with exertion,no cough, wheezing, or hemoptysis.     Cardiovascular: Negative for palpations, chest pain, orthopnea, PND    Gastrointestinal:  Denies change in bowel habits, dyspepsia, ulcer disease, hematochezia, or melena.     Endocrine: Negative for cold intolerance, heat intolerance, polydipsia, polyphagia and polyuria.     Genitourinary: Negative.      Musculoskeletal: DJD, back pain    Neurological:  Denies any history of recurrent headaches, strokes, TIA, or seizure disorder.     Hematological: Denies any food allergies, seasonal allergies, bleeding disorders, or lymphadenopathy.     Psychiatric/Behavioral: Denies any history of depression, substance abuse, or change in cognitive function.     Physical Exam : The following systems were reviewed and no changes noted     Constitutional: Cooperative, alert and oriented,  in no acute distress.      HENT:   Head: Normocephalic, normal hair patterns, no masses or tenderness.  Ears, Nose, and Throat: No gross abnormalities. " No pallor or cyanosis.   Eyes: EOMS intact, PERRL, conjunctivae and lids unremarkable. Fundoscopic exam and visual fields not performed.   Neck: No palpable masses or adenopathy, no thyromegaly, no JVD, carotid pulses are full and equal bilaterally and without  Bruits.      Cardiovascular: Regular rhythm, S1 and S2 normal, no S3 or S4.  No murmurs, gallops, or rubs detected.      Pulmonary/Chest: Chest: normal symmetry,  normal respiratory excursion, no intercostal retraction, no use of accessory muscles.                                  Pulmonary: Normal breath sounds. No rales or ronchi.     Abdominal: Abdomen soft, bowel sounds normoactive, no masses, no hepatosplenomegaly, non-tender, no bruits.     Musculoskeletal: No deformities, clubbing, cyanosis, erythema, or edema observed.     Neurological: No gross motor or sensory deficits noted, affect appropriate, oriented to time, person, place.          Lab Results   Component Value Date    WBC 10.28 03/02/2022    HGB 9.4 (L) 03/02/2022    HCT 30.5 (L) 03/02/2022    .0 (H) 03/02/2022     03/02/2022     Lab Results   Component Value Date    GLUCOSE 141 (H) 03/02/2022    BUN 21 04/22/2022    CREATININE 1.1 04/22/2022    EGFRIFNONA 77 12/23/2021    EGFRIFAFRI 78 11/04/2021    BCR 15.9 03/02/2022    CO2 30 04/22/2022    CALCIUM 9.0 04/22/2022    PROTENTOTREF 6.9 12/23/2021    ALBUMIN 3.5 04/22/2022    LABIL2 1.4 12/23/2021    AST 16 04/22/2022    ALT 10 04/22/2022     Lab Results   Component Value Date    CHOL 112 04/13/2021    CHOL 98 02/09/2021    CHOL 128 07/17/2020     Lab Results   Component Value Date    TRIG 166 (H) 04/22/2022    TRIG 164 (H) 04/13/2021    TRIG 147 02/09/2021     Lab Results   Component Value Date    HDL 34 04/22/2022    HDL 37 04/13/2021    HDL 29 (L) 02/09/2021     No components found for: LDLCALC  Lab Results   Component Value Date    LDL 30 04/22/2022    LDL 51 04/13/2021    LDL 43 02/09/2021     No results found for:  HDLLDLRATIO  No components found for: CHOLHDL  Lab Results   Component Value Date    HGBA1C 7.2 (H) 04/22/2022     Lab Results   Component Value Date    TSH 0.679 02/09/2021           ASSESSMENT AND PLAN  Mr. Waller has multiple medical issues as discussed in detail under history of present illness.  He has no clinical evidence of angina, arrhythmia or congestive heart failure.  He had lab work done recently and his LDL was 30 and HDL 34 on 4/22/2022.  Hemoglobin A1c was 7.2.  CBC in March 22 showed hemoglobin of 9.4 and hematocrit 30.5 with an MCV of 107.  He has an appointment coming up with hematology/oncology.    I have continued antihypertensive therapy with carvedilol, Cardizem CD, losartan, antianginal therapy with isosorbide mononitrate, antiplatelet therapy with Plavix, lipid-lowering therapy with atorvastatin.     He continues to follow-up with vascular surgery for his ascending aortic aneurysm, now every 2 years.        Diagnoses and all orders for this visit:    1. Coronary artery disease involving native coronary artery of native heart without angina pectoris (Primary)    2. Essential hypertension    3. Mixed hyperlipidemia    4. Presence of aortocoronary bypass graft    5. PVD (peripheral vascular disease) (HCC)    6. Type 2 diabetes mellitus without complication, without long-term current use of insulin (HCC)    7. Thoracic aortic aneurysm without rupture (HCC)        Patient's Body mass index is 26.25 kg/m². BMI is within normal parameters. No follow-up required..      Vishnu Waller has h/o tobacco abuse for several decades.  He has quit smoking.      Radha Wilkes MD  5/10/2022  11:40 CDT

## 2022-06-03 NOTE — PROGRESS NOTES
DATE OF VISIT: 6/5/2022      REASON FOR VISIT: Abnormal serum protein electrophoresis, anemia, history of prostate cancer with elevated PSA after definitive treatment, right kidney lesion      HISTORY OF PRESENT ILLNESS:   82-year-old male with medical problem consisting of type 2 diabetes mellitus, dyslipidemia, prostate cancer diagnosed in 2003 s/p prostatectomy, chronic back pain for which patient had a back surgery done in the past, right kidney hypodense mass, anemia who was initially seen in consultation on August 2, 2021 for evaluation of abnormal serum protein electrophoresis, anemia and elevated PSA after definitive treatment for prostate cancer.  Patient is here for follow-up appointment today.  Complains of chronic back pain.  Complains of chronic shortness of breath.  Denies any bleeding.  Denies any new lymph node enlargement.          Past Medical History, Past Surgical History, Social History, Family History have been reviewed and are without significant changes except as mentioned.    Review of Systems   A comprehensive 14 point review of systems was performed and was negative except as mentioned in HPI.    Medications:  The current medication list was reviewed in the EMR    ALLERGIES:    Allergies   Allergen Reactions   • Molds & Smuts Other (See Comments)     Sinus infection   • Hydrocodone-Acetaminophen Itching       Objective      Vitals:    06/03/22 1343   BP: 179/72   Pulse: 80   Temp: 96.7 °F (35.9 °C)   TempSrc: Temporal   SpO2: 91%   Weight: 65.7 kg (144 lb 12.8 oz)   PainSc: 10-Worst pain ever   PainLoc: Back     Current Status 8/20/2021   ECOG score 0       Physical Exam  Pulmonary:      Breath sounds: Normal breath sounds.   Neurological:      Mental Status: He is alert and oriented to person, place, and time.           RECENT LABS:  Glucose   Date Value Ref Range Status   06/03/2022 209 (H) 65 - 99 mg/dL Final   07/19/2019 122 (H) 70 - 110 mg/dL Final     Sodium   Date Value Ref Range  Status   06/03/2022 141 136 - 145 mmol/L Final   04/22/2022 142 136 - 145 mmol/L Final     Potassium   Date Value Ref Range Status   06/03/2022 4.9 3.5 - 5.2 mmol/L Final   04/22/2022 4.7 3.5 - 5.1 mmol/L Final     CO2   Date Value Ref Range Status   06/03/2022 24.0 22.0 - 29.0 mmol/L Final     Total CO2   Date Value Ref Range Status   04/22/2022 30 21 - 31 mmol/L Final     Chloride   Date Value Ref Range Status   06/03/2022 105 98 - 107 mmol/L Final   04/22/2022 105 98 - 107 mmol/L Final     Anion Gap   Date Value Ref Range Status   06/03/2022 12.0 5.0 - 15.0 mmol/L Final   11/04/2021 11 4 - 16 mmol/L Final     Creatinine   Date Value Ref Range Status   06/03/2022 1.08 0.76 - 1.27 mg/dL Final   04/22/2022 1.1 0.7 - 1.3 mg/dL Final     BUN   Date Value Ref Range Status   06/03/2022 18 8 - 23 mg/dL Final   04/22/2022 21 7 - 25 mg/dL Final     BUN/Creatinine Ratio   Date Value Ref Range Status   06/03/2022 16.7 7.0 - 25.0 Final     Calcium   Date Value Ref Range Status   06/03/2022 9.4 8.6 - 10.5 mg/dL Final   04/22/2022 9.0 8.6 - 10.3 mg/dL Final     eGFR Non  Amer   Date Value Ref Range Status   12/23/2021 77 >60 mL/min/1.73 Final     Alkaline Phosphatase   Date Value Ref Range Status   06/03/2022 78 39 - 117 U/L Final   04/22/2022 61 34 - 104 U/L Final   10/22/2019 43 (L) 46 - 116 U/L Final     Total Protein   Date Value Ref Range Status   06/03/2022 6.7 6.0 - 8.5 g/dL Final   05/26/2021 6.1 (L) 6.3 - 7.9 g/dL Final   12/28/2018 6.3 (L) 6.4 - 8.2 g/dL Final     ALT (SGPT)   Date Value Ref Range Status   06/03/2022 18 1 - 41 U/L Final   04/22/2022 10 7 - 52 U/L Final   12/28/2018 18 (L) 30 - 65 U/L Final     AST (SGOT)   Date Value Ref Range Status   06/03/2022 19 1 - 40 U/L Final   04/22/2022 16 13 - 39 U/L Final   12/28/2018 19 15 - 37 U/L Final     Total Bilirubin   Date Value Ref Range Status   06/03/2022 0.4 0.0 - 1.2 mg/dL Final   04/22/2022 0.43 0.3 - 1.0 mg/dL Final     Albumin   Date Value Ref Range  Status   06/03/2022 3.70 3.50 - 5.20 g/dL Final   04/22/2022 3.5 3.5 - 5.7 g/dL Final     Globulin   Date Value Ref Range Status   06/03/2022 3.0 gm/dL Final     A/G Ratio   Date Value Ref Range Status   12/23/2021 1.4 0.7 - 1.7 Final   05/26/2021 0.98  Final     Lab Results   Component Value Date    WBC 11.06 (H) 06/03/2022    HGB 9.5 (L) 06/03/2022    HCT 29.7 (L) 06/03/2022    .3 (H) 06/03/2022     06/03/2022     Lab Results   Component Value Date    NEUTROABS 7.05 (H) 06/03/2022    IRON 151 06/03/2022    IRON 114 03/02/2022    IRON 177 (H) 12/23/2021    TIBC 380 06/03/2022    TIBC 374 03/02/2022    TIBC 402 12/23/2021    LABIRON 40 06/03/2022    LABIRON 30 03/02/2022    LABIRON 44 12/23/2021    FERRITIN 50.30 06/03/2022    FERRITIN 58.45 03/02/2022    FERRITIN 74.39 11/15/2021    [id number] 959 (H) 06/03/2022    [id number] 741 04/22/2022    WMFRCFKY14 948 (H) 03/02/2022    FOLATE >20.00 06/03/2022    FOLATE >20.00 03/02/2022    FOLATE >20.00 11/15/2021     No results found for: , LABCA2, AFPTM, HCGQUANT, , CHROMGRNA, [id number], CEA, REFLABREPO      PATHOLOGY:  * Cannot find OR log *         RADIOLOGY DATA :  No radiology results for the last 7 days        Assessment & Plan     1.  Macrocytic anemia  - Patient has been having ongoing anemia for the last 3 years  - Anemia work-up done today shows globin is 9.5  - Patient is currently on ferrous sulfate 3 times a week along with B12 3 times a week.  Patient is not able to tolerate more than 3 times a week ferrous sulfate due to diarrhea.  - Anemia work-up does not explain his reason of anemia.  No option of bone marrow has been discussed with patient in the past, patient has been refusing bone marrow biopsy at present  - We will have patient return to clinic in 3 months with repeat CBC, CMP, iron studies, ferritin, B12, folate, PSA, prior to that      2.  Abnormal serum protein electrophoresis  - Serum protein electrophoresis on August 2,  2021 showed M spike of 0.1 with IgG kappa immunofixation.  Free light chain was borderline elevated.  - We will repeat myeloma work-up around December 2022    3.  Prostate cancer with elevated PSA  - Patient was diagnosed with prostate cancer in 2003 and had a prostatectomy done at [place]  - Patient had radiation treatment done at [facility]  - Due to persistently elevated PSA with negative staging work-up, patient was started on Eligard on November 18, 2021  - PSA level is undetectable at less than 0.014.  We will continue holding Eligard at present.    4.  Right kidney lesion  - CT scan done in June 2021 showed stable hypodensity and mass involving the right kidney.    5.  Leukocytosis:  - Reactive in nature  - White blood cell count is 11.1  - We will monitor with CBC    6.  Chronic back pain:  - Being followed by pain management     7.  Health maintenance: Patient quit smoking in 2020.  Had a colonoscopy last 5 years    8. Advance Care Planning   ACP discussion was held with the patient during this visit. Patient has an advance directive in EMR which is still valid.                   PHQ-9 Total Score: 2   -Patient is not homicidal or suicidal.  No acute intervention required.    Vishnu Waller reports a pain score of 10.  Given his pain assessment as noted, treatment options were discussed and the following options were decided upon as a follow-up plan to address the patient's pain: continuation of current treatment plan for pain.         Josef Covington MD  6/5/2022  20:21 CDT        Part of this note may be an electronic transcription/translation of spoken language to printed text using the Dragon Dictation System.          CC:

## 2022-06-06 NOTE — TELEPHONE ENCOUNTER
Called and spoke with pt's wife regarding lab results and medication instructions as per Dr. Covington, v/u obtained.

## 2022-06-06 NOTE — TELEPHONE ENCOUNTER
----- Message from Josef Covington MD sent at 6/5/2022  8:22 PM CDT -----  Regarding: labs  Please let patient know he needs to continue taking ferrous sulfate 3 times a week along with B12 3 times a week.  Thank you

## 2022-06-23 NOTE — PLAN OF CARE
Problem: Adult Inpatient Plan of Care  Goal: Plan of Care Review  Outcome: Ongoing, Progressing  Flowsheets (Taken 2/10/2021 1436)  Plan of Care Reviewed With: patient   Goal Outcome Evaluation:  Plan of Care Reviewed With: patient      Alb 3.1L. Reg diet- intakes 75 & 100% x2 meals. Per Epic 11/23/2020 174# and currently 130#/BMI 23.1- noted -11.6% w tloss in <3mo, significant. Pt notes small meals at home, doesn't snack much. NFPE noted fat loss and muscle wasting to body. Pt meets criteria for severe, acute malnutrition likely r/t to COPD and increase energy demands. . Agreed to try [facility] strawberry, chocolate milk at L/D. RD following.   PGy2 Note    Patient called to remind to repeat hCG tomorrow. Patient again strongly advised to use contraception and to abstain from unprotected intercourse. Ectopic precautions and bleeding precautions reinforced . Patient voiced an understanding    Dr. Hernandez and Dr. Contreras aware

## 2022-09-02 PROBLEM — N17.9 AKI (ACUTE KIDNEY INJURY) (HCC): Status: ACTIVE | Noted: 2022-01-01

## 2022-09-02 PROBLEM — E87.5 HYPERKALEMIA: Status: ACTIVE | Noted: 2022-01-01

## 2022-09-02 NOTE — PROGRESS NOTES
DATE OF VISIT: 9/2/2022      REASON FOR VISIT: Abnormal serum protein electrophoresis, anemia, history of prostate cancer with elevated PSA after definitive treatment, right kidney lesion, hyperkalemia, acute kidney injury      HISTORY OF PRESENT ILLNESS:   82-year-old male with medical problems significant for type 2 diabetes mellitus, dyslipidemia, prostate cancer diagnosed in 2003 s/p prostatectomy, rising PSA for which he received dose of Eligard, chronic back pain, right kidney mass, anemia has been following up with [facility] since August 2, 2021.  Patient is here for follow-up appointment today to discuss recently done blood work and further recommendation.  Continues to have chronic back pain.  Denies any bleeding.  Complains of chronic shortness of breath.  Denies any new lymph node enlargement.              Past Medical History, Past Surgical History, Social History, Family History have been reviewed and are without significant changes except as mentioned.    Review of Systems   A comprehensive 14 point review of systems was performed and was negative except as mentioned in HPI.    Medications:  The current medication list was reviewed in the EMR    ALLERGIES:    Allergies   Allergen Reactions   • Molds & Smuts Other (See Comments)     Sinus infection   • Hydrocodone-Acetaminophen Itching       Objective      Vitals:    09/02/22 1313   BP: 124/58   Pulse: 86   Resp: 18   Temp: 96.4 °F (35.8 °C)   SpO2: 91%   Weight: 63.5 kg (140 lb)   PainSc: 10-Worst pain ever     Current Status 8/20/2021   ECOG score 0       Physical Exam  Pulmonary:      Breath sounds: Normal breath sounds.   Neurological:      Mental Status: He is alert and oriented to person, place, and time.           RECENT LABS:  Glucose   Date Value Ref Range Status   08/30/2022 117 (H) 65 - 99 mg/dL Final   07/19/2019 122 (H) 70 - 110 mg/dL Final     Sodium   Date Value Ref Range Status   08/30/2022 143 136 - 145 mmol/L Final    04/22/2022 142 136 - 145 mmol/L Final     Potassium   Date Value Ref Range Status   08/30/2022 5.4 (H) 3.5 - 5.2 mmol/L Final   04/22/2022 4.7 3.5 - 5.1 mmol/L Final     CO2   Date Value Ref Range Status   08/30/2022 28.0 22.0 - 29.0 mmol/L Final     Total CO2   Date Value Ref Range Status   04/22/2022 30 21 - 31 mmol/L Final     Chloride   Date Value Ref Range Status   08/30/2022 104 98 - 107 mmol/L Final   04/22/2022 105 98 - 107 mmol/L Final     Anion Gap   Date Value Ref Range Status   08/30/2022 11.0 5.0 - 15.0 mmol/L Final   11/04/2021 11 4 - 16 mmol/L Final     Creatinine   Date Value Ref Range Status   08/30/2022 1.30 (H) 0.76 - 1.27 mg/dL Final   04/22/2022 1.1 0.7 - 1.3 mg/dL Final     BUN   Date Value Ref Range Status   08/30/2022 22 8 - 23 mg/dL Final   04/22/2022 21 7 - 25 mg/dL Final     BUN/Creatinine Ratio   Date Value Ref Range Status   08/30/2022 16.9 7.0 - 25.0 Final     Calcium   Date Value Ref Range Status   08/30/2022 9.6 8.6 - 10.5 mg/dL Final   04/22/2022 9.0 8.6 - 10.3 mg/dL Final     eGFR Non  Amer   Date Value Ref Range Status   12/23/2021 77 >60 mL/min/1.73 Final     Alkaline Phosphatase   Date Value Ref Range Status   08/30/2022 70 39 - 117 U/L Final   04/22/2022 61 34 - 104 U/L Final   10/22/2019 43 (L) 46 - 116 U/L Final     Total Protein   Date Value Ref Range Status   08/30/2022 6.9 6.0 - 8.5 g/dL Final   05/26/2021 6.1 (L) 6.3 - 7.9 g/dL Final   12/28/2018 6.3 (L) 6.4 - 8.2 g/dL Final     ALT (SGPT)   Date Value Ref Range Status   08/30/2022 9 1 - 41 U/L Final   04/22/2022 10 7 - 52 U/L Final   12/28/2018 18 (L) 30 - 65 U/L Final     AST (SGOT)   Date Value Ref Range Status   08/30/2022 35 1 - 40 U/L Final   04/22/2022 16 13 - 39 U/L Final   12/28/2018 19 15 - 37 U/L Final     Total Bilirubin   Date Value Ref Range Status   08/30/2022 0.5 0.0 - 1.2 mg/dL Final   04/22/2022 0.43 0.3 - 1.0 mg/dL Final     Albumin   Date Value Ref Range Status   08/30/2022 4.20 3.50 - 5.20  g/dL Final   04/22/2022 3.5 3.5 - 5.7 g/dL Final     Globulin   Date Value Ref Range Status   08/30/2022 2.7 gm/dL Final     A/G Ratio   Date Value Ref Range Status   12/23/2021 1.4 0.7 - 1.7 Final   05/26/2021 0.98  Final     Lab Results   Component Value Date    WBC 10.77 08/30/2022    HGB 9.4 (L) 08/30/2022    HCT 29.4 (L) 08/30/2022    .0 (H) 08/30/2022     08/30/2022     Lab Results   Component Value Date    NEUTROABS 5.95 08/30/2022    IRON 120 08/30/2022    IRON 151 06/03/2022    IRON 114 03/02/2022    TIBC 329 08/30/2022    TIBC 380 06/03/2022    TIBC 374 03/02/2022    LABIRON 36 08/30/2022    LABIRON 40 06/03/2022    LABIRON 30 03/02/2022    FERRITIN 73.22 08/30/2022    FERRITIN 50.30 06/03/2022    FERRITIN 58.45 03/02/2022    OIDBTACH63 1,057 (H) 08/30/2022    [id number] 959 (H) 06/03/2022    [id number] 741 04/22/2022    FOLATE >20.00 08/30/2022    FOLATE >20.00 06/03/2022    FOLATE >20.00 03/02/2022     No results found for: , LABCA2, AFPTM, HCGQUANT, , CHROMGRNA, [id number], CEA, REFLABREPO      Component PSA   Latest Ref Rng & Units 0.000 - 4.000 ng/mL   12/28/2018 4.9 (H)   1/16/2020 9.5 (H)   4/13/2021 9.8 (H)   8/2/2021 10.400 (H)   11/15/2021 9.960 (H)   3/2/2022 <0.014   6/3/2022 <0.014   8/30/2022 <0.014             PATHOLOGY:  * Cannot find OR log *         RADIOLOGY DATA :  No radiology results for the last 7 days        Assessment & Plan     1.  Macrocytic anemia:  - Patient has been having ongoing anemia since 2019.  - Recent anemia work-up shows hemoglobin is 9.4.  Iron studies B12 and folate level is adequate.  - Recommend continue with ferrous sulfate p.o. daily with B12 3 times a week.  - Again discussed with patient recent anemia work-up does not explain his ongoing chronic anemia.  Option of bone marrow biopsy was again discussed with patient and he continues to decline bone marrow biopsy at this point  - We will have patient return to clinic in 3 months with  repeat CBC, CMP, iron studies, ferritin, B12, folate, PSA, serum protein electrophoresis with immunofixation, free light chains and 24-hour urine protein electrophoresis to be done prior to that.        2.  Abnormal serum protein electrophoresis  - Patient has M spike of 0.1 with IgG kappa immunofixation 1 SPEP.  Free light chain ratio also is borderline elevated  - We will repeat myeloma work-up prior to next clinic visit in 3 months.    3.  Prostate cancer with elevated PSA  - Patient was diagnosed with prostate cancer in 2003 had a prostatectomy done at [place]  - Patient had a radiation treatment done at [facility]  - Due to persistently elevated PSA with negative staging work-up.  Patient received 1 dose of Eligard on November 18, 2021.  - Recent PSA is less than 0.014.  We will continue holding Eligard at present    4.  Right kidney kidney lesion:  - Recommend continuing with surveillance imaging to follow-up on kidney lesion    5.  Chronic back pain:  - Being followed by pain management    6.  Acute kidney injury:  - Creatinine is elevated at 1.30.  Patient was notified about elevated creatinine and need to increase hydration    7.  Hyperkalemia:  - Potassium is 5.4.  Patient was notified about elevated potassiums patient states he has been eating a lot of bananas recently.  Recommend consuming less amount of banana and increasing hydration.  Prescription for Kayexalate 15 g p.o. x1 has been sent to his pharmacy today    8.  Health maintenance: Patient quit smoking in 2020.  Had a colonoscopy in last 10 years    9. Advance Care Planning   ACP discussion was held with the patient during this visit. Patient has an advance directive in EMR which is still valid.                   PHQ-9 Total Score: 0   -Patient is not homicidal or suicidal.  No acute intervention required.    Vishnu Villatoro Sridhar reports a pain score of 10.  Given his pain assessment as noted, treatment options were discussed and the  following options were decided upon as a follow-up plan to address the patient's pain: continuation of current treatment plan for pain.         Josef Covington MD  9/2/2022  13:30 CDT        Part of this note may be an electronic transcription/translation of spoken language to printed text using the Dragon Dictation System.          CC:

## 2022-10-25 PROBLEM — N17.9 AKI (ACUTE KIDNEY INJURY) (HCC): Status: ACTIVE | Noted: 2022-01-01

## 2022-10-25 PROBLEM — R53.1 WEAKNESS: Status: ACTIVE | Noted: 2022-01-01

## 2022-10-25 PROBLEM — R55 SYNCOPE: Status: ACTIVE | Noted: 2022-01-01

## 2022-10-28 PROBLEM — D64.9 ANEMIA: Status: ACTIVE | Noted: 2021-02-10

## 2022-10-29 PROBLEM — D72.829 LEUKOCYTOSIS: Status: ACTIVE | Noted: 2022-01-01

## 2022-10-30 NOTE — CASE COMMUNICATION
"Medical Necessity and focus of care: Patient recent discharged from hospital d/t acute kidney diesase. Patient having increased weakness.  Recently using a walker to get around.  Wife and pt want to go to skilled nursing facility to do rehab.  Wife doesnt feel like she can take care of him at home.   added to care team.  Patient having pain in back, hips and BLE.  Patient goes to pain clinic.  Pain clinic waiting on bone bi opsy results before increasing pain medication per wife.  Pt fills own mediplanner.    Caregiver Status: lives with wife.   Patient's goal(s): \" go to rehab\"  GG Discharge Goal:   Medication Issus: none   Environmental/ Physical issues: steps to home  Any additional needs:    Based upon record review and collaboration conference, the recommended frequency for this patient is   SN-----2w1  PT-----eval and treat  OT----eval and treat  ST- ----  HHA---  MSW---eval and treat  Provider______Dr. Shaikh____ Notified @ ___10/30/22______ and agrees with POC   "

## 2022-10-30 NOTE — OUTREACH NOTE
Prep Survey    Flowsheet Row Responses   [facility] facility patient discharged from? [place]   Is LACE score < 7 ? No   Emergency Room discharge w/ pulse ox? No   Eligibility Readm Mgmt   Discharge diagnosis Leukocytosis   Does the patient have one of the following disease processes/diagnoses(primary or secondary)? Other   Does the patient have Home health ordered? Yes   What is the Home health agency?  [facility]   Is there a DME ordered? Yes   What DME was ordered? [facility] - oxygen   Prep survey completed? Yes          FREDERIC FOSTER - Registered Nurse

## 2022-11-01 NOTE — OUTREACH NOTE
Medical Week 1 Survey    Flowsheet Row Responses   [facility] [place] patient discharged from? [place]   Does the patient have one of the following disease processes/diagnoses(primary or secondary)? Other   Week 1 attempt successful? Yes   Call start time 1254   Call end time 1302   Discharge diagnosis Leukocytosis   Meds reviewed with patient/caregiver? Yes   Is the patient having any side effects they believe may be caused by any medication additions or changes? No   Does the patient have all medications ordered at discharge? Yes   Is the patient taking all medications as directed (includes completed medication regime)? No   What is preventing the patient from taking all medications as directed? Desires to consult PCP first   Nursing Interventions Nurse provided patient education   Does the patient have a primary care provider?  Yes   Does the patient have an appointment with their PCP within 7 days of discharge? Yes   Comments regarding PCP 11/4/22   Has the patient kept scheduled appointments due by today? Yes   What is the Home health agency?  [facility]   Has home health visited the patient within 72 hours of discharge? Yes   What DME was ordered? [facility] MED[facility] - oxygen   Has all DME been delivered? Yes   DME comments Declined O2    Psychosocial issues? No   Did the patient receive a copy of their discharge instructions? Yes   Nursing interventions Reviewed instructions with patient   What is the patient's perception of their health status since discharge? New symptoms unrelated to diagnosis  [Wife reports that Pt is having a lot of pain with arthritis]   Is the patient/caregiver able to teach back signs and symptoms related to disease process for when to call PCP? Yes   Is the patient/caregiver able to teach back signs and symptoms related to disease process for when to call 911? Yes   Is the patient/caregiver able to teach back the hierarchy of who to call/visit for symptoms/problems? PCP,  Specialist, Home health nurse, Urgent Care, ED, 911 Yes   Week 1 call completed? Yes   Wrap up additional comments Wife reports that his Dr thinks Pt has cancer and they are not sure where. She states Pt needs a bone bourgeois test to determine. She reports his biggest issue is from pain and is hoping Pt can get meds changed to help control pain. Wife is aware to discuss with PCP.           ZEB ZHOU - Registered Nurse

## 2022-11-01 NOTE — HOME HEALTH
Patient is a 83 y/o male who was seen for OASIS SOC with physical therapy evaluation on 10/31/22 secondary to presenting to [facility] with syncope and fall, generalized weakness.  This has been ongoing for some time.  He was found to have anemia, dehydration/NAREN.  Syncope likely related to orthostatic hypotension/dehydration/anemia and weakness.  Work-up otherwise negative. Patient was also given blood secondary to low hemoglobin level and DC after stabilizing in the hospital. Referral for skilled PT d/t decline in functional mobility and physical decline limiting tolerance to activity.     Focus of care: PT focus of care for this patient will include but but be limited to: bilateral LE strength training to improve functional mobility, preserve muscle tone and increase muscular endurance to help reduce levels of fatigue and improve activity tolerance, gait and transfer training to aid in reduction of fall risk and improve patient safety w/ mobility, balance training to prevent falls over course care and improve functional balance outcome scores.      PMH includes: Acute bacterial sinusitis Acute bronchitis Acute frontal sinusitis Acute maxillary sinusitis Acute pharyngitis Allergic rhinitis Allergic rhinitis due to pollen Anxiety Artificial lens present in position Asthma Backache Borderline glaucoma C. difficile diarrhea 2014 Chronic laryngitis Chronic rhinitis Coronary artery disease Cough Degeneration of lumbar intervertebral disc Degenerative joint disease involving multiple joints Diabetes mellitus (HCC) Diverticular disease of colon Dizziness and giddiness Elevated cholesterol Erectile dysfunction Essential hypertension Generalized anxiety disorder GERD (gastroesophageal reflux disease) Glaucoma Hemorrhoids without bleeding Insomnia Kidney stone Kidney stones Malignant tumor of prostate (HCC) Need for prophylactic vaccination and inoculation against influenza Osteoarthritis Osteoarthritis of  multiple joints Pain, lumbar region Pain radiating to lumbar region of back PONV (postoperative nausea and vomiting) 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 These deficits are limiting patient's overall functional capacity w/ independence, activity, ADLs, and IADLs. Patient will benefit from skilled physical therapy to address the defcitis noted above and recorded over course of care to allow patient to return to PLOF and max level of independence.    Subjective: Patient reports that he is doing better since returning home from the hospital. States that he has ongoing pain d/t chronic arthritis but low back is his worst pain at this time. States that he has begun using walker in the home for more support to prevent having another fall. States after receiving blood he is feeling stronger and getting around better in the home but still feels that he is not at his baseline. Patient would like to get back to ambulating w/o device and reduce back pain to tolerate more activity in the home.

## 2022-11-04 NOTE — CASE COMMUNICATION
"Huddle  / Case Conference Note  Medical Necessity and focus of care: Patient is a 81 y/o male who was seen for OASIS SOC with physical therapy evaluation on 10/31/22 secondary to presenting to [facility] with syncope and fall, generalized weakness.  This has been ongoing for some time.  He was found to have anemia, dehydration/NAREN.  Syncope likely related to orthostatic hypotension/dehydration/anemia and weakness.  Wor k-up otherwise negative. Patient was also given blood secondary to low hemoglobin level and DC after stabilizing in the hospital. Referral for skilled PT d/t decline in functional mobility and physical decline limiting tolerance to activity.     Focus of care: PT focus of care for this patient will include but but be limited to: bilateral LE strength training to improve functional mobility, preserve muscle tone and increase muscular end urance to help reduce levels of fatigue and improve activity tolerance, gait and transfer training to aid in reduction of fall risk and improve patient safety w/ mobility, balance training to prevent falls over course care and improve functional balance outcome scores.  Caregiver Status: patient lives with spouse who is present in the home at all times  Patient's goal(s): \"get stronger, reduce back pain, walk without walker again\"  GG D ischarge Goal: 6  Medication [place]: none noted at this time    Environmental/ Physical issues: floor transitions, rugs in kitchen, 1 step to enter and exit home through garage  Any additional needs: none at this time    Based upon record review and collaboration conference, the recommended frequency for this patient is   SN-----  PT-----2w2, 1w2  OT----  ST-----  HHA---  MSW---  Provider___Dr. Shaikh_______ Notified @ _10/31/22________  and agrees with POC     Please verify your eval  scores: (Answer the scores  that pertain to your area of focus remove the others)    3   2  "

## 2022-11-05 NOTE — HOME HEALTH
pt sitting on couch w/ spouse present and reports steady progress w/ strength and activities tolerance in home each day

## 2022-11-07 NOTE — TELEPHONE ENCOUNTER
"    Reason for Disposition  • [1] Caller requesting NON-URGENT health information AND [2] PCP's office is the best resource    Additional Information  • Negative: [1] Caller is not with the adult (patient) AND [2] reporting urgent symptoms  • Negative: Lab result questions  • Negative: Medication questions  • Negative: Caller can't be reached by phone  • Negative: Caller has already spoken to PCP or another triager  • Negative: RN needs further essential information from caller in order to complete triage  • Negative: Requesting regular office appointment    Answer Assessment - Initial Assessment Questions  1. REASON FOR CALL or QUESTION: \"What is your reason for calling today?\" or \"How can I best help you?\" or \"What question do you have that I can help answer?\"      Wife is caller asking for supplemental oxygen, patient refused oxygen at discharge, now thinks he wants it. Advised to call PCP for supplemental oxygen order. Has an PCP appt this week.  Wife says his oxygen level 90-93% his usual readings.    Protocols used: INFORMATION ONLY CALL - NO TRIAGE-ADULT-      "

## 2022-11-09 PROBLEM — S09.90XA INJURY OF HEAD, INITIAL ENCOUNTER: Status: ACTIVE | Noted: 2022-01-01

## 2022-11-10 NOTE — PLAN OF CARE
Goal Outcome Evaluation:  Plan of Care Reviewed With: patient           Outcome Evaluation: OT eval completed as co-eval with PT. Patient supine and agreeable to therapy. Pt complains of 14/10 pain in his lower back at rest. Pt reports his back pain is chronic but the fall made it worse. Patient required CGA sup>sit with HOB flat. Upon sitting Pt SpO2 on 2 LPM was 96%. OT doffed nc and Spo2 dropped to 87% RA. OT donned nc and SpO2 elevated to 95%.  Min A to log roll when completing sit>sup to prevent further back pain. CGA to sit<>stand EOB using FWW x 2 times. Pt took 1-2 steps left toward HOB. Verbal cues required for safety with transfers. CGA to doff socks. CGA to don R sock. Max A to don L sock due to decreased ax tolerance and back pain with bending over. Patient would benefit from use of a sock aid. BUE ROM WFL. MMT 3+/5 grossly. Cont skilled IP/OT to address decreased strength, ax tolerance, and safety/ I with ADLs, IADLs, and transfers. Recommended d/c home with assist and HH.

## 2022-11-10 NOTE — THERAPY EVALUATION
Patient Name: Vishnu Waller  : 1940    MRN: [id number]                              Today's Date: 11/10/2022       Admit Date: 2022    Visit Dx:     ICD-10-CM ICD-9-CM   1. Injury of head, initial encounter  S09.90XA 959.01   2. Blood loss anemia  D50.0 280.0   3. Fall, initial encounter  W19.XXXA E888.9   4. Impaired mobility and ADLs  Z74.09 V49.89    Z78.9      Patient Active Problem List   Diagnosis   • Degeneration of lumbar intervertebral disc   • Low back pain without sciatica   • Neuritis or radiculitis due to rupture of lumbar intervertebral disc   • Borderline glaucoma, open angle with borderline findings   • Pseudophakia   • History of coronary artery bypass surgery   • Essential hypertension   • Mixed hyperlipidemia   • Spinal stenosis of lumbar region   • Degenerative disc disease, lumbar   • Chronic pain of right knee   • History of artificial joint   • B12 deficiency   • Degeneration of intervertebral disc of lumbosacral region   • Personal history of other infectious and parasitic diseases   • Status post lumbar spine operation   • History of pyelonephritis   • History of surgical procedure   • History of repair of rotator cuff   • Encounter for follow-up examination after completed treatment for conditions other than malignant neoplasm   • Encounter for general adult medical examination without abnormal findings   • Osteoarthritis of knee   • Osteoarthritis of multiple joints   • Primary insomnia   • Encounter for screening for malignant neoplasm of colon   • Encounter for screening for malignant neoplasm of prostate   • Type 2 diabetes mellitus, without long-term current use of insulin (HCC)   • Coronary artery disease involving native coronary artery of native heart without angina pectoris   • Dyspnea on exertion   • Presence of aortocoronary bypass graft   • Dizziness and giddiness   • Postviral fatigue syndrome   • Postviral fatigue syndrome   • Recurrent kidney stones   •  Thrombocytopenia ([facility])   • Pyelonephritis   • Chronic non-seasonal allergic rhinitis   • Unstable angina ([facility])   • NSTEMI (non-ST elevated myocardial infarction) ([facility])   • History of PTCA 1   • Medicare annual wellness visit, subsequent   • Thoracic aortic aneurysm without rupture   • Chronic kidney disease, stage 2 (mild)   • Personal history of tobacco use, presenting hazards to health   • Cervical pain (neck)   • Thoracic spine pain   • Calculus of ureter   • Foreign body in bladder and urethra   • Physical deconditioning   • Bilateral hip pain   • Pain of left humerus   • PVD (peripheral vascular disease) ([facility])   • Renal mass   • BPPV (benign paroxysmal positional vertigo)   • Degenerative disc disease, lumbar   • Anemia   • Prostate cancer ([facility])   • Overweight with body mass index (BMI) of 25 to 25.9 in adult   • Elevated PSA   • Abnormal SPEP   • Chronic obstructive pulmonary disease ([facility])   • NAREN (acute kidney injury) ([facility])   • Hyperkalemia   • Syncope   • Weakness   • NAREN (acute kidney injury) ([facility])   • Leukocytosis   • Injury of head, initial encounter     Past Medical History:   Diagnosis Date   • Acute bacterial sinusitis    • Acute bronchitis    • Acute frontal sinusitis    • Acute maxillary sinusitis    • Acute pharyngitis    • Allergic rhinitis    • Allergic rhinitis due to pollen    • Anxiety    • Artificial lens present     in position   • Asthma    • Backache    • Borderline glaucoma    • C. difficile diarrhea 2014   • Chronic laryngitis    • Chronic rhinitis    • Coronary artery disease    • Cough    • Degeneration of lumbar intervertebral disc    • Degenerative joint disease involving multiple joints    • Diabetes mellitus ([facility])    • Diverticular disease of colon    • Dizziness and giddiness    • Elevated cholesterol    • Erectile dysfunction    • Essential hypertension    • Generalized anxiety disorder    • GERD (gastroesophageal reflux disease)    • Glaucoma    • Hemorrhoids     without bleeding    • Insomnia    • Kidney stone    • Kidney stones    • Malignant tumor of prostate (HCC)    • Need for prophylactic vaccination and inoculation against influenza    • Osteoarthritis    • Osteoarthritis of multiple joints    • Pain, lumbar region     Pain radiating to lumbar region of back     • PONV (postoperative nausea and vomiting)    • Postviral fatigue syndrome    • Presence of aortocoronary bypass graft    • Prostate cancer (HCC)    • Pseudomembranous enterocolitis     improved   • Rotator cuff syndrome     right   • Shoulder pain    • SOB (shortness of breath)    • Tenosynovitis    • Thrombocytopenia (HCC)    • Upper respiratory infection      Past Surgical History:   Procedure Laterality Date   • CARDIAC CATHETERIZATION  09/25/2003    Cardiac cath (Normal left ventricular systolic function, EF 60% Multi-vessel coronary artery disease with critical disease noted in the LAD coronary artery, diagonal coronary artery, obtuse marginal coronary and right coronary artery.)   • CARDIAC CATHETERIZATION  05/03/1996    Cardiac cath (Coronary atherosclerotic heart disease. 70% diagonal stenosis. Mild to moderate left anterior descending artery stenosis. Preserved left ventricular function.)   • CARDIAC CATHETERIZATION N/A 2/26/2018    Procedure: Left Heart Cath/ pci if indicated ;  Surgeon: Radha Wilkes MD;  Location: [facility] INVASIVE LOCATION;  Service:    • CATARACT EXTRACTION Bilateral    • CATARACT EXTRACTION  10/04/2011    Remove cataract, insert lens (Right)   • CATARACT EXTRACTION  08/23/2011    Remove cataract, insert lens (left)   • COLONOSCOPY     • COLONOSCOPY      Colon endoscopy 12642 (Rectal bleeding. Radiation proctitis w/bleeding. An abnormal CT of transverse colon due to mucosal ischemic colitis, that now is healed. Internal hemorrhoids w/possible bleeding.)   • COLONOSCOPY  05/10/2011    Colon endoscopy 56618 (Single sessile polyp found in transverse colon and sigmoid colon ,removed by  cold biopsy polypectomy.divertic. found in sigmoid colon,descending colon. Friability/capillary friability in rectum sigmoid due to prior radiation.Inter. hem. Grade 1)   • COLONOSCOPY  05/02/2007    Colon endoscopy 40583 (Colon polyp. Diverticulosis without bleeding. Internal hemorrhoids without bleeding.)   • CORONARY ARTERY BYPASS GRAFT  2002   • CYSTOSCOPY  01/13/1995    Cystoscopy, stone removal (Right ureteroscopic lasertripsy.)   • CYSTOSCOPY Right 1/23/2019    Procedure: CYSTOSCOPY, RIGHT STENT REMOVAL;  Surgeon: Nirmal Gaines MD;  Location: [facility];  Service: Urology   • CYSTOSCOPY, URETEROSCOPY, RETROGRADE PYELOGRAM, STENT INSERTION N/A 8/28/2017    Procedure: URETEROSCOPY LASER LITHOTRIPSY WITH STENT INSERTION AND RETROGRADE PYELOGRAM ;  Surgeon: Anna M. D'Amico, MD;  Location: [facility];  Service:    • CYSTOSCOPY, URETEROSCOPY, RETROGRADE PYELOGRAM, STENT INSERTION Right 1/11/2019    Procedure: CYSTOSCOPY URETEROSCOPY RETROGRADE PYELOGRAM HOLMIUM LASER STENT INSERTION;  Surgeon: Nirmal Gaines MD;  Location: [facility];  Service: Urology   • EPIDURAL  12/03/2014    Therapeutic/diag injection (Lumbar transforaminal epidural steroid injection, L5-S1, left side.)   • EPIDURAL  10/22/2014    Therapeutic/diag injection (Lumbar transforaminal epidural steroid injection L5-S1 left side.)   • EPIDURAL  08/07/2014    Therapeutic/diag injection (Lumbar transforaminal epidural steroid injection.)   • EXCISION LESION  05/13/1999    REMOVE EAR LESION 04360 (1.3 cm basal cell carcinoma, right preauricular area. Excision)   • EXCISION LESION  03/13/2001    REMOVE LESION NECK/CHEST 35676 (Excision of irritated seborrheic keratosis, anterior neck, 1.1 cm and deep lipoma, posterior neck, 3.5 cm)   • INJECTION OF MEDICATION  11/15/2012    Kenalog (4)      • JOINT REPLACEMENT  2015    partial   • KNEE ARTHROSCOPY  01/17/2007    Knee arthroscopy, surgery (Arthroscopy with medial and lateral meniscectomies, right  knee.)   • KNEE SURGERY  11/04/2015    Knee Surgery (Right unicompartmental arthroplasty.)   • LUMBAR LAMINECTOMY N/A 2/7/2017    Procedure: LUMBAR LAMINECTOMY LUMBAR THREE-FOUR, LUMBAR FOUR-FIVE, INTERLAMINAR DISTRACTION ;  Surgeon: Lucio Mayo MD;  Location: Health system;  Service:    • NOSE SURGERY     • OTHER SURGICAL HISTORY      EXTENDED VISUAL FIELDS STUDY 43326 (Borderline glaucoma) (3): 03/19/2015, 04/09/2014, 03/27/2013   • OTHER SURGICAL HISTORY  10/29/2003    Heart revascularize (TMR) (Directmyocardial revascularization times four; LIMA to LAD SVG to DARIUSZ SVG to OM1, SVG to RCA)   • OTHER SURGICAL HISTORY      OCT DISC NFL 93341 (Borderline glaucoma)  (3): 09/24/2015, 08/13/2014, 07/29/2013   • PROSTATECTOMY     • SEPTORHINOPLASTY  11/16/1989    Nasal surgery procedure (Septorhinoplasty with reconstruction of the nasal pyramid with a iliac crest graft, rhinoplasty was performed with external approach.)   • SHOULDER ARTHROSCOPY  07/24/2013    Arthroscopy of right shoulder with rotator repair, Carla procedure, Biceps tenotomy, subacromial decompression.   • SHOULDER SURGERY  11/01/2005    Shoulder surgery procedure (Decompression, subacromial, explore of the rotator cuff, no tear found nor repaired, acromioplasty of the left shoulder and AC shoulder joint resection.)      General Information     Row Name 11/10/22 0806          OT Time and Intention    Document Type evaluation  -SA     Mode of Treatment co-treatment;physical therapy;occupational therapy  -     Row Name 11/10/22 0806          General Information    Patient Profile Reviewed yes  -SA     Prior Level of Function independent:;dependent:;driving  -SA     Existing Precautions/Restrictions fall  -SA     Barriers to Rehab medically complex  -SA     Row Name 11/10/22 0806          Living Environment    People in Home spouse  -SA     Row Name 11/10/22 0806          Home Main Entrance    Number of Stairs, Main Entrance one  -SA     Stair  Railings, Main Entrance none  -     Row Name 11/10/22 0806          Stairs Within Home, Primary    Stairs, Within Home, Primary uses FWW. Tub shower with grab bars, regular toilet.  -     Number of Stairs, Within Home, Primary none  -     Stair Railings, Within Home, Primary none  -SA     Row Name 11/10/22 0806          Cognition    Orientation Status (Cognition) oriented x 4  -[facility] Name 11/10/22 0806          Safety Issues, Functional Mobility    Safety Issues Affecting Function (Mobility) ability to follow commands  -     Impairments Affecting Function (Mobility) balance;endurance/activity tolerance;grasp;pain;strength;shortness of breath  -           User Key  (r) = Recorded By, (t) = Taken By, (c) = Cosigned By    Initials Name Provider Type    SA Reena Mckeon OT Occupational Therapist                 Mobility/ADL's     [facility] Name 11/10/22 0806          Transfers    Transfers sit-stand transfer;stand-sit transfer  -[facility] Name 11/10/22 0806          Sit-Stand Transfer    Sit-Stand [place] (Transfers) contact guard  -     Assistive Device (Sit-Stand Transfers) walker, front-wheeled  -[facility] Name 11/10/22 0806          Stand-Sit Transfer    Stand-Sit [place] (Transfers) contact guard  [facility]     Assistive Device (Stand-Sit Transfers) [facility]     Comment, (Stand-Sit Transfer) sit<>stand x 2 times  -[facility] Name 11/10/22 0806          Functional Mobility    Functional Mobility- Ind. Level contact guard assist  -     Functional Mobility- [facility]     Functional Mobility-Distance (Feet) --  1-2 steps to left toward HOB  -     Row Name 11/10/22 0806          Activities of Daily Living    BADL Assessment/Intervention lower body dressing  -[facility] Name 11/10/22 0806          Lower Body Dressing Assessment/Training    [place] Level (Lower Body Dressing) doff;don;socks;other (see comments)  CGA to doff socks. CGA to don R sock. Max  A to don L sock due to decreased ax tolerance and back pain with bending over. Patient would benefit from use of a sock aid.  -SA     Position (Lower Body Dressing) edge of bed sitting  -           User Key  (r) = Recorded By, (t) = Taken By, (c) = Cosigned By    Initials Name Provider Type    Reena Hull OT Occupational Therapist               Obj/Interventions     [facility] Name 11/10/22 0806          Sensory Assessment (Somatosensory)    Sensory Assessment (Somatosensory) UE sensation intact  -SA     Row Name 11/10/22 0806          Range of Motion Comprehensive    General Range of Motion bilateral upper extremity ROM WFL  -SA     Row Name 11/10/22 0806          Strength Comprehensive (MMT)    General Manual Muscle Testing (MMT) Assessment other (see comments);upper extremity strength deficits identified  -SA     Comment, General Manual Muscle Testing (MMT) Assessment BUE 3+/5 grossly.  -           User Key  (r) = Recorded By, (t) = Taken By, (c) = Cosigned By    Initials Name Provider Type    Reena Hull OT Occupational Therapist               Goals/Plan     Row Name 11/10/22 0806          Transfer Goal 1 (OT)    Activity/Assistive Device (Transfer Goal 1, OT) transfers, all  -SA     [place] Level/Cues Needed (Transfer Goal 1, OT) modified independence  -SA     Time Frame (Transfer Goal 1, OT) long term goal (LTG);by discharge  -SA     Progress/Outcome (Transfer Goal 1, OT) goal not met  -SA     Row Name 11/10/22 0806          Bathing Goal 1 (OT)    Activity/Device (Bathing Goal 1, OT) bathing skills, all  -SA     [place] Level/Cues Needed (Bathing Goal 1, OT) modified independence  -SA     Time Frame (Bathing Goal 1, OT) long term goal (LTG);by discharge  -SA     Progress/Outcomes (Bathing Goal 1, OT) goal not met  -SA     Row Name 11/10/22 0806          Dressing Goal 1 (OT)    Activity/Device (Dressing Goal 1, OT) dressing skills, all  -SA     [place]/Cues Needed (Dressing Goal 1, OT)  modified independence  -SA     Time Frame (Dressing Goal 1, OT) long term goal (LTG);by discharge  -     Progress/Outcome (Dressing Goal 1, OT) goal not met  -     Row Name 11/10/22 0806          Toileting Goal 1 (OT)    Activity/Device (Toileting Goal 1, OT) toileting skills, all  -SA     [place] Level/Cues Needed (Toileting Goal 1, OT) modified independence  -SA     Time Frame (Toileting Goal 1, OT) long term goal (LTG);by discharge  -     Progress/Outcome (Toileting Goal 1, OT) goal not met  -     Row Name 11/10/22 0806          Therapy Assessment/Plan (OT)    Planned Therapy Interventions (OT) activity tolerance training;manual therapy/joint mobilization;patient/caregiver education/training;adaptive equipment training;neuromuscular control/coordination retraining;ROM/therapeutic exercise;BADL retraining;cognitive/visual perception retraining;occupation/activity based interventions;strengthening exercise;edema control/reduction;transfer/mobility retraining;passive ROM/stretching;functional balance retraining;IADL retraining  [facility]           User Key  (r) = Recorded By, (t) = Taken By, (c) = Cosigned By    Initials Name Provider Type     Reena Mckeon OT Occupational Therapist               Clinical Impression     Row Name 11/10/22 0806          Pain Assessment    Pain Location lower  -     Pain Location - back  -     Pre/Posttreatment Pain Comment Pain is chronic but is worse due to fall. Pt reports sharp pain. Pt reports 14/10 pain. Pt reports he got pain meds today.  -     Pain Intervention(s) Repositioned;Rest  -     Row Name 11/10/22 0806          Plan of Care Review    Plan of Care Reviewed With patient  -SA     Outcome Evaluation OT eval completed as co-eval with PT. Patient supine and agreeable to therapy. Pt complains of 14/10 pain in his lower back at rest. Pt reports his back pain is chronic but the fall made it worse. Patient required CGA sup>sit with HOB flat. Upon sitting Pt  SpO2 on 2 LPM was 96%. OT doffed nc and Spo2 dropped to 87% RA. OT donned nc and SpO2 elevated to 95%.  Min A to log roll when completing sit>sup to prevent further back pain. CGA to sit<>stand EOB using FWW x 2 times. Pt took 1-2 steps left toward HOB. Verbal cues required for safety with transfers. CGA to doff socks. CGA to don R sock. Max A to don L sock due to decreased ax tolerance and back pain with bending over. Patient would benefit from use of a sock aid. BUE ROM WFL. MMT 3+/5 grossly. Cont skilled IP/OT to address decreased strength, ax tolerance, and safety/ I with ADLs, IADLs, and transfers. Recommended d/c home with assist and HH.  -     Row Name 11/10/22 0806          Therapy Assessment/Plan (OT)    Patient/Family Therapy Goal Statement (OT) return home  -     Rehab Potential (OT) good, to achieve stated therapy goals  -     Criteria for Skilled Therapeutic Interventions Met (OT) yes;meets criteria;skilled treatment is necessary  -     Therapy Frequency (OT) other (see comments)  3-7 d/wk  -     Predicted Duration of Therapy Intervention (OT) until all goals met or d/c from hospital  -     Row Name 11/10/22 0806          Therapy Plan Review/Discharge Plan (OT)    Equipment Needs Upon Discharge (OT) dressing equipment;shower chair;bathing equipment  -     Anticipated Discharge Disposition (OT) home with assist;home with 24/7 care;home with home health  -     Row Name 11/10/22 0806          Vital Signs    Pre Systolic BP Rehab 146  -SA     Pre Treatment Diastolic BP 72  -SA     Pretreatment Heart Rate (beats/min) 96  -SA     Pre SpO2 (%) 96  -SA     O2 Delivery Pre Treatment nasal cannula  2 LPM  -SA     Intra SpO2 (%) 87  -SA     O2 Delivery Intra Treatment room air  -SA     Post SpO2 (%) 95  -SA     O2 Delivery Post Treatment nasal cannula  -SA     Pre Patient Position Supine  -SA     Intra Patient Position Sitting  -SA     Post Patient Position Sitting  -     Row Name 11/10/22 0806           Positioning and Restraints    Pre-Treatment Position in bed  -SA     Post Treatment Position bed  -SA     In Bed notified nsg;call light within reach;encouraged to call for assist;exit alarm on;legs elevated  -           User Key  (r) = Recorded By, (t) = Taken By, (c) = Cosigned By    Initials Name Provider Type    Reena Hull OT Occupational Therapist               Outcome Measures     Row Name 11/10/22 0806          How much help from another is currently needed...    Putting on and taking off regular lower body clothing? 2  -SA     Bathing (including washing, rinsing, and drying) 2  -SA     Toileting (which includes using toilet bed pan or urinal) 2  -SA     Putting on and taking off regular upper body clothing 3  -SA     Taking care of personal grooming (such as brushing teeth) 4  -SA     Eating meals 4  -SA     AM-PAC 6 Clicks Score (OT) 17  -SA     Row Name 11/10/22 0806          Functional Assessment    Outcome Measure Options AM-PAC 6 Clicks Daily Activity (OT)  -           User Key  (r) = Recorded By, (t) = Taken By, (c) = Cosigned By    Initials Name Provider Type    Renea Hull OT Occupational Therapist                Occupational Therapy Education     Title: PT OT SLP Therapies (In Progress)     Topic: Occupational Therapy (In Progress)     Point: ADL training (Done)     Description:   Instruct learner(s) on proper safety adaptation and remediation techniques during self care or transfers.   Instruct in proper use of assistive devices.              Learning Progress Summary           Patient Acceptance, E,TB, VU by  at 11/10/2022 0855    Comment: OT POC, Role of OT, transfer training                   Point: Home exercise program (Not Started)     Description:   Instruct learner(s) on appropriate technique for monitoring, assisting and/or progressing therapeutic exercises/activities.              Learner Progress:  Not documented in this visit.          Point: Precautions  (Done)     Description:   Instruct learner(s) on prescribed precautions during self-care and functional transfers.              Learning Progress Summary           Patient Acceptance, E,TB, VU by  at 11/10/2022 0855    Comment: OT POC, Role of OT, transfer training                   Point: Body mechanics (Done)     Description:   Instruct learner(s) on proper positioning and spine alignment during self-care, functional mobility activities and/or exercises.              Learning Progress Summary           Patient Acceptance, E,TB, VU by  at 11/10/2022 0855    Comment: OT POC, Role of OT, transfer training                               User Key     Initials Effective Dates Name Provider Type Discipline     08/11/22 -  Reena Mckeon OT Occupational Therapist OT              OT Recommendation and Plan  Planned Therapy Interventions (OT): activity tolerance training, manual therapy/joint mobilization, patient/caregiver education/training, adaptive equipment training, neuromuscular control/coordination retraining, ROM/therapeutic exercise, BADL retraining, cognitive/visual perception retraining, occupation/activity based interventions, strengthening exercise, edema control/reduction, transfer/mobility retraining, passive ROM/stretching, functional balance retraining, IADL retraining  Therapy Frequency (OT): other (see comments) (3-7 d/wk)  Plan of Care Review  Plan of Care Reviewed With: patient  Outcome Evaluation: OT eval completed as co-eval with PT. Patient supine and agreeable to therapy. Pt complains of 14/10 pain in his lower back at rest. Pt reports his back pain is chronic but the fall made it worse. Patient required CGA sup>sit with HOB flat. Upon sitting Pt SpO2 on 2 LPM was 96%. OT doffed nc and Spo2 dropped to 87% RA. OT donned nc and SpO2 elevated to 95%.  Min A to log roll when completing sit>sup to prevent further back pain. CGA to sit<>stand EOB using FWW x 2 times. Pt took 1-2 steps left toward  HOB. Verbal cues required for safety with transfers. CGA to doff socks. CGA to don R sock. Max A to don L sock due to decreased ax tolerance and back pain with bending over. Patient would benefit from use of a sock aid. BUE ROM WFL. MMT 3+/5 grossly. Cont skilled IP/OT to address decreased strength, ax tolerance, and safety/ I with ADLs, IADLs, and transfers. Recommended d/c home with assist and HH.     Time Calculation:    Time Calculation- OT     Row Name 11/10/22 0806             Time Calculation- OT    OT Start Time 0806  -SA      OT Stop Time 0856 -SA      OT Time Calculation (min) 50 min  -SA      OT Received On 11/10/22  -SA      OT Goal Re-Cert Due Date 11/23/22  -SA         Untimed Charges    OT Eval/Re-eval Minutes 50  -SA         Total Minutes    Untimed Charges Total Minutes 50  -SA       Total Minutes 50  -SA            User Key  (r) = Recorded By, (t) = Taken By, (c) = Cosigned By    Initials Name Provider Type     Reena Mckeon OT Occupational Therapist              Therapy Charges for Today     Code Description Service Date Service Provider Modifiers Qty    [id number] HC OT EVAL MOD COMPLEXITY 3 11/10/2022 Reena Mckeon OT GO 1               Reena Mckeon OT  11/10/2022

## 2022-11-10 NOTE — ED PROVIDER NOTES
Subjective   History of Present Illness  Patient presents via EMS after falling backward going up a step into his house.  Patient did not lose consciousness.  He is on a Plavix.  Large amount of blood noted on bandage and bedding.        Review of Systems   Constitutional: Negative for activity change and appetite change.   HENT: Negative for congestion and ear pain.    Eyes: Negative for pain and discharge.   Respiratory: Negative for chest tightness and shortness of breath.    Cardiovascular: Negative for chest pain and palpitations.   Gastrointestinal: Negative for abdominal distention and abdominal pain.   Endocrine: Negative for cold intolerance and heat intolerance.   Genitourinary: Negative for difficulty urinating and dysuria.   Musculoskeletal: Negative for arthralgias and back pain.   Skin: Positive for wound (matted bloody posterior head with no active bleeding noted.). Negative for color change and rash.   Allergic/Immunologic: Negative for environmental allergies and food allergies.   Neurological: Negative for dizziness and headaches.   Hematological: Negative for adenopathy. Does not bruise/bleed easily.   Psychiatric/Behavioral: Negative for agitation and confusion.       Past Medical History:   Diagnosis Date   • Acute bacterial sinusitis    • Acute bronchitis    • Acute frontal sinusitis    • Acute maxillary sinusitis    • Acute pharyngitis    • Allergic rhinitis    • Allergic rhinitis due to pollen    • Anxiety    • Artificial lens present     in position   • Asthma    • Backache    • Borderline glaucoma    • C. difficile diarrhea 2014   • Chronic laryngitis    • Chronic rhinitis    • Coronary artery disease    • Cough    • Degeneration of lumbar intervertebral disc    • Degenerative joint disease involving multiple joints    • Diabetes mellitus (HCC)    • Diverticular disease of colon    • Dizziness and giddiness    • Elevated cholesterol    • Erectile dysfunction    • Essential hypertension    •  Generalized anxiety disorder    • GERD (gastroesophageal reflux disease)    • Glaucoma    • Hemorrhoids     without bleeding   • Insomnia    • Kidney stone    • Kidney stones    • Malignant tumor of prostate (HCC)    • Need for prophylactic vaccination and inoculation against influenza    • Osteoarthritis    • Osteoarthritis of multiple joints    • Pain, lumbar region     Pain radiating to lumbar region of back     • PONV (postoperative nausea and vomiting)    • Postviral fatigue syndrome    • Presence of aortocoronary bypass graft    • Prostate cancer (HCC)    • Pseudomembranous enterocolitis     improved   • Rotator cuff syndrome     right   • Shoulder pain    • SOB (shortness of breath)    • Tenosynovitis    • Thrombocytopenia (HCC)    • Upper respiratory infection        Allergies   Allergen Reactions   • Molds & Smuts Other (See Comments)     Sinus infection   • Hydrocodone-Acetaminophen Itching       Past Surgical History:   Procedure Laterality Date   • CARDIAC CATHETERIZATION  09/25/2003    Cardiac cath (Normal left ventricular systolic function, EF 60% Multi-vessel coronary artery disease with critical disease noted in the LAD coronary artery, diagonal coronary artery, obtuse marginal coronary and right coronary artery.)   • CARDIAC CATHETERIZATION  05/03/1996    Cardiac cath (Coronary atherosclerotic heart disease. 70% diagonal stenosis. Mild to moderate left anterior descending artery stenosis. Preserved left ventricular function.)   • CARDIAC CATHETERIZATION N/A 2/26/2018    Procedure: Left Heart Cath/ pci if indicated ;  Surgeon: Radha Wilkes MD;  Location: [facility] INVASIVE LOCATION;  Service:    • CATARACT EXTRACTION Bilateral    • CATARACT EXTRACTION  10/04/2011    Remove cataract, insert lens (Right)   • CATARACT EXTRACTION  08/23/2011    Remove cataract, insert lens (left)   • COLONOSCOPY     • COLONOSCOPY      Colon endoscopy 31262 (Rectal bleeding. Radiation proctitis w/bleeding.  An abnormal CT of transverse colon due to mucosal ischemic colitis, that now is healed. Internal hemorrhoids w/possible bleeding.)   • COLONOSCOPY  05/10/2011    Colon endoscopy 42133 (Single sessile polyp found in transverse colon and sigmoid colon ,removed by cold biopsy polypectomy.divertic. found in sigmoid colon,descending colon. Friability/capillary friability in rectum sigmoid due to prior radiation.Inter. hem. Grade 1)   • COLONOSCOPY  05/02/2007    Colon endoscopy 43450 (Colon polyp. Diverticulosis without bleeding. Internal hemorrhoids without bleeding.)   • CORONARY ARTERY BYPASS GRAFT  2002   • CYSTOSCOPY  01/13/1995    Cystoscopy, stone removal (Right ureteroscopic lasertripsy.)   • CYSTOSCOPY Right 1/23/2019    Procedure: CYSTOSCOPY, RIGHT STENT REMOVAL;  Surgeon: Nirmal Gaines MD;  Location: [facility];  Service: Urology   • CYSTOSCOPY, URETEROSCOPY, RETROGRADE PYELOGRAM, STENT INSERTION N/A 8/28/2017    Procedure: URETEROSCOPY LASER LITHOTRIPSY WITH STENT INSERTION AND RETROGRADE PYELOGRAM ;  Surgeon: Anna M. D'Amico, MD;  Location: [facility];  Service:    • CYSTOSCOPY, URETEROSCOPY, RETROGRADE PYELOGRAM, STENT INSERTION Right 1/11/2019    Procedure: CYSTOSCOPY URETEROSCOPY RETROGRADE PYELOGRAM HOLMIUM LASER STENT INSERTION;  Surgeon: Nirmal Gaines MD;  Location: [facility];  Service: Urology   • EPIDURAL  12/03/2014    Therapeutic/diag injection (Lumbar transforaminal epidural steroid injection, L5-S1, left side.)   • EPIDURAL  10/22/2014    Therapeutic/diag injection (Lumbar transforaminal epidural steroid injection L5-S1 left side.)   • EPIDURAL  08/07/2014    Therapeutic/diag injection (Lumbar transforaminal epidural steroid injection.)   • EXCISION LESION  05/13/1999    REMOVE EAR LESION 45805 (1.3 cm basal cell carcinoma, right preauricular area. Excision)   • EXCISION LESION  03/13/2001    REMOVE LESION NECK/CHEST 77020 (Excision of irritated seborrheic keratosis, anterior neck, 1.1 cm  and deep lipoma, posterior neck, 3.5 cm)   • INJECTION OF MEDICATION  11/15/2012    Kenalog (4)      • JOINT REPLACEMENT  2015    partial   • KNEE ARTHROSCOPY  01/17/2007    Knee arthroscopy, surgery (Arthroscopy with medial and lateral meniscectomies, right knee.)   • KNEE SURGERY  11/04/2015    Knee Surgery (Right unicompartmental arthroplasty.)   • LUMBAR LAMINECTOMY N/A 2/7/2017    Procedure: LUMBAR LAMINECTOMY LUMBAR THREE-FOUR, LUMBAR FOUR-FIVE, INTERLAMINAR DISTRACTION ;  Surgeon: Lucio Mayo MD;  Location: [facility];  Service:    • NOSE SURGERY     • OTHER SURGICAL HISTORY      EXTENDED VISUAL FIELDS STUDY 45918 (Borderline glaucoma) (3): 03/19/2015, 04/09/2014, 03/27/2013   • OTHER SURGICAL HISTORY  10/29/2003    Heart revascularize (TMR) (Directmyocardial revascularization times four; LIMA to LAD SVG to DARIUSZ SVG to OM1, SVG to RCA)   • OTHER SURGICAL HISTORY      OCT DISC NFL 30548 (Borderline glaucoma)  (3): 09/24/2015, 08/13/2014, 07/29/2013   • PROSTATECTOMY     • SEPTORHINOPLASTY  11/16/1989    Nasal surgery procedure (Septorhinoplasty with reconstruction of the nasal pyramid with a iliac crest graft, rhinoplasty was performed with external approach.)   • SHOULDER ARTHROSCOPY  07/24/2013    Arthroscopy of right shoulder with rotator repair, Carla procedure, Biceps tenotomy, subacromial decompression.   • SHOULDER SURGERY  11/01/2005    Shoulder surgery procedure (Decompression, subacromial, explore of the rotator cuff, no tear found nor repaired, acromioplasty of the left shoulder and AC shoulder joint resection.)       Family History   Problem Relation Age of Onset   • Hypertension Mother    • Heart disease Mother    • Arthritis Mother    • Cancer Other    • Heart disease Other    • Hypertension Other        Social History     Socioeconomic History   • Marital status:    Tobacco Use   • Smoking status: Former     Packs/day: 0.50     Years: 40.00     Pack years: 20.00     Types:  Cigarettes     Start date:      Quit date: 2020     Years since quittin.5   • Smokeless tobacco: Never   Vaping Use   • Vaping Use: Never used   Substance and Sexual Activity   • Alcohol use: Not Currently     Comment: socially   • Drug use: No   • Sexual activity: Defer           Objective   Physical Exam  Vitals and nursing note reviewed.   Constitutional:       Appearance: He is well-developed.   HENT:      Head: Normocephalic and atraumatic.   Eyes:      Pupils: Pupils are equal, round, and reactive to light.   Neck:      Thyroid: No thyromegaly.      Trachea: No tracheal deviation.   Cardiovascular:      Rate and Rhythm: Normal rate.      Pulses:           Radial pulses are 2+ on the left side.        Dorsalis pedis pulses are 2+ on the right side and 2+ on the left side.      Heart sounds: Normal heart sounds, S1 normal and S2 normal.   Pulmonary:      Effort: Pulmonary effort is normal.      Breath sounds: Normal breath sounds.   Abdominal:      Palpations: Abdomen is soft.   Musculoskeletal:         General: Normal range of motion.      Cervical back: Neck supple.   Skin:     General: Skin is warm and dry.      Capillary Refill: Capillary refill takes 2 to 3 seconds.      Comments: Matted blood to back of head with no active oozing or laceration noted.   Neurological:      Mental Status: He is alert and oriented to person, place, and time.      GCS: GCS eye subscore is 4. GCS verbal subscore is 5. GCS motor subscore is 6.   Psychiatric:         Speech: Speech normal.         Behavior: Behavior normal.         Thought Content: Thought content normal.         Laceration Repair    Date/Time: 2022 11:22 PM  Performed by: Cj Rendon MD  Authorized by: Cj Rendon MD     Consent:     Consent obtained:  Verbal    Consent given by:  Patient    Procedural risks discussed: Blood loss.    Alternatives discussed:  No treatment  Universal protocol:     Patient identity confirmed:   Verbally with patient and arm band  Anesthesia:     Anesthesia method:  None  Laceration details:     Location:  Scalp    Scalp location:  Occipital  Treatment:     Area cleansed with:  Saline    Amount of cleaning:  Standard  Skin repair:     Repair method:  Staples    Number of staples:  3  Approximation:     Approximation:  Close  Comments:      Unable to visualize the small lack secondary to active bleeding.  Staples in place with hemostasis achieved with wine and small oozing to the other remained.  Surgicel applied with pressure dressing.  Bleeding controlled.               ED Course  ED Course as of 11/09/22 2324 Wed Nov 09, 2022 2139 Blood Transfusion Consent    I spoke with the patient about the risks and benefits of blood transfusion, as outlined below:    Benefits:   Blood transfusion is a life-saving treatment that benefits patients by treating or preventing blood loss, which can lead to a seriously low hemoglobin level and cause damage to body organs due to a lack of oxygen.      Risks:   Patient acknowledged understanding that the use of blood or blood products has the following general risks:      Uncommon (1-5% chance)  · Mild reactions resulting in itching, rash, fever, headaches.      Rare (<1% chance)   · Respiratory distress (shortness of breath) or lung injury   · Exposure to blood-borne micro-organisms (bacteria and parasites) that could result in an infection   · Possible effects on the immune system, which may decrease the body's ability to fight infection   · Exposure to blood-borne viruses such as hepatitis B (an inflammatory disease affecting the liver)   · Shock      Extremely rare (one in a million or less)   · Exposure to blood-borne viruses such as hepatitis C (an inflammatory disease affecting the liver) and Human Immunodeficiency Virus (HIV, the virus that causes AIDS)   · Death     I answered all the patient's/family's questions, inquiries, and concerns. Patient gave consent to  receive blood products if it becomes medically necessary. Patient did not have any questions at the time I left the room.    This document has been electronically signed by Cj Rendon MD on November 9, 2022 21:39 CST        [DESHAUN]   2322 Head wound remains with pressure dressing with no bleeding through the dressing at this time. [DESHAUN]      ED Course User Index  [DESHAUN] Cj Rendon MD           Vitals:    11/09/22 2127 11/09/22 2200 11/09/22 2230 11/09/22 2300   BP: 130/60 123/60 130/60 140/62   BP Location:       Patient Position:       Pulse: 62 62 66 66   Resp: 18   18   Temp:       TempSrc:       SpO2: 97% 96% 96% 97%   Weight:       Height:         Lab Results (last 24 hours)     Procedure Component Value Units Date/Time    Protime-INR [[id number]] Collected: 11/09/22 2236    Specimen: Blood Updated: 11/09/22 2316    Comprehensive Metabolic Panel [[id number]] Collected: 11/09/22 2236    Specimen: Blood Updated: 11/09/22 2316    Lavender Top [[id number]] Collected: 11/09/22 2236    Specimen: Blood Updated: 11/09/22 2236    Gold Top - SST [[id number]] Collected: 11/09/22 2236    Specimen: Blood Updated: 11/09/22 2236    Green Top (Gel) [[id number]] Collected: 11/09/22 2236    Specimen: Blood Updated: 11/09/22 2236    Extra Tubes [[id number]] Collected: 11/09/22 2236    Specimen: Blood Updated: 11/09/22 2236    Narrative:      The following orders were created for panel order Extra Tubes.  Procedure                               Abnormality         Status                     ---------                               -----------         ------                     Lavender Top[[id number]]                                     In process                 Gold Top - SST[[id number]]                                   In process                 Green Top (Gel)[[id number]]                                  In process                 Light Blue Top[[id number]]                                   In process                   Please  view results for these tests on the individual orders.    Light Blue Top [[id number]] Collected: 11/09/22 2236    Specimen: Blood Updated: 11/09/22 2236    Extra Tubes [[id number]] Collected: 11/09/22 2123    Specimen: Blood, Venous Line Updated: 11/09/22 2230    Narrative:      The following orders were created for panel order Extra Tubes.  Procedure                               Abnormality         Status                     ---------                               -----------         ------                     Green Top (Gel)[[id number]]                                  Final result                 Please view results for these tests on the individual orders.    Green Top (Gel) [[id number]] Collected: 11/09/22 2123    Specimen: Blood Updated: 11/09/22 2230     Extra Tube Hold for add-ons.     Comment: Auto resulted.       CBC & Differential [[id number]]  (Abnormal) Collected: 11/09/22 2119    Specimen: Blood Updated: 11/09/22 2134    Narrative:      The following orders were created for panel order CBC & Differential.  Procedure                               Abnormality         Status                     ---------                               -----------         ------                     CBC Auto Differential[[id number]]        Abnormal            Final result               Scan Slide[[id number]]                                                                    Please view results for these tests on the individual orders.    CBC Auto Differential [[id number]]  (Abnormal) Collected: 11/09/22 2119    Specimen: Blood Updated: 11/09/22 2133     WBC 16.70 10*3/mm3      RBC 2.32 10*6/mm3      Hemoglobin 6.9 g/dL      Hematocrit 23.0 %      MCV 99.1 fL      MCH 29.7 pg      MCHC 30.0 g/dL      RDW 30.5 %      RDW-.4 fl      MPV 10.9 fL      Platelets 316 10*3/mm3     Manual Differential [[id number]] Collected: 11/09/22 2119    Specimen: Blood Updated: 11/09/22 2132        CT Head Without Contrast    Result Date:  11/9/2022  No acute intracranial abnormalities are seen. Electronically signed by:  Krishna Duarte MD  11/9/2022 9:37 PM CST Workstation: [id number]    CT Cervical Spine Without Contrast    Result Date: 11/9/2022  1. No fracture. 2. Advanced diffuse cervical spondylosis. 3. A stenotic central canal is suspected at C5-6 secondary to degenerative disc disease. MR may be useful in assessing this further. Electronically signed by:  Krishna Duarte MD  11/9/2022 9:42 PM CST Workstation: [id number]                                    MDM  Number of Diagnoses or Management Options  Blood loss anemia  Fall, initial encounter  Injury of head, initial encounter  Diagnosis management comments: Patient with blood loss resulting in hemoglobin of 6.9.  Risk and benefits discussed with patient for blood transfusion he accepts transfusion.  Pressure dressing remains to head with bleeding controlled.  Discussed case with hospitalist who accepts patient for admission.  Medicated with chronic pain medications.  CT is negative for acute findings with the exception of hematoma to scalp.       Amount and/or Complexity of Data Reviewed  Clinical lab tests: ordered and reviewed  Tests in the radiology section of CPT®: ordered and reviewed  Decide to obtain previous medical records or to obtain history from someone other than the patient: yes    Patient Progress  Patient progress: stable      Final diagnoses:   Injury of head, initial encounter   Blood loss anemia   Fall, initial encounter       ED Disposition  ED Disposition     ED Disposition   Decision to Admit    Condition   --    Comment   Level of Care: Med/Surg [1]   Diagnosis: Injury of head, initial encounter [654624]   Admitting Physician: BEHROOZI, SAEID [[id number]]   Attending Physician: BEHROOZI, SAEID [[id number]]               No follow-up provider specified.       Medication List      No changes were made to your prescriptions during this visit.       This document has been  electronically signed by Cj Rendon MD on November 9, 2022 23:25 CST    Cj Rendon MD   Part of this note may be an electronic transcription/translation of spoken language to printed text using the Dragon Dictation System.        Cj Rendon MD  11/09/22 6643

## 2022-11-10 NOTE — OUTREACH NOTE
Medical Week 2 Survey    Flowsheet Row Responses   [facility] patient discharged from? [place]   Does the patient have one of the following disease processes/diagnoses(primary or secondary)? Other   Week 2 attempt successful? No   Unsuccessful attempts Attempt 1   Revoke Readmitted          RANJAN TELLES - Registered Nurse

## 2022-11-10 NOTE — ED NOTES
Nursing report ED to floor  Vishnu Waller  82 y.o.  male    HPI:   Chief Complaint   Patient presents with    Fall    Head Laceration       Admitting doctor:   Saeid Behroozi, MD    Consulting provider(s):  Consults       Date and Time Order Name Status Description    10/28/2022  3:24 PM Hematology & Oncology Inpatient Consult Completed              Admitting diagnosis:   The primary encounter diagnosis was Injury of head, initial encounter. Diagnoses of Blood loss anemia and Fall, initial encounter were also pertinent to this visit.    Code status:   Current Code Status       Date Active Code Status Order ID Comments User Context       Prior            Allergies:   Molds & smuts and Hydrocodone-acetaminophen    Intake and Output  No intake or output data in the 24 hours ending 11/09/22 2331    Weight:       11/09/22  1813   Weight: 62.1 kg (137 lb)       Most recent vitals:   Vitals:    11/09/22 2127 11/09/22 2200 11/09/22 2230 11/09/22 2300   BP: 130/60 123/60 130/60 140/62   BP Location:       Patient Position:       Pulse: 62 62 66 66   Resp: 18   18   Temp:       TempSrc:       SpO2: 97% 96% 96% 97%   Weight:       Height:         Oxygen Therapy: 2L    Active LDAs/IV Access:   Lines, Drains & Airways       Active LDAs       Name Placement date Placement time Site Days    Peripheral IV 11/09/22 2245 Anterior;Right Forearm 11/09/22 2245  Forearm  less than 1                    Labs (abnormal labs have a star):   Labs Reviewed   CBC WITH AUTO DIFFERENTIAL - Abnormal; Notable for the following components:       Result Value    WBC 16.70 (*)     RBC 2.32 (*)     Hemoglobin 6.9 (*)     Hematocrit 23.0 (*)     MCV 99.1 (*)     MCHC 30.0 (*)     RDW 30.5 (*)     RDW-.4 (*)     All other components within normal limits   PROTIME-INR - Abnormal; Notable for the following components:    Protime 16.6 (*)     INR 1.35 (*)     All other components within normal limits    Narrative:     Therapeutic range for  most indications is 2.0-3.0 INR,  or 2.5-3.5 for mechanical heart valves.   MANUAL DIFFERENTIAL   COMPREHENSIVE METABOLIC PANEL   TYPE AND SCREEN   PREPARE RBC   PREVIOUS HISTORY   CBC AND DIFFERENTIAL    Narrative:     The following orders were created for panel order CBC & Differential.  Procedure                               Abnormality         Status                     ---------                               -----------         ------                     CBC Auto Differential[[id number]]        Abnormal            Final result               Scan Slide[[id number]]                                                                    Please view results for these tests on the individual orders.   EXTRA TUBES    Narrative:     The following orders were created for panel order Extra Tubes.  Procedure                               Abnormality         Status                     ---------                               -----------         ------                     Green Top (Gel)[[id number]]                                  Final result                 Please view results for these tests on the individual orders.   GREEN TOP   EXTRA TUBES    Narrative:     The following orders were created for panel order Extra Tubes.  Procedure                               Abnormality         Status                     ---------                               -----------         ------                     Lavender Top[[id number]]                                     In process                 Gold Top - SST[[id number]]                                   In process                 Green Top (Gel)[[id number]]                                  In process                 Light Blue Top[[id number]]                                   In process                   Please view results for these tests on the individual orders.   LAVENDER TOP   GOLD TOP - SST   GREEN TOP   LIGHT BLUE TOP       Meds given in ED:   Medications   traMADol (ULTRAM) tablet 50 mg  (50 mg Oral Given 11/9/22 2328)   acetaminophen (TYLENOL) tablet 650 mg (650 mg Oral Given 11/9/22 2328)   pregabalin (LYRICA) capsule 150 mg (150 mg Oral Given 11/9/22 2328)           NIH Stroke Scale:       Isolation/Infection(s):  No active isolations   No active infections     COVID Testing  Collected NA  Resulted NA    Nursing report ED to floor:  Mental status: A+OX$  Ambulatory status: Assist  Precautions: NA    ED nurse phone extentsiey- [phone]

## 2022-11-10 NOTE — PLAN OF CARE
Goal Outcome Evaluation:  Plan of Care Reviewed With: patient           Outcome Evaluation: PT eval completed. Patient pleasant but inconsisitent about home much pain he normally has in his low back. CGA for supine>Sit transfer ModAx1 sit>supine with HOB down and bed rails. Attempted log roll technique to reduced any low back pain. CGA for sit<>stand transfer with RW. CGA for ambulation 3'x1 with RW secondary to patient reporting increase low back pain with standing. Upon further questioning patient always has increased low back pain with standing. Anticipate home with assist and HHPT.

## 2022-11-10 NOTE — PLAN OF CARE
Goal Outcome Evaluation:  Plan of Care Reviewed With: patient        Progress: improving  Outcome Evaluation: Admission this shift. patient A&O x4. stand by assistance. fall precautions initated. Medications administered per order. 1 unit PRBCs administered this shift. No further requests at this time

## 2022-11-10 NOTE — PROGRESS NOTES
[facility] Medicine   INPATIENT PROGRESS NOTE      Patient Name: Vishnu Waller  Date of Admission: 11/9/2022  Today's Date: 11/10/22  Length of Stay: 0  Primary Care Physician: Johnathon Shaikh MD    Subjective   Chief Complaint: Ground-level fall with subsequent head laceration  HPI   During my evaluation the patient stated he was still having pain on his head but otherwise denied chest pain, dyspnea, abdominal pain, nausea, or emesis.        Review of Systems   All pertinent negatives and positives are as above. All other systems have been reviewed and are negative unless otherwise stated.     Objective    Temp:  [96.8 °F (36 °C)-97.8 °F (36.6 °C)] 97.8 °F (36.6 °C)  Heart Rate:  [62-91] 83  Resp:  [16-18] 16  BP: (117-140)/(57-79) 117/57  Physical Exam  General: Drowsy, no acute distress, appears stated age  HEENT: Head laceration is bandaged  Cardio: Regular rate and rhythm, no murmurs, rubs, or gallops  Respiratory: Clear to auscultation bilaterally without wheezes, rales, or rhonchi, symmetric chest expansion  GI: Bowel sounds are present x4, abdomen is soft, nondistended, nontender  Musculoskeletal: Pulses are 2+ in upper and lower extremities bilaterally, no cyanosis, clubbing, or edema      Results Review:  I have reviewed the labs, radiology results, and diagnostic studies.    Laboratory Data:   Results from last 7 days   Lab Units 11/10/22  0803 11/09/22  2119   WBC 10*3/mm3 16.66* 16.70*   HEMOGLOBIN g/dL 8.2* 6.9*   HEMATOCRIT % 26.7* 23.0*   PLATELETS 10*3/mm3 286 316        Results from last 7 days   Lab Units 11/10/22  1305 11/10/22  0803 11/09/22  2236   SODIUM mmol/L  --  146* 140   POTASSIUM mmol/L 5.0 5.7* 6.1*   CHLORIDE mmol/L  --  108* 106   CO2 mmol/L  --  29.0 26.0   BUN mg/dL  --  28* 30*   CREATININE mg/dL  --  1.59* 1.75*   CALCIUM mg/dL  --  9.4 9.0   BILIRUBIN mg/dL  --   --  0.4   ALK PHOS U/L  --   --  53   ALT (SGPT) U/L   --   --  10   AST (SGOT) U/L  --   --  18   GLUCOSE mg/dL  --  108* 119*       Culture Data:   No results found for: BLOODCX  No results found for: URINECX  No results found for: RESPCX  No results found for: WOUNDCX  No results found for: STOOLCX  No components found for: BODYFLD    Radiology Data:   Imaging Results (Last 24 Hours)     Procedure Component Value Units Date/Time    CT Cervical Spine Without Contrast [[id number]] Collected: 11/09/22 2020     Updated: 11/09/22 2144    Narrative:      CT CERVICAL SPINE WO CONTRAST  RPID: CT CERVICAL SPINE WITHOUT IV CONTRAST    HISTORY: 82 years old Male with fall,    COMPARISON: None.    TECHNIQUE: CT scan of the cervical spine was performed without   IV contrast.   RADIATION DOSE REDUCTION: This exam was performed according to  our departmental dose-optimization program, including automated  exposure control, adjustment of the mA and kV according to  patient size, and iterative reconstruction technique if  available.    FINDINGS:   There is diffuse cervical spondylosis. Multilevel disc bulging is  seen particularly at C5-6 and C6-7. The central canal appears  narrowed at C5-6 secondary to degenerative disc disease.  Soft tissue swelling around the dens with partial calcifications,  this may be CPPD related.      Impression:        1. No fracture.  2. Advanced diffuse cervical spondylosis.  3. A stenotic central canal is suspected at C5-6 secondary to  degenerative disc disease. MR may be useful in assessing this  further.    Electronically signed by:  Krishna Duarte MD  11/9/2022 9:42 PM CST  Workstation: [id number]    CT Head Without Contrast [[id number]] Collected: 11/09/22 2020     Updated: 11/09/22 2139    Narrative:       CT HEAD WO CONTRAST  RPID: CT HEAD WITHOUT IV CONTRAST    CLINICAL HISTORY: 82 years old Male with fall ,    COMPARISON: 10/25/2022  TECHNIQUE: Contiguous axial images of the brain were obtained  without intravenous contrast. From this data,  sagittal and  coronal reconstructed images were obtained.  This exam was performed according to our departmental  dose-optimization program, which includes automated exposure  control, adjustment of the mA and kV according to patient size,  and iterative reconstruction technique, if available.      FINDINGS:   The midline structures are preserved. The ventricles are normal.  No cerebral mass effects are observed. Gray-white matter  differentiation is normal. Stable global atrophy. Low attenuation  in the periventricular white matter and centrum semiovale is  observed, which is most likely from stable mild microvascular  chronic ischemia. This is frequently seen in patients with  diabetes or hypertension.    The skull has a normal appearance.   There is a scalp hematoma in the high left parietal region with  skin staples present.      Impression:        No acute intracranial abnormalities are seen.      Electronically signed by:  Krishna Duarte MD  11/9/2022 9:37 PM CST  Workstation: [id number]          I have reviewed the patient's current medications.     Assessment/Plan     Active Hospital Problems    Diagnosis    • **Injury of head, initial encounter        Assessment:  Head laceration  Ground-level mechanical fall  Hyperkalemia  Acute kidney injury  Acute blood loss anemia  Leukocytosis  Chronic anemia  Coronary artery disease  Non-insulin-dependent diabetes mellitus  Hypertension  Hyperlipidemia  DVT prophylaxis    Plan:  At this time the patient is hemodynamically stable.  He presented after a ground-level mechanical fall for which she sustained a head laceration.  CT of the head without contrast was negative for any acute intracranial process.  Head laceration was stapled and dressed in the ED.  His presenting hemoglobin was 6.1.  He was given packed red blood cells in the ED.  He has known underlying chronic anemia for which she has been worked up in the outpatient setting by heme-onc to determine the  etiology.  Most recently his hemoglobin is 8.2.  Additionally his presenting creatinine was 1.75.  Etiology is likely secondary to prerenal azotemia in the setting of anemia.  Today his creatinine has trended down slightly to 1.59.  Continue monitoring and avoiding nephrotoxic agents when possible.  He is also had hyperkalemia over the course of his stay with an initial potassium of 6.1.  Etiology is likely secondary to his acute renal dysfunction.  Today his potassium was 5.7.  I therefore gave him 40 mg of IV Lasix.  Repeat potassium levels improved to 5.0.  We will repeat a potassium level in the morning to reevaluate.  I anticipate the patient requiring another 24 hours in the hospital.  Further recommendations are predicated upon response to therapy and the results of future studies.                Electronically signed by Michael Sanchez DO, 11/10/22, 14:22 CST.

## 2022-11-10 NOTE — NURSING NOTE
Nurse stayed in patients room the first 15mins of blood transfusion. No s/s of blood transfusion reaction noted at this time.

## 2022-11-10 NOTE — THERAPY EVALUATION
Patient Name: Vishnu Waller  : 1940    MRN: [id number]                              Today's Date: 11/10/2022       Admit Date: 2022    Visit Dx:     ICD-10-CM ICD-9-CM   1. Injury of head, initial encounter  S09.90XA 959.01   2. Blood loss anemia  D50.0 280.0   3. Fall, initial encounter  W19.XXXA E888.9   4. Impaired mobility and ADLs  Z74.09 V49.89    Z78.9    5. Impaired functional mobility, balance, gait, and endurance  Z74.09 V49.89     Patient Active Problem List   Diagnosis   • Degeneration of lumbar intervertebral disc   • Low back pain without sciatica   • Neuritis or radiculitis due to rupture of lumbar intervertebral disc   • Borderline glaucoma, open angle with borderline findings   • Pseudophakia   • History of coronary artery bypass surgery   • Essential hypertension   • Mixed hyperlipidemia   • Spinal stenosis of lumbar region   • Degenerative disc disease, lumbar   • Chronic pain of right knee   • History of artificial joint   • B12 deficiency   • Degeneration of intervertebral disc of lumbosacral region   • Personal history of other infectious and parasitic diseases   • Status post lumbar spine operation   • History of pyelonephritis   • History of surgical procedure   • History of repair of rotator cuff   • Encounter for follow-up examination after completed treatment for conditions other than malignant neoplasm   • Encounter for general adult medical examination without abnormal findings   • Osteoarthritis of knee   • Osteoarthritis of multiple joints   • Primary insomnia   • Encounter for screening for malignant neoplasm of colon   • Encounter for screening for malignant neoplasm of prostate   • Type 2 diabetes mellitus, without long-term current use of insulin (HCC)   • Coronary artery disease involving native coronary artery of native heart without angina pectoris   • Dyspnea on exertion   • Presence of aortocoronary bypass graft   • Dizziness and giddiness   • Postviral  fatigue syndrome   • Postviral fatigue syndrome   • Recurrent kidney stones   • Thrombocytopenia ([facility])   • Pyelonephritis   • Chronic non-seasonal allergic rhinitis   • Unstable angina ([facility])   • NSTEMI (non-ST elevated myocardial infarction) ([facility])   • History of PTCA 1   • Medicare annual wellness visit, subsequent   • Thoracic aortic aneurysm without rupture   • Chronic kidney disease, stage 2 (mild)   • Personal history of tobacco use, presenting hazards to health   • Cervical pain (neck)   • Thoracic spine pain   • Calculus of ureter   • Foreign body in bladder and urethra   • Physical deconditioning   • Bilateral hip pain   • Pain of left humerus   • PVD (peripheral vascular disease) ([facility])   • Renal mass   • BPPV (benign paroxysmal positional vertigo)   • Degenerative disc disease, lumbar   • Anemia   • Prostate cancer ([facility])   • Overweight with body mass index (BMI) of 25 to 25.9 in adult   • Elevated PSA   • Abnormal SPEP   • Chronic obstructive pulmonary disease ([facility])   • NAREN (acute kidney injury) ([facility])   • Hyperkalemia   • Syncope   • Weakness   • NAREN (acute kidney injury) ([facility])   • Leukocytosis   • Injury of head, initial encounter     Past Medical History:   Diagnosis Date   • Acute bacterial sinusitis    • Acute bronchitis    • Acute frontal sinusitis    • Acute maxillary sinusitis    • Acute pharyngitis    • Allergic rhinitis    • Allergic rhinitis due to pollen    • Anxiety    • Artificial lens present     in position   • Asthma    • Backache    • Borderline glaucoma    • C. difficile diarrhea 2014   • Chronic laryngitis    • Chronic rhinitis    • Coronary artery disease    • Cough    • Degeneration of lumbar intervertebral disc    • Degenerative joint disease involving multiple joints    • Diabetes mellitus ([facility])    • Diverticular disease of colon    • Dizziness and giddiness    • Elevated cholesterol    • Erectile dysfunction    • Essential hypertension    • Generalized anxiety disorder    • GERD  (gastroesophageal reflux disease)    • Glaucoma    • Hemorrhoids     without bleeding   • Insomnia    • Kidney stone    • Kidney stones    • Malignant tumor of prostate (HCC)    • Need for prophylactic vaccination and inoculation against influenza    • Osteoarthritis    • Osteoarthritis of multiple joints    • Pain, lumbar region     Pain radiating to lumbar region of back     • PONV (postoperative nausea and vomiting)    • Postviral fatigue syndrome    • Presence of aortocoronary bypass graft    • Prostate cancer (HCC)    • Pseudomembranous enterocolitis     improved   • Rotator cuff syndrome     right   • Shoulder pain    • SOB (shortness of breath)    • Tenosynovitis    • Thrombocytopenia (HCC)    • Upper respiratory infection      Past Surgical History:   Procedure Laterality Date   • CARDIAC CATHETERIZATION  09/25/2003    Cardiac cath (Normal left ventricular systolic function, EF 60% Multi-vessel coronary artery disease with critical disease noted in the LAD coronary artery, diagonal coronary artery, obtuse marginal coronary and right coronary artery.)   • CARDIAC CATHETERIZATION  05/03/1996    Cardiac cath (Coronary atherosclerotic heart disease. 70% diagonal stenosis. Mild to moderate left anterior descending artery stenosis. Preserved left ventricular function.)   • CARDIAC CATHETERIZATION N/A 2/26/2018    Procedure: Left Heart Cath/ pci if indicated ;  Surgeon: Radha Wilkes MD;  Location: [facility] CATH INVASIVE LOCATION;  Service:    • CATARACT EXTRACTION Bilateral    • CATARACT EXTRACTION  10/04/2011    Remove cataract, insert lens (Right)   • CATARACT EXTRACTION  08/23/2011    Remove cataract, insert lens (left)   • COLONOSCOPY     • COLONOSCOPY      Colon endoscopy 95936 (Rectal bleeding. Radiation proctitis w/bleeding. An abnormal CT of transverse colon due to mucosal ischemic colitis, that now is healed. Internal hemorrhoids w/possible bleeding.)   • COLONOSCOPY  05/10/2011    Colon  endoscopy 56419 (Single sessile polyp found in transverse colon and sigmoid colon ,removed by cold biopsy polypectomy.divertic. found in sigmoid colon,descending colon. Friability/capillary friability in rectum sigmoid due to prior radiation.Inter. hem. Grade 1)   • COLONOSCOPY  05/02/2007    Colon endoscopy 37580 (Colon polyp. Diverticulosis without bleeding. Internal hemorrhoids without bleeding.)   • CORONARY ARTERY BYPASS GRAFT  2002   • CYSTOSCOPY  01/13/1995    Cystoscopy, stone removal (Right ureteroscopic lasertripsy.)   • CYSTOSCOPY Right 1/23/2019    Procedure: CYSTOSCOPY, RIGHT STENT REMOVAL;  Surgeon: Nirmal Gaines MD;  Location: [facility];  Service: Urology   • CYSTOSCOPY, URETEROSCOPY, RETROGRADE PYELOGRAM, STENT INSERTION N/A 8/28/2017    Procedure: URETEROSCOPY LASER LITHOTRIPSY WITH STENT INSERTION AND RETROGRADE PYELOGRAM ;  Surgeon: Anna M. D'Amico, MD;  Location: [facility];  Service:    • CYSTOSCOPY, URETEROSCOPY, RETROGRADE PYELOGRAM, STENT INSERTION Right 1/11/2019    Procedure: CYSTOSCOPY URETEROSCOPY RETROGRADE PYELOGRAM HOLMIUM LASER STENT INSERTION;  Surgeon: Nirmal Gaines MD;  Location: [facility];  Service: Urology   • EPIDURAL  12/03/2014    Therapeutic/diag injection (Lumbar transforaminal epidural steroid injection, L5-S1, left side.)   • EPIDURAL  10/22/2014    Therapeutic/diag injection (Lumbar transforaminal epidural steroid injection L5-S1 left side.)   • EPIDURAL  08/07/2014    Therapeutic/diag injection (Lumbar transforaminal epidural steroid injection.)   • EXCISION LESION  05/13/1999    REMOVE EAR LESION 40178 (1.3 cm basal cell carcinoma, right preauricular area. Excision)   • EXCISION LESION  03/13/2001    REMOVE LESION NECK/CHEST 56869 (Excision of irritated seborrheic keratosis, anterior neck, 1.1 cm and deep lipoma, posterior neck, 3.5 cm)   • INJECTION OF MEDICATION  11/15/2012    Kenalog (4)      • JOINT REPLACEMENT  2015    partial   • KNEE ARTHROSCOPY   01/17/2007    Knee arthroscopy, surgery (Arthroscopy with medial and lateral meniscectomies, right knee.)   • KNEE SURGERY  11/04/2015    Knee Surgery (Right unicompartmental arthroplasty.)   • LUMBAR LAMINECTOMY N/A 2/7/2017    Procedure: LUMBAR LAMINECTOMY LUMBAR THREE-FOUR, LUMBAR FOUR-FIVE, INTERLAMINAR DISTRACTION ;  Surgeon: Lucio Mayo MD;  Location: [facility];  Service:    • NOSE SURGERY     • OTHER SURGICAL HISTORY      EXTENDED VISUAL FIELDS STUDY 90260 (Borderline glaucoma) (3): 03/19/2015, 04/09/2014, 03/27/2013   • OTHER SURGICAL HISTORY  10/29/2003    Heart revascularize (TMR) (Directmyocardial revascularization times four; LIMA to LAD SVG to DARIUSZ SVG to OM1, SVG to RCA)   • OTHER SURGICAL HISTORY      OCT DISC NFL 37189 (Borderline glaucoma)  (3): 09/24/2015, 08/13/2014, 07/29/2013   • PROSTATECTOMY     • SEPTORHINOPLASTY  11/16/1989    Nasal surgery procedure (Septorhinoplasty with reconstruction of the nasal pyramid with a iliac crest graft, rhinoplasty was performed with external approach.)   • SHOULDER ARTHROSCOPY  07/24/2013    Arthroscopy of right shoulder with rotator repair, Carla procedure, Biceps tenotomy, subacromial decompression.   • SHOULDER SURGERY  11/01/2005    Shoulder surgery procedure (Decompression, subacromial, explore of the rotator cuff, no tear found nor repaired, acromioplasty of the left shoulder and AC shoulder joint resection.)      General Information     Row Name 11/10/22 0805          Physical Therapy Time and Intention    Document Type evaluation  -LR     Mode of Treatment co-treatment;physical therapy;occupational therapy  -LR     Row Name 11/10/22 0805          General Information    Patient Profile Reviewed yes  -LR     Prior Level of Function independent:;all household mobility;gait;transfer  -LR     Existing Precautions/Restrictions fall  -LR     Barriers to Rehab medically complex  -LR     Row Name 11/10/22 0805          Living Environment     People in Home spouse  -LR     Row Name 11/10/22 0805          Home Main Entrance    Number of Stairs, Main Entrance one  -LR     Stair Railings, Main Entrance none  -LR     Row Name 11/10/22 0805          Stairs Within Home, Primary    Stairs, Within Home, Primary uses FWW. Tub shower with grab bars, regular toilet  -LR     Row Name 11/10/22 0805          Cognition    Orientation Status (Cognition) oriented x 4  -LR     Row Name 11/10/22 0805          Safety Issues, Functional Mobility    Safety Issues Affecting Function (Mobility) ability to follow commands;safety precaution awareness;safety precautions follow-through/compliance;awareness of need for assistance;at risk behavior observed;insight into deficits/self-awareness;judgment;positioning of assistive device  -LR     Impairments Affecting Function (Mobility) balance;endurance/activity tolerance;grasp;pain;strength;shortness of breath  -LR           User Key  (r) = Recorded By, (t) = Taken By, (c) = Cosigned By    Initials Name Provider Type    LR Elkin Chavez Physical Therapist               Mobility     Row Name 11/10/22 0805          Bed Mobility    Bed Mobility supine-sit;sit-supine  -LR     Supine-Sit [place] (Bed Mobility) contact guard  -LR     Sit-Supine [place] (Bed Mobility) moderate assist (50% patient effort)  -LR     Assistive Device (Bed Mobility) bed rails;draw sheet  -LR     Row Name 11/10/22 0805          Sit-Stand Transfer    Sit-Stand [place] (Transfers) contact guard  -LR     Assistive Device (Sit-Stand Transfers) walker, front-wheeled  -LR     Row Name 11/10/22 0805          Gait/Stairs (Locomotion)    [place] Level (Gait) contact guard  -LR     Assistive Device (Gait) walker, front-wheeled  -LR     Distance in Feet (Gait) 3'x1 laterally  -LR     Deviations/Abnormal Patterns (Gait) festinating/shuffling;gait speed decreased;rafiq decreased  -LR           User Key  (r) = Recorded By, (t) = Taken By, (c) = Cosigned  By    Initials Name Provider Type    LR Elkin Chavez Physical Therapist               Obj/Interventions     Row Name 11/10/22 0805          Range of Motion Comprehensive    General Range of Motion no range of motion deficits identified  -     Row Name 11/10/22 0805          Strength Comprehensive (MMT)    Comment, General Manual Muscle Testing (MMT) Assessment BLE grossly 5/5, except Bilateraly hip 4/5  -LR     Row Name 11/10/22 0805          Sensory Assessment (Somatosensory)    Sensory Assessment (Somatosensory) sensation intact;LE sensation intact  -           User Key  (r) = Recorded By, (t) = Taken By, (c) = Cosigned By    Initials Name Provider Type    LR Elkin Chavez Physical Therapist               Goals/Plan     Row Name 11/10/22 0804          Bed Mobility Goal 1 (PT)    Activity/Assistive Device (Bed Mobility Goal 1, PT) sit to supine;supine to sit  -LR     [place] Level/Cues Needed (Bed Mobility Goal 1, PT) modified independence  -LR (r) [place] (t) LR (c)     Time Frame (Bed Mobility Goal 1, PT) by discharge  -LR (r) [place] (t) LR (c)     Row Name 11/10/22 0804          Transfer Goal 1 (PT)    Activity/Assistive Device (Transfer Goal 1, PT) walker, rolling;bed-to-chair/chair-to-bed;sit-to-stand/stand-to-sit  -LR     [place] Level/Cues Needed (Transfer Goal 1, PT) modified independence  -LR (r) [place] (t) LR (c)     Time Frame (Transfer Goal 1, PT) by discharge  -LR (r) [place] (t) LR (c)     [place] Name 11/10/22 0804          Gait Training Goal 1 (PT)    Activity/Assistive Device (Gait Training Goal 1, PT) assistive device use;gait (walking locomotion)  -LR     [place] Level (Gait Training Goal 1, PT) modified independence  -LR (r) [place] (t) LR (c)     Distance (Gait Training Goal 1, PT) 50'x2  -LR     Time Frame (Gait Training Goal 1, PT) by discharge  -LR (r) [place] (t) LR (c)     Progress/Outcome (Gait Training Goal 1, PT) goal not met  -LR     Row Name 11/10/22 0804          Therapy Assessment/Plan (PT)     Planned Therapy Interventions (PT) balance training;bed mobility training;gait training;home exercise program;joint mobilization;lumbar stabilization;manual therapy techniques;neuromuscular re-education;orthotic fitting/training;patient/family education;postural re-education;ROM (range of motion);stair training;strengthening;stretching;transfer training;wound care  -LR (r) [place] (t) LR (c)           User Key  (r) = Recorded By, (t) = Taken By, (c) = Cosigned By    Initials Name Provider Type    LR Elkin Chavez Physical Therapist    Antwon Brasher, PT Student PT Student               Clinical Impression     Row Name 11/10/22 0804          Pain    Pretreatment Pain Rating 10/10  -LR (r) [place] (t) LR (c)     Posttreatment Pain Rating 10/10  -LR (r) [place] (t) LR (c)     Pain Location - Side/Orientation Bilateral  -LR (r) [place] (t) LR (c)     Pain Location lower  -LR (r) [place] (t) LR (c)     Pain Location - back  -LR (r) [place] (t) LR (c)     Pain Intervention(s) Rest;Repositioned  -LR     Row Name 11/10/22 0804          Plan of Care Review    Plan of Care Reviewed With patient  -LR     Outcome Evaluation PT eval completed. Patient pleasant but inconsisitent about home much pain he normally has in his low back. CGA for supine>Sit transfer ModAx1 sit>supine with HOB down and bed rails. Attempted log roll technique to reduced any low back pain. CGA for sit<>stand transfer with RW. CGA for ambulation 3'x1 with RW secondary to patient reporting increase low back pain with standing. Upon further questioning patient always has increased low back pain with standing. Anticipate home with assist and HHPT.  -LR     Row Name 11/10/22 0804          Therapy Assessment/Plan (PT)    Patient/Family Therapy Goals Statement (PT) Patient wants to return home.  -LR     Rehab Potential (PT) good, to achieve stated therapy goals  -LR     Criteria for Skilled Interventions Met (PT) yes;meets criteria;skilled treatment is necessary  -LR     Therapy  Frequency (PT) other (see comments)  3-7d/week  -LR     Predicted Duration of Therapy Intervention (PT) Until d/c or until all goals met  -LR     Row Name 11/10/22 0804          Vital Signs    Pre Systolic BP Rehab 146  -LR     Pre Treatment Diastolic BP 72  -LR     Pretreatment Heart Rate (beats/min) 96  -LR     Pre SpO2 (%) 96  -LR     O2 Delivery Pre Treatment supplemental O2  2L  -LR     Pre Patient Position Supine  -LR     Intra Patient Position Sitting  -LR     Post Patient Position Supine  -LR     Row Name 11/10/22 0804          Positioning and Restraints    Pre-Treatment Position in bed  -LR     Post Treatment Position bed  -LR     In Bed notified nsg;call light within reach;encouraged to call for assist;exit alarm on  -LR           User Key  (r) = Recorded By, (t) = Taken By, (c) = Cosigned By    Initials Name Provider Type    LR Elkin Chavez Physical Therapist    Antwon Brasher, PT Student PT Student               Outcome Measures     Row Name 11/10/22 0805          How much help from another person do you currently need...    Turning from your back to your side while in flat bed without using bedrails? 3  -LR     Moving from lying on back to sitting on the side of a flat bed without bedrails? 2  -LR     Moving to and from a bed to a chair (including a wheelchair)? 3  -LR     Standing up from a chair using your arms (e.g., wheelchair, bedside chair)? 3  -LR     Climbing 3-5 steps with a railing? 2  -LR     To walk in hospital room? 3  -LR     AM-PAC 6 Clicks Score (PT) 16  -LR     Highest level of mobility 5 --> Static standing  -LR     Row Name 11/10/22 0806 11/10/22 0805       Functional Assessment    Outcome Measure Options AM-PAC 6 Clicks Daily Activity (OT)  -SA AM-PAC 6 Clicks Basic Mobility (PT)  -LR          User Key  (r) = Recorded By, (t) = Taken By, (c) = Cosigned By    Initials Name Provider Type    LR Elkin Chavez Physical Therapist    Reena Hull OT Occupational Therapist                              Physical Therapy Education     Title: PT OT SLP Therapies (In Progress)     Topic: Physical Therapy (Done)     Point: Mobility training (Done)     Learning Progress Summary           Patient Acceptance, E,TB, VU by LR at 11/10/2022 1112    Comment: Educated on PT POC and goals.                   Point: Home exercise program (Done)     Learning Progress Summary           Patient Acceptance, E,TB, VU by LR at 11/10/2022 1112    Comment: Educated on PT POC and goals.                   Point: Body mechanics (Done)     Learning Progress Summary           Patient Acceptance, E,TB, VU by LR at 11/10/2022 1112    Comment: Educated on PT POC and goals.                   Point: Precautions (Done)     Learning Progress Summary           Patient Acceptance, E,TB, VU by LR at 11/10/2022 1112    Comment: Educated on PT POC and goals.                               User Key     Initials Effective Dates Name Provider Type Discipline    LR 06/16/21 -  Elkin Chavez Physical Therapist PT              PT Recommendation and Plan     Plan of Care Reviewed With: patient  Outcome Evaluation: PT eval completed. Patient pleasant but inconsisitent about home much pain he normally has in his low back. CGA for supine>Sit transfer ModAx1 sit>supine with HOB down and bed rails. Attempted log roll technique to reduced any low back pain. CGA for sit<>stand transfer with RW. CGA for ambulation 3'x1 with RW secondary to patient reporting increase low back pain with standing. Upon further questioning patient always has increased low back pain with standing. Anticipate home with assist and HHPT.     Time Calculation:    PT Charges     Row Name 11/10/22 1112             Time Calculation    Start Time 0805  -LR      Stop Time 0845  -LR      Time Calculation (min) 40 min  -LR      PT Received On 11/10/22  -LR      PT Goal Re-Cert Due Date 11/23/22  -LR         Untimed Charges    PT Eval/Re-eval Minutes 40  -LR         Total  Minutes    Untimed Charges Total Minutes 40  -LR       Total Minutes 40  -LR            User Key  (r) = Recorded By, (t) = Taken By, (c) = Cosigned By    Initials Name Provider Type    LR Elkin Chavez Physical Therapist              Therapy Charges for Today     Code Description Service Date Service Provider Modifiers Qty    [id number] HC PT EVAL MOD COMPLEXITY 3 11/10/2022 Elkin Chavez GP 1          PT G-Codes  Outcome Measure Options: AM-PAC 6 Clicks Daily Activity (OT)  AM-PAC 6 Clicks Score (PT): 16  AM-PAC 6 Clicks Score (OT): 17    Elkin Chavez  11/10/2022

## 2022-11-10 NOTE — H&P
[facility] Medicine Admission      Date of Admission: 11/9/2022      Primary Care Physician: Johnathon Shaikh MD      Chief Complaint: Fall    HPI:    ANA is a 82-year-old male with past medical history of coronary artery disease, CABG quadruple, diabetes mellitus type 2 hypercholesterolemia, hypertension, chronic anemia with prior history of packed red blood cell transfusion,, history of admission in October 2022 to hospital for for syncope, acute renal failure and leukocytosis, presented to ER via paramedics after fall and sustained laceration to scalp.  He underwent  stapling Surgicel placement and pressure dressing to laceration of forehead.  Per ED attending patient had significant bleeding at the site.  1 unit packed red blood cells was ordered.  Hospitalist service was called for observation of the patient.    Was seen and examined in ED room 20.  His wife Julissa at bedside.  Patient reports he was getting into the house through back to when he lost his balance and fell from 1 step.  He hit his head with subsequent laceration and bleeding from his a scalp.  He denies any loss of consciousness, neck trauma with above fall.  He denies any prodromal sign prior to his fall.  He is resting currently in bed and denies any other complaint.  He denies any double vision blurred vision confusion disorientation neck pain chest pain shortness of breath dizziness lightheadedness palpitation nausea vomiting back pain abdominal pain constipation diarrhea.    Concurrent Medical History:  has a past medical history of Acute bacterial sinusitis, Acute bronchitis, Acute frontal sinusitis, Acute maxillary sinusitis, Acute pharyngitis, Allergic rhinitis, Allergic rhinitis due to pollen, Anxiety, Artificial lens present, Asthma, Backache, Borderline glaucoma, C. difficile diarrhea (2014), Chronic laryngitis, Chronic rhinitis, Coronary artery disease, Cough, Degeneration of lumbar  intervertebral disc, Degenerative joint disease involving multiple joints, Diabetes mellitus (HCC), Diverticular disease of colon, Dizziness and giddiness, Elevated cholesterol, Erectile dysfunction, Essential hypertension, Generalized anxiety disorder, GERD (gastroesophageal reflux disease), Glaucoma, Hemorrhoids, Insomnia, Kidney stone, Kidney stones, Malignant tumor of prostate (HCC), Need for prophylactic vaccination and inoculation against influenza, Osteoarthritis, Osteoarthritis of multiple joints, Pain, lumbar region, PONV (postoperative nausea and vomiting), Postviral fatigue syndrome, Presence of aortocoronary bypass graft, Prostate cancer (HCC), Pseudomembranous enterocolitis, Rotator cuff syndrome, Shoulder pain, SOB (shortness of breath), Tenosynovitis, Thrombocytopenia (HCC), and Upper respiratory infection.    Past Surgical History:  has a past surgical history that includes Cataract extraction (Bilateral); Nose surgery; Prostatectomy; Colonoscopy; Shoulder arthroscopy (07/24/2013); Cardiac catheterization (09/25/2003); Cardiac catheterization (05/03/1996); Colonoscopy; Colonoscopy (05/10/2011); Colonoscopy (05/02/2007); Cystoscopy (01/13/1995); Other surgical history; Other surgical history (10/29/2003); Injection of Medication (11/15/2012); Knee Arthroscopy (01/17/2007); Knee surgery (11/04/2015); Septorhinoplasty (11/16/1989); Other surgical history; Cataract Extraction (10/04/2011); Cataract Extraction (08/23/2011); Excision Lesion (05/13/1999); Excision Lesion (03/13/2001); Shoulder surgery (11/01/2005); Epidural (12/03/2014); Epidural (10/22/2014); Epidural (08/07/2014); Coronary artery bypass graft (2002); Joint replacement (2015); Lumbar laminectomy (N/A, 2/7/2017); cystoscopy, ureteroscopy, retrograde pyelogram, stent insertion (N/A, 8/28/2017); Cardiac catheterization (N/A, 2/26/2018); cystoscopy, ureteroscopy, retrograde pyelogram, stent insertion (Right, 1/11/2019); and Cystoscopy (Right,  1/23/2019).    Family History: family history includes Arthritis in his mother; Cancer in an other family member; Heart disease in his mother and another family member; Hypertension in his mother and another family member.     Social History:  reports that he quit smoking about 2 years ago. His smoking use included cigarettes. He started smoking about 62 years ago. He has a 20.00 pack-year smoking history. He has never used smokeless tobacco. He reports that he does not currently use alcohol. He reports that he does not use drugs.    Allergies:   Allergies   Allergen Reactions   • Molds & Smuts Other (See Comments)     Sinus infection   • Hydrocodone-Acetaminophen Itching       Medications:   Prior to Admission medications    Medication Sig Start Date End Date Taking? Authorizing Provider   acetaminophen (Tylenol) 325 MG tablet Take 650 mg by mouth Every 4 (Four) Hours As Needed for Mild Pain. 10/30/22   Johnathon Shaikh MD   albuterol sulfate HFA (Ventolin HFA) 108 (90 Base) MCG/ACT inhaler Inhale 2 puffs Every 4 (Four) Hours As Needed for Wheezing or Shortness of Air. 3/29/22   Katherin Case DO   atorvastatin (LIPITOR) 40 MG tablet Take 1 tablet by mouth Daily. 10/6/21   Radha Wilkes MD   budesonide-formoterol (SYMBICORT) 160-4.5 MCG/ACT inhaler Inhale 2 puffs 2 (Two) Times a Day. 4/26/22   Katherin Case DO   carvedilol (COREG) 3.125 MG tablet Take 1 tablet by mouth Every 12 (Twelve) Hours. 1/19/22   Radha Wilkes MD   clopidogrel (PLAVIX) 75 MG tablet Take 1 tablet by mouth Daily. 1/19/22   Radha Wilkes MD   Cyanocobalamin (VITAMIN B 12 PO) Take 500 mcg by mouth Daily. Three times weekly, Vicky Song MD   dilTIAZem CD (CARDIZEM CD) 180 MG 24 hr capsule Take 1 capsule by mouth Daily. 10/6/21   Radha Wilkes MD   FeroSul 325 (65 Fe) MG tablet TAKE 1 TABLET EVERY DAY WITH BREAKFAST 4/7/22   Josef Covington MD   fluticasone (FLONASE)  50 MCG/ACT nasal spray 2 sprays into each nostril Every Night. 2/14/18   Camryn Koehler MD   ipratropium-albuterol (DUO-NEB) 0.5-2.5 mg/3 ml nebulizer Inhale the contents of 1 vial by nebulization Every 4 (Four) Hours As Needed for Wheezing for up to 30 days. 2/10/21 3/12/21  Akbar Zapien MD   isosorbide mononitrate (IMDUR) 30 MG 24 hr tablet Take 1 tablet by mouth Daily. 2/28/18   Julio Cesar Moss APRN   losartan (COZAAR) 50 MG tablet Take 1 tablet by mouth Every Night. 1/19/22   Radha Wilkes MD   magnesium oxide (MAG-OX) 400 MG tablet Take 1 tablet by mouth 2 (Two) Times a Day. 4/15/21   ProviderVicky MD   metFORMIN (GLUCOPHAGE) 1000 MG tablet Take 1 tablet by mouth 2 (Two) Times a Day With Meals. 2/28/18   Julio Cesar Moss APRN   nitroglycerin (NITROSTAT) 0.4 MG SL tablet Place 1 tablet under the tongue Every 5 (Five) Minutes As Needed for Chest Pain. Take no more than 3 doses in 15 minutes. 2/27/18   Julio Cesar Moss APRN   omeprazole (priLOSEC) 20 MG capsule Take 20 mg by mouth Daily. 10/30/22   Johnathon Shaikh MD   oxybutynin XL (DITROPAN-XL) 5 MG 24 hr tablet Take 5 mg by mouth Daily.    Provider, MD Vicky   pantoprazole (PROTONIX) 40 MG EC tablet Take 1 tablet by mouth Every Morning. 10/29/22   Andreia Ralph PA-C   pregabalin (LYRICA) 150 MG capsule 3 (Three) Times a Day.    Emergency, Nurse Emily, RN   pregabalin (LYRICA) 75 MG capsule Take 1 capsule by mouth 2 (Two) Times a Day for 30 days.  Patient taking differently: Take 2 capsules by mouth Daily. 2/12/21 7/12/21  Akbar Zapien MD   traMADol (ULTRAM) 50 MG tablet Take 1 tablet by mouth 2 (Two) Times a Day.    Provider, MD Vicky   travoprost, BAK free, (TRAVATAN) 0.004 % solution ophthalmic solution Administer 1 drop to both eyes Daily. 10/30/22   Provider, MD Vicky       Review of Systems:  Review of Systems   Constitutional: Negative for chills, diaphoresis, fatigue and  fever.   HENT: Negative for congestion, dental problem, ear pain, facial swelling, rhinorrhea and sinus pressure.    Eyes: Negative for photophobia, discharge, redness, itching and visual disturbance.   Respiratory: Negative for apnea, cough, choking, chest tightness, shortness of breath, wheezing and stridor.    Cardiovascular: Negative for chest pain, palpitations and leg swelling.   Gastrointestinal: Negative for abdominal distention, abdominal pain, anal bleeding, blood in stool, diarrhea, nausea, rectal pain and vomiting.   Endocrine: Negative for cold intolerance, heat intolerance, polydipsia, polyphagia and polyuria.   Genitourinary: Negative for difficulty urinating, flank pain, frequency, hematuria and urgency.   Musculoskeletal: Negative for arthralgias, back pain, joint swelling and myalgias.   Skin: Positive for wound (posterior scalp ). Negative for pallor and rash.   Allergic/Immunologic: Negative for environmental allergies and immunocompromised state.   Neurological: Negative for dizziness, tremors, seizures, facial asymmetry, speech difficulty, weakness, light-headedness, numbness and headaches.   Hematological: Negative for adenopathy. Does not bruise/bleed easily.   Psychiatric/Behavioral: Negative for agitation, behavioral problems and hallucinations. The patient is not nervous/anxious.        Otherwise complete ROS is negative except as mentioned above.    Physical Exam:   Temp:  [97.3 °F (36.3 °C)] 97.3 °F (36.3 °C)  Heart Rate:  [62-66] 66  Resp:  [18] 18  BP: (123-140)/(60-79) 140/62  Physical Exam  Constitutional:       General: He is not in acute distress.     Appearance: He is normal weight. He is not ill-appearing, toxic-appearing or diaphoretic.   HENT:      Head: Normocephalic and atraumatic.      Comments: Pressure dressing present to head, examination deferred limited     Right Ear: External ear normal.      Left Ear: External ear normal.      Nose: Nose normal.      Mouth/Throat:       Mouth: Mucous membranes are moist.      Pharynx: Oropharynx is clear.   Eyes:      Extraocular Movements: Extraocular movements intact.      Conjunctiva/sclera: Conjunctivae normal.      Pupils: Pupils are equal, round, and reactive to light.   Cardiovascular:      Rate and Rhythm: Normal rate and regular rhythm.      Heart sounds:     No friction rub. No gallop.   Pulmonary:      Effort: No respiratory distress.      Breath sounds: No stridor. No wheezing or rales.   Chest:      Chest wall: No tenderness.   Abdominal:      General: Abdomen is flat. There is no distension.      Palpations: Abdomen is soft.      Tenderness: There is no abdominal tenderness. There is no guarding or rebound.   Musculoskeletal:         General: No swelling or tenderness.      Cervical back: No rigidity or tenderness.      Right lower leg: No edema.      Left lower leg: No edema.   Lymphadenopathy:      Cervical: No cervical adenopathy.   Skin:     General: Skin is warm and dry.      Coloration: Skin is not jaundiced.      Findings: No erythema.   Neurological:      Mental Status: He is alert and oriented to person, place, and time. Mental status is at baseline.      Sensory: No sensory deficit.      Motor: No weakness.      Coordination: Coordination normal.   Psychiatric:         Mood and Affect: Mood normal.         Behavior: Behavior normal.         Judgment: Judgment normal.           Results Reviewed:  I have personally reviewed current lab, radiology, and data and agree with results.  Lab Results (last 24 hours)     Procedure Component Value Units Date/Time    Protime-INR [[id number]] Collected: 11/09/22 2236    Specimen: Blood Updated: 11/09/22 2316    Comprehensive Metabolic Panel [[id number]] Collected: 11/09/22 2236    Specimen: Blood Updated: 11/09/22 2316    Lavender Top [[id number]] Collected: 11/09/22 2236    Specimen: Blood Updated: 11/09/22 2236    Gold Top - SST [[id number]] Collected: 11/09/22 2236    Specimen: Blood  Updated: 11/09/22 2236    Green Top (Gel) [[id number]] Collected: 11/09/22 2236    Specimen: Blood Updated: 11/09/22 2236    Extra Tubes [[id number]] Collected: 11/09/22 2236    Specimen: Blood Updated: 11/09/22 2236    Narrative:      The following orders were created for panel order Extra Tubes.  Procedure                               Abnormality         Status                     ---------                               -----------         ------                     Lavender Top[[id number]]                                     In process                 Gold Top - SST[[id number]]                                   In process                 Green Top (Gel)[[id number]]                                  In process                 Light Blue Top[[id number]]                                   In process                   Please view results for these tests on the individual orders.    Light Blue Top [[id number]] Collected: 11/09/22 2236    Specimen: Blood Updated: 11/09/22 2236    Extra Tubes [[id number]] Collected: 11/09/22 2123    Specimen: Blood, Venous Line Updated: 11/09/22 2230    Narrative:      The following orders were created for panel order Extra Tubes.  Procedure                               Abnormality         Status                     ---------                               -----------         ------                     Green Top (Gel)[[id number]]                                  Final result                 Please view results for these tests on the individual orders.    Green Top (Gel) [[id number]] Collected: 11/09/22 2123    Specimen: Blood Updated: 11/09/22 2230     Extra Tube Hold for add-ons.     Comment: Auto resulted.       CBC & Differential [[id number]]  (Abnormal) Collected: 11/09/22 2119    Specimen: Blood Updated: 11/09/22 2134    Narrative:      The following orders were created for panel order CBC & Differential.  Procedure                               Abnormality         Status                      ---------                               -----------         ------                     CBC Auto Differential[[id number]]        Abnormal            Final result               Scan Slide[[id number]]                                                                    Please view results for these tests on the individual orders.    CBC Auto Differential [[id number]]  (Abnormal) Collected: 11/09/22 2119    Specimen: Blood Updated: 11/09/22 2133     WBC 16.70 10*3/mm3      RBC 2.32 10*6/mm3      Hemoglobin 6.9 g/dL      Hematocrit 23.0 %      MCV 99.1 fL      MCH 29.7 pg      MCHC 30.0 g/dL      RDW 30.5 %      RDW-.4 fl      MPV 10.9 fL      Platelets 316 10*3/mm3     Manual Differential [[id number]] Collected: 11/09/22 2119    Specimen: Blood Updated: 11/09/22 2132        Imaging Results (Last 24 Hours)     Procedure Component Value Units Date/Time    CT Cervical Spine Without Contrast [[id number]] Collected: 11/09/22 2020     Updated: 11/09/22 2144    Narrative:      CT CERVICAL SPINE WO CONTRAST  RPID: CT CERVICAL SPINE WITHOUT IV CONTRAST    HISTORY: 82 years old Male with fall,    COMPARISON: None.    TECHNIQUE: CT scan of the cervical spine was performed without   IV contrast.   RADIATION DOSE REDUCTION: This exam was performed according to  our departmental dose-optimization program, including automated  exposure control, adjustment of the mA and kV according to  patient size, and iterative reconstruction technique if  available.    FINDINGS:   There is diffuse cervical spondylosis. Multilevel disc bulging is  seen particularly at C5-6 and C6-7. The central canal appears  narrowed at C5-6 secondary to degenerative disc disease.  Soft tissue swelling around the dens with partial calcifications,  this may be CPPD related.      Impression:        1. No fracture.  2. Advanced diffuse cervical spondylosis.  3. A stenotic central canal is suspected at C5-6 secondary to  degenerative disc disease. MR may be useful in  assessing this  further.    Electronically signed by:  Krishna Duarte MD  11/9/2022 9:42 PM CST  Workstation: [id number]    CT Head Without Contrast [[id number]] Collected: 11/09/22 2020     Updated: 11/09/22 2139    Narrative:       CT HEAD WO CONTRAST  RPID: CT HEAD WITHOUT IV CONTRAST    CLINICAL HISTORY: 82 years old Male with fall ,    COMPARISON: 10/25/2022  TECHNIQUE: Contiguous axial images of the brain were obtained  without intravenous contrast. From this data, sagittal and  coronal reconstructed images were obtained.  This exam was performed according to our departmental  dose-optimization program, which includes automated exposure  control, adjustment of the mA and kV according to patient size,  and iterative reconstruction technique, if available.      FINDINGS:   The midline structures are preserved. The ventricles are normal.  No cerebral mass effects are observed. Gray-white matter  differentiation is normal. Stable global atrophy. Low attenuation  in the periventricular white matter and centrum semiovale is  observed, which is most likely from stable mild microvascular  chronic ischemia. This is frequently seen in patients with  diabetes or hypertension.    The skull has a normal appearance.   There is a scalp hematoma in the high left parietal region with  skin staples present.      Impression:        No acute intracranial abnormalities are seen.      Electronically signed by:  Krishna Duarte MD  11/9/2022 9:37 PM CST  Workstation: [id number]            Assessment:    Active Hospital Problems    Diagnosis    • **Injury of head, initial encounter      # Mechanical fall   # Injury of head by fall with scalp laceration, status post stapling and Surgicel and pressure dressing placement in ED  # Hyperkalemia  # Acute renal failure reviewed potential chronic kidney disease a stage II-III  # Leukocytosis reactive   # Acute blood loss anemia by bleeding from scalp laceration  # Chronic anemia with prior history  of packed red blood cell transfusion   # Coronary artery disease with history of CABG   # Diabetes mellitus type 2   # Hypertension   # Hyperlipidemia           Plan:    Place on telemetry  Obtain further laboratory work-up  Serial neuro check  1 unit packed red blood cells was ordered in ED.  We will see response of H&H to packed red blood cell transfusion.  I discussed with the patient risks and benefits of packed red blood cell transfusion.  Patient agreed with packed red blood cell transfusion.  Hold nephrotoxic medication.   Place on low rate IV fluid.  See response of hyperkalemia to hyperkalemia treatment protocol (sodium bicarb, insulin with glucose, calcium gluconate, IV fluid, Lokelma)  5 mg oral vitamin K from mildly elevated INR.  PT OT  Comorbidities, chronic medical problems will be treated appropriately  Reconcile home medication and continue with essential home medication.  Hold any antiplatelet therapy at this time considering scalp laceration and bleeding to minimize risk of further bleeding.  PT OT.  Please see orders for comprehensive plan.    I confirmed that the patient's Advance Care Plan is present, code status is documented, or surrogate decision maker is listed in the patient's medical record.   Discussed with the patient in presence of his wife advanced directive CODE STATUS.  Patient wished to be DNR/DNI.  His wife agree with him.  He wished that his wife Julissa be his healthcare proxy.  I have utilized all available immediate resources to obtain, update, or review the patient's current medications.     I discussed the patient's findings and my recommendations with: Patient and his wife both agreed with above plan of care.      Saeid Behroozi, MD   11/09/22   23:22 CST

## 2022-11-11 NOTE — PROGRESS NOTES
[facility] Medicine Services  INPATIENT PROGRESS NOTE    Length of Stay: 0  Date of Admission: 11/9/2022  Primary Care Physician: Johnathon Shaikh MD    Subjective   Chief Complaint: weakness, shortness of breath   HPI:  82 year old male with past medical history of CAD, CABG, type 2 DM, HLD, HTN, chronic anemia who presented on 11/9/22 with complaints of fall with head laceration.  Laceration was repaired via stapling in the ED.  He was found to be anemic with hemoglobin of 6.9 requiring one unit PRBC.  During today's visit, patient reports being short winded and weak.  Wife reports he was slightly confused yesterday, but patient is currently alert and oriented.     Review of Systems   Constitutional: Positive for activity change and fatigue. Negative for chills.   HENT: Negative for congestion, rhinorrhea and sore throat.    Respiratory: Positive for shortness of breath. Negative for cough, chest tightness and wheezing.    Cardiovascular: Negative for chest pain, palpitations and leg swelling.   Gastrointestinal: Negative for abdominal pain, nausea and vomiting.   Musculoskeletal: Negative for back pain and neck pain.   Skin: Positive for pallor.   Neurological: Positive for weakness. Negative for dizziness and headaches.   Psychiatric/Behavioral: Negative for confusion. The patient is not nervous/anxious.         All pertinent negatives and positives are as above. All other systems have been reviewed and are negative unless otherwise stated.     Objective    Temp:  [97.8 °F (36.6 °C)-98 °F (36.7 °C)] 97.8 °F (36.6 °C)  Heart Rate:  [70-88] 88  Resp:  [16] 16  BP: (101-139)/(51-65) 126/60    Physical Exam  Vitals and nursing note reviewed.   Constitutional:       General: He is not in acute distress.     Appearance: Normal appearance. He is ill-appearing.   HENT:      Head: Normocephalic.        Comments: Head laceration intact with staples.      Right Ear: External ear  normal.      Left Ear: External ear normal.      Nose: Nose normal.      Mouth/Throat:      Mouth: Mucous membranes are moist.      Pharynx: Oropharynx is clear.   Eyes:      General: No scleral icterus.        Right eye: No discharge.         Left eye: No discharge.      Conjunctiva/sclera: Conjunctivae normal.   Cardiovascular:      Rate and Rhythm: Regular rhythm.      Heart sounds: Normal heart sounds. No murmur heard.    No friction rub. No gallop.   Pulmonary:      Effort: No accessory muscle usage or respiratory distress.      Breath sounds: Examination of the right-lower field reveals decreased breath sounds. Examination of the left-lower field reveals decreased breath sounds. Decreased breath sounds present. No wheezing, rhonchi or rales.   Abdominal:      General: Bowel sounds are normal. There is no distension.      Palpations: Abdomen is soft.      Tenderness: There is no abdominal tenderness.   Musculoskeletal:         General: No swelling. Normal range of motion.      Cervical back: Normal range of motion and neck supple.   Skin:     General: Skin is warm and dry.      Coloration: Skin is pale.   Neurological:      General: No focal deficit present.      Mental Status: He is alert and oriented to person, place, and time.   Psychiatric:         Mood and Affect: Mood normal.         Behavior: Behavior normal.             Results Review:  I have reviewed the labs, radiology results, and diagnostic studies.    Laboratory Data:   Results from last 7 days   Lab Units 11/10/22  1305 11/10/22  0803 11/09/22  2236   SODIUM mmol/L  --  146* 140   POTASSIUM mmol/L 5.0 5.7* 6.1*   CHLORIDE mmol/L  --  108* 106   CO2 mmol/L  --  29.0 26.0   BUN mg/dL  --  28* 30*   CREATININE mg/dL  --  1.59* 1.75*   GLUCOSE mg/dL  --  108* 119*   CALCIUM mg/dL  --  9.4 9.0   BILIRUBIN mg/dL  --   --  0.4   ALK PHOS U/L  --   --  53   ALT (SGPT) U/L  --   --  10   AST (SGOT) U/L  --   --  18   ANION GAP mmol/L  --  9.0 8.0      Estimated Creatinine Clearance: 32.5 mL/min (A) (by C-G formula based on SCr of 1.59 mg/dL (H)).  Results from last 7 days   Lab Units 11/10/22  0803   MAGNESIUM mg/dL 1.8         Results from last 7 days   Lab Units 11/10/22  0803 11/09/22  2119   WBC 10*3/mm3 16.66* 16.70*   HEMOGLOBIN g/dL 8.2* 6.9*   HEMATOCRIT % 26.7* 23.0*   PLATELETS 10*3/mm3 286 316     Results from last 7 days   Lab Units 11/10/22  0803 11/09/22  2236   INR  1.37* 1.35*       Culture Data:   No results found for: BLOODCX  No results found for: URINECX  No results found for: RESPCX  No results found for: WOUNDCX  No results found for: STOOLCX  No components found for: BODYFLD    Radiology Data:   Imaging Results (Last 24 Hours)     ** No results found for the last 24 hours. **          I have reviewed the patient's current medications.     Assessment/Plan     Active Hospital Problems    Diagnosis    • **Injury of head, initial encounter        Plan:    1. Head laceration: laceration intact with staples and open to air.    2. Chronic Anemia: s/p one unit PRBC.  Recheck H/H.  Currently undergoing workup in outpatient setting with Dr. Covington.  Was scheduled outpatient bone marrow aspiration, but has missed more than one outpatient appointment due to hospitalization and/or weakness,illness.  Patient and wife request consult with Dr. Covington to see if procedure can be arranged as inpatient since patient has had multiple admissions related to falls, syncope, etc due to anemia.   3. Hyperkalemia: improving.  Trended downward from 6.1-5.7-5.0  4. CAD: antiplatelets held due to anemia/head injury.  Continue Lipitor, Imdur, Coreg  5. HTN: continue Coreg.   6. HLD: continue Lipitor.   7. Type 2 DM: Metformin held.  FSBS AC and HS with SSI.   8. Deconditioning: Pt/Ot    Discharge Planning: I expect patient to be discharged to home in 1-2 days.    I confirmed that the patient's Advance Care Plan is present, code status is documented, or surrogate decision  maker is listed in the patient's medical record.          This document has been electronically signed by SERA Vieira on November 11, 2022 10:02 CST

## 2022-11-11 NOTE — THERAPY TREATMENT NOTE
Acute Care - Physical Therapy Treatment Note  [facility]     Patient Name: Vishnu Waller  : 1940  MRN: [id number]  Today's Date: 2022      Visit Dx:     ICD-10-CM ICD-9-CM   1. Injury of head, initial encounter  S09.90XA 959.01   2. Blood loss anemia  D50.0 280.0   3. Fall, initial encounter  W19.XXXA E888.9   4. Impaired mobility and ADLs  Z74.09 V49.89    Z78.9    5. Impaired functional mobility, balance, gait, and endurance  Z74.09 V49.89     Patient Active Problem List   Diagnosis   • Degeneration of lumbar intervertebral disc   • Low back pain without sciatica   • Neuritis or radiculitis due to rupture of lumbar intervertebral disc   • Borderline glaucoma, open angle with borderline findings   • Pseudophakia   • History of coronary artery bypass surgery   • Essential hypertension   • Mixed hyperlipidemia   • Spinal stenosis of lumbar region   • Degenerative disc disease, lumbar   • Chronic pain of right knee   • History of artificial joint   • B12 deficiency   • Degeneration of intervertebral disc of lumbosacral region   • Personal history of other infectious and parasitic diseases   • Status post lumbar spine operation   • History of pyelonephritis   • History of surgical procedure   • History of repair of rotator cuff   • Encounter for follow-up examination after completed treatment for conditions other than malignant neoplasm   • Encounter for general adult medical examination without abnormal findings   • Osteoarthritis of knee   • Osteoarthritis of multiple joints   • Primary insomnia   • Encounter for screening for malignant neoplasm of colon   • Encounter for screening for malignant neoplasm of prostate   • Type 2 diabetes mellitus, without long-term current use of insulin (HCC)   • Coronary artery disease involving native coronary artery of native heart without angina pectoris   • Dyspnea on exertion   • Presence of aortocoronary bypass graft   • Dizziness and giddiness   •  Postviral fatigue syndrome   • Postviral fatigue syndrome   • Recurrent kidney stones   • Thrombocytopenia ([facility])   • Pyelonephritis   • Chronic non-seasonal allergic rhinitis   • Unstable angina ([facility])   • NSTEMI (non-ST elevated myocardial infarction) ([facility])   • History of PTCA 1   • Medicare annual wellness visit, subsequent   • Thoracic aortic aneurysm without rupture   • Chronic kidney disease, stage 2 (mild)   • Personal history of tobacco use, presenting hazards to health   • Cervical pain (neck)   • Thoracic spine pain   • Calculus of ureter   • Foreign body in bladder and urethra   • Physical deconditioning   • Bilateral hip pain   • Pain of left humerus   • PVD (peripheral vascular disease) ([facility])   • Renal mass   • BPPV (benign paroxysmal positional vertigo)   • Degenerative disc disease, lumbar   • Anemia   • Prostate cancer ([facility])   • Overweight with body mass index (BMI) of 25 to 25.9 in adult   • Elevated PSA   • Abnormal SPEP   • Chronic obstructive pulmonary disease ([facility])   • NAREN (acute kidney injury) ([facility])   • Hyperkalemia   • Syncope   • Weakness   • NAREN (acute kidney injury) ([facility])   • Leukocytosis   • Injury of head, initial encounter     Past Medical History:   Diagnosis Date   • Acute bacterial sinusitis    • Acute bronchitis    • Acute frontal sinusitis    • Acute maxillary sinusitis    • Acute pharyngitis    • Allergic rhinitis    • Allergic rhinitis due to pollen    • Anxiety    • Artificial lens present     in position   • Asthma    • Backache    • Borderline glaucoma    • C. difficile diarrhea 2014   • Chronic laryngitis    • Chronic rhinitis    • Coronary artery disease    • Cough    • Degeneration of lumbar intervertebral disc    • Degenerative joint disease involving multiple joints    • Diabetes mellitus ([facility])    • Diverticular disease of colon    • Dizziness and giddiness    • Elevated cholesterol    • Erectile dysfunction    • Essential hypertension    • Generalized anxiety disorder    •  GERD (gastroesophageal reflux disease)    • Glaucoma    • Hemorrhoids     without bleeding   • Insomnia    • Kidney stone    • Kidney stones    • Malignant tumor of prostate (HCC)    • Need for prophylactic vaccination and inoculation against influenza    • Osteoarthritis    • Osteoarthritis of multiple joints    • Pain, lumbar region     Pain radiating to lumbar region of back     • PONV (postoperative nausea and vomiting)    • Postviral fatigue syndrome    • Presence of aortocoronary bypass graft    • Prostate cancer (HCC)    • Pseudomembranous enterocolitis     improved   • Rotator cuff syndrome     right   • Shoulder pain    • SOB (shortness of breath)    • Tenosynovitis    • Thrombocytopenia (HCC)    • Upper respiratory infection      Past Surgical History:   Procedure Laterality Date   • CARDIAC CATHETERIZATION  09/25/2003    Cardiac cath (Normal left ventricular systolic function, EF 60% Multi-vessel coronary artery disease with critical disease noted in the LAD coronary artery, diagonal coronary artery, obtuse marginal coronary and right coronary artery.)   • CARDIAC CATHETERIZATION  05/03/1996    Cardiac cath (Coronary atherosclerotic heart disease. 70% diagonal stenosis. Mild to moderate left anterior descending artery stenosis. Preserved left ventricular function.)   • CARDIAC CATHETERIZATION N/A 2/26/2018    Procedure: Left Heart Cath/ pci if indicated ;  Surgeon: Radha Wilkes MD;  Location: [facility] INVASIVE LOCATION;  Service:    • CATARACT EXTRACTION Bilateral    • CATARACT EXTRACTION  10/04/2011    Remove cataract, insert lens (Right)   • CATARACT EXTRACTION  08/23/2011    Remove cataract, insert lens (left)   • COLONOSCOPY     • COLONOSCOPY      Colon endoscopy 11358 (Rectal bleeding. Radiation proctitis w/bleeding. An abnormal CT of transverse colon due to mucosal ischemic colitis, that now is healed. Internal hemorrhoids w/possible bleeding.)   • COLONOSCOPY  05/10/2011    Colon  endoscopy 59757 (Single sessile polyp found in transverse colon and sigmoid colon ,removed by cold biopsy polypectomy.divertic. found in sigmoid colon,descending colon. Friability/capillary friability in rectum sigmoid due to prior radiation.Inter. hem. Grade 1)   • COLONOSCOPY  05/02/2007    Colon endoscopy 64617 (Colon polyp. Diverticulosis without bleeding. Internal hemorrhoids without bleeding.)   • CORONARY ARTERY BYPASS GRAFT  2002   • CYSTOSCOPY  01/13/1995    Cystoscopy, stone removal (Right ureteroscopic lasertripsy.)   • CYSTOSCOPY Right 1/23/2019    Procedure: CYSTOSCOPY, RIGHT STENT REMOVAL;  Surgeon: Nirmal Gaines MD;  Location: [facility];  Service: Urology   • CYSTOSCOPY, URETEROSCOPY, RETROGRADE PYELOGRAM, STENT INSERTION N/A 8/28/2017    Procedure: URETEROSCOPY LASER LITHOTRIPSY WITH STENT INSERTION AND RETROGRADE PYELOGRAM ;  Surgeon: Anna M. D'Amico, MD;  Location: [facility];  Service:    • CYSTOSCOPY, URETEROSCOPY, RETROGRADE PYELOGRAM, STENT INSERTION Right 1/11/2019    Procedure: CYSTOSCOPY URETEROSCOPY RETROGRADE PYELOGRAM HOLMIUM LASER STENT INSERTION;  Surgeon: Nirmal Gaines MD;  Location: [facility];  Service: Urology   • EPIDURAL  12/03/2014    Therapeutic/diag injection (Lumbar transforaminal epidural steroid injection, L5-S1, left side.)   • EPIDURAL  10/22/2014    Therapeutic/diag injection (Lumbar transforaminal epidural steroid injection L5-S1 left side.)   • EPIDURAL  08/07/2014    Therapeutic/diag injection (Lumbar transforaminal epidural steroid injection.)   • EXCISION LESION  05/13/1999    REMOVE EAR LESION 23520 (1.3 cm basal cell carcinoma, right preauricular area. Excision)   • EXCISION LESION  03/13/2001    REMOVE LESION NECK/CHEST 40493 (Excision of irritated seborrheic keratosis, anterior neck, 1.1 cm and deep lipoma, posterior neck, 3.5 cm)   • INJECTION OF MEDICATION  11/15/2012    Kenalog (4)      • JOINT REPLACEMENT  2015    partial   • KNEE ARTHROSCOPY   01/17/2007    Knee arthroscopy, surgery (Arthroscopy with medial and lateral meniscectomies, right knee.)   • KNEE SURGERY  11/04/2015    Knee Surgery (Right unicompartmental arthroplasty.)   • LUMBAR LAMINECTOMY N/A 2/7/2017    Procedure: LUMBAR LAMINECTOMY LUMBAR THREE-FOUR, LUMBAR FOUR-FIVE, INTERLAMINAR DISTRACTION ;  Surgeon: Lucio Mayo MD;  Location: [facility];  Service:    • NOSE SURGERY     • OTHER SURGICAL HISTORY      EXTENDED VISUAL FIELDS STUDY 43363 (Borderline glaucoma) (3): 03/19/2015, 04/09/2014, 03/27/2013   • OTHER SURGICAL HISTORY  10/29/2003    Heart revascularize (TMR) (Directmyocardial revascularization times four; LIMA to LAD SVG to DARIUSZ SVG to OM1, SVG to RCA)   • OTHER SURGICAL HISTORY      OCT DISC NFL 34123 (Borderline glaucoma)  (3): 09/24/2015, 08/13/2014, 07/29/2013   • PROSTATECTOMY     • SEPTORHINOPLASTY  11/16/1989    Nasal surgery procedure (Septorhinoplasty with reconstruction of the nasal pyramid with a iliac crest graft, rhinoplasty was performed with external approach.)   • SHOULDER ARTHROSCOPY  07/24/2013    Arthroscopy of right shoulder with rotator repair, Carla procedure, Biceps tenotomy, subacromial decompression.   • SHOULDER SURGERY  11/01/2005    Shoulder surgery procedure (Decompression, subacromial, explore of the rotator cuff, no tear found nor repaired, acromioplasty of the left shoulder and AC shoulder joint resection.)     PT Assessment (last 12 hours)     PT Evaluation and Treatment     Row Name 11/11/22 1054          Physical Therapy Time and Intention    Subjective Information complains of;pain  -TA     Document Type therapy note (daily note)  -TA     Mode of Treatment physical therapy  -TA     Row Name 11/11/22 1051          General Information    Patient Profile Reviewed yes  -TA     Existing Precautions/Restrictions fall  -TA     Row Name 11/11/22 1055          Pain    Pretreatment Pain Rating 8/10  -TA     Posttreatment Pain Rating 10/10   -     Row Name 11/11/22 1055          Cognition    Orientation Status (Cognition) oriented x 4  -TA     Personal Safety Interventions fall prevention program maintained;gait belt;muscle strengthening facilitated;nonskid shoes/slippers when out of bed;supervised activity  -     Row Name 11/11/22 1055          Range of Motion Comprehensive    General Range of Motion no range of motion deficits identified  -     Row Name 11/11/22 1055          Bed Mobility    Bed Mobility supine-sit;sit-supine  -TA     Supine-Sit [place] (Bed Mobility) contact guard  -TA     Sit-Supine [place] (Bed Mobility) standby assist  -TA     Assistive Device (Bed Mobility) bed rails;draw sheet  -     Row Name 11/11/22 1055          Sit-Stand Transfer    Sit-Stand [place] (Transfers) contact guard  -TA     Assistive Device (Sit-Stand Transfers) walker, front-wheeled  -TA     Row Name 11/11/22 1055          Stand-Sit Transfer    Stand-Sit [place] (Transfers) contact guard  -TA     Assistive Device (Stand-Sit Transfers) walker, front-wheeled  -TA     Row Name 11/11/22 1055          Gait/Stairs (Locomotion)    [place] Level (Gait) contact guard  -TA     Assistive Device (Gait) walker, front-wheeled  -TA     Distance in Feet (Gait) 4` & 8`  -TA     Deviations/Abnormal Patterns (Gait) festinating/shuffling;gait speed decreased;rafiq decreased  -     Row Name 11/11/22 1055          Safety Issues, Functional Mobility    Impairments Affecting Function (Mobility) balance;endurance/activity tolerance;grasp;pain;strength;shortness of breath  -[facility] Name 11/11/22 1055          Hip (Therapeutic Exercise)    Hip (Therapeutic Exercise) AROM (active range of motion);isometric exercises  -     Hip AROM (Therapeutic Exercise) bilateral;aBduction;aDduction;supine;5 repetitions  -     Hip Isometrics (Therapeutic Exercise) bilateral;gluteal sets;supine;10 repetitions  -     Row Name 11/11/22 1055          Knee  (Therapeutic Exercise)    Knee (Therapeutic Exercise) AROM (active range of motion);isometric exercises  -TA     Knee AROM (Therapeutic Exercise) bilateral;supine;heel slides;5 repetitions  -TA     Knee Isometrics (Therapeutic Exercise) bilateral;quad sets;supine;10 repetitions  -TA     Row Name 11/11/22 1055          Ankle (Therapeutic Exercise)    Ankle (Therapeutic Exercise) AROM (active range of motion)  -TA     Ankle AROM (Therapeutic Exercise) bilateral;dorsiflexion;plantarflexion;sitting;supine;10 repetitions  -TA     Row Name 11/11/22 1055          Plan of Care Review    Plan of Care Reviewed With patient  -TA     Outcome Evaluation pt sup<>sit with CGA-SBA, sit<>stand with CGA, pt side stepped stepped 4` & 8` with RW & CGA. pt would benefit from 24/7 care & continued PT services  -TA     Row Name 11/11/22 1057          Vital Signs    Pre Systolic BP Rehab 118  -TA     Pre Treatment Diastolic BP 57  -TA     Post Systolic BP Rehab 136  -TA     Post Treatment Diastolic BP 61  -TA     Pretreatment Heart Rate (beats/min) 86  -TA     Posttreatment Heart Rate (beats/min) 88  -TA     Pre SpO2 (%) 93  -TA     O2 Delivery Pre Treatment supplemental O2  -TA     Post SpO2 (%) 92  -TA     O2 Delivery Post Treatment supplemental O2  -TA     Pre Patient Position Supine  -TA     Post Patient Position Supine  -TA     Row Name 11/11/22 1056          Positioning and Restraints    Pre-Treatment Position in bed  -TA     Post Treatment Position bed  -TA     In Bed supine;call light within reach;exit alarm on;with family/caregiver  -TA     Row Name 11/11/22 1059          Therapy Assessment/Plan (PT)    Comment, Therapy Assessment/Plan (PT) continue  -TA           User Key  (r) = Recorded By, (t) = Taken By, (c) = Cosigned By    Initials Name Provider Type    Allie Paniagua, PTA Physical Therapist Assistant                Physical Therapy Education     Title: PT OT SLP Therapies (In Progress)     Topic: Physical Therapy (Done)      Point: Mobility training (Done)     Learning Progress Summary           Patient Acceptance, E,TB, VU by LR at 11/10/2022 1112    Comment: Educated on PT POC and goals.                   Point: Home exercise program (Done)     Learning Progress Summary           Patient Acceptance, E,TB, VU by LR at 11/10/2022 1112    Comment: Educated on PT POC and goals.                   Point: Body mechanics (Done)     Learning Progress Summary           Patient Acceptance, E,TB, VU by LR at 11/10/2022 1112    Comment: Educated on PT POC and goals.                   Point: Precautions (Done)     Learning Progress Summary           Patient Acceptance, E,TB, VU by LR at 11/10/2022 1112    Comment: Educated on PT POC and goals.                               User Key     Initials Effective Dates Name Provider Type Discipline    LR 06/16/21 -  Elkin Chavez Physical Therapist PT              PT Recommendation and Plan     Plan of Care Reviewed With: patient  Outcome Evaluation: pt sup<>sit with CGA-SBA, sit<>stand with CGA, pt side stepped stepped 4` & 8` with RW & CGA. pt would benefit from 24/7 care & continued PT services   Outcome Measures     Row Name 11/11/22 1200             How much help from another person do you currently need...    Turning from your back to your side while in flat bed without using bedrails? 3  -TA      Moving from lying on back to sitting on the side of a flat bed without bedrails? 3  -TA      Moving to and from a bed to a chair (including a wheelchair)? 3  -TA      Standing up from a chair using your arms (e.g., wheelchair, bedside chair)? 3  -TA      Climbing 3-5 steps with a railing? 2  -TA      To walk in hospital room? 3  -TA      AM-PAC 6 Clicks Score (PT) 17  -TA         Functional Assessment    Outcome Measure Options AM-PAC 6 Clicks Basic Mobility (PT)  -TA            User Key  (r) = Recorded By, (t) = Taken By, (c) = Cosigned By    Initials Name Provider Type    Allie Paniagua, PTA  Physical Therapist Assistant                 Time Calculation:    PT Charges     Row Name 11/11/22 1259             Time Calculation    Start Time 1055  -TA      Stop Time 1123  -TA      Time Calculation (min) 28 min  -TA      PT Received On 11/11/22  -TA         Time Calculation- PT    Total Timed Code Minutes- PT 28 minute(s)  -TA         Timed Charges    70012 - PT Therapeutic Exercise Minutes 14  -TA      99750 - PT Therapeutic Activity Minutes 14  -TA         Total Minutes    Timed Charges Total Minutes 28  -TA       Total Minutes 28  -TA            User Key  (r) = Recorded By, (t) = Taken By, (c) = Cosigned By    Initials Name Provider Type    TA Allie Das PTA Physical Therapist Assistant              Therapy Charges for Today     Code Description Service Date Service Provider Modifiers Qty    66708452374 HC PT THER PROC EA 15 MIN 11/11/2022 Allie Das PTA GP 1    18026264452 HC PT THERAPEUTIC ACT EA 15 MIN 11/11/2022 Allie Das PTA GP 1          PT G-Codes  Outcome Measure Options: AM-PAC 6 Clicks Basic Mobility (PT)  AM-PAC 6 Clicks Score (PT): 17  AM-PAC 6 Clicks Score (OT): 20    Allie Das PTA  11/11/2022

## 2022-11-11 NOTE — PLAN OF CARE
Goal Outcome Evaluation:  Plan of Care Reviewed With: patient        Progress: improving  Outcome Evaluation: No events this shift. VSS. Patient rested most of this shift. No further reqests at this time

## 2022-11-11 NOTE — PLAN OF CARE
Goal Outcome Evaluation:  Plan of Care Reviewed With: patient        Progress: improving  Outcome Evaluation: Pt agreeable to shower this AM. Sup-sit-CGA. Sit-stand-CGA. Shower t/f-CGA. UB/LB bathing-SBA w/ shower chair and LH sponge. UB dressing-SBA. Pt deferred non skid socks. Pt returned to supine position-SBA w/ all needs in reach. Spouse present. Cont OT POC

## 2022-11-11 NOTE — PLAN OF CARE
Goal Outcome Evaluation:  Plan of Care Reviewed With: patient           Outcome Evaluation: Patient resting in bed at this time with wife at bedside.  Patient more alert this evening and more himself wife said.  No s/s of distress noted.  Will continue to monitor.

## 2022-11-11 NOTE — PLAN OF CARE
Goal Outcome Evaluation:  Plan of Care Reviewed With: patient           Outcome Evaluation: pt sup<>sit with CGA-SBA, sit<>stand with CGA, pt side stepped stepped 4` & 8` with RW & CGA. pt would benefit from 24/7 care & continued PT services

## 2022-11-11 NOTE — THERAPY TREATMENT NOTE
Patient Name: Vishnu Waller  : 1940    MRN: [id number]                              Today's Date: 2022       Admit Date: 2022    Visit Dx:     ICD-10-CM ICD-9-CM   1. Injury of head, initial encounter  S09.90XA 959.01   2. Blood loss anemia  D50.0 280.0   3. Fall, initial encounter  W19.XXXA E888.9   4. Impaired mobility and ADLs  Z74.09 V49.89    Z78.9    5. Impaired functional mobility, balance, gait, and endurance  Z74.09 V49.89     Patient Active Problem List   Diagnosis   • Degeneration of lumbar intervertebral disc   • Low back pain without sciatica   • Neuritis or radiculitis due to rupture of lumbar intervertebral disc   • Borderline glaucoma, open angle with borderline findings   • Pseudophakia   • History of coronary artery bypass surgery   • Essential hypertension   • Mixed hyperlipidemia   • Spinal stenosis of lumbar region   • Degenerative disc disease, lumbar   • Chronic pain of right knee   • History of artificial joint   • B12 deficiency   • Degeneration of intervertebral disc of lumbosacral region   • Personal history of other infectious and parasitic diseases   • Status post lumbar spine operation   • History of pyelonephritis   • History of surgical procedure   • History of repair of rotator cuff   • Encounter for follow-up examination after completed treatment for conditions other than malignant neoplasm   • Encounter for general adult medical examination without abnormal findings   • Osteoarthritis of knee   • Osteoarthritis of multiple joints   • Primary insomnia   • Encounter for screening for malignant neoplasm of colon   • Encounter for screening for malignant neoplasm of prostate   • Type 2 diabetes mellitus, without long-term current use of insulin (HCC)   • Coronary artery disease involving native coronary artery of native heart without angina pectoris   • Dyspnea on exertion   • Presence of aortocoronary bypass graft   • Dizziness and giddiness   • Postviral  fatigue syndrome   • Postviral fatigue syndrome   • Recurrent kidney stones   • Thrombocytopenia ([facility])   • Pyelonephritis   • Chronic non-seasonal allergic rhinitis   • Unstable angina ([facility])   • NSTEMI (non-ST elevated myocardial infarction) ([facility])   • History of PTCA 1   • Medicare annual wellness visit, subsequent   • Thoracic aortic aneurysm without rupture   • Chronic kidney disease, stage 2 (mild)   • Personal history of tobacco use, presenting hazards to health   • Cervical pain (neck)   • Thoracic spine pain   • Calculus of ureter   • Foreign body in bladder and urethra   • Physical deconditioning   • Bilateral hip pain   • Pain of left humerus   • PVD (peripheral vascular disease) ([facility])   • Renal mass   • BPPV (benign paroxysmal positional vertigo)   • Degenerative disc disease, lumbar   • Anemia   • Prostate cancer ([facility])   • Overweight with body mass index (BMI) of 25 to 25.9 in adult   • Elevated PSA   • Abnormal SPEP   • Chronic obstructive pulmonary disease ([facility])   • NAREN (acute kidney injury) ([facility])   • Hyperkalemia   • Syncope   • Weakness   • NAREN (acute kidney injury) ([facility])   • Leukocytosis   • Injury of head, initial encounter     Past Medical History:   Diagnosis Date   • Acute bacterial sinusitis    • Acute bronchitis    • Acute frontal sinusitis    • Acute maxillary sinusitis    • Acute pharyngitis    • Allergic rhinitis    • Allergic rhinitis due to pollen    • Anxiety    • Artificial lens present     in position   • Asthma    • Backache    • Borderline glaucoma    • C. difficile diarrhea 2014   • Chronic laryngitis    • Chronic rhinitis    • Coronary artery disease    • Cough    • Degeneration of lumbar intervertebral disc    • Degenerative joint disease involving multiple joints    • Diabetes mellitus ([facility])    • Diverticular disease of colon    • Dizziness and giddiness    • Elevated cholesterol    • Erectile dysfunction    • Essential hypertension    • Generalized anxiety disorder    • GERD  (gastroesophageal reflux disease)    • Glaucoma    • Hemorrhoids     without bleeding   • Insomnia    • Kidney stone    • Kidney stones    • Malignant tumor of prostate (HCC)    • Need for prophylactic vaccination and inoculation against influenza    • Osteoarthritis    • Osteoarthritis of multiple joints    • Pain, lumbar region     Pain radiating to lumbar region of back     • PONV (postoperative nausea and vomiting)    • Postviral fatigue syndrome    • Presence of aortocoronary bypass graft    • Prostate cancer (HCC)    • Pseudomembranous enterocolitis     improved   • Rotator cuff syndrome     right   • Shoulder pain    • SOB (shortness of breath)    • Tenosynovitis    • Thrombocytopenia (HCC)    • Upper respiratory infection      Past Surgical History:   Procedure Laterality Date   • CARDIAC CATHETERIZATION  09/25/2003    Cardiac cath (Normal left ventricular systolic function, EF 60% Multi-vessel coronary artery disease with critical disease noted in the LAD coronary artery, diagonal coronary artery, obtuse marginal coronary and right coronary artery.)   • CARDIAC CATHETERIZATION  05/03/1996    Cardiac cath (Coronary atherosclerotic heart disease. 70% diagonal stenosis. Mild to moderate left anterior descending artery stenosis. Preserved left ventricular function.)   • CARDIAC CATHETERIZATION N/A 2/26/2018    Procedure: Left Heart Cath/ pci if indicated ;  Surgeon: Radha Wilkes MD;  Location: [facility] CATH INVASIVE LOCATION;  Service:    • CATARACT EXTRACTION Bilateral    • CATARACT EXTRACTION  10/04/2011    Remove cataract, insert lens (Right)   • CATARACT EXTRACTION  08/23/2011    Remove cataract, insert lens (left)   • COLONOSCOPY     • COLONOSCOPY      Colon endoscopy 33421 (Rectal bleeding. Radiation proctitis w/bleeding. An abnormal CT of transverse colon due to mucosal ischemic colitis, that now is healed. Internal hemorrhoids w/possible bleeding.)   • COLONOSCOPY  05/10/2011    Colon  endoscopy 86492 (Single sessile polyp found in transverse colon and sigmoid colon ,removed by cold biopsy polypectomy.divertic. found in sigmoid colon,descending colon. Friability/capillary friability in rectum sigmoid due to prior radiation.Inter. hem. Grade 1)   • COLONOSCOPY  05/02/2007    Colon endoscopy 73640 (Colon polyp. Diverticulosis without bleeding. Internal hemorrhoids without bleeding.)   • CORONARY ARTERY BYPASS GRAFT  2002   • CYSTOSCOPY  01/13/1995    Cystoscopy, stone removal (Right ureteroscopic lasertripsy.)   • CYSTOSCOPY Right 1/23/2019    Procedure: CYSTOSCOPY, RIGHT STENT REMOVAL;  Surgeon: Nirmal Gaines MD;  Location: [facility];  Service: Urology   • CYSTOSCOPY, URETEROSCOPY, RETROGRADE PYELOGRAM, STENT INSERTION N/A 8/28/2017    Procedure: URETEROSCOPY LASER LITHOTRIPSY WITH STENT INSERTION AND RETROGRADE PYELOGRAM ;  Surgeon: Anna M. D'Amico, MD;  Location: [facility];  Service:    • CYSTOSCOPY, URETEROSCOPY, RETROGRADE PYELOGRAM, STENT INSERTION Right 1/11/2019    Procedure: CYSTOSCOPY URETEROSCOPY RETROGRADE PYELOGRAM HOLMIUM LASER STENT INSERTION;  Surgeon: Nirmal Gaines MD;  Location: [facility];  Service: Urology   • EPIDURAL  12/03/2014    Therapeutic/diag injection (Lumbar transforaminal epidural steroid injection, L5-S1, left side.)   • EPIDURAL  10/22/2014    Therapeutic/diag injection (Lumbar transforaminal epidural steroid injection L5-S1 left side.)   • EPIDURAL  08/07/2014    Therapeutic/diag injection (Lumbar transforaminal epidural steroid injection.)   • EXCISION LESION  05/13/1999    REMOVE EAR LESION 99411 (1.3 cm basal cell carcinoma, right preauricular area. Excision)   • EXCISION LESION  03/13/2001    REMOVE LESION NECK/CHEST 42533 (Excision of irritated seborrheic keratosis, anterior neck, 1.1 cm and deep lipoma, posterior neck, 3.5 cm)   • INJECTION OF MEDICATION  11/15/2012    Kenalog (4)      • JOINT REPLACEMENT  2015    partial   • KNEE ARTHROSCOPY   01/17/2007    Knee arthroscopy, surgery (Arthroscopy with medial and lateral meniscectomies, right knee.)   • KNEE SURGERY  11/04/2015    Knee Surgery (Right unicompartmental arthroplasty.)   • LUMBAR LAMINECTOMY N/A 2/7/2017    Procedure: LUMBAR LAMINECTOMY LUMBAR THREE-FOUR, LUMBAR FOUR-FIVE, INTERLAMINAR DISTRACTION ;  Surgeon: Lucio Mayo MD;  Location: [facility];  Service:    • NOSE SURGERY     • OTHER SURGICAL HISTORY      EXTENDED VISUAL FIELDS STUDY 38921 (Borderline glaucoma) (3): 03/19/2015, 04/09/2014, 03/27/2013   • OTHER SURGICAL HISTORY  10/29/2003    Heart revascularize (TMR) (Directmyocardial revascularization times four; LIMA to LAD SVG to DARIUSZ SVG to OM1, SVG to RCA)   • OTHER SURGICAL HISTORY      OCT DISC NFL 94058 (Borderline glaucoma)  (3): 09/24/2015, 08/13/2014, 07/29/2013   • PROSTATECTOMY     • SEPTORHINOPLASTY  11/16/1989    Nasal surgery procedure (Septorhinoplasty with reconstruction of the nasal pyramid with a iliac crest graft, rhinoplasty was performed with external approach.)   • SHOULDER ARTHROSCOPY  07/24/2013    Arthroscopy of right shoulder with rotator repair, Carla procedure, Biceps tenotomy, subacromial decompression.   • SHOULDER SURGERY  11/01/2005    Shoulder surgery procedure (Decompression, subacromial, explore of the rotator cuff, no tear found nor repaired, acromioplasty of the left shoulder and AC shoulder joint resection.)      General Information     Row Name 11/11/22 6423          OT Time and Intention    Document Type therapy note (daily note)  -KD     Mode of Treatment individual therapy;occupational therapy  -KD     Row Name 11/11/22 0919          General Information    Patient Profile Reviewed yes  -KD     Existing Precautions/Restrictions fall  -KD     Row Name 11/11/22 0950          Cognition    Orientation Status (Cognition) oriented x 4  -KD     Row Name 11/11/22 0944          Safety Issues, Functional Mobility    Impairments Affecting  Function (Mobility) balance;endurance/activity tolerance;grasp;pain;strength;shortness of breath  -           User Key  (r) = Recorded By, (t) = Taken By, (c) = Cosigned By    Initials Name Provider Type    Orly Ramirez COTA Occupational Therapist Assistant                 Mobility/ADL's     Row Name 11/11/22 0950          Bed Mobility    Supine-Sit Dickenson (Bed Mobility) contact guard  -KD     Sit-Supine Dickenson (Bed Mobility) standby assist  -KD     Assistive Device (Bed Mobility) bed rails;draw sheet  -     Row Name 11/11/22 0950          Sit-Stand Transfer    Sit-Stand Dickenson (Transfers) contact guard  -KD     Assistive Device (Sit-Stand Transfers) walker, front-wheeled  -KD     Row Name 11/11/22 0950          Stand-Sit Transfer    Stand-Sit Dickenson (Transfers) contact guard  -KD     Assistive Device (Stand-Sit Transfers) walker, front-wheeled  -KD     Row Name 11/11/22 0950          Functional Mobility    Functional Mobility- Ind. Level contact guard assist  -     Functional Mobility- Device other (see comments)  HHA  -     Functional Mobility-Distance (Feet) 5  -KD     Row Name 11/11/22 0950          Activities of Daily Living    BADL Assessment/Intervention bathing;upper body dressing;grooming  -     Row Name 11/11/22 0950          Bathing Assessment/Intervention    Dickenson Level (Bathing) bathing skills;lower body;upper body;standby assist  -     Assistive Devices (Bathing) bath mitt;long-handled sponge;shower chair  -     Position (Bathing) unsupported sitting  -     Row Name 11/11/22 0950          Upper Body Dressing Assessment/Training    Dickenson Level (Upper Body Dressing) upper body dressing skills;doff;don;standby assist  -KD     Position (Upper Body Dressing) edge of bed sitting  -     Row Name 11/11/22 0950          Grooming Assessment/Training    Dickenson Level (Grooming) grooming skills;wash face, hands;independent;set up  -     Position  (Grooming) unsupported sitting  -KD           User Key  (r) = Recorded By, (t) = Taken By, (c) = Cosigned By    Initials Name Provider Type    Orly Ramirez COTA Occupational Therapist Assistant               Obj/Interventions    No documentation.                Goals/Plan     Row Name 11/11/22 0950          Transfer Goal 1 (OT)    Activity/Assistive Device (Transfer Goal 1, OT) transfers, all  -KD     Brown Level/Cues Needed (Transfer Goal 1, OT) modified independence  -KD     Time Frame (Transfer Goal 1, OT) long term goal (LTG);by discharge  -KD     Progress/Outcome (Transfer Goal 1, OT) goal not met  -KD     Row Name 11/11/22 0950          Bathing Goal 1 (OT)    Activity/Device (Bathing Goal 1, OT) bathing skills, all  -KD     Brown Level/Cues Needed (Bathing Goal 1, OT) modified independence  -KD     Time Frame (Bathing Goal 1, OT) long term goal (LTG);by discharge  -KD     Progress/Outcomes (Bathing Goal 1, OT) goal not met  -KD     Row Name 11/11/22 0950          Dressing Goal 1 (OT)    Activity/Device (Dressing Goal 1, OT) dressing skills, all  -KD     Brown/Cues Needed (Dressing Goal 1, OT) modified independence  -KD     Time Frame (Dressing Goal 1, OT) long term goal (LTG);by discharge  -KD     Progress/Outcome (Dressing Goal 1, OT) goal not met  -KD     Row Name 11/11/22 0950          Toileting Goal 1 (OT)    Activity/Device (Toileting Goal 1, OT) toileting skills, all  -KD     Brown Level/Cues Needed (Toileting Goal 1, OT) modified independence  -KD     Time Frame (Toileting Goal 1, OT) long term goal (LTG);by discharge  -KD     Progress/Outcome (Toileting Goal 1, OT) goal not met  -KD           User Key  (r) = Recorded By, (t) = Taken By, (c) = Cosigned By    Initials Name Provider Type    Orly Ramirez COTA Occupational Therapist Assistant               Clinical Impression     Row Name 11/11/22 0950          Pain Assessment    Pretreatment Pain Rating 9/10  -KD      Posttreatment Pain Rating 9/10  -KD     Pain Location lower  -KD     Pain Location - back  -KD     Pain Intervention(s) Repositioned;Nursing Notified  -KD     Row Name 11/11/22 0950          Plan of Care Review    Plan of Care Reviewed With patient  -KD     Progress improving  -KD     Outcome Evaluation Pt agreeable to shower this AM. Sup-sit-CGA. Sit-stand-CGA. Shower t/f-CGA. UB/LB bathing-SBA w/ shower chair and LH sponge. UB dressing-SBA. Pt deferred non skid socks. Pt returned to supine position-SBA w/ all needs in reach. Spouse present. Cont OT POC  -KD     Row Name 11/11/22 0950          Therapy Assessment/Plan (OT)    Therapy Frequency (OT) other (see comments)  3-7 days a week  -KD     Row Name 11/11/22 0950          Therapy Plan Review/Discharge Plan (OT)    Anticipated Discharge Disposition (OT) home with assist;home with 24/7 care;home with home health  -KD     Row Name 11/11/22 0950          Vital Signs    Pre Systolic BP Rehab 101  -KD     Pre Treatment Diastolic BP 51  -KD     Pretreatment Heart Rate (beats/min) 75  -KD     Pre SpO2 (%) 96  -KD     O2 Delivery Pre Treatment nasal cannula  -KD     Pre Patient Position Supine  -KD     Intra Patient Position Standing  -KD     Post Patient Position Supine  -KD     Row Name 11/11/22 0950          Positioning and Restraints    Pre-Treatment Position in bed  -KD     Post Treatment Position bed  -KD     In Bed fowlers;call light within reach;encouraged to call for assist;exit alarm on  -KD           User Key  (r) = Recorded By, (t) = Taken By, (c) = Cosigned By    Initials Name Provider Type    KD Orly Hoffmann COTA Occupational Therapist Assistant               Outcome Measures     Row Name 11/11/22 0950          How much help from another is currently needed...    Putting on and taking off regular lower body clothing? 2  -KD     Bathing (including washing, rinsing, and drying) 3  -KD     Toileting (which includes using toilet bed pan or urinal) 3  -KD      Putting on and taking off regular upper body clothing 4  -KD     Taking care of personal grooming (such as brushing teeth) 4  -KD     Eating meals 4  -KD     AM-PAC 6 Clicks Score (OT) 20  -KD     Row Name 11/11/22 0903          How much help from another person do you currently need...    Turning from your back to your side while in flat bed without using bedrails? 3  -ML     Moving from lying on back to sitting on the side of a flat bed without bedrails? 2  -ML     Moving to and from a bed to a chair (including a wheelchair)? 3  -ML     Standing up from a chair using your arms (e.g., wheelchair, bedside chair)? 3  -ML     Climbing 3-5 steps with a railing? 2  -ML     To walk in hospital room? 3  -ML     AM-PAC 6 Clicks Score (PT) 16  -ML     Highest level of mobility 5 --> Static standing  -ML           User Key  (r) = Recorded By, (t) = Taken By, (c) = Cosigned By    Initials Name Provider Type    KD Orly Hoffmann COTA Occupational Therapist Assistant    Angel Monk LPN Licensed Nurse                Occupational Therapy Education     Title: PT OT SLP Therapies (In Progress)     Topic: Occupational Therapy (In Progress)     Point: ADL training (Done)     Description:   Instruct learner(s) on proper safety adaptation and remediation techniques during self care or transfers.   Instruct in proper use of assistive devices.              Learning Progress Summary           Patient Acceptance, E,TB, VU by  at 11/10/2022 0855    Comment: OT POC, Role of OT, transfer training                   Point: Home exercise program (Not Started)     Description:   Instruct learner(s) on appropriate technique for monitoring, assisting and/or progressing therapeutic exercises/activities.              Learner Progress:  Not documented in this visit.          Point: Precautions (Done)     Description:   Instruct learner(s) on prescribed precautions during self-care and functional transfers.              Learning Progress  Summary           Patient Acceptance, E,TB, VU by  at 11/10/2022 0855    Comment: OT POC, Role of OT, transfer training                   Point: Body mechanics (Done)     Description:   Instruct learner(s) on proper positioning and spine alignment during self-care, functional mobility activities and/or exercises.              Learning Progress Summary           Patient Acceptance, E,TB, VU by  at 11/10/2022 0855    Comment: OT POC, Role of OT, transfer training                               User Key     Initials Effective Dates Name Provider Type [facility] 08/11/22 -  Reena Mckeon OT Occupational Therapist OT              OT Recommendation and Plan  Therapy Frequency (OT): other (see comments) (3-7 days a week)  Plan of Care Review  Plan of Care Reviewed With: patient  Progress: improving  Outcome Evaluation: Pt agreeable to shower this AM. Sup-sit-CGA. Sit-stand-CGA. Shower t/f-CGA. UB/LB bathing-SBA w/ shower chair and LH sponge. UB dressing-SBA. Pt deferred non skid socks. Pt returned to supine position-SBA w/ all needs in reach. Spouse present. Cont OT POC     Time Calculation:    Time Calculation- OT     Row Name 11/11/22 1024             Time Calculation- OT    OT Start Time 0950  -KD      OT Stop Time 1028  -KD      OT Time Calculation (min) 38 min  -KD      Total Timed Code Minutes- OT 38 minute(s)  -KD      OT Received On 11/11/22  -KD         Timed Charges    89170 - OT Self Care/Mgmt Minutes 38  -KD         Total Minutes    Timed Charges Total Minutes 38  -KD       Total Minutes 38  -KD            User Key  (r) = Recorded By, (t) = Taken By, (c) = Cosigned By    Initials Name Provider Type     Orly Hoffmann COTA Occupational Therapist Assistant              Therapy Charges for Today     Code Description Service Date Service Provider Modifiers Qty    [id number] HC OT SELF CARE/MGMT/TRAIN EA 15 MIN 11/11/2022 Orly Hoffmann COTA GO 3               PARIS Marlow  11/11/2022

## 2022-11-12 NOTE — PLAN OF CARE
Goal Outcome Evaluation:  Plan of Care Reviewed With: patient           Outcome Evaluation: pt sup<>sit with SBA, sit<>stand with CGA, pt ambulated 20` & 80` with RW & CGA. pt would benefit from assistance & HHPT

## 2022-11-12 NOTE — PROGRESS NOTES
[facility] Medicine Services  INPATIENT PROGRESS NOTE    Length of Stay: 0  Date of Admission: 11/9/2022  Primary Care Physician: Johnathon Shaikh MD    Subjective   Chief Complaint: weakness, shortness of breath   HPI:  82 year old male with past medical history of prostate cancer, CAD, CABG, type 2 DM, HLD, HTN, chronic anemia who presented on 11/9/22 with complaints of fall with head laceration.  Laceration was repaired via stapling in the ED.  He was found to be anemic with hemoglobin of 6.9 requiring one unit PRBC.  Followed by hematology/oncology with plan for close outpatient follow-up for further evaluation of chronic anemia.        Subjective:    No acute events overnight.  He remains with some dyspnea on exertion and significant fatigue, generalized weakness.  Denies any lightheadedness or dizziness.  Denies any blood in stools or other overt bleeding episodes.  No fevers or chills.          ROS  14 point review systems completed  All pertinent negatives and positives are as above. All other systems have been reviewed and are negative unless otherwise stated.     Objective    Temp:  [97.4 °F (36.3 °C)-98.3 °F (36.8 °C)] 97.8 °F (36.6 °C)  Heart Rate:  [] 106  Resp:  [16-18] 18  BP: ()/(55-58) 109/55    Physical Exam  Vitals and nursing note reviewed.   Constitutional:       General: He is not in acute distress.     Appearance: Normal appearance. He is ill-appearing.   HENT:      Head: Normocephalic.        Comments: Head laceration intact with staples.   Eyes:      General: No scleral icterus.     Conjunctiva/sclera: Conjunctivae normal.   Cardiovascular:      Rate and Rhythm: Normal rate and regular rhythm.      Heart sounds: Normal heart sounds. No murmur heard.    No friction rub. No gallop.   Pulmonary:      Effort: No accessory muscle usage or respiratory distress.      Breath sounds: No wheezing, rhonchi or rales.   Abdominal:      General:  Bowel sounds are normal. There is no distension.      Palpations: Abdomen is soft.      Tenderness: There is no abdominal tenderness.   Musculoskeletal:         General: No swelling.      Cervical back: Normal range of motion and neck supple.      Right lower leg: No edema.      Left lower leg: No edema.   Skin:     General: Skin is warm and dry.      Findings: No rash.   Neurological:      Mental Status: He is alert.      Motor: Weakness present.   Psychiatric:         Behavior: Behavior normal.             Results Review:  I have reviewed the labs, radiology results, and diagnostic studies.    Laboratory Data:   Results from last 7 days   Lab Units 11/12/22  0706 11/10/22  1305 11/10/22  0803 11/09/22  2236   SODIUM mmol/L 145  --  146* 140   POTASSIUM mmol/L 4.5 5.0 5.7* 6.1*   CHLORIDE mmol/L 104  --  108* 106   CO2 mmol/L 34.0*  --  29.0 26.0   BUN mg/dL 22  --  28* 30*   CREATININE mg/dL 1.50*  --  1.59* 1.75*   GLUCOSE mg/dL 128*  --  108* 119*   CALCIUM mg/dL 8.6  --  9.4 9.0   BILIRUBIN mg/dL  --   --   --  0.4   ALK PHOS U/L  --   --   --  53   ALT (SGPT) U/L  --   --   --  10   AST (SGOT) U/L  --   --   --  18   ANION GAP mmol/L 7.0  --  9.0 8.0     Estimated Creatinine Clearance: 34.5 mL/min (A) (by C-G formula based on SCr of 1.5 mg/dL (H)).  Results from last 7 days   Lab Units 11/10/22  0803   MAGNESIUM mg/dL 1.8         Results from last 7 days   Lab Units 11/12/22  0706 11/11/22  1023 11/10/22  0803 11/09/22  2119   WBC 10*3/mm3 13.61*  --  16.66* 16.70*   HEMOGLOBIN g/dL 7.4* 8.3* 8.2* 6.9*   HEMATOCRIT % 24.7* 26.3* 26.7* 23.0*   PLATELETS 10*3/mm3 247  --  286 316     Results from last 7 days   Lab Units 11/10/22  0803 11/09/22  2236   INR  1.37* 1.35*       Culture Data:   No results found for: BLOODCX  No results found for: URINECX  No results found for: RESPCX  No results found for: WOUNDCX  No results found for: STOOLCX  No components found for: BODYFLD    Radiology Data:   Imaging Results  (Last 24 Hours)     ** No results found for the last 24 hours. **          I have reviewed the patient's current medications.     Assessment/Plan     Active Hospital Problems    Diagnosis    • **Injury of head, initial encounter        Head laceration  - Staples in place  -- Antiplatelets held (takes for CAD)    Acute on chronic anemia requiring transfusion  - Status post RBC transfusion  - Follow H&H, continue to transfuse for hemoglobin less than 7  --Recent anemia work-up reviewed  - Arrange close outpatient follow-up with hematology/oncology for further work-up including bone marrow biopsy    Hyperkalemia--resolved    NAREN  Improving, continue IV fluids    Generalized weakness  Supportive care  Continue PT/OT    Diabetes  Glucose monitoring, continue sliding scale insulin correction as warranted        Discharge Planning: I expect patient to be discharged to home in 1-2 days pending stability of H&H, further PT    Need to confirm close outpatient follow-up appointment with hematology/oncology this week        This document has been electronically signed by Oracio Mancini MD on November 12, 2022 13:07 CST

## 2022-11-13 NOTE — PLAN OF CARE
Goal Outcome Evaluation:  Plan of Care Reviewed With: patient        Progress: improving  Outcome Evaluation: Patient has rested better today with wife at bedside. Patient sit up on side of be and ate today.  Wife said he is looking better.  No s/s of distress noted. Will continue to monitor.

## 2022-11-13 NOTE — PLAN OF CARE
Goal Outcome Evaluation:  Plan of Care Reviewed With: patient        Progress: improving  Outcome Evaluation: Patient has received 1 unit PRBC today and tolerated well.  Tried weaning patient off oxygen today and managed to get him from 2L to 1.5L.  Patient has sit on side of bed today and tolerated well.  Wife at bedside.  No s/s of distress noted.  Will continue to monitor.

## 2022-11-13 NOTE — PROGRESS NOTES
[facility] Medicine Services  INPATIENT PROGRESS NOTE    Length of Stay: 0  Date of Admission: 11/9/2022  Primary Care Physician: Johnathon Shaikh MD    Subjective   (S) Patient male admitted for accidental fall with head contusion and acute blood loss anemia  Today, he feels better; no nausea, no vomiting;   His wife would like for Dr Monet to do the BM biopsy while he is inpatient    Review of Systems     All pertinent negatives and positives are as above. All other systems have been reviewed and are negative unless otherwise stated.     Prior to Admission medications    Medication Sig Start Date End Date Taking? Authorizing Provider   acetaminophen (Tylenol) 325 MG tablet Take 650 mg by mouth Every 4 (Four) Hours As Needed for Mild Pain. 10/30/22   Johnathon Shaikh MD   albuterol sulfate HFA (Ventolin HFA) 108 (90 Base) MCG/ACT inhaler Inhale 2 puffs Every 4 (Four) Hours As Needed for Wheezing or Shortness of Air. 3/29/22   Katherin Case DO   atorvastatin (LIPITOR) 40 MG tablet Take 1 tablet by mouth Daily. 10/6/21   Radha Wilkes MD   budesonide-formoterol (SYMBICORT) 160-4.5 MCG/ACT inhaler Inhale 2 puffs 2 (Two) Times a Day. 4/26/22   Katherin Case DO   carvedilol (COREG) 3.125 MG tablet Take 1 tablet by mouth Every 12 (Twelve) Hours. 1/19/22   Radha Wilkes MD   clopidogrel (PLAVIX) 75 MG tablet Take 1 tablet by mouth Daily. 1/19/22   Radha Wilkes MD   Cyanocobalamin (VITAMIN B 12 PO) Take 500 mcg by mouth Daily. Three times weekly, Vicky Song MD   dilTIAZem CD (CARDIZEM CD) 180 MG 24 hr capsule Take 1 capsule by mouth Daily. 10/6/21   Radha Wilkes MD   FeroSul 325 (65 Fe) MG tablet TAKE 1 TABLET EVERY DAY WITH BREAKFAST 4/7/22   Josef Covington MD   fluticasone (FLONASE) 50 MCG/ACT nasal spray 2 sprays into each nostril Every Night. 2/14/18   Camryn Koehler MD    ipratropium-albuterol (DUO-NEB) 0.5-2.5 mg/3 ml nebulizer Inhale the contents of 1 vial by nebulization Every 4 (Four) Hours As Needed for Wheezing for up to 30 days. 2/10/21 3/12/21  Akbar Zapien MD   isosorbide mononitrate (IMDUR) 30 MG 24 hr tablet Take 1 tablet by mouth Daily. 2/28/18   Julio Cesar Moss APRN   losartan (COZAAR) 50 MG tablet Take 1 tablet by mouth Every Night. 1/19/22   Radha Wilkes MD   magnesium oxide (MAG-OX) 400 MG tablet Take 1 tablet by mouth 2 (Two) Times a Day. 4/15/21   Vicky Kent MD   metFORMIN (GLUCOPHAGE) 1000 MG tablet Take 1 tablet by mouth 2 (Two) Times a Day With Meals. 2/28/18   Julio Cesar Moss APRN   nitroglycerin (NITROSTAT) 0.4 MG SL tablet Place 1 tablet under the tongue Every 5 (Five) Minutes As Needed for Chest Pain. Take no more than 3 doses in 15 minutes. 2/27/18   Julio Cesar Moss APRN   omeprazole (priLOSEC) 20 MG capsule Take 20 mg by mouth Daily. 10/30/22   Johnathon Shaikh MD   oxybutynin XL (DITROPAN-XL) 5 MG 24 hr tablet Take 5 mg by mouth Daily.    ProviderVicky MD   pantoprazole (PROTONIX) 40 MG EC tablet Take 1 tablet by mouth Every Morning. 10/29/22   Andreia Ralph PA-C   pregabalin (LYRICA) 150 MG capsule 3 (Three) Times a Day.    Emergency, Nurse Epic, RN   pregabalin (LYRICA) 75 MG capsule Take 1 capsule by mouth 2 (Two) Times a Day for 30 days.  Patient taking differently: Take 2 capsules by mouth Daily. 2/12/21 7/12/21  Akbar Zapien MD   traMADol (ULTRAM) 50 MG tablet Take 1 tablet by mouth 2 (Two) Times a Day.    ProviderVicky MD   travoprost, BAK free, (TRAVATAN) 0.004 % solution ophthalmic solution Administer 1 drop to both eyes Daily. 10/30/22   Vicky Kent MD       atorvastatin, 40 mg, Oral, Daily  budesonide-formoterol, 2 puff, Inhalation, BID - RT  carvedilol, 3.125 mg, Oral, Q12H  dilTIAZem CD, 180 mg, Oral, Daily  ferrous sulfate, 324 mg, Oral, Daily With  Breakfast  Insulin Aspart, 0-7 Units, Subcutaneous, TID AC  isosorbide mononitrate, 30 mg, Oral, Q24H  latanoprost, 1 drop, Both Eyes, Nightly  oxybutynin XL, 5 mg, Oral, Daily  pantoprazole, 40 mg, Oral, QAM  pregabalin, 100 mg, Oral, Q8H  sodium chloride, 10 mL, Intravenous, Q12H  vitamin B-12, 500 mcg, Oral, Daily           Objective    Temp:  [96.1 °F (35.6 °C)-98 °F (36.7 °C)] 97 °F (36.1 °C)  Heart Rate:  [75-90] 75  Resp:  [16-18] 16  BP: ()/(53-67) 119/59    Physical Exam  HENT:      Head: Laceration present.      Nose: Nose normal.      Mouth/Throat:      Mouth: Mucous membranes are moist.   Eyes:      Extraocular Movements: Extraocular movements intact.   Cardiovascular:      Rate and Rhythm: Regular rhythm.      Heart sounds: Normal heart sounds.   Pulmonary:      Breath sounds: Normal breath sounds.   Abdominal:      General: Bowel sounds are normal.      Palpations: Abdomen is soft.   Musculoskeletal:         General: Normal range of motion.      Cervical back: Normal range of motion and neck supple.   Skin:     General: Skin is warm.   Neurological:      General: No focal deficit present.      Mental Status: He is alert. Mental status is at baseline.   Psychiatric:         Mood and Affect: Mood normal.         Behavior: Behavior normal.             Results Review:  I have reviewed the labs, radiology results, and diagnostic studies.    Laboratory Data:   Results from last 7 days   Lab Units 11/13/22  0541 11/12/22  0706 11/10/22  1305 11/10/22  0803 11/09/22  2236   SODIUM mmol/L 143 145  --  146* 140   POTASSIUM mmol/L 4.4 4.5 5.0 5.7* 6.1*   CHLORIDE mmol/L 106 104  --  108* 106   CO2 mmol/L 31.0* 34.0*  --  29.0 26.0   BUN mg/dL 19 22  --  28* 30*   CREATININE mg/dL 1.10 1.50*  --  1.59* 1.75*   GLUCOSE mg/dL 132* 128*  --  108* 119*   CALCIUM mg/dL 8.4* 8.6  --  9.4 9.0   BILIRUBIN mg/dL  --   --   --   --  0.4   ALK PHOS U/L  --   --   --   --  53   ALT (SGPT) U/L  --   --   --   --  10    AST (SGOT) U/L  --   --   --   --  18   ANION GAP mmol/L 6.0 7.0  --  9.0 8.0     Estimated Creatinine Clearance: 47 mL/min (by C-G formula based on SCr of 1.1 mg/dL).  Results from last 7 days   Lab Units 11/10/22  0803   MAGNESIUM mg/dL 1.8         Results from last 7 days   Lab Units 11/13/22  0541 11/12/22  0706 11/11/22  1023 11/10/22  0803 11/09/22  2119   WBC 10*3/mm3 12.67* 13.61*  --  16.66* 16.70*   HEMOGLOBIN g/dL 6.7* 7.4* 8.3* 8.2* 6.9*   HEMATOCRIT % 22.2* 24.7* 26.3* 26.7* 23.0*   PLATELETS 10*3/mm3 222 247  --  286 316     Results from last 7 days   Lab Units 11/10/22  0803 11/09/22  2236   INR  1.37* 1.35*       Culture Data:   No results found for: BLOODCX  No results found for: URINECX  No results found for: RESPCX  No results found for: WOUNDCX  No results found for: STOOLCX  No components found for: BODYFLD    Radiology Data:   Imaging Results (Last 24 Hours)     ** No results found for the last 24 hours. **          I have reviewed the patient's current medications.     Assessment/Plan   Accidental fall     With head contusion and scalp laceration; he would need to stay off of Plavix if possible     Acute blood loss anemia     Due to laceration on the head     S/p three units of PRBC given    Acute kidney injury on CKD     Due to dehyration      Resolving    Acute respiratory failure with hypoxemia     POA     No respiratory symptoms     Check chest x ray    Chronic medical problems     CAD/CABG     Type 2 DM     Essential hypertension     Hyperlipidemia       Left a message with Dr Monet to see possibility of doing a BM biopsy while he is here and his wife requested it    Manuel Tucker MD

## 2022-11-14 NOTE — CONSULTS
SUBJECTIVE:   11/14/2022    Name: Vishnu Waller  DOD: 1940    REASON FOR CONSULT: Coffee-ground emesis    Chief Complaint:     Chief Complaint   Patient presents with   • Fall   • Head Laceration       Subjective     Patient is 82 y.o. male with past medical history of coronary artery disease s/p CABG, diabetes mellitus, hypercholesterolemia, hypertension, anemia, syncope presents with fall with scalp laceration.  He developed dyspnea with acidosis for which he was placed on BiPAP.  Subsequently had nausea with coffee-ground emesis.  Denied abdominal pain, diarrhea, constipation, rectal bleeding or melena.  Hemoglobin is 9.5 and WBC is 19.74.     ROS/HISTORY/ CURRENT MEDICATIONS/OBJECTIVE/VS/PE:   Review of Systems:   Review of Systems   Constitutional: Negative for chills, fatigue, fever and unexpected weight change.   HENT: Negative for congestion, ear discharge, hearing loss, nosebleeds and sore throat.    Eyes: Negative for pain, discharge and redness.   Respiratory: Positive for shortness of breath. Negative for cough, chest tightness and wheezing.    Cardiovascular: Negative for chest pain and palpitations.   Gastrointestinal: Positive for nausea and vomiting. Negative for abdominal distention, abdominal pain, blood in stool, constipation and diarrhea.   Endocrine: Negative for cold intolerance, polydipsia, polyphagia and polyuria.   Genitourinary: Negative for dysuria, flank pain, frequency, hematuria and urgency.   Musculoskeletal: Negative for arthralgias, back pain, joint swelling and myalgias.   Skin: Positive for wound. Negative for color change, pallor and rash.   Neurological: Negative for tremors, seizures, syncope, weakness and headaches.   Hematological: Negative for adenopathy. Does not bruise/bleed easily.   Psychiatric/Behavioral: Negative for behavioral problems, confusion, dysphoric mood, hallucinations and suicidal ideas. The patient is not nervous/anxious.        History:     Past  Medical History:   Diagnosis Date   • Acute bacterial sinusitis    • Acute bronchitis    • Acute frontal sinusitis    • Acute maxillary sinusitis    • Acute pharyngitis    • Allergic rhinitis    • Allergic rhinitis due to pollen    • Anxiety    • Artificial lens present     in position   • Asthma    • Backache    • Borderline glaucoma    • C. difficile diarrhea 2014   • Chronic laryngitis    • Chronic rhinitis    • Coronary artery disease    • Cough    • Degeneration of lumbar intervertebral disc    • Degenerative joint disease involving multiple joints    • Diabetes mellitus (HCC)    • Diverticular disease of colon    • Dizziness and giddiness    • Elevated cholesterol    • Erectile dysfunction    • Essential hypertension    • Generalized anxiety disorder    • GERD (gastroesophageal reflux disease)    • Glaucoma    • Hemorrhoids     without bleeding   • Insomnia    • Kidney stone    • Kidney stones    • Malignant tumor of prostate (HCC)    • Need for prophylactic vaccination and inoculation against influenza    • Osteoarthritis    • Osteoarthritis of multiple joints    • Pain, lumbar region     Pain radiating to lumbar region of back     • PONV (postoperative nausea and vomiting)    • Postviral fatigue syndrome    • Presence of aortocoronary bypass graft    • Prostate cancer (HCC)    • Pseudomembranous enterocolitis     improved   • Rotator cuff syndrome     right   • Shoulder pain    • SOB (shortness of breath)    • Tenosynovitis    • Thrombocytopenia (HCC)    • Upper respiratory infection      Past Surgical History:   Procedure Laterality Date   • CARDIAC CATHETERIZATION  09/25/2003    Cardiac cath (Normal left ventricular systolic function, EF 60% Multi-vessel coronary artery disease with critical disease noted in the LAD coronary artery, diagonal coronary artery, obtuse marginal coronary and right coronary artery.)   • CARDIAC CATHETERIZATION  05/03/1996    Cardiac cath (Coronary atherosclerotic heart disease.  70% diagonal stenosis. Mild to moderate left anterior descending artery stenosis. Preserved left ventricular function.)   • CARDIAC CATHETERIZATION N/A 2/26/2018    Procedure: Left Heart Cath/ pci if indicated ;  Surgeon: Radha Wilkes MD;  Location: [facility] INVASIVE LOCATION;  Service:    • CATARACT EXTRACTION Bilateral    • CATARACT EXTRACTION  10/04/2011    Remove cataract, insert lens (Right)   • CATARACT EXTRACTION  08/23/2011    Remove cataract, insert lens (left)   • COLONOSCOPY     • COLONOSCOPY      Colon endoscopy 88989 (Rectal bleeding. Radiation proctitis w/bleeding. An abnormal CT of transverse colon due to mucosal ischemic colitis, that now is healed. Internal hemorrhoids w/possible bleeding.)   • COLONOSCOPY  05/10/2011    Colon endoscopy 82763 (Single sessile polyp found in transverse colon and sigmoid colon ,removed by cold biopsy polypectomy.divertic. found in sigmoid colon,descending colon. Friability/capillary friability in rectum sigmoid due to prior radiation.Inter. hem. Grade 1)   • COLONOSCOPY  05/02/2007    Colon endoscopy 52134 (Colon polyp. Diverticulosis without bleeding. Internal hemorrhoids without bleeding.)   • CORONARY ARTERY BYPASS GRAFT  2002   • CYSTOSCOPY  01/13/1995    Cystoscopy, stone removal (Right ureteroscopic lasertripsy.)   • CYSTOSCOPY Right 1/23/2019    Procedure: CYSTOSCOPY, RIGHT STENT REMOVAL;  Surgeon: Nirmal Gaines MD;  Location: [facility];  Service: Urology   • CYSTOSCOPY, URETEROSCOPY, RETROGRADE PYELOGRAM, STENT INSERTION N/A 8/28/2017    Procedure: URETEROSCOPY LASER LITHOTRIPSY WITH STENT INSERTION AND RETROGRADE PYELOGRAM ;  Surgeon: Anna M. D'Amico, MD;  Location: [facility] OR;  Service:    • CYSTOSCOPY, URETEROSCOPY, RETROGRADE PYELOGRAM, STENT INSERTION Right 1/11/2019    Procedure: CYSTOSCOPY URETEROSCOPY RETROGRADE PYELOGRAM HOLMIUM LASER STENT INSERTION;  Surgeon: Nirmal Gaines MD;  Location: [facility] OR;  Service: Urology   •  EPIDURAL  12/03/2014    Therapeutic/diag injection (Lumbar transforaminal epidural steroid injection, L5-S1, left side.)   • EPIDURAL  10/22/2014    Therapeutic/diag injection (Lumbar transforaminal epidural steroid injection L5-S1 left side.)   • EPIDURAL  08/07/2014    Therapeutic/diag injection (Lumbar transforaminal epidural steroid injection.)   • EXCISION LESION  05/13/1999    REMOVE EAR LESION 13025 (1.3 cm basal cell carcinoma, right preauricular area. Excision)   • EXCISION LESION  03/13/2001    REMOVE LESION NECK/CHEST 91575 (Excision of irritated seborrheic keratosis, anterior neck, 1.1 cm and deep lipoma, posterior neck, 3.5 cm)   • INJECTION OF MEDICATION  11/15/2012    Kenalog (4)      • JOINT REPLACEMENT  2015    partial   • KNEE ARTHROSCOPY  01/17/2007    Knee arthroscopy, surgery (Arthroscopy with medial and lateral meniscectomies, right knee.)   • KNEE SURGERY  11/04/2015    Knee Surgery (Right unicompartmental arthroplasty.)   • LUMBAR LAMINECTOMY N/A 2/7/2017    Procedure: LUMBAR LAMINECTOMY LUMBAR THREE-FOUR, LUMBAR FOUR-FIVE, INTERLAMINAR DISTRACTION ;  Surgeon: Lucio Mayo MD;  Location: [facility];  Service:    • NOSE SURGERY     • OTHER SURGICAL HISTORY      EXTENDED VISUAL FIELDS STUDY 28939 (Borderline glaucoma) (3): 03/19/2015, 04/09/2014, 03/27/2013   • OTHER SURGICAL HISTORY  10/29/2003    Heart revascularize (TMR) (Directmyocardial revascularization times four; LIMA to LAD SVG to DARIUSZ SVG to OM1, SVG to RCA)   • OTHER SURGICAL HISTORY      OCT DISC NFL 79834 (Borderline glaucoma)  (3): 09/24/2015, 08/13/2014, 07/29/2013   • PROSTATECTOMY     • SEPTORHINOPLASTY  11/16/1989    Nasal surgery procedure (Septorhinoplasty with reconstruction of the nasal pyramid with a iliac crest graft, rhinoplasty was performed with external approach.)   • SHOULDER ARTHROSCOPY  07/24/2013    Arthroscopy of right shoulder with rotator repair, Carla procedure, Biceps tenotomy, subacromial  decompression.   • SHOULDER SURGERY  2005    Shoulder surgery procedure (Decompression, subacromial, explore of the rotator cuff, no tear found nor repaired, acromioplasty of the left shoulder and AC shoulder joint resection.)     Family History   Problem Relation Age of Onset   • Hypertension Mother    • Heart disease Mother    • Arthritis Mother    • Cancer Other    • Heart disease Other    • Hypertension Other      Social History     Tobacco Use   • Smoking status: Former     Packs/day: 0.50     Years: 40.00     Pack years: 20.00     Types: Cigarettes     Start date:      Quit date: 2020     Years since quittin.5   • Smokeless tobacco: Never   Vaping Use   • Vaping Use: Never used   Substance Use Topics   • Alcohol use: Not Currently     Comment: socially   • Drug use: No     Medications Prior to Admission   Medication Sig Dispense Refill Last Dose   • acetaminophen (Tylenol) 325 MG tablet Take 650 mg by mouth Every 4 (Four) Hours As Needed for Mild Pain.      • albuterol sulfate HFA (Ventolin HFA) 108 (90 Base) MCG/ACT inhaler Inhale 2 puffs Every 4 (Four) Hours As Needed for Wheezing or Shortness of Air. 18 g 5    • atorvastatin (LIPITOR) 40 MG tablet Take 1 tablet by mouth Daily. 90 tablet 1    • budesonide-formoterol (SYMBICORT) 160-4.5 MCG/ACT inhaler Inhale 2 puffs 2 (Two) Times a Day. 1 each 11    • carvedilol (COREG) 3.125 MG tablet Take 1 tablet by mouth Every 12 (Twelve) Hours. 180 tablet 3    • clopidogrel (PLAVIX) 75 MG tablet Take 1 tablet by mouth Daily. 90 tablet 3    • Cyanocobalamin (VITAMIN B 12 PO) Take 500 mcg by mouth Daily. Three times weekly, MoWeFr      • dilTIAZem CD (CARDIZEM CD) 180 MG 24 hr capsule Take 1 capsule by mouth Daily. 90 capsule 3    • FeroSul 325 (65 Fe) MG tablet TAKE 1 TABLET EVERY DAY WITH BREAKFAST 90 tablet 0    • fluticasone (FLONASE) 50 MCG/ACT nasal spray 2 sprays into each nostril Every Night.      • ipratropium-albuterol (DUO-NEB) 0.5-2.5 mg/3 ml  nebulizer Inhale the contents of 1 vial by nebulization Every 4 (Four) Hours As Needed for Wheezing for up to 30 days. 360 mL 3    • isosorbide mononitrate (IMDUR) 30 MG 24 hr tablet Take 1 tablet by mouth Daily. 30 tablet 0    • losartan (COZAAR) 50 MG tablet Take 1 tablet by mouth Every Night. 90 tablet 3    • magnesium oxide (MAG-OX) 400 MG tablet Take 1 tablet by mouth 2 (Two) Times a Day.      • metFORMIN (GLUCOPHAGE) 1000 MG tablet Take 1 tablet by mouth 2 (Two) Times a Day With Meals.      • nitroglycerin (NITROSTAT) 0.4 MG SL tablet Place 1 tablet under the tongue Every 5 (Five) Minutes As Needed for Chest Pain. Take no more than 3 doses in 15 minutes. 30 tablet 0    • omeprazole (priLOSEC) 20 MG capsule Take 20 mg by mouth Daily.      • oxybutynin XL (DITROPAN-XL) 5 MG 24 hr tablet Take 5 mg by mouth Daily.      • pantoprazole (PROTONIX) 40 MG EC tablet Take 1 tablet by mouth Every Morning. 30 tablet 0    • pregabalin (LYRICA) 150 MG capsule 3 (Three) Times a Day.      • pregabalin (LYRICA) 75 MG capsule Take 1 capsule by mouth 2 (Two) Times a Day for 30 days. (Patient taking differently: Take 2 capsules by mouth Daily.) 60 capsule 0    • traMADol (ULTRAM) 50 MG tablet Take 1 tablet by mouth 2 (Two) Times a Day.      • travoprost, BAK free, (TRAVATAN) 0.004 % solution ophthalmic solution Administer 1 drop to both eyes Daily.        Allergies:  Molds & smuts and Hydrocodone-acetaminophen    I have reviewed the patient's medical history, surgical history and family history in the available medical record system.     Current Medications:     Current Facility-Administered Medications   Medication Dose Route Frequency Provider Last Rate Last Admin   • acetaminophen (TYLENOL) suppository 650 mg  650 mg Rectal Q4H PRN Manuel Tucker MD       • acetaminophen (TYLENOL) tablet 650 mg  650 mg Oral Q6H PRN Behroozi, Saeid, MD       • atorvastatin (LIPITOR) tablet 40 mg  40 mg Oral Daily Behroozi, Saeid, MD   40  mg at 11/13/22 0926   • budesonide-formoterol (SYMBICORT) 160-4.5 MCG/ACT inhaler 2 puff  2 puff Inhalation BID - RT Behroozi, Saeid, MD   2 puff at 11/13/22 2106   • carvedilol (COREG) tablet 3.125 mg  3.125 mg Oral Q12H Behroozi, Saeid, MD   3.125 mg at 11/13/22 1202   • dextrose (D50W) (25 g/50 mL) IV injection 25 g  25 g Intravenous Q15 Min PRN Isha Holcomb APRN       • dextrose (GLUTOSE) oral gel 15 g  15 g Oral Q15 Min PRN Isha Holcomb APRN       • dilTIAZem CD (CARDIZEM CD) 24 hr capsule 180 mg  180 mg Oral Daily Behroozi, Saeid, MD   180 mg at 11/13/22 0926   • ferrous sulfate EC tablet 324 mg  324 mg Oral Daily With Breakfast Behroozi, Saeid, MD   324 mg at 11/13/22 0926   • glucagon (human recombinant) (GLUCAGEN DIAGNOSTIC) injection 1 mg  1 mg Intramuscular Q15 Min PRN Isha Holcomb APRN       • Insulin Aspart (novoLOG) injection 0-7 Units  0-7 Units Subcutaneous TID AC Isha Holcomb APRN   2 Units at 11/13/22 1731   • isosorbide mononitrate (IMDUR) 24 hr tablet 30 mg  30 mg Oral Q24H Behroozi, Saeid, MD   30 mg at 11/13/22 0926   • latanoprost (XALATAN) 0.005 % ophthalmic solution 1 drop  1 drop Both Eyes Nightly Behroozi, Saeid, MD   1 drop at 11/13/22 2035   • nitroglycerin (NITROSTAT) SL tablet 0.4 mg  0.4 mg Sublingual Q5 Min PRN Behroozi, Saeid, MD       • oxybutynin XL (DITROPAN-XL) 24 hr tablet 5 mg  5 mg Oral Daily Behroozi, Saeid, MD   5 mg at 11/13/22 0926   • pantoprazole (PROTONIX) 40 mg in 100mL NS IVPB  8 mg/hr Intravenous Continuous Manuel Tucker MD 20 mL/hr at 11/14/22 1638 8 mg/hr at 11/14/22 1638   • sodium chloride 0.9 % flush 10 mL  10 mL Intravenous Q12H Behroozi, Saeid, MD   10 mL at 11/13/22 2038   • sodium chloride 0.9 % flush 10 mL  10 mL Intravenous PRN Behroozi, Saeid, MD       • vitamin B-12 (CYANOCOBALAMIN) tablet 500 mcg  500 mcg Oral Daily Behroozi, Saeid, MD   500 mcg at 11/13/22 0926       Objective     Physical Exam:   Temp:  [98 °F (36.7 °C)-98.4 °F  (36.9 °C)] 98.4 °F (36.9 °C)  Heart Rate:  [] 98  Resp:  [16-28] 20  BP: (125-148)/(62-81) 148/62    Physical Exam:  General Appearance:    Alert, cooperative, in no acute distress   Head:    Normocephalic, without obvious abnormality, atraumatic   Eyes:            Lids and lashes normal, conjunctivae and sclerae normal, no   icterus, no pallor, corneas clear, PERRLA   Ears:    Ears appear intact with no abnormalities noted   Throat:   No oral lesions, no thrush, oral mucosa moist   Neck:   No adenopathy, supple, trachea midline, no thyromegaly, no     carotid bruit, no JVD   Back:     No kyphosis present, no scoliosis present, no skin lesions,       erythema or scars, no tenderness to percussion or                   palpation,   range of motion normal   Lungs:     Clear to auscultation,respirations regular, even and                   unlabored    Heart:    Regular rhythm and normal rate, normal S1 and S2, no            murmur, no gallop, no rub, no click   Breast Exam:    Deferred   Abdomen:     Normal bowel sounds, no masses, no organomegaly, soft        nontender, nondistended, no guarding, no rebound                 tenderness   Genitalia:    Deferred   Extremities:   Moves all extremities well, no edema, no cyanosis, no              redness   Pulses:   Pulses palpable and equal bilaterally   Skin:   No bleeding, bruising or rash   Lymph nodes:   No palpable adenopathy   Neurologic:   Cranial nerves 2 - 12 grossly intact, sensation intact, DTR        present and equal bilaterally      Results Review:     Lab Results   Component Value Date    WBC 19.74 (H) 11/14/2022    WBC 12.67 (H) 11/13/2022    WBC 13.61 (H) 11/12/2022    HGB 9.5 (L) 11/14/2022    HGB 9.1 (L) 11/14/2022    HGB 6.7 (C) 11/13/2022    HCT 29.3 (L) 11/14/2022    HCT 28.9 (L) 11/14/2022    HCT 22.2 (L) 11/13/2022     11/14/2022     11/13/2022     11/12/2022     Results from last 7 days   Lab Units 11/09/22  2236   ALK  PHOS U/L 53   ALT (SGPT) U/L 10   AST (SGOT) U/L 18     Results from last 7 days   Lab Units 11/09/22  2236   BILIRUBIN mg/dL 0.4   ALK PHOS U/L 53     No results found for: LIPASE  Lab Results   Component Value Date    INR 1.37 (H) 11/10/2022    INR 1.35 (H) 11/09/2022    INR 1.22 (H) 10/25/2022         Radiology Review:  Imaging Results (Last 72 Hours)     Procedure Component Value Units Date/Time    XR Abdomen KUB [[id number]] Collected: 11/14/22 1350     Updated: 11/14/22 1514    Narrative:      Comparison:  6/21/2021    Indication:  Possible GI bleed. Iron deficiency anemia    Findings:  Single supine radiograph of abdomen is obtained. There  is gaseous and fluid distention of large and small bowel.  Multiple leads overlie the abdomen.      Impression:      Gaseous and fluid distention of large and small  bowel. Consider CT.    Electronically signed by:  Gonzalo Willis MD  11/14/2022 3:12 PM CST  Workstation: [id number]    XR Chest 1 View [[id number]] Collected: 11/13/22 1647     Updated: 11/13/22 1659    Narrative:        PORTABLE CHEST    HISTORY: Hypoxemia.    Portable AP upright film of the chest was obtained at 4:46 PM.  COMPARISON: October 25, 2022    FINDINGS:   Chronic obstructive pulmonary disease.  Minimal subsegmental infiltrate inferiorly in the right upper  lobe abutting the minor fissure.  Increased opacity right lung base which may represent atelectasis  and/or infiltrate and/or pleural effusion.  Subsegmental infiltrate lateral left lung base.  Coronary artery bypass.  Minimal cardiomegaly.  The pulmonary vasculature is not increased.  No pleural effusion.  No pneumothorax.  No acute osseous abnormality.  Degenerative changes are present in the thoracic spine.      Impression:      CONCLUSION:  Chronic obstructive pulmonary disease.  Minimal subsegmental infiltrate inferiorly in the right upper  lobe abutting the minor fissure.  Increased opacity right lung base which may represent atelectasis  and/or  infiltrate and/or pleural effusion.  Subsegmental infiltrate lateral left lung base.  Coronary artery bypass.  Minimal cardiomegaly.    [id number]    Electronically signed by:  Smith Koehler MD  11/13/2022 4:57 PM  CST Workstation: [id number]          I reviewed the patient's new clinical results.    I reviewed the patient's new imaging results and agree with the interpretation.     ASSESSMENT/PLAN:   ASSESSMENT: 1.  Nausea with vomiting and hematemesis rule out peptic ulcer disease, Dariela-[place]on[place] tear and gastritis.  2.  Respiratory distress, on BiPAP  3.  Acidosis, on BiPAP  4.  Leukocytosis rule out pneumonia  5.  Anemia  6.  Fall with scalp laceration  PLAN: 1.  Continue PPI, antibiotics and current supportive care  2.  Follow H&H closely and transfuse as needed  3.  EGD in a.m. post improvement of respiratory distress  The risks, benefits, and alternatives of this procedure have been discussed with the patient or the responsible party. The patient understands and agrees to proceed.         Jordan Garcia MD  11/14/22  17:56 CST

## 2022-11-14 NOTE — PLAN OF CARE
Goal Outcome Evaluation:           Progress: declining  Outcome Evaluation: patient having increased WOB and oxygenation needs during night.  ABGs and Bipap ordered.

## 2022-11-14 NOTE — NURSING NOTE
Pt having increased WOB and oxygen needs throughout the night.  MD notified.  RT notified.  ABGs ordered.  Bipap ordered.  Will continue to monitor.

## 2022-11-14 NOTE — PROGRESS NOTES
[facility] Medicine Services  INPATIENT PROGRESS NOTE    Length of Stay: 1  Date of Admission: 11/9/2022  Primary Care Physician: Johnathon Shaikh MD    Subjective   (S) Patient male admitted for accidental fall with head contusion and acute blood loss anemia  Early morning he had respiratory acidosis with hypercapnia; on bipap now    Review of Systems   Unable to perform ROS: Acuity of condition        All pertinent negatives and positives are as above. All other systems have been reviewed and are negative unless otherwise stated.     Prior to Admission medications    Medication Sig Start Date End Date Taking? Authorizing Provider   acetaminophen (Tylenol) 325 MG tablet Take 650 mg by mouth Every 4 (Four) Hours As Needed for Mild Pain. 10/30/22   Johnathon Shaikh MD   albuterol sulfate HFA (Ventolin HFA) 108 (90 Base) MCG/ACT inhaler Inhale 2 puffs Every 4 (Four) Hours As Needed for Wheezing or Shortness of Air. 3/29/22   Katherin Case DO   atorvastatin (LIPITOR) 40 MG tablet Take 1 tablet by mouth Daily. 10/6/21   Radha Wilkes MD   budesonide-formoterol (SYMBICORT) 160-4.5 MCG/ACT inhaler Inhale 2 puffs 2 (Two) Times a Day. 4/26/22   Katherin aCse DO   carvedilol (COREG) 3.125 MG tablet Take 1 tablet by mouth Every 12 (Twelve) Hours. 1/19/22   Radha Wilkes MD   clopidogrel (PLAVIX) 75 MG tablet Take 1 tablet by mouth Daily. 1/19/22   Radha Wilkes MD   Cyanocobalamin (VITAMIN B 12 PO) Take 500 mcg by mouth Daily. Three times weekly, Vicky Song MD   dilTIAZem CD (CARDIZEM CD) 180 MG 24 hr capsule Take 1 capsule by mouth Daily. 10/6/21   Radha Wilkes MD   FeroSul 325 (65 Fe) MG tablet TAKE 1 TABLET EVERY DAY WITH BREAKFAST 4/7/22   Josef Covington MD   fluticasone (FLONASE) 50 MCG/ACT nasal spray 2 sprays into each nostril Every Night. 2/14/18   Camryn Koehler MD   ipratropium-albuterol  (DUO-NEB) 0.5-2.5 mg/3 ml nebulizer Inhale the contents of 1 vial by nebulization Every 4 (Four) Hours As Needed for Wheezing for up to 30 days. 2/10/21 3/12/21  Akbar Zapien MD   isosorbide mononitrate (IMDUR) 30 MG 24 hr tablet Take 1 tablet by mouth Daily. 2/28/18   Julio Cesar Moss APRN   losartan (COZAAR) 50 MG tablet Take 1 tablet by mouth Every Night. 1/19/22   Radha Wilkes MD   magnesium oxide (MAG-OX) 400 MG tablet Take 1 tablet by mouth 2 (Two) Times a Day. 4/15/21   Vicky Kent MD   metFORMIN (GLUCOPHAGE) 1000 MG tablet Take 1 tablet by mouth 2 (Two) Times a Day With Meals. 2/28/18   Julio Cesar Moss APRN   nitroglycerin (NITROSTAT) 0.4 MG SL tablet Place 1 tablet under the tongue Every 5 (Five) Minutes As Needed for Chest Pain. Take no more than 3 doses in 15 minutes. 2/27/18   Julio Cesar Moss APRN   omeprazole (priLOSEC) 20 MG capsule Take 20 mg by mouth Daily. 10/30/22   Johnathon Shaikh MD   oxybutynin XL (DITROPAN-XL) 5 MG 24 hr tablet Take 5 mg by mouth Daily.    Vicky Kent MD   pantoprazole (PROTONIX) 40 MG EC tablet Take 1 tablet by mouth Every Morning. 10/29/22   Andreia Ralph PA-C   pregabalin (LYRICA) 150 MG capsule 3 (Three) Times a Day.    Emergency, Nurse Epic, RN   pregabalin (LYRICA) 75 MG capsule Take 1 capsule by mouth 2 (Two) Times a Day for 30 days.  Patient taking differently: Take 2 capsules by mouth Daily. 2/12/21 7/12/21  Akbar Zapien MD   traMADol (ULTRAM) 50 MG tablet Take 1 tablet by mouth 2 (Two) Times a Day.    Vicky Kent MD   travoprost, BAK free, (TRAVATAN) 0.004 % solution ophthalmic solution Administer 1 drop to both eyes Daily. 10/30/22   Vicky Kent MD       atorvastatin, 40 mg, Oral, Daily  budesonide-formoterol, 2 puff, Inhalation, BID - RT  carvedilol, 3.125 mg, Oral, Q12H  dilTIAZem CD, 180 mg, Oral, Daily  ferrous sulfate, 324 mg, Oral, Daily With Breakfast  Insulin Aspart,  0-7 Units, Subcutaneous, TID AC  isosorbide mononitrate, 30 mg, Oral, Q24H  latanoprost, 1 drop, Both Eyes, Nightly  oxybutynin XL, 5 mg, Oral, Daily  pantoprazole, 40 mg, Oral, QAM  sodium chloride, 10 mL, Intravenous, Q12H  vitamin B-12, 500 mcg, Oral, Daily           Objective    Temp:  [97 °F (36.1 °C)-98.2 °F (36.8 °C)] 98.1 °F (36.7 °C)  Heart Rate:  [] 86  Resp:  [16-26] 20  BP: (110-139)/(57-81) 125/68    Physical Exam  Constitutional:       Comments: Lethargic with a Bipap on    HENT:      Head: Normocephalic. Laceration present.      Nose: Nose normal.      Mouth/Throat:      Mouth: Mucous membranes are moist.   Cardiovascular:      Rate and Rhythm: Regular rhythm.      Heart sounds: Normal heart sounds.   Pulmonary:      Breath sounds: Normal breath sounds.   Abdominal:      General: Bowel sounds are normal.      Palpations: Abdomen is soft.   Musculoskeletal:         General: Normal range of motion.      Cervical back: Normal range of motion and neck supple.   Skin:     General: Skin is warm.   Neurological:      Comments: Lethargic    Psychiatric:      Comments: With bipap         Results Review:  I have reviewed the labs, radiology results, and diagnostic studies.    Laboratory Data:   Results from last 7 days   Lab Units 11/14/22  0142 11/13/22  0541 11/12/22  0706 11/10/22  0803 11/09/22  2236   SODIUM mmol/L 144 143 145   < > 140   POTASSIUM mmol/L 4.7 4.4 4.5   < > 6.1*   CHLORIDE mmol/L 105 106 104   < > 106   CO2 mmol/L 31.0* 31.0* 34.0*   < > 26.0   BUN mg/dL 18 19 22   < > 30*   CREATININE mg/dL 1.19 1.10 1.50*   < > 1.75*   GLUCOSE mg/dL 160* 132* 128*   < > 119*   CALCIUM mg/dL 8.8 8.4* 8.6   < > 9.0   BILIRUBIN mg/dL  --   --   --   --  0.4   ALK PHOS U/L  --   --   --   --  53   ALT (SGPT) U/L  --   --   --   --  10   AST (SGOT) U/L  --   --   --   --  18   ANION GAP mmol/L 8.0 6.0 7.0   < > 8.0    < > = values in this interval not displayed.     Estimated Creatinine Clearance: 43.5  mL/min (by C-G formula based on SCr of 1.19 mg/dL).  Results from last 7 days   Lab Units 11/10/22  0803   MAGNESIUM mg/dL 1.8         Results from last 7 days   Lab Units 11/14/22  0145 11/13/22  0541 11/12/22  0706 11/11/22  1023 11/10/22  0803 11/09/22  2119   WBC 10*3/mm3 19.74* 12.67* 13.61*  --  16.66* 16.70*   HEMOGLOBIN g/dL 9.1* 6.7* 7.4* 8.3* 8.2* 6.9*   HEMATOCRIT % 28.9* 22.2* 24.7* 26.3* 26.7* 23.0*   PLATELETS 10*3/mm3 267 222 247  --  286 316     Results from last 7 days   Lab Units 11/10/22  0803 11/09/22  2236   INR  1.37* 1.35*       Culture Data:   No results found for: BLOODCX  No results found for: URINECX  No results found for: RESPCX  No results found for: WOUNDCX  No results found for: STOOLCX  No components found for: BODYFLD    Radiology Data:   Imaging Results (Last 24 Hours)     Procedure Component Value Units Date/Time    XR Chest 1 View [[id number]] Collected: 11/13/22 1647     Updated: 11/13/22 1659    Narrative:        PORTABLE CHEST    HISTORY: Hypoxemia.    Portable AP upright film of the chest was obtained at 4:46 PM.  COMPARISON: October 25, 2022    FINDINGS:   Chronic obstructive pulmonary disease.  Minimal subsegmental infiltrate inferiorly in the right upper  lobe abutting the minor fissure.  Increased opacity right lung base which may represent atelectasis  and/or infiltrate and/or pleural effusion.  Subsegmental infiltrate lateral left lung base.  Coronary artery bypass.  Minimal cardiomegaly.  The pulmonary vasculature is not increased.  No pleural effusion.  No pneumothorax.  No acute osseous abnormality.  Degenerative changes are present in the thoracic spine.      Impression:      CONCLUSION:  Chronic obstructive pulmonary disease.  Minimal subsegmental infiltrate inferiorly in the right upper  lobe abutting the minor fissure.  Increased opacity right lung base which may represent atelectasis  and/or infiltrate and/or pleural effusion.  Subsegmental infiltrate lateral left  lung base.  Coronary artery bypass.  Minimal cardiomegaly.    [id number]    Electronically signed by:  Smith Koehler MD  11/13/2022 4:57 PM  CST Workstation: [id number]          I have reviewed the patient's current medications.     Assessment/Plan   Accidental fall     With head contusion and scalp laceration; he would need to stay off of Plavix if possible     Acute blood loss anemia     Due to laceration on the head     S/p three units of PRBC given     H&H stable    Acute kidney injury on CKD     Due to dehyration      Resolving    Acute respiratory failure with hypoxemia     POA     Aggravated today early morning with hypoxemia and hypercapnia due to being given to him tramadol and 2 hours later iv lorazepam that depressed his respiratory symptoms; these medications were discontinued; continue with Bipap and ABG later     If ABG keeps improving and he still does not become alert, will need to do a CT head    Leukocytosis     Reactive?; he is afebrile; check procalcitonin    Chronic medical problems     CAD/CABG     Type 2 DM     Essential hypertension     Hyperlipidemia      Hx of carotid stenosis      Manuel Tucker MD

## 2022-11-14 NOTE — SIGNIFICANT NOTE
11/14/22 0952   OTHER   Discipline occupational therapy assistant   Rehab Time/Intention   Session Not Performed other (see comments)  (Nsg deferred therapy services today secondary to respiratory issues)

## 2022-11-14 NOTE — SIGNIFICANT NOTE
11/14/22 1155   OTHER   Discipline physical therapy assistant   Rehab Time/Intention   Session Not Performed other (see comments)  (nursing deferred treatment secondary to patients current respiratory condition/concerns)   Recommendation   PT - Next Appointment 11/15/22

## 2022-11-15 PROBLEM — K92.0 HEMATEMESIS WITH NAUSEA: Status: ACTIVE | Noted: 2022-01-01

## 2022-11-15 NOTE — PLAN OF CARE
Goal Outcome Evaluation:           Progress: declining  Outcome Evaluation: pt continues on 100% AVAPS.  Only resonsive to pain.  CTA results called to physician.  Further tests ordered.

## 2022-11-15 NOTE — PROGRESS NOTES
"  Pharmacokinetics by Pharmacy - Vancomycin    Vishnu Waller is a 82 y.o. male   [Ht: 160 cm (63\"); Wt: 59.4 kg (131 lb)] was started on Vancomycin for sepsis.   Pt is also on Cefepime 2 gm IV every 24 hours.    Estimated Creatinine Clearance: 27.5 mL/min (A) (by C-G formula based on SCr of 1.8 mg/dL (H)).   Creatinine   Date Value Ref Range Status   11/15/2022 1.80 (H) 0.76 - 1.27 mg/dL Final   11/14/2022 1.19 0.76 - 1.27 mg/dL Final   11/13/2022 1.10 0.76 - 1.27 mg/dL Final   11/01/2022 1.6 (H) 0.7 - 1.3 mg/dL Final   04/22/2022 1.1 0.7 - 1.3 mg/dL Final   11/04/2021 1.1 0.7 - 1.3 mg/dL Final      Lab Results   Component Value Date    WBC 35.31 (C) 11/15/2022    WBC 19.74 (H) 11/14/2022    WBC 12.67 (H) 11/13/2022      Temp Readings from Last 1 Encounters:   11/15/22 97.9 °F (36.6 °C)      Lactate   Date Value Ref Range Status   11/15/2022 1.6 0.5 - 2.0 mmol/L Final       Indication for use: sepsis     Baseline culture results:  Microbiology Results (last 10 days)       Procedure Component Value - Date/Time    COVID-19 and FLU A/B PCR - Swab, Nasopharynx [[id number]]  (Normal) Collected: 11/14/22 0242    Lab Status: Final result Specimen: Swab from Nasopharynx Updated: 11/14/22 0306     COVID19 Not Detected     Influenza A PCR Not Detected     Influenza B PCR Not Detected    Narrative:      Fact sheet for providers: https://www.fda.gov/media/520760/download    Fact sheet for patients: https://www.fda.gov/media/941426/download    Test performed by PCR.          Blood cultures pending    Assessment/Plan  Initiated Vancomycin 1250 mg x 1 dose today at 1106.  WBC increase to 35.31  Cr increased today to 1.8, from 1.19 yesterday    Noted per provider:  Acute kidney injury on CKD     Due to dehyration     Worsening this morning likely due to lack of intake vs contrast use  Treating empirically as PNA- ordered a MRSA nasal swab.  No Vancomycin levels ordered at present.   Possible de escalation tomorrow.   Based on " current Cr level dosing would be ever 36 hours. Will order a random level in the am for pulse dosing.   Pharmacy will monitor renal function and adjust dose accordingly.    Tana Diaz, PharmD  11/15/22 14:51 CST

## 2022-11-15 NOTE — THERAPY TREATMENT NOTE
Patient Name: Vishnu Waller  : 1940    MRN: [id number]                              Today's Date: 11/15/2022       Physical Therapy Treatment Note    Admit Date: 2022    Visit Dx:     ICD-10-CM ICD-9-CM   1. Injury of head, initial encounter  S09.90XA 959.01   2. Blood loss anemia  D50.0 280.0   3. Fall, initial encounter  W19.XXXA E888.9   4. Impaired mobility and ADLs  Z74.09 V49.89    Z78.9    5. Impaired functional mobility, balance, gait, and endurance  Z74.09 V49.89   6. Hematemesis with nausea  K92.0 578.0     787.02   7. Degenerative disc disease, lumbar  M51.36 722.52     Patient Active Problem List   Diagnosis   • Degeneration of lumbar intervertebral disc   • Low back pain without sciatica   • Neuritis or radiculitis due to rupture of lumbar intervertebral disc   • Borderline glaucoma, open angle with borderline findings   • Pseudophakia   • History of coronary artery bypass surgery   • Essential hypertension   • Mixed hyperlipidemia   • Spinal stenosis of lumbar region   • Degenerative disc disease, lumbar   • Chronic pain of right knee   • History of artificial joint   • B12 deficiency   • Degeneration of intervertebral disc of lumbosacral region   • Personal history of other infectious and parasitic diseases   • Status post lumbar spine operation   • History of pyelonephritis   • History of surgical procedure   • History of repair of rotator cuff   • Encounter for follow-up examination after completed treatment for conditions other than malignant neoplasm   • Encounter for general adult medical examination without abnormal findings   • Osteoarthritis of knee   • Osteoarthritis of multiple joints   • Primary insomnia   • Encounter for screening for malignant neoplasm of colon   • Encounter for screening for malignant neoplasm of prostate   • Type 2 diabetes mellitus, without long-term current use of insulin (HCC)   • Coronary artery disease involving native coronary artery of native  heart without angina pectoris   • Dyspnea on exertion   • Presence of aortocoronary bypass graft   • Dizziness and giddiness   • Postviral fatigue syndrome   • Postviral fatigue syndrome   • Recurrent kidney stones   • Thrombocytopenia ([facility])   • Pyelonephritis   • Chronic non-seasonal allergic rhinitis   • Unstable angina ([facility])   • NSTEMI (non-ST elevated myocardial infarction) ([facility])   • History of PTCA 1   • Medicare annual wellness visit, subsequent   • Thoracic aortic aneurysm without rupture   • Chronic kidney disease, stage 2 (mild)   • Personal history of tobacco use, presenting hazards to health   • Cervical pain (neck)   • Thoracic spine pain   • Calculus of ureter   • Foreign body in bladder and urethra   • Physical deconditioning   • Bilateral hip pain   • Pain of left humerus   • PVD (peripheral vascular disease) ([facility])   • Renal mass   • BPPV (benign paroxysmal positional vertigo)   • Degenerative disc disease, lumbar   • Anemia   • Prostate cancer ([facility])   • Overweight with body mass index (BMI) of 25 to 25.9 in adult   • Elevated PSA   • Abnormal SPEP   • Chronic obstructive pulmonary disease ([facility])   • NAREN (acute kidney injury) ([facility])   • Hyperkalemia   • Syncope   • Weakness   • NAREN (acute kidney injury) ([facility])   • Leukocytosis   • Injury of head, initial encounter   • Hematemesis with nausea     Past Medical History:   Diagnosis Date   • Acute bacterial sinusitis    • Acute bronchitis    • Acute frontal sinusitis    • Acute maxillary sinusitis    • Acute pharyngitis    • Allergic rhinitis    • Allergic rhinitis due to pollen    • Anxiety    • Artificial lens present     in position   • Asthma    • Backache    • Borderline glaucoma    • C. difficile diarrhea 2014   • Chronic laryngitis    • Chronic rhinitis    • Coronary artery disease    • Cough    • Degeneration of lumbar intervertebral disc    • Degenerative joint disease involving multiple joints    • Diabetes mellitus (HCC)    • Diverticular disease  of colon    • Dizziness and giddiness    • Elevated cholesterol    • Erectile dysfunction    • Essential hypertension    • Generalized anxiety disorder    • GERD (gastroesophageal reflux disease)    • Glaucoma    • Hemorrhoids     without bleeding   • Insomnia    • Kidney stone    • Kidney stones    • Malignant tumor of prostate (HCC)    • Need for prophylactic vaccination and inoculation against influenza    • Osteoarthritis    • Osteoarthritis of multiple joints    • Pain, lumbar region     Pain radiating to lumbar region of back     • PONV (postoperative nausea and vomiting)    • Postviral fatigue syndrome    • Presence of aortocoronary bypass graft    • Prostate cancer (HCC)    • Pseudomembranous enterocolitis     improved   • Rotator cuff syndrome     right   • Shoulder pain    • SOB (shortness of breath)    • Tenosynovitis    • Thrombocytopenia (HCC)    • Upper respiratory infection      Past Surgical History:   Procedure Laterality Date   • CARDIAC CATHETERIZATION  09/25/2003    Cardiac cath (Normal left ventricular systolic function, EF 60% Multi-vessel coronary artery disease with critical disease noted in the LAD coronary artery, diagonal coronary artery, obtuse marginal coronary and right coronary artery.)   • CARDIAC CATHETERIZATION  05/03/1996    Cardiac cath (Coronary atherosclerotic heart disease. 70% diagonal stenosis. Mild to moderate left anterior descending artery stenosis. Preserved left ventricular function.)   • CARDIAC CATHETERIZATION N/A 2/26/2018    Procedure: Left Heart Cath/ pci if indicated ;  Surgeon: Radha Wilkes MD;  Location: [facility] INVASIVE LOCATION;  Service:    • CATARACT EXTRACTION Bilateral    • CATARACT EXTRACTION  10/04/2011    Remove cataract, insert lens (Right)   • CATARACT EXTRACTION  08/23/2011    Remove cataract, insert lens (left)   • COLONOSCOPY     • COLONOSCOPY      Colon endoscopy 93131 (Rectal bleeding. Radiation proctitis w/bleeding. An abnormal  CT of transverse colon due to mucosal ischemic colitis, that now is healed. Internal hemorrhoids w/possible bleeding.)   • COLONOSCOPY  05/10/2011    Colon endoscopy 92602 (Single sessile polyp found in transverse colon and sigmoid colon ,removed by cold biopsy polypectomy.divertic. found in sigmoid colon,descending colon. Friability/capillary friability in rectum sigmoid due to prior radiation.Inter. hem. Grade 1)   • COLONOSCOPY  05/02/2007    Colon endoscopy 12856 (Colon polyp. Diverticulosis without bleeding. Internal hemorrhoids without bleeding.)   • CORONARY ARTERY BYPASS GRAFT  2002   • CYSTOSCOPY  01/13/1995    Cystoscopy, stone removal (Right ureteroscopic lasertripsy.)   • CYSTOSCOPY Right 1/23/2019    Procedure: CYSTOSCOPY, RIGHT STENT REMOVAL;  Surgeon: Nirmal Gaines MD;  Location: [facility];  Service: Urology   • CYSTOSCOPY, URETEROSCOPY, RETROGRADE PYELOGRAM, STENT INSERTION N/A 8/28/2017    Procedure: URETEROSCOPY LASER LITHOTRIPSY WITH STENT INSERTION AND RETROGRADE PYELOGRAM ;  Surgeon: Anna M. D'Amico, MD;  Location: [facility];  Service:    • CYSTOSCOPY, URETEROSCOPY, RETROGRADE PYELOGRAM, STENT INSERTION Right 1/11/2019    Procedure: CYSTOSCOPY URETEROSCOPY RETROGRADE PYELOGRAM HOLMIUM LASER STENT INSERTION;  Surgeon: Nirmal Gaines MD;  Location: [facility];  Service: Urology   • EPIDURAL  12/03/2014    Therapeutic/diag injection (Lumbar transforaminal epidural steroid injection, L5-S1, left side.)   • EPIDURAL  10/22/2014    Therapeutic/diag injection (Lumbar transforaminal epidural steroid injection L5-S1 left side.)   • EPIDURAL  08/07/2014    Therapeutic/diag injection (Lumbar transforaminal epidural steroid injection.)   • EXCISION LESION  05/13/1999    REMOVE EAR LESION 38141 (1.3 cm basal cell carcinoma, right preauricular area. Excision)   • EXCISION LESION  03/13/2001    REMOVE LESION NECK/CHEST 86476 (Excision of irritated seborrheic keratosis, anterior neck, 1.1 cm and deep  lipoma, posterior neck, 3.5 cm)   • INJECTION OF MEDICATION  11/15/2012    Kenalog (4)      • JOINT REPLACEMENT  2015    partial   • KNEE ARTHROSCOPY  01/17/2007    Knee arthroscopy, surgery (Arthroscopy with medial and lateral meniscectomies, right knee.)   • KNEE SURGERY  11/04/2015    Knee Surgery (Right unicompartmental arthroplasty.)   • LUMBAR LAMINECTOMY N/A 2/7/2017    Procedure: LUMBAR LAMINECTOMY LUMBAR THREE-FOUR, LUMBAR FOUR-FIVE, INTERLAMINAR DISTRACTION ;  Surgeon: Lucio Mayo MD;  Location: [facility];  Service:    • NOSE SURGERY     • OTHER SURGICAL HISTORY      EXTENDED VISUAL FIELDS STUDY 69549 (Borderline glaucoma) (3): 03/19/2015, 04/09/2014, 03/27/2013   • OTHER SURGICAL HISTORY  10/29/2003    Heart revascularize (TMR) (Directmyocardial revascularization times four; LIMA to LAD SVG to DARIUSZ SVG to OM1, SVG to RCA)   • OTHER SURGICAL HISTORY      OCT DISC NFL 80776 (Borderline glaucoma)  (3): 09/24/2015, 08/13/2014, 07/29/2013   • PROSTATECTOMY     • SEPTORHINOPLASTY  11/16/1989    Nasal surgery procedure (Septorhinoplasty with reconstruction of the nasal pyramid with a iliac crest graft, rhinoplasty was performed with external approach.)   • SHOULDER ARTHROSCOPY  07/24/2013    Arthroscopy of right shoulder with rotator repair, Carla procedure, Biceps tenotomy, subacromial decompression.   • SHOULDER SURGERY  11/01/2005    Shoulder surgery procedure (Decompression, subacromial, explore of the rotator cuff, no tear found nor repaired, acromioplasty of the left shoulder and AC shoulder joint resection.)      General Information     Row Name 11/15/22 2306          Physical Therapy Time and Intention    Document Type therapy note (daily note)  -     Mode of Treatment individual therapy;physical therapy  -     Row Name 11/15/22 7368          General Information    Patient Profile Reviewed yes  -     Existing Precautions/Restrictions fall  -     Row Name 11/15/22 6059           Cognition    Orientation Status (Cognition) unable/difficult to assess  -     Row Name 11/15/22 1358          Safety Issues, Functional Mobility    Safety Issues Affecting Function (Mobility) ability to follow commands  -     Impairments Affecting Function (Mobility) balance;endurance/activity tolerance;grasp;pain;strength;shortness of breath  -           User Key  (r) = Recorded By, (t) = Taken By, (c) = Cosigned By    Initials Name Provider Type     Eli Smith PTA Physical Therapist Assistant               Mobility     Row Name 11/15/22 1424          Bed Mobility    Bed Mobility rolling left;rolling right;scooting/bridging  -     Rolling Left [place] (Bed Mobility) dependent (less than 25% patient effort);1 person assist  -     Rolling Right [place] (Bed Mobility) dependent (less than 25% patient effort);1 person assist  -     Scooting/Bridging [place] (Bed Mobility) dependent (less than 25% patient effort);2 person assist  -     Assistive Device (Bed Mobility) draw sheet;bed rails  -     Row Name 11/15/22 1424          Bed-Chair Transfer    Bed-Chair [place] (Transfers) unable to assess;not tested  -[facility] Name 11/15/22 1424          Sit-Stand Transfer    Sit-Stand [place] (Transfers) unable to assess;not tested  -     Row Name 11/15/22 1424          Gait/Stairs (Locomotion)    [place] Level (Gait) unable to assess;not tested  -     [place] Level (Stairs) unable to assess;not tested  -           User Key  (r) = Recorded By, (t) = Taken By, (c) = Cosigned By    Initials Name Provider Type     Eli Smith PTA Physical Therapist Assistant               Obj/Interventions     Row Name 11/15/22 1426          Motor Skills    Therapeutic Exercise hip;knee;ankle  -[facility] Name 11/15/22 1426          Hip (Therapeutic Exercise)    Hip (Therapeutic Exercise) AAROM (active assistive range of motion)  -     Hip AAROM (Therapeutic Exercise)  bilateral;aBduction;aDduction;external rotation;internal rotation;supine;10 repetitions;2 sets  -     Row Name 11/15/22 1426          Knee (Therapeutic Exercise)    Knee (Therapeutic Exercise) AAROM (active assistive range of motion)  -     Knee AAROM (Therapeutic Exercise) bilateral;flexion;extension;supine;10 repetitions;2 sets  -     Row Name 11/15/22 1426          Ankle (Therapeutic Exercise)    Ankle AROM (Therapeutic Exercise) bilateral;dorsiflexion;plantarflexion;supine;15 repititions  -           User Key  (r) = Recorded By, (t) = Taken By, (c) = Cosigned By    Initials Name Provider Type     Eli Smith PTA Physical Therapist Assistant               Goals/Plan     Row Name 11/15/22 1434          Bed Mobility Goal 1 (PT)    Activity/Assistive Device (Bed Mobility Goal 1, PT) sit to supine;supine to sit  -     [place] Level/Cues Needed (Bed Mobility Goal 1, PT) modified independence  -     Time Frame (Bed Mobility Goal 1, PT) by discharge  -     Row Name 11/15/22 1434          Transfer Goal 1 (PT)    Activity/Assistive Device (Transfer Goal 1, PT) walker, rolling;bed-to-chair/chair-to-bed;sit-to-stand/stand-to-sit  -     [place] Level/Cues Needed (Transfer Goal 1, PT) modified independence  -     Time Frame (Transfer Goal 1, PT) by discharge  -     Row Name 11/15/22 1434          Gait Training Goal 1 (PT)    Activity/Assistive Device (Gait Training Goal 1, PT) assistive device use;gait (walking locomotion)  -     [place] Level (Gait Training Goal 1, PT) modified independence  -     Distance (Gait Training Goal 1, PT) 50'x2  -     Time Frame (Gait Training Goal 1, PT) by discharge  -     Progress/Outcome (Gait Training Goal 1, PT) goal not met  -           User Key  (r) = Recorded By, (t) = Taken By, (c) = Cosigned By    Initials Name Provider Type     Eli Smith PTA Physical Therapist Assistant               Clinical Impression     Row Name 11/15/22  "7336          Pain    Pain Location generalized  -     Pain Location - other (see comments)  \"all over\"  -     Pre/Posttreatment Pain Comment patient is unable to appropriately rate pain, he is moaning throughout and mumbling but unable to comprehend him other than him saying he hurts and all over when asked where.  -     Pain Intervention(s) Repositioned;Nursing Notified  -     Row Name 11/15/22 7583          Plan of Care Review    Plan of Care Reviewed With patient  -     Progress declining  -     Outcome Evaluation patient is on Airvo device. nursing okays bed activity only and to keep O2 SATS up. patient has difficulty following commands throughout. All ROM is performed AAROM to PROM with consistent cueing to keep patient engaged. he keeps his eyes closed during most of treatment. dependent x2 for repositioning/scooting up in bed. he is dependent x1 for rolling in bed to both the right and left with patient contineuing to have difficulty following commands. His O2 Sats do stay between % with treatment today.unable to get patient EOB secondary to his inability to follow commands and his decreased alertness.  -     Row Name 11/15/22 6537          Therapy Assessment/Plan (PT)    Rehab Potential (PT) fair, will monitor progress closely  -[facility] Name 11/15/22 1267          Vital Signs    Pre Systolic BP Rehab 118  -MH     Pre Treatment Diastolic BP 60  -MH     Post Systolic BP Rehab 121  -MH     Post Treatment Diastolic BP 58  -MH     Pretreatment Heart Rate (beats/min) 97  -MH     Posttreatment Heart Rate (beats/min) 96  -     Pre SpO2 (%) 100  -MH     O2 Delivery Pre Treatment hi-flow  -     Post SpO2 (%) 99  -MH     O2 Delivery Post Treatment hi-flow  -     Pre Patient Position Supine  HOB/feet of bed elevated  -     Post Patient Position Side Lying  Left Sidelying  -     Row Name 11/15/22 6253          Positioning and Restraints    Pre-Treatment Position in bed  -     Post " Treatment Position bed  -     In Bed notified nsg;side lying left;call light within reach;encouraged to call for assist;exit alarm on  -           User Key  (r) = Recorded By, (t) = Taken By, (c) = Cosigned By    Initials Name Provider Type    Eli Hernandez PTA Physical Therapist Assistant               Outcome Measures     Row Name 11/15/22 1435 11/15/22 0836       How much help from another person do you currently need...    Turning from your back to your side while in flat bed without using bedrails? 1  - 2  -CB    Moving from lying on back to sitting on the side of a flat bed without bedrails? 1  - 1  -CB    Moving to and from a bed to a chair (including a wheelchair)? 1  - 1  -CB    Standing up from a chair using your arms (e.g., wheelchair, bedside chair)? 1  - 1  -CB    Climbing 3-5 steps with a railing? 1  - 1  -CB    To walk in hospital room? 1  - 1  -CB    AM-PAC 6 Clicks Score (PT) 6  - 7  -CB    Highest level of mobility 2 --> Bed activities/dependent transfer  - 2 --> Bed activities/dependent transfer  -CB          User Key  (r) = Recorded By, (t) = Taken By, (c) = Cosigned By    Initials Name Provider Type    Eli Hernandez PTA Physical Therapist Assistant    Erik Pedraza, RN Registered Nurse                             Physical Therapy Education     Title: PT OT SLP Therapies (In Progress)     Topic: Physical Therapy (Done)     Point: Mobility training (Done)     Learning Progress Summary           Patient Acceptance, E,TB, VU by LR at 11/10/2022 1112    Comment: Educated on PT POC and goals.                   Point: Home exercise program (Done)     Learning Progress Summary           Patient Acceptance, E,TB, VU by LR at 11/10/2022 1112    Comment: Educated on PT POC and goals.                   Point: Body mechanics (Done)     Learning Progress Summary           Patient Acceptance, E,TB, VU by LR at 11/10/2022 1112    Comment: Educated on PT POC and goals.                    Point: Precautions (Done)     Learning Progress Summary           Patient Acceptance, E,TB, VU by LR at 11/10/2022 1112    Comment: Educated on PT POC and goals.                               User Key     Initials Effective Dates Name Provider Type Discipline     06/16/21 -  Elkin Chavez Physical Therapist PT              PT Recommendation and Plan     Plan of Care Reviewed With: patient  Progress: declining  Outcome Evaluation: patient is on Airvo device. nursing okays bed activity only and to keep O2 SATS up. patient has difficulty following commands throughout. All ROM is performed AAROM to PROM with consistent cueing to keep patient engaged. he keeps his eyes closed during most of treatment. dependent x2 for repositioning/scooting up in bed. he is dependent x1 for rolling in bed to both the right and left with patient contineuing to have difficulty following commands. His O2 Sats do stay between % with treatment today.unable to get patient EOB secondary to his inability to follow commands and his decreased alertness.     Time Calculation:    PT Charges     Row Name 11/15/22 1436             Time Calculation    Start Time 1350  -MH      Stop Time 1418  -MH      Time Calculation (min) 28 min  -         Time Calculation- PT    Total Timed Code Minutes- PT 28 minute(s)  -MH         Timed Charges    88423 - PT Therapeutic Exercise Minutes 15  -MH      04133 - PT Therapeutic Activity Minutes 13  -MH         Total Minutes    Timed Charges Total Minutes 28  -MH       Total Minutes 28  -MH            User Key  (r) = Recorded By, (t) = Taken By, (c) = Cosigned By    Initials Name Provider Type     Eli Smith PTA Physical Therapist Assistant              Therapy Charges for Today     Code Description Service Date Service Provider Modifiers Qty    53689137638 HC PT THER PROC EA 15 MIN 11/15/2022 Eli Smith PTA GP 1    [id number] HC PT THERAPEUTIC ACT EA 15 MIN 11/15/2022 Eli Smith  PTA GP 1          PT G-Codes  Outcome Measure Options: AM-PAC 6 Clicks Basic Mobility (PT)  AM-PAC 6 Clicks Score (PT): 6  AM-PAC 6 Clicks Score (OT): 20  PT Discharge Summary  Anticipated Discharge Disposition (PT): home with assist, home with home health    Eli Smith, PTA  11/15/2022

## 2022-11-15 NOTE — PROGRESS NOTES
[facility] Medicine Services  INPATIENT PROGRESS NOTE    Length of Stay: 2  Date of Admission: 11/9/2022  Primary Care Physician: Johnathon Shaikh MD    Subjective   (S) Patient male admitted for accidental fall with head contusion and acute blood loss anemia  He is more alert, answering questions with airvo; just put a purewick on him; took a couple of sips of water      Review of Systems   Unable to perform ROS: Acuity of condition        All pertinent negatives and positives are as above. All other systems have been reviewed and are negative unless otherwise stated.     Prior to Admission medications    Medication Sig Start Date End Date Taking? Authorizing Provider   acetaminophen (Tylenol) 325 MG tablet Take 650 mg by mouth Every 4 (Four) Hours As Needed for Mild Pain. 10/30/22   Johnathon Shaikh MD   albuterol sulfate HFA (Ventolin HFA) 108 (90 Base) MCG/ACT inhaler Inhale 2 puffs Every 4 (Four) Hours As Needed for Wheezing or Shortness of Air. 3/29/22   Katherin Case DO   atorvastatin (LIPITOR) 40 MG tablet Take 1 tablet by mouth Daily. 10/6/21   Radha Wilkes MD   budesonide-formoterol (SYMBICORT) 160-4.5 MCG/ACT inhaler Inhale 2 puffs 2 (Two) Times a Day. 4/26/22   Katherin Case DO   carvedilol (COREG) 3.125 MG tablet Take 1 tablet by mouth Every 12 (Twelve) Hours. 1/19/22   Radha Wilkes MD   clopidogrel (PLAVIX) 75 MG tablet Take 1 tablet by mouth Daily. 1/19/22   Radha Wilkes MD   Cyanocobalamin (VITAMIN B 12 PO) Take 500 mcg by mouth Daily. Three times weekly, Vicky Song MD   dilTIAZem CD (CARDIZEM CD) 180 MG 24 hr capsule Take 1 capsule by mouth Daily. 10/6/21   Radha Wilkes MD   FeroSul 325 (65 Fe) MG tablet TAKE 1 TABLET EVERY DAY WITH BREAKFAST 4/7/22   Josef Covington MD   fluticasone (FLONASE) 50 MCG/ACT nasal spray 2 sprays into each nostril Every Night. 2/14/18    Camryn Koehler MD   ipratropium-albuterol (DUO-NEB) 0.5-2.5 mg/3 ml nebulizer Inhale the contents of 1 vial by nebulization Every 4 (Four) Hours As Needed for Wheezing for up to 30 days. 2/10/21 3/12/21  Akbar Zapien MD   isosorbide mononitrate (IMDUR) 30 MG 24 hr tablet Take 1 tablet by mouth Daily. 2/28/18   Julio Cesar Moss APRN   losartan (COZAAR) 50 MG tablet Take 1 tablet by mouth Every Night. 1/19/22   Radha Wilkes MD   magnesium oxide (MAG-OX) 400 MG tablet Take 1 tablet by mouth 2 (Two) Times a Day. 4/15/21   ProviderVicky MD   metFORMIN (GLUCOPHAGE) 1000 MG tablet Take 1 tablet by mouth 2 (Two) Times a Day With Meals. 2/28/18   Julio Cesar Moss APRN   nitroglycerin (NITROSTAT) 0.4 MG SL tablet Place 1 tablet under the tongue Every 5 (Five) Minutes As Needed for Chest Pain. Take no more than 3 doses in 15 minutes. 2/27/18   Julio Cesar Moss APRN   omeprazole (priLOSEC) 20 MG capsule Take 20 mg by mouth Daily. 10/30/22   Johnathon Shaikh MD   oxybutynin XL (DITROPAN-XL) 5 MG 24 hr tablet Take 5 mg by mouth Daily.    Provider, MD Vicky   pantoprazole (PROTONIX) 40 MG EC tablet Take 1 tablet by mouth Every Morning. 10/29/22   Andreia Ralph PA-C   pregabalin (LYRICA) 150 MG capsule 3 (Three) Times a Day.    Emergency, Nurse Emily, RN   pregabalin (LYRICA) 75 MG capsule Take 1 capsule by mouth 2 (Two) Times a Day for 30 days.  Patient taking differently: Take 2 capsules by mouth Daily. 2/12/21 7/12/21  Akbar Zapien MD   traMADol (ULTRAM) 50 MG tablet Take 1 tablet by mouth 2 (Two) Times a Day.    Provider, MD Vicky   travoprost, BAK free, (TRAVATAN) 0.004 % solution ophthalmic solution Administer 1 drop to both eyes Daily. 10/30/22   Provider, MD Vicky       atorvastatin, 40 mg, Oral, Daily  budesonide-formoterol, 2 puff, Inhalation, BID - RT  carvedilol, 3.125 mg, Oral, Q12H  cefepime, 2 g, Intravenous, Once  cefepime, 2 g,  Intravenous, Q24H  dilTIAZem CD, 180 mg, Oral, Daily  ferrous sulfate, 324 mg, Oral, Daily With Breakfast  Insulin Aspart, 0-7 Units, Subcutaneous, TID AC  isosorbide mononitrate, 30 mg, Oral, Q24H  latanoprost, 1 drop, Both Eyes, Nightly  oxybutynin XL, 5 mg, Oral, Daily  Pharmacy to dose vancomycin, , Does not apply, Daily  sodium chloride, 10 mL, Intravenous, Q12H  vancomycin, 20 mg/kg, Intravenous, Once  vitamin B-12, 500 mcg, Oral, Daily      dextrose, 75 mL/hr  pantoprazole, 8 mg/hr, Last Rate: 8 mg/hr (11/14/22 2137)  Pharmacy to Dose Cefepime,         Objective    Temp:  [97.6 °F (36.4 °C)-98.4 °F (36.9 °C)] 97.9 °F (36.6 °C)  Heart Rate:  [] 87  Resp:  [20-28] 22  BP: ()/(52-76) 96/52    Physical Exam  Constitutional:       Comments: Somnolent with Airvo   HENT:      Head: Normocephalic. Laceration present.      Nose: Nose normal.      Mouth/Throat:      Mouth: Mucous membranes are moist.   Cardiovascular:      Rate and Rhythm: Regular rhythm.      Heart sounds: Normal heart sounds.   Pulmonary:      Breath sounds: Normal breath sounds.   Abdominal:      General: Bowel sounds are normal.      Palpations: Abdomen is soft.   Musculoskeletal:         General: Normal range of motion.      Cervical back: Normal range of motion and neck supple.   Skin:     General: Skin is warm.   Neurological:      Mental Status: Mental status is at baseline.      Comments: Somnolent but very easily awaken; moves all extremities   Psychiatric:         Mood and Affect: Mood normal.      Comments: With Airvo         Results Review:  I have reviewed the labs, radiology results, and diagnostic studies.    Laboratory Data:   Results from last 7 days   Lab Units 11/15/22  0606 11/14/22  0142 11/13/22  0541 11/10/22  0803 11/09/22  2236   SODIUM mmol/L 146* 144 143   < > 140   POTASSIUM mmol/L 4.9 4.7 4.4   < > 6.1*   CHLORIDE mmol/L 106 105 106   < > 106   CO2 mmol/L 28.0 31.0* 31.0*   < > 26.0   BUN mg/dL 36* 18 19   < >  30*   CREATININE mg/dL 1.80* 1.19 1.10   < > 1.75*   GLUCOSE mg/dL 142* 160* 132*   < > 119*   CALCIUM mg/dL 9.0 8.8 8.4*   < > 9.0   BILIRUBIN mg/dL  --   --   --   --  0.4   ALK PHOS U/L  --   --   --   --  53   ALT (SGPT) U/L  --   --   --   --  10   AST (SGOT) U/L  --   --   --   --  18   ANION GAP mmol/L 12.0 8.0 6.0   < > 8.0    < > = values in this interval not displayed.     Estimated Creatinine Clearance: 27.5 mL/min (A) (by C-G formula based on SCr of 1.8 mg/dL (H)).  Results from last 7 days   Lab Units 11/10/22  0803   MAGNESIUM mg/dL 1.8         Results from last 7 days   Lab Units 11/15/22  0606 11/14/22  1359 11/14/22  0145 11/13/22  0541 11/12/22  0706 11/11/22  1023 11/10/22  0803   WBC 10*3/mm3 35.31*  --  19.74* 12.67* 13.61*  --  16.66*   HEMOGLOBIN g/dL 8.9* 9.5* 9.1* 6.7* 7.4*   < > 8.2*   HEMATOCRIT % 27.9* 29.3* 28.9* 22.2* 24.7*   < > 26.7*   PLATELETS 10*3/mm3 228  --  267 222 247  --  286    < > = values in this interval not displayed.     Results from last 7 days   Lab Units 11/10/22  0803 11/09/22  2236   INR  1.37* 1.35*       Culture Data:   No results found for: BLOODCX  No results found for: URINECX  No results found for: RESPCX  No results found for: WOUNDCX  No results found for: STOOLCX  No components found for: BODYFLD    Radiology Data:   Imaging Results (Last 24 Hours)     Procedure Component Value Units Date/Time    CT Abdomen Pelvis With Contrast [[id number]] Resulted: 11/15/22 0758     Updated: 11/15/22 0759    CT Angiogram Chest [[id number]] Collected: 11/14/22 2343     Updated: 11/15/22 0054    Addenda:         ADDENDUM   ADDENDUM #1       Results were discussed with RN Gina Currie at 12:30 AM central  time.    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     COMPARISON: 11/13/2022    TECHNIQUE:  Postcontrast helical CT acquisition performed from the level of  the lung apices to the adrenal glands with coronal and sagittal  reformats including MIP coronal and sagittal reformatted images.    CT Dose reduction techniques were utilized for this examination  with 1 or more of the following: Automated exposure control  and/or adjustment of the mA or kV according to patient size,  and/or use of iterative reconstruction technique.    IV Contrast: Nonionic iodinated contrast.    Complications: None.    FINDINGS:    Pulmonary arteries: No pulmonary embolus identified.    Mediastinum: No lymphadenopathy. No significant pericardial  effusion. Ascending aortic aneurysm measuring up to 4.5 cm in  diameter by my measurements (coronal series).    Lungs: Small left and small-to-moderate right pleural effusions.  Interval development of dense, patchy, somewhat masslike  opacification in the right suprahilar region. Region of somewhat  confluent density measures up to 5.2 x 3.9 cm on axial series 3,  image 61. Dense and patchy opacification of both the hilum and  extends throughout the remainder of the right lung. Additional  patchy, nodular opacities on the left measuring up to 1.1 cm in  left lower lobe (axial series 3, image 55).    Upper Abdomen: Indeterminate 1.6 cm right renal lesion.  Cholelithiasis. Partially visualized moderate left  hydronephrosis, new from prior.    Bones: New mild upper endplate compression deformity anteriorly  at T12. No osseous retropulsion identified.      Impression:        Interval  development of large area of dense, patchy, somewhat  masslike area of opacification with adjacent round glass  densities and nodular densities traversing the right lung as  above. Region of confluent, somewhat masslike density measures up  to 5.2 x 3.9 cm. Appearance is nonspecific and may reflect either  acute pneumonia or underlying mass/malignancy. Dedicated PET/CT  or short interval follow-up CT such as an 4-6 weeks is advised.    A nodular density in the left upper lobe measures up to 1.1 cm.  Attention time of PET CT or short interval follow-up CT.    Partially visualized moderate left hydronephrosis, new from  prior. Raises concern for a more distal stone in the ureter or  obstructing mass lesion. Dedicated CT abdomen and pelvis advised.    Small left and small to moderate right pleural effusions.    Indeterminate 1.6 cm right renal lesion, cannot exclude evidence  of complex renal lesion or neoplasm/malignancy. A follow-up renal  protocol CT is advised.    No pulmonary embolus identified.    Ascending aortic aneurysm measuring up to 4.5 cm. Six-month  follow-up CT surveillance imaging is advised based on size.    New mild upper endplate compression deformity anteriorly at T12.  No osseous retropulsion identified.    Electronically signed by:  Bebo Soto MD  11/15/2022 12:20 AM  CST Workstation: [id number]    CT Head Without Contrast [[id number]] Collected: 11/14/22 2343     Updated: 11/15/22 0010    Narrative:      EXAM DESCRIPTION:  CT HEAD WITHOUT IV CONTRAST    CLINICAL HISTORY:   Mental status change, unknown cause, S09.90XA Unspecified injury  of head, initial encounter D50.0 Iron deficiency anemia secondary  to blood loss (chronic) W19.XXXA Unspecified fall, initial  encounter Z74.09 Other reduced mobility Z78.9 Other specified  health status Z74.09 Other reduced mobility K92.0 Hematemesis  M51.36 Other intervertebral disc degeneration, lumbar region.    TECHNIQUE:   Multiple axial images were  obtained through the brain without  intravenous contrast. Coronal and sagittal images were  reconstructed.  All CT scans at this facility use dose modulation, iterative  reconstruction, and/or weight based dosing when appropriate to  reduce radiation dose to as low as reasonably achievable.    COMPARISON: Previous head CT dated 11/9/2022.    FINDINGS:    There is no evidence of acute intracranial hemorrhage or acute  major territorial infarction.  The ventricles, sulci and cisterns are grossly unchanged in size.  There is no midline shift. No abnormal extra-axial fluid  collections are seen. Generalized parenchymal volume loss appears  grossly unchanged.  Subcortical and periventricular white matter hypodensities are  seen, compatible with chronic microangiopathic disease, grossly  unchanged. Small chronic lacunar infarct along the left external  capsule, unchanged. Vascular calcifications.    Mild mucosal thickening within the right sphenoid sinus and trace  mucosal thickening within the left maxillary sinus. No air-fluid  levels.  The mastoid air cells are clear bilaterally.  Mild subgaleal scalp hematoma overlying the posterior left  parietal convexity, with overlying skin staples.        Impression:      1.  No acute intracranial abnormality.  2.  Generalized parenchymal volume loss and chronic  microangiopathic disease, grossly unchanged.  3.  Mild subgaleal scalp hematoma overlying the posterior left  parietal convexity, with overlying skin staples.    Electronically signed by:  Rowan Anton MD  11/15/2022 12:08  AM CST Workstation: [id number]    XR Abdomen KUB [[id number]] Collected: 11/14/22 1350     Updated: 11/14/22 1514    Narrative:      Comparison:  6/21/2021    Indication:  Possible GI bleed. Iron deficiency anemia    Findings:  Single supine radiograph of abdomen is obtained. There  is gaseous and fluid distention of large and small bowel.  Multiple leads overlie the abdomen.      Impression:       Gaseous and fluid distention of large and small  bowel. Consider CT.    Electronically signed by:  Gonzalo Willis MD  11/14/2022 3:12 PM CST  Workstation: [id number]          I have reviewed the patient's current medications.     Assessment/Plan   Accidental fall     With head contusion and scalp laceration; he would need to stay off of Plavix if possible     Acute blood loss anemia     Due to laceration on the head     S/p three units of PRBC given     H&H stable    Acute kidney injury on CKD     Due to dehyration     Worsening this morning likely due to lack of intake vs contrast use    Acute respiratory failure with hypoxemia     POA     Aggravated  Again thismorning with hypoxemia and hypercapnia; improving; continue with Airvo     Right perihilar mass/infiltration     In the setting of bilateral pleural effusion     Will treat it empirically as pneumonia; discussed with wife and no interested on biopsy to find out about cancer as possibility    Sepsis     Likely due to above     Empirically on cefepime and vancomycin    Irregular inferior wall bladder      Might need cystoscopy     Wife does not want to pursue this any further    Left ureteral small calculi     With mild hydronephrosis; no blood in urine    Chronic medical problems     CAD/CABG     Type 2 DM     Essential hypertension     Hyperlipidemia      Hx of carotid stenosis    Family no interested on further work up for mass like on the right lung      Manuel Tucker MD

## 2022-11-15 NOTE — NURSING NOTE
Discussed CTA results with Bebo Soto MD.  Results conveyed to Dr. Reed Cuadra.  Discussion of patient current status and care.  CT abd/pelvis ordered for AM.  ABGs to be completed.  Pt continues on AVAPS.  Wife is in room.  Plan of care discussed with her in detail.  States understanding.

## 2022-11-15 NOTE — PROGRESS NOTES
Concerns voiced by nursing staff that patient remains altered despite improvement to ABGs and being on Airvo at this time. Chart review performed and I do note that lack of improvement of mental status despite improvement of ABG would warrant CT head. Discussed the case with my attending. CT head will be ordered, as well as CTA Chest to assess for any pulmonary etiologies of the sudden change in respiration and metabolic state.

## 2022-11-15 NOTE — PLAN OF CARE
Goal Outcome Evaluation:  Plan of Care Reviewed With: patient, spouse        Progress: declining  Outcome Evaluation: Pt's sats remaining in 90s on 60L Airvo. Pt confused at times, trying to get out of bed and pulling Airvo prongs out of nose. Continuous O2 monitoring along with telemetry. Pt resting. VSS. IV antibiotics ordered. Will continue to monitor.

## 2022-11-15 NOTE — PLAN OF CARE
Goal Outcome Evaluation:  Plan of Care Reviewed With: patient        Progress: declining  Outcome Evaluation: patient is on Airvo device. nursing okays bed activity only and to keep O2 SATS up. patient has difficulty following commands throughout. All ROM is performed AAROM to PROM with consistent cueing to keep patient engaged. he keeps his eyes closed during most of treatment. dependent x2 for repositioning/scooting up in bed. he is dependent x1 for rolling in bed to both the right and left with patient contineuing to have difficulty following commands. His O2 Sats do stay between % with treatment today.unable to get patient EOB secondary to his inability to follow commands and his decreased alertness.

## 2022-11-15 NOTE — PROGRESS NOTES
Adult Nutrition  Assessment/PES    Patient Name:  Vishnu Waller  YOB: 1940  MRN: [id number]  Admit Date:  11/9/2022    Assessment Date:  11/15/2022    Comments:  81yo male assessed by RD for LOS. Admit s/p fall w/ laceration to head requiring staples. Had drop in Hgb and s/p 1 u prbc. MD notes NAREN on CKD stg 2, and hx DM, CAD/CABG, prostate cancer. Pt noted to have respiratory acidosis w/ coffee ground emesis 11/14 and briefly required bipap - now on Airvo. NPO to for possible EGD. Over LOS, intakes average 47% meals. Wt 10/30 140# and #, noting -6.5% wt loss in 2 wks, significant. Pt sleepy, unable to answer RD ?s. Pt meets criteria for severe, acute malnutrition r/t decreased po and significant wt loss in 2 wks. RD to follow hospital course and diet initiation to interview for preferences r/t foods and supplements.      Reason for Assessment     Row Name 11/15/22 1232          Reason for Assessment    Reason For Assessment per organizational policy     Diagnosis trauma;hematological/related complications;pulmonary disease     Identified At Risk by Screening Criteria other (see comments);reduced oral intake over the last month;unintentional loss of 10 lbs or more in the past 2 mos  LOS                Nutrition/Diet History     Row Name 11/15/22 1236          Nutrition/Diet History    Typical Intake (Food/Fluid/EN/PN) Pt sleepy, unable to answer RD ?s- is npo today                Labs/Tests/Procedures/Meds     Row Name 11/15/22 1237          Labs/Procedures/Meds    Lab Results Reviewed reviewed     Lab Results Comments Glu 142, BUN 3H/Cr1.8H, Na 146H, alb 3.2L        Diagnostic Tests/Procedures    Diagnostic Test/Procedure Reviewed reviewed     Diagnostic Test/Procedures Comments possible EGD today, s/p 1u prbc        Medications    Pertinent Medications Reviewed reviewed     Pertinent Medications Comments D5 75ml/hr, Fe/B12, SSi, IV protonix                  Estimated/Assessed Needs -  Anthropometrics     Row Name 11/15/22 1237          Anthropometrics    Weight for Calculation 59.4 kg (131 lb)        Estimated/Assessed Needs    Additional Documentation Fluid Requirements (Group);Estimated Calorie Needs (Group);KCAL/KG (Group);Protein Requirements (Group)        Estimated Calorie Needs    Estimated Calorie Requirement (kcal/day) 1700        KCAL/KG    KCAL/KG 25 Kcal/Kg (kcal);30 Kcal/Kg (kcal)     25 Kcal/Kg (kcal) 1485.525     30 Kcal/Kg (kcal) 1782.63        Protein Requirements    Weight Used For Protein Calculations 59.4 kg (131 lb)     Est Protein Requirement Amount (gms/kg) 1.0 gm protein     Estimated Protein Requirements (gms/day) 59.42        Fluid Requirements    Fluid Requirements (mL/day) 1700     RDA Method (mL) 1700                Nutrition Prescription Ordered     Row Name 11/15/22 1239          Nutrition Prescription PO    Current PO Diet NPO                Evaluation of Received Nutrient/Fluid Intake     Row Name 11/15/22 1239          PO Evaluation    Number of Days PO Intake Evaluated Insufficient Data  for LOS     Number of Meals 10     % PO Intake 0x2, 25x2, 50x3, 75x1, 100x2                Malnutrition Severity Assessment     Row Name 11/15/22 1240          Malnutrition Severity Assessment    Malnutrition Type Acute Disease or Injury - Related Malnutrition        Insufficient Energy Intake     Insufficient Energy Intake Findings Moderate     Insufficient Energy Intake  < or equal to 50% of est. energy requirement for > or equal to 5d)  <50% avg x6 days        Unintentional Weight Loss     Unintentional Weight Loss Findings Severe     Unintentional Weight Loss  Weight loss greater than 5% in one month  -6.5% in 2 wks, significant        Criteria Met (Must meet criteria for severity in at least 2 of these categories: M Wasting, Fat Loss, Fluid, Secondary Signs, Wt. Status, Intake)    Patient meets criteria for  Severe Malnutrition  pt sleepy and unable to answer RD ?s- did not  complete NFPE                 Problem/Interventions:   Problem 1     Row Name 11/15/22 1241          Nutrition Diagnoses Problem 1    Problem 1 Malnutrition  severe, acute     Etiology (related to) Factors Affecting Nutrition     Appetite Fair;Poor     Signs/Symptoms (evidenced by) Unintended Weight Change     Unintended Weight Change Loss     Number of Pounds Lost 9#     Weight loss time period 2wks (-6.5%)                Problem 2     Row Name 11/15/22 1242          Nutrition Diagnoses Problem 2    Problem 2 Inadequate Intake/Infusion     Inadequate Intake Type Oral     Macronutrient Kcal;Protein     Etiology (related to) Other (comment)     Signs/Symptoms (evidenced by) NPO;Report of Minimal PO Intake;PO Intake     Percent (%) intake recorded 47 %     Over number of meals 10  recorded over 6 days                    Intervention Goal     Row Name 11/15/22 1242          Intervention Goal    General Maintain nutrition;Meet nutritional needs for age/condition;Improved nutrition related lab(s);Reduce/improve symptoms     PO Increase intake;Meet estimated needs     Weight Maintain weight                Nutrition Intervention     Row Name 11/15/22 1242          Nutrition Intervention    RD/Tech Action Interview for preference;Care plan reviewd;Follow Tx progress;Encourage intake                  Education/Evaluation     Row Name 11/15/22 1243          Education    Education Will Instruct as appropriate        Monitor/Evaluation    Monitor PO intake;Pertinent labs;Weight;Skin status;Symptoms     Education Follow-up Reinforce PRN                 Electronically signed by:  Jenny Barillas RD  11/15/22 12:43 CST

## 2022-11-15 NOTE — PLAN OF CARE
Problem: Adult Inpatient Plan of Care  Goal: Plan of Care Review  Outcome: Ongoing, Progressing   Goal Outcome Evaluation:               assessed by RD for LOS. Admit s/p fall w/ laceration to head requiring staples. Had drop in Hgb and s/p 1 u prbc. MD notes NAREN on CKD stg 2, and hx DM, CAD/CABG, prostate cancer. Pt noted to have respiratory acidosis w/ coffee ground emesis 11/14 and briefly required bipap - now on Airvo. NPO to for possible EGD. Over LOS, intakes average 47% meals. Wt 10/30 140# and #, noting -6.5% wt loss in 2 wks, significant. Pt sleepy, unable to answer RD ?s. Pt meets criteria for severe, acute malnutrition r/t decreased po and significant wt loss in 2 wks. RD to follow hospital course and diet initiation to interview for preferences r/t foods and supplements.

## 2022-11-16 PROBLEM — E43 SEVERE MALNUTRITION (HCC): Status: ACTIVE | Noted: 2022-01-01

## 2022-11-16 NOTE — THERAPY TREATMENT NOTE
Patient Name: Vishnu Waller  : 1940    MRN: [id number]                              Today's Date: 2022     Physical Therapy Treatment Note    Admit Date: 2022    Visit Dx:     ICD-10-CM ICD-9-CM   1. Injury of head, initial encounter  S09.90XA 959.01   2. Blood loss anemia  D50.0 280.0   3. Fall, initial encounter  W19.XXXA E888.9   4. Impaired mobility and ADLs  Z74.09 V49.89    Z78.9    5. Impaired functional mobility, balance, gait, and endurance  Z74.09 V49.89   6. Hematemesis with nausea  K92.0 578.0     787.02   7. Degenerative disc disease, lumbar  M51.36 722.52     Patient Active Problem List   Diagnosis   • Degeneration of lumbar intervertebral disc   • Low back pain without sciatica   • Neuritis or radiculitis due to rupture of lumbar intervertebral disc   • Borderline glaucoma, open angle with borderline findings   • Pseudophakia   • History of coronary artery bypass surgery   • Essential hypertension   • Mixed hyperlipidemia   • Spinal stenosis of lumbar region   • Degenerative disc disease, lumbar   • Chronic pain of right knee   • History of artificial joint   • B12 deficiency   • Degeneration of intervertebral disc of lumbosacral region   • Personal history of other infectious and parasitic diseases   • Status post lumbar spine operation   • History of pyelonephritis   • History of surgical procedure   • History of repair of rotator cuff   • Encounter for follow-up examination after completed treatment for conditions other than malignant neoplasm   • Encounter for general adult medical examination without abnormal findings   • Osteoarthritis of knee   • Osteoarthritis of multiple joints   • Primary insomnia   • Encounter for screening for malignant neoplasm of colon   • Encounter for screening for malignant neoplasm of prostate   • Type 2 diabetes mellitus, without long-term current use of insulin (HCC)   • Coronary artery disease involving native coronary artery of native  heart without angina pectoris   • Dyspnea on exertion   • Presence of aortocoronary bypass graft   • Dizziness and giddiness   • Postviral fatigue syndrome   • Postviral fatigue syndrome   • Recurrent kidney stones   • Thrombocytopenia (HCC)   • Pyelonephritis   • Chronic non-seasonal allergic rhinitis   • Unstable angina ([facility])   • NSTEMI (non-ST elevated myocardial infarction) ([facility])   • History of PTCA 1   • Medicare annual wellness visit, subsequent   • Thoracic aortic aneurysm without rupture   • Chronic kidney disease, stage 2 (mild)   • Personal history of tobacco use, presenting hazards to health   • Cervical pain (neck)   • Thoracic spine pain   • Calculus of ureter   • Foreign body in bladder and urethra   • Physical deconditioning   • Bilateral hip pain   • Pain of left humerus   • PVD (peripheral vascular disease) ([facility])   • Renal mass   • BPPV (benign paroxysmal positional vertigo)   • Degenerative disc disease, lumbar   • Anemia   • Prostate cancer (HCC)   • Overweight with body mass index (BMI) of 25 to 25.9 in adult   • Elevated PSA   • Abnormal SPEP   • Chronic obstructive pulmonary disease ([facility])   • NAREN (acute kidney injury) ([facility])   • Hyperkalemia   • Syncope   • Weakness   • NAREN (acute kidney injury) ([facility])   • Leukocytosis   • Injury of head, initial encounter   • Hematemesis with nausea   • Severe malnutrition ([facility])     Past Medical History:   Diagnosis Date   • Acute bacterial sinusitis    • Acute bronchitis    • Acute frontal sinusitis    • Acute maxillary sinusitis    • Acute pharyngitis    • Allergic rhinitis    • Allergic rhinitis due to pollen    • Anxiety    • Artificial lens present     in position   • Asthma    • Backache    • Borderline glaucoma    • C. difficile diarrhea 2014   • Chronic laryngitis    • Chronic rhinitis    • Coronary artery disease    • Cough    • Degeneration of lumbar intervertebral disc    • Degenerative joint disease involving multiple joints    • Diabetes mellitus  (HCC)    • Diverticular disease of colon    • Dizziness and giddiness    • Elevated cholesterol    • Erectile dysfunction    • Essential hypertension    • Generalized anxiety disorder    • GERD (gastroesophageal reflux disease)    • Glaucoma    • Hemorrhoids     without bleeding   • Insomnia    • Kidney stone    • Kidney stones    • Malignant tumor of prostate (HCC)    • Need for prophylactic vaccination and inoculation against influenza    • Osteoarthritis    • Osteoarthritis of multiple joints    • Pain, lumbar region     Pain radiating to lumbar region of back     • PONV (postoperative nausea and vomiting)    • Postviral fatigue syndrome    • Presence of aortocoronary bypass graft    • Prostate cancer (HCC)    • Pseudomembranous enterocolitis     improved   • Rotator cuff syndrome     right   • Shoulder pain    • SOB (shortness of breath)    • Tenosynovitis    • Thrombocytopenia (HCC)    • Upper respiratory infection      Past Surgical History:   Procedure Laterality Date   • CARDIAC CATHETERIZATION  09/25/2003    Cardiac cath (Normal left ventricular systolic function, EF 60% Multi-vessel coronary artery disease with critical disease noted in the LAD coronary artery, diagonal coronary artery, obtuse marginal coronary and right coronary artery.)   • CARDIAC CATHETERIZATION  05/03/1996    Cardiac cath (Coronary atherosclerotic heart disease. 70% diagonal stenosis. Mild to moderate left anterior descending artery stenosis. Preserved left ventricular function.)   • CARDIAC CATHETERIZATION N/A 2/26/2018    Procedure: Left Heart Cath/ pci if indicated ;  Surgeon: Radha Wilkes MD;  Location: [facility] INVASIVE LOCATION;  Service:    • CATARACT EXTRACTION Bilateral    • CATARACT EXTRACTION  10/04/2011    Remove cataract, insert lens (Right)   • CATARACT EXTRACTION  08/23/2011    Remove cataract, insert lens (left)   • COLONOSCOPY     • COLONOSCOPY      Colon endoscopy 73264 (Rectal bleeding. Radiation  proctitis w/bleeding. An abnormal CT of transverse colon due to mucosal ischemic colitis, that now is healed. Internal hemorrhoids w/possible bleeding.)   • COLONOSCOPY  05/10/2011    Colon endoscopy 92738 (Single sessile polyp found in transverse colon and sigmoid colon ,removed by cold biopsy polypectomy.divertic. found in sigmoid colon,descending colon. Friability/capillary friability in rectum sigmoid due to prior radiation.Inter. hem. Grade 1)   • COLONOSCOPY  05/02/2007    Colon endoscopy 38201 (Colon polyp. Diverticulosis without bleeding. Internal hemorrhoids without bleeding.)   • CORONARY ARTERY BYPASS GRAFT  2002   • CYSTOSCOPY  01/13/1995    Cystoscopy, stone removal (Right ureteroscopic lasertripsy.)   • CYSTOSCOPY Right 1/23/2019    Procedure: CYSTOSCOPY, RIGHT STENT REMOVAL;  Surgeon: Nirmal Gaines MD;  Location: [facility];  Service: Urology   • CYSTOSCOPY, URETEROSCOPY, RETROGRADE PYELOGRAM, STENT INSERTION N/A 8/28/2017    Procedure: URETEROSCOPY LASER LITHOTRIPSY WITH STENT INSERTION AND RETROGRADE PYELOGRAM ;  Surgeon: Anna M. D'Amico, MD;  Location: [facility];  Service:    • CYSTOSCOPY, URETEROSCOPY, RETROGRADE PYELOGRAM, STENT INSERTION Right 1/11/2019    Procedure: CYSTOSCOPY URETEROSCOPY RETROGRADE PYELOGRAM HOLMIUM LASER STENT INSERTION;  Surgeon: Nirmal Gaines MD;  Location: [facility];  Service: Urology   • EPIDURAL  12/03/2014    Therapeutic/diag injection (Lumbar transforaminal epidural steroid injection, L5-S1, left side.)   • EPIDURAL  10/22/2014    Therapeutic/diag injection (Lumbar transforaminal epidural steroid injection L5-S1 left side.)   • EPIDURAL  08/07/2014    Therapeutic/diag injection (Lumbar transforaminal epidural steroid injection.)   • EXCISION LESION  05/13/1999    REMOVE EAR LESION 38866 (1.3 cm basal cell carcinoma, right preauricular area. Excision)   • EXCISION LESION  03/13/2001    REMOVE LESION NECK/CHEST 37701 (Excision of irritated seborrheic keratosis,  anterior neck, 1.1 cm and deep lipoma, posterior neck, 3.5 cm)   • INJECTION OF MEDICATION  11/15/2012    Kenalog (4)      • JOINT REPLACEMENT  2015    partial   • KNEE ARTHROSCOPY  01/17/2007    Knee arthroscopy, surgery (Arthroscopy with medial and lateral meniscectomies, right knee.)   • KNEE SURGERY  11/04/2015    Knee Surgery (Right unicompartmental arthroplasty.)   • LUMBAR LAMINECTOMY N/A 2/7/2017    Procedure: LUMBAR LAMINECTOMY LUMBAR THREE-FOUR, LUMBAR FOUR-FIVE, INTERLAMINAR DISTRACTION ;  Surgeon: Lucio Mayo MD;  Location: [facility];  Service:    • NOSE SURGERY     • OTHER SURGICAL HISTORY      EXTENDED VISUAL FIELDS STUDY 94809 (Borderline glaucoma) (3): 03/19/2015, 04/09/2014, 03/27/2013   • OTHER SURGICAL HISTORY  10/29/2003    Heart revascularize (TMR) (Directmyocardial revascularization times four; LIMA to LAD SVG to DARIUSZ SVG to OM1, SVG to RCA)   • OTHER SURGICAL HISTORY      OCT DISC NFL 97880 (Borderline glaucoma)  (3): 09/24/2015, 08/13/2014, 07/29/2013   • PROSTATECTOMY     • SEPTORHINOPLASTY  11/16/1989    Nasal surgery procedure (Septorhinoplasty with reconstruction of the nasal pyramid with a iliac crest graft, rhinoplasty was performed with external approach.)   • SHOULDER ARTHROSCOPY  07/24/2013    Arthroscopy of right shoulder with rotator repair, Carla procedure, Biceps tenotomy, subacromial decompression.   • SHOULDER SURGERY  11/01/2005    Shoulder surgery procedure (Decompression, subacromial, explore of the rotator cuff, no tear found nor repaired, acromioplasty of the left shoulder and AC shoulder joint resection.)      General Information     Row Name 11/16/22 140          Physical Therapy Time and Intention    Document Type therapy note (daily note)  -     Mode of Treatment individual therapy;physical therapy  -     Row Name 11/16/22 6578          General Information    Patient Profile Reviewed yes  -     Existing Precautions/Restrictions fall  -[facility]  Name 11/16/22 1401          Cognition    Orientation Status (Cognition) oriented x 3;person;place;situation  -     Row Name 11/16/22 1401          Safety Issues, Functional Mobility    Safety Issues Affecting Function (Mobility) ability to follow commands  -     Impairments Affecting Function (Mobility) balance;endurance/activity tolerance;grasp;pain;strength;shortness of breath  -           User Key  (r) = Recorded By, (t) = Taken By, (c) = Cosigned By    Initials Name Provider Type     Eli Smith PTA Physical Therapist Assistant               Mobility     Row Name 11/16/22 1422          Bed Mobility    Rolling Left [place] (Bed Mobility) unable to assess;not tested  -     Rolling Right [place] (Bed Mobility) unable to assess;not tested  -     Scooting/Bridging [place] (Bed Mobility) unable to assess;not tested  -     Supine-Sit [place] (Bed Mobility) unable to assess;not tested  -     Sit-Supine [place] (Bed Mobility) unable to assess;not tested  -     Row Name 11/16/22 1422          Bed-Chair Transfer    Bed-Chair [place] (Transfers) unable to assess;not tested  -     Row Name 11/16/22 1422          Sit-Stand Transfer    Sit-Stand [place] (Transfers) unable to assess;not tested  -     Row Name 11/16/22 1422          Gait/Stairs (Locomotion)    [place] Level (Gait) unable to assess;not tested  -           User Key  (r) = Recorded By, (t) = Taken By, (c) = Cosigned By    Initials Name Provider Type     Eli Smith PTA Physical Therapist Assistant               Obj/Interventions     Row Name 11/16/22 1404          Motor Skills    Therapeutic Exercise hip;knee;ankle  -     Row Name 11/16/22 1404          Hip (Therapeutic Exercise)    Hip (Therapeutic Exercise) AROM (active range of motion)  -     Hip AROM (Therapeutic Exercise) bilateral;aBduction;aDduction;5 repetitions;supine  -     Hip Isometrics (Therapeutic Exercise)  bilateral;aDduction;supine;10 repetitions  -     Row Name 11/16/22 1404          Knee (Therapeutic Exercise)    Knee (Therapeutic Exercise) AROM (active range of motion)  -     Knee AROM (Therapeutic Exercise) bilateral;heel slides;supine;5 repetitions  -     Row Name 11/16/22 1404          Ankle (Therapeutic Exercise)    Ankle (Therapeutic Exercise) AROM (active range of motion)  -     Ankle AROM (Therapeutic Exercise) bilateral;dorsiflexion;plantarflexion;10 repetitions;supine;other (see comments)  ankle circles  -           User Key  (r) = Recorded By, (t) = Taken By, (c) = Cosigned By    Initials Name Provider Type     Eli Smith PTA Physical Therapist Assistant               Goals/Plan     Row Name 11/16/22 1453          Bed Mobility Goal 1 (PT)    Activity/Assistive Device (Bed Mobility Goal 1, PT) sit to supine;supine to sit  -     [place] Level/Cues Needed (Bed Mobility Goal 1, PT) modified independence  -     Time Frame (Bed Mobility Goal 1, PT) by discharge  -     Row Name 11/16/22 6641          Transfer Goal 1 (PT)    Activity/Assistive Device (Transfer Goal 1, PT) walker, rolling;bed-to-chair/chair-to-bed;sit-to-stand/stand-to-sit  -     [place] Level/Cues Needed (Transfer Goal 1, PT) modified independence  -     Time Frame (Transfer Goal 1, PT) by discharge  -     Row Name 11/16/22 3696          Gait Training Goal 1 (PT)    Activity/Assistive Device (Gait Training Goal 1, PT) assistive device use;gait (walking locomotion)  -     [place] Level (Gait Training Goal 1, PT) modified independence  -     Distance (Gait Training Goal 1, PT) 50'x2  -     Time Frame (Gait Training Goal 1, PT) by discharge  -     Progress/Outcome (Gait Training Goal 1, PT) goal not met  -           User Key  (r) = Recorded By, (t) = Taken By, (c) = Cosigned By    Initials Name Provider Type     Eli Smith PTA Physical Therapist Assistant               Clinical Impression      Row Name 11/16/22 1403          Pain    Pretreatment Pain Rating --  12.5/10  -     Posttreatment Pain Rating --  12.5/10  -     Pain Location upper  -     Pain Location - back  -     Pre/Posttreatment Pain Comment attempted to reposition patient to sidelying but patient adamently declines both the clinician and his spouse who encourages him to allow for repositioning  -     Pain Intervention(s) Declines  -     Row Name 11/16/22 1405          Plan of Care Review    Plan of Care Reviewed With patient;spouse  -     Progress improving  -     Outcome Evaluation patient is oriented to person, place and situation today, he is accompanied by his spouse who actively encourages participation in LE exercises in bed. his vitals remain stable this treatment. patient defers transfers in bed or bed mobility this date. he needs max encouargement for AROM of B LE's this date. he does need some cueing to stay on task as he often loses track of what he is asked to do and resumes ankle circles vs the instructed exercise. he is more alert this treatment than last treatment.  -     Row Name 11/16/22 140          Therapy Assessment/Plan (PT)    Rehab Potential (PT) fair, will monitor progress closely  -     Criteria for Skilled Interventions Met (PT) yes;meets criteria;skilled treatment is necessary  -     Therapy Frequency (PT) other (see comments)  -     Row Name 11/16/22 140          Vital Signs    Pre Systolic BP Rehab 107  -MH     Pre Treatment Diastolic BP 82  -MH     Post Systolic BP Rehab 99  -MH     Post Treatment Diastolic BP 54  -MH     Pretreatment Heart Rate (beats/min) 93  -MH     Posttreatment Heart Rate (beats/min) 90  -     Pre SpO2 (%) 100  -     O2 Delivery Pre Treatment hi-flow  -     Post SpO2 (%) 100  -     O2 Delivery Post Treatment hi-flow  -     Pre Patient Position Supine  -     Post Patient Position Supine  -     Row Name 11/16/22 1407          Positioning and  Restraints    Pre-Treatment Position in bed  -MH     Post Treatment Position bed  -MH     In Bed fowlers;call light within reach;encouraged to call for assist;exit alarm on;with family/caregiver  -           User Key  (r) = Recorded By, (t) = Taken By, (c) = Cosigned By    Initials Name Provider Type    Eli Hernandez PTA Physical Therapist Assistant               Outcome Measures     Row Name 11/16/22 1450 11/16/22 0720       How much help from another person do you currently need...    Turning from your back to your side while in flat bed without using bedrails? 1  - 1  -CB    Moving from lying on back to sitting on the side of a flat bed without bedrails? 1  -MH 2  -CB    Moving to and from a bed to a chair (including a wheelchair)? 1  - 1  -CB    Standing up from a chair using your arms (e.g., wheelchair, bedside chair)? 1  - 1  -CB    Climbing 3-5 steps with a railing? 1  - 1  -CB    To walk in hospital room? 1  - 1  -CB    AM-PAC 6 Clicks Score (PT) 6  - 7  -CB    Highest level of mobility 2 --> Bed activities/dependent transfer  -MH 2 --> Bed activities/dependent transfer  -CB          User Key  (r) = Recorded By, (t) = Taken By, (c) = Cosigned By    Initials Name Provider Type    Eli Hernandez PTA Physical Therapist Assistant    Erik Pedraza, RN Registered Nurse                             Physical Therapy Education     Title: PT OT SLP Therapies (In Progress)     Topic: Physical Therapy (Done)     Point: Mobility training (Done)     Learning Progress Summary           Patient Acceptance, E,TB, VU by LR at 11/10/2022 1112    Comment: Educated on PT POC and goals.                   Point: Home exercise program (Done)     Learning Progress Summary           Patient Acceptance, E,TB, VU by LR at 11/10/2022 1112    Comment: Educated on PT POC and goals.                   Point: Body mechanics (Done)     Learning Progress Summary           Patient Acceptance, E,TB, VU by LR at  11/10/2022 1112    Comment: Educated on PT POC and goals.                   Point: Precautions (Done)     Learning Progress Summary           Patient Acceptance, E,TB, VU by LR at 11/10/2022 1112    Comment: Educated on PT POC and goals.                               User Key     Initials Effective Dates Name Provider Type Discipline     06/16/21 -  Elkin Chavez Physical Therapist PT              PT Recommendation and Plan     Plan of Care Reviewed With: patient, spouse  Progress: improving  Outcome Evaluation: patient is oriented to person, place and situation today, he is accompanied by his spouse who actively encourages participation in LE exercises in bed. his vitals remain stable this treatment. patient defers transfers in bed or bed mobility this date. he needs max encouargement for AROM of B LE's this date. he does need some cueing to stay on task as he often loses track of what he is asked to do and resumes ankle circles vs the instructed exercise. he is more alert this treatment than last treatment.     Time Calculation:    PT Charges     Row Name 11/16/22 1451 11/16/22 1137          Time Calculation    Start Time 1353  -MH --     Stop Time 1416  -MH --     Time Calculation (min) 23 min  -MH --     PT - Next Appointment -- 11/16/22  -        Time Calculation- PT    Total Timed Code Minutes- PT 23 minute(s)  -MH --        Timed Charges    58004 - PT Therapeutic Exercise Minutes 23  -MH --        Total Minutes    Timed Charges Total Minutes 23  -MH --      Total Minutes 23  -MH --           User Key  (r) = Recorded By, (t) = Taken By, (c) = Cosigned By    Initials Name Provider Type     Eli Smith PTA Physical Therapist Assistant              Therapy Charges for Today     Code Description Service Date Service Provider Modifiers Qty    96958113166 HC PT THER PROC EA 15 MIN 11/15/2022 Eli Smith PTA GP 1    06671425427 HC PT THERAPEUTIC ACT EA 15 MIN 11/15/2022 Eli Smith PTA GP 1     [id number]  PT THER PROC EA 15 MIN 11/16/2022 Eli Smith, NATAN GP 2          PT G-Codes  Outcome Measure Options: AM-PAC 6 Clicks Basic Mobility (PT)  AM-PAC 6 Clicks Score (PT): 6  AM-PAC 6 Clicks Score (OT): 20  PT Discharge Summary  Anticipated Discharge Disposition (PT): home with assist, home with home health    Eli Smith PTA  11/16/2022

## 2022-11-16 NOTE — SIGNIFICANT NOTE
Pt defers OT this pm stating he got bad news today and he does not feel like therapy.   11/16/22 8802   OTHER   Discipline occupational therapy assistant   Rehab Time/Intention   Session Not Performed patient/family declined treatment

## 2022-11-16 NOTE — PLAN OF CARE
Goal Outcome Evaluation:  Plan of Care Reviewed With: patient, spouse        Progress: improving  Outcome Evaluation: patient is oriented to person, place and situation today, he is accompanied by his spouse who actively encourages participation in LE exercises in bed. his vitals remain stable this treatment. patient defers transfers in bed or bed mobility this date. he needs max encouargement for AROM of B LE's this date. he does need some cueing to stay on task as he often loses track of what he is asked to do and resumes ankle circles vs the instructed exercise. he is more alert this treatment than last treatment.

## 2022-11-16 NOTE — DISCHARGE PLACEMENT REQUEST
"Marcio Waller (82 y.o. Male)     Date of Birth   1940    Social Security Number       Address   [address] [place] [address] [address] [place] [address]    Home Phone   [phone]    MRN   [id number]       Latter-day   Jew    Marital Status                               Admission Date   11/9/22    Admission Type   Emergency    Admitting Provider   Behroozi, Saeid, MD    Attending Provider   Brenden Lozoya MD    Department, Room/Bed   [address], 370/1       Discharge Date       Discharge Disposition       Discharge Destination                               Attending Provider: Brenden Lozoya MD    Allergies: Molds & Smuts, Hydrocodone-acetaminophen    Isolation: None   Infection: None   Code Status: No CPR    Ht: 160 cm (63\")   Wt: 59.9 kg (132 lb)    Admission Cmt: None   Principal Problem: Injury of head, initial encounter [S09.90XA]                 Active Insurance as of 11/9/2022     Primary Coverage     Payor Plan Insurance Group Employer/Plan Group    MEDICARE MEDICARE A & B      Payor Plan Address Payor Plan Phone Number Payor Plan Fax Number Effective Dates    [address] [phone]  2/1/2007 - None Entered    [facility] [id number]       Subscriber Name Subscriber Birth Date Member ID       MARCIO WALLER 1940 [id number]           Secondary Coverage     Payor Plan Insurance Group Employer/Plan Group    [employer] PLAN F     Payor Plan Address Payor Plan Phone Number Payor Plan Fax Number Effective Dates    [address] [address] [phone]  1/1/2015 - None Entered    [facility] [id number]       Subscriber Name Subscriber Birth Date Member ID       MARCIO WALLER 1940 [id number]                 Emergency Contacts      (Rel.) Home Phone Work Phone Mobile Phone    Julissa Ryan (Spouse) [phone] -- [phone]               History & Physical      Behroozi, Saeid, MD at " 11/09/22 2322                [facility] Medicine Admission      Date of Admission: 11/9/2022      Primary Care Physician: Johnathon Shaikh MD      Chief Complaint: Fall    HPI:    ANA is a 82-year-old male with past medical history of coronary artery disease, CABG quadruple, diabetes mellitus type 2 hypercholesterolemia, hypertension, chronic anemia with prior history of packed red blood cell transfusion,, history of admission in October 2022 to hospital for for syncope, acute renal failure and leukocytosis, presented to ER via paramedics after fall and sustained laceration to scalp.  He underwent  stapling Surgicel placement and pressure dressing to laceration of forehead.  Per ED attending patient had significant bleeding at the site.  1 unit packed red blood cells was ordered.  Hospitalist service was called for observation of the patient.    Was seen and examined in ED room 20.  His wife Julissa at bedside.  Patient reports he was getting into the house through back to when he lost his balance and fell from 1 step.  He hit his head with subsequent laceration and bleeding from his a scalp.  He denies any loss of consciousness, neck trauma with above fall.  He denies any prodromal sign prior to his fall.  He is resting currently in bed and denies any other complaint.  He denies any double vision blurred vision confusion disorientation neck pain chest pain shortness of breath dizziness lightheadedness palpitation nausea vomiting back pain abdominal pain constipation diarrhea.    Concurrent Medical History:  has a past medical history of Acute bacterial sinusitis, Acute bronchitis, Acute frontal sinusitis, Acute maxillary sinusitis, Acute pharyngitis, Allergic rhinitis, Allergic rhinitis due to pollen, Anxiety, Artificial lens present, Asthma, Backache, Borderline glaucoma, C. difficile diarrhea (2014), Chronic laryngitis, Chronic rhinitis, Coronary artery disease, Cough,  Degeneration of lumbar intervertebral disc, Degenerative joint disease involving multiple joints, Diabetes mellitus (HCC), Diverticular disease of colon, Dizziness and giddiness, Elevated cholesterol, Erectile dysfunction, Essential hypertension, Generalized anxiety disorder, GERD (gastroesophageal reflux disease), Glaucoma, Hemorrhoids, Insomnia, Kidney stone, Kidney stones, Malignant tumor of prostate (HCC), Need for prophylactic vaccination and inoculation against influenza, Osteoarthritis, Osteoarthritis of multiple joints, Pain, lumbar region, PONV (postoperative nausea and vomiting), Postviral fatigue syndrome, Presence of aortocoronary bypass graft, Prostate cancer (HCC), Pseudomembranous enterocolitis, Rotator cuff syndrome, Shoulder pain, SOB (shortness of breath), Tenosynovitis, Thrombocytopenia (HCC), and Upper respiratory infection.    Past Surgical History:  has a past surgical history that includes Cataract extraction (Bilateral); Nose surgery; Prostatectomy; Colonoscopy; Shoulder arthroscopy (07/24/2013); Cardiac catheterization (09/25/2003); Cardiac catheterization (05/03/1996); Colonoscopy; Colonoscopy (05/10/2011); Colonoscopy (05/02/2007); Cystoscopy (01/13/1995); Other surgical history; Other surgical history (10/29/2003); Injection of Medication (11/15/2012); Knee Arthroscopy (01/17/2007); Knee surgery (11/04/2015); Septorhinoplasty (11/16/1989); Other surgical history; Cataract Extraction (10/04/2011); Cataract Extraction (08/23/2011); Excision Lesion (05/13/1999); Excision Lesion (03/13/2001); Shoulder surgery (11/01/2005); Epidural (12/03/2014); Epidural (10/22/2014); Epidural (08/07/2014); Coronary artery bypass graft (2002); Joint replacement (2015); Lumbar laminectomy (N/A, 2/7/2017); cystoscopy, ureteroscopy, retrograde pyelogram, stent insertion (N/A, 8/28/2017); Cardiac catheterization (N/A, 2/26/2018); cystoscopy, ureteroscopy, retrograde pyelogram, stent insertion (Right, 1/11/2019);  and Cystoscopy (Right, 1/23/2019).    Family History: family history includes Arthritis in his mother; Cancer in an other family member; Heart disease in his mother and another family member; Hypertension in his mother and another family member.     Social History:  reports that he quit smoking about 2 years ago. His smoking use included cigarettes. He started smoking about 62 years ago. He has a 20.00 pack-year smoking history. He has never used smokeless tobacco. He reports that he does not currently use alcohol. He reports that he does not use drugs.    Allergies:   Allergies   Allergen Reactions   • Molds & Smuts Other (See Comments)     Sinus infection   • Hydrocodone-Acetaminophen Itching       Medications:   Prior to Admission medications    Medication Sig Start Date End Date Taking? Authorizing Provider   acetaminophen (Tylenol) 325 MG tablet Take 650 mg by mouth Every 4 (Four) Hours As Needed for Mild Pain. 10/30/22   Johnathon Shaikh MD   albuterol sulfate HFA (Ventolin HFA) 108 (90 Base) MCG/ACT inhaler Inhale 2 puffs Every 4 (Four) Hours As Needed for Wheezing or Shortness of Air. 3/29/22   Katherin Case DO   atorvastatin (LIPITOR) 40 MG tablet Take 1 tablet by mouth Daily. 10/6/21   Radha Wilkes MD   budesonide-formoterol (SYMBICORT) 160-4.5 MCG/ACT inhaler Inhale 2 puffs 2 (Two) Times a Day. 4/26/22   Katherin Case DO   carvedilol (COREG) 3.125 MG tablet Take 1 tablet by mouth Every 12 (Twelve) Hours. 1/19/22   Radha Wilkes MD   clopidogrel (PLAVIX) 75 MG tablet Take 1 tablet by mouth Daily. 1/19/22   Radha Wilkes MD   Cyanocobalamin (VITAMIN B 12 PO) Take 500 mcg by mouth Daily. Three times weekly, Vicky Song MD   dilTIAZem CD (CARDIZEM CD) 180 MG 24 hr capsule Take 1 capsule by mouth Daily. 10/6/21   Radha Wilkes MD   FeroSul 325 (65 Fe) MG tablet TAKE 1 TABLET EVERY DAY WITH BREAKFAST 4/7/22   Josef Covington MD    fluticasone (FLONASE) 50 MCG/ACT nasal spray 2 sprays into each nostril Every Night. 2/14/18   Camryn Koehler MD   ipratropium-albuterol (DUO-NEB) 0.5-2.5 mg/3 ml nebulizer Inhale the contents of 1 vial by nebulization Every 4 (Four) Hours As Needed for Wheezing for up to 30 days. 2/10/21 3/12/21  Akbar Zapien MD   isosorbide mononitrate (IMDUR) 30 MG 24 hr tablet Take 1 tablet by mouth Daily. 2/28/18   Julio Cesar Moss APRN   losartan (COZAAR) 50 MG tablet Take 1 tablet by mouth Every Night. 1/19/22   Radha Wilkes MD   magnesium oxide (MAG-OX) 400 MG tablet Take 1 tablet by mouth 2 (Two) Times a Day. 4/15/21   ProviderVicky MD   metFORMIN (GLUCOPHAGE) 1000 MG tablet Take 1 tablet by mouth 2 (Two) Times a Day With Meals. 2/28/18   Julio Cesar Moss APRN   nitroglycerin (NITROSTAT) 0.4 MG SL tablet Place 1 tablet under the tongue Every 5 (Five) Minutes As Needed for Chest Pain. Take no more than 3 doses in 15 minutes. 2/27/18   Julio Cesar Moss APRN   omeprazole (priLOSEC) 20 MG capsule Take 20 mg by mouth Daily. 10/30/22   Johnathon Shaikh MD   oxybutynin XL (DITROPAN-XL) 5 MG 24 hr tablet Take 5 mg by mouth Daily.    Provider, MD Vicky   pantoprazole (PROTONIX) 40 MG EC tablet Take 1 tablet by mouth Every Morning. 10/29/22   Andreia Ralph PA-C   pregabalin (LYRICA) 150 MG capsule 3 (Three) Times a Day.    Emergency, Nurse Emily, RN   pregabalin (LYRICA) 75 MG capsule Take 1 capsule by mouth 2 (Two) Times a Day for 30 days.  Patient taking differently: Take 2 capsules by mouth Daily. 2/12/21 7/12/21  Akbar Zapien MD   traMADol (ULTRAM) 50 MG tablet Take 1 tablet by mouth 2 (Two) Times a Day.    Provider, MD Vicky   travoprost, BAK free, (TRAVATAN) 0.004 % solution ophthalmic solution Administer 1 drop to both eyes Daily. 10/30/22   Provider, MD Vicky       Review of Systems:  Review of Systems   Constitutional: Negative for chills,  diaphoresis, fatigue and fever.   HENT: Negative for congestion, dental problem, ear pain, facial swelling, rhinorrhea and sinus pressure.    Eyes: Negative for photophobia, discharge, redness, itching and visual disturbance.   Respiratory: Negative for apnea, cough, choking, chest tightness, shortness of breath, wheezing and stridor.    Cardiovascular: Negative for chest pain, palpitations and leg swelling.   Gastrointestinal: Negative for abdominal distention, abdominal pain, anal bleeding, blood in stool, diarrhea, nausea, rectal pain and vomiting.   Endocrine: Negative for cold intolerance, heat intolerance, polydipsia, polyphagia and polyuria.   Genitourinary: Negative for difficulty urinating, flank pain, frequency, hematuria and urgency.   Musculoskeletal: Negative for arthralgias, back pain, joint swelling and myalgias.   Skin: Positive for wound (posterior scalp ). Negative for pallor and rash.   Allergic/Immunologic: Negative for environmental allergies and immunocompromised state.   Neurological: Negative for dizziness, tremors, seizures, facial asymmetry, speech difficulty, weakness, light-headedness, numbness and headaches.   Hematological: Negative for adenopathy. Does not bruise/bleed easily.   Psychiatric/Behavioral: Negative for agitation, behavioral problems and hallucinations. The patient is not nervous/anxious.        Otherwise complete ROS is negative except as mentioned above.    Physical Exam:   Temp:  [97.3 °F (36.3 °C)] 97.3 °F (36.3 °C)  Heart Rate:  [62-66] 66  Resp:  [18] 18  BP: (123-140)/(60-79) 140/62  Physical Exam  Constitutional:       General: He is not in acute distress.     Appearance: He is normal weight. He is not ill-appearing, toxic-appearing or diaphoretic.   HENT:      Head: Normocephalic and atraumatic.      Comments: Pressure dressing present to head, examination deferred limited     Right Ear: External ear normal.      Left Ear: External ear normal.      Nose: Nose  normal.      Mouth/Throat:      Mouth: Mucous membranes are moist.      Pharynx: Oropharynx is clear.   Eyes:      Extraocular Movements: Extraocular movements intact.      Conjunctiva/sclera: Conjunctivae normal.      Pupils: Pupils are equal, round, and reactive to light.   Cardiovascular:      Rate and Rhythm: Normal rate and regular rhythm.      Heart sounds:     No friction rub. No gallop.   Pulmonary:      Effort: No respiratory distress.      Breath sounds: No stridor. No wheezing or rales.   Chest:      Chest wall: No tenderness.   Abdominal:      General: Abdomen is flat. There is no distension.      Palpations: Abdomen is soft.      Tenderness: There is no abdominal tenderness. There is no guarding or rebound.   Musculoskeletal:         General: No swelling or tenderness.      Cervical back: No rigidity or tenderness.      Right lower leg: No edema.      Left lower leg: No edema.   Lymphadenopathy:      Cervical: No cervical adenopathy.   Skin:     General: Skin is warm and dry.      Coloration: Skin is not jaundiced.      Findings: No erythema.   Neurological:      Mental Status: He is alert and oriented to person, place, and time. Mental status is at baseline.      Sensory: No sensory deficit.      Motor: No weakness.      Coordination: Coordination normal.   Psychiatric:         Mood and Affect: Mood normal.         Behavior: Behavior normal.         Judgment: Judgment normal.           Results Reviewed:  I have personally reviewed current lab, radiology, and data and agree with results.  Lab Results (last 24 hours)     Procedure Component Value Units Date/Time    Protime-INR [[id number]] Collected: 11/09/22 2236    Specimen: Blood Updated: 11/09/22 2316    Comprehensive Metabolic Panel [[id number]] Collected: 11/09/22 2236    Specimen: Blood Updated: 11/09/22 2316    Lavender Top [[id number]] Collected: 11/09/22 2236    Specimen: Blood Updated: 11/09/22 2236    Gold Top - [facility] [[id number]] Collected:  11/09/22 2236    Specimen: Blood Updated: 11/09/22 2236    Green Top (Gel) [[id number]] Collected: 11/09/22 2236    Specimen: Blood Updated: 11/09/22 2236    Extra Tubes [[id number]] Collected: 11/09/22 2236    Specimen: Blood Updated: 11/09/22 2236    Narrative:      The following orders were created for panel order Extra Tubes.  Procedure                               Abnormality         Status                     ---------                               -----------         ------                     Lavender Top[[id number]]                                     In process                 Gold Top - SST[[id number]]                                   In process                 Green Top (Gel)[[id number]]                                  In process                 Light Blue Top[[id number]]                                   In process                   Please view results for these tests on the individual orders.    Light Blue Top [[id number]] Collected: 11/09/22 2236    Specimen: Blood Updated: 11/09/22 2236    Extra Tubes [[id number]] Collected: 11/09/22 2123    Specimen: Blood, Venous Line Updated: 11/09/22 2230    Narrative:      The following orders were created for panel order Extra Tubes.  Procedure                               Abnormality         Status                     ---------                               -----------         ------                     Green Top (Gel)[[id number]]                                  Final result                 Please view results for these tests on the individual orders.    Green Top (Gel) [[id number]] Collected: 11/09/22 2123    Specimen: Blood Updated: 11/09/22 2230     Extra Tube Hold for add-ons.     Comment: Auto resulted.       CBC & Differential [[id number]]  (Abnormal) Collected: 11/09/22 2119    Specimen: Blood Updated: 11/09/22 2134    Narrative:      The following orders were created for panel order CBC & Differential.  Procedure                               Abnormality          Status                     ---------                               -----------         ------                     CBC Auto Differential[[id number]]        Abnormal            Final result               Scan Slide[[id number]]                                                                    Please view results for these tests on the individual orders.    CBC Auto Differential [[id number]]  (Abnormal) Collected: 11/09/22 2119    Specimen: Blood Updated: 11/09/22 2133     WBC 16.70 10*3/mm3      RBC 2.32 10*6/mm3      Hemoglobin 6.9 g/dL      Hematocrit 23.0 %      MCV 99.1 fL      MCH 29.7 pg      MCHC 30.0 g/dL      RDW 30.5 %      RDW-.4 fl      MPV 10.9 fL      Platelets 316 10*3/mm3     Manual Differential [[id number]] Collected: 11/09/22 2119    Specimen: Blood Updated: 11/09/22 2132        Imaging Results (Last 24 Hours)     Procedure Component Value Units Date/Time    CT Cervical Spine Without Contrast [[id number]] Collected: 11/09/22 2020     Updated: 11/09/22 2144    Narrative:      CT CERVICAL SPINE WO CONTRAST  RPID: CT CERVICAL SPINE WITHOUT IV CONTRAST    HISTORY: 82 years old Male with fall,    COMPARISON: None.    TECHNIQUE: CT scan of the cervical spine was performed without   IV contrast.   RADIATION DOSE REDUCTION: This exam was performed according to  our departmental dose-optimization program, including automated  exposure control, adjustment of the mA and kV according to  patient size, and iterative reconstruction technique if  available.    FINDINGS:   There is diffuse cervical spondylosis. Multilevel disc bulging is  seen particularly at C5-6 and C6-7. The central canal appears  narrowed at C5-6 secondary to degenerative disc disease.  Soft tissue swelling around the dens with partial calcifications,  this may be CPPD related.      Impression:        1. No fracture.  2. Advanced diffuse cervical spondylosis.  3. A stenotic central canal is suspected at C5-6 secondary to  degenerative disc  disease. MR may be useful in assessing this  further.    Electronically signed by:  Krishna Duarte MD  11/9/2022 9:42 PM CST  Workstation: [id number]    CT Head Without Contrast [[id number]] Collected: 11/09/22 2020     Updated: 11/09/22 2139    Narrative:       CT HEAD WO CONTRAST  RPID: CT HEAD WITHOUT IV CONTRAST    CLINICAL HISTORY: 82 years old Male with fall ,    COMPARISON: 10/25/2022  TECHNIQUE: Contiguous axial images of the brain were obtained  without intravenous contrast. From this data, sagittal and  coronal reconstructed images were obtained.  This exam was performed according to our departmental  dose-optimization program, which includes automated exposure  control, adjustment of the mA and kV according to patient size,  and iterative reconstruction technique, if available.      FINDINGS:   The midline structures are preserved. The ventricles are normal.  No cerebral mass effects are observed. Gray-white matter  differentiation is normal. Stable global atrophy. Low attenuation  in the periventricular white matter and centrum semiovale is  observed, which is most likely from stable mild microvascular  chronic ischemia. This is frequently seen in patients with  diabetes or hypertension.    The skull has a normal appearance.   There is a scalp hematoma in the high left parietal region with  skin staples present.      Impression:        No acute intracranial abnormalities are seen.      Electronically signed by:  Krishna Duarte MD  11/9/2022 9:37 PM CST  Workstation: [id number]            Assessment:    Active Hospital Problems    Diagnosis    • **Injury of head, initial encounter      # Mechanical fall   # Injury of head by fall with scalp laceration, status post stapling and Surgicel and pressure dressing placement in ED  # Hyperkalemia  # Acute renal failure reviewed potential chronic kidney disease a stage II-III  # Leukocytosis reactive   # Acute blood loss anemia by bleeding from scalp laceration  #  Chronic anemia with prior history of packed red blood cell transfusion   # Coronary artery disease with history of CABG   # Diabetes mellitus type 2   # Hypertension   # Hyperlipidemia           Plan:    Place on telemetry  Obtain further laboratory work-up  Serial neuro check  1 unit packed red blood cells was ordered in ED.  We will see response of H&H to packed red blood cell transfusion.  I discussed with the patient risks and benefits of packed red blood cell transfusion.  Patient agreed with packed red blood cell transfusion.  Hold nephrotoxic medication.   Place on low rate IV fluid.  See response of hyperkalemia to hyperkalemia treatment protocol (sodium bicarb, insulin with glucose, calcium gluconate, IV fluid, Lokelma)  5 mg oral vitamin K from mildly elevated INR.  PT OT  Comorbidities, chronic medical problems will be treated appropriately  Reconcile home medication and continue with essential home medication.  Hold any antiplatelet therapy at this time considering scalp laceration and bleeding to minimize risk of further bleeding.  PT OT.  Please see orders for comprehensive plan.    I confirmed that the patient's Advance Care Plan is present, code status is documented, or surrogate decision maker is listed in the patient's medical record.   Discussed with the patient in presence of his wife advanced directive CODE STATUS.  Patient wished to be DNR/DNI.  His wife agree with him.  He wished that his wife Julissa be his healthcare proxy.  I have utilized all available immediate resources to obtain, update, or review the patient's current medications.     I discussed the patient's findings and my recommendations with: Patient and his wife both agreed with above plan of care.      Saeid Behroozi, MD   11/09/22   23:22 CST                Electronically signed by Behroozi, Saeid, MD at 11/10/22 0796

## 2022-11-16 NOTE — PLAN OF CARE
Goal Outcome Evaluation:  Plan of Care Reviewed With: patient        Progress: declining  Outcome Evaluation: Patient is confused at times and trying to pull his Airvo treatment out of his nose.RT notified of increase WOB. Patient is switched back to Bipap tonight;tolerating well;on continous pulse ox monitoring; NPO;given with tylenol for pain crushed in applesauce;turned to sides;urine output adequate;on continous protonix drip; continous ivf infusing;left on bed sleeping

## 2022-11-16 NOTE — DISCHARGE PLACEMENT REQUEST
"Marcio Waller (82 y.o. Male)     Date of Birth   1940    Social Security Number       Address   [address] [place] [address] [address] [place] [address]    Home Phone   [phone]    MRN   [id number]       Sikh   Zoroastrian    Marital Status                               Admission Date   11/9/22    Admission Type   Emergency    Admitting Provider   Behroozi, Saeid, MD    Attending Provider   Brenden Lozoya MD    Department, Room/Bed   [address], 370/1       Discharge Date       Discharge Disposition       Discharge Destination                               Attending Provider: Brenden Lozoya MD    Allergies: Molds & Smuts, Hydrocodone-acetaminophen    Isolation: None   Infection: None   Code Status: No CPR    Ht: 160 cm (63\")   Wt: 59.9 kg (132 lb)    Admission Cmt: None   Principal Problem: Injury of head, initial encounter [S09.90XA]                 Active Insurance as of 11/9/2022     Primary Coverage     Payor Plan Insurance Group Employer/Plan Group    MEDICARE MEDICARE A & B      Payor Plan Address Payor Plan Phone Number Payor Plan Fax Number Effective Dates    [address] [phone]  2/1/2007 - None Entered    [facility] [id number]       Subscriber Name Subscriber Birth Date Member ID       MARCIO WALLER 1940 [id number]           Secondary Coverage     Payor Plan Insurance Group Employer/Plan Group    [employer] CO PLAN F     Payor Plan Address Payor Plan Phone Number Payor Plan Fax Number Effective Dates    [address] [address] [phone]  1/1/2015 - None Entered    [facility] [id number]       Subscriber Name Subscriber Birth Date Member ID       MARCIO WALLER 1940 [id number]                 Emergency Contacts      (Rel.) Home Phone Work Phone Mobile Phone    Julissa Ryan (Spouse) [phone] -- [phone]            Physician Progress Notes (last 24 hours)  Notes from " 11/15/22 1615 through 11/16/22 1615   No notes of this type exist for this encounter.

## 2022-11-16 NOTE — PROGRESS NOTES
TWO PATIENT IDENTIFIERS WERE USED. THE PATIENT WAS DRAPED WITH A FULL BODY DRAPE AND THE PATIENT'S LEFT ARM WAS PREPPED WITH CHLORA PREP. ULTRASOUND WAS USED TO LOCALIZE THELEFT BASILIC VEIN. SUBCUTANEOUS TISSUE AT THE CATHETER SITE WAS INFILTRATED WITH 2% LIDOCAINE. UNDER ULTRASOUND GUIDANCE, THE VEIN WAS ACCESSED WITH A 21 GAUGE  NEEDLE. AN 0.018 WIRE WAS THEN THREADED THROUGH THE NEEDLE. THE 21 GAUGE NEEDLE WAS REMOVED AND A 4 Lao SHEATH WAS PLACED OVER THE WIRE INTO THE VEIN.THE MIDLINE CATHETER WAS TRIMMED TO 20CM. THE MIDLINE CATHETER WAS THEN PLACED OVER THE WIRE INTO THE VEIN, THE SHEATH WAS PEELED AWAY, WIRE WAS REMOVED. CATHETER WAS FLUSHED WITH NORMAL SALINE AND CATHETER TIP APPLIED. BIOPATCH PLACED. CATHETER SECURED WITH STAT LOCK AND TEGADERM. PATIENT TOLERATED PROCEDURE WELL. THIS WAS DONE IN THE ANGIOSUITE      IMPRESSION:SUCCESSFUL PLACEMENT OF DUAL LUMEN MIDLINE.           Rowan Stacy  11/16/2022  11:52 CST

## 2022-11-16 NOTE — PROGRESS NOTES
[facility] Medicine   INPATIENT PROGRESS NOTE      Patient Name: Vishnu Waller  Date of Admission: 11/9/2022  Today's Date: 11/16/22  Length of Stay: 3  Primary Care Physician: Johnathon Shaikh MD    Subjective   Chief Complaint and HPI:  Patient initially admitted for accidental fall with head contusion/laceration and acute blood loss anemia.  Known history of anemia and suspected cancer, patient was planning to get bone marrow aspiration outpatient with Dr. Covington before the fall/admission.  On imaging status post fall, was found to have bladder mass and lung mass.  Developed shortness of breath, now on air Vo.      Today he is doing well.  He is eating today, has not been able to eat over the last several days due to his waxing and waning confusion and shortness of breath.    Review of Systems   Review of Systems as of 11/16/22   Constitutional: Negative.    HENT: Negative.    Eyes: Negative.    Respiratory: Positive for shortness of breath  Cardiovascular: Negative.    Gastrointestinal: Negative.    Endocrine: Negative.    Genitourinary: Negative.    Musculoskeletal: Negative.    Skin: Negative.    Neurological: Negative.    Psychiatric/Behavioral: Negative.      All pertinent negatives and positives are as above. All other systems have been reviewed and are negative unless otherwise stated.     Objective    Temp:  [97.8 °F (36.6 °C)-98.2 °F (36.8 °C)] 97.9 °F (36.6 °C)  Heart Rate:  [] 78  Resp:  [20-23] 22  BP: (121-131)/(55-65) 121/55  Physical Exam  Vitals and nursing note reviewed.   Constitutional:       General: No acute distress.     Appearance: Normal appearance.   HENT:      Head: Normocephalic and atraumatic.      Mouth: Mucous membranes are moist.   Eyes:      Conjunctivae/sclerae: Conjunctivae normal.      Pupils: Pupils are equal, round, and reactive to light.   Cardiovascular:      Rate and Rhythm: Normal rate and regular rhythm.    Pulmonary:      Effort: Pulmonary effort is normal.      Breath sounds: Normal breath sounds.   Abdominal:      General: Abdomen is flat.      Palpations: Abdomen is soft.      Tenderness: No abdominal tenderness.   Musculoskeletal:         General: No signs of injury. Normal range of motion.   Skin:     General: Skin is warm and dry.   Neurological:      General: No focal deficit present.      Mental Status: Alert and oriented  Psychiatric:         Mood and Affect: Mood normal.         Behavior: Behavior normal.         Results Review:  I have reviewed the labs, radiology results, and diagnostic studies.    Laboratory Data:   Results from last 7 days   Lab Units 11/16/22  0629 11/15/22  0606 11/14/22  1359 11/14/22  0145   WBC 10*3/mm3 22.09* 35.31*  --  19.74*   HEMOGLOBIN g/dL 7.3* 8.9* 9.5* 9.1*   HEMATOCRIT % 23.1* 27.9* 29.3* 28.9*   PLATELETS 10*3/mm3 180 228  --  267        Results from last 7 days   Lab Units 11/16/22  0629 11/15/22  0606 11/14/22  0142 11/10/22  0803 11/09/22  2236   SODIUM mmol/L 146* 146* 144   < > 140   POTASSIUM mmol/L 3.9 4.9 4.7   < > 6.1*   CHLORIDE mmol/L 106 106 105   < > 106   CO2 mmol/L 29.0 28.0 31.0*   < > 26.0   BUN mg/dL 36* 36* 18   < > 30*   CREATININE mg/dL 1.37* 1.80* 1.19   < > 1.75*   CALCIUM mg/dL 9.3 9.0 8.8   < > 9.0   BILIRUBIN mg/dL  --   --   --   --  0.4   ALK PHOS U/L  --   --   --   --  53   ALT (SGPT) U/L  --   --   --   --  10   AST (SGOT) U/L  --   --   --   --  18   GLUCOSE mg/dL 131* 142* 160*   < > 119*    < > = values in this interval not displayed.       Culture Data:   No results found for: BLOODCX  No results found for: URINECX  No results found for: RESPCX  No results found for: WOUNDCX  No results found for: STOOLCX  No components found for: BODYFLD    Radiology Data:   Imaging Results (Last 24 Hours)     Procedure Component Value Units Date/Time    CT Abdomen Pelvis With Contrast [[id number]] Collected: 11/15/22 0748     Updated: 11/15/22 0940     Narrative:      CT abdomen and pelvis with IV contrast November 15, 2022    INDICATION: Follow-up abnormal study    TECHNIQUE: Spiral images obtained from diaphragm through rectum  following intravenous administration of 90 mL of Isovue-300.  Sagittal, axial and coronal reformatted images generated  retrospectively. Comparison with previous study dated 6/21/2021    This exam was performed according to our departmental  dose-optimization program, which includes automated exposure  control, adjustment of the mA and/or kV according to patient size  and/or use of iterative reconstruction technique.      FINDINGS:  New bilateral pleural effusions.  Basilar volume loss/consolidation particularly right-sided.  Stable low-attenuation lesion anterior right lobe of the liver  likely represent small cysts or less likely hemangioma. No new  hepatic lesions.  Cholelithiasis without evidence of cholecystitis.  Spleen normal.  Adrenals grossly normal.  Atrophic pancreas. No focal pancreatic lesions.  Renal cortical scarring bilateral.  Right renal cortical cysts as previously.  New left hydronephrosis and hydroureter down 2-D level of the  bladder there appear to be a couple of small distal left ureteral  calculi present at the UVJ.  There is mild primarily of the dorsal aspect of the floor of the  bladder should be viewed with some concern. Remainder of the  bladder normal.  Patient appears constipated with a significant amount of fecal  material present particularly in the rectum.  Diverticulosis without evidence of acute diverticulitis.  Appendix normal.  No bowel obstruction or free intraperitoneal air.  Atherosclerotic change involving the abdominal aorta and major  branch vessels. No aneurysmal dilatation.  No free fluid in the pelvis.  Patient appears to be status post prostatectomy.  No focal bony lesions.  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  Dorsal hardware present at the L4 and L5 levels.      Impression:      New pleural effusions with basilar volume loss/consolidation  particularly right-sided.    Left-sided hydronephrosis and hydroureter may be due to small  distal ureteral calculi.  Irregular inferior bladder wall dorsally should be viewed with  some concern and urologic consultation with cystoscopy should be  considered. Retrograde left pyelogram should be considered at  that time as well.  Diverticulosis without acute diverticulitis.  Patient appears constipated.  Other findings as above. See body of report for full details.    Electronically signed by:  Juancho Johnson MD  11/15/2022 9:38 AM  CST Workstation: [id number]          I have reviewed the patient's current medications.     Assessment/Plan     Active Hospital Problems    Diagnosis    • **Injury of head, initial encounter    • Hematemesis with nausea      Accidental fall     With head contusion and scalp laceration; he would need to stay off of Plavix if possible      Acute blood loss anemia     Due to laceration on the head and pre-existing anemia     S/p three units of PRBC given     H&H stable     Acute kidney injury on CKD     Due to dehyration     Improved since yesterday     Acute respiratory failure with hypoxemia     Waxing and waning but WBC much improved today and clinically improved, awake and alert most of the day     continue with Airvo     Right perihilar mass/infiltration     In the setting of bilateral pleural effusion     Will treat it empirically as pneumonia; discussed with wife and no interested on biopsy to find out about cancer as possibility     Sepsis     Likely due to above     Empirically on cefepime      Vanc stopped today, MRSA negative     Irregular inferior wall bladder      Might need cystoscopy     Wife does not want to pursue this any further    Left ureteral small calculi     With mild hydronephrosis; no blood in urine     Chronic medical  problems     CAD/CABG     Type 2 DM     Essential hypertension     Hyperlipidemia      Hx of carotid stenosis    I had a long discussion with the patient and wife.  There seems to be some difficulty grasping his status.  Wife and patient insist that they do not want any further work-up on the bladder and lung masses as they understand that he has cancer and have made him DNR/DNIand stated did not want to pursue finding out the type cancer causing the two masses but they do want the bone marrow aspiration.  Attempted multiple times to explain that these are essentially all looking for the same thing (the source of his likely cancer).  Wife is asking about possibly going home with hospice but at this time he is still requiring the Airvo very frequently, becoming confused when he has been off of it for some time.  Patient  And SO requested CM to speak with them regarding hospice options.      Discharge Planning: in process    Copied text in this note has been reviewed and is accurate as of 11/16/2022       Electronically signed by Andreia Ralph PA-C, 11/16/22, 08:58 CST.

## 2022-11-16 NOTE — DISCHARGE PLACEMENT REQUEST
"Marcio Waller (82 y.o. Male)     Date of Birth   1940    Social Security Number       Address   [address] [place] [address] [address] [place] [place] [address]    Home Phone   [phone]    MRN   [id number]       Mandaeism   Episcopal    Marital Status                               Admission Date   11/9/22    Admission Type   Emergency    Admitting Provider   Behroozi, Saeid, MD    Attending Provider   Brenden Lozoya MD    Department, Room/Bed   [address], 370/1       Discharge Date       Discharge Disposition       Discharge Destination                               Attending Provider: Brenden Lozoya MD    Allergies: Molds & Smuts, Hydrocodone-acetaminophen    Isolation: None   Infection: None   Code Status: No CPR    Ht: 160 cm (63\")   Wt: 59.9 kg (132 lb)    Admission Cmt: None   Principal Problem: Injury of head, initial encounter [S09.90XA]                 Active Insurance as of 11/9/2022     Primary Coverage     Payor Plan Insurance Group Employer/Plan Group    MEDICARE MEDICARE A & B      Payor Plan Address Payor Plan Phone Number Payor Plan Fax Number Effective Dates    [address] [phone]  2/1/2007 - None Entered    [facility] [id number]       Subscriber Name Subscriber Birth Date Member ID       MARCIO WALLER 1940 [id number]           Secondary Coverage     Payor Plan Insurance Group Employer/Plan Group    [employer] PLAN F     Payor Plan Address Payor Plan Phone Number Payor Plan Fax Number Effective Dates    [address] [address] [phone]  1/1/2015 - None Entered    [facility] [id number]       Subscriber Name Subscriber Birth Date Member ID       MARCIO WALLER 1940 [id number]                 Emergency Contacts      (Rel.) Home Phone Work Phone Mobile Phone    Julissa Ryan (Spouse) [phone] -- [phone]               Physician Progress Notes (last 48 hours)    "   Manuel Tucker MD at 11/15/22 0905              [facility] Medicine Services  INPATIENT PROGRESS NOTE    Length of Stay: 2  Date of Admission: 11/9/2022  Primary Care Physician: Johnathon Shaikh MD    Subjective   (S) Patient male admitted for accidental fall with head contusion and acute blood loss anemia  He is more alert, answering questions with airvo; just put a purewick on him; took a couple of sips of water      Review of Systems   Unable to perform ROS: Acuity of condition        All pertinent negatives and positives are as above. All other systems have been reviewed and are negative unless otherwise stated.     Prior to Admission medications    Medication Sig Start Date End Date Taking? Authorizing Provider   acetaminophen (Tylenol) 325 MG tablet Take 650 mg by mouth Every 4 (Four) Hours As Needed for Mild Pain. 10/30/22   Johnathon Shaikh MD   albuterol sulfate HFA (Ventolin HFA) 108 (90 Base) MCG/ACT inhaler Inhale 2 puffs Every 4 (Four) Hours As Needed for Wheezing or Shortness of Air. 3/29/22   Katherin Case DO   atorvastatin (LIPITOR) 40 MG tablet Take 1 tablet by mouth Daily. 10/6/21   Radha Wilkes MD   budesonide-formoterol (SYMBICORT) 160-4.5 MCG/ACT inhaler Inhale 2 puffs 2 (Two) Times a Day. 4/26/22   Katherin Case DO   carvedilol (COREG) 3.125 MG tablet Take 1 tablet by mouth Every 12 (Twelve) Hours. 1/19/22   Radha Wilkes MD   clopidogrel (PLAVIX) 75 MG tablet Take 1 tablet by mouth Daily. 1/19/22   Radha Wilkes MD   Cyanocobalamin (VITAMIN B 12 PO) Take 500 mcg by mouth Daily. Three times weekly, Vicky Song MD   dilTIAZem CD (CARDIZEM CD) 180 MG 24 hr capsule Take 1 capsule by mouth Daily. 10/6/21   Radha Wilkes MD   FeroSul 325 (65 Fe) MG tablet TAKE 1 TABLET EVERY DAY WITH BREAKFAST 4/7/22   Josef Covington MD   fluticasone (FLONASE) 50 MCG/ACT nasal spray 2  sprays into each nostril Every Night. 2/14/18   Camryn Koehler MD   ipratropium-albuterol (DUO-NEB) 0.5-2.5 mg/3 ml nebulizer Inhale the contents of 1 vial by nebulization Every 4 (Four) Hours As Needed for Wheezing for up to 30 days. 2/10/21 3/12/21  Akbar Zapien MD   isosorbide mononitrate (IMDUR) 30 MG 24 hr tablet Take 1 tablet by mouth Daily. 2/28/18   Julio Cesar Moss APRN   losartan (COZAAR) 50 MG tablet Take 1 tablet by mouth Every Night. 1/19/22   Radha Wilkes MD   magnesium oxide (MAG-OX) 400 MG tablet Take 1 tablet by mouth 2 (Two) Times a Day. 4/15/21   ProviderVicky MD   metFORMIN (GLUCOPHAGE) 1000 MG tablet Take 1 tablet by mouth 2 (Two) Times a Day With Meals. 2/28/18   Julio Cesar Moss APRN   nitroglycerin (NITROSTAT) 0.4 MG SL tablet Place 1 tablet under the tongue Every 5 (Five) Minutes As Needed for Chest Pain. Take no more than 3 doses in 15 minutes. 2/27/18   Julio Cesar Moss APRN   omeprazole (priLOSEC) 20 MG capsule Take 20 mg by mouth Daily. 10/30/22   Johnathon Shaikh MD   oxybutynin XL (DITROPAN-XL) 5 MG 24 hr tablet Take 5 mg by mouth Daily.    Provider, MD Vicky   pantoprazole (PROTONIX) 40 MG EC tablet Take 1 tablet by mouth Every Morning. 10/29/22   Andreia Ralph PA-C   pregabalin (LYRICA) 150 MG capsule 3 (Three) Times a Day.    Emergency, Nurse Emily, RN   pregabalin (LYRICA) 75 MG capsule Take 1 capsule by mouth 2 (Two) Times a Day for 30 days.  Patient taking differently: Take 2 capsules by mouth Daily. 2/12/21 7/12/21  Akbar Zapien MD   traMADol (ULTRAM) 50 MG tablet Take 1 tablet by mouth 2 (Two) Times a Day.    Provider, MD Vicky   travoprost, BAK free, (TRAVATAN) 0.004 % solution ophthalmic solution Administer 1 drop to both eyes Daily. 10/30/22   Provider, MD Vicky       atorvastatin, 40 mg, Oral, Daily  budesonide-formoterol, 2 puff, Inhalation, BID - RT  carvedilol, 3.125 mg, Oral, Q12H  cefepime, 2  g, Intravenous, Once  cefepime, 2 g, Intravenous, Q24H  dilTIAZem CD, 180 mg, Oral, Daily  ferrous sulfate, 324 mg, Oral, Daily With Breakfast  Insulin Aspart, 0-7 Units, Subcutaneous, TID AC  isosorbide mononitrate, 30 mg, Oral, Q24H  latanoprost, 1 drop, Both Eyes, Nightly  oxybutynin XL, 5 mg, Oral, Daily  Pharmacy to dose vancomycin, , Does not apply, Daily  sodium chloride, 10 mL, Intravenous, Q12H  vancomycin, 20 mg/kg, Intravenous, Once  vitamin B-12, 500 mcg, Oral, Daily      dextrose, 75 mL/hr  pantoprazole, 8 mg/hr, Last Rate: 8 mg/hr (11/14/22 2137)  Pharmacy to Dose Cefepime,         Objective    Temp:  [97.6 °F (36.4 °C)-98.4 °F (36.9 °C)] 97.9 °F (36.6 °C)  Heart Rate:  [] 87  Resp:  [20-28] 22  BP: ()/(52-76) 96/52    Physical Exam  Constitutional:       Comments: Somnolent with Airvo   HENT:      Head: Normocephalic. Laceration present.      Nose: Nose normal.      Mouth/Throat:      Mouth: Mucous membranes are moist.   Cardiovascular:      Rate and Rhythm: Regular rhythm.      Heart sounds: Normal heart sounds.   Pulmonary:      Breath sounds: Normal breath sounds.   Abdominal:      General: Bowel sounds are normal.      Palpations: Abdomen is soft.   Musculoskeletal:         General: Normal range of motion.      Cervical back: Normal range of motion and neck supple.   Skin:     General: Skin is warm.   Neurological:      Mental Status: Mental status is at baseline.      Comments: Somnolent but very easily awaken; moves all extremities   Psychiatric:         Mood and Affect: Mood normal.      Comments: With Airvo         Results Review:  I have reviewed the labs, radiology results, and diagnostic studies.    Laboratory Data:   Results from last 7 days   Lab Units 11/15/22  0606 11/14/22  0142 11/13/22  0541 11/10/22  0803 11/09/22  2236   SODIUM mmol/L 146* 144 143   < > 140   POTASSIUM mmol/L 4.9 4.7 4.4   < > 6.1*   CHLORIDE mmol/L 106 105 106   < > 106   CO2 mmol/L 28.0 31.0* 31.0*    < > 26.0   BUN mg/dL 36* 18 19   < > 30*   CREATININE mg/dL 1.80* 1.19 1.10   < > 1.75*   GLUCOSE mg/dL 142* 160* 132*   < > 119*   CALCIUM mg/dL 9.0 8.8 8.4*   < > 9.0   BILIRUBIN mg/dL  --   --   --   --  0.4   ALK PHOS U/L  --   --   --   --  53   ALT (SGPT) U/L  --   --   --   --  10   AST (SGOT) U/L  --   --   --   --  18   ANION GAP mmol/L 12.0 8.0 6.0   < > 8.0    < > = values in this interval not displayed.     Estimated Creatinine Clearance: 27.5 mL/min (A) (by C-G formula based on SCr of 1.8 mg/dL (H)).  Results from last 7 days   Lab Units 11/10/22  0803   MAGNESIUM mg/dL 1.8         Results from last 7 days   Lab Units 11/15/22  0606 11/14/22  1359 11/14/22  0145 11/13/22  0541 11/12/22  0706 11/11/22  1023 11/10/22  0803   WBC 10*3/mm3 35.31*  --  19.74* 12.67* 13.61*  --  16.66*   HEMOGLOBIN g/dL 8.9* 9.5* 9.1* 6.7* 7.4*   < > 8.2*   HEMATOCRIT % 27.9* 29.3* 28.9* 22.2* 24.7*   < > 26.7*   PLATELETS 10*3/mm3 228  --  267 222 247  --  286    < > = values in this interval not displayed.     Results from last 7 days   Lab Units 11/10/22  0803 11/09/22  2236   INR  1.37* 1.35*       Culture Data:   No results found for: BLOODCX  No results found for: URINECX  No results found for: RESPCX  No results found for: WOUNDCX  No results found for: STOOLCX  No components found for: BODYFLD    Radiology Data:   Imaging Results (Last 24 Hours)     Procedure Component Value Units Date/Time    CT Abdomen Pelvis With Contrast [[id number]] Resulted: 11/15/22 0758     Updated: 11/15/22 0759    CT Angiogram Chest [[id number]] Collected: 11/14/22 2343     Updated: 11/15/22 0054    Addenda:         ADDENDUM   ADDENDUM #1       Results were discussed with NICKO Currie at 12:30 AM central  time.    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     COMPARISON: 11/13/2022    TECHNIQUE:  Postcontrast helical CT acquisition performed from the level of  the lung apices to the adrenal glands with coronal and sagittal  reformats including MIP coronal and sagittal reformatted images.    CT Dose reduction techniques were utilized for this examination  with 1 or more of the following: Automated exposure control  and/or adjustment of the mA or kV according to patient size,  and/or use of iterative reconstruction technique.    IV Contrast: Nonionic iodinated contrast.    Complications: None.    FINDINGS:    Pulmonary arteries: No pulmonary embolus identified.    Mediastinum: No lymphadenopathy. No significant pericardial  effusion. Ascending aortic aneurysm measuring up to 4.5 cm in  diameter by my measurements (coronal series).    Lungs: Small left and small-to-moderate right pleural effusions.  Interval development of dense, patchy, somewhat masslike  opacification in the right suprahilar region. Region of somewhat  confluent density measures up to 5.2 x 3.9 cm on axial series 3,  image 61. Dense and patchy opacification of both the hilum and  extends throughout the remainder of the right lung. Additional  patchy, nodular opacities on the left measuring up to 1.1 cm in  left lower lobe (axial series 3, image 55).    Upper Abdomen: Indeterminate 1.6 cm right renal lesion.  Cholelithiasis. Partially visualized moderate left  hydronephrosis, new from prior.    Bones: New mild upper endplate compression deformity anteriorly  at T12. No osseous retropulsion identified.       Impression:        Interval development of large area of dense, patchy, somewhat  masslike area of opacification with adjacent round glass  densities and nodular densities traversing the right lung as  above. Region of confluent, somewhat masslike density measures up  to 5.2 x 3.9 cm. Appearance is nonspecific and may reflect either  acute pneumonia or underlying mass/malignancy. Dedicated PET/CT  or short interval follow-up CT such as an 4-6 weeks is advised.    A nodular density in the left upper lobe measures up to 1.1 cm.  Attention time of PET CT or short interval follow-up CT.    Partially visualized moderate left hydronephrosis, new from  prior. Raises concern for a more distal stone in the ureter or  obstructing mass lesion. Dedicated CT abdomen and pelvis advised.    Small left and small to moderate right pleural effusions.    Indeterminate 1.6 cm right renal lesion, cannot exclude evidence  of complex renal lesion or neoplasm/malignancy. A follow-up renal  protocol CT is advised.    No pulmonary embolus identified.    Ascending aortic aneurysm measuring up to 4.5 cm. Six-month  follow-up CT surveillance imaging is advised based on size.    New mild upper endplate compression deformity anteriorly at T12.  No osseous retropulsion identified.    Electronically signed by:  Bebo Soto MD  11/15/2022 12:20 AM  CST Workstation: [id number]    CT Head Without Contrast [[id number]] Collected: 11/14/22 2343     Updated: 11/15/22 0010    Narrative:      EXAM DESCRIPTION:  CT HEAD WITHOUT IV CONTRAST    CLINICAL HISTORY:   Mental status change, unknown cause, S09.90XA Unspecified injury  of head, initial encounter D50.0 Iron deficiency anemia secondary  to blood loss (chronic) W19.XXXA Unspecified fall, initial  encounter Z74.09 Other reduced mobility Z78.9 Other specified  health status Z74.09 Other reduced mobility K92.0 Hematemesis  M51.36 Other intervertebral disc degeneration, lumbar region.    TECHNIQUE:    Multiple axial images were obtained through the brain without  intravenous contrast. Coronal and sagittal images were  reconstructed.  All CT scans at this facility use dose modulation, iterative  reconstruction, and/or weight based dosing when appropriate to  reduce radiation dose to as low as reasonably achievable.    COMPARISON: Previous head CT dated 11/9/2022.    FINDINGS:    There is no evidence of acute intracranial hemorrhage or acute  major territorial infarction.  The ventricles, sulci and cisterns are grossly unchanged in size.  There is no midline shift. No abnormal extra-axial fluid  collections are seen. Generalized parenchymal volume loss appears  grossly unchanged.  Subcortical and periventricular white matter hypodensities are  seen, compatible with chronic microangiopathic disease, grossly  unchanged. Small chronic lacunar infarct along the left external  capsule, unchanged. Vascular calcifications.    Mild mucosal thickening within the right sphenoid sinus and trace  mucosal thickening within the left maxillary sinus. No air-fluid  levels.  The mastoid air cells are clear bilaterally.  Mild subgaleal scalp hematoma overlying the posterior left  parietal convexity, with overlying skin staples.        Impression:      1.  No acute intracranial abnormality.  2.  Generalized parenchymal volume loss and chronic  microangiopathic disease, grossly unchanged.  3.  Mild subgaleal scalp hematoma overlying the posterior left  parietal convexity, with overlying skin staples.    Electronically signed by:  Rowan Anton MD  11/15/2022 12:08  AM CST Workstation: [id number]    XR Abdomen KUB [[id number]] Collected: 11/14/22 1350     Updated: 11/14/22 1514    Narrative:      Comparison:  6/21/2021    Indication:  Possible GI bleed. Iron deficiency anemia    Findings:  Single supine radiograph of abdomen is obtained. There  is gaseous and fluid distention of large and small bowel.  Multiple leads overlie the  abdomen.      Impression:      Gaseous and fluid distention of large and small  bowel. Consider CT.    Electronically signed by:  Gonzalo Willis MD  11/14/2022 3:12 PM CST  Workstation: [id number]          I have reviewed the patient's current medications.     Assessment/Plan   Accidental fall     With head contusion and scalp laceration; he would need to stay off of Plavix if possible     Acute blood loss anemia     Due to laceration on the head     S/p three units of PRBC given     H&H stable    Acute kidney injury on CKD     Due to dehyration     Worsening this morning likely due to lack of intake vs contrast use    Acute respiratory failure with hypoxemia     POA     Aggravated  Again thismorning with hypoxemia and hypercapnia; improving; continue with Airvo     Right perihilar mass/infiltration     In the setting of bilateral pleural effusion     Will treat it empirically as pneumonia; discussed with wife and no interested on biopsy to find out about cancer as possibility    Sepsis     Likely due to above     Empirically on cefepime and vancomycin    Irregular inferior wall bladder      Might need cystoscopy     Wife does not want to pursue this any further    Left ureteral small calculi     With mild hydronephrosis; no blood in urine    Chronic medical problems     CAD/CABG     Type 2 DM     Essential hypertension     Hyperlipidemia      Hx of carotid stenosis    Family no interested on further work up for mass like on the right lung      Manuel Tucker MD      Electronically signed by Manuel Tucker MD at 11/15/22 1049     Manuela Carlton PA-C at 11/14/22 8505     Attestation signed by Reed Cuadra MD at 11/16/22 5340    I have reviewed this documentation and agree.  Subsequently with the results of the CT scans I have ordered further imaging, as recommended and appropriate.  We will await this further information to know how to proceed in the patient's further management    Electronically signed  by Reed Cuadra MD, 11/16/22, 8:30 AM CST.                  Concerns voiced by nursing staff that patient remains altered despite improvement to ABGs and being on Airvo at this time. Chart review performed and I do note that lack of improvement of mental status despite improvement of ABG would warrant CT head. Discussed the case with my attending. CT head will be ordered, as well as CTA Chest to assess for any pulmonary etiologies of the sudden change in respiration and metabolic state.     Electronically signed by Reed Cuadra MD at 11/16/22 7444

## 2022-11-16 NOTE — PLAN OF CARE
Goal Outcome Evaluation:  Plan of Care Reviewed With: patient, spouse        Progress: improving  Outcome Evaluation: Pt has been awake, alert, and oriented this shift. Airvo continues at 60L. Diet ordered but appetite is poor. Case management consulted.

## 2022-11-16 NOTE — SIGNIFICANT NOTE
11/16/22 1137   OTHER   Discipline physical therapy assistant   Rehab Time/Intention   Session Not Performed other (see comments)  (nursing staff holds PT treatment this morning secondary to attempting to get patient's O2 sats up. requests to check back in PM)   Recommendation   PT - Next Appointment 11/16/22

## 2022-11-17 NOTE — PROGRESS NOTES
[facility] Medicine Services  INPATIENT PROGRESS NOTE    Length of Stay: 4  Date of Admission: 11/9/2022  Primary Care Physician: Johnathon Shaikh MD    Subjective   Chief Complaint: shortness of breath   HPI:   82 year old male with past medical history of CAD, CABG, type 2 DM, HLD, HTN, chronic anemia with suspected cancer who presented on 11/9/22 with complaints of fall with head laceration.  Laceration was repaired via stapling in the ED.  He was found to be anemic with hemoglobin of 6.9 requiring transfusion of PRBC. Imaging post fall also revealed potential bladder and lung masses.  The patient has opted against any further invasive evaluation and wishes to pursue hospice care.  He and wife met with [facility] today and plan to transition to hospice care at home when service can admit them tomorrow.  After today's discussion with hospice, option of comfort care while in the hospital was discussed with patient and wife and they wish to transition to comfort care orders. During today's visit, patient is experiencing shortness of breath/air hunger.    Review of Systems   Constitutional: Positive for activity change and fatigue. Negative for chills and fever.   HENT: Negative for congestion, rhinorrhea and sore throat.    Respiratory: Positive for shortness of breath. Negative for cough, chest tightness and wheezing.    Cardiovascular: Negative for chest pain, palpitations and leg swelling.   Gastrointestinal: Negative for abdominal pain, diarrhea, nausea and vomiting.   Musculoskeletal: Positive for back pain.   Skin: Positive for pallor.   Neurological: Positive for weakness. Negative for dizziness and headaches.   Psychiatric/Behavioral: Negative for confusion. The patient is not nervous/anxious.         All pertinent negatives and positives are as above. All other systems have been reviewed and are negative unless otherwise stated.     Objective    Temp:   [97.6 °F (36.4 °C)-98.6 °F (37 °C)] 98.1 °F (36.7 °C)  Heart Rate:  [70-90] 85  Resp:  [18-22] 18  BP: (122-143)/(60-69) 133/61    Physical Exam  Vitals and nursing note reviewed.   Constitutional:       Appearance: He is ill-appearing.   HENT:      Head: Normocephalic and atraumatic.      Right Ear: External ear normal.      Left Ear: External ear normal.      Nose: Nose normal.      Mouth/Throat:      Mouth: Mucous membranes are moist.      Pharynx: Oropharynx is clear.   Eyes:      General: No scleral icterus.        Right eye: No discharge.         Left eye: No discharge.      Conjunctiva/sclera: Conjunctivae normal.   Cardiovascular:      Rate and Rhythm: Normal rate and regular rhythm.      Heart sounds: Normal heart sounds. No murmur heard.    No friction rub. No gallop.   Pulmonary:      Effort: Accessory muscle usage present. No respiratory distress.      Breath sounds: No stridor. Examination of the right-upper field reveals decreased breath sounds. Examination of the left-upper field reveals decreased breath sounds. Examination of the right-lower field reveals decreased breath sounds. Examination of the left-lower field reveals decreased breath sounds. Decreased breath sounds present. No wheezing, rhonchi or rales.   Abdominal:      General: Bowel sounds are normal. There is no distension.      Palpations: Abdomen is soft.      Tenderness: There is no abdominal tenderness.   Musculoskeletal:         General: No swelling or deformity. Normal range of motion.      Cervical back: Normal range of motion and neck supple.   Skin:     General: Skin is warm and dry.      Coloration: Skin is pale.   Neurological:      General: No focal deficit present.      Mental Status: He is alert and oriented to person, place, and time.   Psychiatric:         Mood and Affect: Mood is anxious.         Behavior: Behavior normal.             Results Review:  I have reviewed the labs, radiology results, and diagnostic  studies.    Laboratory Data:   Results from last 7 days   Lab Units 11/16/22  0629 11/15/22  0606 11/14/22  0142   SODIUM mmol/L 146* 146* 144   POTASSIUM mmol/L 3.9 4.9 4.7   CHLORIDE mmol/L 106 106 105   CO2 mmol/L 29.0 28.0 31.0*   BUN mg/dL 36* 36* 18   CREATININE mg/dL 1.37* 1.80* 1.19   GLUCOSE mg/dL 131* 142* 160*   CALCIUM mg/dL 9.3 9.0 8.8   ANION GAP mmol/L 11.0 12.0 8.0     Estimated Creatinine Clearance: 36.1 mL/min (A) (by C-G formula based on SCr of 1.37 mg/dL (H)).          Results from last 7 days   Lab Units 11/17/22  0540 11/16/22  0629 11/15/22  0606 11/14/22  1359 11/14/22  0145 11/13/22  0541   WBC 10*3/mm3 15.91* 22.09* 35.31*  --  19.74* 12.67*   HEMOGLOBIN g/dL 6.7* 7.3* 8.9* 9.5* 9.1* 6.7*   HEMATOCRIT % 20.7* 23.1* 27.9* 29.3* 28.9* 22.2*   PLATELETS 10*3/mm3 182 180 228  --  267 222           Culture Data:   Blood Culture   Date Value Ref Range Status   11/15/2022 No growth at 2 days  Preliminary   11/15/2022 No growth at 2 days  Preliminary     No results found for: URINECX  No results found for: RESPCX  No results found for: WOUNDCX  No results found for: STOOLCX  No components found for: BODYFLD    Radiology Data:   Imaging Results (Last 24 Hours)     Procedure Component Value Units Date/Time    US Guided Vascular Access [[id number]] Collected: 11/16/22 1000     Updated: 11/16/22 1348    Narrative:      PROCEDURE: Ultrasound Guidance Vascular Access    HISTORY: Incompatible IV meds, S09.90XA Unspecified injury of  head, initial encounter 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       Impression:      Imaging obtained during vascular access device placement under  ultrasound guidance as described above    Electronically signed by:  Kaushik Scott MD  11/16/2022 1:45 PM  CST Workstation: [id number]          I have reviewed the patient's current medications.     Assessment/Plan     Active Hospital Problems    Diagnosis    • **Injury of head, initial encounter    • Severe malnutrition (HCC)    • Hematemesis with nausea    • Anemia        Plan:   1.  Fall with head laceration: repaired in ED with staples.   2. Chronic anemia: received 1 unit PRBC today due to hgb 6.7.  3. Lung, bladder mass (suspected malignancy): patient wishes against any further invasive workup or biopsy.  Due to his multiple comorbidities, he wishes to pursue hospice care at home at discharge.  [facility] agree[facility] to admit patient at home tomorrow morning once oxygen arrangements (Trilogy versus high flow) can be arranged.  Nursing informed to attempt weaning to high flow.      Comfort care orders initiated including PRN Morphine for pain/air hunger, Ativan PRN for anxiety/agitation, Zofran PRN, Scopolamine.     Discharge Planning: I expect patient to be discharged to home with hospice in 1 days.    I confirmed that the patient's Advance Care Plan is present, code status is documented, or surrogate decision maker is listed in the patient's medical record.          This document has been electronically signed by SERA Vieira on November 17, 2022 12:25 CST

## 2022-11-17 NOTE — PLAN OF CARE
Goal Outcome Evaluation:  Plan of Care Reviewed With: patient        Progress: improving  Outcome Evaluation: Patient is alert;calm and cooperative this shift; still on Airvo and tolerating well; Poor appetite;NPO for possible EGD today; Hospice consult today; no s/s of respiratory distress.

## 2022-11-17 NOTE — NURSING NOTE
Pt and wife stated this morning that they do not want to do the EGD that is scheduled for today. Notified Dr. Garcia.

## 2022-11-17 NOTE — PROGRESS NOTES
SUBJECTIVE:   11/16/2022  Chief Complaint:     Subjective      Patient remains on the BiPAP.  Dyspnea is improving.  Denied abdominal pain.  No further nausea or emesis.  Tolerating diet.  Hemoglobin dropped to 7.3.    History:  Past Medical History:   Diagnosis Date   • Acute bacterial sinusitis    • Acute bronchitis    • Acute frontal sinusitis    • Acute maxillary sinusitis    • Acute pharyngitis    • Allergic rhinitis    • Allergic rhinitis due to pollen    • Anxiety    • Artificial lens present     in position   • Asthma    • Backache    • Borderline glaucoma    • C. difficile diarrhea 2014   • Chronic laryngitis    • Chronic rhinitis    • Coronary artery disease    • Cough    • Degeneration of lumbar intervertebral disc    • Degenerative joint disease involving multiple joints    • Diabetes mellitus (HCC)    • Diverticular disease of colon    • Dizziness and giddiness    • Elevated cholesterol    • Erectile dysfunction    • Essential hypertension    • Generalized anxiety disorder    • GERD (gastroesophageal reflux disease)    • Glaucoma    • Hemorrhoids     without bleeding   • Insomnia    • Kidney stone    • Kidney stones    • Malignant tumor of prostate (HCC)    • Need for prophylactic vaccination and inoculation against influenza    • Osteoarthritis    • Osteoarthritis of multiple joints    • Pain, lumbar region     Pain radiating to lumbar region of back     • PONV (postoperative nausea and vomiting)    • Postviral fatigue syndrome    • Presence of aortocoronary bypass graft    • Prostate cancer (HCC)    • Pseudomembranous enterocolitis     improved   • Rotator cuff syndrome     right   • Shoulder pain    • SOB (shortness of breath)    • Tenosynovitis    • Thrombocytopenia (HCC)    • Upper respiratory infection      Past Surgical History:   Procedure Laterality Date   • CARDIAC CATHETERIZATION  09/25/2003    Cardiac cath (Normal left ventricular systolic function, EF 60% Multi-vessel coronary  artery disease with critical disease noted in the LAD coronary artery, diagonal coronary artery, obtuse marginal coronary and right coronary artery.)   • CARDIAC CATHETERIZATION  05/03/1996    Cardiac cath (Coronary atherosclerotic heart disease. 70% diagonal stenosis. Mild to moderate left anterior descending artery stenosis. Preserved left ventricular function.)   • CARDIAC CATHETERIZATION N/A 2/26/2018    Procedure: Left Heart Cath/ pci if indicated ;  Surgeon: Radha Wilkes MD;  Location: [facility] CATH INVASIVE LOCATION;  Service:    • CATARACT EXTRACTION Bilateral    • CATARACT EXTRACTION  10/04/2011    Remove cataract, insert lens (Right)   • CATARACT EXTRACTION  08/23/2011    Remove cataract, insert lens (left)   • COLONOSCOPY     • COLONOSCOPY      Colon endoscopy 11896 (Rectal bleeding. Radiation proctitis w/bleeding. An abnormal CT of transverse colon due to mucosal ischemic colitis, that now is healed. Internal hemorrhoids w/possible bleeding.)   • COLONOSCOPY  05/10/2011    Colon endoscopy 07383 (Single sessile polyp found in transverse colon and sigmoid colon ,removed by cold biopsy polypectomy.divertic. found in sigmoid colon,descending colon. Friability/capillary friability in rectum sigmoid due to prior radiation.Inter. hem. Grade 1)   • COLONOSCOPY  05/02/2007    Colon endoscopy 85277 (Colon polyp. Diverticulosis without bleeding. Internal hemorrhoids without bleeding.)   • CORONARY ARTERY BYPASS GRAFT  2002   • CYSTOSCOPY  01/13/1995    Cystoscopy, stone removal (Right ureteroscopic lasertripsy.)   • CYSTOSCOPY Right 1/23/2019    Procedure: CYSTOSCOPY, RIGHT STENT REMOVAL;  Surgeon: Nirmal Gaines MD;  Location: [facility] OR;  Service: Urology   • CYSTOSCOPY, URETEROSCOPY, RETROGRADE PYELOGRAM, STENT INSERTION N/A 8/28/2017    Procedure: URETEROSCOPY LASER LITHOTRIPSY WITH STENT INSERTION AND RETROGRADE PYELOGRAM ;  Surgeon: Anna M. D'Amico, MD;  Location: [facility] OR;  Service:    •  CYSTOSCOPY, URETEROSCOPY, RETROGRADE PYELOGRAM, STENT INSERTION Right 1/11/2019    Procedure: CYSTOSCOPY URETEROSCOPY RETROGRADE PYELOGRAM HOLMIUM LASER STENT INSERTION;  Surgeon: Nirmal Gaines MD;  Location: [facility];  Service: Urology   • EPIDURAL  12/03/2014    Therapeutic/diag injection (Lumbar transforaminal epidural steroid injection, L5-S1, left side.)   • EPIDURAL  10/22/2014    Therapeutic/diag injection (Lumbar transforaminal epidural steroid injection L5-S1 left side.)   • EPIDURAL  08/07/2014    Therapeutic/diag injection (Lumbar transforaminal epidural steroid injection.)   • EXCISION LESION  05/13/1999    REMOVE EAR LESION 20917 (1.3 cm basal cell carcinoma, right preauricular area. Excision)   • EXCISION LESION  03/13/2001    REMOVE LESION NECK/CHEST 68497 (Excision of irritated seborrheic keratosis, anterior neck, 1.1 cm and deep lipoma, posterior neck, 3.5 cm)   • INJECTION OF MEDICATION  11/15/2012    Kenalog (4)      • JOINT REPLACEMENT  2015    partial   • KNEE ARTHROSCOPY  01/17/2007    Knee arthroscopy, surgery (Arthroscopy with medial and lateral meniscectomies, right knee.)   • KNEE SURGERY  11/04/2015    Knee Surgery (Right unicompartmental arthroplasty.)   • LUMBAR LAMINECTOMY N/A 2/7/2017    Procedure: LUMBAR LAMINECTOMY LUMBAR THREE-FOUR, LUMBAR FOUR-FIVE, INTERLAMINAR DISTRACTION ;  Surgeon: Lucio Mayo MD;  Location: [facility];  Service:    • NOSE SURGERY     • OTHER SURGICAL HISTORY      EXTENDED VISUAL FIELDS STUDY 77816 (Borderline glaucoma) (3): 03/19/2015, 04/09/2014, 03/27/2013   • OTHER SURGICAL HISTORY  10/29/2003    Heart revascularize (TMR) (Directmyocardial revascularization times four; LIMA to LAD SVG to DARIUSZ SVG to OM1, SVG to RCA)   • OTHER SURGICAL HISTORY      OCT DISC NFL 81802 (Borderline glaucoma)  (3): 09/24/2015, 08/13/2014, 07/29/2013   • PROSTATECTOMY     • SEPTORHINOPLASTY  11/16/1989    Nasal surgery procedure (Septorhinoplasty with  reconstruction of the nasal pyramid with a iliac crest graft, rhinoplasty was performed with external approach.)   • SHOULDER ARTHROSCOPY  2013    Arthroscopy of right shoulder with rotator repair, Carla procedure, Biceps tenotomy, subacromial decompression.   • SHOULDER SURGERY  2005    Shoulder surgery procedure (Decompression, subacromial, explore of the rotator cuff, no tear found nor repaired, acromioplasty of the left shoulder and AC shoulder joint resection.)     Family History   Problem Relation Age of Onset   • Hypertension Mother    • Heart disease Mother    • Arthritis Mother    • Cancer Other    • Heart disease Other    • Hypertension Other      Social History     Tobacco Use   • Smoking status: Former     Packs/day: 0.50     Years: 40.00     Pack years: 20.00     Types: Cigarettes     Start date:      Quit date: 2020     Years since quittin.5   • Smokeless tobacco: Never   Vaping Use   • Vaping Use: Never used   Substance Use Topics   • Alcohol use: Not Currently     Comment: socially   • Drug use: No     Medications Prior to Admission   Medication Sig Dispense Refill Last Dose   • acetaminophen (Tylenol) 325 MG tablet Take 650 mg by mouth Every 4 (Four) Hours As Needed for Mild Pain.      • albuterol sulfate HFA (Ventolin HFA) 108 (90 Base) MCG/ACT inhaler Inhale 2 puffs Every 4 (Four) Hours As Needed for Wheezing or Shortness of Air. 18 g 5    • atorvastatin (LIPITOR) 40 MG tablet Take 1 tablet by mouth Daily. 90 tablet 1    • budesonide-formoterol (SYMBICORT) 160-4.5 MCG/ACT inhaler Inhale 2 puffs 2 (Two) Times a Day. 1 each 11    • carvedilol (COREG) 3.125 MG tablet Take 1 tablet by mouth Every 12 (Twelve) Hours. 180 tablet 3    • clopidogrel (PLAVIX) 75 MG tablet Take 1 tablet by mouth Daily. 90 tablet 3    • Cyanocobalamin (VITAMIN B 12 PO) Take 500 mcg by mouth Daily. Three times weekly, MoWeFr      • dilTIAZem CD (CARDIZEM CD) 180 MG 24 hr capsule Take 1 capsule by  mouth Daily. 90 capsule 3    • FeroSul 325 (65 Fe) MG tablet TAKE 1 TABLET EVERY DAY WITH BREAKFAST 90 tablet 0    • fluticasone (FLONASE) 50 MCG/ACT nasal spray 2 sprays into each nostril Every Night.      • ipratropium-albuterol (DUO-NEB) 0.5-2.5 mg/3 ml nebulizer Inhale the contents of 1 vial by nebulization Every 4 (Four) Hours As Needed for Wheezing for up to 30 days. 360 mL 3    • isosorbide mononitrate (IMDUR) 30 MG 24 hr tablet Take 1 tablet by mouth Daily. 30 tablet 0    • losartan (COZAAR) 50 MG tablet Take 1 tablet by mouth Every Night. 90 tablet 3    • magnesium oxide (MAG-OX) 400 MG tablet Take 1 tablet by mouth 2 (Two) Times a Day.      • metFORMIN (GLUCOPHAGE) 1000 MG tablet Take 1 tablet by mouth 2 (Two) Times a Day With Meals.      • nitroglycerin (NITROSTAT) 0.4 MG SL tablet Place 1 tablet under the tongue Every 5 (Five) Minutes As Needed for Chest Pain. Take no more than 3 doses in 15 minutes. 30 tablet 0    • omeprazole (priLOSEC) 20 MG capsule Take 20 mg by mouth Daily.      • oxybutynin XL (DITROPAN-XL) 5 MG 24 hr tablet Take 5 mg by mouth Daily.      • pantoprazole (PROTONIX) 40 MG EC tablet Take 1 tablet by mouth Every Morning. 30 tablet 0    • pregabalin (LYRICA) 150 MG capsule 3 (Three) Times a Day.      • pregabalin (LYRICA) 75 MG capsule Take 1 capsule by mouth 2 (Two) Times a Day for 30 days. (Patient taking differently: Take 2 capsules by mouth Daily.) 60 capsule 0    • traMADol (ULTRAM) 50 MG tablet Take 1 tablet by mouth 2 (Two) Times a Day.      • travoprost, BAK free, (TRAVATAN) 0.004 % solution ophthalmic solution Administer 1 drop to both eyes Daily.        Allergies:  Molds & smuts and Hydrocodone-acetaminophen     CURRENT MEDICATIONS/OBJECTIVE/VS/PE:     Current Medications:     Current Facility-Administered Medications   Medication Dose Route Frequency Provider Last Rate Last Admin   • acetaminophen (TYLENOL) tablet 650 mg  650 mg Oral Q6H PRN Behroozi, Saeid, MD   650 mg at  11/15/22 1955   • atorvastatin (LIPITOR) tablet 40 mg  40 mg Oral Daily Behroozi, Saeid, MD   40 mg at 11/16/22 0930   • budesonide-formoterol (SYMBICORT) 160-4.5 MCG/ACT inhaler 2 puff  2 puff Inhalation BID - RT Behroozi, Saeid, MD   2 puff at 11/16/22 0727   • carvedilol (COREG) tablet 3.125 mg  3.125 mg Oral Q12H Behroozi, Saeid, MD   3.125 mg at 11/16/22 0930   • [START ON 11/17/2022] cefepime (MAXIPIME) 2 g/100 mL 0.9% NS (mbp)  2 g Intravenous Q12H Manuel Tuckre MD       • dextrose (D50W) (25 g/50 mL) IV injection 25 g  25 g Intravenous Q15 Min PRN Isha Holcomb APRN       • dextrose (GLUTOSE) oral gel 15 g  15 g Oral Q15 Min PRN Isha Holcomb APRN       • dilTIAZem CD (CARDIZEM CD) 24 hr capsule 180 mg  180 mg Oral Daily Behroozi, Saeid, MD   180 mg at 11/16/22 0930   • ferrous sulfate EC tablet 324 mg  324 mg Oral Daily With Breakfast Behroozi, Saeid, MD   324 mg at 11/16/22 0930   • glucagon (human recombinant) (GLUCAGEN DIAGNOSTIC) injection 1 mg  1 mg Intramuscular Q15 Min PRN Isha Holcomb APRN       • Insulin Aspart (novoLOG) injection 0-7 Units  0-7 Units Subcutaneous TID AC Isha Holcomb APRN   3 Units at 11/16/22 1727   • isosorbide mononitrate (IMDUR) 24 hr tablet 30 mg  30 mg Oral Q24H Behroozi, Saeid, MD   30 mg at 11/16/22 0930   • latanoprost (XALATAN) 0.005 % ophthalmic solution 1 drop  1 drop Both Eyes Nightly Behroozi, Saeid, MD   1 drop at 11/15/22 2003   • nitroglycerin (NITROSTAT) SL tablet 0.4 mg  0.4 mg Sublingual Q5 Min PRN Behroozi, Saeid, MD       • oxybutynin XL (DITROPAN-XL) 24 hr tablet 5 mg  5 mg Oral Daily Behroozi, Saeid, MD   5 mg at 11/16/22 0930   • pantoprazole (PROTONIX) 40 mg in 100mL NS IVPB  8 mg/hr Intravenous Continuous Manuel Tucker MD 20 mL/hr at 11/16/22 1430 8 mg/hr at 11/16/22 1430   • Pharmacy to Dose Cefepime   Does not apply Continuous PRN Manuel Tucker MD       • sodium chloride 0.9 % flush 10 mL  10 mL Intravenous Q12H  Behroozi, Saeid, MD   10 mL at 11/15/22 1955   • sodium chloride 0.9 % flush 10 mL  10 mL Intravenous PRN Behroozi, Saeid, MD       • vitamin B-12 (CYANOCOBALAMIN) tablet 500 mcg  500 mcg Oral Daily Behroozi, Saeid, MD   500 mcg at 11/16/22 0437       Objective     Review of Systems:   Review of Systems   Constitutional: Negative for chills, fatigue, fever and unexpected weight change.   HENT: Negative for congestion, ear discharge, hearing loss, nosebleeds and sore throat.    Eyes: Negative for pain, discharge and redness.   Respiratory: Positive for shortness of breath. Negative for cough, chest tightness and wheezing.    Cardiovascular: Negative for chest pain and palpitations.   Gastrointestinal: Negative for abdominal distention, abdominal pain, blood in stool, constipation, diarrhea, nausea and vomiting.   Endocrine: Negative for cold intolerance, polydipsia, polyphagia and polyuria.   Genitourinary: Negative for dysuria, flank pain, frequency, hematuria and urgency.   Musculoskeletal: Negative for arthralgias, back pain, joint swelling and myalgias.   Skin: Negative for color change, pallor and rash.   Neurological: Negative for tremors, seizures, syncope, weakness and headaches.   Hematological: Negative for adenopathy. Does not bruise/bleed easily.   Psychiatric/Behavioral: Negative for behavioral problems, confusion, dysphoric mood, hallucinations and suicidal ideas. The patient is not nervous/anxious.        Physical Exam:   Temp:  [97.8 °F (36.6 °C)-98.2 °F (36.8 °C)] 98.1 °F (36.7 °C)  Heart Rate:  [78-98] 84  Resp:  [22-23] 22  BP: (121-137)/(55-69) 137/69     Physical Exam:  General Appearance:    Alert, cooperative, in no acute distress   Head:    Normocephalic, without obvious abnormality, atraumatic   Eyes:            Lids and lashes normal, conjunctivae and sclerae normal, no   icterus, no pallor, corneas clear, PERRLA   Ears:    Ears appear intact with no abnormalities noted   Throat:   No oral  lesions, no thrush, oral mucosa moist   Neck:   No adenopathy, supple, trachea midline, no thyromegaly, no     carotid bruit, no JVD   Back:     No kyphosis present, no scoliosis present, no skin lesions,       erythema or scars, no tenderness to percussion or                   palpation,   range of motion normal   Lungs:     Clear to auscultation,respirations regular, even and                   unlabored    Heart:    Regular rhythm and normal rate, normal S1 and S2, no            murmur, no gallop, no rub, no click   Breast Exam:    Deferred   Abdomen:     Normal bowel sounds, no masses, no organomegaly, soft        nontender, nondistended, no guarding, no rebound                 tenderness   Genitalia:    Deferred   Extremities:   Moves all extremities well, no edema, no cyanosis, no              redness   Pulses:   Pulses palpable and equal bilaterally   Skin:   No bleeding, bruising or rash   Lymph nodes:   No palpable adenopathy   Neurologic:   Cranial nerves 2 - 12 grossly intact, sensation intact, DTR        present and equal bilaterally      Results Review:     Lab Results (last 24 hours)     Procedure Component Value Units Date/Time    POC Glucose Once [[id number]]  (Abnormal) Collected: 11/16/22 1623    Specimen: Blood Updated: 11/16/22 1638     Glucose 229 mg/dL      Comment: RN NotifiedOperator: [id number] [facility] RHAYONAMeter ID: [id number]       POC Glucose Once [[id number]]  (Abnormal) Collected: 11/16/22 1023    Specimen: Blood Updated: 11/16/22 1638     Glucose 175 mg/dL      Comment: RN NotifiedOperator: [id number] [facility] RHAYONAMeter ID: [id number]       Blood Culture - Blood, Arm, Right [[id number]]  (Normal) Collected: 11/15/22 0925    Specimen: Blood from Arm, Right Updated: 11/16/22 0945     Blood Culture No growth at 24 hours    Blood Culture - Blood, Arm, Left [[id number]]  (Normal) Collected: 11/15/22 0925    Specimen: Blood from Arm, Left Updated: 11/16/22 0945     Blood Culture No growth at  24 hours    Basic Metabolic Panel [[id number]]  (Abnormal) Collected: 11/16/22 0629    Specimen: Blood Updated: 11/16/22 0820     Glucose 131 mg/dL      BUN 36 mg/dL      Creatinine 1.37 mg/dL      Sodium 146 mmol/L      Potassium 3.9 mmol/L      Comment: Slight hemolysis detected by analyzer. Results may be affected.        Chloride 106 mmol/L      CO2 29.0 mmol/L      Calcium 9.3 mg/dL      BUN/Creatinine Ratio 26.3     Anion Gap 11.0 mmol/L      eGFR 52.1 mL/min/1.73      Comment: National Kidney Foundation and American Society of Nephrology (ASN) Task Force recommended calculation based on the Chronic Kidney Disease Epidemiology Collaboration (CKD-EPI) equation refit without adjustment for race.       Narrative:      GFR Normal >60  Chronic Kidney Disease <60  Kidney Failure <15    The GFR formula is only valid for adults with stable renal function between ages 18 and 70.    Vancomycin, Random [[id number]]  (Normal) Collected: 11/16/22 0629    Specimen: Blood Updated: 11/16/22 0820     Vancomycin Random 15.00 mcg/mL     CBC & Differential [[id number]]  (Abnormal) Collected: 11/16/22 0629    Specimen: Blood Updated: 11/16/22 0816    Narrative:      The following orders were created for panel order CBC & Differential.  Procedure                               Abnormality         Status                     ---------                               -----------         ------                     CBC Auto Differential[[id number]]        Abnormal            Final result                 Please view results for these tests on the individual orders.    CBC Auto Differential [[id number]]  (Abnormal) Collected: 11/16/22 0629    Specimen: Blood Updated: 11/16/22 0816     WBC 22.09 10*3/mm3      RBC 2.30 10*6/mm3      Hemoglobin 7.3 g/dL      Hematocrit 23.1 %      .4 fL      MCH 31.7 pg      MCHC 31.6 g/dL      RDW 26.0 %      RDW-SD 94.5 fl      MPV 11.2 fL      Platelets 180 10*3/mm3      Neutrophil % 83.9 %      Lymphocyte  % 5.0 %      Monocyte % 9.6 %      Eosinophil % 0.1 %      Basophil % 0.3 %      Immature Grans % 1.1 %      Neutrophils, Absolute 18.54 10*3/mm3      Lymphocytes, Absolute 1.10 10*3/mm3      Monocytes, Absolute 2.11 10*3/mm3      Eosinophils, Absolute 0.02 10*3/mm3      Basophils, Absolute 0.07 10*3/mm3      Immature Grans, Absolute 0.25 10*3/mm3      nRBC 0.1 /100 WBC     POC Glucose Once [[id number]]  (Abnormal) Collected: 11/16/22 0609    Specimen: Blood Updated: 11/16/22 0643     Glucose 154 mg/dL      Comment: RN NotifiedOperator: [id number] HORACIO GONGORARAMeter ID: [id number]       POC Glucose Once [[id number]]  (Abnormal) Collected: 11/15/22 1038    Specimen: Blood Updated: 11/16/22 0100     Glucose 133 mg/dL      Comment: RN NotifiedOperator: [id number] ROS[facility] RHAYONAMeter ID: [id number]       POC Glucose Once [[id number]]  (Abnormal) Collected: 11/15/22 1943    Specimen: Blood Updated: 11/15/22 2236     Glucose 162 mg/dL      Comment: RN NotifiedOperator: [id number] HORACIO GONGORARAMeter ID: [id number]              I reviewed the patient's new clinical results.  I reviewed the patient's new imaging results and agree with the interpretation.     ASSESSMENT/PLAN:   ASSESSMENT: 1.  Nausea with vomiting and hematemesis, improved.  2.  Acute posthemorrhagic anemia, worsening.  3.  Respiratory distress, improving.  4.  Acidosis, improving.    PLAN: 1.  Continue PPI and current supportive care  2.  Follow H&H closely and transfuse as needed  3.  EGD in a.m. for further evaluation  The risks, benefits, and alternatives of this procedure have been discussed with the patient or the responsible party- the patient understands and agrees to proceed.         Jordan Garcia MD  11/16/22  19:41 CST

## 2022-11-17 NOTE — NURSING NOTE
RT was called to room, on arrival pt was experiencing increased WOB. Air hunger and saying help.     Saturations were 93% on airvo. . RR 30's.  Patient was placed on his bipap with previous settings avaps 600 RR 22 EPAP 8 and 40%.     SERA Vieira at bedside.  Morphine and ativan given for air hunger and anxiety.     After several minutes of monitoring pt his WOB has decreased, RR is now 24-26. HR has come down to 100 and he is resting.     Will continue to monitor.

## 2022-11-17 NOTE — SIGNIFICANT NOTE
Pt with low H&H. No OT tx today.   11/17/22 1130   OTHER   Discipline occupational therapy assistant   Rehab Time/Intention   Session Not Performed other (see comments)  (low H&H)

## 2022-11-17 NOTE — SIGNIFICANT NOTE
11/17/22 0804   OTHER   Discipline physical therapy assistant   Rehab Time/Intention   Session Not Performed other (see comments)  (no treatment today secondary to Low H&H)

## 2022-11-17 NOTE — CASE COMMUNICATION
Pt missed a physical therapy appt on 11/16/22 due to recent hospitalization at [facility].

## 2022-11-17 NOTE — PROGRESS NOTES
SUBJECTIVE:   11/16/2022  Chief Complaint:     Subjective      Patient has continued dyspnea.  Remains on BiPAP with high flow oxygen at 6 L/min.  No further nausea emesis.  Unable to proceed with EGD today due to respiratory distress.  Hemoglobin today is 8.9.    History:  Past Medical History:   Diagnosis Date   • Acute bacterial sinusitis    • Acute bronchitis    • Acute frontal sinusitis    • Acute maxillary sinusitis    • Acute pharyngitis    • Allergic rhinitis    • Allergic rhinitis due to pollen    • Anxiety    • Artificial lens present     in position   • Asthma    • Backache    • Borderline glaucoma    • C. difficile diarrhea 2014   • Chronic laryngitis    • Chronic rhinitis    • Coronary artery disease    • Cough    • Degeneration of lumbar intervertebral disc    • Degenerative joint disease involving multiple joints    • Diabetes mellitus (HCC)    • Diverticular disease of colon    • Dizziness and giddiness    • Elevated cholesterol    • Erectile dysfunction    • Essential hypertension    • Generalized anxiety disorder    • GERD (gastroesophageal reflux disease)    • Glaucoma    • Hemorrhoids     without bleeding   • Insomnia    • Kidney stone    • Kidney stones    • Malignant tumor of prostate (HCC)    • Need for prophylactic vaccination and inoculation against influenza    • Osteoarthritis    • Osteoarthritis of multiple joints    • Pain, lumbar region     Pain radiating to lumbar region of back     • PONV (postoperative nausea and vomiting)    • Postviral fatigue syndrome    • Presence of aortocoronary bypass graft    • Prostate cancer (HCC)    • Pseudomembranous enterocolitis     improved   • Rotator cuff syndrome     right   • Shoulder pain    • SOB (shortness of breath)    • Tenosynovitis    • Thrombocytopenia (HCC)    • Upper respiratory infection      Past Surgical History:   Procedure Laterality Date   • CARDIAC CATHETERIZATION  09/25/2003    Cardiac cath (Normal left ventricular  systolic function, EF 60% Multi-vessel coronary artery disease with critical disease noted in the LAD coronary artery, diagonal coronary artery, obtuse marginal coronary and right coronary artery.)   • CARDIAC CATHETERIZATION  05/03/1996    Cardiac cath (Coronary atherosclerotic heart disease. 70% diagonal stenosis. Mild to moderate left anterior descending artery stenosis. Preserved left ventricular function.)   • CARDIAC CATHETERIZATION N/A 2/26/2018    Procedure: Left Heart Cath/ pci if indicated ;  Surgeon: Radha Wilkes MD;  Location: [facility] CATH INVASIVE LOCATION;  Service:    • CATARACT EXTRACTION Bilateral    • CATARACT EXTRACTION  10/04/2011    Remove cataract, insert lens (Right)   • CATARACT EXTRACTION  08/23/2011    Remove cataract, insert lens (left)   • COLONOSCOPY     • COLONOSCOPY      Colon endoscopy 86304 (Rectal bleeding. Radiation proctitis w/bleeding. An abnormal CT of transverse colon due to mucosal ischemic colitis, that now is healed. Internal hemorrhoids w/possible bleeding.)   • COLONOSCOPY  05/10/2011    Colon endoscopy 32667 (Single sessile polyp found in transverse colon and sigmoid colon ,removed by cold biopsy polypectomy.divertic. found in sigmoid colon,descending colon. Friability/capillary friability in rectum sigmoid due to prior radiation.Inter. hem. Grade 1)   • COLONOSCOPY  05/02/2007    Colon endoscopy 16620 (Colon polyp. Diverticulosis without bleeding. Internal hemorrhoids without bleeding.)   • CORONARY ARTERY BYPASS GRAFT  2002   • CYSTOSCOPY  01/13/1995    Cystoscopy, stone removal (Right ureteroscopic lasertripsy.)   • CYSTOSCOPY Right 1/23/2019    Procedure: CYSTOSCOPY, RIGHT STENT REMOVAL;  Surgeon: Nirmal Gaines MD;  Location: [facility] OR;  Service: Urology   • CYSTOSCOPY, URETEROSCOPY, RETROGRADE PYELOGRAM, STENT INSERTION N/A 8/28/2017    Procedure: URETEROSCOPY LASER LITHOTRIPSY WITH STENT INSERTION AND RETROGRADE PYELOGRAM ;  Surgeon: Anna M. D'Amico,  MD;  Location: [facility];  Service:    • CYSTOSCOPY, URETEROSCOPY, RETROGRADE PYELOGRAM, STENT INSERTION Right 1/11/2019    Procedure: CYSTOSCOPY URETEROSCOPY RETROGRADE PYELOGRAM HOLMIUM LASER STENT INSERTION;  Surgeon: Nirmal Gaines MD;  Location: [facility];  Service: Urology   • EPIDURAL  12/03/2014    Therapeutic/diag injection (Lumbar transforaminal epidural steroid injection, L5-S1, left side.)   • EPIDURAL  10/22/2014    Therapeutic/diag injection (Lumbar transforaminal epidural steroid injection L5-S1 left side.)   • EPIDURAL  08/07/2014    Therapeutic/diag injection (Lumbar transforaminal epidural steroid injection.)   • EXCISION LESION  05/13/1999    REMOVE EAR LESION 99245 (1.3 cm basal cell carcinoma, right preauricular area. Excision)   • EXCISION LESION  03/13/2001    REMOVE LESION NECK/CHEST 18885 (Excision of irritated seborrheic keratosis, anterior neck, 1.1 cm and deep lipoma, posterior neck, 3.5 cm)   • INJECTION OF MEDICATION  11/15/2012    Kenalog (4)      • JOINT REPLACEMENT  2015    partial   • KNEE ARTHROSCOPY  01/17/2007    Knee arthroscopy, surgery (Arthroscopy with medial and lateral meniscectomies, right knee.)   • KNEE SURGERY  11/04/2015    Knee Surgery (Right unicompartmental arthroplasty.)   • LUMBAR LAMINECTOMY N/A 2/7/2017    Procedure: LUMBAR LAMINECTOMY LUMBAR THREE-FOUR, LUMBAR FOUR-FIVE, INTERLAMINAR DISTRACTION ;  Surgeon: Lucio Mayo MD;  Location: [facility];  Service:    • NOSE SURGERY     • OTHER SURGICAL HISTORY      EXTENDED VISUAL FIELDS STUDY 29770 (Borderline glaucoma) (3): 03/19/2015, 04/09/2014, 03/27/2013   • OTHER SURGICAL HISTORY  10/29/2003    Heart revascularize (TMR) (Directmyocardial revascularization times four; LIMA to LAD SVG to DARIUSZ SVG to OM1, SVG to RCA)   • OTHER SURGICAL HISTORY      OCT DISC NFL 08280 (Borderline glaucoma)  (3): 09/24/2015, 08/13/2014, 07/29/2013   • PROSTATECTOMY     • SEPTORHINOPLASTY  11/16/1989    Nasal surgery  procedure (Septorhinoplasty with reconstruction of the nasal pyramid with a iliac crest graft, rhinoplasty was performed with external approach.)   • SHOULDER ARTHROSCOPY  2013    Arthroscopy of right shoulder with rotator repair, Carla procedure, Biceps tenotomy, subacromial decompression.   • SHOULDER SURGERY  2005    Shoulder surgery procedure (Decompression, subacromial, explore of the rotator cuff, no tear found nor repaired, acromioplasty of the left shoulder and AC shoulder joint resection.)     Family History   Problem Relation Age of Onset   • Hypertension Mother    • Heart disease Mother    • Arthritis Mother    • Cancer Other    • Heart disease Other    • Hypertension Other      Social History     Tobacco Use   • Smoking status: Former     Packs/day: 0.50     Years: 40.00     Pack years: 20.00     Types: Cigarettes     Start date:      Quit date: 2020     Years since quittin.5   • Smokeless tobacco: Never   Vaping Use   • Vaping Use: Never used   Substance Use Topics   • Alcohol use: Not Currently     Comment: socially   • Drug use: No     Medications Prior to Admission   Medication Sig Dispense Refill Last Dose   • acetaminophen (Tylenol) 325 MG tablet Take 650 mg by mouth Every 4 (Four) Hours As Needed for Mild Pain.      • albuterol sulfate HFA (Ventolin HFA) 108 (90 Base) MCG/ACT inhaler Inhale 2 puffs Every 4 (Four) Hours As Needed for Wheezing or Shortness of Air. 18 g 5    • atorvastatin (LIPITOR) 40 MG tablet Take 1 tablet by mouth Daily. 90 tablet 1    • budesonide-formoterol (SYMBICORT) 160-4.5 MCG/ACT inhaler Inhale 2 puffs 2 (Two) Times a Day. 1 each 11    • carvedilol (COREG) 3.125 MG tablet Take 1 tablet by mouth Every 12 (Twelve) Hours. 180 tablet 3    • clopidogrel (PLAVIX) 75 MG tablet Take 1 tablet by mouth Daily. 90 tablet 3    • Cyanocobalamin (VITAMIN B 12 PO) Take 500 mcg by mouth Daily. Three times weekly, MoWeFr      • dilTIAZem CD (CARDIZEM CD) 180 MG 24  hr capsule Take 1 capsule by mouth Daily. 90 capsule 3    • FeroSul 325 (65 Fe) MG tablet TAKE 1 TABLET EVERY DAY WITH BREAKFAST 90 tablet 0    • fluticasone (FLONASE) 50 MCG/ACT nasal spray 2 sprays into each nostril Every Night.      • ipratropium-albuterol (DUO-NEB) 0.5-2.5 mg/3 ml nebulizer Inhale the contents of 1 vial by nebulization Every 4 (Four) Hours As Needed for Wheezing for up to 30 days. 360 mL 3    • isosorbide mononitrate (IMDUR) 30 MG 24 hr tablet Take 1 tablet by mouth Daily. 30 tablet 0    • losartan (COZAAR) 50 MG tablet Take 1 tablet by mouth Every Night. 90 tablet 3    • magnesium oxide (MAG-OX) 400 MG tablet Take 1 tablet by mouth 2 (Two) Times a Day.      • metFORMIN (GLUCOPHAGE) 1000 MG tablet Take 1 tablet by mouth 2 (Two) Times a Day With Meals.      • nitroglycerin (NITROSTAT) 0.4 MG SL tablet Place 1 tablet under the tongue Every 5 (Five) Minutes As Needed for Chest Pain. Take no more than 3 doses in 15 minutes. 30 tablet 0    • omeprazole (priLOSEC) 20 MG capsule Take 20 mg by mouth Daily.      • oxybutynin XL (DITROPAN-XL) 5 MG 24 hr tablet Take 5 mg by mouth Daily.      • pantoprazole (PROTONIX) 40 MG EC tablet Take 1 tablet by mouth Every Morning. 30 tablet 0    • pregabalin (LYRICA) 150 MG capsule 3 (Three) Times a Day.      • pregabalin (LYRICA) 75 MG capsule Take 1 capsule by mouth 2 (Two) Times a Day for 30 days. (Patient taking differently: Take 2 capsules by mouth Daily.) 60 capsule 0    • traMADol (ULTRAM) 50 MG tablet Take 1 tablet by mouth 2 (Two) Times a Day.      • travoprost, BAK free, (TRAVATAN) 0.004 % solution ophthalmic solution Administer 1 drop to both eyes Daily.        Allergies:  Molds & smuts and Hydrocodone-acetaminophen     CURRENT MEDICATIONS/OBJECTIVE/VS/PE:     Current Medications:     Current Facility-Administered Medications   Medication Dose Route Frequency Provider Last Rate Last Admin   • acetaminophen (TYLENOL) tablet 650 mg  650 mg Oral Q6H PRN  Behroozi, Saeid, MD   650 mg at 11/15/22 1955   • atorvastatin (LIPITOR) tablet 40 mg  40 mg Oral Daily Behroozi, Saeid, MD   40 mg at 11/16/22 0930   • budesonide-formoterol (SYMBICORT) 160-4.5 MCG/ACT inhaler 2 puff  2 puff Inhalation BID - RT Behroozi, Saeid, MD   2 puff at 11/16/22 0727   • carvedilol (COREG) tablet 3.125 mg  3.125 mg Oral Q12H Behroozi, Saeid, MD   3.125 mg at 11/16/22 0930   • [START ON 11/17/2022] cefepime (MAXIPIME) 2 g/100 mL 0.9% NS (mbp)  2 g Intravenous Q12H Manuel Tucker MD       • dextrose (D50W) (25 g/50 mL) IV injection 25 g  25 g Intravenous Q15 Min PRN Isha Holcomb APRN       • dextrose (GLUTOSE) oral gel 15 g  15 g Oral Q15 Min PRN Isha Holcomb APRN       • dilTIAZem CD (CARDIZEM CD) 24 hr capsule 180 mg  180 mg Oral Daily Behroozi, Saeid, MD   180 mg at 11/16/22 0930   • ferrous sulfate EC tablet 324 mg  324 mg Oral Daily With Breakfast Behroozi, Saeid, MD   324 mg at 11/16/22 0930   • glucagon (human recombinant) (GLUCAGEN DIAGNOSTIC) injection 1 mg  1 mg Intramuscular Q15 Min PRN Isha Holcomb APRN       • Insulin Aspart (novoLOG) injection 0-7 Units  0-7 Units Subcutaneous TID AC Isha Holcomb APRN   3 Units at 11/16/22 1727   • isosorbide mononitrate (IMDUR) 24 hr tablet 30 mg  30 mg Oral Q24H Behroozi, Saeid, MD   30 mg at 11/16/22 0930   • latanoprost (XALATAN) 0.005 % ophthalmic solution 1 drop  1 drop Both Eyes Nightly Behroozi, Saeid, MD   1 drop at 11/15/22 2003   • nitroglycerin (NITROSTAT) SL tablet 0.4 mg  0.4 mg Sublingual Q5 Min PRN Behroozi, Saeid, MD       • oxybutynin XL (DITROPAN-XL) 24 hr tablet 5 mg  5 mg Oral Daily Behroozi, Saeid, MD   5 mg at 11/16/22 0930   • pantoprazole (PROTONIX) 40 mg in 100mL NS IVPB  8 mg/hr Intravenous Continuous Manuel Tucker MD 20 mL/hr at 11/16/22 1430 8 mg/hr at 11/16/22 1430   • Pharmacy to Dose Cefepime   Does not apply Continuous PRN Manuel Tucker MD       • sodium chloride 0.9 % flush 10  mL  10 mL Intravenous Q12H Behroozi, Saeid, MD   10 mL at 11/15/22 1955   • sodium chloride 0.9 % flush 10 mL  10 mL Intravenous PRN Behroozi, Saeid, MD       • vitamin B-12 (CYANOCOBALAMIN) tablet 500 mcg  500 mcg Oral Daily Behroozi, Saeid, MD   500 mcg at 11/16/22 0930       Objective     Review of Systems:   Review of Systems   Constitutional: Negative for chills, fatigue, fever and unexpected weight change.   HENT: Negative for congestion, ear discharge, hearing loss, nosebleeds and sore throat.    Eyes: Negative for pain, discharge and redness.   Respiratory: Positive for shortness of breath. Negative for cough, chest tightness and wheezing.    Cardiovascular: Negative for chest pain and palpitations.   Gastrointestinal: Negative for abdominal distention, abdominal pain, blood in stool, constipation, diarrhea, nausea and vomiting.   Endocrine: Negative for cold intolerance, polydipsia, polyphagia and polyuria.   Genitourinary: Negative for dysuria, flank pain, frequency, hematuria and urgency.   Musculoskeletal: Negative for arthralgias, back pain, joint swelling and myalgias.   Skin: Negative for color change, pallor and rash.   Neurological: Negative for tremors, seizures, syncope, weakness and headaches.   Hematological: Negative for adenopathy. Does not bruise/bleed easily.   Psychiatric/Behavioral: Negative for behavioral problems, confusion, dysphoric mood, hallucinations and suicidal ideas. The patient is not nervous/anxious.        Physical Exam:   Temp:  [97.8 °F (36.6 °C)-98.2 °F (36.8 °C)] 98.1 °F (36.7 °C)  Heart Rate:  [78-98] 84  Resp:  [20-23] 22  BP: (121-137)/(55-69) 137/69     Physical Exam:  General Appearance:    Alert, cooperative, in no acute distress   Head:    Normocephalic, without obvious abnormality, atraumatic   Eyes:            Lids and lashes normal, conjunctivae and sclerae normal, no   icterus, no pallor, corneas clear, PERRLA   Ears:    Ears appear intact with no abnormalities  noted   Throat:   No oral lesions, no thrush, oral mucosa moist   Neck:   No adenopathy, supple, trachea midline, no thyromegaly, no     carotid bruit, no JVD   Back:     No kyphosis present, no scoliosis present, no skin lesions,       erythema or scars, no tenderness to percussion or                   palpation,   range of motion normal   Lungs:     Clear to auscultation,respirations regular, even and                   unlabored    Heart:    Regular rhythm and normal rate, normal S1 and S2, no            murmur, no gallop, no rub, no click   Breast Exam:    Deferred   Abdomen:     Normal bowel sounds, no masses, no organomegaly, soft        nontender, nondistended, no guarding, no rebound                 tenderness   Genitalia:    Deferred   Extremities:   Moves all extremities well, no edema, no cyanosis, no              redness   Pulses:   Pulses palpable and equal bilaterally   Skin:   No bleeding, bruising or rash   Lymph nodes:   No palpable adenopathy   Neurologic:   Cranial nerves 2 - 12 grossly intact, sensation intact, DTR        present and equal bilaterally      Results Review:     Lab Results (last 24 hours)     Procedure Component Value Units Date/Time    POC Glucose Once [[id number]]  (Abnormal) Collected: 11/16/22 1623    Specimen: Blood Updated: 11/16/22 1638     Glucose 229 mg/dL      Comment: RN NotifiedOperator: [id number] [employer] RHAYONAMeter ID: [id number]       POC Glucose Once [[id number]]  (Abnormal) Collected: 11/16/22 1023    Specimen: Blood Updated: 11/16/22 1638     Glucose 175 mg/dL      Comment: RN NotifiedOperator: [id number] [employer] RHAYONAMeter ID: [id number]       Blood Culture - Blood, Arm, Right [[id number]]  (Normal) Collected: 11/15/22 0925    Specimen: Blood from Arm, Right Updated: 11/16/22 0945     Blood Culture No growth at 24 hours    Blood Culture - Blood, Arm, Left [[id number]]  (Normal) Collected: 11/15/22 0925    Specimen: Blood from Arm, Left Updated: 11/16/22 0945      Blood Culture No growth at 24 hours    Basic Metabolic Panel [[id number]]  (Abnormal) Collected: 11/16/22 0629    Specimen: Blood Updated: 11/16/22 0820     Glucose 131 mg/dL      BUN 36 mg/dL      Creatinine 1.37 mg/dL      Sodium 146 mmol/L      Potassium 3.9 mmol/L      Comment: Slight hemolysis detected by analyzer. Results may be affected.        Chloride 106 mmol/L      CO2 29.0 mmol/L      Calcium 9.3 mg/dL      BUN/Creatinine Ratio 26.3     Anion Gap 11.0 mmol/L      eGFR 52.1 mL/min/1.73      Comment: National Kidney Foundation and American Society of Nephrology (ASN) Task Force recommended calculation based on the Chronic Kidney Disease Epidemiology Collaboration (CKD-EPI) equation refit without adjustment for race.       Narrative:      GFR Normal >60  Chronic Kidney Disease <60  Kidney Failure <15    The GFR formula is only valid for adults with stable renal function between ages 18 and 70.    Vancomycin, Random [[id number]]  (Normal) Collected: 11/16/22 0629    Specimen: Blood Updated: 11/16/22 0820     Vancomycin Random 15.00 mcg/mL     CBC & Differential [[id number]]  (Abnormal) Collected: 11/16/22 0629    Specimen: Blood Updated: 11/16/22 0816    Narrative:      The following orders were created for panel order CBC & Differential.  Procedure                               Abnormality         Status                     ---------                               -----------         ------                     CBC Auto Differential[[id number]]        Abnormal            Final result                 Please view results for these tests on the individual orders.    CBC Auto Differential [[id number]]  (Abnormal) Collected: 11/16/22 0629    Specimen: Blood Updated: 11/16/22 0816     WBC 22.09 10*3/mm3      RBC 2.30 10*6/mm3      Hemoglobin 7.3 g/dL      Hematocrit 23.1 %      .4 fL      MCH 31.7 pg      MCHC 31.6 g/dL      RDW 26.0 %      RDW-SD 94.5 fl      MPV 11.2 fL      Platelets 180 10*3/mm3       Neutrophil % 83.9 %      Lymphocyte % 5.0 %      Monocyte % 9.6 %      Eosinophil % 0.1 %      Basophil % 0.3 %      Immature Grans % 1.1 %      Neutrophils, Absolute 18.54 10*3/mm3      Lymphocytes, Absolute 1.10 10*3/mm3      Monocytes, Absolute 2.11 10*3/mm3      Eosinophils, Absolute 0.02 10*3/mm3      Basophils, Absolute 0.07 10*3/mm3      Immature Grans, Absolute 0.25 10*3/mm3      nRBC 0.1 /100 WBC     POC Glucose Once [[id number]]  (Abnormal) Collected: 11/16/22 0609    Specimen: Blood Updated: 11/16/22 0643     Glucose 154 mg/dL      Comment: RN NotifiedOperator: [id number] HORACIO King ID: [id number]       POC Glucose Once [[id number]]  (Abnormal) Collected: 11/15/22 1038    Specimen: Blood Updated: 11/16/22 0100     Glucose 133 mg/dL      Comment: RN NotifiedOperator: [id number] [employer] RHAYONAMeter ID: [id number]       POC Glucose Once [[id number]]  (Abnormal) Collected: 11/15/22 1943    Specimen: Blood Updated: 11/15/22 2236     Glucose 162 mg/dL      Comment: RN NotifiedOperator: [id number] HORACIO King ID: [id number]              I reviewed the patient's new clinical results.  I reviewed the patient's new imaging results and agree with the interpretation.     ASSESSMENT/PLAN:   ASSESSMENT: 1.  Nausea and vomiting, improved.  2.  Hematemesis, resolved.  3.  Respiratory distress, persistent.  4.  Acidosis, improved.  4.  Anemia, improved with packed red cell transfusion    PLAN: 1.  Continue PPI and current supportive care  2.  Initiate clear liquid diet  3.  Follow H&H closely and transfuse as needed  4.  EGD in a.m. if respiratory distress improves  The risks, benefits, and alternatives of this procedure have been discussed with the patient or the responsible party- the patient understands and agrees to proceed.         Jordan Garcia MD  11/16/22  19:25 CST

## 2022-11-17 NOTE — CONSULTS
"Nutrition Services    Patient Name:  Vishnu Waller  YOB: 1940  MRN: [id number]  Admit Date:  11/9/2022    RD f/u. Pt was NPO for possible EGD but pt decided not to have that done. He is currently receiving blood and per wife, will d/c when that is complete. Diet order has been changed to regular and pt says \"i'll eat whatever you bring\". Plan is for d/c w/hospice. Will monitor for change to POC.     Electronically signed by:  Jannette Smith RD  11/17/22 12:56 CST   "

## 2022-11-18 NOTE — PLAN OF CARE
Goal Outcome Evaluation:  Plan of Care Reviewed With: patient        Progress: no change  Outcome Evaluation: Patient is on comfort care; given with prn morphine for air hunger and dyspnea; given with ativan prn for restlessness;left on bed asleep and appears comfortable.

## 2022-11-18 NOTE — NURSING NOTE
RN responded to bedside. Pt moaning, grunting, pulling at tubes and lines. Appears anxious and SOA. PRN Morphine given for air hunger, and ativan given for anxiety. Interventions ineffective 15 minutes after administration, SERA Hinojosa contacted per phone,  New orders received, morphine administered.

## 2022-11-18 NOTE — PROGRESS NOTES
[facility] Medicine Services  INPATIENT PROGRESS NOTE    Length of Stay: 5  Date of Admission: 11/9/2022  Primary Care Physician: Johnathon Shaikh MD    Subjective   Chief Complaint: agitation, air hunger per nursing  HPI:   82 year old male with past medical history of CAD, CABG, type 2 DM, HLD, HTN, chronic anemia with suspected cancer who presented on 11/9/22 with complaints of fall with head laceration.  Laceration was repaired via stapling in the ED.  He was found to be anemic with hemoglobin of 6.9 requiring transfusion of PRBC. Imaging post fall also revealed potential bladder and lung masses.  The patient has opted against any further invasive evaluation and wished to pursue hospice care at home with [facility].  During today's visit, he has experienced significant air hunger and agitation and is not yet ready to transition home.     Review of Systems   Unable to perform ROS: Mental status change   Constitutional: Positive for fatigue.   Respiratory: Positive for shortness of breath.    Skin: Positive for pallor.   Neurological: Positive for weakness.   Psychiatric/Behavioral: Positive for agitation.        All pertinent negatives and positives are as above. All other systems have been reviewed and are negative unless otherwise stated.     Objective    Temp:  [97.1 °F (36.2 °C)-98.1 °F (36.7 °C)] 97.1 °F (36.2 °C)  Heart Rate:  [] 87  Resp:  [16-26] 18  BP: (111-166)/(61-85) 111/61    Physical Exam  Vitals and nursing note reviewed.   Constitutional:       General: He is in acute distress.      Appearance: He is ill-appearing.   HENT:      Head: Normocephalic and atraumatic.      Right Ear: External ear normal.      Left Ear: External ear normal.      Nose: Nose normal.      Mouth/Throat:      Pharynx: Oropharynx is clear.   Eyes:      General: No scleral icterus.        Right eye: No discharge.         Left eye: No discharge.       Conjunctiva/sclera: Conjunctivae normal.   Cardiovascular:      Rate and Rhythm: Regular rhythm. Tachycardia present.      Heart sounds: Normal heart sounds. No murmur heard.    No friction rub. No gallop.   Pulmonary:      Effort: Tachypnea, accessory muscle usage and respiratory distress present.      Breath sounds: No stridor. Examination of the right-upper field reveals decreased breath sounds. Examination of the left-upper field reveals decreased breath sounds. Examination of the right-lower field reveals decreased breath sounds. Examination of the left-lower field reveals decreased breath sounds. Decreased breath sounds present. No wheezing, rhonchi or rales.   Abdominal:      General: Bowel sounds are normal. There is no distension.      Palpations: Abdomen is soft.      Tenderness: There is no abdominal tenderness.   Musculoskeletal:         General: No swelling or deformity. Normal range of motion.      Cervical back: Normal range of motion and neck supple.   Skin:     General: Skin is warm and dry.      Coloration: Skin is pale.   Neurological:      Mental Status: He is alert. He is disoriented.   Psychiatric:         Mood and Affect: Mood is anxious.         Behavior: Behavior normal.             Results Review:  I have reviewed the labs, radiology results, and diagnostic studies.    Laboratory Data:   Results from last 7 days   Lab Units 11/16/22  0629 11/15/22  0606 11/14/22  0142   SODIUM mmol/L 146* 146* 144   POTASSIUM mmol/L 3.9 4.9 4.7   CHLORIDE mmol/L 106 106 105   CO2 mmol/L 29.0 28.0 31.0*   BUN mg/dL 36* 36* 18   CREATININE mg/dL 1.37* 1.80* 1.19   GLUCOSE mg/dL 131* 142* 160*   CALCIUM mg/dL 9.3 9.0 8.8   ANION GAP mmol/L 11.0 12.0 8.0     Estimated Creatinine Clearance: 35.9 mL/min (A) (by C-G formula based on SCr of 1.37 mg/dL (H)).          Results from last 7 days   Lab Units 11/17/22  0540 11/16/22  0629 11/15/22  0606 11/14/22  1359 11/14/22  0145 11/13/22  0541   WBC 10*3/mm3 15.91*  22.09* 35.31*  --  19.74* 12.67*   HEMOGLOBIN g/dL 6.7* 7.3* 8.9* 9.5* 9.1* 6.7*   HEMATOCRIT % 20.7* 23.1* 27.9* 29.3* 28.9* 22.2*   PLATELETS 10*3/mm3 182 180 228  --  267 222           Culture Data:   No results found for: BLOODCX  No results found for: URINECX  No results found for: RESPCX  No results found for: WOUNDCX  No results found for: STOOLCX  No components found for: BODYFLD    Radiology Data:   Imaging Results (Last 24 Hours)     ** No results found for the last 24 hours. **          I have reviewed the patient's current medications.     Assessment/Plan     Active Hospital Problems    Diagnosis    • **Injury of head, initial encounter    • Severe malnutrition (HCC)    • Hematemesis with nausea    • Anemia        Plan:   1. End of life care/comfort measures only: Patient experiencing significant air hunger/restlessness today.  Adjustment made to morphine dosing, PRN Ativan also available for agitation/anxiety.  Will hold on transferring patient home with hospice for now due to change in status.  Patient appears imminent with life expectancy of 24 hours or less.        I confirmed that the patient's Advance Care Plan is present, code status is documented, or surrogate decision maker is listed in the patient's medical record.          This document has been electronically signed by SERA Vieira on November 18, 2022 10:01 CST

## 2022-11-18 NOTE — PROGRESS NOTES
SUBJECTIVE:   11/17/2022  Chief Complaint:     Subjective      Patient continues to have dyspnea.  No further nausea or emesis.  Hemoglobin was 6.7.  Received 1 unit of packed red cells today.  Wife is on the bedside.  Patient and his family decided for comfort care.    History:  Past Medical History:   Diagnosis Date   • Acute bacterial sinusitis    • Acute bronchitis    • Acute frontal sinusitis    • Acute maxillary sinusitis    • Acute pharyngitis    • Allergic rhinitis    • Allergic rhinitis due to pollen    • Anxiety    • Artificial lens present     in position   • Asthma    • Backache    • Borderline glaucoma    • C. difficile diarrhea 2014   • Chronic laryngitis    • Chronic rhinitis    • Coronary artery disease    • Cough    • Degeneration of lumbar intervertebral disc    • Degenerative joint disease involving multiple joints    • Diabetes mellitus (HCC)    • Diverticular disease of colon    • Dizziness and giddiness    • Elevated cholesterol    • Erectile dysfunction    • Essential hypertension    • Generalized anxiety disorder    • GERD (gastroesophageal reflux disease)    • Glaucoma    • Hemorrhoids     without bleeding   • Insomnia    • Kidney stone    • Kidney stones    • Malignant tumor of prostate (HCC)    • Need for prophylactic vaccination and inoculation against influenza    • Osteoarthritis    • Osteoarthritis of multiple joints    • Pain, lumbar region     Pain radiating to lumbar region of back     • PONV (postoperative nausea and vomiting)    • Postviral fatigue syndrome    • Presence of aortocoronary bypass graft    • Prostate cancer (HCC)    • Pseudomembranous enterocolitis     improved   • Rotator cuff syndrome     right   • Shoulder pain    • SOB (shortness of breath)    • Tenosynovitis    • Thrombocytopenia (HCC)    • Upper respiratory infection      Past Surgical History:   Procedure Laterality Date   • CARDIAC CATHETERIZATION  09/25/2003    Cardiac cath (Normal left ventricular  systolic function, EF 60% Multi-vessel coronary artery disease with critical disease noted in the LAD coronary artery, diagonal coronary artery, obtuse marginal coronary and right coronary artery.)   • CARDIAC CATHETERIZATION  05/03/1996    Cardiac cath (Coronary atherosclerotic heart disease. 70% diagonal stenosis. Mild to moderate left anterior descending artery stenosis. Preserved left ventricular function.)   • CARDIAC CATHETERIZATION N/A 2/26/2018    Procedure: Left Heart Cath/ pci if indicated ;  Surgeon: Radha Wilkes MD;  Location: [facility] CATH INVASIVE LOCATION;  Service:    • CATARACT EXTRACTION Bilateral    • CATARACT EXTRACTION  10/04/2011    Remove cataract, insert lens (Right)   • CATARACT EXTRACTION  08/23/2011    Remove cataract, insert lens (left)   • COLONOSCOPY     • COLONOSCOPY      Colon endoscopy 25340 (Rectal bleeding. Radiation proctitis w/bleeding. An abnormal CT of transverse colon due to mucosal ischemic colitis, that now is healed. Internal hemorrhoids w/possible bleeding.)   • COLONOSCOPY  05/10/2011    Colon endoscopy 48757 (Single sessile polyp found in transverse colon and sigmoid colon ,removed by cold biopsy polypectomy.divertic. found in sigmoid colon,descending colon. Friability/capillary friability in rectum sigmoid due to prior radiation.Inter. hem. Grade 1)   • COLONOSCOPY  05/02/2007    Colon endoscopy 27406 (Colon polyp. Diverticulosis without bleeding. Internal hemorrhoids without bleeding.)   • CORONARY ARTERY BYPASS GRAFT  2002   • CYSTOSCOPY  01/13/1995    Cystoscopy, stone removal (Right ureteroscopic lasertripsy.)   • CYSTOSCOPY Right 1/23/2019    Procedure: CYSTOSCOPY, RIGHT STENT REMOVAL;  Surgeon: Nirmal Gaines MD;  Location: [facility] OR;  Service: Urology   • CYSTOSCOPY, URETEROSCOPY, RETROGRADE PYELOGRAM, STENT INSERTION N/A 8/28/2017    Procedure: URETEROSCOPY LASER LITHOTRIPSY WITH STENT INSERTION AND RETROGRADE PYELOGRAM ;  Surgeon: Anna M. D'Amico,  MD;  Location: [facility];  Service:    • CYSTOSCOPY, URETEROSCOPY, RETROGRADE PYELOGRAM, STENT INSERTION Right 1/11/2019    Procedure: CYSTOSCOPY URETEROSCOPY RETROGRADE PYELOGRAM HOLMIUM LASER STENT INSERTION;  Surgeon: Nirmal Gaines MD;  Location: [facility];  Service: Urology   • EPIDURAL  12/03/2014    Therapeutic/diag injection (Lumbar transforaminal epidural steroid injection, L5-S1, left side.)   • EPIDURAL  10/22/2014    Therapeutic/diag injection (Lumbar transforaminal epidural steroid injection L5-S1 left side.)   • EPIDURAL  08/07/2014    Therapeutic/diag injection (Lumbar transforaminal epidural steroid injection.)   • EXCISION LESION  05/13/1999    REMOVE EAR LESION 59056 (1.3 cm basal cell carcinoma, right preauricular area. Excision)   • EXCISION LESION  03/13/2001    REMOVE LESION NECK/CHEST 21190 (Excision of irritated seborrheic keratosis, anterior neck, 1.1 cm and deep lipoma, posterior neck, 3.5 cm)   • INJECTION OF MEDICATION  11/15/2012    Kenalog (4)      • JOINT REPLACEMENT  2015    partial   • KNEE ARTHROSCOPY  01/17/2007    Knee arthroscopy, surgery (Arthroscopy with medial and lateral meniscectomies, right knee.)   • KNEE SURGERY  11/04/2015    Knee Surgery (Right unicompartmental arthroplasty.)   • LUMBAR LAMINECTOMY N/A 2/7/2017    Procedure: LUMBAR LAMINECTOMY LUMBAR THREE-FOUR, LUMBAR FOUR-FIVE, INTERLAMINAR DISTRACTION ;  Surgeon: Lucio Mayo MD;  Location: [facility];  Service:    • NOSE SURGERY     • OTHER SURGICAL HISTORY      EXTENDED VISUAL FIELDS STUDY 82690 (Borderline glaucoma) (3): 03/19/2015, 04/09/2014, 03/27/2013   • OTHER SURGICAL HISTORY  10/29/2003    Heart revascularize (TMR) (Directmyocardial revascularization times four; LIMA to LAD SVG to DARIUSZ SVG to OM1, SVG to RCA)   • OTHER SURGICAL HISTORY      OCT DISC NFL 06666 (Borderline glaucoma)  (3): 09/24/2015, 08/13/2014, 07/29/2013   • PROSTATECTOMY     • SEPTORHINOPLASTY  11/16/1989    Nasal surgery  procedure (Septorhinoplasty with reconstruction of the nasal pyramid with a iliac crest graft, rhinoplasty was performed with external approach.)   • SHOULDER ARTHROSCOPY  2013    Arthroscopy of right shoulder with rotator repair, Carla procedure, Biceps tenotomy, subacromial decompression.   • SHOULDER SURGERY  2005    Shoulder surgery procedure (Decompression, subacromial, explore of the rotator cuff, no tear found nor repaired, acromioplasty of the left shoulder and AC shoulder joint resection.)     Family History   Problem Relation Age of Onset   • Hypertension Mother    • Heart disease Mother    • Arthritis Mother    • Cancer Other    • Heart disease Other    • Hypertension Other      Social History     Tobacco Use   • Smoking status: Former     Packs/day: 0.50     Years: 40.00     Pack years: 20.00     Types: Cigarettes     Start date:      Quit date: 2020     Years since quittin.5   • Smokeless tobacco: Never   Vaping Use   • Vaping Use: Never used   Substance Use Topics   • Alcohol use: Not Currently     Comment: socially   • Drug use: No     Medications Prior to Admission   Medication Sig Dispense Refill Last Dose   • acetaminophen (Tylenol) 325 MG tablet Take 650 mg by mouth Every 4 (Four) Hours As Needed for Mild Pain.      • albuterol sulfate HFA (Ventolin HFA) 108 (90 Base) MCG/ACT inhaler Inhale 2 puffs Every 4 (Four) Hours As Needed for Wheezing or Shortness of Air. 18 g 5    • atorvastatin (LIPITOR) 40 MG tablet Take 1 tablet by mouth Daily. 90 tablet 1    • budesonide-formoterol (SYMBICORT) 160-4.5 MCG/ACT inhaler Inhale 2 puffs 2 (Two) Times a Day. 1 each 11    • carvedilol (COREG) 3.125 MG tablet Take 1 tablet by mouth Every 12 (Twelve) Hours. 180 tablet 3    • clopidogrel (PLAVIX) 75 MG tablet Take 1 tablet by mouth Daily. 90 tablet 3    • Cyanocobalamin (VITAMIN B 12 PO) Take 500 mcg by mouth Daily. Three times weekly, MoWeFr      • dilTIAZem CD (CARDIZEM CD) 180 MG 24  hr capsule Take 1 capsule by mouth Daily. 90 capsule 3    • FeroSul 325 (65 Fe) MG tablet TAKE 1 TABLET EVERY DAY WITH BREAKFAST 90 tablet 0    • fluticasone (FLONASE) 50 MCG/ACT nasal spray 2 sprays into each nostril Every Night.      • ipratropium-albuterol (DUO-NEB) 0.5-2.5 mg/3 ml nebulizer Inhale the contents of 1 vial by nebulization Every 4 (Four) Hours As Needed for Wheezing for up to 30 days. 360 mL 3    • isosorbide mononitrate (IMDUR) 30 MG 24 hr tablet Take 1 tablet by mouth Daily. 30 tablet 0    • losartan (COZAAR) 50 MG tablet Take 1 tablet by mouth Every Night. 90 tablet 3    • magnesium oxide (MAG-OX) 400 MG tablet Take 1 tablet by mouth 2 (Two) Times a Day.      • metFORMIN (GLUCOPHAGE) 1000 MG tablet Take 1 tablet by mouth 2 (Two) Times a Day With Meals.      • nitroglycerin (NITROSTAT) 0.4 MG SL tablet Place 1 tablet under the tongue Every 5 (Five) Minutes As Needed for Chest Pain. Take no more than 3 doses in 15 minutes. 30 tablet 0    • omeprazole (priLOSEC) 20 MG capsule Take 20 mg by mouth Daily.      • oxybutynin XL (DITROPAN-XL) 5 MG 24 hr tablet Take 5 mg by mouth Daily.      • pantoprazole (PROTONIX) 40 MG EC tablet Take 1 tablet by mouth Every Morning. 30 tablet 0    • pregabalin (LYRICA) 150 MG capsule 3 (Three) Times a Day.      • pregabalin (LYRICA) 75 MG capsule Take 1 capsule by mouth 2 (Two) Times a Day for 30 days. (Patient taking differently: Take 2 capsules by mouth Daily.) 60 capsule 0    • traMADol (ULTRAM) 50 MG tablet Take 1 tablet by mouth 2 (Two) Times a Day.      • travoprost, BAK free, (TRAVATAN) 0.004 % solution ophthalmic solution Administer 1 drop to both eyes Daily.        Allergies:  Molds & smuts and Hydrocodone-acetaminophen     CURRENT MEDICATIONS/OBJECTIVE/VS/PE:     Current Medications:     Current Facility-Administered Medications   Medication Dose Route Frequency Provider Last Rate Last Admin   • acetaminophen (TYLENOL) suppository 650 mg  650 mg Rectal Q4H  PRN Isha Holcomb APRN       • acetaminophen (TYLENOL) tablet 650 mg  650 mg Oral Q6H PRN Behroozi, Saeid, MD   650 mg at 11/17/22 0308   • budesonide-formoterol (SYMBICORT) 160-4.5 MCG/ACT inhaler 2 puff  2 puff Inhalation BID - RT Behroozi, Saeid, MD   2 puff at 11/17/22 0701   • diphenhydrAMINE-zinc acetate 2-0.1 % cream 1 application  1 application Topical TID PRN Isha Holocmb APRN       • LORazepam (ATIVAN) injection 1 mg  1 mg Intravenous Q4H PRN Isha Holcomb APRN   1 mg at 11/17/22 1330   • morphine injection 2 mg  2 mg Intravenous Q1H PRN Isha Holcomb APRN   2 mg at 11/17/22 1756   • ondansetron (ZOFRAN) injection 4 mg  4 mg Intravenous Q6H PRN Isha Holcomb APRN       • scopolamine patch 1 mg/72 hr  1 patch Transdermal Q72H Isha Holcomb APRN   1 patch at 11/17/22 1352   • sodium chloride 0.9 % flush 10 mL  10 mL Intravenous Q12H Behroozi, Saeid, MD   10 mL at 11/17/22 0844   • sodium chloride 0.9 % flush 10 mL  10 mL Intravenous PRN Behroozi, Saeid, MD           Objective     Review of Systems:   Review of Systems   Constitutional: Negative for chills, fatigue, fever and unexpected weight change.   HENT: Negative for congestion, ear discharge, hearing loss, nosebleeds and sore throat.    Eyes: Negative for pain, discharge and redness.   Respiratory: Positive for shortness of breath. Negative for cough, chest tightness and wheezing.    Cardiovascular: Negative for chest pain and palpitations.   Gastrointestinal: Negative for abdominal distention, abdominal pain, blood in stool, constipation, diarrhea, nausea and vomiting.   Endocrine: Negative for cold intolerance, polydipsia, polyphagia and polyuria.   Genitourinary: Negative for dysuria, flank pain, frequency, hematuria and urgency.   Musculoskeletal: Negative for arthralgias, back pain, joint swelling and myalgias.   Skin: Negative for color change, pallor and rash.   Neurological: Negative for tremors, seizures, syncope, weakness  and headaches.   Hematological: Negative for adenopathy. Does not bruise/bleed easily.   Psychiatric/Behavioral: Negative for behavioral problems, confusion, dysphoric mood, hallucinations and suicidal ideas. The patient is not nervous/anxious.        Physical Exam:   Temp:  [97.6 °F (36.4 °C)-98.6 °F (37 °C)] 98.1 °F (36.7 °C)  Heart Rate:  [] 108  Resp:  [18-26] 18  BP: (122-166)/(60-81) 166/81     Physical Exam:  General Appearance:    Alert, cooperative, in no acute distress   Head:    Normocephalic, without obvious abnormality, atraumatic   Eyes:            Lids and lashes normal, conjunctivae and sclerae normal, no   icterus, no pallor, corneas clear, PERRLA   Ears:    Ears appear intact with no abnormalities noted   Throat:   No oral lesions, no thrush, oral mucosa moist   Neck:   No adenopathy, supple, trachea midline, no thyromegaly, no     carotid bruit, no JVD   Back:     No kyphosis present, no scoliosis present, no skin lesions,       erythema or scars, no tenderness to percussion or                   palpation,   range of motion normal   Lungs:     Clear to auscultation,respirations regular, even and                   unlabored    Heart:    Regular rhythm and normal rate, normal S1 and S2, no            murmur, no gallop, no rub, no click   Breast Exam:    Deferred   Abdomen:     Normal bowel sounds, no masses, no organomegaly, soft        nontender, nondistended, no guarding, no rebound                 tenderness   Genitalia:    Deferred   Extremities:   Moves all extremities well, no edema, no cyanosis, no              redness   Pulses:   Pulses palpable and equal bilaterally   Skin:   No bleeding, bruising or rash   Lymph nodes:   No palpable adenopathy   Neurologic:   Cranial nerves 2 - 12 grossly intact, sensation intact, DTR        present and equal bilaterally      Results Review:     Lab Results (last 24 hours)     Procedure Component Value Units Date/Time    POC Glucose Once [[id number]]   (Abnormal) Collected: 11/17/22 0612    Specimen: Blood Updated: 11/17/22 1107     Glucose 144 mg/dL      Comment: RN NotifiedOperator: [id number] ANNAMARIA PAREDESMeter ID: [id number]       POC Glucose Once [[id number]]  (Abnormal) Collected: 11/17/22 1028    Specimen: Blood Updated: 11/17/22 1042     Glucose 153 mg/dL      Comment: RN NotifiedOperator: [id number] TARYN MALDONADOYMeter ID: [id number]       Blood Culture - Blood, Arm, Right [[id number]]  (Normal) Collected: 11/15/22 0925    Specimen: Blood from Arm, Right Updated: 11/17/22 0946     Blood Culture No growth at 2 days    Blood Culture - Blood, Arm, Left [[id number]]  (Normal) Collected: 11/15/22 0925    Specimen: Blood from Arm, Left Updated: 11/17/22 0946     Blood Culture No growth at 2 days    CBC & Differential [[id number]]  (Abnormal) Collected: 11/17/22 0540    Specimen: Blood Updated: 11/17/22 0802    Narrative:      The following orders were created for panel order CBC & Differential.  Procedure                               Abnormality         Status                     ---------                               -----------         ------                     CBC Auto Differential[[id number]]        Abnormal            Final result               Scan Slide[[id number]]                                       Final result                 Please view results for these tests on the individual orders.    Scan Slide [[id number]] Collected: 11/17/22 0540    Specimen: Blood Updated: 11/17/22 0802     Anisocytosis Slight/1+     Hypochromia Mod/2+     Ovalocytes Slight/1+     WBC Morphology Normal     Platelet Estimate Adequate    CBC Auto Differential [[id number]]  (Abnormal) Collected: 11/17/22 0540    Specimen: Blood Updated: 11/17/22 0638     WBC 15.91 10*3/mm3      RBC 2.12 10*6/mm3      Hemoglobin 6.7 g/dL      Hematocrit 20.7 %      MCV 97.6 fL      MCH 31.6 pg      MCHC 32.4 g/dL      RDW 24.9 %      RDW-SD 88.9 fl      MPV 11.2 fL      Platelets 182 10*3/mm3       Neutrophil % 80.8 %      Lymphocyte % 7.5 %      Monocyte % 9.9 %      Eosinophil % 0.6 %      Basophil % 0.3 %      Immature Grans % 0.9 %      Neutrophils, Absolute 12.86 10*3/mm3      Lymphocytes, Absolute 1.19 10*3/mm3      Monocytes, Absolute 1.58 10*3/mm3      Eosinophils, Absolute 0.09 10*3/mm3      Basophils, Absolute 0.05 10*3/mm3      Immature Grans, Absolute 0.14 10*3/mm3      nRBC 0.0 /100 WBC     POC Glucose Once [[id number]]  (Abnormal) Collected: 11/16/22 2005    Specimen: Blood Updated: 11/16/22 2034     Glucose 266 mg/dL      Comment: RN NotifiedOperator: [id number] ANNAMARIA PAREDESMeter ID: [id number]              I reviewed the patient's new clinical results.  I reviewed the patient's new imaging results and agree with the interpretation.     ASSESSMENT/PLAN:   ASSESSMENT: 1.  Nausea and vomiting, resolved.  2.  Anemia s/p packed red cell transfusion  3.  Respiratory distress    PLAN: 1.  Continue current comfort care  2.  We will sign off please call with any further questions thank you  The risks, benefits, and alternatives of this procedure have been discussed with the patient or the responsible party- the patient understands and agrees to proceed.         Jordan Garcia MD  11/17/22  19:27 CST

## 2022-11-19 NOTE — DISCHARGE SUMMARY
[facility] Medicine Services  DEATH SUMMARY       Date of Admission: 11/9/2022  Date of Death:  11/19/2022 at approximately 1338  Primary Care Physician: Johnathno Shaikh MD    Presenting Problem/History of Present Illness:  Blood loss anemia [D50.0]  Injury of head, initial encounter [S09.90XA]  Fall, initial encounter [W19.XXXA]     Final Death Diagnoses:  Active Hospital Problems    Diagnosis    • **Injury of head, initial encounter    • Severe malnutrition (HCC)    • Hematemesis with nausea    • Anemia        Consults:   Consults     Date and Time Order Name Status Description    11/14/2022  1:40 PM Inpatient Gastroenterology Consult Completed     10/28/2022  3:24 PM Hematology & Oncology Inpatient Consult Completed           Procedures Performed: Procedure(s):  ESOPHAGOGASTRODUODENOSCOPY                Pertinent Test Results:     Hospital Course:  82 year old male with past medical history of CAD, CABG, type 2 DM, HLD, HTN, chronic anemia with suspected cancer who presented on 11/9/22 with complaints of fall with head laceration.  Laceration was repaired via stapling in the ED.  He was found to be anemic with hemoglobin of 6.9 requiring transfusion of PRBC. Imaging post fall also revealed potential bladder and lung masses suspicious for metastatic malignancy.  The patient opted against any further invasive evaluation and opted for hospice/comfort care.  He passed away peacefully with family at bedside on 11/19/2022 at approximately 1338.      This document has been electronically signed by SERA Vieira on November 19, 2022 13:44 CST

## 2022-11-19 NOTE — PROGRESS NOTES
"    [facility] Medicine Services  INPATIENT PROGRESS NOTE    Length of Stay: 6  Date of Admission: 11/9/2022  Primary Care Physician: Johnathon Shaikh MD    Subjective   Chief Complaint: \"rattling breathing\" per wife  HPI:   82 year old male with past medical history of CAD, CABG, type 2 DM, HLD, HTN, chronic anemia with suspected cancer who presented on 11/9/22 with complaints of fall with head laceration.  Laceration was repaired via stapling in the ED.  He was found to be anemic with hemoglobin of 6.9 requiring transfusion of PRBC. Imaging post fall also revealed potential bladder and lung masses.  The patient has opted against any further invasive evaluation and is currently comfort measures only status.  During today's visit, patient's wife complains of \"rattling breathing\" sounds and that reports patient has not been responsive since yesterday.    Review of Systems   Unable to perform ROS: Mental status change   Constitutional: Positive for fatigue.   Respiratory: Positive for shortness of breath.    Skin: Positive for pallor.   Neurological: Positive for weakness.   Psychiatric/Behavioral: Negative for agitation.        All pertinent negatives and positives are as above. All other systems have been reviewed and are negative unless otherwise stated.     Objective    Temp:  [98.4 °F (36.9 °C)-98.6 °F (37 °C)] 98.6 °F (37 °C)  Heart Rate:  [] 99  Resp:  [16] 16  BP: ()/(53-64) 97/53    Physical Exam  Vitals and nursing note reviewed.   Constitutional:       General: He is not in acute distress.     Appearance: He is ill-appearing.   HENT:      Head: Normocephalic and atraumatic.      Right Ear: External ear normal.      Left Ear: External ear normal.      Nose: Nose normal.      Mouth/Throat:      Pharynx: Oropharynx is clear.   Eyes:      General: No scleral icterus.        Right eye: No discharge.         Left eye: No discharge.      Conjunctiva/sclera: " Conjunctivae normal.   Cardiovascular:      Rate and Rhythm: Regular rhythm. Tachycardia present.      Heart sounds: Normal heart sounds. No murmur heard.    No friction rub. No gallop.   Pulmonary:      Effort: Accessory muscle usage present. No tachypnea or respiratory distress.      Breath sounds: No stridor. Examination of the right-upper field reveals decreased breath sounds. Examination of the left-upper field reveals decreased breath sounds. Examination of the right-lower field reveals decreased breath sounds. Examination of the left-lower field reveals decreased breath sounds. Decreased breath sounds and rales present. No wheezing or rhonchi.   Abdominal:      General: Bowel sounds are normal. There is no distension.      Palpations: Abdomen is soft.      Tenderness: There is no abdominal tenderness.   Musculoskeletal:         General: No swelling or deformity. Normal range of motion.      Cervical back: Normal range of motion and neck supple.   Skin:     General: Skin is warm and dry.      Coloration: Skin is pale.   Neurological:      Mental Status: He is alert. He is disoriented.             Results Review:  I have reviewed the labs, radiology results, and diagnostic studies.    Laboratory Data:   Results from last 7 days   Lab Units 11/16/22  0629 11/15/22  0606 11/14/22  0142   SODIUM mmol/L 146* 146* 144   POTASSIUM mmol/L 3.9 4.9 4.7   CHLORIDE mmol/L 106 106 105   CO2 mmol/L 29.0 28.0 31.0*   BUN mg/dL 36* 36* 18   CREATININE mg/dL 1.37* 1.80* 1.19   GLUCOSE mg/dL 131* 142* 160*   CALCIUM mg/dL 9.3 9.0 8.8   ANION GAP mmol/L 11.0 12.0 8.0     Estimated Creatinine Clearance: 35.9 mL/min (A) (by C-G formula based on SCr of 1.37 mg/dL (H)).          Results from last 7 days   Lab Units 11/17/22  0540 11/16/22  0629 11/15/22  0606 11/14/22  1359 11/14/22  0145 11/13/22  0541   WBC 10*3/mm3 15.91* 22.09* 35.31*  --  19.74* 12.67*   HEMOGLOBIN g/dL 6.7* 7.3* 8.9* 9.5* 9.1* 6.7*   HEMATOCRIT % 20.7* 23.1*  27.9* 29.3* 28.9* 22.2*   PLATELETS 10*3/mm3 182 180 228  --  267 222           Culture Data:   No results found for: BLOODCX  No results found for: URINECX  No results found for: RESPCX  No results found for: WOUNDCX  No results found for: STOOLCX  No components found for: BODYFLD    Radiology Data:   Imaging Results (Last 24 Hours)     ** No results found for the last 24 hours. **          I have reviewed the patient's current medications.     Assessment/Plan     Active Hospital Problems    Diagnosis    • **Injury of head, initial encounter    • Severe malnutrition (HCC)    • Hematemesis with nausea    • Anemia        Plan:   1. End of life care/comfort measures only: Air hunger improved with morphine continuous PCA.  PRN dosing also available.  PRN Ativan also available for agitation/anxiety.  Continue Scopolamine and atropine for management of secretions.  Patient is very near end of life with life expectancy of just hours.       I confirmed that the patient's Advance Care Plan is present, code status is documented, or surrogate decision maker is listed in the patient's medical record.          This document has been electronically signed by SERA Vieira on November 19, 2022 08:39 CST

## 2022-11-19 NOTE — PLAN OF CARE
Goal Outcome Evaluation:  Plan of Care Reviewed With: patient        Progress: no change  Outcome Evaluation: Patient tolerating PCA at this time.  Family has all arrived at bedside from out of town.  Family has sang to patient and prayed over him.  Patient provided comfort care.  will continue to monitor.

## 2022-11-19 NOTE — PLAN OF CARE
Goal Outcome Evaluation:  Plan of Care Reviewed With: patient        Progress: no change  Outcome Evaluation: Patient has rested today with family at bedside for comfort care.  Morphine PCA in place and patient tolerating well.  Family will arrive from out of town this evening.  Will continue to monitor patient and continue comfort care.

## 2022-11-19 NOTE — PLAN OF CARE
Goal Outcome Evaluation:  Plan of Care Reviewed With: spouse        Progress: no change  Outcome Evaluation: patient remains on PCA, additional dose given for air hunger when chnaging patient, deep suctioned, family at bedside

## 2022-11-20 LAB
BACTERIA SPEC AEROBE CULT: NORMAL
BACTERIA SPEC AEROBE CULT: NORMAL

## 2022-11-23 NOTE — CASE COMMUNICATION
PT WAS REFERRED TO HOME HEALTH FOLLOWING: Patient is a 83 y/o male who was seen for OASIS SOC with physical therapy evaluation on 10/31/22 secondary to presenting to [facility] with syncope and fall, generalized weakness.  This has been ongoing for some time.  He was found to have anemia, dehydration/NAREN.  Syncope likely related to orthostatic hypotension/dehydration/anemia and weakness.  Work-up otherwise negative. Butch souza was also given blood secondary to low hemoglobin level and DC after stabilizing in the hospital. Referral for skilled PT d/t decline in functional mobility and physical decline limiting tolerance to activity.     PRIOR VS CURRENT LEVEL OF FUNCTION:    GAIT: 40'ft with RW and SBA on eval. 125'ft in/out of home with RW and close SBA including step on D/C    T/F: Sit to stands with SBA on eval. Sit to stands with independence but ext ra effort on D/C.     BED MOBILITY: Within defined limits on eval. Patient verbalized independence on D/C.    DISCHARGE REASON:

## 2022-11-29 NOTE — CASE COMMUNICATION
Home Health Physical Therapy Discipline Discharge (22; patient )    PT WAS REFERRED TO Critical access hospital FOLLOWING: Patient is a 81 y/o male who was seen for OASIS SOC with physical therapy evaluation on 10/31/22 secondary to presenting to [facility] with syncope and fall, generalized weakness.  This has been ongoing for some time.  He was found to have anemia, dehydration/NAREN.  Syncope likely related to orthost atic hypotension/dehydration/anemia and weakness.  Work-up otherwise negative. Patient was also given blood secondary to low hemoglobin level and DC after stabilizing in the hospital. Referral for skilled PT d/t decline in functional mobility and physical decline limiting tolerance to activity.   PRIOR VS CURRENT LEVEL OF FUNCTION:  GAIT: 40'ft with RW and SBA on eval. 125'ft in/out of home with RW and close SBA including step on D/C  T /F: Sit to stands with SBA on eval. Sit to stands with independence but extra effort on D/C.     BED MOBILITY: Within defined limits on eval. Patient verbalized independence on D/C.  DISCHARGE REASON:

## 2022-12-09 ENCOUNTER — APPOINTMENT (OUTPATIENT)
Dept: ONCOLOGY | Facility: CLINIC | Age: 82
End: 2022-12-09

## 2023-10-02 NOTE — THERAPY TREATMENT NOTE
Acute Care - Physical Therapy Treatment Note  [facility]     Patient Name: Vishnu Waller  : 1940  MRN: [id number]  Today's Date: 2022      Visit Dx:     ICD-10-CM ICD-9-CM   1. Injury of head, initial encounter  S09.90XA 959.01   2. Blood loss anemia  D50.0 280.0   3. Fall, initial encounter  W19.XXXA E888.9   4. Impaired mobility and ADLs  Z74.09 V49.89    Z78.9    5. Impaired functional mobility, balance, gait, and endurance  Z74.09 V49.89     Patient Active Problem List   Diagnosis   • Degeneration of lumbar intervertebral disc   • Low back pain without sciatica   • Neuritis or radiculitis due to rupture of lumbar intervertebral disc   • Borderline glaucoma, open angle with borderline findings   • Pseudophakia   • History of coronary artery bypass surgery   • Essential hypertension   • Mixed hyperlipidemia   • Spinal stenosis of lumbar region   • Degenerative disc disease, lumbar   • Chronic pain of right knee   • History of artificial joint   • B12 deficiency   • Degeneration of intervertebral disc of lumbosacral region   • Personal history of other infectious and parasitic diseases   • Status post lumbar spine operation   • History of pyelonephritis   • History of surgical procedure   • History of repair of rotator cuff   • Encounter for follow-up examination after completed treatment for conditions other than malignant neoplasm   • Encounter for general adult medical examination without abnormal findings   • Osteoarthritis of knee   • Osteoarthritis of multiple joints   • Primary insomnia   • Encounter for screening for malignant neoplasm of colon   • Encounter for screening for malignant neoplasm of prostate   • Type 2 diabetes mellitus, without long-term current use of insulin (HCC)   • Coronary artery disease involving native coronary artery of native heart without angina pectoris   • Dyspnea on exertion   • Presence of aortocoronary bypass graft   • Dizziness and giddiness   •  Scheduled procedure with Patient at      Telephone Information:   Mobile [phone]      Scheduled Via: Case Request Order for  [facility]  Patient prefers   facility rather than the doctor's preferred facility  Procedure date: 10/1  Procedure time: 145  Farzana explained: Yes  Rep Contacted?: No  Entered into MD's [place]? Yes  Insurance confirmed as , will be the same at time of procedure?: Yes  Insurance Accepted at Facility? Yes    The following have been confirmed:  Latex Allergy No  Diabetic No  Sleep Apnea No  Diuretic/Water pill No  Defibrillator/Pacemaker No  MRSA hx No  Blood thinners: Coumadin (Warfarin) or Plavix No      Aspirin No      Phentermine (diet pill) No  Pre-Op testing required Yes, Patient informed Yes  Prep required? No   Postviral fatigue syndrome   • Postviral fatigue syndrome   • Recurrent kidney stones   • Thrombocytopenia ([facility])   • Pyelonephritis   • Chronic non-seasonal allergic rhinitis   • Unstable angina ([facility])   • NSTEMI (non-ST elevated myocardial infarction) ([facility])   • History of PTCA 1   • Medicare annual wellness visit, subsequent   • Thoracic aortic aneurysm without rupture   • Chronic kidney disease, stage 2 (mild)   • Personal history of tobacco use, presenting hazards to health   • Cervical pain (neck)   • Thoracic spine pain   • Calculus of ureter   • Foreign body in bladder and urethra   • Physical deconditioning   • Bilateral hip pain   • Pain of left humerus   • PVD (peripheral vascular disease) ([facility])   • Renal mass   • BPPV (benign paroxysmal positional vertigo)   • Degenerative disc disease, lumbar   • Anemia   • Prostate cancer ([facility])   • Overweight with body mass index (BMI) of 25 to 25.9 in adult   • Elevated PSA   • Abnormal SPEP   • Chronic obstructive pulmonary disease ([facility])   • NAREN (acute kidney injury) ([facility])   • Hyperkalemia   • Syncope   • Weakness   • NAREN (acute kidney injury) ([facility])   • Leukocytosis   • Injury of head, initial encounter     Past Medical History:   Diagnosis Date   • Acute bacterial sinusitis    • Acute bronchitis    • Acute frontal sinusitis    • Acute maxillary sinusitis    • Acute pharyngitis    • Allergic rhinitis    • Allergic rhinitis due to pollen    • Anxiety    • Artificial lens present     in position   • Asthma    • Backache    • Borderline glaucoma    • C. difficile diarrhea 2014   • Chronic laryngitis    • Chronic rhinitis    • Coronary artery disease    • Cough    • Degeneration of lumbar intervertebral disc    • Degenerative joint disease involving multiple joints    • Diabetes mellitus ([facility])    • Diverticular disease of colon    • Dizziness and giddiness    • Elevated cholesterol    • Erectile dysfunction    • Essential hypertension    • Generalized anxiety disorder    •  GERD (gastroesophageal reflux disease)    • Glaucoma    • Hemorrhoids     without bleeding   • Insomnia    • Kidney stone    • Kidney stones    • Malignant tumor of prostate (HCC)    • Need for prophylactic vaccination and inoculation against influenza    • Osteoarthritis    • Osteoarthritis of multiple joints    • Pain, lumbar region     Pain radiating to lumbar region of back     • PONV (postoperative nausea and vomiting)    • Postviral fatigue syndrome    • Presence of aortocoronary bypass graft    • Prostate cancer (HCC)    • Pseudomembranous enterocolitis     improved   • Rotator cuff syndrome     right   • Shoulder pain    • SOB (shortness of breath)    • Tenosynovitis    • Thrombocytopenia (HCC)    • Upper respiratory infection      Past Surgical History:   Procedure Laterality Date   • CARDIAC CATHETERIZATION  09/25/2003    Cardiac cath (Normal left ventricular systolic function, EF 60% Multi-vessel coronary artery disease with critical disease noted in the LAD coronary artery, diagonal coronary artery, obtuse marginal coronary and right coronary artery.)   • CARDIAC CATHETERIZATION  05/03/1996    Cardiac cath (Coronary atherosclerotic heart disease. 70% diagonal stenosis. Mild to moderate left anterior descending artery stenosis. Preserved left ventricular function.)   • CARDIAC CATHETERIZATION N/A 2/26/2018    Procedure: Left Heart Cath/ pci if indicated ;  Surgeon: Radha Wilkes MD;  Location: [facility] INVASIVE LOCATION;  Service:    • CATARACT EXTRACTION Bilateral    • CATARACT EXTRACTION  10/04/2011    Remove cataract, insert lens (Right)   • CATARACT EXTRACTION  08/23/2011    Remove cataract, insert lens (left)   • COLONOSCOPY     • COLONOSCOPY      Colon endoscopy 49552 (Rectal bleeding. Radiation proctitis w/bleeding. An abnormal CT of transverse colon due to mucosal ischemic colitis, that now is healed. Internal hemorrhoids w/possible bleeding.)   • COLONOSCOPY  05/10/2011    Colon  endoscopy 15321 (Single sessile polyp found in transverse colon and sigmoid colon ,removed by cold biopsy polypectomy.divertic. found in sigmoid colon,descending colon. Friability/capillary friability in rectum sigmoid due to prior radiation.Inter. hem. Grade 1)   • COLONOSCOPY  05/02/2007    Colon endoscopy 72738 (Colon polyp. Diverticulosis without bleeding. Internal hemorrhoids without bleeding.)   • CORONARY ARTERY BYPASS GRAFT  2002   • CYSTOSCOPY  01/13/1995    Cystoscopy, stone removal (Right ureteroscopic lasertripsy.)   • CYSTOSCOPY Right 1/23/2019    Procedure: CYSTOSCOPY, RIGHT STENT REMOVAL;  Surgeon: Nirmal Gaines MD;  Location: [facility];  Service: Urology   • CYSTOSCOPY, URETEROSCOPY, RETROGRADE PYELOGRAM, STENT INSERTION N/A 8/28/2017    Procedure: URETEROSCOPY LASER LITHOTRIPSY WITH STENT INSERTION AND RETROGRADE PYELOGRAM ;  Surgeon: Anna M. D'Amico, MD;  Location: [facility];  Service:    • CYSTOSCOPY, URETEROSCOPY, RETROGRADE PYELOGRAM, STENT INSERTION Right 1/11/2019    Procedure: CYSTOSCOPY URETEROSCOPY RETROGRADE PYELOGRAM HOLMIUM LASER STENT INSERTION;  Surgeon: Nirmal Gaines MD;  Location: [facility];  Service: Urology   • EPIDURAL  12/03/2014    Therapeutic/diag injection (Lumbar transforaminal epidural steroid injection, L5-S1, left side.)   • EPIDURAL  10/22/2014    Therapeutic/diag injection (Lumbar transforaminal epidural steroid injection L5-S1 left side.)   • EPIDURAL  08/07/2014    Therapeutic/diag injection (Lumbar transforaminal epidural steroid injection.)   • EXCISION LESION  05/13/1999    REMOVE EAR LESION 56690 (1.3 cm basal cell carcinoma, right preauricular area. Excision)   • EXCISION LESION  03/13/2001    REMOVE LESION NECK/CHEST 98922 (Excision of irritated seborrheic keratosis, anterior neck, 1.1 cm and deep lipoma, posterior neck, 3.5 cm)   • INJECTION OF MEDICATION  11/15/2012    Kenalog (4)      • JOINT REPLACEMENT  2015    partial   • KNEE ARTHROSCOPY   01/17/2007    Knee arthroscopy, surgery (Arthroscopy with medial and lateral meniscectomies, right knee.)   • KNEE SURGERY  11/04/2015    Knee Surgery (Right unicompartmental arthroplasty.)   • LUMBAR LAMINECTOMY N/A 2/7/2017    Procedure: LUMBAR LAMINECTOMY LUMBAR THREE-FOUR, LUMBAR FOUR-FIVE, INTERLAMINAR DISTRACTION ;  Surgeon: Lucio Mayo MD;  Location: [facility];  Service:    • NOSE SURGERY     • OTHER SURGICAL HISTORY      EXTENDED VISUAL FIELDS STUDY 62616 (Borderline glaucoma) (3): 03/19/2015, 04/09/2014, 03/27/2013   • OTHER SURGICAL HISTORY  10/29/2003    Heart revascularize (TMR) (Directmyocardial revascularization times four; LIMA to LAD SVG to DARIUSZ SVG to OM1, SVG to RCA)   • OTHER SURGICAL HISTORY      OCT DISC NFL 29341 (Borderline glaucoma)  (3): 09/24/2015, 08/13/2014, 07/29/2013   • PROSTATECTOMY     • SEPTORHINOPLASTY  11/16/1989    Nasal surgery procedure (Septorhinoplasty with reconstruction of the nasal pyramid with a iliac crest graft, rhinoplasty was performed with external approach.)   • SHOULDER ARTHROSCOPY  07/24/2013    Arthroscopy of right shoulder with rotator repair, Carla procedure, Biceps tenotomy, subacromial decompression.   • SHOULDER SURGERY  11/01/2005    Shoulder surgery procedure (Decompression, subacromial, explore of the rotator cuff, no tear found nor repaired, acromioplasty of the left shoulder and AC shoulder joint resection.)     PT Assessment (last 12 hours)     PT Evaluation and Treatment     Row Name 11/12/22 1023          Physical Therapy Time and Intention    Subjective Information no complaints  -TA     Document Type therapy note (daily note)  -TA     Mode of Treatment physical therapy  -TA     Row Name 11/12/22 1023          General Information    Patient Profile Reviewed yes  -TA     Existing Precautions/Restrictions fall  -TA     Row Name 11/12/22 1023          Pain    Pretreatment Pain Rating 0/10 - no pain  -TA     Posttreatment Pain Rating  0/10 - no pain  -     Row Name 11/12/22 1023          Cognition    Orientation Status (Cognition) oriented x 4  -TA     Personal Safety Interventions fall prevention program maintained;gait belt;muscle strengthening facilitated;nonskid shoes/slippers when out of bed;supervised activity  -     Row Name 11/12/22 1023          Range of Motion Comprehensive    General Range of Motion no range of motion deficits identified  -     Row Name 11/12/22 1023          Bed Mobility    Bed Mobility supine-sit;sit-supine  -TA     Supine-Sit Leslie (Bed Mobility) standby assist  -TA     Sit-Supine Leslie (Bed Mobility) standby assist  -TA     Assistive Device (Bed Mobility) bed rails;draw sheet  -     Row Name 11/12/22 1023          Sit-Stand Transfer    Sit-Stand Leslie (Transfers) contact guard  -TA     Assistive Device (Sit-Stand Transfers) walker, front-wheeled  -TA     Row Name 11/12/22 1023          Stand-Sit Transfer    Stand-Sit Leslie (Transfers) contact guard  -TA     Assistive Device (Stand-Sit Transfers) walker, front-wheeled  -TA     Row Name 11/12/22 1023          Gait/Stairs (Locomotion)    Leslie Level (Gait) contact guard  -TA     Assistive Device (Gait) walker, front-wheeled  -TA     Distance in Feet (Gait) 201 & 80`  -TA     Deviations/Abnormal Patterns (Gait) gait speed decreased  -     Row Name 11/12/22 1023          Safety Issues, Functional Mobility    Impairments Affecting Function (Mobility) balance;endurance/activity tolerance;grasp;pain;strength;shortness of breath  -     Row Name 11/12/22 1023          Hip (Therapeutic Exercise)    Hip (Therapeutic Exercise) AROM (active range of motion)  -TA     Hip AROM (Therapeutic Exercise) bilateral;flexion;aBduction;aDduction;sitting;15 repititions  -     Row Name 11/12/22 1023          Knee (Therapeutic Exercise)    Knee (Therapeutic Exercise) AROM (active range of motion)  -TA     Knee AROM (Therapeutic Exercise)  bilateral;LAQ (long arc quad);sitting;15 repititions  -TA     Row Name 11/12/22 1023          Ankle (Therapeutic Exercise)    Ankle (Therapeutic Exercise) AROM (active range of motion)  -TA     Ankle AROM (Therapeutic Exercise) bilateral;dorsiflexion;plantarflexion;sitting;15 repititions  -TA     Row Name 11/12/22 1023          Plan of Care Review    Plan of Care Reviewed With patient  -TA     Outcome Evaluation pt sup<>sit with SBA, sit<>stand with CGA, pt ambulated 20` & 80` with RW & CGA. pt would benefit from assistance & HHPT  -TA     Row Name 11/12/22 1023          Vital Signs    Pre Systolic BP Rehab 132  -TA     Pre Treatment Diastolic BP 64  -TA     Post Systolic BP Rehab 127  -TA     Post Treatment Diastolic BP 58  -TA     Pretreatment Heart Rate (beats/min) 97  -TA     Posttreatment Heart Rate (beats/min) 102  -TA     Pre SpO2 (%) 94  -TA     O2 Delivery Pre Treatment supplemental O2  -TA     Post SpO2 (%) 93  -TA     O2 Delivery Post Treatment supplemental O2  -TA     Pre Patient Position Supine  -TA     Post Patient Position Supine  -TA     Row Name 11/12/22 1023          Positioning and Restraints    Pre-Treatment Position in bed  -TA     Post Treatment Position bed  -TA     In Bed supine;call light within reach;exit alarm on  -TA     Row Name 11/12/22 1023          Therapy Assessment/Plan (PT)    Comment, Therapy Assessment/Plan (PT) continue  -TA     Row Name 11/12/22 1023          Bed Mobility Goal 1 (PT)    Activity/Assistive Device (Bed Mobility Goal 1, PT) sit to supine;supine to sit  -TA     [place] Level/Cues Needed (Bed Mobility Goal 1, PT) modified independence  -TA     Time Frame (Bed Mobility Goal 1, PT) by discharge  -TA     Row Name 11/12/22 1023          Transfer Goal 1 (PT)    Activity/Assistive Device (Transfer Goal 1, PT) walker, rolling;bed-to-chair/chair-to-bed;sit-to-stand/stand-to-sit  -TA     [place] Level/Cues Needed (Transfer Goal 1, PT) modified independence  -TA      Time Frame (Transfer Goal 1, PT) by discharge  -TA     Row Name 11/12/22 1023          Gait Training Goal 1 (PT)    Activity/Assistive Device (Gait Training Goal 1, PT) assistive device use;gait (walking locomotion)  -TA     [place] Level (Gait Training Goal 1, PT) modified independence  -TA     Distance (Gait Training Goal 1, PT) 50'x2  -TA     Time Frame (Gait Training Goal 1, PT) by discharge  -TA     Progress/Outcome (Gait Training Goal 1, PT) goal not met  -TA           User Key  (r) = Recorded By, (t) = Taken By, (c) = Cosigned By    Initials Name Provider Type    TA Allie Das, PTA Physical Therapist Assistant                Physical Therapy Education     Title: PT OT SLP Therapies (In Progress)     Topic: Physical Therapy (Done)     Point: Mobility training (Done)     Learning Progress Summary           Patient Acceptance, E,TB, VU by LR at 11/10/2022 1112    Comment: Educated on PT POC and goals.                   Point: Home exercise program (Done)     Learning Progress Summary           Patient Acceptance, E,TB, VU by LR at 11/10/2022 1112    Comment: Educated on PT POC and goals.                   Point: Body mechanics (Done)     Learning Progress Summary           Patient Acceptance, E,TB, VU by LR at 11/10/2022 1112    Comment: Educated on PT POC and goals.                   Point: Precautions (Done)     Learning Progress Summary           Patient Acceptance, E,TB, VU by LR at 11/10/2022 1112    Comment: Educated on PT POC and goals.                               User Key     Initials Effective Dates Name Provider Type Discipline    LR 06/16/21 -  Elkin Chavez Physical Therapist PT              PT Recommendation and Plan     Plan of Care Reviewed With: patient  Outcome Evaluation: pt sup<>sit with SBA, sit<>stand with CGA, pt ambulated 20` & 80` with RW & CGA. pt would benefit from assistance & HHPT   Outcome Measures     Row Name 11/12/22 1200 11/11/22 1200          How much help from  another person do you currently need...    Turning from your back to your side while in flat bed without using bedrails? 3  -TA 3  -TA     Moving from lying on back to sitting on the side of a flat bed without bedrails? 3  -TA 3  -TA     Moving to and from a bed to a chair (including a wheelchair)? 3  -TA 3  -TA     Standing up from a chair using your arms (e.g., wheelchair, bedside chair)? 3  -TA 3  -TA     Climbing 3-5 steps with a railing? 2  -TA 2  -TA     To walk in hospital room? 3  -TA 3  -TA     AM-PAC 6 Clicks Score (PT) 17  -TA 17  -TA        Functional Assessment    Outcome Measure Options AM-PAC 6 Clicks Basic Mobility (PT)  -TA AM-PAC 6 Clicks Basic Mobility (PT)  -TA           User Key  (r) = Recorded By, (t) = Taken By, (c) = Cosigned By    Initials Name Provider Type    Allie Paniagua PTA Physical Therapist Assistant                 Time Calculation:    PT Charges     Row Name 11/12/22 1252             Time Calculation    Start Time 1023  -TA      Stop Time 1052  -TA      Time Calculation (min) 29 min  -TA      PT Received On 11/12/22  -TA         Time Calculation- PT    Total Timed Code Minutes- PT 29 minute(s)  -TA         Timed Charges    34204 - PT Therapeutic Exercise Minutes 14  -TA      17582 - Gait Training Minutes  15  -TA         Total Minutes    Timed Charges Total Minutes 29  -TA       Total Minutes 29  -TA            User Key  (r) = Recorded By, (t) = Taken By, (c) = Cosigned By    Initials Name Provider Type    Allie Paniagua PTA Physical Therapist Assistant              Therapy Charges for Today     Code Description Service Date Service Provider Modifiers Qty    32379298616 HC PT THER PROC EA 15 MIN 11/11/2022 Allie Das, PTA GP 1    68304011406 HC PT THERAPEUTIC ACT EA 15 MIN 11/11/2022 Allie Das, PTA GP 1    59461344763 HC PT THER PROC EA 15 MIN 11/12/2022 Allie Das, PTA GP 1    04765138021 HC GAIT TRAINING EA 15 MIN 11/12/2022 Allie Das, PTA GP 1           PT G-Codes  Outcome Measure Options: AM-PAC 6 Clicks Basic Mobility (PT)  AM-PAC 6 Clicks Score (PT): 17  AM-PAC 6 Clicks Score (OT): 20    Allie Das, PTA  11/12/2022     01-Oct-2023 22:54

## 2024-05-09 NOTE — PROGRESS NOTES
Pulmonary Office Follow-up    Subjective     Vishnu Waller is seen today at the office for   Chief Complaint   Patient presents with   • COPD     4 months         HPI  Vishnu Waller is a 78 y.o. male with a PMH significant for COPD with asthma, tobacco use, allergies, CAD s/p CABG, HTN, DM, and borderline glaucoma who presents for follow-up of COPD.  He was last seen on 7/20/18, at which time I recommended he continue on Symbicort twice daily and suggested that he try Benadryl at bedtime in addition to his daily Flonase and Singulair. Pt states he is doing well, but he complains of ongoing issues with his chronic back pain.  He states that he is out of his albuterol, but admits that he uses it rarely.  He does use his Symbicort twice daily most days, but there are some days when he does not feel like it is necessary.  Patient does have a cough which is exacerbated from smoking marijuana, which he states he does almost daily.  He denies changes in sputum, fevers, chest pain, or weight changes.  He reports that he would like to have something done about his upper back pain, but he was told by his cardiologist nothing to be scheduled until he is a year out from his drug-eluting stent placement.    Patient Active Problem List   Diagnosis   • Degeneration of lumbar intervertebral disc   • Low back pain without sciatica   • Neuritis or radiculitis due to rupture of lumbar intervertebral disc   • Borderline glaucoma, open angle with borderline findings   • Pseudophakia   • History of coronary artery bypass surgery   • Essential hypertension   • Mixed hyperlipidemia   • Thrombocytopenia (CMS/HCC)   • Spinal stenosis of lumbar region   • Degenerative disc disease, lumbar   • Chronic pain of right knee   • History of artificial joint   • B12 deficiency   • Degeneration of intervertebral disc of lumbosacral region   • Personal history of other infectious and parasitic diseases   • Status post lumbar spine  Appointment was made for 5/9/2024   operation   • History of pyelonephritis   • History of surgical procedure   • History of repair of rotator cuff   • Encounter for follow-up examination after completed treatment for conditions other than malignant neoplasm   • Encounter for general adult medical examination without abnormal findings   • Osteoarthritis of knee   • Osteoarthritis of multiple joints   • Primary insomnia   • Encounter for screening for malignant neoplasm of colon   • Encounter for screening for malignant neoplasm of prostate   • Type 2 diabetes mellitus without complication, without long-term current use of insulin (CMS/HCC)   • Coronary artery disease involving native coronary artery of native heart without angina pectoris   • SOB (shortness of breath)   • Presence of aortocoronary bypass graft   • Dizziness and giddiness   • Postviral fatigue syndrome   • Postviral fatigue syndrome   • Recurrent kidney stones   • Thrombocytopenia (CMS/HCC)   • Pyelonephritis   • COPD with asthma (CMS/HCC)   • Chronic non-seasonal allergic rhinitis   • Unstable angina (CMS/HCC)   • NSTEMI (non-ST elevated myocardial infarction) (CMS/HCC)   • History of PTCA 1   • Medicare annual wellness visit, subsequent   • Thoracic aortic aneurysm without rupture (CMS/HCC)   • Chronic kidney disease, stage 2 (mild)   • Personal history of tobacco use, presenting hazards to health   • Cervical pain (neck)   • Thoracic spine pain   • Marijuana smoker       Review of Systems  Review of Systems   Constitutional: Positive for fatigue. Negative for fever and unexpected weight change.   HENT: Negative for congestion and rhinorrhea.    Respiratory: Positive for cough and shortness of breath. Negative for wheezing.    Cardiovascular: Negative for chest pain.   Musculoskeletal: Positive for arthralgias and back pain.     As described in the HPI. Otherwise, remainder of ROS (14 systems) were negative.    Medications, Allergies, Social, and Family Histories reviewed as per  EMR.    Objective     Vitals:    11/26/18 1344   BP: 161/82   Pulse: 82   SpO2: 96%     Physical Exam   Constitutional: He is oriented to person, place, and time. Vital signs are normal. He appears well-developed and well-nourished.   HENT:   Head: Normocephalic and atraumatic.   Nose: No mucosal edema.   Mouth/Throat: Uvula is midline, oropharynx is clear and moist and mucous membranes are normal.   Mallampati 3   Eyes: Conjunctivae, EOM and lids are normal. Pupils are equal, round, and reactive to light.   Neck: Trachea normal and normal range of motion. No tracheal tenderness present. No thyroid mass present.   Cardiovascular: Normal rate, regular rhythm and normal heart sounds. PMI is not displaced. Exam reveals no gallop.   No murmur heard.  Pulmonary/Chest: Effort normal and breath sounds normal. No respiratory distress. He has no decreased breath sounds. He has no wheezes. He has no rhonchi. He exhibits no tenderness.   Abdominal: Soft. Normal appearance and bowel sounds are normal. There is no hepatomegaly. There is no tenderness.   Musculoskeletal:   Normal gait, no extremity edema     Vascular Status -  His right foot exhibits no edema. His left foot exhibits no edema.  Lymphadenopathy:        Head (right side): No submandibular adenopathy present.        Head (left side): No submandibular adenopathy present.     He has no cervical adenopathy.        Right: No supraclavicular adenopathy present.        Left: No supraclavicular adenopathy present.   Neurological: He is alert and oriented to person, place, and time. Gait normal.   Skin: Skin is warm and dry. No rash noted. No cyanosis. Nails show no clubbing.   Psychiatric: He has a normal mood and affect. His speech is normal and behavior is normal. Judgment normal.   Nursing note and vitals reviewed.          Assessment/Plan     Vishnu was seen today for copd.    Diagnoses and all orders for this visit:    COPD with asthma (CMS/[facility])  -     [facility]   (90 Base) MCG/ACT inhaler; Inhale 2 puffs Every 4 (Four) Hours As Needed for Wheezing.  -     budesonide-formoterol (SYMBICORT) 80-4.5 MCG/ACT inhaler; Inhale 2 puffs 2 (Two) Times a Day.    Chronic non-seasonal allergic rhinitis    Personal history of tobacco use, presenting hazards to health    Marijuana smoker         Discussion/ Recommendations:   He is doing well on Symbicort with infrequent albuterol use.  I think his cough is well controlled, but is likely exacerbated from smoking marijuana, which is expected with inhalational injury.  Otherwise, he has had no acute issues or recent exacerbations.    -Continue Symbicort twice daily and albuterol as needed for dyspnea or wheeze.  -If albuterol MDIs to costly, we can attempt to get him albuterol nebs filed to Medicare part B.  -Continue Flonase and Singulair daily.  -Counseled on minimizing inhalational injury to reduce cough.  -Qualifies for LDCT, but also having CT chest annually to follow thoracic aortic aneurysm due may 2019. Will use this scan as his screening (1-2ppd x 60yrs, quit 2017).  -Up-to-date with flu vaccine.    Patient's Body mass index is 25.51 kg/m². BMI is within normal parameters. No follow-up required.        Return in about 3 months (around 2/26/2019) for Recheck COPD.          This document has been electronically signed by Camryn Koehler MD on November 26, 2018 2:28 PM      Dictated using Dragon

## (undated) DEVICE — PK SPINE LF 60

## (undated) DEVICE — 3M™ STERI-STRIP™ REINFORCED ADHESIVE SKIN CLOSURES, R1547, 1/2 IN X 4 IN (12 MM X 100 MM), 6 STRIPS/ENVELOPE: Brand: 3M™ STERI-STRIP™

## (undated) DEVICE — CATH DIAG EXPO .056 ARMOD 6F 100CM

## (undated) DEVICE — SOL PVPI SPRY BETADINE 3OZ

## (undated) DEVICE — PK CYSTO LF 60

## (undated) DEVICE — GLV SURG NEOLON 2G PF LF 7.5 STRL

## (undated) DEVICE — CODMAN® SURGICAL PATTIES 1/2" X 1" (1.27CM X 2.54CM): Brand: CODMAN®

## (undated) DEVICE — GOWN,AURORA,NOREINF,RAGLAN,XL,STERILE: Brand: MEDLINE

## (undated) DEVICE — GLV SURG TRIUMPH ORTHO W/ALOE PF LTX 7.0

## (undated) DEVICE — GLV SURG NEOLON 2G PF LF 6.5 STRL

## (undated) DEVICE — GW PTFE FIX/CORE FLXTIP .038 3X150CM

## (undated) DEVICE — SOL IRR H2O BTL 1000ML STRL

## (undated) DEVICE — GLV SURG SENSICARE MICRO PF LF 7.5 STRL

## (undated) DEVICE — A2000 MULTI-USE SYRINGE KIT, P/N 701277-003KIT CONTENTS: 100ML CONTRAST RESERVOIR AND TUBING WITH CONTRAST SPIKE AND CLAMP: Brand: A2000 MULTI-USE SYRINGE KIT

## (undated) DEVICE — Device

## (undated) DEVICE — SPNG DRN AMD EXCILON 6PLY 4X4IN PK/2

## (undated) DEVICE — COPILOT KIT INCLUDES BLEEDBACK CONTROL VALVE / GUIDE WIRE INTRODUCER / TORQUE DEVICE: Brand: ACCESSORIES

## (undated) DEVICE — SUT VICRYL 1 CP-2 27IN J871H

## (undated) DEVICE — GLV SURG TRIUMPH ORTHO W/ALOE PF LTX 6.5 STRL

## (undated) DEVICE — URETERAL ACCESS SHEATH SET: Brand: NAVIGATOR HD

## (undated) DEVICE — FLOSEAL MATRIX IS INDICATED IN SURGICAL PROCEDURES (OTHER THAN IN OPHTHALMIC) AS AN ADJUNCT TO HEMOSTASIS WHEN CONTROL OF BLEEDING BY LIGATURE OR CONVENTIONAL PROCEDURES IS INEFFECTIVE OR IMPRACTICAL.: Brand: FLOSEAL HEMOSTATIC MATRIX

## (undated) DEVICE — CATH DIAG EXPO .056 LCB 6F 100CM

## (undated) DEVICE — STERILE POLYISOPRENE POWDER-FREE SURGICAL GLOVES: Brand: PROTEXIS

## (undated) DEVICE — GLV SURG SENSICARE GREEN W/ALOE PF LF 6.5 STRL

## (undated) DEVICE — COVER,C-ARM,41X74: Brand: MEDLINE

## (undated) DEVICE — SUT MNCRYL 3/0 Y936H

## (undated) DEVICE — 3.0MM PRECISION NEURO (MATCH HEAD)

## (undated) DEVICE — GW PERIPH GUIDERIGHT STD/J/TP PTFE/PCOAT SS 0.038IN 5X150CM

## (undated) DEVICE — CATH URETRL OPEN END W/CONNECT 5F 70CM

## (undated) DEVICE — GLV SURG TRIUMPH LT PF LTX 6.5 STRL

## (undated) DEVICE — GLV SURG SENSICARE GREEN W/ALOE PF LF 7 STRL

## (undated) DEVICE — EVAC BLDR UROVAC W ADAPT

## (undated) DEVICE — ADHS LIQ MASTISOL 2/3ML

## (undated) DEVICE — CATH DIAG EXPO .056 IM 6F 100CM

## (undated) DEVICE — SOL IRR NACL 0.9PCT BT 1000ML

## (undated) DEVICE — NDL SPINE 18G 31/2IN PNK

## (undated) DEVICE — SOL IRR NACL 0.9PCT 3000ML

## (undated) DEVICE — CONTAINER,SPECIMEN,OR STERILE,4OZ: Brand: MEDLINE

## (undated) DEVICE — UNDRPD BREATH 23X36 BG/10

## (undated) DEVICE — GLW STD STR 3CM .035X150CM

## (undated) DEVICE — INTRO SHEATH ART/FEM ENGAGE .038 6F12CM

## (undated) DEVICE — ADAPT CATH URETRL

## (undated) DEVICE — CATH DIAG EXPO M/ PK 6FR FL4/FR4 PIG 3PK

## (undated) DEVICE — MODEL BT2000 P/N 700287-012KIT CONTENTS: MANIFOLD WITH SALINE AND CONTRAST PORTS, SALINE TUBING WITH SPIKE AND HAND SYRINGE, TRANSDUCER: Brand: BT2000 AUTOMATED MANIFOLD KIT

## (undated) DEVICE — SPNG DISSCT SECTO KTTNER PK/5

## (undated) DEVICE — GAUZE,SPONGE,4"X4",16PLY,XRAY,STRL,LF: Brand: MEDLINE

## (undated) DEVICE — DRSNG SURESITE123 4X10IN

## (undated) DEVICE — TREK CORONARY DILATATION CATHETER 2.50 MM X 20 MM / RAPID-EXCHANGE: Brand: TREK

## (undated) DEVICE — SUT VICRYL O M0-4 27IN ETHCPP71D

## (undated) DEVICE — CAUTERY TIP POLISHER: Brand: DEVON

## (undated) DEVICE — CATH GUIDE LAUNCHER AR1.0 6F 100CM

## (undated) DEVICE — HI-TORQUE BALANCE MIDDLEWEIGHT GUIDE WIRE .014 J TIP 3.0 CM X 190 CM: Brand: HI-TORQUE BALANCE MIDDLEWEIGHT

## (undated) DEVICE — PK CATH LAB 60

## (undated) DEVICE — SOL IRRG H2O PL/BG 1000ML STRL

## (undated) DEVICE — MODEL AT P65, P/N 701554-001KIT CONTENTS: HAND CONTROLLER, 3-WAY HIGH-PRESSURE STOPCOCK WITH ROTATING END AND PREMIUM HIGH-PRESSURE TUBING: Brand: ANGIOTOUCH® KIT

## (undated) DEVICE — SUT SILK 0 FSL 18IN 678G

## (undated) DEVICE — GLV SURG SENSICARE GREEN W/ALOE PF LF 8 STRL

## (undated) DEVICE — SYS IRR PUMP SGL ACTN VAC SYR 10CC

## (undated) DEVICE — ELECTRODE,RT,STRESS,FOAM,50PK: Brand: MEDLINE

## (undated) DEVICE — KT DRN EVAC WND PVC PCH WTROC RND 19F400

## (undated) DEVICE — SUT VIC 2 CP 27IN UD VCP195H

## (undated) DEVICE — SUT VICRYL 2-0  X-1 27IN ETVCP459H PLS

## (undated) DEVICE — GLV SURG SENSICARE ALOE LF PF SZ7.5 GRN

## (undated) DEVICE — ANTIBACTERIAL UNDYED BRAIDED (POLYGLACTIN 910), SYNTHETIC ABSORBABLE SUTURE: Brand: COATED VICRYL

## (undated) DEVICE — SPNG GZ WOVN 4X4IN 12PLY 10/BX STRL

## (undated) DEVICE — SPNG LAP 18X18IN LF STRL PK/5

## (undated) DEVICE — SUT NLY 2/0 664G

## (undated) DEVICE — HI-TORQUE WHISPER MS GUIDE WIRE .014 J TIP 3.0 CM X 190 CM: Brand: HI-TORQUE WHISPER

## (undated) DEVICE — ARTERIAL NEEDLE: Brand: UNBRANDED

## (undated) DEVICE — DEV INFL MONARCH 20ML

## (undated) DEVICE — 1010 S-DRAPE TOWEL DRAPE 10/BX: Brand: STERI-DRAPE™

## (undated) DEVICE — CATH URETRL OPN/END 5F70CM